# Patient Record
Sex: FEMALE | Race: WHITE | NOT HISPANIC OR LATINO | Employment: OTHER | URBAN - NONMETROPOLITAN AREA
[De-identification: names, ages, dates, MRNs, and addresses within clinical notes are randomized per-mention and may not be internally consistent; named-entity substitution may affect disease eponyms.]

---

## 2017-01-03 DIAGNOSIS — C50.412 MALIGNANT NEOPLASM OF UPPER-OUTER QUADRANT OF LEFT FEMALE BREAST (HCC): Primary | ICD-10-CM

## 2017-01-04 ENCOUNTER — INFUSION (OUTPATIENT)
Dept: ONCOLOGY | Facility: HOSPITAL | Age: 72
End: 2017-01-04
Attending: INTERNAL MEDICINE

## 2017-01-04 ENCOUNTER — OFFICE VISIT (OUTPATIENT)
Dept: ONCOLOGY | Facility: CLINIC | Age: 72
End: 2017-01-04

## 2017-01-04 ENCOUNTER — LAB (OUTPATIENT)
Dept: ONCOLOGY | Facility: CLINIC | Age: 72
End: 2017-01-04

## 2017-01-04 VITALS
HEART RATE: 60 BPM | DIASTOLIC BLOOD PRESSURE: 72 MMHG | TEMPERATURE: 98 F | OXYGEN SATURATION: 98 % | BODY MASS INDEX: 29.24 KG/M2 | WEIGHT: 175.5 LBS | SYSTOLIC BLOOD PRESSURE: 138 MMHG | HEIGHT: 65 IN | RESPIRATION RATE: 16 BRPM

## 2017-01-04 VITALS
RESPIRATION RATE: 20 BRPM | OXYGEN SATURATION: 99 % | SYSTOLIC BLOOD PRESSURE: 148 MMHG | TEMPERATURE: 97.6 F | DIASTOLIC BLOOD PRESSURE: 56 MMHG | HEART RATE: 61 BPM

## 2017-01-04 DIAGNOSIS — C50.412 MALIGNANT NEOPLASM OF UPPER-OUTER QUADRANT OF LEFT FEMALE BREAST (HCC): ICD-10-CM

## 2017-01-04 DIAGNOSIS — C50.412 MALIGNANT NEOPLASM OF UPPER-OUTER QUADRANT OF LEFT FEMALE BREAST (HCC): Primary | ICD-10-CM

## 2017-01-04 DIAGNOSIS — C79.51 BONE METASTASIS: Primary | ICD-10-CM

## 2017-01-04 LAB
ALBUMIN SERPL-MCNC: 4.1 G/DL (ref 3.5–5)
ALBUMIN/GLOB SERPL: 1 G/DL
ALP SERPL-CCNC: 83 U/L (ref 38–126)
ALT SERPL W P-5'-P-CCNC: 24 U/L (ref 9–52)
ANION GAP SERPL CALCULATED.3IONS-SCNC: 14 MMOL/L
AST SERPL-CCNC: 27 U/L (ref 5–40)
AUTO MIXED CELLS #: 0.3 10*3/UL (ref 0.1–1.5)
AUTO MIXED CELLS %: 4.7 % (ref 0.2–15.1)
BILIRUB SERPL-MCNC: 0.4 MG/DL (ref 0.2–1.3)
BUN BLD-MCNC: 17 MG/DL (ref 7–26)
BUN/CREAT SERPL: 13.1 (ref 7–25)
CALCIUM SPEC-SCNC: 9.3 MG/DL (ref 8.4–10.2)
CHLORIDE SERPL-SCNC: 103 MMOL/L (ref 98–107)
CO2 SERPL-SCNC: 27 MMOL/L (ref 22–30)
CREAT BLD-MCNC: 1.3 MG/DL (ref 0.7–1.4)
ERYTHROCYTE [DISTWIDTH] IN BLOOD BY AUTOMATED COUNT: 14.3 % (ref 11.5–14.5)
GFR SERPL CREATININE-BSD FRML MDRD: 40 ML/MIN/1.73
GLOBULIN UR ELPH-MCNC: 4.2 GM/DL
GLUCOSE BLD-MCNC: 120 MG/DL (ref 75–110)
HCT VFR BLD AUTO: 38.4 % (ref 37–47)
HGB BLD-MCNC: 11.2 G/DL (ref 12–16)
LYMPHOCYTES # BLD AUTO: 2.1 10*3/MM3 (ref 0.8–7)
LYMPHOCYTES NFR BLD AUTO: 34 % (ref 10–58.5)
MCH RBC QN AUTO: 34.5 PG (ref 27–31)
MCHC RBC AUTO-ENTMCNC: 29.2 G/DL (ref 33–37)
MCV RBC AUTO: 118.2 FL (ref 81–99)
NEUTROPHILS # BLD AUTO: 3.7 10*3/MM3 (ref 2–7.8)
NEUTROPHILS NFR BLD AUTO: 61.3 % (ref 37–92)
PLATELET # BLD AUTO: 437 10*3/MM3 (ref 130–400)
PMV BLD AUTO: 6.3 FL (ref 6–12)
POTASSIUM BLD-SCNC: 4.4 MMOL/L (ref 3.6–5)
PROT SERPL-MCNC: 8.3 G/DL (ref 6.3–8.2)
RBC # BLD AUTO: 3.25 10*6/MM3 (ref 4.2–5.4)
SODIUM BLD-SCNC: 144 MMOL/L (ref 137–145)
WBC NRBC COR # BLD: 6.1 10*3/MM3 (ref 4.8–10.8)

## 2017-01-04 PROCEDURE — 96402 CHEMO HORMON ANTINEOPL SQ/IM: CPT

## 2017-01-04 PROCEDURE — 99214 OFFICE O/P EST MOD 30 MIN: CPT | Performed by: INTERNAL MEDICINE

## 2017-01-04 PROCEDURE — 25010000002 FULVESTRANT PER 25 MG: Performed by: INTERNAL MEDICINE

## 2017-01-04 PROCEDURE — 80053 COMPREHEN METABOLIC PANEL: CPT | Performed by: INTERNAL MEDICINE

## 2017-01-04 PROCEDURE — 85025 COMPLETE CBC W/AUTO DIFF WBC: CPT | Performed by: INTERNAL MEDICINE

## 2017-01-04 PROCEDURE — 36415 COLL VENOUS BLD VENIPUNCTURE: CPT | Performed by: INTERNAL MEDICINE

## 2017-01-04 RX ADMIN — FULVESTRANT 500 MG: 50 INJECTION INTRAMUSCULAR at 14:26

## 2017-01-04 NOTE — PROGRESS NOTES
[facility]  HEMATOLOGY & ONCOLOGY        Subjective  patient is here for chemotherapy   Staging form: Breast, AJCC V7      Clinical stage from 10/11/2016: Stage IV (T2, N1, M1) - ER/VT positive. HER2/maynor negative  VISIT DIAGNOSIS:   Encounter Diagnosis   Name Primary?   • Malignant neoplasm of upper-outer quadrant of left female breast             HEMATOLOGY / ONCOLOGY HISTORY:   Oncology/Hematology History    Patient is a 67-year-old postmenopausal female who had onset of her menses at age 9 and menopause at age 48. She received oral contraceptives for approximately 14 years and did not receive hormonal manipulation. Patient has a maternal cousin had breast cancer. Patient has had no prior breast biopsies. Patient found a palpable left breast mass as well as a left axillary mass. Patient had a bloody nipple discharge which been present for approximately 6 months. On 6/20/2012, a mammogram was performed showing a subareolar mass consistent with malignancy. Bilateral breast ultrasound revealed an ill-defined subareolar mass measuring 2.8-2.5 cm. There is a left axillary mass measuring 1.7 cm with suspicion of extracapsular extension. There is no evidence of disease in the right breast. On 7/9/2012 patient underwent a left breast biopsy and placement of marker clip. Left breast biopsy revealed an infiltrating lobular carcinoma grade 2 with focal ductal carcinoma in situ, solid pattern. A fine-needle biopsy of the left lymph node was consistent with metastatic carcinoma. Breast tumor profile revealed ER: 100%; VT: 98%; HER-2/maynor 2+; FISH: Unamplified; Ki-67: 71%; P 53 1%; DNA aneuploidy. A MRI of the breast were performed body multiple abnormal enhancing masses within the left breast. There appears to be anterior retraction of the pectoralis muscle with no direct extension. There is a moderate to large, layering left pleural effusion appreciated. The right breast reveals 3 moderately enlarged lymph  nodes in the posterior lateral aspect the right breast. These have fatty hilum but followup is recommended. Patient is undergone systemic studies including, on 8/2/2012, a CT scan of the brain showing atrophy. a CT scan that shows showing a small to moderate left pleural effusion with 3 subcentimeter nodule densities in the left lung.   She completed 4 cycles of neoadjuvant treatment with dose dense Adriamycin and Cytoxan. She had a mammogram which showed a significant reduction in the size of her left breast lesion. There is no axillary adenopathy noted. She has had an ultrasound revealing the  lesion reducing from 2.4 cm to 1 cm. Patient underwent a left mastectomy on 03/19/2013 finding negative invasive cancer, negative nodes. A 5% DCIS was noted. The patient has declined hormonal therapy. She did not need radiation therapy.  November 2014, she began to have evidence of lytic bone lesions at T5T6, frontal disease, an 8 mm lucency in the central frontal area of  the skull but nothing intracranial. Bone scan revealed only vertex tracer activity but bilateral ribs and thoracic spine, and other lesions in  her pelvis. At that time, she was started on letrozole since November 2014. She is taking Xgeva. She had progression found in bone in Feb 2015 on scan. She was started on Faslodex, Ibrance and continued xgeva.         Malignant neoplasm of upper-outer quadrant of left female breast    7/9/2012 Initial Diagnosis    Malignant neoplasm of upper-outer quadrant of left female breast      7/10/2012 Biopsy    Left Breast Biopsy & Axillary Node FNA: A) Infiltrating lobular carcinoma, Grade 2. B) Foci of ductal carcinoma in situ, solid pattern. C) Greatest dimension of the invasive carcinoma is 15.8mm.      3/19/2013 Surgery    Left Mastectomy w/ Axillary Tail: Focal residual ducatal carcinoma in situ (DCIS) 12 lymph nodes negative for metastatic carcinoma.      10/20/2014 Biopsy    Peritoneum Biopsy: Final Diagnosis:  Malignant Cell Neoplasm.         Cancer Staging Information:  Malignant neoplasm of upper-outer quadrant of left female breast    Staging form: Breast, AJCC V7      Clinical stage from 10/11/2016: Stage IV (T2, N1, M1) - Signed by ALBERT Kulkarni on 10/11/2016      INTERVAL HISTORY  Patient ID: Heavenly Yanes is a 71 y.o. year old female history of metastatic breast carcinoma to lung and bone.  She is presently receiving treatment with Ibrance, Faslodex and Xgeva.  Tumor markers continued to decline with a CA 27-29 of 92.2 obtained on 9:30 2016 compared to 105.8 on 07/27/2016.  Mr. Yanes continues to tolerate this treatment regimen well and is here today for evaluation consideration to receive Faslodex and Xgeva.  Last bone mineral density study was obtained 11/21/2014 indicating osteopopenia, we will obtain a repeat bone mineral density study. Ms. Yanes indicates that she needs to have some significant dental work completed to include having 8 teeth pulled and implants placed to her lower jaw.  Discussed with Mrs. Yanes that this would have to be postponed for 3 months after her last dose of Xgeva which was given on 09/29/2016 and could not be restarted until 3 months after completion of dental work. She will have her dental work completed January 12th 2017  and if all goes well returned to Xgeva in April.  We will continue with the Ibrance and Faslodex as prescribed.  she has no new complaints today and indicates that otherwise she is doing well.           A 14 point review of systems is listed below and outlines the contribution of comorbidities to the activities of daily living. Acute complaints from review of systems have been evaluated and addressed with patient. Review of chronic complaints have also been reviewed with patient and are without change from previous exam as indicated below.      Past Medical History:   Past Medical History   Diagnosis Date   • Bipolar disorder    • Disease of thyroid  gland    • Hypertension    • Malignant neoplasm of upper-outer quadrant of left female breast 9/18/2016   • Secondary malignant neoplasm of bone and bone marrow 9/18/2016     Past Surgical History:   Past Surgical History   Procedure Laterality Date   • External ear surgery     • Breast surgery       left breast biopsy and axillary node   • Portacath placement     • Cholecystectomy     • Mastectomy Left      Social History:   Social History     Social History   • Marital status:      Spouse name: N/A   • Number of children: N/A   • Years of education: N/A     Occupational History   • Not on file.     Social History Main Topics   • Smoking status: Never Smoker   • Smokeless tobacco: Never Used   • Alcohol use No   • Drug use: No   • Sexual activity: Not on file     Other Topics Concern   • Not on file     Social History Narrative     Family History:   Family History   Problem Relation Age of Onset   • No Known Problems Daughter    • No Known Problems Son    • Other Mother      complications from a concussion from a fall   • Other Father      old age   • No Known Problems Brother    • No Known Problems Daughter        Review of Systems   Constitutional: Negative.  Negative for activity change, appetite change, chills, diaphoresis, fatigue and fever.   HENT: Negative.  Negative for congestion, ear discharge, ear pain, facial swelling, hearing loss, mouth sores, nosebleeds, postnasal drip, rhinorrhea, sinus pressure, sore throat, tinnitus, trouble swallowing and voice change.    Eyes: Negative.  Negative for photophobia, pain, discharge and visual disturbance.   Respiratory: Negative.  Negative for apnea, cough, chest tightness, shortness of breath, wheezing and stridor.    Cardiovascular: Negative.  Negative for chest pain, palpitations and leg swelling.   Gastrointestinal: Negative.  Negative for abdominal distention, abdominal pain, blood in stool, constipation, diarrhea, nausea, rectal pain and vomiting.    Endocrine: Negative.  Negative for cold intolerance, heat intolerance, polydipsia, polyphagia and polyuria.   Genitourinary: Negative.  Negative for difficulty urinating, dysuria, flank pain, frequency, hematuria and urgency.   Musculoskeletal: Negative.  Negative for arthralgias, back pain, gait problem, joint swelling and myalgias.   Skin: Negative.  Negative for color change, pallor, rash and wound.   Allergic/Immunologic: Negative.  Negative for environmental allergies, food allergies and immunocompromised state.   Neurological: Negative.  Negative for dizziness, tremors, seizures, syncope, speech difficulty, weakness, light-headedness, numbness and headaches.   Hematological: Negative.  Negative for adenopathy. Does not bruise/bleed easily.   Psychiatric/Behavioral: Negative.  Negative for agitation, confusion, hallucinations, sleep disturbance and suicidal ideas. The patient is not nervous/anxious.    All other systems reviewed and are negative.       Performance Status:  Asymptomatic    Medications:    Current Outpatient Prescriptions   Medication Sig Dispense Refill   • B Complex Vitamins (VITAMIN B COMPLEX PO) Take  by mouth. 1 po daily     • Calcium Carb-Cholecalciferol (CALCIUM 600 + D PO) Take  by mouth.     • celecoxib (CeleBREX) 200 MG capsule Take 200 mg by mouth 2 (two) times a day.     • DULoxetine (CYMBALTA) 30 MG capsule Take 30 mg by mouth daily.     • ferrous sulfate 325 (65 FE) MG tablet Take 325 mg by mouth 2 (two) times a day.     • Lysine HCl (L-LYSINE) 500 MG tablet tablet Take  by mouth daily.     • Magnesium 250 MG tablet Take  by mouth.     • melatonin 3 MG tablet Take  by mouth.     • metoprolol tartrate (LOPRESSOR) 25 MG tablet Take 25 mg by mouth 2 (two) times a day.     • nystatin (NYSTOP) 053059 UNIT/GM powder powder Apply  topically. prn     • OLANZapine (ZYPREXA) 2.5 MG tablet Take 2.5 mg by mouth every night.     • omeprazole (PriLOSEC) 40 MG capsule Take 40 mg by mouth Daily.      • Palbociclib 125 MG capsule capsule Take 125 mg by mouth daily. 3 weeks on, 1 week off     • Probiotic Product (PROBIOTIC DAILY PO) Take  by mouth. 300million cell daily     • thyroid 60 MG PO tablet Take 60 mg by mouth daily.     • VALERIAN ROOT PO Take  by mouth. 100mg daily     • Zinc 50 MG tablet Take  by mouth. 1 po daily       No current facility-administered medications for this visit.        ALLERGIES:    Allergies   Allergen Reactions   • Codeine    • Heparin    • Other      POPPY SEED   • Penicillins    • Petroleum Jelly [Skin Protectants, Misc.]    • Sulfa Antibiotics    • Valium [Diazepam]        Objective      There were no vitals filed for this visit.      No flowsheet data found.    General Appearance: Patient is awake, alert, oriented and in no acute distress. Patient is welldeveloped, wellnourished, and appears stated age.  HEENT: Normocephalic. Sclerae clear, conjunctiva pink, extraocular movements intact, pupils, round, reactive to light and accommodation. Mouth and throat are clear with moist oral mucosa.  NECK: Supple, no jugular venous distention, thyroid not enlarged.  LYMPH: No cervical, supraclavicular, axillary, or inguinal lymphadenopathy.  CHEST: Equal bilateral expansion   LUNGS: Good air movement, no rales, rhonchi, rubs or wheezes with auscultation  CARDIO: Regular sinus rhythm, no murmurs, gallops or rubs.  ABDOMEN: Nondistended, soft, No tenderness, no guarding, no rebound, No hepatosplenomegaly. No abdominal masses. Bowel sounds positive. No hernia  GENITALIA: Not examined.  BREASTS: Not examined.  MUSKEL: No joint swelling, decreased motion, or inflammation  EXTREMS: No edema, clubbing, cyanosis, No varicose veins.  NEURO: Grossly nonfocal, Gait is coordinated and smooth, Cognition is preserved.  SKIN: No rashes, no ecchymoses, no petechia.  PSYCH: Oriented to time, place and person. Memory is preserved. Mood and affect appear normal      RECENT LABS:  WBC   Date Value Ref  Range Status   01/04/2017 6.10 4.80 - 10.80 10*3/mm3 Final     RBC   Date Value Ref Range Status   01/04/2017 3.25 (L) 4.20 - 5.40 10*6/mm3 Final     HEMOGLOBIN   Date Value Ref Range Status   01/04/2017 11.2 (L) 12.0 - 16.0 g/dL Final     HEMATOCRIT   Date Value Ref Range Status   01/04/2017 38.4 37.0 - 47.0 % Final     MCV   Date Value Ref Range Status   01/04/2017 118.2 (H) 81.0 - 99.0 fL Final     MCH   Date Value Ref Range Status   01/04/2017 34.5 (H) 27.0 - 31.0 pg Final     MCHC   Date Value Ref Range Status   01/04/2017 29.2 (L) 33.0 - 37.0 g/dL Final     RDW   Date Value Ref Range Status   01/04/2017 14.3 11.5 - 14.5 % Final     MPV   Date Value Ref Range Status   01/04/2017 6.3 6.0 - 12.0 fL Final     PLATELETS   Date Value Ref Range Status   01/04/2017 437 (H) 130 - 400 10*3/mm3 Final     NEUTROPHIL %   Date Value Ref Range Status   01/04/2017 61.3 37.0 - 92.0 % Final     LYMPHOCYTE %   Date Value Ref Range Status   01/04/2017 34.0 10.0 - 58.5 % Final     NEUTROPHILS, ABSOLUTE   Date Value Ref Range Status   01/04/2017 3.70 2.00 - 7.80 10*3/mm3 Final     LYMPHOCYTES, ABSOLUTE   Date Value Ref Range Status   01/04/2017 2.10 0.80 - 7.00 10*3/mm3 Final     No visits with results within 7 Day(s) from this visit.  Latest known visit with results is:    Office Visit on 11/30/2016   Component Date Value Ref Range Status   • CA 27.29 11/30/2016 83.5* 0.0 - 38.6 U/mL Final    [facility]/ACS methodology   • CEA 11/30/2016 1.43  0.00 - 5.00 ng/mL Final       RADIOLOGY:  No results found.         Assessment/Plan     Patient Active Problem List   Diagnosis   • Malignant neoplasm of upper-outer quadrant of left female breast   • Secondary malignant neoplasm of bone and bone marrow   • Essential hypertension   • Acquired hypothyroidism   • Bipolar 1 disorder      Results for VÍCTOR BERNARD (MRN [id number]) as of 1/4/2017 12:45   Ref. Range 9/29/2016 13:02 11/30/2016 08:56   CA 27.29 Latest Ref Range: 0.0 - 38.6 U/mL  92.2 (H) 83.5 (H)   CEA Latest Ref Range: 0.00 - 5.00 ng/mL 1.55 1.43     Assessment:  1. Stage IV metastatic breast carcinoma, ER/AR positive. HER2/maynor negative undergoing Ibrance, Faslodex and Xgeva with overall stable with stable CA27.29 with slow decline. She is tolerating the treatment very well. Faslodex cycle 16 today and will continue to HOLD Xgeva for a total of 6 months for dental work, last dose of Xgeva given on 09/29/2016.    2. Chronic kidney disease overall stable.  3.  Needing 8 teeth pulled from lower jaw with plans from implants. Will hold Xgeva and patient will postpone procedures Juanuary 2017.      Recommendation/Plan:  1. CBC and CMP today and upon return.  2. Continue Faslodex and Ibrance.  3. HOLD Xgeva. Tentatively untill April 2017 for dental work.   3. Follow up in 28 days for Faslodex.  4. Advise patient to call with any problems between now and next appointment.  5. Tumor markers, ct scans and bone scan before next visit.         .    Delonte Childress MD    1/4/2017    12:43 PM

## 2017-01-04 NOTE — MR AVS SNAPSHOT
[facility] HEMATOLOGY & ONCOLOGY  [phone]                    Heavenly Yanes   1/4/2017 1:00 PM   Office Visit    Dept Phone:  [phone]   Encounter #:  [id number]    Provider:  Delonte Childress MD   Department:  [facility] HEMATOLOGY & ONCOLOGY                Your Full Care Plan              Your Updated Medication List          This list is accurate as of: 1/4/17  1:48 PM.  Always use your most recent med list.                CALCIUM 600 + D PO       celecoxib 200 MG capsule   Commonly known as:  CeleBREX       DULoxetine 30 MG capsule   Commonly known as:  CYMBALTA       ferrous sulfate 325 (65 FE) MG tablet       l-lysine 500 MG tablet tablet       Magnesium 250 MG tablet       melatonin 3 MG tablet       metoprolol tartrate 25 MG tablet   Commonly known as:  LOPRESSOR       nystatin 051290 UNIT/GM powder powder       OLANZapine 2.5 MG tablet   Commonly known as:  zyPREXA       omeprazole 40 MG capsule   Commonly known as:  priLOSEC       Palbociclib 125 MG capsule capsule       PROBIOTIC DAILY PO       Thyroid 60 MG tablet   Commonly known as:  ARMOUR       VALERIAN ROOT PO       VITAMIN B COMPLEX PO       Zinc 50 MG tablet               We Performed the Following     Clinic Appointment Request       You Were Diagnosed With        Codes Comments    Bone metastasis    -  Primary ICD-10-CM: C79.51  ICD-9-CM: 198.5     Malignant neoplasm of upper-outer quadrant of left female breast     ICD-10-CM: C50.412  ICD-9-CM: 174.4       Instructions     None    Patient Instructions History      Upcoming Appointments     Visit Type Date Time Department    FOLLOW UP 1 UNIT 1/4/2017  1:00 PM [facility] [place]    LAB 1/4/2017 12:45 PM [facility] [place]    ONCOLOGY TREAT - [place] 1/4/2017  1:30 PM  [facility]    FOLLOW UP 1 UNIT 2/1/2017  8:50 AM [facility] [place]    LAB 2/1/2017  8:30 AM [facility] [place]      MyChart Signup     Our records indicate that you have declined [facility]  "MyChart signup. If you would like to sign up for [employer], please email [email or web address] or call [phone] to obtain an activation code.             Other Info from Your Visit           Your Appointments     Feb 01, 2017  8:30 AM CST   LAB with [facility]   [facility] HEMATOLOGY & ONCOLOGY ([place])    [address] [address]   [phone]            Feb 01, 2017  8:50 AM CST   FOLLOW UP with Neptali Tolbert MD   [facility] HEMATOLOGY & ONCOLOGY ([place])    [address] [address]   [phone]              Allergies     Codeine      Heparin      Other      POPPY SEED    Penicillins      Petroleum Jelly [Skin Protectants, Misc.]      Sulfa Antibiotics      Valium [Diazepam]        Vital Signs     Blood Pressure Pulse Temperature Respirations Height Weight    138/72 60 98 °F (36.7 °C) (Tympanic) 16 65\" (165.1 cm) 175 lb 8 oz (79.6 kg)    Last Menstrual Period Oxygen Saturation Body Mass Index Smoking Status          (LMP Unknown) 98% 29.2 kg/m2 Never Smoker        Problems and Diagnoses Noted     Breast cancer    Bone metastasis    -  Primary        "

## 2017-01-11 ENCOUNTER — HOSPITAL ENCOUNTER (OUTPATIENT)
Dept: NUCLEAR MEDICINE | Age: 72
Discharge: HOME | End: 2017-01-11
Payer: MEDICARE

## 2017-01-11 ENCOUNTER — HOSPITAL ENCOUNTER (OUTPATIENT)
Dept: CT IMAGING | Age: 72
Discharge: HOME | End: 2017-01-11
Payer: MEDICARE

## 2017-01-11 DIAGNOSIS — C50.412 MALIGNANT NEOPLASM OF UPPER-OUTER QUADRANT OF LEFT FEMALE BREAST (HCC): ICD-10-CM

## 2017-01-11 DIAGNOSIS — C79.51 BONE METASTASIS (HCC): ICD-10-CM

## 2017-01-11 LAB
GFR NON-AFRICAN AMERICAN: 32
PERFORMED ON: ABNORMAL
POC CREATININE: 1.6 MG/DL (ref 0.3–1.3)
POC SAMPLE TYPE: ABNORMAL

## 2017-01-11 PROCEDURE — 74177 CT ABD & PELVIS W/CONTRAST: CPT

## 2017-01-11 PROCEDURE — 71260 CT THORAX DX C+: CPT

## 2017-01-11 PROCEDURE — A9561 TC99M OXIDRONATE: HCPCS | Performed by: INTERNAL MEDICINE

## 2017-01-11 PROCEDURE — 78306 BONE IMAGING WHOLE BODY: CPT

## 2017-01-11 PROCEDURE — 3430000000 HC RX DIAGNOSTIC RADIOPHARMACEUTICAL: Performed by: INTERNAL MEDICINE

## 2017-01-11 PROCEDURE — 82565 ASSAY OF CREATININE: CPT

## 2017-01-11 PROCEDURE — 6360000004 HC RX CONTRAST MEDICATION: Performed by: INTERNAL MEDICINE

## 2017-01-11 RX ADMIN — IOPAMIDOL 50 ML: 755 INJECTION, SOLUTION INTRAVENOUS at 08:44

## 2017-01-11 RX ADMIN — TECHNETIUM TC 99M OXIDRONATE 20 MILLICURIE: 3.15 INJECTION, POWDER, LYOPHILIZED, FOR SOLUTION INTRAVENOUS at 13:15

## 2017-02-01 ENCOUNTER — LAB (OUTPATIENT)
Dept: ONCOLOGY | Facility: CLINIC | Age: 72
End: 2017-02-01

## 2017-02-01 ENCOUNTER — OFFICE VISIT (OUTPATIENT)
Dept: ONCOLOGY | Facility: CLINIC | Age: 72
End: 2017-02-01

## 2017-02-01 ENCOUNTER — INFUSION (OUTPATIENT)
Dept: ONCOLOGY | Facility: HOSPITAL | Age: 72
End: 2017-02-01
Attending: INTERNAL MEDICINE

## 2017-02-01 ENCOUNTER — OFFICE VISIT (OUTPATIENT)
Dept: CARDIOLOGY | Age: 72
End: 2017-02-01
Payer: COMMERCIAL

## 2017-02-01 VITALS
WEIGHT: 180 LBS | BODY MASS INDEX: 29.99 KG/M2 | HEART RATE: 74 BPM | SYSTOLIC BLOOD PRESSURE: 130 MMHG | HEIGHT: 65 IN | DIASTOLIC BLOOD PRESSURE: 78 MMHG

## 2017-02-01 VITALS
HEART RATE: 58 BPM | RESPIRATION RATE: 18 BRPM | SYSTOLIC BLOOD PRESSURE: 172 MMHG | TEMPERATURE: 96.9 F | DIASTOLIC BLOOD PRESSURE: 80 MMHG | OXYGEN SATURATION: 99 %

## 2017-02-01 VITALS
RESPIRATION RATE: 16 BRPM | DIASTOLIC BLOOD PRESSURE: 70 MMHG | HEART RATE: 56 BPM | OXYGEN SATURATION: 98 % | TEMPERATURE: 97.6 F | HEIGHT: 65 IN | BODY MASS INDEX: 30.52 KG/M2 | SYSTOLIC BLOOD PRESSURE: 124 MMHG | WEIGHT: 183.2 LBS

## 2017-02-01 DIAGNOSIS — C50.412 MALIGNANT NEOPLASM OF UPPER-OUTER QUADRANT OF LEFT FEMALE BREAST (HCC): Primary | ICD-10-CM

## 2017-02-01 DIAGNOSIS — I10 ESSENTIAL HYPERTENSION: ICD-10-CM

## 2017-02-01 DIAGNOSIS — E53.8 B12 DEFICIENCY: Primary | ICD-10-CM

## 2017-02-01 DIAGNOSIS — C50.412 MALIGNANT NEOPLASM OF UPPER-OUTER QUADRANT OF LEFT FEMALE BREAST (HCC): ICD-10-CM

## 2017-02-01 DIAGNOSIS — I48.91 ATRIAL FIBRILLATION, UNSPECIFIED TYPE (HCC): ICD-10-CM

## 2017-02-01 LAB
ALBUMIN SERPL-MCNC: 3.9 G/DL (ref 3.5–5)
ALBUMIN/GLOB SERPL: 1 G/DL
ALP SERPL-CCNC: 75 U/L (ref 38–126)
ALT SERPL W P-5'-P-CCNC: 23 U/L (ref 9–52)
ANION GAP SERPL CALCULATED.3IONS-SCNC: 12 MMOL/L
AST SERPL-CCNC: 44 U/L (ref 5–40)
AUTO MIXED CELLS #: 0.4 10*3/UL (ref 0.1–1.5)
AUTO MIXED CELLS %: 8.4 % (ref 0.2–15.1)
BILIRUB SERPL-MCNC: 0.4 MG/DL (ref 0.2–1.3)
BUN BLD-MCNC: 16 MG/DL (ref 7–26)
BUN/CREAT SERPL: 14.5 (ref 7–25)
CALCIUM SPEC-SCNC: 9.2 MG/DL (ref 8.4–10.2)
CHLORIDE SERPL-SCNC: 102 MMOL/L (ref 98–107)
CO2 SERPL-SCNC: 29 MMOL/L (ref 22–30)
CREAT BLD-MCNC: 1.1 MG/DL (ref 0.7–1.4)
ERYTHROCYTE [DISTWIDTH] IN BLOOD BY AUTOMATED COUNT: 15.2 % (ref 11.5–14.5)
GFR SERPL CREATININE-BSD FRML MDRD: 49 ML/MIN/1.73
GLOBULIN UR ELPH-MCNC: 3.8 GM/DL
GLUCOSE BLD-MCNC: 102 MG/DL (ref 75–110)
HCT VFR BLD AUTO: 35.3 % (ref 37–47)
HGB BLD-MCNC: 10.6 G/DL (ref 12–16)
LYMPHOCYTES # BLD AUTO: 1.7 10*3/MM3 (ref 0.8–7)
LYMPHOCYTES NFR BLD AUTO: 36.2 % (ref 10–58.5)
MCH RBC QN AUTO: 34.5 PG (ref 27–31)
MCHC RBC AUTO-ENTMCNC: 30 G/DL (ref 33–37)
MCV RBC AUTO: 114.9 FL (ref 81–99)
NEUTROPHILS # BLD AUTO: 2.7 10*3/MM3 (ref 2–7.8)
NEUTROPHILS NFR BLD AUTO: 55.4 % (ref 37–92)
PLATELET # BLD AUTO: 285 10*3/MM3 (ref 130–400)
PMV BLD AUTO: 6.8 FL (ref 6–12)
POTASSIUM BLD-SCNC: 4.7 MMOL/L (ref 3.6–5)
PROT SERPL-MCNC: 7.7 G/DL (ref 6.3–8.2)
RBC # BLD AUTO: 3.07 10*6/MM3 (ref 4.2–5.4)
SODIUM BLD-SCNC: 143 MMOL/L (ref 137–145)
WBC NRBC COR # BLD: 4.8 10*3/MM3 (ref 4.8–10.8)

## 2017-02-01 PROCEDURE — 25010000002 FULVESTRANT PER 25 MG: Performed by: INTERNAL MEDICINE

## 2017-02-01 PROCEDURE — 99214 OFFICE O/P EST MOD 30 MIN: CPT | Performed by: INTERNAL MEDICINE

## 2017-02-01 PROCEDURE — 36415 COLL VENOUS BLD VENIPUNCTURE: CPT | Performed by: INTERNAL MEDICINE

## 2017-02-01 PROCEDURE — 99213 OFFICE O/P EST LOW 20 MIN: CPT | Performed by: INTERNAL MEDICINE

## 2017-02-01 PROCEDURE — 85025 COMPLETE CBC W/AUTO DIFF WBC: CPT | Performed by: INTERNAL MEDICINE

## 2017-02-01 PROCEDURE — 96402 CHEMO HORMON ANTINEOPL SQ/IM: CPT

## 2017-02-01 PROCEDURE — 80053 COMPREHEN METABOLIC PANEL: CPT | Performed by: INTERNAL MEDICINE

## 2017-02-01 RX ADMIN — FULVESTRANT 500 MG: 50 INJECTION INTRAMUSCULAR at 10:21

## 2017-02-01 NOTE — TELEPHONE ENCOUNTER
Heavenly stated that she was supposed to start back on Ibrance but has not received her prescription yet.  I called [facility] and was told that they have tried to get in touch with Heavenly twice last month because she has to answer questions for them and so far she has not returned their call.  Called Heavenly and gave her the number to call at [facility]([phone]).

## 2017-02-01 NOTE — PROGRESS NOTES
[facility]  HEMATOLOGY & ONCOLOGY        Subjective  patient is here for chemotherapy   Staging form: Breast, AJCC V7      Clinical stage from 10/11/2016: Stage IV (T2, N1, M1) - ER/IN positive. HER2/maynor negative  VISIT DIAGNOSIS:   No diagnosis found.         HEMATOLOGY / ONCOLOGY HISTORY:   Oncology/Hematology History    Patient is a 67-year-old postmenopausal female who had onset of her menses at age 9 and menopause at age 48. She received oral contraceptives for approximately 14 years and did not receive hormonal manipulation. Patient has a maternal cousin had breast cancer. Patient has had no prior breast biopsies. Patient found a palpable left breast mass as well as a left axillary mass. Patient had a bloody nipple discharge which been present for approximately 6 months. On 6/20/2012, a mammogram was performed showing a subareolar mass consistent with malignancy. Bilateral breast ultrasound revealed an ill-defined subareolar mass measuring 2.8-2.5 cm. There is a left axillary mass measuring 1.7 cm with suspicion of extracapsular extension. There is no evidence of disease in the right breast. On 7/9/2012 patient underwent a left breast biopsy and placement of marker clip. Left breast biopsy revealed an infiltrating lobular carcinoma grade 2 with focal ductal carcinoma in situ, solid pattern. A fine-needle biopsy of the left lymph node was consistent with metastatic carcinoma. Breast tumor profile revealed ER: 100%; IN: 98%; HER-2/maynor 2+; FISH: Unamplified; Ki-67: 71%; P 53 1%; DNA aneuploidy. A MRI of the breast were performed body multiple abnormal enhancing masses within the left breast. There appears to be anterior retraction of the pectoralis muscle with no direct extension. There is a moderate to large, layering left pleural effusion appreciated. The right breast reveals 3 moderately enlarged lymph nodes in the posterior lateral aspect the right breast. These have fatty hilum but  followup is recommended. Patient is undergone systemic studies including, on 8/2/2012, a CT scan of the brain showing atrophy. a CT scan that shows showing a small to moderate left pleural effusion with 3 subcentimeter nodule densities in the left lung.   She completed 4 cycles of neoadjuvant treatment with dose dense Adriamycin and Cytoxan. She had a mammogram which showed a significant reduction in the size of her left breast lesion. There is no axillary adenopathy noted. She has had an ultrasound revealing the  lesion reducing from 2.4 cm to 1 cm. Patient underwent a left mastectomy on 03/19/2013 finding negative invasive cancer, negative nodes. A 5% DCIS was noted. The patient has declined hormonal therapy. She did not need radiation therapy.  November 2014, she began to have evidence of lytic bone lesions at T5T6, frontal disease, an 8 mm lucency in the central frontal area of  the skull but nothing intracranial. Bone scan revealed only vertex tracer activity but bilateral ribs and thoracic spine, and other lesions in  her pelvis. At that time, she was started on letrozole since November 2014. She is taking Xgeva. She had progression found in bone in Feb 2015 on scan. She was started on Faslodex, Ibrance and continued xgeva.         Malignant neoplasm of upper-outer quadrant of left female breast    7/9/2012 Initial Diagnosis    Malignant neoplasm of upper-outer quadrant of left female breast      7/10/2012 Biopsy    Left Breast Biopsy & Axillary Node FNA: A) Infiltrating lobular carcinoma, Grade 2. B) Foci of ductal carcinoma in situ, solid pattern. C) Greatest dimension of the invasive carcinoma is 15.8mm.      3/19/2013 Surgery    Left Mastectomy w/ Axillary Tail: Focal residual ducatal carcinoma in situ (DCIS) 12 lymph nodes negative for metastatic carcinoma.      10/20/2014 Biopsy    Peritoneum Biopsy: Final Diagnosis: Malignant Cell Neoplasm.        Secondary malignant neoplasm of bone and bone marrow     10/15/2012 -  Other Event    Left mammogram:  Impression:  1. Decreasing spiculation and density in the retroareolar left breast, near a previous biopsy.   2. Definite left axillary lesion is not appreciated.       2/5/2013 -  Other Event    Diagnostic Mammo:  Comparison is made to 10/15/2012 and 6/20/2012  The spiculated mass at 12:00 behind the nipple is nearly resolved.   Impression:  1. The mass on the left side is less apparent after receiving chemotherapy. There has been a good response to chmotherapy on the left side.   2. No suspicious abnormality is seen in the right breast to account for the new palable abnormality at the 4-5:00 position.       3/19/2013 Surgery    Breast Biopsy:  Final Diagnosis:  A. Breast, left mastectomy with axillary tail  1. Focal residual ductal carcinoma in situ (DICS) (less than 5% of tumor bed).  2. Negative for residual invasive carcinoma.   3. 12 Lymph Nodes, negative for metastatic carcinoma (0/12) focally, some lymph nodes demonstrate fibrosis/treatment effect.  B. Skin and soft tissue, left advancement flap:  1. Benign skin and subcutis.   2. Negative for carcinoma.       6/20/2013 -  Other Event    Diagnostic Mammo [facility]:  1. A subareolar mass is consistent with malignancy. Additional involement extends inferiorly sonographically and superolaterally mammograhically as demonstrated by calcifications. The mass measures approx 2.8 cm sonographically, but the involved region is likely much larger including an intraductal componet. The left axillary lymph nodes are also involved.   2. Recommened ultrasound guided biopsy of the left subareolar mass and left axillary lymph node.   3. Breast MRI could also further define multicentric disease on the left.   4. Clear suspicious finding on the right.  Recommend ongoing clinical follow up of palpable foci.       6/13/2014 -  Other Event    Diagnostic Mammo:  IMPRESSION:  1. History of left breast cancer and mastectomy. Stable  benign right breast mammograms.       10/20/2014 Surgery    Final Diagnosis:  Biopsies, pertoneum:  Metastatic carcinoma, morphologically compatible with metastatic mammary carcinoma.       6/15/2015 -  Other Event    Diagnostic Mammo:  IMPRESSION:   1. Negative x-ray report, should not delay biopsy if a dominat or clinically suspicious mass is present.       7/5/2016 -  Other Event    Diagnositic mammogram:  Impression:   Stable right mamogram with no radiographic findings suspicious for malignancy. Recommend continued screening mammography per screening guidelines unless otherwise earlier clinically indicated.       9/18/2016 Initial Diagnosis    Secondary malignant neoplasm of bone and bone marrow       Cancer Staging Information:  Malignant neoplasm of upper-outer quadrant of left female breast    Staging form: Breast, AJCC V7      Clinical stage from 10/11/2016: Stage IV (T2, N1, M1) - Signed by ALBERT Kulkarni on 10/11/2016      INTERVAL HISTORY  Patient ID: Heavenly Yanes is a 71 y.o. year old female history of metastatic breast carcinoma to lung and bone.  She is presently receiving treatment with Ibrance, Faslodex and Xgeva.  Tumor markers continued to decline with a CA 27-29 of 92.2 obtained on 9:30 2016 compared to 105.8 on 07/27/2016.  Mr. Yanes continues to tolerate this treatment regimen well and is here today for evaluation consideration to receive Faslodex and Xgeva.  Last bone mineral density study was obtained 11/21/2014 indicating osteopopenia, we will obtain a repeat bone mineral density study. Ms. Yanes indicates that she needs to have some significant dental work completed to include having 8 teeth pulled and implants placed to her lower jaw.  Discussed with Mrs. Yanes that this would have to be postponed for 3 months after her last dose of Xgeva which was given on 09/29/2016 and could not be restarted until 3 months after completion of dental work. She will have her dental work  completed January 12th 2017  and if all goes well returned to [facility] in April.  We will continue with the Ibrance and Faslodex as prescribed.  she has no new complaints today and indicates that otherwise she is doing well.           Past Medical History:   Past Medical History   Diagnosis Date   • Bipolar disorder    • Disease of thyroid gland    • Hypertension    • Malignant neoplasm of upper-outer quadrant of left female breast 9/18/2016   • Secondary malignant neoplasm of bone and bone marrow 9/18/2016     Past Surgical History:   Past Surgical History   Procedure Laterality Date   • External ear surgery     • Breast surgery       left breast biopsy and axillary node   • Portacath placement     • Cholecystectomy     • Mastectomy Left      Social History:   Social History     Social History   • Marital status:      Spouse name: N/A   • Number of children: N/A   • Years of education: N/A     Occupational History   • Not on file.     Social History Main Topics   • Smoking status: Never Smoker   • Smokeless tobacco: Never Used   • Alcohol use No   • Drug use: No   • Sexual activity: Not on file     Other Topics Concern   • Not on file     Social History Narrative     Family History:   Family History   Problem Relation Age of Onset   • No Known Problems Daughter    • No Known Problems Son    • Other Mother      complications from a concussion from a fall   • Other Father      old age   • No Known Problems Brother    • No Known Problems Daughter        Review of Systems   Constitutional: Negative.  Negative for activity change, appetite change, chills, diaphoresis, fatigue and fever.   HENT: Negative.  Negative for congestion, ear discharge, ear pain, facial swelling, hearing loss, mouth sores, nosebleeds, postnasal drip, rhinorrhea, sinus pressure, sore throat, tinnitus, trouble swallowing and voice change.    Eyes: Negative.  Negative for photophobia, pain, discharge and visual disturbance.   Respiratory:  Negative.  Negative for apnea, cough, chest tightness, shortness of breath, wheezing and stridor.    Cardiovascular: Negative.  Negative for chest pain, palpitations and leg swelling.   Gastrointestinal: Negative.  Negative for abdominal distention, abdominal pain, blood in stool, constipation, diarrhea, nausea, rectal pain and vomiting.   Endocrine: Negative.  Negative for cold intolerance, heat intolerance, polydipsia, polyphagia and polyuria.   Genitourinary: Negative.  Negative for difficulty urinating, dysuria, flank pain, frequency, hematuria and urgency.   Musculoskeletal: Negative.  Negative for arthralgias, back pain, gait problem, joint swelling and myalgias.   Skin: Negative.  Negative for color change, pallor, rash and wound.   Allergic/Immunologic: Negative.  Negative for environmental allergies, food allergies and immunocompromised state.   Neurological: Negative.  Negative for dizziness, tremors, seizures, syncope, speech difficulty, weakness, light-headedness, numbness and headaches.   Hematological: Negative.  Negative for adenopathy. Does not bruise/bleed easily.   Psychiatric/Behavioral: Negative.  Negative for agitation, confusion, hallucinations, sleep disturbance and suicidal ideas. The patient is not nervous/anxious.    All other systems reviewed and are negative.       Performance Status:  Asymptomatic    Medications:    Current Outpatient Prescriptions   Medication Sig Dispense Refill   • B Complex Vitamins (VITAMIN B COMPLEX PO) Take  by mouth. 1 po daily     • Calcium Carb-Cholecalciferol (CALCIUM 600 + D PO) Take  by mouth.     • celecoxib (CeleBREX) 200 MG capsule Take 200 mg by mouth 2 (two) times a day.     • DULoxetine (CYMBALTA) 30 MG capsule Take 30 mg by mouth daily.     • ferrous sulfate 325 (65 FE) MG tablet Take 325 mg by mouth 2 (two) times a day.     • Lysine HCl (L-LYSINE) 500 MG tablet tablet Take  by mouth daily.     • Magnesium 250 MG tablet Take  by mouth.     • melatonin  "3 MG tablet Take  by mouth.     • metoprolol tartrate (LOPRESSOR) 25 MG tablet Take 25 mg by mouth 2 (two) times a day.     • nystatin (NYSTOP) 319418 UNIT/GM powder powder Apply  topically. prn     • OLANZapine (ZYPREXA) 2.5 MG tablet Take 2.5 mg by mouth every night.     • omeprazole (PriLOSEC) 40 MG capsule Take 40 mg by mouth Daily.     • Palbociclib 125 MG capsule capsule Take 1 capsule by mouth Daily. 3 weeks on, 1 week off 21 capsule 5   • Probiotic Product (PROBIOTIC DAILY PO) Take  by mouth. 300million cell daily     • thyroid 60 MG PO tablet Take 60 mg by mouth daily.     • VALERIAN ROOT PO Take  by mouth. 100mg daily     • Zinc 50 MG tablet Take  by mouth. 1 po daily       No current facility-administered medications for this visit.        ALLERGIES:    Allergies   Allergen Reactions   • Codeine    • Heparin    • Other      POPPY SEED   • Penicillins    • Petroleum Jelly [Skin Protectants, Misc.]    • Sulfa Antibiotics    • Valium [Diazepam]        Objective      Vitals:    02/01/17 0855   BP: 124/70   Pulse: 56   Resp: 16   Temp: 97.6 °F (36.4 °C)   TempSrc: Tympanic   SpO2: 98%   Weight: 183 lb 3.2 oz (83.1 kg)   Height: 65\" (165.1 cm)         Current Status 2/1/2017   ECOG score 0       General Appearance: Patient is awake, alert, oriented and in no acute distress. Patient is welldeveloped, wellnourished, and appears stated age.  HEENT: Normocephalic. Sclerae clear, conjunctiva pink, extraocular movements intact, pupils, round, reactive to light and accommodation. Mouth and throat are clear with moist oral mucosa.  NECK: Supple, no jugular venous distention, thyroid not enlarged.  LYMPH: No cervical, supraclavicular, axillary, or inguinal lymphadenopathy.  CHEST: Equal bilateral expansion   LUNGS: Good air movement, no rales, rhonchi, rubs or wheezes with auscultation  CARDIO: Regular sinus rhythm, no murmurs, gallops or rubs.  ABDOMEN: Nondistended, soft, No tenderness, no guarding, no rebound, No " hepatosplenomegaly. No abdominal masses. Bowel sounds positive. No hernia  GENITALIA: Not examined.  BREASTS: Not examined.  MUSKEL: No joint swelling, decreased motion, or inflammation  EXTREMS: No edema, clubbing, cyanosis, No varicose veins.  NEURO: Grossly nonfocal, Gait is coordinated and smooth, Cognition is preserved.  SKIN: No rashes, no ecchymoses, no petechia.  PSYCH: Oriented to time, place and person. Memory is preserved. Mood and affect appear normal      RECENT LABS:  WBC   Date Value Ref Range Status   02/01/2017 4.80 4.80 - 10.80 10*3/mm3 Final     RBC   Date Value Ref Range Status   02/01/2017 3.07 (L) 4.20 - 5.40 10*6/mm3 Final     HEMOGLOBIN   Date Value Ref Range Status   02/01/2017 10.6 (L) 12.0 - 16.0 g/dL Final     HEMATOCRIT   Date Value Ref Range Status   02/01/2017 35.3 (L) 37.0 - 47.0 % Final     MCV   Date Value Ref Range Status   02/01/2017 114.9 (H) 81.0 - 99.0 fL Final     MCH   Date Value Ref Range Status   02/01/2017 34.5 (H) 27.0 - 31.0 pg Final     MCHC   Date Value Ref Range Status   02/01/2017 30.0 (L) 33.0 - 37.0 g/dL Final     RDW   Date Value Ref Range Status   02/01/2017 15.2 (H) 11.5 - 14.5 % Final     MPV   Date Value Ref Range Status   02/01/2017 6.8 6.0 - 12.0 fL Final     PLATELETS   Date Value Ref Range Status   02/01/2017 285 130 - 400 10*3/mm3 Final     NEUTROPHIL %   Date Value Ref Range Status   02/01/2017 55.4 37.0 - 92.0 % Final     LYMPHOCYTE %   Date Value Ref Range Status   02/01/2017 36.2 10.0 - 58.5 % Final     NEUTROPHILS, ABSOLUTE   Date Value Ref Range Status   02/01/2017 2.70 2.00 - 7.80 10*3/mm3 Final     LYMPHOCYTES, ABSOLUTE   Date Value Ref Range Status   02/01/2017 1.70 0.80 - 7.00 10*3/mm3 Final     Orders Only on 02/01/2017   Component Date Value Ref Range Status   • WBC 02/01/2017 4.80  4.80 - 10.80 10*3/mm3 Final   • RBC 02/01/2017 3.07* 4.20 - 5.40 10*6/mm3 Final   • Hemoglobin 02/01/2017 10.6* 12.0 - 16.0 g/dL Final   • Hematocrit 02/01/2017  35.3* 37.0 - 47.0 % Final   • MCV 02/01/2017 114.9* 81.0 - 99.0 fL Final   • MCH 02/01/2017 34.5* 27.0 - 31.0 pg Final   • MCHC 02/01/2017 30.0* 33.0 - 37.0 g/dL Final   • RDW 02/01/2017 15.2* 11.5 - 14.5 % Final   • MPV 02/01/2017 6.8  6.0 - 12.0 fL Final   • Platelets 02/01/2017 285  130 - 400 10*3/mm3 Final   • Neutrophil % 02/01/2017 55.4  37.0 - 92.0 % Final   • Lymphocyte % 02/01/2017 36.2  10.0 - 58.5 % Final   • Auto Mixed Cells % 02/01/2017 8.4  0.2 - 15.1 % Final   • Neutrophils, Absolute 02/01/2017 2.70  2.00 - 7.80 10*3/mm3 Final   • Lymphocytes, Absolute 02/01/2017 1.70  0.80 - 7.00 10*3/mm3 Final   • Auto Mixed Cells # 02/01/2017 0.40  0.10 - 1.50 10*3/uL Final       RADIOLOGY:  No results found.         Assessment/Plan     Patient Active Problem List   Diagnosis   • Malignant neoplasm of upper-outer quadrant of left female breast   • Secondary malignant neoplasm of bone and bone marrow   • Essential hypertension   • Acquired hypothyroidism   • Bipolar 1 disorder      Results for VÍCTOR BERNARD (MRN [id number]) as of 1/4/2017 12:45   Ref. Range 9/29/2016 13:02 11/30/2016 08:56   CA 27.29 Latest Ref Range: 0.0 - 38.6 U/mL 92.2 (H) 83.5 (H)   CEA Latest Ref Range: 0.00 - 5.00 ng/mL 1.55 1.43     Assessment:  1. Stage IV metastatic breast carcinoma, ER/CT positive. HER2/maynor negative undergoing Ibrance, Faslodex and Xgeva with overall stable with stable CA27.29 with slow decline. She is tolerating the treatment very well. Faslodex cycle 17 today and will continue to HOLD Xgeva due to upcoming dental work, last dose of Xgeva given on 09/29/2016.  Restaging imaging CT chest abdomen and pelvis and bone scan showed stable diffuse sclerotic bony lesions, unchanged from 7/1/2016.  Next bone scan in August 2017.    2. Chronic kidney disease overall stable.    3.  Needing 8 teeth pulled from lower jaw with plans from implants. Will hold Xgeva and patient will postpone procedures Juanuary 2017.      4.  Microcytic  anemia.  We will check Vitamin B12 and folate level.    Recommendation/Plan:  1.  Proceed with the Faslodex cycle 17 at the hospital  2. Continue Ibrance 125 mg 3 weeks on and one-week off.  Patient's insurance was changed recently and she is awaiting get on Ibrance approval.  3. HOLD Xgeva. Tentatively untill April 2017 for dental work.   3. Follow up in 28 days for Faslodex.           .    Rocio Roman MD    2/1/2017    9:16 AM

## 2017-02-02 LAB
FOLATE SERPL-MCNC: >20 NG/ML
VIT B12 SERPL-MCNC: 376 PG/ML (ref 239–931)

## 2017-03-01 ENCOUNTER — OFFICE VISIT (OUTPATIENT)
Dept: ONCOLOGY | Facility: CLINIC | Age: 72
End: 2017-03-01

## 2017-03-01 ENCOUNTER — LAB (OUTPATIENT)
Dept: ONCOLOGY | Facility: CLINIC | Age: 72
End: 2017-03-01

## 2017-03-01 ENCOUNTER — INFUSION (OUTPATIENT)
Dept: ONCOLOGY | Facility: HOSPITAL | Age: 72
End: 2017-03-01
Attending: INTERNAL MEDICINE

## 2017-03-01 VITALS
HEART RATE: 51 BPM | HEIGHT: 65 IN | SYSTOLIC BLOOD PRESSURE: 152 MMHG | WEIGHT: 173 LBS | TEMPERATURE: 97.5 F | BODY MASS INDEX: 28.82 KG/M2 | RESPIRATION RATE: 18 BRPM | OXYGEN SATURATION: 98 % | DIASTOLIC BLOOD PRESSURE: 50 MMHG

## 2017-03-01 VITALS
RESPIRATION RATE: 18 BRPM | HEART RATE: 63 BPM | OXYGEN SATURATION: 98 % | HEIGHT: 65 IN | DIASTOLIC BLOOD PRESSURE: 78 MMHG | WEIGHT: 173 LBS | TEMPERATURE: 97.9 F | BODY MASS INDEX: 28.82 KG/M2 | SYSTOLIC BLOOD PRESSURE: 124 MMHG

## 2017-03-01 DIAGNOSIS — C50.412 MALIGNANT NEOPLASM OF UPPER-OUTER QUADRANT OF LEFT FEMALE BREAST (HCC): Primary | ICD-10-CM

## 2017-03-01 DIAGNOSIS — C79.51 CARCINOMA OF LEFT BREAST METASTATIC TO BONE (HCC): ICD-10-CM

## 2017-03-01 DIAGNOSIS — C50.412 MALIGNANT NEOPLASM OF UPPER-OUTER QUADRANT OF LEFT FEMALE BREAST (HCC): ICD-10-CM

## 2017-03-01 DIAGNOSIS — C50.912 CARCINOMA OF LEFT BREAST METASTATIC TO BONE (HCC): ICD-10-CM

## 2017-03-01 DIAGNOSIS — C79.51 CARCINOMA OF LEFT BREAST METASTATIC TO BONE (HCC): Primary | ICD-10-CM

## 2017-03-01 DIAGNOSIS — C50.912 CARCINOMA OF LEFT BREAST METASTATIC TO BONE (HCC): Primary | ICD-10-CM

## 2017-03-01 DIAGNOSIS — E53.8 B12 DEFICIENCY: ICD-10-CM

## 2017-03-01 PROBLEM — C50.911 CARCINOMA OF RIGHT BREAST METASTATIC TO BONE (HCC): Status: ACTIVE | Noted: 2017-03-01

## 2017-03-01 LAB
AUTO MIXED CELLS #: 0.3 10*3/UL (ref 0.1–1.5)
AUTO MIXED CELLS %: 7 % (ref 0.2–15.1)
ERYTHROCYTE [DISTWIDTH] IN BLOOD BY AUTOMATED COUNT: 14.8 % (ref 11.5–14.5)
HCT VFR BLD AUTO: 34.6 % (ref 37–47)
HGB BLD-MCNC: 11.1 G/DL (ref 12–16)
LYMPHOCYTES # BLD AUTO: 1.9 10*3/MM3 (ref 0.8–7)
LYMPHOCYTES NFR BLD AUTO: 44.3 % (ref 10–58.5)
MCH RBC QN AUTO: 36.4 PG (ref 27–31)
MCHC RBC AUTO-ENTMCNC: 32.1 G/DL (ref 33–37)
MCV RBC AUTO: 113.4 FL (ref 81–99)
NEUTROPHILS # BLD AUTO: 2.1 10*3/MM3 (ref 2–7.8)
NEUTROPHILS NFR BLD AUTO: 48.7 % (ref 37–92)
PLATELET # BLD AUTO: 208 10*3/MM3 (ref 130–400)
PMV BLD AUTO: 6.8 FL (ref 6–12)
RBC # BLD AUTO: 3.05 10*6/MM3 (ref 4.2–5.4)
WBC NRBC COR # BLD: 4.3 10*3/MM3 (ref 4.8–10.8)

## 2017-03-01 PROCEDURE — 96402 CHEMO HORMON ANTINEOPL SQ/IM: CPT

## 2017-03-01 PROCEDURE — 25010000002 FULVESTRANT PER 25 MG: Performed by: INTERNAL MEDICINE

## 2017-03-01 PROCEDURE — 99214 OFFICE O/P EST MOD 30 MIN: CPT | Performed by: INTERNAL MEDICINE

## 2017-03-01 PROCEDURE — 36415 COLL VENOUS BLD VENIPUNCTURE: CPT | Performed by: INTERNAL MEDICINE

## 2017-03-01 PROCEDURE — 85025 COMPLETE CBC W/AUTO DIFF WBC: CPT | Performed by: INTERNAL MEDICINE

## 2017-03-01 RX ADMIN — FULVESTRANT 500 MG: 50 INJECTION INTRAMUSCULAR at 10:18

## 2017-03-01 NOTE — PROGRESS NOTES
[facility]  HEMATOLOGY & ONCOLOGY        Subjective    Metastatic breast cancer on Faslodex and Ibrance     Staging form: Breast, AJCC V7      Clinical stage from 10/11/2016: Stage IV (T2, N1, M1) - ER/ND positive. HER2/maynor negative  VISIT DIAGNOSIS:   Metastatic breast cancer       HEMATOLOGY / ONCOLOGY HISTORY:   Oncology/Hematology History    Patient is a 67-year-old postmenopausal female who had onset of her menses at age 9 and menopause at age 48. She received oral contraceptives for approximately 14 years and did not receive hormonal manipulation. Patient has a maternal cousin had breast cancer. Patient has had no prior breast biopsies. Patient found a palpable left breast mass as well as a left axillary mass. Patient had a bloody nipple discharge which been present for approximately 6 months. On 6/20/2012, a mammogram was performed showing a subareolar mass consistent with malignancy. Bilateral breast ultrasound revealed an ill-defined subareolar mass measuring 2.8-2.5 cm. There is a left axillary mass measuring 1.7 cm with suspicion of extracapsular extension. There is no evidence of disease in the right breast. On 7/9/2012 patient underwent a left breast biopsy and placement of marker clip. Left breast biopsy revealed an infiltrating lobular carcinoma grade 2 with focal ductal carcinoma in situ, solid pattern. A fine-needle biopsy of the left lymph node was consistent with metastatic carcinoma. Breast tumor profile revealed ER: 100%; ND: 98%; HER-2/maynor 2+; FISH: Unamplified; Ki-67: 71%; P 53 1%; DNA aneuploidy. A MRI of the breast were performed body multiple abnormal enhancing masses within the left breast. There appears to be anterior retraction of the pectoralis muscle with no direct extension. There is a moderate to large, layering left pleural effusion appreciated. The right breast reveals 3 moderately enlarged lymph nodes in the posterior lateral aspect the right breast. These have  fatty hilum but followup is recommended. Patient is undergone systemic studies including, on 8/2/2012, a CT scan of the brain showing atrophy. a CT scan that shows showing a small to moderate left pleural effusion with 3 subcentimeter nodule densities in the left lung.   She completed 4 cycles of neoadjuvant treatment with dose dense Adriamycin and Cytoxan. She had a mammogram which showed a significant reduction in the size of her left breast lesion. There is no axillary adenopathy noted. She has had an ultrasound revealing the  lesion reducing from 2.4 cm to 1 cm. Patient underwent a left mastectomy on 03/19/2013 finding negative invasive cancer, negative nodes. A 5% DCIS was noted. The patient has declined hormonal therapy. She did not need radiation therapy.  November 2014, she began to have evidence of lytic bone lesions at T5T6, frontal disease, an 8 mm lucency in the central frontal area of  the skull but nothing intracranial. Bone scan revealed only vertex tracer activity but bilateral ribs and thoracic spine, and other lesions in  her pelvis. At that time, she was started on letrozole since November 2014. She is taking Xgeva. She had progression found in bone in Feb 2015 on scan. She was started on Faslodex, Ibrance and continued xgeva.         Malignant neoplasm of upper-outer quadrant of left female breast    7/9/2012 Initial Diagnosis    Malignant neoplasm of upper-outer quadrant of left female breast      7/10/2012 Biopsy    Left Breast Biopsy & Axillary Node FNA: A) Infiltrating lobular carcinoma, Grade 2. B) Foci of ductal carcinoma in situ, solid pattern. C) Greatest dimension of the invasive carcinoma is 15.8mm.      3/19/2013 Surgery    Left Mastectomy w/ Axillary Tail: Focal residual ducatal carcinoma in situ (DCIS) 12 lymph nodes negative for metastatic carcinoma.      10/20/2014 Biopsy    Peritoneum Biopsy: Final Diagnosis: Malignant Cell Neoplasm.        Secondary malignant neoplasm of bone and  bone marrow    10/15/2012 -  Other Event    Left mammogram:  Impression:  1. Decreasing spiculation and density in the retroareolar left breast, near a previous biopsy.   2. Definite left axillary lesion is not appreciated.       2/5/2013 -  Other Event    Diagnostic Mammo:  Comparison is made to 10/15/2012 and 6/20/2012  The spiculated mass at 12:00 behind the nipple is nearly resolved.   Impression:  1. The mass on the left side is less apparent after receiving chemotherapy. There has been a good response to chmotherapy on the left side.   2. No suspicious abnormality is seen in the right breast to account for the new palable abnormality at the 4-5:00 position.       3/19/2013 Surgery    Breast Biopsy:  Final Diagnosis:  A. Breast, left mastectomy with axillary tail  1. Focal residual ductal carcinoma in situ (DICS) (less than 5% of tumor bed).  2. Negative for residual invasive carcinoma.   3. 12 Lymph Nodes, negative for metastatic carcinoma (0/12) focally, some lymph nodes demonstrate fibrosis/treatment effect.  B. Skin and soft tissue, left advancement flap:  1. Benign skin and subcutis.   2. Negative for carcinoma.       6/20/2013 -  Other Event    Diagnostic Mammo [facility]:  1. A subareolar mass is consistent with malignancy. Additional involement extends inferiorly sonographically and superolaterally mammograhically as demonstrated by calcifications. The mass measures approx 2.8 cm sonographically, but the involved region is likely much larger including an intraductal componet. The left axillary lymph nodes are also involved.   2. Recommened ultrasound guided biopsy of the left subareolar mass and left axillary lymph node.   3. Breast MRI could also further define multicentric disease on the left.   4. Clear suspicious finding on the right.  Recommend ongoing clinical follow up of palpable foci.       6/13/2014 -  Other Event    Diagnostic Mammo:  IMPRESSION:  1. History of left breast cancer and  mastectomy. Stable benign right breast mammograms.       10/20/2014 Surgery    Final Diagnosis:  Biopsies, pertoneum:  Metastatic carcinoma, morphologically compatible with metastatic mammary carcinoma.       6/15/2015 -  Other Event    Diagnostic Mammo:  IMPRESSION:   1. Negative x-ray report, should not delay biopsy if a dominat or clinically suspicious mass is present.       7/5/2016 -  Other Event    Diagnositic mammogram:  Impression:   Stable right mamogram with no radiographic findings suspicious for malignancy. Recommend continued screening mammography per screening guidelines unless otherwise earlier clinically indicated.       9/18/2016 Initial Diagnosis    Secondary malignant neoplasm of bone and bone marrow       Cancer Staging Information:  Malignant neoplasm of upper-outer quadrant of left female breast    Staging form: Breast, AJCC V7      Clinical stage from 10/11/2016: Stage IV (T2, N1, M1) - Signed by ALBERT Kulkarni on 10/11/2016      INTERVAL HISTORY  Patient ID: Heavenly Yanes is a 71 y.o. year old female history of metastatic breast carcinoma to lung and bone.  She is presently receiving treatment with Ibrance, Faslodex and Xgeva.  Tumor markers continued to decline with a CA 27-29 of 92.2 obtained on 9:30 2016 compared to 105.8 on 07/27/2016.  Mr. Yanes continues to tolerate this treatment regimen well and is here today for evaluation consideration to receive Faslodex and Xgeva.  Last bone mineral density study was obtained 11/21/2014 indicating osteopopenia, we will obtain a repeat bone mineral density study. Ms. Yanes completed 8 teeth pulled 02/14/2017.   Xgeva on hold, resume 05/2017.     Past Medical History:   Past Medical History   Diagnosis Date   • Bipolar disorder    • Disease of thyroid gland    • Hypertension    • Malignant neoplasm of upper-outer quadrant of left female breast 9/18/2016   • Secondary malignant neoplasm of bone and bone marrow 9/18/2016     Past Surgical  History:   Past Surgical History   Procedure Laterality Date   • External ear surgery     • Breast surgery       left breast biopsy and axillary node   • Portacath placement     • Cholecystectomy     • Mastectomy Left      Social History:   Social History     Social History   • Marital status:      Spouse name: N/A   • Number of children: N/A   • Years of education: N/A     Occupational History   • Not on file.     Social History Main Topics   • Smoking status: Never Smoker   • Smokeless tobacco: Never Used   • Alcohol use No   • Drug use: No   • Sexual activity: Not on file     Other Topics Concern   • Not on file     Social History Narrative     Family History:   Family History   Problem Relation Age of Onset   • No Known Problems Daughter    • No Known Problems Son    • Other Mother      complications from a concussion from a fall   • Other Father      old age   • No Known Problems Brother    • No Known Problems Daughter        Review of Systems   Constitutional: Negative.  Negative for activity change, appetite change, chills, diaphoresis, fatigue and fever.   HENT: Negative.  Negative for congestion, ear discharge, ear pain, facial swelling, hearing loss, mouth sores, nosebleeds, postnasal drip, rhinorrhea, sinus pressure, sore throat, tinnitus, trouble swallowing and voice change.    Eyes: Negative.  Negative for photophobia, pain, discharge and visual disturbance.   Respiratory: Negative.  Negative for apnea, cough, chest tightness, shortness of breath, wheezing and stridor.    Cardiovascular: Negative.  Negative for chest pain, palpitations and leg swelling.   Gastrointestinal: Negative.  Negative for abdominal distention, abdominal pain, blood in stool, constipation, diarrhea, nausea, rectal pain and vomiting.   Endocrine: Negative.  Negative for cold intolerance, heat intolerance, polydipsia, polyphagia and polyuria.   Genitourinary: Negative.  Negative for difficulty urinating, dysuria, flank pain,  frequency, hematuria and urgency.   Musculoskeletal: Negative.  Negative for arthralgias, back pain, gait problem, joint swelling and myalgias.   Skin: Negative.  Negative for color change, pallor, rash and wound.   Allergic/Immunologic: Negative.  Negative for environmental allergies, food allergies and immunocompromised state.   Neurological: Negative.  Negative for dizziness, tremors, seizures, syncope, speech difficulty, weakness, light-headedness, numbness and headaches.   Hematological: Negative.  Negative for adenopathy. Does not bruise/bleed easily.   Psychiatric/Behavioral: Negative.  Negative for agitation, confusion, hallucinations, sleep disturbance and suicidal ideas. The patient is not nervous/anxious.    All other systems reviewed and are negative.       Performance Status:  Asymptomatic    Medications:    Current Outpatient Prescriptions   Medication Sig Dispense Refill   • B Complex Vitamins (VITAMIN B COMPLEX PO) Take  by mouth. 1 po daily     • Calcium Carb-Cholecalciferol (CALCIUM 600 + D PO) Take  by mouth.     • celecoxib (CeleBREX) 200 MG capsule Take 200 mg by mouth 2 (two) times a day.     • DULoxetine (CYMBALTA) 30 MG capsule Take 30 mg by mouth daily.     • ferrous sulfate 325 (65 FE) MG tablet Take 325 mg by mouth 2 (two) times a day.     • Lysine HCl (L-LYSINE) 500 MG tablet tablet Take  by mouth daily.     • Magnesium 250 MG tablet Take  by mouth.     • melatonin 3 MG tablet Take  by mouth.     • metoprolol tartrate (LOPRESSOR) 25 MG tablet Take 25 mg by mouth 2 (two) times a day.     • nystatin (NYSTOP) 487127 UNIT/GM powder powder Apply  topically. prn     • OLANZapine (ZYPREXA) 2.5 MG tablet Take 2.5 mg by mouth every night.     • omeprazole (PriLOSEC) 40 MG capsule Take 40 mg by mouth Daily.     • Palbociclib 125 MG capsule capsule Take 1 capsule by mouth Daily. 3 weeks on, 1 week off 21 capsule 5   • Probiotic Product (PROBIOTIC DAILY PO) Take  by mouth. 300million cell daily     •  "thyroid 60 MG PO tablet Take 60 mg by mouth daily.     • VALERIAN ROOT PO Take  by mouth. 100mg daily     • Zinc 50 MG tablet Take  by mouth. 1 po daily       No current facility-administered medications for this visit.        ALLERGIES:    Allergies   Allergen Reactions   • Codeine    • Heparin    • Other      POPPY SEED   • Penicillins    • Petroleum Jelly [Skin Protectants, Misc.]    • Sulfa Antibiotics    • Valium [Diazepam]        Objective      Vitals:    03/01/17 0901   BP: 124/78   Pulse: 63   Resp: 18   Temp: 97.9 °F (36.6 °C)   TempSrc: Tympanic   SpO2: 98%   Weight: 173 lb (78.5 kg)   Height: 65\" (165.1 cm)         Current Status 2/1/2017   ECOG score 0       General Appearance: Patient is awake, alert, oriented and in no acute distress. Patient is welldeveloped, wellnourished, and appears stated age.  HEENT: Normocephalic. Sclerae clear, conjunctiva pink, extraocular movements intact, pupils, round, reactive to light and accommodation. Mouth and throat are clear with moist oral mucosa.  NECK: Supple, no jugular venous distention, thyroid not enlarged.  LYMPH: No cervical, supraclavicular, axillary, or inguinal lymphadenopathy.  CHEST: Equal bilateral expansion   LUNGS: Good air movement, no rales, rhonchi, rubs or wheezes with auscultation  CARDIO: Regular sinus rhythm, no murmurs, gallops or rubs.  ABDOMEN: Nondistended, soft, No tenderness, no guarding, no rebound, No hepatosplenomegaly. No abdominal masses. Bowel sounds positive. No hernia  MUSKEL: No joint swelling, decreased motion, or inflammation  EXTREMS: No edema, clubbing, cyanosis, No varicose veins.  NEURO: Grossly nonfocal, Gait is coordinated and smooth, Cognition is preserved.  SKIN: No rashes, no ecchymoses, no petechia.  PSYCH: Oriented to time, place and person. Memory is preserved. Mood and affect appear normal      RECENT LABS:  WBC   Date Value Ref Range Status   03/01/2017 4.30 (L) 4.80 - 10.80 10*3/mm3 Final     RBC   Date Value " Ref Range Status   03/01/2017 3.05 (L) 4.20 - 5.40 10*6/mm3 Final     HEMOGLOBIN   Date Value Ref Range Status   03/01/2017 11.1 (L) 12.0 - 16.0 g/dL Final     HEMATOCRIT   Date Value Ref Range Status   03/01/2017 34.6 (L) 37.0 - 47.0 % Final     MCV   Date Value Ref Range Status   03/01/2017 113.4 (H) 81.0 - 99.0 fL Final     MCH   Date Value Ref Range Status   03/01/2017 36.4 (H) 27.0 - 31.0 pg Final     MCHC   Date Value Ref Range Status   03/01/2017 32.1 (L) 33.0 - 37.0 g/dL Final     RDW   Date Value Ref Range Status   03/01/2017 14.8 (H) 11.5 - 14.5 % Final     MPV   Date Value Ref Range Status   03/01/2017 6.8 6.0 - 12.0 fL Final     PLATELETS   Date Value Ref Range Status   03/01/2017 208 130 - 400 10*3/mm3 Final     NEUTROPHIL %   Date Value Ref Range Status   03/01/2017 48.7 37.0 - 92.0 % Final     LYMPHOCYTE %   Date Value Ref Range Status   03/01/2017 44.3 10.0 - 58.5 % Final     NEUTROPHILS, ABSOLUTE   Date Value Ref Range Status   03/01/2017 2.10 2.00 - 7.80 10*3/mm3 Final     LYMPHOCYTES, ABSOLUTE   Date Value Ref Range Status   03/01/2017 1.90 0.80 - 7.00 10*3/mm3 Final     Lab on 03/01/2017   Component Date Value Ref Range Status   • WBC 03/01/2017 4.30* 4.80 - 10.80 10*3/mm3 Final   • RBC 03/01/2017 3.05* 4.20 - 5.40 10*6/mm3 Final   • Hemoglobin 03/01/2017 11.1* 12.0 - 16.0 g/dL Final   • Hematocrit 03/01/2017 34.6* 37.0 - 47.0 % Final   • MCV 03/01/2017 113.4* 81.0 - 99.0 fL Final   • MCH 03/01/2017 36.4* 27.0 - 31.0 pg Final   • MCHC 03/01/2017 32.1* 33.0 - 37.0 g/dL Final   • RDW 03/01/2017 14.8* 11.5 - 14.5 % Final   • MPV 03/01/2017 6.8  6.0 - 12.0 fL Final   • Platelets 03/01/2017 208  130 - 400 10*3/mm3 Final   • Neutrophil % 03/01/2017 48.7  37.0 - 92.0 % Final   • Lymphocyte % 03/01/2017 44.3  10.0 - 58.5 % Final   • Auto Mixed Cells % 03/01/2017 7.0  0.2 - 15.1 % Final   • Neutrophils, Absolute 03/01/2017 2.10  2.00 - 7.80 10*3/mm3 Final   • Lymphocytes, Absolute 03/01/2017 1.90  0.80 -  7.00 10*3/mm3 Final   • Auto Mixed Cells # 03/01/2017 0.30  0.10 - 1.50 10*3/uL Final             Assessment/Plan     Patient Active Problem List   Diagnosis   • Malignant neoplasm of upper-outer quadrant of left female breast   • Secondary malignant neoplasm of bone and bone marrow   • Essential hypertension   • Acquired hypothyroidism   • Bipolar 1 disorder          Assessment:  1. Stage IV metastatic breast carcinoma, ER/MA positive. HER2/maynor negative undergoing Ibrance, Faslodex and Xgeva with overall stable with stable CA27.29 with slow decline. She is tolerating the treatment very well. Faslodex cycle 17 today and will continue to HOLD Xgeva due to upcoming dental work, last dose of Xgeva given on 09/29/2016.  01/2016 restaging CT chest abdomen and pelvis and bone scan showed stable diffuse sclerotic bony lesions, unchanged from 7/1/2016.Next scan July 2017    2.  Mild leukopenia without neutropenia.  Likely secondary to Ibrance.  Continue to monitor.     3.  Macrocytic anemia.  Hemoglobin 11.1.  02/2016, B12 and folate normal    4.  Status post 8 teeth extraction.     Recommendation/Plan:  1.  Proceed with Faslodex cycle 18.  2. Continue Ibrance 125 mg 3 weeks on, one-week off.  Reduce dose if leukopenia worsens.   3. HOLD Xgeva. Dental work and 02/14/2017, resume Xgeva mid-May 2017 .   4. Follow up in 28 days for Faslodex cycle 19.         Rocio Roman MD    3/1/2017    9:35 AM

## 2017-03-02 LAB
FOLATE SERPL-MCNC: >20 NG/ML
VIT B12 SERPL-MCNC: 406 PG/ML (ref 239–931)

## 2017-03-29 ENCOUNTER — APPOINTMENT (OUTPATIENT)
Dept: ONCOLOGY | Facility: HOSPITAL | Age: 72
End: 2017-03-29
Attending: INTERNAL MEDICINE

## 2017-03-30 DIAGNOSIS — C50.412 MALIGNANT NEOPLASM OF UPPER-OUTER QUADRANT OF LEFT FEMALE BREAST (HCC): Primary | ICD-10-CM

## 2017-03-31 ENCOUNTER — INFUSION (OUTPATIENT)
Dept: ONCOLOGY | Facility: HOSPITAL | Age: 72
End: 2017-03-31
Attending: INTERNAL MEDICINE

## 2017-03-31 ENCOUNTER — OFFICE VISIT (OUTPATIENT)
Dept: ONCOLOGY | Facility: CLINIC | Age: 72
End: 2017-03-31

## 2017-03-31 ENCOUNTER — LAB (OUTPATIENT)
Dept: ONCOLOGY | Facility: CLINIC | Age: 72
End: 2017-03-31

## 2017-03-31 VITALS
OXYGEN SATURATION: 99 % | SYSTOLIC BLOOD PRESSURE: 126 MMHG | BODY MASS INDEX: 29.42 KG/M2 | WEIGHT: 176.6 LBS | HEIGHT: 65 IN | DIASTOLIC BLOOD PRESSURE: 84 MMHG | RESPIRATION RATE: 16 BRPM | HEART RATE: 68 BPM | TEMPERATURE: 97.7 F

## 2017-03-31 VITALS
SYSTOLIC BLOOD PRESSURE: 155 MMHG | BODY MASS INDEX: 29.32 KG/M2 | WEIGHT: 176 LBS | HEIGHT: 65 IN | OXYGEN SATURATION: 100 % | RESPIRATION RATE: 18 BRPM | HEART RATE: 62 BPM | TEMPERATURE: 98 F | DIASTOLIC BLOOD PRESSURE: 74 MMHG

## 2017-03-31 DIAGNOSIS — C50.919 BREAST CANCER METASTASIZED TO BONE, UNSPECIFIED LATERALITY (HCC): ICD-10-CM

## 2017-03-31 DIAGNOSIS — C50.412 MALIGNANT NEOPLASM OF UPPER-OUTER QUADRANT OF LEFT FEMALE BREAST (HCC): Primary | ICD-10-CM

## 2017-03-31 DIAGNOSIS — C50.412 MALIGNANT NEOPLASM OF UPPER-OUTER QUADRANT OF LEFT FEMALE BREAST (HCC): ICD-10-CM

## 2017-03-31 DIAGNOSIS — C50.912 CARCINOMA OF LEFT BREAST METASTATIC TO BONE (HCC): Primary | ICD-10-CM

## 2017-03-31 DIAGNOSIS — C79.51 CARCINOMA OF LEFT BREAST METASTATIC TO BONE (HCC): Primary | ICD-10-CM

## 2017-03-31 DIAGNOSIS — C79.51 BREAST CANCER METASTASIZED TO BONE, UNSPECIFIED LATERALITY (HCC): ICD-10-CM

## 2017-03-31 LAB
ALBUMIN SERPL-MCNC: 4.5 G/DL (ref 3.5–5)
ALBUMIN/GLOB SERPL: 1.2 G/DL
ALP SERPL-CCNC: 66 U/L (ref 38–126)
ALT SERPL W P-5'-P-CCNC: 18 U/L (ref 9–52)
ANION GAP SERPL CALCULATED.3IONS-SCNC: 11 MMOL/L
AST SERPL-CCNC: 31 U/L (ref 5–40)
AUTO MIXED CELLS #: 0.3 10*3/UL (ref 0.1–1.5)
AUTO MIXED CELLS %: 9 % (ref 0.2–15.1)
BILIRUB SERPL-MCNC: 0.4 MG/DL (ref 0.2–1.3)
BUN BLD-MCNC: 16 MG/DL (ref 7–26)
BUN/CREAT SERPL: 14.5 (ref 7–25)
CALCIUM SPEC-SCNC: 9.1 MG/DL (ref 8.4–10.2)
CHLORIDE SERPL-SCNC: 105 MMOL/L (ref 98–107)
CO2 SERPL-SCNC: 27 MMOL/L (ref 22–30)
CREAT BLD-MCNC: 1.1 MG/DL (ref 0.7–1.4)
ERYTHROCYTE [DISTWIDTH] IN BLOOD BY AUTOMATED COUNT: 14.3 % (ref 11.5–14.5)
GFR SERPL CREATININE-BSD FRML MDRD: 49 ML/MIN/1.73
GLOBULIN UR ELPH-MCNC: 3.7 GM/DL
GLUCOSE BLD-MCNC: 101 MG/DL (ref 75–110)
HCT VFR BLD AUTO: 30.7 % (ref 37–47)
HGB BLD-MCNC: 10 G/DL (ref 12–16)
LYMPHOCYTES # BLD AUTO: 1.8 10*3/MM3 (ref 0.8–7)
LYMPHOCYTES NFR BLD AUTO: 55 % (ref 10–58.5)
MCH RBC QN AUTO: 36.2 PG (ref 27–31)
MCHC RBC AUTO-ENTMCNC: 32.6 G/DL (ref 33–37)
MCV RBC AUTO: 111.3 FL (ref 81–99)
NEUTROPHILS # BLD AUTO: 1.2 10*3/MM3 (ref 2–7.8)
NEUTROPHILS NFR BLD AUTO: 36 % (ref 37–92)
PLATELET # BLD AUTO: 196 10*3/MM3 (ref 130–400)
PMV BLD AUTO: 6.9 FL (ref 6–12)
POTASSIUM BLD-SCNC: 4.3 MMOL/L (ref 3.6–5)
PROT SERPL-MCNC: 8.2 G/DL (ref 6.3–8.2)
RBC # BLD AUTO: 2.76 10*6/MM3 (ref 4.2–5.4)
SODIUM BLD-SCNC: 143 MMOL/L (ref 137–145)
WBC NRBC COR # BLD: 3.2 10*3/MM3 (ref 4.8–10.8)

## 2017-03-31 PROCEDURE — 85025 COMPLETE CBC W/AUTO DIFF WBC: CPT | Performed by: INTERNAL MEDICINE

## 2017-03-31 PROCEDURE — 80053 COMPREHEN METABOLIC PANEL: CPT | Performed by: INTERNAL MEDICINE

## 2017-03-31 PROCEDURE — 36415 COLL VENOUS BLD VENIPUNCTURE: CPT | Performed by: INTERNAL MEDICINE

## 2017-03-31 PROCEDURE — 25010000002 FULVESTRANT PER 25 MG: Performed by: INTERNAL MEDICINE

## 2017-03-31 PROCEDURE — 96402 CHEMO HORMON ANTINEOPL SQ/IM: CPT

## 2017-03-31 PROCEDURE — 99214 OFFICE O/P EST MOD 30 MIN: CPT | Performed by: INTERNAL MEDICINE

## 2017-03-31 RX ADMIN — FULVESTRANT 500 MG: 50 INJECTION INTRAMUSCULAR at 10:17

## 2017-03-31 NOTE — PROGRESS NOTES
[facility]  HEMATOLOGY & ONCOLOGY        Subjective    Metastatic breast cancer on Faslodex and Ibrance     Staging form: Breast, AJCC V7      Clinical stage from 10/11/2016: Stage IV (T2, N1, M1) - ER/CO positive. HER2/maynor negative  VISIT DIAGNOSIS:   Metastatic breast cancer       HEMATOLOGY / ONCOLOGY HISTORY:   Oncology/Hematology History    Patient is a 67-year-old postmenopausal female who had onset of her menses at age 9 and menopause at age 48. She received oral contraceptives for approximately 14 years and did not receive hormonal manipulation. Patient has a maternal cousin had breast cancer. Patient has had no prior breast biopsies. Patient found a palpable left breast mass as well as a left axillary mass. Patient had a bloody nipple discharge which been present for approximately 6 months. On 6/20/2012, a mammogram was performed showing a subareolar mass consistent with malignancy. Bilateral breast ultrasound revealed an ill-defined subareolar mass measuring 2.8-2.5 cm. There is a left axillary mass measuring 1.7 cm with suspicion of extracapsular extension. There is no evidence of disease in the right breast. On 7/9/2012 patient underwent a left breast biopsy and placement of marker clip. Left breast biopsy revealed an infiltrating lobular carcinoma grade 2 with focal ductal carcinoma in situ, solid pattern. A fine-needle biopsy of the left lymph node was consistent with metastatic carcinoma. Breast tumor profile revealed ER: 100%; CO: 98%; HER-2/maynor 2+; FISH: Unamplified; Ki-67: 71%; P 53 1%; DNA aneuploidy. A MRI of the breast were performed body multiple abnormal enhancing masses within the left breast. There appears to be anterior retraction of the pectoralis muscle with no direct extension. There is a moderate to large, layering left pleural effusion appreciated. The right breast reveals 3 moderately enlarged lymph nodes in the posterior lateral aspect the right breast. These have  fatty hilum but followup is recommended. Patient is undergone systemic studies including, on 8/2/2012, a CT scan of the brain showing atrophy. a CT scan that shows showing a small to moderate left pleural effusion with 3 subcentimeter nodule densities in the left lung.   She completed 4 cycles of neoadjuvant treatment with dose dense Adriamycin and Cytoxan. She had a mammogram which showed a significant reduction in the size of her left breast lesion. There is no axillary adenopathy noted. She has had an ultrasound revealing the  lesion reducing from 2.4 cm to 1 cm. Patient underwent a left mastectomy on 03/19/2013 finding negative invasive cancer, negative nodes. A 5% DCIS was noted. The patient has declined hormonal therapy. She did not need radiation therapy.  November 2014, she began to have evidence of lytic bone lesions at T5T6, frontal disease, an 8 mm lucency in the central frontal area of  the skull but nothing intracranial. Bone scan revealed only vertex tracer activity but bilateral ribs and thoracic spine, and other lesions in  her pelvis. At that time, she was started on letrozole since November 2014. She is taking Xgeva. She had progression found in bone in Feb 2015 on scan. She was started on Faslodex, Ibrance and continued xgeva.         Malignant neoplasm of upper-outer quadrant of left female breast    7/9/2012 Initial Diagnosis    Malignant neoplasm of upper-outer quadrant of left female breast      7/10/2012 Biopsy    Left Breast Biopsy & Axillary Node FNA: A) Infiltrating lobular carcinoma, Grade 2. B) Foci of ductal carcinoma in situ, solid pattern. C) Greatest dimension of the invasive carcinoma is 15.8mm.      3/19/2013 Surgery    Left Mastectomy w/ Axillary Tail: Focal residual ducatal carcinoma in situ (DCIS) 12 lymph nodes negative for metastatic carcinoma.      10/20/2014 Biopsy    Peritoneum Biopsy: Final Diagnosis: Malignant Cell Neoplasm.        Secondary malignant neoplasm of bone and  bone marrow    10/15/2012 -  Other Event    Left mammogram:  Impression:  1. Decreasing spiculation and density in the retroareolar left breast, near a previous biopsy.   2. Definite left axillary lesion is not appreciated.       2/5/2013 -  Other Event    Diagnostic Mammo:  Comparison is made to 10/15/2012 and 6/20/2012  The spiculated mass at 12:00 behind the nipple is nearly resolved.   Impression:  1. The mass on the left side is less apparent after receiving chemotherapy. There has been a good response to chmotherapy on the left side.   2. No suspicious abnormality is seen in the right breast to account for the new palable abnormality at the 4-5:00 position.       3/19/2013 Surgery    Breast Biopsy:  Final Diagnosis:  A. Breast, left mastectomy with axillary tail  1. Focal residual ductal carcinoma in situ (DICS) (less than 5% of tumor bed).  2. Negative for residual invasive carcinoma.   3. 12 Lymph Nodes, negative for metastatic carcinoma (0/12) focally, some lymph nodes demonstrate fibrosis/treatment effect.  B. Skin and soft tissue, left advancement flap:  1. Benign skin and subcutis.   2. Negative for carcinoma.       6/20/2013 -  Other Event    Diagnostic Mammo [facility]:  1. A subareolar mass is consistent with malignancy. Additional involement extends inferiorly sonographically and superolaterally mammograhically as demonstrated by calcifications. The mass measures approx 2.8 cm sonographically, but the involved region is likely much larger including an intraductal componet. The left axillary lymph nodes are also involved.   2. Recommened ultrasound guided biopsy of the left subareolar mass and left axillary lymph node.   3. Breast MRI could also further define multicentric disease on the left.   4. Clear suspicious finding on the right.  Recommend ongoing clinical follow up of palpable foci.       6/13/2014 -  Other Event    Diagnostic Mammo:  IMPRESSION:  1. History of left breast cancer and  mastectomy. Stable benign right breast mammograms.       10/20/2014 Surgery    Final Diagnosis:  Biopsies, pertoneum:  Metastatic carcinoma, morphologically compatible with metastatic mammary carcinoma.       6/15/2015 -  Other Event    Diagnostic Mammo:  IMPRESSION:   1. Negative x-ray report, should not delay biopsy if a dominat or clinically suspicious mass is present.       7/5/2016 -  Other Event    Diagnositic mammogram:  Impression:   Stable right mamogram with no radiographic findings suspicious for malignancy. Recommend continued screening mammography per screening guidelines unless otherwise earlier clinically indicated.       9/18/2016 Initial Diagnosis    Secondary malignant neoplasm of bone and bone marrow       Cancer Staging Information:  Malignant neoplasm of upper-outer quadrant of left female breast    Staging form: Breast, AJCC V7      Clinical stage from 10/11/2016: Stage IV (T2, N1, M1) - Signed by ALBERT Kulkarni on 10/11/2016      INTERVAL HISTORY  Patient ID: Heavenly Yanes is a 72 y.o. year old female history of metastatic breast carcinoma to lung and bone.  She is presently receiving treatment with Ibrance, Faslodex and Xgeva.  Tumor markers continued to decline with a CA 27-29 of 92.2 obtained on 9:30 2016 compared to 105.8 on 07/27/2016.  Mr. Yanes continues to tolerate this treatment regimen well and is here today for evaluation consideration to receive Faslodex and Xgeva.  Last bone mineral density study was obtained 11/21/2014 indicating osteopopenia, we will obtain a repeat bone mineral density study. Ms. Yanes completed 8 teeth pulled 02/14/2017.   Xgeva on hold, resume 05/2017.     Past Medical History:   Past Medical History:   Diagnosis Date   • Bipolar disorder    • Disease of thyroid gland    • Hypertension    • Malignant neoplasm of upper-outer quadrant of left female breast 9/18/2016   • Secondary malignant neoplasm of bone and bone marrow 9/18/2016     Past Surgical  History:   Past Surgical History:   Procedure Laterality Date   • BREAST SURGERY      left breast biopsy and axillary node   • CHOLECYSTECTOMY     • EXTERNAL EAR SURGERY     • MASTECTOMY Left    • PORTACATH PLACEMENT       Social History:   Social History     Social History   • Marital status:      Spouse name: N/A   • Number of children: N/A   • Years of education: N/A     Occupational History   • Not on file.     Social History Main Topics   • Smoking status: Never Smoker   • Smokeless tobacco: Never Used   • Alcohol use No   • Drug use: No   • Sexual activity: Not on file     Other Topics Concern   • Not on file     Social History Narrative     Family History:   Family History   Problem Relation Age of Onset   • No Known Problems Daughter    • No Known Problems Son    • Other Mother      complications from a concussion from a fall   • Other Father      old age   • No Known Problems Brother    • No Known Problems Daughter        Review of Systems   Constitutional: Negative.  Negative for activity change, appetite change, chills, diaphoresis, fatigue and fever.   HENT: Negative.  Negative for congestion, ear discharge, ear pain, facial swelling, hearing loss, mouth sores, nosebleeds, postnasal drip, rhinorrhea, sinus pressure, sore throat, tinnitus, trouble swallowing and voice change.    Eyes: Negative.  Negative for photophobia, pain, discharge and visual disturbance.   Respiratory: Negative.  Negative for apnea, cough, chest tightness, shortness of breath, wheezing and stridor.    Cardiovascular: Negative.  Negative for chest pain, palpitations and leg swelling.   Gastrointestinal: Negative.  Negative for abdominal distention, abdominal pain, blood in stool, constipation, diarrhea, nausea, rectal pain and vomiting.   Endocrine: Negative.  Negative for cold intolerance, heat intolerance, polydipsia, polyphagia and polyuria.   Genitourinary: Negative.  Negative for difficulty urinating, dysuria, flank pain,  frequency, hematuria and urgency.   Musculoskeletal: Negative.  Negative for arthralgias, back pain, gait problem, joint swelling and myalgias.   Skin: Negative.  Negative for color change, pallor, rash and wound.   Allergic/Immunologic: Negative.  Negative for environmental allergies, food allergies and immunocompromised state.   Neurological: Negative.  Negative for dizziness, tremors, seizures, syncope, speech difficulty, weakness, light-headedness, numbness and headaches.   Hematological: Negative.  Negative for adenopathy. Does not bruise/bleed easily.   Psychiatric/Behavioral: Negative.  Negative for agitation, confusion, hallucinations, sleep disturbance and suicidal ideas. The patient is not nervous/anxious.    All other systems reviewed and are negative.       Performance Status:  Asymptomatic    Medications:    Current Outpatient Prescriptions   Medication Sig Dispense Refill   • B Complex Vitamins (VITAMIN B COMPLEX PO) Take  by mouth. 1 po daily     • Calcium Carb-Cholecalciferol (CALCIUM 600 + D PO) Take  by mouth.     • celecoxib (CeleBREX) 200 MG capsule Take 200 mg by mouth 2 (two) times a day.     • DULoxetine (CYMBALTA) 30 MG capsule Take 30 mg by mouth daily.     • ferrous sulfate 325 (65 FE) MG tablet Take 325 mg by mouth 2 (two) times a day.     • Lysine HCl (L-LYSINE) 500 MG tablet tablet Take  by mouth daily.     • Magnesium 250 MG tablet Take  by mouth.     • melatonin 3 MG tablet Take  by mouth.     • metoprolol tartrate (LOPRESSOR) 25 MG tablet Take 25 mg by mouth 2 (two) times a day.     • nystatin (NYSTOP) 855238 UNIT/GM powder powder Apply  topically. prn     • OLANZapine (ZYPREXA) 2.5 MG tablet Take 2.5 mg by mouth every night.     • omeprazole (PriLOSEC) 40 MG capsule Take 40 mg by mouth Daily.     • Palbociclib 125 MG capsule capsule Take 1 capsule by mouth Daily. 3 weeks on, 1 week off 21 capsule 5   • Probiotic Product (PROBIOTIC DAILY PO) Take  by mouth. 300million cell daily     •  thyroid 60 MG PO tablet Take 60 mg by mouth daily.     • VALERIAN ROOT PO Take  by mouth. 100mg daily     • Zinc 50 MG tablet Take  by mouth. 1 po daily       No current facility-administered medications for this visit.        ALLERGIES:    Allergies   Allergen Reactions   • Codeine    • Heparin    • Other      POPPY SEED   • Penicillins    • Petroleum Jelly [Skin Protectants, Misc.]    • Sulfa Antibiotics    • Valium [Diazepam]        Objective      There were no vitals filed for this visit.      Current Status 2/1/2017   ECOG score 0       General Appearance: Patient is awake, alert, oriented and in no acute distress. Patient is welldeveloped, wellnourished, and appears stated age.  HEENT: Normocephalic. Sclerae clear, conjunctiva pink, extraocular movements intact, pupils, round, reactive to light and accommodation. Mouth and throat are clear with moist oral mucosa.  NECK: Supple, no jugular venous distention, thyroid not enlarged.  LYMPH: No cervical, supraclavicular, axillary, or inguinal lymphadenopathy.  CHEST: Equal bilateral expansion   LUNGS: Good air movement, no rales, rhonchi, rubs or wheezes with auscultation  CARDIO: Regular sinus rhythm, no murmurs, gallops or rubs.  ABDOMEN: Nondistended, soft, No tenderness, no guarding, no rebound, No hepatosplenomegaly. No abdominal masses. Bowel sounds positive. No hernia  MUSKEL: No joint swelling, decreased motion, or inflammation  EXTREMS: No edema, clubbing, cyanosis, No varicose veins.  NEURO: Grossly nonfocal, Gait is coordinated and smooth, Cognition is preserved.  SKIN: No rashes, no ecchymoses, no petechia.  PSYCH: Oriented to time, place and person. Memory is preserved. Mood and affect appear normal      RECENT LABS:  WBC   Date Value Ref Range Status   03/01/2017 4.30 (L) 4.80 - 10.80 10*3/mm3 Final     RBC   Date Value Ref Range Status   03/01/2017 3.05 (L) 4.20 - 5.40 10*6/mm3 Final     Hemoglobin   Date Value Ref Range Status   03/01/2017 11.1 (L)  12.0 - 16.0 g/dL Final     Hematocrit   Date Value Ref Range Status   03/01/2017 34.6 (L) 37.0 - 47.0 % Final     MCV   Date Value Ref Range Status   03/01/2017 113.4 (H) 81.0 - 99.0 fL Final     MCH   Date Value Ref Range Status   03/01/2017 36.4 (H) 27.0 - 31.0 pg Final     MCHC   Date Value Ref Range Status   03/01/2017 32.1 (L) 33.0 - 37.0 g/dL Final     RDW   Date Value Ref Range Status   03/01/2017 14.8 (H) 11.5 - 14.5 % Final     MPV   Date Value Ref Range Status   03/01/2017 6.8 6.0 - 12.0 fL Final     Platelets   Date Value Ref Range Status   03/01/2017 208 130 - 400 10*3/mm3 Final     Neutrophil %   Date Value Ref Range Status   03/01/2017 48.7 37.0 - 92.0 % Final     Lymphocyte %   Date Value Ref Range Status   03/01/2017 44.3 10.0 - 58.5 % Final     Neutrophils, Absolute   Date Value Ref Range Status   03/01/2017 2.10 2.00 - 7.80 10*3/mm3 Final     Lymphocytes, Absolute   Date Value Ref Range Status   03/01/2017 1.90 0.80 - 7.00 10*3/mm3 Final     No visits with results within 7 Day(s) from this visit.  Latest known visit with results is:    Lab on 03/01/2017   Component Date Value Ref Range Status   • WBC 03/01/2017 4.30* 4.80 - 10.80 10*3/mm3 Final   • RBC 03/01/2017 3.05* 4.20 - 5.40 10*6/mm3 Final   • Hemoglobin 03/01/2017 11.1* 12.0 - 16.0 g/dL Final   • Hematocrit 03/01/2017 34.6* 37.0 - 47.0 % Final   • MCV 03/01/2017 113.4* 81.0 - 99.0 fL Final   • MCH 03/01/2017 36.4* 27.0 - 31.0 pg Final   • MCHC 03/01/2017 32.1* 33.0 - 37.0 g/dL Final   • RDW 03/01/2017 14.8* 11.5 - 14.5 % Final   • MPV 03/01/2017 6.8  6.0 - 12.0 fL Final   • Platelets 03/01/2017 208  130 - 400 10*3/mm3 Final   • Neutrophil % 03/01/2017 48.7  37.0 - 92.0 % Final   • Lymphocyte % 03/01/2017 44.3  10.0 - 58.5 % Final   • Auto Mixed Cells % 03/01/2017 7.0  0.2 - 15.1 % Final   • Neutrophils, Absolute 03/01/2017 2.10  2.00 - 7.80 10*3/mm3 Final   • Lymphocytes, Absolute 03/01/2017 1.90  0.80 - 7.00 10*3/mm3 Final   • Auto Mixed  Cells # 03/01/2017 0.30  0.10 - 1.50 10*3/uL Final   • Vitamin B-12 03/01/2017 406  239 - 931 pg/mL Final   • Folate 03/01/2017 >20.00  >2.75 ng/mL Final             Assessment/Plan     Patient Active Problem List   Diagnosis   • Malignant neoplasm of upper-outer quadrant of left female breast   • Secondary malignant neoplasm of bone and bone marrow   • Essential hypertension   • Acquired hypothyroidism   • Bipolar 1 disorder   • Carcinoma of left breast metastatic to bone          Assessment:  1. Stage IV metastatic breast carcinoma, ER/NC positive. HER2/maynor negative undergoing Ibrance, Faslodex and Xgeva with overall stable with stable CA27.29 with slow decline. She is tolerating the treatment very well. Faslodex cycle 17 today and will continue to HOLD Xgeva due to upcoming dental work, last dose of Xgeva given on 09/29/2016.  01/2017 restaging CT chest abdomen and pelvis and bone scan showed stable diffuse sclerotic bony lesions, unchanged from 7/1/2016.Next scan July 2017    2.  Mild leukopenia without neutropenia.  Likely secondary to Ibrance.  Continue to monitor.     3.  Macrocytic anemia.  Hemoglobin 11.1.  02/2016, B12 and folate normal    4.  Status post 8 teeth extraction.     Recommendation/Plan:  1.  Proceed with Faslodex cycle 19.  2. Continue Ibrance 125 mg 3 weeks on, one-week off.  Reduce dose if leukopenia worsens.   3. HOLD Xgeva. Dental work and 02/14/2017, resume Xgeva mid-May 2017 .   4. Follow up in 28 days for Faslodex cycle 19.   5. Recheck CA 27.29 today.      Joseph Nunez MD    3/31/2017    8:44 AM

## 2017-04-01 LAB — CANCER AG27-29 SERPL-ACNC: 81.4 U/ML (ref 0–38.6)

## 2017-04-06 ENCOUNTER — OFFICE VISIT (OUTPATIENT)
Dept: ONCOLOGY | Facility: CLINIC | Age: 72
End: 2017-04-06

## 2017-04-06 ENCOUNTER — LAB (OUTPATIENT)
Dept: ONCOLOGY | Facility: CLINIC | Age: 72
End: 2017-04-06

## 2017-04-06 VITALS
HEIGHT: 65 IN | SYSTOLIC BLOOD PRESSURE: 128 MMHG | WEIGHT: 175 LBS | TEMPERATURE: 97.4 F | DIASTOLIC BLOOD PRESSURE: 84 MMHG | OXYGEN SATURATION: 98 % | BODY MASS INDEX: 29.16 KG/M2 | RESPIRATION RATE: 16 BRPM | HEART RATE: 68 BPM

## 2017-04-06 DIAGNOSIS — D50.9 IRON DEFICIENCY ANEMIA, UNSPECIFIED: ICD-10-CM

## 2017-04-06 DIAGNOSIS — D50.9 IRON DEFICIENCY ANEMIA, UNSPECIFIED: Primary | ICD-10-CM

## 2017-04-06 DIAGNOSIS — N18.30 KIDNEY DISEASE, CHRONIC, STAGE III (MODERATE, EGFR 30-59 ML/MIN) (HCC): ICD-10-CM

## 2017-04-06 LAB
IRON SATN MFR SERPL: 35 % (ref 20–45)
IRON SERPL-MCNC: 91 MCG/DL (ref 42–180)
TIBC SERPL-MCNC: 259 MCG/DL (ref 225–420)
UIBC SERPL-MCNC: 168 MCG/DL

## 2017-04-06 PROCEDURE — 99214 OFFICE O/P EST MOD 30 MIN: CPT | Performed by: INTERNAL MEDICINE

## 2017-04-06 NOTE — PROGRESS NOTES
[facility]  HEMATOLOGY & ONCOLOGY        Subjective    Metastatic breast cancer on Faslodex and Ibrance     Staging form: Breast, AJCC V7      Clinical stage from 10/11/2016: Stage IV (T2, N1, M1) - ER/WI positive. HER2/maynor negative  VISIT DIAGNOSIS:   Metastatic breast cancer       HEMATOLOGY / ONCOLOGY HISTORY:   Oncology/Hematology History    Patient is a 67-year-old postmenopausal female who had onset of her menses at age 9 and menopause at age 48. She received oral contraceptives for approximately 14 years and did not receive hormonal manipulation. Patient has a maternal cousin had breast cancer. Patient has had no prior breast biopsies. Patient found a palpable left breast mass as well as a left axillary mass. Patient had a bloody nipple discharge which been present for approximately 6 months. On 6/20/2012, a mammogram was performed showing a subareolar mass consistent with malignancy. Bilateral breast ultrasound revealed an ill-defined subareolar mass measuring 2.8-2.5 cm. There is a left axillary mass measuring 1.7 cm with suspicion of extracapsular extension. There is no evidence of disease in the right breast. On 7/9/2012 patient underwent a left breast biopsy and placement of marker clip. Left breast biopsy revealed an infiltrating lobular carcinoma grade 2 with focal ductal carcinoma in situ, solid pattern. A fine-needle biopsy of the left lymph node was consistent with metastatic carcinoma. Breast tumor profile revealed ER: 100%; WI: 98%; HER-2/maynor 2+; FISH: Unamplified; Ki-67: 71%; P 53 1%; DNA aneuploidy. A MRI of the breast were performed body multiple abnormal enhancing masses within the left breast. There appears to be anterior retraction of the pectoralis muscle with no direct extension. There is a moderate to large, layering left pleural effusion appreciated. The right breast reveals 3 moderately enlarged lymph nodes in the posterior lateral aspect the right breast. These have  fatty hilum but followup is recommended. Patient is undergone systemic studies including, on 8/2/2012, a CT scan of the brain showing atrophy. a CT scan that shows showing a small to moderate left pleural effusion with 3 subcentimeter nodule densities in the left lung.   She completed 4 cycles of neoadjuvant treatment with dose dense Adriamycin and Cytoxan. She had a mammogram which showed a significant reduction in the size of her left breast lesion. There is no axillary adenopathy noted. She has had an ultrasound revealing the  lesion reducing from 2.4 cm to 1 cm. Patient underwent a left mastectomy on 03/19/2013 finding negative invasive cancer, negative nodes. A 5% DCIS was noted. The patient has declined hormonal therapy. She did not need radiation therapy.  November 2014, she began to have evidence of lytic bone lesions at T5T6, frontal disease, an 8 mm lucency in the central frontal area of  the skull but nothing intracranial. Bone scan revealed only vertex tracer activity but bilateral ribs and thoracic spine, and other lesions in  her pelvis. At that time, she was started on letrozole since November 2014. She is taking Xgeva. She had progression found in bone in Feb 2015 on scan. She was started on Faslodex, Ibrance and continued xgeva.         Malignant neoplasm of upper-outer quadrant of left female breast    7/9/2012 Initial Diagnosis    Malignant neoplasm of upper-outer quadrant of left female breast      7/10/2012 Biopsy    Left Breast Biopsy & Axillary Node FNA: A) Infiltrating lobular carcinoma, Grade 2. B) Foci of ductal carcinoma in situ, solid pattern. C) Greatest dimension of the invasive carcinoma is 15.8mm.      3/19/2013 Surgery    Left Mastectomy w/ Axillary Tail: Focal residual ducatal carcinoma in situ (DCIS) 12 lymph nodes negative for metastatic carcinoma.      10/20/2014 Biopsy    Peritoneum Biopsy: Final Diagnosis: Malignant Cell Neoplasm.        Breast cancer metastasized to bone,  unspecified laterality    10/15/2012 -  Other Event    Left mammogram:  Impression:  1. Decreasing spiculation and density in the retroareolar left breast, near a previous biopsy.   2. Definite left axillary lesion is not appreciated.       2/5/2013 -  Other Event    Diagnostic Mammo:  Comparison is made to 10/15/2012 and 6/20/2012  The spiculated mass at 12:00 behind the nipple is nearly resolved.   Impression:  1. The mass on the left side is less apparent after receiving chemotherapy. There has been a good response to chmotherapy on the left side.   2. No suspicious abnormality is seen in the right breast to account for the new palable abnormality at the 4-5:00 position.       3/19/2013 Surgery    Breast Biopsy:  Final Diagnosis:  A. Breast, left mastectomy with axillary tail  1. Focal residual ductal carcinoma in situ (DICS) (less than 5% of tumor bed).  2. Negative for residual invasive carcinoma.   3. 12 Lymph Nodes, negative for metastatic carcinoma (0/12) focally, some lymph nodes demonstrate fibrosis/treatment effect.  B. Skin and soft tissue, left advancement flap:  1. Benign skin and subcutis.   2. Negative for carcinoma.       6/20/2013 -  Other Event    Diagnostic Mammo [facility]:  1. A subareolar mass is consistent with malignancy. Additional involement extends inferiorly sonographically and superolaterally mammograhically as demonstrated by calcifications. The mass measures approx 2.8 cm sonographically, but the involved region is likely much larger including an intraductal componet. The left axillary lymph nodes are also involved.   2. Recommened ultrasound guided biopsy of the left subareolar mass and left axillary lymph node.   3. Breast MRI could also further define multicentric disease on the left.   4. Clear suspicious finding on the right.  Recommend ongoing clinical follow up of palpable foci.       6/13/2014 -  Other Event    Diagnostic Mammo:  IMPRESSION:  1. History of left breast cancer  and mastectomy. Stable benign right breast mammograms.       10/20/2014 Surgery    Final Diagnosis:  Biopsies, pertoneum:  Metastatic carcinoma, morphologically compatible with metastatic mammary carcinoma.       6/15/2015 -  Other Event    Diagnostic Mammo:  IMPRESSION:   1. Negative x-ray report, should not delay biopsy if a dominat or clinically suspicious mass is present.       7/5/2016 -  Other Event    Diagnositic mammogram:  Impression:   Stable right mamogram with no radiographic findings suspicious for malignancy. Recommend continued screening mammography per screening guidelines unless otherwise earlier clinically indicated.       9/18/2016 Initial Diagnosis    Secondary malignant neoplasm of bone and bone marrow       Cancer Staging Information:  Malignant neoplasm of upper-outer quadrant of left female breast    Staging form: Breast, AJCC V7      Clinical stage from 10/11/2016: Stage IV (T2, N1, M1) - Signed by ALBERT Kulkarni on 10/11/2016      INTERVAL HISTORY  Patient ID: Heavenly Yanes is a 72 y.o. year old female history of metastatic breast carcinoma to lung and bone.  She is presently receiving treatment with Ibrance, Faslodex and Xgeva.  Tumor markers continued to decline with a CA 27-29 of 92.2 obtained on 9:30 2016 compared to 105.8 on 07/27/2016.  Mr. Yanes continues to tolerate this treatment regimen well and is here today for evaluation consideration to receive Faslodex and Xgeva.  Last bone mineral density study was obtained 11/21/2014 indicating osteopopenia, we will obtain a repeat bone mineral density study. Ms. Yanes completed 8 teeth pulled 02/14/2017.   Xgeva on hold, resume 05/2017.     Past Medical History:   Past Medical History:   Diagnosis Date   • Bipolar disorder    • Disease of thyroid gland    • Hypertension    • Malignant neoplasm of upper-outer quadrant of left female breast 9/18/2016   • Secondary malignant neoplasm of bone and bone marrow 9/18/2016     Past  Surgical History:   Past Surgical History:   Procedure Laterality Date   • BREAST SURGERY      left breast biopsy and axillary node   • CHOLECYSTECTOMY     • EXTERNAL EAR SURGERY     • MASTECTOMY Left    • PORTACATH PLACEMENT       Social History:   Social History     Social History   • Marital status:      Spouse name: N/A   • Number of children: N/A   • Years of education: N/A     Occupational History   • Not on file.     Social History Main Topics   • Smoking status: Never Smoker   • Smokeless tobacco: Never Used   • Alcohol use No   • Drug use: No   • Sexual activity: Not on file     Other Topics Concern   • Not on file     Social History Narrative     Family History:   Family History   Problem Relation Age of Onset   • No Known Problems Daughter    • No Known Problems Son    • Other Mother      complications from a concussion from a fall   • Other Father      old age   • No Known Problems Brother    • No Known Problems Daughter        Review of Systems   Constitutional: Negative.  Negative for activity change, appetite change, chills, diaphoresis, fatigue and fever.   HENT: Negative.  Negative for congestion, ear discharge, ear pain, facial swelling, hearing loss, mouth sores, nosebleeds, postnasal drip, rhinorrhea, sinus pressure, sore throat, tinnitus, trouble swallowing and voice change.    Eyes: Negative.  Negative for photophobia, pain, discharge and visual disturbance.   Respiratory: Negative.  Negative for apnea, cough, chest tightness, shortness of breath, wheezing and stridor.    Cardiovascular: Negative.  Negative for chest pain, palpitations and leg swelling.   Gastrointestinal: Negative.  Negative for abdominal distention, abdominal pain, blood in stool, constipation, diarrhea, nausea, rectal pain and vomiting.   Endocrine: Negative.  Negative for cold intolerance, heat intolerance, polydipsia, polyphagia and polyuria.   Genitourinary: Negative.  Negative for difficulty urinating, dysuria,  flank pain, frequency, hematuria and urgency.   Musculoskeletal: Negative.  Negative for arthralgias, back pain, gait problem, joint swelling and myalgias.   Skin: Negative.  Negative for color change, pallor, rash and wound.   Allergic/Immunologic: Negative.  Negative for environmental allergies, food allergies and immunocompromised state.   Neurological: Negative.  Negative for dizziness, tremors, seizures, syncope, speech difficulty, weakness, light-headedness, numbness and headaches.   Hematological: Negative.  Negative for adenopathy. Does not bruise/bleed easily.   Psychiatric/Behavioral: Negative.  Negative for agitation, confusion, hallucinations, sleep disturbance and suicidal ideas. The patient is not nervous/anxious.    All other systems reviewed and are negative.       Performance Status:  Asymptomatic    Medications:    Current Outpatient Prescriptions   Medication Sig Dispense Refill   • B Complex Vitamins (VITAMIN B COMPLEX PO) Take  by mouth. 1 po daily     • Calcium Carb-Cholecalciferol (CALCIUM 600 + D PO) Take  by mouth.     • celecoxib (CeleBREX) 200 MG capsule Take 200 mg by mouth 2 (two) times a day.     • DULoxetine (CYMBALTA) 30 MG capsule Take 30 mg by mouth daily.     • ferrous sulfate 325 (65 FE) MG tablet Take 325 mg by mouth 2 (two) times a day.     • Lysine HCl (L-LYSINE) 500 MG tablet tablet Take  by mouth daily.     • Magnesium 250 MG tablet Take  by mouth.     • melatonin 3 MG tablet Take  by mouth.     • metoprolol tartrate (LOPRESSOR) 25 MG tablet Take 25 mg by mouth 2 (two) times a day.     • OLANZapine (ZYPREXA) 2.5 MG tablet Take 2.5 mg by mouth every night.     • omeprazole (PriLOSEC) 40 MG capsule Take 40 mg by mouth Daily.     • Palbociclib 125 MG capsule capsule Take 1 capsule by mouth Daily. 3 weeks on, 1 week off 21 capsule 5   • Probiotic Product (PROBIOTIC DAILY PO) Take  by mouth. 300million cell daily     • thyroid 60 MG PO tablet Take 60 mg by mouth daily.     •  "VALERIAN ROOT PO Take  by mouth. 100mg daily     • Zinc 50 MG tablet Take  by mouth. 1 po daily       No current facility-administered medications for this visit.        ALLERGIES:    Allergies   Allergen Reactions   • Codeine    • Diphenhydramine Other (See Comments)     \"Shaky leg syndrome\"   • Heparin    • Other      POPPY SEED   • Penicillins    • Petroleum Jelly [Skin Protectants, Misc.]    • Sulfa Antibiotics    • Sulfur    • Valium [Diazepam]        Objective      There were no vitals filed for this visit.      Current Status 3/31/2017   ECOG score 0       General Appearance: Patient is awake, alert, oriented and in no acute distress. Patient is welldeveloped, wellnourished, and appears stated age.  HEENT: Normocephalic. Sclerae clear, conjunctiva pink, extraocular movements intact, pupils, round, reactive to light and accommodation. Mouth and throat are clear with moist oral mucosa.  NECK: Supple, no jugular venous distention, thyroid not enlarged.  LYMPH: No cervical, supraclavicular, axillary, or inguinal lymphadenopathy.  CHEST: Equal bilateral expansion   LUNGS: Good air movement, no rales, rhonchi, rubs or wheezes with auscultation  CARDIO: Regular sinus rhythm, no murmurs, gallops or rubs.  ABDOMEN: Nondistended, soft, No tenderness, no guarding, no rebound, No hepatosplenomegaly. No abdominal masses. Bowel sounds positive. No hernia  MUSKEL: No joint swelling, decreased motion, or inflammation  EXTREMS: No edema, clubbing, cyanosis, No varicose veins.  NEURO: Grossly nonfocal, Gait is coordinated and smooth, Cognition is preserved.  SKIN: No rashes, no ecchymoses, no petechia.  PSYCH: Oriented to time, place and person. Memory is preserved. Mood and affect appear normal      RECENT LABS:  WBC   Date Value Ref Range Status   03/31/2017 3.20 (L) 4.80 - 10.80 10*3/mm3 Final     RBC   Date Value Ref Range Status   03/31/2017 2.76 (L) 4.20 - 5.40 10*6/mm3 Final     Hemoglobin   Date Value Ref Range Status "   03/31/2017 10.0 (L) 12.0 - 16.0 g/dL Final     Hematocrit   Date Value Ref Range Status   03/31/2017 30.7 (L) 37.0 - 47.0 % Final     MCV   Date Value Ref Range Status   03/31/2017 111.3 (H) 81.0 - 99.0 fL Final     MCH   Date Value Ref Range Status   03/31/2017 36.2 (H) 27.0 - 31.0 pg Final     MCHC   Date Value Ref Range Status   03/31/2017 32.6 (L) 33.0 - 37.0 g/dL Final     RDW   Date Value Ref Range Status   03/31/2017 14.3 11.5 - 14.5 % Final     MPV   Date Value Ref Range Status   03/31/2017 6.9 6.0 - 12.0 fL Final     Platelets   Date Value Ref Range Status   03/31/2017 196 130 - 400 10*3/mm3 Final     Neutrophil %   Date Value Ref Range Status   03/31/2017 36.0 (L) 37.0 - 92.0 % Final     Lymphocyte %   Date Value Ref Range Status   03/31/2017 55.0 10.0 - 58.5 % Final     Neutrophils, Absolute   Date Value Ref Range Status   03/31/2017 1.20 (L) 2.00 - 7.80 10*3/mm3 Final     Lymphocytes, Absolute   Date Value Ref Range Status   03/31/2017 1.80 0.80 - 7.00 10*3/mm3 Final     Orders Only on 03/31/2017   Component Date Value Ref Range Status   • CA 27.29 03/31/2017 81.4* 0.0 - 38.6 U/mL Final    [facility]/ACS methodology   Lab on 03/31/2017   Component Date Value Ref Range Status   • WBC 03/31/2017 3.20* 4.80 - 10.80 10*3/mm3 Final   • RBC 03/31/2017 2.76* 4.20 - 5.40 10*6/mm3 Final   • Hemoglobin 03/31/2017 10.0* 12.0 - 16.0 g/dL Final   • Hematocrit 03/31/2017 30.7* 37.0 - 47.0 % Final   • MCV 03/31/2017 111.3* 81.0 - 99.0 fL Final   • MCH 03/31/2017 36.2* 27.0 - 31.0 pg Final   • MCHC 03/31/2017 32.6* 33.0 - 37.0 g/dL Final   • RDW 03/31/2017 14.3  11.5 - 14.5 % Final   • MPV 03/31/2017 6.9  6.0 - 12.0 fL Final   • Platelets 03/31/2017 196  130 - 400 10*3/mm3 Final   • Neutrophil % 03/31/2017 36.0* 37.0 - 92.0 % Final   • Lymphocyte % 03/31/2017 55.0  10.0 - 58.5 % Final   • Auto Mixed Cells % 03/31/2017 9.0  0.2 - 15.1 % Final   • Neutrophils, Absolute 03/31/2017 1.20* 2.00 - 7.80 10*3/mm3 Final   •  Lymphocytes, Absolute 03/31/2017 1.80  0.80 - 7.00 10*3/mm3 Final   • Auto Mixed Cells # 03/31/2017 0.30  0.10 - 1.50 10*3/uL Final             Assessment/Plan     Patient Active Problem List   Diagnosis   • Malignant neoplasm of upper-outer quadrant of left female breast   • Breast cancer metastasized to bone, unspecified laterality   • Essential hypertension   • Acquired hypothyroidism   • Bipolar 1 disorder   • Carcinoma of left breast metastatic to bone          Assessment:  1. Stage IV metastatic breast carcinoma, ER/IN positive. HER2/maynor negative undergoing Ibrance, Faslodex and Xgeva with overall stable with stable CA27.29 with slow decline. She is tolerating the treatment very well. Faslodex cycle 17 today and will continue to HOLD Xgeva due to upcoming dental work, last dose of Xgeva given on 09/29/2016.  01/2017 restaging CT chest abdomen and pelvis and bone scan showed stable diffuse sclerotic bony lesions, unchanged from 7/1/2016.Next scan July 2017    2.  Mild leukopenia without neutropenia.  Likely secondary to Ibrance.  Continue to monitor.     3.  Macrocytic anemia.  Hemoglobin 11.1.  02/2016, B12 and folate normal    4.  Status post 8 teeth extraction.     Recommendation/Plan:  1.  Proceed with Faslodex cycle 19.  2. Continue Ibrance 125 mg 3 weeks on, one-week off.  Reduce dose if leukopenia worsens.   3. HOLD Xgeva. Dental work and 02/14/2017, resume Xgeva mid-May 2017 .   4. Follow up in 28 days for Faslodex cycle 19.   Repeat CA 27.29 on 03/30/2017 was 81 compared to the level of 92 back in November 2016.    Additionally she is anemic with a hemoglobin of 10 g percent with decreasing GFR.  I do suspect that she may have some form of chronic kidney disease, I am not sure why, she is not diabetic or hypertensive.  For this reason she is being referred over to a nephrologist.  We will draw iron studies on her today.      Joseph Nunez MD    4/6/2017    9:01 AM

## 2017-04-07 LAB — FERRITIN SERPL-MCNC: 100 NG/ML (ref 11.1–264)

## 2017-04-28 ENCOUNTER — INFUSION (OUTPATIENT)
Dept: ONCOLOGY | Facility: HOSPITAL | Age: 72
End: 2017-04-28
Attending: INTERNAL MEDICINE

## 2017-04-28 ENCOUNTER — OFFICE VISIT (OUTPATIENT)
Dept: ONCOLOGY | Facility: CLINIC | Age: 72
End: 2017-04-28

## 2017-04-28 VITALS
BODY MASS INDEX: 28.99 KG/M2 | HEART RATE: 55 BPM | SYSTOLIC BLOOD PRESSURE: 128 MMHG | OXYGEN SATURATION: 98 % | HEIGHT: 65 IN | WEIGHT: 174 LBS | DIASTOLIC BLOOD PRESSURE: 74 MMHG | TEMPERATURE: 98.2 F | RESPIRATION RATE: 18 BRPM

## 2017-04-28 VITALS
WEIGHT: 174 LBS | OXYGEN SATURATION: 99 % | HEIGHT: 65 IN | SYSTOLIC BLOOD PRESSURE: 154 MMHG | TEMPERATURE: 97.5 F | BODY MASS INDEX: 28.99 KG/M2 | DIASTOLIC BLOOD PRESSURE: 74 MMHG | HEART RATE: 50 BPM | RESPIRATION RATE: 18 BRPM

## 2017-04-28 DIAGNOSIS — C50.412 MALIGNANT NEOPLASM OF UPPER-OUTER QUADRANT OF LEFT FEMALE BREAST (HCC): Primary | ICD-10-CM

## 2017-04-28 DIAGNOSIS — C50.919 BREAST CANCER METASTASIZED TO BONE, UNSPECIFIED LATERALITY (HCC): ICD-10-CM

## 2017-04-28 DIAGNOSIS — C79.51 BREAST CANCER METASTASIZED TO BONE, UNSPECIFIED LATERALITY (HCC): ICD-10-CM

## 2017-04-28 LAB
ALBUMIN SERPL-MCNC: 4.1 G/DL (ref 3.5–5)
ALBUMIN/GLOB SERPL: 1 G/DL
ALP SERPL-CCNC: 68 U/L (ref 38–126)
ALT SERPL W P-5'-P-CCNC: 17 U/L (ref 9–52)
ANION GAP SERPL CALCULATED.3IONS-SCNC: 8 MMOL/L
AST SERPL-CCNC: 32 U/L (ref 5–40)
AUTO MIXED CELLS #: 0.4 10*3/UL (ref 0.1–1.5)
AUTO MIXED CELLS %: 10.3 % (ref 0.2–15.1)
BILIRUB SERPL-MCNC: 0.4 MG/DL (ref 0.2–1.3)
BUN BLD-MCNC: 17 MG/DL (ref 7–26)
BUN/CREAT SERPL: 14.2 (ref 7–25)
CALCIUM SPEC-SCNC: 9.3 MG/DL (ref 8.4–10.2)
CHLORIDE SERPL-SCNC: 109 MMOL/L (ref 98–107)
CO2 SERPL-SCNC: 26 MMOL/L (ref 22–30)
CREAT BLD-MCNC: 1.2 MG/DL (ref 0.7–1.4)
ERYTHROCYTE [DISTWIDTH] IN BLOOD BY AUTOMATED COUNT: 15.3 % (ref 11.5–14.5)
GFR SERPL CREATININE-BSD FRML MDRD: 44 ML/MIN/1.73
GLOBULIN UR ELPH-MCNC: 4.2 GM/DL
GLUCOSE BLD-MCNC: 88 MG/DL (ref 75–110)
HCT VFR BLD AUTO: 32.1 % (ref 37–47)
HGB BLD-MCNC: 10.3 G/DL (ref 12–16)
LYMPHOCYTES # BLD AUTO: 1.9 10*3/MM3 (ref 0.8–7)
LYMPHOCYTES NFR BLD AUTO: 45.4 % (ref 10–58.5)
MCH RBC QN AUTO: 36.1 PG (ref 27–31)
MCHC RBC AUTO-ENTMCNC: 32.1 G/DL (ref 33–37)
MCV RBC AUTO: 112.8 FL (ref 81–99)
NEUTROPHILS # BLD AUTO: 1.8 10*3/MM3 (ref 2–7.8)
NEUTROPHILS NFR BLD AUTO: 44.3 % (ref 37–92)
PLATELET # BLD AUTO: 245 10*3/MM3 (ref 130–400)
PMV BLD AUTO: 7.3 FL (ref 6–12)
POTASSIUM BLD-SCNC: 4.3 MMOL/L (ref 3.6–5)
PROT SERPL-MCNC: 8.3 G/DL (ref 6.3–8.2)
RBC # BLD AUTO: 2.85 10*6/MM3 (ref 4.2–5.4)
SODIUM BLD-SCNC: 143 MMOL/L (ref 137–145)
WBC NRBC COR # BLD: 4.1 10*3/MM3 (ref 4.8–10.8)

## 2017-04-28 PROCEDURE — 25010000002 FULVESTRANT PER 25 MG: Performed by: INTERNAL MEDICINE

## 2017-04-28 PROCEDURE — 96402 CHEMO HORMON ANTINEOPL SQ/IM: CPT

## 2017-04-28 PROCEDURE — 99214 OFFICE O/P EST MOD 30 MIN: CPT | Performed by: INTERNAL MEDICINE

## 2017-04-28 PROCEDURE — 80053 COMPREHEN METABOLIC PANEL: CPT | Performed by: INTERNAL MEDICINE

## 2017-04-28 PROCEDURE — 85025 COMPLETE CBC W/AUTO DIFF WBC: CPT | Performed by: INTERNAL MEDICINE

## 2017-04-28 PROCEDURE — 36415 COLL VENOUS BLD VENIPUNCTURE: CPT | Performed by: INTERNAL MEDICINE

## 2017-04-28 RX ORDER — SODIUM CHLORIDE 0.9 % (FLUSH) 0.9 %
10 SYRINGE (ML) INJECTION AS NEEDED
Status: CANCELLED | OUTPATIENT
Start: 2017-05-25

## 2017-04-28 RX ORDER — SODIUM CHLORIDE 0.9 % (FLUSH) 0.9 %
10 SYRINGE (ML) INJECTION AS NEEDED
Status: DISCONTINUED | OUTPATIENT
Start: 2017-04-28 | End: 2017-04-28 | Stop reason: HOSPADM

## 2017-04-28 RX ADMIN — Medication 10 ML: at 08:54

## 2017-04-28 RX ADMIN — FULVESTRANT 500 MG: 50 INJECTION INTRAMUSCULAR at 09:52

## 2017-04-28 NOTE — PROGRESS NOTES
[facility]  HEMATOLOGY & ONCOLOGY        Subjective    Metastatic breast cancer on Faslodex and Ibrance     Staging form: Breast, AJCC V7      Clinical stage from 10/11/2016: Stage IV (T2, N1, M1) - ER/NE positive. HER2/maynor negative  VISIT DIAGNOSIS:   Metastatic breast cancer       HEMATOLOGY / ONCOLOGY HISTORY:   Oncology/Hematology History    Patient is a 67-year-old postmenopausal female who had onset of her menses at age 9 and menopause at age 48. She received oral contraceptives for approximately 14 years and did not receive hormonal manipulation. Patient has a maternal cousin had breast cancer. Patient has had no prior breast biopsies. Patient found a palpable left breast mass as well as a left axillary mass. Patient had a bloody nipple discharge which been present for approximately 6 months. On 6/20/2012, a mammogram was performed showing a subareolar mass consistent with malignancy. Bilateral breast ultrasound revealed an ill-defined subareolar mass measuring 2.8-2.5 cm. There is a left axillary mass measuring 1.7 cm with suspicion of extracapsular extension. There is no evidence of disease in the right breast. On 7/9/2012 patient underwent a left breast biopsy and placement of marker clip. Left breast biopsy revealed an infiltrating lobular carcinoma grade 2 with focal ductal carcinoma in situ, solid pattern. A fine-needle biopsy of the left lymph node was consistent with metastatic carcinoma. Breast tumor profile revealed ER: 100%; NE: 98%; HER-2/maynor 2+; FISH: Unamplified; Ki-67: 71%; P 53 1%; DNA aneuploidy. A MRI of the breast were performed body multiple abnormal enhancing masses within the left breast. There appears to be anterior retraction of the pectoralis muscle with no direct extension. There is a moderate to large, layering left pleural effusion appreciated. The right breast reveals 3 moderately enlarged lymph nodes in the posterior lateral aspect the right breast. These have  fatty hilum but followup is recommended. Patient is undergone systemic studies including, on 8/2/2012, a CT scan of the brain showing atrophy. a CT scan that shows showing a small to moderate left pleural effusion with 3 subcentimeter nodule densities in the left lung.   She completed 4 cycles of neoadjuvant treatment with dose dense Adriamycin and Cytoxan. She had a mammogram which showed a significant reduction in the size of her left breast lesion. There is no axillary adenopathy noted. She has had an ultrasound revealing the  lesion reducing from 2.4 cm to 1 cm. Patient underwent a left mastectomy on 03/19/2013 finding negative invasive cancer, negative nodes. A 5% DCIS was noted. The patient has declined hormonal therapy. She did not need radiation therapy.  November 2014, she began to have evidence of lytic bone lesions at T5T6, frontal disease, an 8 mm lucency in the central frontal area of  the skull but nothing intracranial. Bone scan revealed only vertex tracer activity but bilateral ribs and thoracic spine, and other lesions in  her pelvis. At that time, she was started on letrozole since November 2014. She is taking Xgeva. She had progression found in bone in Feb 2015 on scan. She was started on Faslodex, Ibrance and continued xgeva.         Malignant neoplasm of upper-outer quadrant of left female breast    7/9/2012 Initial Diagnosis    Malignant neoplasm of upper-outer quadrant of left female breast      7/10/2012 Biopsy    Left Breast Biopsy & Axillary Node FNA: A) Infiltrating lobular carcinoma, Grade 2. B) Foci of ductal carcinoma in situ, solid pattern. C) Greatest dimension of the invasive carcinoma is 15.8mm.      3/19/2013 Surgery    Left Mastectomy w/ Axillary Tail: Focal residual ducatal carcinoma in situ (DCIS) 12 lymph nodes negative for metastatic carcinoma.      10/20/2014 Biopsy    Peritoneum Biopsy: Final Diagnosis: Malignant Cell Neoplasm.        Breast cancer metastasized to bone,  unspecified laterality    10/15/2012 -  Other Event    Left mammogram:  Impression:  1. Decreasing spiculation and density in the retroareolar left breast, near a previous biopsy.   2. Definite left axillary lesion is not appreciated.       2/5/2013 -  Other Event    Diagnostic Mammo:  Comparison is made to 10/15/2012 and 6/20/2012  The spiculated mass at 12:00 behind the nipple is nearly resolved.   Impression:  1. The mass on the left side is less apparent after receiving chemotherapy. There has been a good response to chmotherapy on the left side.   2. No suspicious abnormality is seen in the right breast to account for the new palable abnormality at the 4-5:00 position.       3/19/2013 Surgery    Breast Biopsy:  Final Diagnosis:  A. Breast, left mastectomy with axillary tail  1. Focal residual ductal carcinoma in situ (DICS) (less than 5% of tumor bed).  2. Negative for residual invasive carcinoma.   3. 12 Lymph Nodes, negative for metastatic carcinoma (0/12) focally, some lymph nodes demonstrate fibrosis/treatment effect.  B. Skin and soft tissue, left advancement flap:  1. Benign skin and subcutis.   2. Negative for carcinoma.       6/20/2013 -  Other Event    Diagnostic Mammo [facility]:  1. A subareolar mass is consistent with malignancy. Additional involement extends inferiorly sonographically and superolaterally mammograhically as demonstrated by calcifications. The mass measures approx 2.8 cm sonographically, but the involved region is likely much larger including an intraductal componet. The left axillary lymph nodes are also involved.   2. Recommened ultrasound guided biopsy of the left subareolar mass and left axillary lymph node.   3. Breast MRI could also further define multicentric disease on the left.   4. Clear suspicious finding on the right.  Recommend ongoing clinical follow up of palpable foci.       6/13/2014 -  Other Event    Diagnostic Mammo:  IMPRESSION:  1. History of left breast cancer  and mastectomy. Stable benign right breast mammograms.       10/20/2014 Surgery    Final Diagnosis:  Biopsies, pertoneum:  Metastatic carcinoma, morphologically compatible with metastatic mammary carcinoma.       6/15/2015 -  Other Event    Diagnostic Mammo:  IMPRESSION:   1. Negative x-ray report, should not delay biopsy if a dominat or clinically suspicious mass is present.       7/5/2016 -  Other Event    Diagnositic mammogram:  Impression:   Stable right mamogram with no radiographic findings suspicious for malignancy. Recommend continued screening mammography per screening guidelines unless otherwise earlier clinically indicated.       9/18/2016 Initial Diagnosis    Secondary malignant neoplasm of bone and bone marrow       Cancer Staging Information:  Malignant neoplasm of upper-outer quadrant of left female breast    Staging form: Breast, AJCC V7      Clinical stage from 10/11/2016: Stage IV (T2, N1, M1) - Signed by ALBERT Kulkarni on 10/11/2016      INTERVAL HISTORY  Patient ID: Heavenly Yanes is a 72 y.o. year old female history of metastatic breast carcinoma to lung and bone.  She is presently receiving treatment with Ibrance, Faslodex and Xgeva.  Tumor markers continued to decline with a CA 27-29 of 92.2 obtained on 9:30 2016 compared to 105.8 on 07/27/2016.  Mr. Yanes continues to tolerate this treatment regimen well and is here today for evaluation consideration to receive Faslodex and Xgeva.  Last bone mineral density study was obtained 11/21/2014 indicating osteopopenia, we will obtain a repeat bone mineral density study. Ms. Yanes completed 8 teeth pulled 02/14/2017.   Xgeva on hold, resume 05/2017.     Past Medical History:   Past Medical History:   Diagnosis Date   • Bipolar disorder    • Disease of thyroid gland    • Hypertension    • Malignant neoplasm of upper-outer quadrant of left female breast 9/18/2016   • Secondary malignant neoplasm of bone and bone marrow 9/18/2016     Past  Surgical History:   Past Surgical History:   Procedure Laterality Date   • BREAST SURGERY      left breast biopsy and axillary node   • CHOLECYSTECTOMY     • EXTERNAL EAR SURGERY     • MASTECTOMY Left    • PORTACATH PLACEMENT       Social History:   Social History     Social History   • Marital status:      Spouse name: N/A   • Number of children: N/A   • Years of education: N/A     Occupational History   • Not on file.     Social History Main Topics   • Smoking status: Never Smoker   • Smokeless tobacco: Never Used   • Alcohol use No   • Drug use: No   • Sexual activity: Not on file     Other Topics Concern   • Not on file     Social History Narrative     Family History:   Family History   Problem Relation Age of Onset   • No Known Problems Daughter    • No Known Problems Son    • Other Mother      complications from a concussion from a fall   • Other Father      old age   • No Known Problems Brother    • No Known Problems Daughter        Review of Systems   Constitutional: Negative.  Negative for activity change, appetite change, chills, diaphoresis, fatigue and fever.   HENT: Negative.  Negative for congestion, ear discharge, ear pain, facial swelling, hearing loss, mouth sores, nosebleeds, postnasal drip, rhinorrhea, sinus pressure, sore throat, tinnitus, trouble swallowing and voice change.    Eyes: Negative.  Negative for photophobia, pain, discharge and visual disturbance.   Respiratory: Negative.  Negative for apnea, cough, chest tightness, shortness of breath, wheezing and stridor.    Cardiovascular: Negative.  Negative for chest pain, palpitations and leg swelling.   Gastrointestinal: Negative.  Negative for abdominal distention, abdominal pain, blood in stool, constipation, diarrhea, nausea, rectal pain and vomiting.   Endocrine: Negative.  Negative for cold intolerance, heat intolerance, polydipsia, polyphagia and polyuria.   Genitourinary: Negative.  Negative for difficulty urinating, dysuria,  flank pain, frequency, hematuria and urgency.   Musculoskeletal: Negative.  Negative for arthralgias, back pain, gait problem, joint swelling and myalgias.   Skin: Negative.  Negative for color change, pallor, rash and wound.   Allergic/Immunologic: Negative.  Negative for environmental allergies, food allergies and immunocompromised state.   Neurological: Negative.  Negative for dizziness, tremors, seizures, syncope, speech difficulty, weakness, light-headedness, numbness and headaches.   Hematological: Negative.  Negative for adenopathy. Does not bruise/bleed easily.   Psychiatric/Behavioral: Negative.  Negative for agitation, confusion, hallucinations, sleep disturbance and suicidal ideas. The patient is not nervous/anxious.    All other systems reviewed and are negative.       Performance Status:  Asymptomatic    Medications:    Current Outpatient Prescriptions   Medication Sig Dispense Refill   • B Complex Vitamins (VITAMIN B COMPLEX PO) Take  by mouth. 1 po daily     • Calcium Carb-Cholecalciferol (CALCIUM 600 + D PO) Take  by mouth.     • celecoxib (CeleBREX) 200 MG capsule Take 200 mg by mouth 2 (two) times a day.     • DULoxetine (CYMBALTA) 30 MG capsule Take 30 mg by mouth daily.     • ferrous sulfate 325 (65 FE) MG tablet Take 325 mg by mouth 2 (two) times a day.     • Lysine HCl (L-LYSINE) 500 MG tablet tablet Take  by mouth daily.     • Magnesium 250 MG tablet Take  by mouth.     • melatonin 3 MG tablet Take  by mouth.     • metoprolol tartrate (LOPRESSOR) 25 MG tablet Take 25 mg by mouth 2 (two) times a day.     • OLANZapine (ZYPREXA) 2.5 MG tablet Take 2.5 mg by mouth every night.     • omeprazole (PriLOSEC) 40 MG capsule Take 40 mg by mouth Daily.     • Palbociclib 125 MG capsule capsule Take 1 capsule by mouth Daily. 3 weeks on, 1 week off 21 capsule 5   • Probiotic Product (PROBIOTIC DAILY PO) Take  by mouth. 300million cell daily     • thyroid 60 MG PO tablet Take 60 mg by mouth daily.     •  "VALERIAN ROOT PO Take  by mouth. 100mg daily     • Zinc 50 MG tablet Take  by mouth. 1 po daily       No current facility-administered medications for this visit.        ALLERGIES:    Allergies   Allergen Reactions   • Codeine    • Diphenhydramine Other (See Comments)     \"Shaky leg syndrome\"   • Heparin    • Other      POPPY SEED   • Penicillins    • Petroleum Jelly [Skin Protectants, Misc.]    • Sulfa Antibiotics    • Sulfur    • Valium [Diazepam]        Objective      There were no vitals filed for this visit.      Current Status 4/6/2017   ECOG score 0       General Appearance: Patient is awake, alert, oriented and in no acute distress. Patient is welldeveloped, wellnourished, and appears stated age.  HEENT: Normocephalic. Sclerae clear, conjunctiva pink, extraocular movements intact, pupils, round, reactive to light and accommodation. Mouth and throat are clear with moist oral mucosa.  NECK: Supple, no jugular venous distention, thyroid not enlarged.  LYMPH: No cervical, supraclavicular, axillary, or inguinal lymphadenopathy.  CHEST: Equal bilateral expansion   LUNGS: Good air movement, no rales, rhonchi, rubs or wheezes with auscultation  CARDIO: Regular sinus rhythm, no murmurs, gallops or rubs.  ABDOMEN: Nondistended, soft, No tenderness, no guarding, no rebound, No hepatosplenomegaly. No abdominal masses. Bowel sounds positive. No hernia  MUSKEL: No joint swelling, decreased motion, or inflammation  EXTREMS: No edema, clubbing, cyanosis, No varicose veins.  NEURO: Grossly nonfocal, Gait is coordinated and smooth, Cognition is preserved.  SKIN: No rashes, no ecchymoses, no petechia.  PSYCH: Oriented to time, place and person. Memory is preserved. Mood and affect appear normal      RECENT LABS:  WBC   Date Value Ref Range Status   03/31/2017 3.20 (L) 4.80 - 10.80 10*3/mm3 Final     RBC   Date Value Ref Range Status   03/31/2017 2.76 (L) 4.20 - 5.40 10*6/mm3 Final     Hemoglobin   Date Value Ref Range Status "   03/31/2017 10.0 (L) 12.0 - 16.0 g/dL Final     Hematocrit   Date Value Ref Range Status   03/31/2017 30.7 (L) 37.0 - 47.0 % Final     MCV   Date Value Ref Range Status   03/31/2017 111.3 (H) 81.0 - 99.0 fL Final     MCH   Date Value Ref Range Status   03/31/2017 36.2 (H) 27.0 - 31.0 pg Final     MCHC   Date Value Ref Range Status   03/31/2017 32.6 (L) 33.0 - 37.0 g/dL Final     RDW   Date Value Ref Range Status   03/31/2017 14.3 11.5 - 14.5 % Final     MPV   Date Value Ref Range Status   03/31/2017 6.9 6.0 - 12.0 fL Final     Platelets   Date Value Ref Range Status   03/31/2017 196 130 - 400 10*3/mm3 Final     Neutrophil %   Date Value Ref Range Status   03/31/2017 36.0 (L) 37.0 - 92.0 % Final     Lymphocyte %   Date Value Ref Range Status   03/31/2017 55.0 10.0 - 58.5 % Final     Neutrophils, Absolute   Date Value Ref Range Status   03/31/2017 1.20 (L) 2.00 - 7.80 10*3/mm3 Final     Lymphocytes, Absolute   Date Value Ref Range Status   03/31/2017 1.80 0.80 - 7.00 10*3/mm3 Final     No visits with results within 7 Day(s) from this visit.  Latest known visit with results is:    Lab on 04/06/2017   Component Date Value Ref Range Status   • TIBC 04/06/2017 259  225 - 420 mcg/dL Final   • UIBC 04/06/2017 168  mcg/dL Final   • Iron 04/06/2017 91  42 - 180 mcg/dL Final   • Iron Saturation 04/06/2017 35  20 - 45 % Final   • Ferritin 04/06/2017 100.00  11.10 - 264.00 ng/mL Final             Assessment/Plan     Patient Active Problem List   Diagnosis   • Malignant neoplasm of upper-outer quadrant of left female breast   • Breast cancer metastasized to bone, unspecified laterality   • Essential hypertension   • Acquired hypothyroidism   • Bipolar 1 disorder   • Carcinoma of left breast metastatic to bone          Assessment:  1. Stage IV metastatic breast carcinoma, ER/ME positive. HER2/maynor negative undergoing Ibrance, Faslodex and Xgeva with overall stable with stable CA27.29 with slow decline. She is tolerating the  treatment very well. Faslodex cycle 17 today and will continue to HOLD Xgeva due to upcoming dental work, last dose of Xgeva given on 09/29/2016.  01/2017 restaging CT chest abdomen and pelvis and bone scan showed stable diffuse sclerotic bony lesions, unchanged from 7/1/2016.Next scan July 2017    2.  Mild leukopenia without neutropenia.  Likely secondary to Ibrance.  Continue to monitor.     3.  Macrocytic anemia.  Hemoglobin 11.1.  02/2016, B12 and folate normal    4.  Status post 8 teeth extraction.     Recommendation/Plan:  1.  Proceed with Faslodex cycle 19.  2. Continue Ibrance 125 mg 3 weeks on, one-week off.  Reduce dose if leukopenia worsens.   3. HOLD Xgeva. Dental work and 02/14/2017, resume Xgeva    4. Follow up in 28 days for Faslodex cycle 19.   Repeat CA 27.29 on 03/30/2017 was 81 compared to the level of 92 back in November 2016.    Additionally she is anemic with a hemoglobin of 10 g percent with decreasing GFR.  I do suspect that she may have some form of chronic kidney disease, I am not sure why, she is not diabetic or hypertensive.  For this reason she is being referred over to a nephrologist.  We will draw iron studies on her today.      Joseph Nunez MD    4/28/2017    8:11 AM

## 2017-05-24 DIAGNOSIS — C50.412 MALIGNANT NEOPLASM OF UPPER-OUTER QUADRANT OF LEFT FEMALE BREAST (HCC): Primary | ICD-10-CM

## 2017-05-25 ENCOUNTER — TRANSCRIBE ORDERS (OUTPATIENT)
Dept: ADMINISTRATIVE | Facility: HOSPITAL | Age: 72
End: 2017-05-25

## 2017-05-25 ENCOUNTER — OFFICE VISIT (OUTPATIENT)
Dept: ONCOLOGY | Facility: CLINIC | Age: 72
End: 2017-05-25

## 2017-05-25 ENCOUNTER — INFUSION (OUTPATIENT)
Dept: ONCOLOGY | Facility: HOSPITAL | Age: 72
End: 2017-05-25
Attending: INTERNAL MEDICINE

## 2017-05-25 ENCOUNTER — LAB (OUTPATIENT)
Dept: ONCOLOGY | Facility: CLINIC | Age: 72
End: 2017-05-25

## 2017-05-25 ENCOUNTER — APPOINTMENT (OUTPATIENT)
Dept: LAB | Facility: HOSPITAL | Age: 72
End: 2017-05-25
Attending: INTERNAL MEDICINE

## 2017-05-25 VITALS
OXYGEN SATURATION: 100 % | TEMPERATURE: 97.9 F | BODY MASS INDEX: 27.74 KG/M2 | DIASTOLIC BLOOD PRESSURE: 64 MMHG | WEIGHT: 172.6 LBS | SYSTOLIC BLOOD PRESSURE: 180 MMHG | HEIGHT: 66 IN | HEART RATE: 55 BPM | RESPIRATION RATE: 20 BRPM

## 2017-05-25 VITALS
RESPIRATION RATE: 18 BRPM | DIASTOLIC BLOOD PRESSURE: 74 MMHG | WEIGHT: 172.2 LBS | BODY MASS INDEX: 28.69 KG/M2 | HEART RATE: 64 BPM | OXYGEN SATURATION: 99 % | HEIGHT: 65 IN | TEMPERATURE: 97.9 F | SYSTOLIC BLOOD PRESSURE: 130 MMHG

## 2017-05-25 DIAGNOSIS — C50.412 MALIGNANT NEOPLASM OF UPPER-OUTER QUADRANT OF LEFT FEMALE BREAST (HCC): Primary | ICD-10-CM

## 2017-05-25 DIAGNOSIS — C50.919 BREAST CANCER METASTASIZED TO BONE, UNSPECIFIED LATERALITY (HCC): ICD-10-CM

## 2017-05-25 DIAGNOSIS — N18.30 CHRONIC KIDNEY DISEASE, STAGE III (MODERATE) (HCC): Primary | ICD-10-CM

## 2017-05-25 DIAGNOSIS — C79.51 CARCINOMA OF LEFT BREAST METASTATIC TO BONE (HCC): ICD-10-CM

## 2017-05-25 DIAGNOSIS — C50.912 CARCINOMA OF LEFT BREAST METASTATIC TO BONE (HCC): ICD-10-CM

## 2017-05-25 DIAGNOSIS — C79.51 BREAST CANCER METASTASIZED TO BONE, UNSPECIFIED LATERALITY (HCC): ICD-10-CM

## 2017-05-25 DIAGNOSIS — C50.412 MALIGNANT NEOPLASM OF UPPER-OUTER QUADRANT OF LEFT FEMALE BREAST (HCC): ICD-10-CM

## 2017-05-25 LAB
ALBUMIN SERPL-MCNC: 4 G/DL (ref 3.5–5)
ALBUMIN SERPL-MCNC: 4.1 G/DL (ref 3.5–5)
ALBUMIN/GLOB SERPL: 1.1 G/DL
ALP SERPL-CCNC: 71 U/L (ref 38–126)
ALT SERPL W P-5'-P-CCNC: 17 U/L (ref 9–52)
ANION GAP SERPL CALCULATED.3IONS-SCNC: 10 MMOL/L (ref 4–13)
ANION GAP SERPL CALCULATED.3IONS-SCNC: 7 MMOL/L
AST SERPL-CCNC: 32 U/L (ref 5–40)
AUTO MIXED CELLS #: 0.3 10*3/UL (ref 0.1–1.5)
AUTO MIXED CELLS %: 9.6 % (ref 0.2–15.1)
BILIRUB SERPL-MCNC: 0.4 MG/DL (ref 0.2–1.3)
BILIRUB UR QL STRIP: NEGATIVE
BUN BLD-MCNC: 15 MG/DL (ref 5–21)
BUN BLD-MCNC: 15 MG/DL (ref 7–26)
BUN/CREAT SERPL: 13.2 (ref 7–25)
BUN/CREAT SERPL: 13.6 (ref 7–25)
CALCIUM SPEC-SCNC: 9.2 MG/DL (ref 8.4–10.2)
CALCIUM SPEC-SCNC: 9.5 MG/DL (ref 8.4–10.4)
CHLORIDE SERPL-SCNC: 102 MMOL/L (ref 98–110)
CHLORIDE SERPL-SCNC: 106 MMOL/L (ref 98–107)
CLARITY UR: CLEAR
CO2 SERPL-SCNC: 27 MMOL/L (ref 22–30)
CO2 SERPL-SCNC: 27 MMOL/L (ref 24–31)
COLOR UR: YELLOW
CREAT BLD-MCNC: 1.1 MG/DL (ref 0.7–1.4)
CREAT BLD-MCNC: 1.14 MG/DL (ref 0.5–1.4)
CREAT UR-MCNC: 33.4 MG/DL
DEPRECATED RDW RBC AUTO: 60.1 FL (ref 40–54)
ERYTHROCYTE [DISTWIDTH] IN BLOOD BY AUTOMATED COUNT: 15.2 % (ref 12–15)
ERYTHROCYTE [DISTWIDTH] IN BLOOD BY AUTOMATED COUNT: 16 % (ref 11.5–14.5)
GFR SERPL CREATININE-BSD FRML MDRD: 47 ML/MIN/1.73
GFR SERPL CREATININE-BSD FRML MDRD: 49 ML/MIN/1.73
GLOBULIN UR ELPH-MCNC: 3.8 GM/DL
GLUCOSE BLD-MCNC: 89 MG/DL (ref 75–110)
GLUCOSE BLD-MCNC: 92 MG/DL (ref 70–100)
GLUCOSE UR STRIP-MCNC: NEGATIVE MG/DL
HCT VFR BLD AUTO: 30 % (ref 37–47)
HCT VFR BLD AUTO: 30.8 % (ref 37–47)
HGB BLD-MCNC: 10.2 G/DL (ref 12–16)
HGB BLD-MCNC: 9.8 G/DL (ref 12–16)
HGB UR QL STRIP.AUTO: NEGATIVE
KETONES UR QL STRIP: NEGATIVE
LEUKOCYTE ESTERASE UR QL STRIP.AUTO: NEGATIVE
LYMPHOCYTES # BLD AUTO: 1.3 10*3/MM3 (ref 0.8–7)
LYMPHOCYTES NFR BLD AUTO: 41.1 % (ref 10–58.5)
MCH RBC QN AUTO: 36.3 PG (ref 27–31)
MCH RBC QN AUTO: 36.8 PG (ref 28–32)
MCHC RBC AUTO-ENTMCNC: 31.8 G/DL (ref 33–37)
MCHC RBC AUTO-ENTMCNC: 34 G/DL (ref 33–36)
MCV RBC AUTO: 108.3 FL (ref 82–98)
MCV RBC AUTO: 114.2 FL (ref 81–99)
NEUTROPHILS # BLD AUTO: 1.5 10*3/MM3 (ref 2–7.8)
NEUTROPHILS NFR BLD AUTO: 49.3 % (ref 37–92)
NITRITE UR QL STRIP: NEGATIVE
PH UR STRIP.AUTO: 6.5 [PH] (ref 5–8)
PHOSPHATE SERPL-MCNC: 3.9 MG/DL (ref 2.5–4.5)
PLATELET # BLD AUTO: 184 10*3/MM3 (ref 130–400)
PLATELET # BLD AUTO: 196 10*3/MM3 (ref 130–400)
PMV BLD AUTO: 6.4 FL (ref 6–12)
PMV BLD AUTO: 9.3 FL (ref 6–12)
POTASSIUM BLD-SCNC: 4.5 MMOL/L (ref 3.6–5)
POTASSIUM BLD-SCNC: 4.8 MMOL/L (ref 3.5–5.3)
PROT SERPL-MCNC: 7.8 G/DL (ref 6.3–8.2)
PROT UR QL STRIP: NEGATIVE
PROT UR-MCNC: 12 MG/DL (ref 0–13.5)
PTH-INTACT SERPL-MCNC: 53.2 PG/ML (ref 7.5–53.5)
RBC # BLD AUTO: 2.7 10*6/MM3 (ref 4.2–5.4)
RBC # BLD AUTO: 2.77 10*6/MM3 (ref 4.2–5.4)
SODIUM BLD-SCNC: 139 MMOL/L (ref 135–145)
SODIUM BLD-SCNC: 140 MMOL/L (ref 137–145)
SP GR UR STRIP: 1.01 (ref 1–1.03)
URATE SERPL-MCNC: 5.7 MG/DL (ref 2.7–7.5)
UROBILINOGEN UR QL STRIP: NORMAL
WBC NRBC COR # BLD: 3.1 10*3/MM3 (ref 4.8–10.8)
WBC NRBC COR # BLD: 4.38 10*3/MM3 (ref 4.8–10.8)

## 2017-05-25 PROCEDURE — 80053 COMPREHEN METABOLIC PANEL: CPT | Performed by: INTERNAL MEDICINE

## 2017-05-25 PROCEDURE — 84165 PROTEIN E-PHORESIS SERUM: CPT | Performed by: INTERNAL MEDICINE

## 2017-05-25 PROCEDURE — 36415 COLL VENOUS BLD VENIPUNCTURE: CPT | Performed by: INTERNAL MEDICINE

## 2017-05-25 PROCEDURE — 85025 COMPLETE CBC W/AUTO DIFF WBC: CPT | Performed by: INTERNAL MEDICINE

## 2017-05-25 PROCEDURE — 84550 ASSAY OF BLOOD/URIC ACID: CPT | Performed by: INTERNAL MEDICINE

## 2017-05-25 PROCEDURE — 84155 ASSAY OF PROTEIN SERUM: CPT | Performed by: INTERNAL MEDICINE

## 2017-05-25 PROCEDURE — 84156 ASSAY OF PROTEIN URINE: CPT | Performed by: INTERNAL MEDICINE

## 2017-05-25 PROCEDURE — 82784 ASSAY IGA/IGD/IGG/IGM EACH: CPT | Performed by: INTERNAL MEDICINE

## 2017-05-25 PROCEDURE — 81003 URINALYSIS AUTO W/O SCOPE: CPT | Performed by: INTERNAL MEDICINE

## 2017-05-25 PROCEDURE — 82570 ASSAY OF URINE CREATININE: CPT | Performed by: INTERNAL MEDICINE

## 2017-05-25 PROCEDURE — 85027 COMPLETE CBC AUTOMATED: CPT | Performed by: INTERNAL MEDICINE

## 2017-05-25 PROCEDURE — 86300 IMMUNOASSAY TUMOR CA 15-3: CPT | Performed by: INTERNAL MEDICINE

## 2017-05-25 PROCEDURE — 96402 CHEMO HORMON ANTINEOPL SQ/IM: CPT

## 2017-05-25 PROCEDURE — 83970 ASSAY OF PARATHORMONE: CPT | Performed by: INTERNAL MEDICINE

## 2017-05-25 PROCEDURE — 25010000002 FULVESTRANT PER 25 MG: Performed by: INTERNAL MEDICINE

## 2017-05-25 PROCEDURE — 80069 RENAL FUNCTION PANEL: CPT | Performed by: INTERNAL MEDICINE

## 2017-05-25 PROCEDURE — 96372 THER/PROPH/DIAG INJ SC/IM: CPT

## 2017-05-25 PROCEDURE — 86334 IMMUNOFIX E-PHORESIS SERUM: CPT | Performed by: INTERNAL MEDICINE

## 2017-05-25 PROCEDURE — 99214 OFFICE O/P EST MOD 30 MIN: CPT | Performed by: INTERNAL MEDICINE

## 2017-05-25 RX ADMIN — FULVESTRANT 500 MG: 50 INJECTION INTRAMUSCULAR at 10:38

## 2017-05-26 LAB — CANCER AG27-29 SERPL-ACNC: 84.1 U/ML (ref 0–38.6)

## 2017-06-01 LAB
ALBUMIN SERPL-MCNC: 3.5 G/DL (ref 2.9–4.4)
ALBUMIN/GLOB SERPL: 0.9 {RATIO} (ref 0.7–1.7)
ALPHA1 GLOB FLD ELPH-MCNC: 0.3 G/DL (ref 0–0.4)
ALPHA2 GLOB SERPL ELPH-MCNC: 0.8 G/DL (ref 0.4–1)
B-GLOBULIN SERPL ELPH-MCNC: 1.2 G/DL (ref 0.7–1.3)
GAMMA GLOB SERPL ELPH-MCNC: 1.5 G/DL (ref 0.4–1.8)
GLOBULIN SER CALC-MCNC: 3.8 G/DL (ref 2.2–3.9)
IGA SERPL-MCNC: 287 MG/DL (ref 64–422)
IGG SERPL-MCNC: 1469 MG/DL (ref 700–1600)
IGM SERPL-MCNC: 198 MG/DL (ref 26–217)
INTERPRETATION SERPL IEP-IMP: NORMAL
INTERPRETATION: NORMAL
Lab: NORMAL
M-SPIKE: NORMAL G/DL
PROT SERPL-MCNC: 7.3 G/DL (ref 6–8.5)

## 2017-06-27 DIAGNOSIS — C50.412 MALIGNANT NEOPLASM OF UPPER-OUTER QUADRANT OF LEFT FEMALE BREAST (HCC): Primary | ICD-10-CM

## 2017-06-28 ENCOUNTER — OFFICE VISIT (OUTPATIENT)
Dept: ONCOLOGY | Facility: CLINIC | Age: 72
End: 2017-06-28

## 2017-06-28 ENCOUNTER — INFUSION (OUTPATIENT)
Dept: ONCOLOGY | Facility: HOSPITAL | Age: 72
End: 2017-06-28
Attending: INTERNAL MEDICINE

## 2017-06-28 ENCOUNTER — LAB (OUTPATIENT)
Dept: ONCOLOGY | Facility: CLINIC | Age: 72
End: 2017-06-28

## 2017-06-28 ENCOUNTER — HOSPITAL ENCOUNTER (OUTPATIENT)
Dept: WOMENS IMAGING | Age: 72
Discharge: HOME | End: 2017-06-28
Payer: MEDICARE

## 2017-06-28 VITALS
RESPIRATION RATE: 16 BRPM | HEIGHT: 66 IN | SYSTOLIC BLOOD PRESSURE: 120 MMHG | BODY MASS INDEX: 27.06 KG/M2 | TEMPERATURE: 98.2 F | DIASTOLIC BLOOD PRESSURE: 80 MMHG | WEIGHT: 168.4 LBS | HEART RATE: 60 BPM | OXYGEN SATURATION: 98 %

## 2017-06-28 VITALS
DIASTOLIC BLOOD PRESSURE: 59 MMHG | RESPIRATION RATE: 20 BRPM | HEIGHT: 64 IN | BODY MASS INDEX: 28.68 KG/M2 | SYSTOLIC BLOOD PRESSURE: 151 MMHG | OXYGEN SATURATION: 100 % | WEIGHT: 168 LBS | TEMPERATURE: 97.9 F | HEART RATE: 58 BPM

## 2017-06-28 DIAGNOSIS — C50.919 BREAST CANCER METASTASIZED TO BONE, UNSPECIFIED LATERALITY (HCC): ICD-10-CM

## 2017-06-28 DIAGNOSIS — C50.412 MALIGNANT NEOPLASM OF UPPER-OUTER QUADRANT OF LEFT FEMALE BREAST (HCC): ICD-10-CM

## 2017-06-28 DIAGNOSIS — C79.51 BREAST CANCER METASTASIZED TO BONE, UNSPECIFIED LATERALITY (HCC): ICD-10-CM

## 2017-06-28 DIAGNOSIS — C79.51 CARCINOMA OF LEFT BREAST METASTATIC TO BONE (HCC): ICD-10-CM

## 2017-06-28 DIAGNOSIS — C50.912 CARCINOMA OF LEFT BREAST METASTATIC TO BONE (HCC): Primary | ICD-10-CM

## 2017-06-28 DIAGNOSIS — C79.51 CARCINOMA OF LEFT BREAST METASTATIC TO BONE (HCC): Primary | ICD-10-CM

## 2017-06-28 DIAGNOSIS — C50.912 CARCINOMA OF LEFT BREAST METASTATIC TO BONE (HCC): ICD-10-CM

## 2017-06-28 DIAGNOSIS — C50.412 MALIGNANT NEOPLASM OF UPPER-OUTER QUADRANT OF LEFT FEMALE BREAST (HCC): Primary | ICD-10-CM

## 2017-06-28 DIAGNOSIS — C50.912 BREAST CANCER, STAGE 4, LEFT (HCC): ICD-10-CM

## 2017-06-28 LAB
ALBUMIN SERPL-MCNC: 4.2 G/DL (ref 3.5–5)
ALBUMIN/GLOB SERPL: 1.1 G/DL
ALP SERPL-CCNC: 90 U/L (ref 38–126)
ALT SERPL W P-5'-P-CCNC: 31 U/L (ref 9–52)
ANION GAP SERPL CALCULATED.3IONS-SCNC: 9 MMOL/L
AST SERPL-CCNC: 46 U/L (ref 5–40)
AUTO MIXED CELLS #: 0.5 10*3/UL (ref 0.1–1.5)
AUTO MIXED CELLS %: 10.1 % (ref 0.2–15.1)
BILIRUB SERPL-MCNC: 0.4 MG/DL (ref 0.2–1.3)
BUN BLD-MCNC: 9 MG/DL (ref 7–26)
BUN/CREAT SERPL: 8.2 (ref 7–25)
CALCIUM SPEC-SCNC: 9.8 MG/DL (ref 8.4–10.2)
CHLORIDE SERPL-SCNC: 103 MMOL/L (ref 98–107)
CO2 SERPL-SCNC: 27 MMOL/L (ref 22–30)
CREAT BLD-MCNC: 1.1 MG/DL (ref 0.7–1.4)
ERYTHROCYTE [DISTWIDTH] IN BLOOD BY AUTOMATED COUNT: 14.4 % (ref 11.5–14.5)
GFR SERPL CREATININE-BSD FRML MDRD: 49 ML/MIN/1.73
GLOBULIN UR ELPH-MCNC: 3.8 GM/DL
GLUCOSE BLD-MCNC: 109 MG/DL (ref 75–110)
HCT VFR BLD AUTO: 32.8 % (ref 37–47)
HGB BLD-MCNC: 10.6 G/DL (ref 12–16)
LYMPHOCYTES # BLD AUTO: 2 10*3/MM3 (ref 0.8–7)
LYMPHOCYTES NFR BLD AUTO: 43.4 % (ref 10–58.5)
MCH RBC QN AUTO: 37.2 PG (ref 27–31)
MCHC RBC AUTO-ENTMCNC: 32.3 G/DL (ref 33–37)
MCV RBC AUTO: 115.1 FL (ref 81–99)
NEUTROPHILS # BLD AUTO: 2.1 10*3/MM3 (ref 2–7.8)
NEUTROPHILS NFR BLD AUTO: 46.5 % (ref 37–92)
PLATELET # BLD AUTO: 325 10*3/MM3 (ref 130–400)
PMV BLD AUTO: 6.9 FL (ref 6–12)
POTASSIUM BLD-SCNC: 3.9 MMOL/L (ref 3.6–5)
PROT SERPL-MCNC: 8 G/DL (ref 6.3–8.2)
RBC # BLD AUTO: 2.85 10*6/MM3 (ref 4.2–5.4)
SODIUM BLD-SCNC: 139 MMOL/L (ref 137–145)
WBC NRBC COR # BLD: 4.5 10*3/MM3 (ref 4.8–10.8)

## 2017-06-28 PROCEDURE — 96401 CHEMO ANTI-NEOPL SQ/IM: CPT

## 2017-06-28 PROCEDURE — 80053 COMPREHEN METABOLIC PANEL: CPT | Performed by: INTERNAL MEDICINE

## 2017-06-28 PROCEDURE — 96402 CHEMO HORMON ANTINEOPL SQ/IM: CPT

## 2017-06-28 PROCEDURE — 85025 COMPLETE CBC W/AUTO DIFF WBC: CPT | Performed by: INTERNAL MEDICINE

## 2017-06-28 PROCEDURE — 99214 OFFICE O/P EST MOD 30 MIN: CPT | Performed by: INTERNAL MEDICINE

## 2017-06-28 PROCEDURE — 36415 COLL VENOUS BLD VENIPUNCTURE: CPT | Performed by: INTERNAL MEDICINE

## 2017-06-28 PROCEDURE — 25010000002 FULVESTRANT PER 25 MG: Performed by: INTERNAL MEDICINE

## 2017-06-28 PROCEDURE — G0279 TOMOSYNTHESIS, MAMMO: HCPCS

## 2017-06-28 RX ADMIN — FULVESTRANT 500 MG: 50 INJECTION INTRAMUSCULAR at 11:20

## 2017-06-28 NOTE — PROGRESS NOTES
[facility]  HEMATOLOGY & ONCOLOGY        Subjective    Metastatic breast cancer on Faslodex and Ibrance     Staging form: Breast, AJCC V7      Clinical stage from 10/11/2016: Stage IV (T2, N1, M1) - ER/PA positive. HER2/maynor negative  VISIT DIAGNOSIS:   Metastatic breast cancer       HEMATOLOGY / ONCOLOGY HISTORY:   Oncology/Hematology History    Patient is a 67-year-old postmenopausal female who had onset of her menses at age 9 and menopause at age 48. She received oral contraceptives for approximately 14 years and did not receive hormonal manipulation. Patient has a maternal cousin had breast cancer. Patient has had no prior breast biopsies. Patient found a palpable left breast mass as well as a left axillary mass. Patient had a bloody nipple discharge which been present for approximately 6 months. On 6/20/2012, a mammogram was performed showing a subareolar mass consistent with malignancy. Bilateral breast ultrasound revealed an ill-defined subareolar mass measuring 2.8-2.5 cm. There is a left axillary mass measuring 1.7 cm with suspicion of extracapsular extension. There is no evidence of disease in the right breast. On 7/9/2012 patient underwent a left breast biopsy and placement of marker clip. Left breast biopsy revealed an infiltrating lobular carcinoma grade 2 with focal ductal carcinoma in situ, solid pattern. A fine-needle biopsy of the left lymph node was consistent with metastatic carcinoma. Breast tumor profile revealed ER: 100%; PA: 98%; HER-2/maynor 2+; FISH: Unamplified; Ki-67: 71%; P 53 1%; DNA aneuploidy. A MRI of the breast were performed body multiple abnormal enhancing masses within the left breast. There appears to be anterior retraction of the pectoralis muscle with no direct extension. There is a moderate to large, layering left pleural effusion appreciated. The right breast reveals 3 moderately enlarged lymph nodes in the posterior lateral aspect the right breast. These have  fatty hilum but followup is recommended. Patient is undergone systemic studies including, on 8/2/2012, a CT scan of the brain showing atrophy. a CT scan that shows showing a small to moderate left pleural effusion with 3 subcentimeter nodule densities in the left lung.   She completed 4 cycles of neoadjuvant treatment with dose dense Adriamycin and Cytoxan. She had a mammogram which showed a significant reduction in the size of her left breast lesion. There is no axillary adenopathy noted. She has had an ultrasound revealing the  lesion reducing from 2.4 cm to 1 cm. Patient underwent a left mastectomy on 03/19/2013 finding negative invasive cancer, negative nodes. A 5% DCIS was noted. The patient has declined hormonal therapy. She did not need radiation therapy.  November 2014, she began to have evidence of lytic bone lesions at T5T6, frontal disease, an 8 mm lucency in the central frontal area of  the skull but nothing intracranial. Bone scan revealed only vertex tracer activity but bilateral ribs and thoracic spine, and other lesions in  her pelvis. At that time, she was started on letrozole since November 2014. She is taking Xgeva. She had progression found in bone in Feb 2015 on scan. She was started on Faslodex, Ibrance and continued xgeva.         Malignant neoplasm of upper-outer quadrant of left female breast    7/9/2012 Initial Diagnosis    Malignant neoplasm of upper-outer quadrant of left female breast      7/10/2012 Biopsy    Left Breast Biopsy & Axillary Node FNA: A) Infiltrating lobular carcinoma, Grade 2. B) Foci of ductal carcinoma in situ, solid pattern. C) Greatest dimension of the invasive carcinoma is 15.8mm.      3/19/2013 Surgery    Left Mastectomy w/ Axillary Tail: Focal residual ducatal carcinoma in situ (DCIS) 12 lymph nodes negative for metastatic carcinoma.      10/20/2014 Biopsy    Peritoneum Biopsy: Final Diagnosis: Malignant Cell Neoplasm.        Breast cancer metastasized to bone,  unspecified laterality    10/15/2012 -  Other Event    Left mammogram:  Impression:  1. Decreasing spiculation and density in the retroareolar left breast, near a previous biopsy.   2. Definite left axillary lesion is not appreciated.       2/5/2013 -  Other Event    Diagnostic Mammo:  Comparison is made to 10/15/2012 and 6/20/2012  The spiculated mass at 12:00 behind the nipple is nearly resolved.   Impression:  1. The mass on the left side is less apparent after receiving chemotherapy. There has been a good response to chmotherapy on the left side.   2. No suspicious abnormality is seen in the right breast to account for the new palable abnormality at the 4-5:00 position.       3/19/2013 Surgery    Breast Biopsy:  Final Diagnosis:  A. Breast, left mastectomy with axillary tail  1. Focal residual ductal carcinoma in situ (DICS) (less than 5% of tumor bed).  2. Negative for residual invasive carcinoma.   3. 12 Lymph Nodes, negative for metastatic carcinoma (0/12) focally, some lymph nodes demonstrate fibrosis/treatment effect.  B. Skin and soft tissue, left advancement flap:  1. Benign skin and subcutis.   2. Negative for carcinoma.       6/20/2013 -  Other Event    Diagnostic Mammo [facility]:  1. A subareolar mass is consistent with malignancy. Additional involement extends inferiorly sonographically and superolaterally mammograhically as demonstrated by calcifications. The mass measures approx 2.8 cm sonographically, but the involved region is likely much larger including an intraductal componet. The left axillary lymph nodes are also involved.   2. Recommened ultrasound guided biopsy of the left subareolar mass and left axillary lymph node.   3. Breast MRI could also further define multicentric disease on the left.   4. Clear suspicious finding on the right.  Recommend ongoing clinical follow up of palpable foci.       6/13/2014 -  Other Event    Diagnostic Mammo:  IMPRESSION:  1. History of left breast cancer  and mastectomy. Stable benign right breast mammograms.       10/20/2014 Surgery    Final Diagnosis:  Biopsies, pertoneum:  Metastatic carcinoma, morphologically compatible with metastatic mammary carcinoma.       6/15/2015 -  Other Event    Diagnostic Mammo:  IMPRESSION:   1. Negative x-ray report, should not delay biopsy if a dominat or clinically suspicious mass is present.       7/5/2016 -  Other Event    Diagnositic mammogram:  Impression:   Stable right mamogram with no radiographic findings suspicious for malignancy. Recommend continued screening mammography per screening guidelines unless otherwise earlier clinically indicated.       9/18/2016 Initial Diagnosis    Secondary malignant neoplasm of bone and bone marrow       Cancer Staging Information:  Malignant neoplasm of upper-outer quadrant of left female breast    Staging form: Breast, AJCC V7      Clinical stage from 10/11/2016: Stage IV (T2, N1, M1) - Signed by ALBERT Kulkarni on 10/11/2016      INTERVAL HISTORY  Patient ID: Heavenly Yanes is a 72 y.o. year old female history of metastatic breast carcinoma to lung and bone.  She is presently receiving treatment with Ibrance, Faslodex and Xgeva.  Tumor markers continued to decline with a CA 27-29 of 92.2 obtained on 9:30 2016 compared to 105.8 on 07/27/2016.  Mr. Yanes continues to tolerate this treatment regimen well and is here today for evaluation consideration to receive Faslodex and Xgeva.  Last bone mineral density study was obtained 11/21/2014 indicating osteopopenia, we will obtain a repeat bone mineral density study. Ms. Yanes completed 8 teeth pulled 02/14/2017.   Xgeva on hold, resume 05/2017.     Past Medical History:   Past Medical History:   Diagnosis Date   • Bipolar disorder    • Disease of thyroid gland    • Hypertension    • Malignant neoplasm of upper-outer quadrant of left female breast 9/18/2016   • Secondary malignant neoplasm of bone and bone marrow 9/18/2016     Past  Surgical History:   Past Surgical History:   Procedure Laterality Date   • BREAST SURGERY      left breast biopsy and axillary node   • CHOLECYSTECTOMY     • EXTERNAL EAR SURGERY     • MASTECTOMY Left    • PORTACATH PLACEMENT       Social History:   Social History     Social History   • Marital status:      Spouse name: N/A   • Number of children: N/A   • Years of education: N/A     Occupational History   • Not on file.     Social History Main Topics   • Smoking status: Never Smoker   • Smokeless tobacco: Never Used   • Alcohol use No   • Drug use: No   • Sexual activity: Not on file     Other Topics Concern   • Not on file     Social History Narrative     Family History:   Family History   Problem Relation Age of Onset   • No Known Problems Daughter    • No Known Problems Son    • Other Mother      complications from a concussion from a fall   • Other Father      old age   • No Known Problems Brother    • No Known Problems Daughter        Review of Systems   Constitutional: Negative.  Negative for activity change, appetite change, chills, diaphoresis, fatigue and fever.   HENT: Negative.  Negative for congestion, ear discharge, ear pain, facial swelling, hearing loss, mouth sores, nosebleeds, postnasal drip, rhinorrhea, sinus pressure, sore throat, tinnitus, trouble swallowing and voice change.    Eyes: Negative.  Negative for photophobia, pain, discharge and visual disturbance.   Respiratory: Negative.  Negative for apnea, cough, chest tightness, shortness of breath, wheezing and stridor.    Cardiovascular: Negative.  Negative for chest pain, palpitations and leg swelling.   Gastrointestinal: Negative.  Negative for abdominal distention, abdominal pain, blood in stool, constipation, diarrhea, nausea, rectal pain and vomiting.   Endocrine: Negative.  Negative for cold intolerance, heat intolerance, polydipsia, polyphagia and polyuria.   Genitourinary: Negative.  Negative for difficulty urinating, dysuria,  flank pain, frequency, hematuria and urgency.   Musculoskeletal: Negative.  Negative for arthralgias, back pain, gait problem, joint swelling and myalgias.   Skin: Negative.  Negative for color change, pallor, rash and wound.   Allergic/Immunologic: Negative.  Negative for environmental allergies, food allergies and immunocompromised state.   Neurological: Negative.  Negative for dizziness, tremors, seizures, syncope, speech difficulty, weakness, light-headedness, numbness and headaches.   Hematological: Negative.  Negative for adenopathy. Does not bruise/bleed easily.   Psychiatric/Behavioral: Negative.  Negative for agitation, confusion, hallucinations, sleep disturbance and suicidal ideas. The patient is not nervous/anxious.    All other systems reviewed and are negative.       Performance Status:  Asymptomatic    Medications:    Current Outpatient Prescriptions   Medication Sig Dispense Refill   • B Complex Vitamins (VITAMIN B COMPLEX PO) Take  by mouth. 1 po daily     • Calcium Carb-Cholecalciferol (CALCIUM 600 + D PO) Take  by mouth.     • celecoxib (CeleBREX) 200 MG capsule Take 200 mg by mouth 2 (two) times a day.     • DULoxetine (CYMBALTA) 30 MG capsule Take 30 mg by mouth daily.     • ferrous sulfate 325 (65 FE) MG tablet Take 325 mg by mouth 2 (two) times a day.     • Lysine HCl (L-LYSINE) 500 MG tablet tablet Take  by mouth daily.     • Magnesium 250 MG tablet Take  by mouth.     • melatonin 3 MG tablet Take  by mouth.     • metoprolol tartrate (LOPRESSOR) 25 MG tablet Take 25 mg by mouth 2 (two) times a day.     • OLANZapine (ZYPREXA) 2.5 MG tablet Take 2.5 mg by mouth every night.     • omeprazole (PriLOSEC) 40 MG capsule Take 40 mg by mouth Daily.     • Palbociclib 125 MG capsule capsule Take 1 capsule by mouth Daily. 3 weeks on, 1 week off 21 capsule 5   • Probiotic Product (PROBIOTIC DAILY PO) Take  by mouth. 300million cell daily     • thyroid 60 MG PO tablet Take 60 mg by mouth daily.     •  "VALERIAN ROOT PO Take  by mouth. 100mg daily     • Zinc 50 MG tablet Take  by mouth. 1 po daily       No current facility-administered medications for this visit.        ALLERGIES:    Allergies   Allergen Reactions   • Codeine    • Diphenhydramine Other (See Comments)     \"Shaky leg syndrome\"   • Heparin    • Other      POPPY SEED   • Penicillins    • Petroleum Jelly [Skin Protectants, Misc.]    • Sulfa Antibiotics    • Sulfur    • Valium [Diazepam]        Objective      There were no vitals filed for this visit.      Current Status 5/25/2017   ECOG score 0       General Appearance: Patient is awake, alert, oriented and in no acute distress. Patient is welldeveloped, wellnourished, and appears stated age.  HEENT: Normocephalic. Sclerae clear, conjunctiva pink, extraocular movements intact, pupils, round, reactive to light and accommodation. Mouth and throat are clear with moist oral mucosa.  NECK: Supple, no jugular venous distention, thyroid not enlarged.  LYMPH: No cervical, supraclavicular, axillary, or inguinal lymphadenopathy.  CHEST: Equal bilateral expansion   LUNGS: Good air movement, no rales, rhonchi, rubs or wheezes with auscultation  CARDIO: Regular sinus rhythm, no murmurs, gallops or rubs.  ABDOMEN: Nondistended, soft, No tenderness, no guarding, no rebound, No hepatosplenomegaly. No abdominal masses. Bowel sounds positive. No hernia  MUSKEL: No joint swelling, decreased motion, or inflammation  EXTREMS: No edema, clubbing, cyanosis, No varicose veins.  NEURO: Grossly nonfocal, Gait is coordinated and smooth, Cognition is preserved.  SKIN: No rashes, no ecchymoses, no petechia.  PSYCH: Oriented to time, place and person. Memory is preserved. Mood and affect appear normal      RECENT LABS:  WBC   Date Value Ref Range Status   05/25/2017 4.38 (L) 4.80 - 10.80 10*3/mm3 Final     RBC   Date Value Ref Range Status   05/25/2017 2.77 (L) 4.20 - 5.40 10*6/mm3 Final     Hemoglobin   Date Value Ref Range Status "   05/25/2017 10.2 (L) 12.0 - 16.0 g/dL Final     Hematocrit   Date Value Ref Range Status   05/25/2017 30.0 (L) 37.0 - 47.0 % Final     MCV   Date Value Ref Range Status   05/25/2017 108.3 (H) 82.0 - 98.0 fL Final     MCH   Date Value Ref Range Status   05/25/2017 36.8 (H) 28.0 - 32.0 pg Final     MCHC   Date Value Ref Range Status   05/25/2017 34.0 33.0 - 36.0 g/dL Final     RDW   Date Value Ref Range Status   05/25/2017 15.2 (H) 12.0 - 15.0 % Final     RDW-SD   Date Value Ref Range Status   05/25/2017 60.1 (H) 40.0 - 54.0 fl Final     MPV   Date Value Ref Range Status   05/25/2017 9.3 6.0 - 12.0 fL Final     Platelets   Date Value Ref Range Status   05/25/2017 184 130 - 400 10*3/mm3 Final     Neutrophil %   Date Value Ref Range Status   05/25/2017 49.3 37.0 - 92.0 % Final     Lymphocyte %   Date Value Ref Range Status   05/25/2017 41.1 10.0 - 58.5 % Final     Neutrophils, Absolute   Date Value Ref Range Status   05/25/2017 1.50 (L) 2.00 - 7.80 10*3/mm3 Final     Lymphocytes, Absolute   Date Value Ref Range Status   05/25/2017 1.30 0.80 - 7.00 10*3/mm3 Final     No visits with results within 7 Day(s) from this visit.  Latest known visit with results is:    Orders Only on 05/25/2017   Component Date Value Ref Range Status   • CA 27.29 05/25/2017 84.1* 0.0 - 38.6 U/mL Final    [employer]/ACS methodology             Assessment/Plan     Patient Active Problem List   Diagnosis   • Malignant neoplasm of upper-outer quadrant of left female breast   • Breast cancer metastasized to bone, unspecified laterality   • Essential hypertension   • Acquired hypothyroidism   • Bipolar 1 disorder   • Carcinoma of left breast metastatic to bone          Assessment:  1. Stage IV metastatic breast carcinoma, ER/NM positive. HER2/maynor negative undergoing Ibrance, Faslodex and Xgeva with overall stable with stable CA27.29 with slow decline. She is tolerating the treatment very well. Faslodex cycle 17 today and will continue to HOLD  Xgeva due to upcoming dental work, last dose of Xgeva given on 09/29/2016.  01/2017 restaging CT chest abdomen and pelvis and bone scan showed stable diffuse sclerotic bony lesions, unchanged from 7/1/2016.Next scan July 2017    2.  Mild leukopenia without neutropenia.  Likely secondary to Ibrance.  Continue to monitor.     3.  Macrocytic anemia.  Hemoglobin 11.1.  02/2016, B12 and folate normal    4.  Status post 8 teeth extraction.     Recommendation/Plan:  1.  Proceed with Faslodex cycle 20.  2. Continue Ibrance 125 mg 3 weeks on, one-week off.  Reduce dose if leukopenia worsens.   3. HOLD Xgeva. Dental work and 02/14/2017, resume Xgeva    4. Follow up in 28 days for Faslodex cycle 19.   Repeat CA 27.29 on 03/30/2017 was 81 compared to the level of 92 back in November 2016.    Additionally she is anemic with a hemoglobin of 10 g percent with decreasing GFR.  I do suspect that she may have some form of chronic kidney disease, I am not sure why, she is not diabetic or hypertensive.  For this reason she is being referred over to a nephrologist.  We will draw iron studies on her today.      Joseph Nunez MD    6/28/2017    8:55 AM

## 2017-06-28 NOTE — PROGRESS NOTES
6/28/17 1029 Called dr. nunn's office and spoke with Sandra RN to discuss labs. Instructions to not give xgeva, ok for faslodex and authorization is ok for faslodex, patient is medicare patient. Nilsa Cagle RN

## 2017-07-26 ENCOUNTER — LAB (OUTPATIENT)
Dept: ONCOLOGY | Facility: CLINIC | Age: 72
End: 2017-07-26

## 2017-07-26 ENCOUNTER — OFFICE VISIT (OUTPATIENT)
Dept: ONCOLOGY | Facility: CLINIC | Age: 72
End: 2017-07-26

## 2017-07-26 ENCOUNTER — INFUSION (OUTPATIENT)
Dept: ONCOLOGY | Facility: HOSPITAL | Age: 72
End: 2017-07-26
Attending: INTERNAL MEDICINE

## 2017-07-26 VITALS
TEMPERATURE: 98 F | WEIGHT: 170.7 LBS | HEIGHT: 64 IN | RESPIRATION RATE: 16 BRPM | DIASTOLIC BLOOD PRESSURE: 78 MMHG | OXYGEN SATURATION: 98 % | BODY MASS INDEX: 29.14 KG/M2 | SYSTOLIC BLOOD PRESSURE: 144 MMHG | HEART RATE: 60 BPM

## 2017-07-26 VITALS — DIASTOLIC BLOOD PRESSURE: 61 MMHG | SYSTOLIC BLOOD PRESSURE: 180 MMHG | HEART RATE: 51 BPM | TEMPERATURE: 98.1 F

## 2017-07-26 DIAGNOSIS — C50.912 CARCINOMA OF LEFT BREAST METASTATIC TO BONE (HCC): ICD-10-CM

## 2017-07-26 DIAGNOSIS — C50.412 MALIGNANT NEOPLASM OF UPPER-OUTER QUADRANT OF LEFT FEMALE BREAST (HCC): ICD-10-CM

## 2017-07-26 DIAGNOSIS — C50.412 MALIGNANT NEOPLASM OF UPPER-OUTER QUADRANT OF LEFT FEMALE BREAST (HCC): Primary | ICD-10-CM

## 2017-07-26 DIAGNOSIS — C79.51 BREAST CANCER METASTASIZED TO BONE, UNSPECIFIED LATERALITY (HCC): ICD-10-CM

## 2017-07-26 DIAGNOSIS — C79.51 CARCINOMA OF LEFT BREAST METASTATIC TO BONE (HCC): ICD-10-CM

## 2017-07-26 DIAGNOSIS — C50.919 BREAST CANCER METASTASIZED TO BONE, UNSPECIFIED LATERALITY (HCC): ICD-10-CM

## 2017-07-26 DIAGNOSIS — C50.912 CARCINOMA OF LEFT BREAST METASTATIC TO BONE (HCC): Primary | ICD-10-CM

## 2017-07-26 DIAGNOSIS — C79.51 CARCINOMA OF LEFT BREAST METASTATIC TO BONE (HCC): Primary | ICD-10-CM

## 2017-07-26 LAB
ALBUMIN SERPL-MCNC: 4.1 G/DL (ref 3.5–5)
ALBUMIN/GLOB SERPL: 1.1 G/DL
ALP SERPL-CCNC: 79 U/L (ref 38–126)
ALT SERPL W P-5'-P-CCNC: 20 U/L (ref 9–52)
ANION GAP SERPL CALCULATED.3IONS-SCNC: 9 MMOL/L
AST SERPL-CCNC: 25 U/L (ref 5–40)
AUTO MIXED CELLS #: 0.4 10*3/MM3 (ref 0.1–1.5)
AUTO MIXED CELLS %: 9.4 % (ref 0.2–15.1)
BILIRUB SERPL-MCNC: 0.3 MG/DL (ref 0.2–1.3)
BUN BLD-MCNC: 7 MG/DL (ref 7–26)
BUN/CREAT SERPL: 7 (ref 7–25)
CALCIUM SPEC-SCNC: 9.7 MG/DL (ref 8.4–10.2)
CHLORIDE SERPL-SCNC: 103 MMOL/L (ref 98–107)
CO2 SERPL-SCNC: 29 MMOL/L (ref 22–30)
CREAT BLD-MCNC: 1 MG/DL (ref 0.7–1.4)
ERYTHROCYTE [DISTWIDTH] IN BLOOD BY AUTOMATED COUNT: 15 % (ref 11.5–14.5)
GFR SERPL CREATININE-BSD FRML MDRD: 54 ML/MIN/1.73
GLOBULIN UR ELPH-MCNC: 3.9 GM/DL
GLUCOSE BLD-MCNC: 102 MG/DL (ref 75–110)
HCT VFR BLD AUTO: 32.8 % (ref 37–47)
HGB BLD-MCNC: 10.2 G/DL (ref 12–16)
LYMPHOCYTES # BLD AUTO: 1.8 10*3/MM3 (ref 0.8–7)
LYMPHOCYTES NFR BLD AUTO: 44 % (ref 10–58.5)
MCH RBC QN AUTO: 36.6 PG (ref 27–31)
MCHC RBC AUTO-ENTMCNC: 31.1 G/DL (ref 33–37)
MCV RBC AUTO: 117.7 FL (ref 81–99)
NEUTROPHILS # BLD AUTO: 2 10*3/MM3 (ref 2–7.8)
NEUTROPHILS NFR BLD AUTO: 46.6 % (ref 37–92)
PLATELET # BLD AUTO: 322 10*3/MM3 (ref 130–400)
PMV BLD AUTO: 6.4 FL (ref 6–12)
POTASSIUM BLD-SCNC: 4.1 MMOL/L (ref 3.6–5)
PROT SERPL-MCNC: 8 G/DL (ref 6.3–8.2)
RBC # BLD AUTO: 2.79 10*6/MM3 (ref 4.2–5.4)
SODIUM BLD-SCNC: 141 MMOL/L (ref 137–145)
WBC NRBC COR # BLD: 4.2 10*3/MM3 (ref 4.8–10.8)

## 2017-07-26 PROCEDURE — 36415 COLL VENOUS BLD VENIPUNCTURE: CPT | Performed by: INTERNAL MEDICINE

## 2017-07-26 PROCEDURE — 80053 COMPREHEN METABOLIC PANEL: CPT | Performed by: INTERNAL MEDICINE

## 2017-07-26 PROCEDURE — 85025 COMPLETE CBC W/AUTO DIFF WBC: CPT | Performed by: INTERNAL MEDICINE

## 2017-07-26 PROCEDURE — 99214 OFFICE O/P EST MOD 30 MIN: CPT | Performed by: INTERNAL MEDICINE

## 2017-07-26 PROCEDURE — 25010000002 FULVESTRANT PER 25 MG: Performed by: INTERNAL MEDICINE

## 2017-07-26 PROCEDURE — 96402 CHEMO HORMON ANTINEOPL SQ/IM: CPT

## 2017-07-26 RX ADMIN — FULVESTRANT 500 MG: 50 INJECTION INTRAMUSCULAR at 09:48

## 2017-07-26 NOTE — PROGRESS NOTES
[facility]  HEMATOLOGY & ONCOLOGY        Subjective    Metastatic breast cancer on Faslodex and Ibrance     Staging form: Breast, AJCC V7      Clinical stage from 10/11/2016: Stage IV (T2, N1, M1) - ER/OR positive. HER2/maynor negative  VISIT DIAGNOSIS:   Metastatic breast cancer       HEMATOLOGY / ONCOLOGY HISTORY:   Oncology/Hematology History    Patient is a 67-year-old postmenopausal female who had onset of her menses at age 9 and menopause at age 48. She received oral contraceptives for approximately 14 years and did not receive hormonal manipulation. Patient has a maternal cousin had breast cancer. Patient has had no prior breast biopsies. Patient found a palpable left breast mass as well as a left axillary mass. Patient had a bloody nipple discharge which been present for approximately 6 months. On 6/20/2012, a mammogram was performed showing a subareolar mass consistent with malignancy. Bilateral breast ultrasound revealed an ill-defined subareolar mass measuring 2.8-2.5 cm. There is a left axillary mass measuring 1.7 cm with suspicion of extracapsular extension. There is no evidence of disease in the right breast. On 7/9/2012 patient underwent a left breast biopsy and placement of marker clip. Left breast biopsy revealed an infiltrating lobular carcinoma grade 2 with focal ductal carcinoma in situ, solid pattern. A fine-needle biopsy of the left lymph node was consistent with metastatic carcinoma. Breast tumor profile revealed ER: 100%; OR: 98%; HER-2/maynor 2+; FISH: Unamplified; Ki-67: 71%; P 53 1%; DNA aneuploidy. A MRI of the breast were performed body multiple abnormal enhancing masses within the left breast. There appears to be anterior retraction of the pectoralis muscle with no direct extension. There is a moderate to large, layering left pleural effusion appreciated. The right breast reveals 3 moderately enlarged lymph nodes in the posterior lateral aspect the right breast. These have  fatty hilum but followup is recommended. Patient is undergone systemic studies including, on 8/2/2012, a CT scan of the brain showing atrophy. a CT scan that shows showing a small to moderate left pleural effusion with 3 subcentimeter nodule densities in the left lung.   She completed 4 cycles of neoadjuvant treatment with dose dense Adriamycin and Cytoxan. She had a mammogram which showed a significant reduction in the size of her left breast lesion. There is no axillary adenopathy noted. She has had an ultrasound revealing the  lesion reducing from 2.4 cm to 1 cm. Patient underwent a left mastectomy on 03/19/2013 finding negative invasive cancer, negative nodes. A 5% DCIS was noted. The patient has declined hormonal therapy. She did not need radiation therapy.  November 2014, she began to have evidence of lytic bone lesions at T5T6, frontal disease, an 8 mm lucency in the central frontal area of  the skull but nothing intracranial. Bone scan revealed only vertex tracer activity but bilateral ribs and thoracic spine, and other lesions in  her pelvis. At that time, she was started on letrozole since November 2014. She is taking Xgeva. She had progression found in bone in Feb 2015 on scan. She was started on Faslodex, Ibrance and continued xgeva.         Malignant neoplasm of upper-outer quadrant of left female breast    7/9/2012 Initial Diagnosis     Malignant neoplasm of upper-outer quadrant of left female breast         7/10/2012 Biopsy     Left Breast Biopsy & Axillary Node FNA: A) Infiltrating lobular carcinoma, Grade 2. B) Foci of ductal carcinoma in situ, solid pattern. C) Greatest dimension of the invasive carcinoma is 15.8mm.         3/19/2013 Surgery     Left Mastectomy w/ Axillary Tail: Focal residual ducatal carcinoma in situ (DCIS) 12 lymph nodes negative for metastatic carcinoma.         10/20/2014 Biopsy     Peritoneum Biopsy: Final Diagnosis: Malignant Cell Neoplasm.           Breast cancer  metastasized to bone, unspecified laterality    10/15/2012 -  Other Event     Left mammogram:  Impression:  1. Decreasing spiculation and density in the retroareolar left breast, near a previous biopsy.   2. Definite left axillary lesion is not appreciated.          2/5/2013 -  Other Event     Diagnostic Mammo:  Comparison is made to 10/15/2012 and 6/20/2012  The spiculated mass at 12:00 behind the nipple is nearly resolved.   Impression:  1. The mass on the left side is less apparent after receiving chemotherapy. There has been a good response to chmotherapy on the left side.   2. No suspicious abnormality is seen in the right breast to account for the new palable abnormality at the 4-5:00 position.          3/19/2013 Surgery     Breast Biopsy:  Final Diagnosis:  A. Breast, left mastectomy with axillary tail  1. Focal residual ductal carcinoma in situ (DICS) (less than 5% of tumor bed).  2. Negative for residual invasive carcinoma.   3. 12 Lymph Nodes, negative for metastatic carcinoma (0/12) focally, some lymph nodes demonstrate fibrosis/treatment effect.  B. Skin and soft tissue, left advancement flap:  1. Benign skin and subcutis.   2. Negative for carcinoma.          6/20/2013 -  Other Event     Diagnostic Mammo [facility]:  1. A subareolar mass is consistent with malignancy. Additional involement extends inferiorly sonographically and superolaterally mammograhically as demonstrated by calcifications. The mass measures approx 2.8 cm sonographically, but the involved region is likely much larger including an intraductal componet. The left axillary lymph nodes are also involved.   2. Recommened ultrasound guided biopsy of the left subareolar mass and left axillary lymph node.   3. Breast MRI could also further define multicentric disease on the left.   4. Clear suspicious finding on the right.  Recommend ongoing clinical follow up of palpable foci.          6/13/2014 -  Other Event     Diagnostic  Mammo:  IMPRESSION:  1. History of left breast cancer and mastectomy. Stable benign right breast mammograms.          10/20/2014 Surgery     Final Diagnosis:  Biopsies, pertoneum:  Metastatic carcinoma, morphologically compatible with metastatic mammary carcinoma.          6/15/2015 -  Other Event     Diagnostic Mammo:  IMPRESSION:   1. Negative x-ray report, should not delay biopsy if a dominat or clinically suspicious mass is present.          7/5/2016 -  Other Event     Diagnositic mammogram:  Impression:   Stable right mamogram with no radiographic findings suspicious for malignancy. Recommend continued screening mammography per screening guidelines unless otherwise earlier clinically indicated.          9/18/2016 Initial Diagnosis     Secondary malignant neoplasm of bone and bone marrow          Cancer Staging Information:  Malignant neoplasm of upper-outer quadrant of left female breast    Staging form: Breast, AJCC V7      Clinical stage from 10/11/2016: Stage IV (T2, N1, M1) - Signed by ALBERT Kulkarni on 10/11/2016      INTERVAL HISTORY  Patient ID: Heavenly Yanes is a 72 y.o. year old female history of metastatic breast carcinoma to lung and bone.  She is presently receiving treatment with Ibrance, Faslodex and Xgeva.  Tumor markers continued to decline with a CA 27-29 of 92.2 obtained on 9:30 2016 compared to 105.8 on 07/27/2016.  Mr. Yanes continues to tolerate this treatment regimen well and is here today for evaluation consideration to receive Faslodex and Xgeva.  Last bone mineral density study was obtained 11/21/2014 indicating osteopopenia, we will obtain a repeat bone mineral density study. Ms. Yanes completed 8 teeth pulled 02/14/2017.   Xgeva on hold, resume 05/2017.     Past Medical History:   Past Medical History:   Diagnosis Date   • Bipolar disorder    • Disease of thyroid gland    • Hypertension    • Malignant neoplasm of upper-outer quadrant of left female breast 9/18/2016   •  Secondary malignant neoplasm of bone and bone marrow 9/18/2016     Past Surgical History:   Past Surgical History:   Procedure Laterality Date   • BREAST SURGERY      left breast biopsy and axillary node   • CHOLECYSTECTOMY     • EXTERNAL EAR SURGERY     • MASTECTOMY Left    • PORTACATH PLACEMENT       Social History:   Social History     Social History   • Marital status:      Spouse name: N/A   • Number of children: N/A   • Years of education: N/A     Occupational History   • Not on file.     Social History Main Topics   • Smoking status: Never Smoker   • Smokeless tobacco: Never Used   • Alcohol use No   • Drug use: No   • Sexual activity: Not on file     Other Topics Concern   • Not on file     Social History Narrative     Family History:   Family History   Problem Relation Age of Onset   • No Known Problems Daughter    • No Known Problems Son    • Other Mother      complications from a concussion from a fall   • Other Father      old age   • No Known Problems Brother    • No Known Problems Daughter        Review of Systems   Constitutional: Negative.  Negative for activity change, appetite change, chills, diaphoresis, fatigue and fever.   HENT: Negative.  Negative for congestion, ear discharge, ear pain, facial swelling, hearing loss, mouth sores, nosebleeds, postnasal drip, rhinorrhea, sinus pressure, sore throat, tinnitus, trouble swallowing and voice change.    Eyes: Negative.  Negative for photophobia, pain, discharge and visual disturbance.   Respiratory: Negative.  Negative for apnea, cough, chest tightness, shortness of breath, wheezing and stridor.    Cardiovascular: Negative.  Negative for chest pain, palpitations and leg swelling.   Gastrointestinal: Negative.  Negative for abdominal distention, abdominal pain, blood in stool, constipation, diarrhea, nausea, rectal pain and vomiting.   Endocrine: Negative.  Negative for cold intolerance, heat intolerance, polydipsia, polyphagia and polyuria.    Genitourinary: Negative.  Negative for difficulty urinating, dysuria, flank pain, frequency, hematuria and urgency.   Musculoskeletal: Negative.  Negative for arthralgias, back pain, gait problem, joint swelling and myalgias.   Skin: Negative.  Negative for color change, pallor, rash and wound.   Allergic/Immunologic: Negative.  Negative for environmental allergies, food allergies and immunocompromised state.   Neurological: Negative.  Negative for dizziness, tremors, seizures, syncope, speech difficulty, weakness, light-headedness, numbness and headaches.   Hematological: Negative.  Negative for adenopathy. Does not bruise/bleed easily.   Psychiatric/Behavioral: Negative.  Negative for agitation, confusion, hallucinations, sleep disturbance and suicidal ideas. The patient is not nervous/anxious.    All other systems reviewed and are negative.       Performance Status:  Asymptomatic    Medications:    Current Outpatient Prescriptions   Medication Sig Dispense Refill   • B Complex Vitamins (VITAMIN B COMPLEX PO) Take  by mouth. 1 po daily     • Calcium Carb-Cholecalciferol (CALCIUM 600 + D PO) Take  by mouth.     • celecoxib (CeleBREX) 200 MG capsule Take 200 mg by mouth 2 (two) times a day.     • DULoxetine (CYMBALTA) 30 MG capsule Take 30 mg by mouth daily.     • ferrous sulfate 325 (65 FE) MG tablet Take 325 mg by mouth 2 (two) times a day.     • Lysine HCl (L-LYSINE) 500 MG tablet tablet Take  by mouth daily.     • Magnesium 250 MG tablet Take  by mouth.     • melatonin 3 MG tablet Take  by mouth.     • metoprolol tartrate (LOPRESSOR) 25 MG tablet Take 25 mg by mouth 2 (two) times a day.     • OLANZapine (ZYPREXA) 2.5 MG tablet Take 2.5 mg by mouth every night.     • omeprazole (PriLOSEC) 40 MG capsule Take 40 mg by mouth Daily.     • Palbociclib 125 MG capsule capsule Take 1 capsule by mouth Daily. 3 weeks on, 1 week off 21 capsule 5   • Probiotic Product (PROBIOTIC DAILY PO) Take  by mouth. 300million cell  "daily     • thyroid 60 MG PO tablet Take 60 mg by mouth daily.     • VALERIAN ROOT PO Take  by mouth. 100mg daily     • Zinc 50 MG tablet Take  by mouth. 1 po daily       No current facility-administered medications for this visit.        ALLERGIES:    Allergies   Allergen Reactions   • Codeine    • Diphenhydramine Other (See Comments)     \"Shaky leg syndrome\"   • Heparin    • Other      POPPY SEED   • Penicillins    • Petroleum Jelly [Skin Protectants, Misc.]    • Sulfa Antibiotics    • Sulfur    • Valium [Diazepam]        Objective      Vitals:    07/26/17 0812   BP: 144/78   Pulse: 60   Resp: 16   Temp: 98 °F (36.7 °C)   TempSrc: Tympanic   SpO2: 98%   Weight: 170 lb 11.2 oz (77.4 kg)   Height: 64\" (162.6 cm)         Current Status 7/26/2017   ECOG score 2       General Appearance: Patient is awake, alert, oriented and in no acute distress. Patient is welldeveloped, wellnourished, and appears stated age.  HEENT: Normocephalic. Sclerae clear, conjunctiva pink, extraocular movements intact, pupils, round, reactive to light and accommodation. Mouth and throat are clear with moist oral mucosa.  NECK: Supple, no jugular venous distention, thyroid not enlarged.  LYMPH: No cervical, supraclavicular, axillary, or inguinal lymphadenopathy.  CHEST: Equal bilateral expansion   LUNGS: Good air movement, no rales, rhonchi, rubs or wheezes with auscultation  CARDIO: Regular sinus rhythm, no murmurs, gallops or rubs.  ABDOMEN: Nondistended, soft, No tenderness, no guarding, no rebound, No hepatosplenomegaly. No abdominal masses. Bowel sounds positive. No hernia  MUSKEL: No joint swelling, decreased motion, or inflammation  EXTREMS: No edema, clubbing, cyanosis, No varicose veins.  NEURO: Grossly nonfocal, Gait is coordinated and smooth, Cognition is preserved.  SKIN: No rashes, no ecchymoses, no petechia.  PSYCH: Oriented to time, place and person. Memory is preserved. Mood and affect appear normal      RECENT LABS:  WBC   Date " Value Ref Range Status   07/26/2017 4.20 (L) 4.80 - 10.80 10*3/mm3 Final     RBC   Date Value Ref Range Status   07/26/2017 2.79 (L) 4.20 - 5.40 10*6/mm3 Final     Hemoglobin   Date Value Ref Range Status   07/26/2017 10.2 (L) 12.0 - 16.0 g/dL Final     Hematocrit   Date Value Ref Range Status   07/26/2017 32.8 (L) 37.0 - 47.0 % Final     MCV   Date Value Ref Range Status   07/26/2017 117.7 (H) 81.0 - 99.0 fL Final     MCH   Date Value Ref Range Status   07/26/2017 36.6 (H) 27.0 - 31.0 pg Final     MCHC   Date Value Ref Range Status   07/26/2017 31.1 (L) 33.0 - 37.0 g/dL Final     RDW   Date Value Ref Range Status   07/26/2017 15.0 (H) 11.5 - 14.5 % Final     RDW-SD   Date Value Ref Range Status   05/25/2017 60.1 (H) 40.0 - 54.0 fl Final     MPV   Date Value Ref Range Status   07/26/2017 6.4 6.0 - 12.0 fL Final     Platelets   Date Value Ref Range Status   07/26/2017 322 130 - 400 10*3/mm3 Final     Neutrophil %   Date Value Ref Range Status   07/26/2017 46.6 37.0 - 92.0 % Final     Lymphocyte %   Date Value Ref Range Status   07/26/2017 44.0 10.0 - 58.5 % Final     Neutrophils, Absolute   Date Value Ref Range Status   07/26/2017 2.00 2.00 - 7.80 10*3/mm3 Final     Lymphocytes, Absolute   Date Value Ref Range Status   07/26/2017 1.80 0.80 - 7.00 10*3/mm3 Final     Lab on 07/26/2017   Component Date Value Ref Range Status   • WBC 07/26/2017 4.20* 4.80 - 10.80 10*3/mm3 Final   • RBC 07/26/2017 2.79* 4.20 - 5.40 10*6/mm3 Final   • Hemoglobin 07/26/2017 10.2* 12.0 - 16.0 g/dL Final   • Hematocrit 07/26/2017 32.8* 37.0 - 47.0 % Final   • MCV 07/26/2017 117.7* 81.0 - 99.0 fL Final   • MCH 07/26/2017 36.6* 27.0 - 31.0 pg Final   • MCHC 07/26/2017 31.1* 33.0 - 37.0 g/dL Final   • RDW 07/26/2017 15.0* 11.5 - 14.5 % Final   • MPV 07/26/2017 6.4  6.0 - 12.0 fL Final   • Platelets 07/26/2017 322  130 - 400 10*3/mm3 Final   • Neutrophil % 07/26/2017 46.6  37.0 - 92.0 % Final   • Lymphocyte % 07/26/2017 44.0  10.0 - 58.5 % Final    • Auto Mixed Cells % 07/26/2017 9.4  0.2 - 15.1 % Final   • Neutrophils, Absolute 07/26/2017 2.00  2.00 - 7.80 10*3/mm3 Final   • Lymphocytes, Absolute 07/26/2017 1.80  0.80 - 7.00 10*3/mm3 Final   • Auto Mixed Cells # 07/26/2017 0.40  0.10 - 1.50 10*3/mm3 Final             Assessment/Plan     Patient Active Problem List   Diagnosis   • Malignant neoplasm of upper-outer quadrant of left female breast   • Breast cancer metastasized to bone, unspecified laterality   • Essential hypertension   • Acquired hypothyroidism   • Bipolar 1 disorder   • Carcinoma of left breast metastatic to bone          Assessment:  1. Stage IV metastatic breast carcinoma, ER/DE positive. HER2/maynor negative undergoing Ibrance, Faslodex and Xgeva with overall stable with stable CA27.29 with slow decline. She is tolerating the treatment very well. Faslodex cycle 17 today and will continue to HOLD Xgeva due to upcoming dental work, last dose of Xgeva given on 09/29/2016.  01/2017 restaging CT chest abdomen and pelvis and bone scan showed stable diffuse sclerotic bony lesions, unchanged from 7/1/2016.Next scan July 2017    2.  Mild leukopenia without neutropenia.  Likely secondary to Ibrance.  Continue to monitor.     3.  Macrocytic anemia.  Hemoglobin 11.1.  02/2016, B12 and folate normal    4.  Status post 8 teeth extraction.     Recommendation/Plan:  1.  Proceed with Faslodex cycle 20.  2. Continue Ibrance 125 mg 3 weeks on, one-week off.  Reduce dose if leukopenia worsens.   3. HOLD Xgeva. Dental work and 02/14/2017, resume Xgeva    4. Follow up in 28 days for Faslodex cycle 19.   Repeat CA 27.29 .    Additionally she is anemic with a hemoglobin of 10 g percent with decreasing GFR.  I do suspect that she may have some form of chronic kidney disease, I am not sure why, she is not diabetic or hypertensive.  For this reason she is being referred over to a nephrologist.  We will draw iron studies on her today.      Joseph Nunez,  MD    7/26/2017    8:33 AM

## 2017-07-27 LAB — CANCER AG27-29 SERPL-ACNC: 88.9 U/ML (ref 0–38.6)

## 2017-08-02 ENCOUNTER — OFFICE VISIT (OUTPATIENT)
Dept: CARDIOLOGY | Age: 72
End: 2017-08-02
Payer: COMMERCIAL

## 2017-08-02 VITALS
WEIGHT: 171 LBS | BODY MASS INDEX: 28.49 KG/M2 | SYSTOLIC BLOOD PRESSURE: 110 MMHG | HEART RATE: 60 BPM | DIASTOLIC BLOOD PRESSURE: 64 MMHG | HEIGHT: 65 IN

## 2017-08-02 DIAGNOSIS — I48.0 PAROXYSMAL ATRIAL FIBRILLATION (HCC): Primary | ICD-10-CM

## 2017-08-02 DIAGNOSIS — I38 VALVULAR HEART DISEASE: ICD-10-CM

## 2017-08-02 PROCEDURE — 93000 ELECTROCARDIOGRAM COMPLETE: CPT | Performed by: CLINICAL NURSE SPECIALIST

## 2017-08-02 PROCEDURE — 99213 OFFICE O/P EST LOW 20 MIN: CPT | Performed by: CLINICAL NURSE SPECIALIST

## 2017-08-22 DIAGNOSIS — C50.412 MALIGNANT NEOPLASM OF UPPER-OUTER QUADRANT OF LEFT FEMALE BREAST (HCC): Primary | ICD-10-CM

## 2017-08-24 ENCOUNTER — OFFICE VISIT (OUTPATIENT)
Dept: ONCOLOGY | Facility: CLINIC | Age: 72
End: 2017-08-24

## 2017-08-24 ENCOUNTER — INFUSION (OUTPATIENT)
Dept: ONCOLOGY | Facility: HOSPITAL | Age: 72
End: 2017-08-24
Attending: INTERNAL MEDICINE

## 2017-08-24 ENCOUNTER — LAB (OUTPATIENT)
Dept: ONCOLOGY | Facility: CLINIC | Age: 72
End: 2017-08-24
Attending: INTERNAL MEDICINE

## 2017-08-24 VITALS
SYSTOLIC BLOOD PRESSURE: 148 MMHG | HEART RATE: 57 BPM | HEIGHT: 65 IN | BODY MASS INDEX: 27.82 KG/M2 | RESPIRATION RATE: 18 BRPM | TEMPERATURE: 97.5 F | OXYGEN SATURATION: 100 % | WEIGHT: 167 LBS | DIASTOLIC BLOOD PRESSURE: 49 MMHG

## 2017-08-24 VITALS
SYSTOLIC BLOOD PRESSURE: 126 MMHG | TEMPERATURE: 97.8 F | DIASTOLIC BLOOD PRESSURE: 72 MMHG | BODY MASS INDEX: 28.61 KG/M2 | HEART RATE: 58 BPM | HEIGHT: 64 IN | RESPIRATION RATE: 16 BRPM | WEIGHT: 167.6 LBS | OXYGEN SATURATION: 99 %

## 2017-08-24 DIAGNOSIS — C50.912 CARCINOMA OF LEFT BREAST METASTATIC TO BONE (HCC): Primary | ICD-10-CM

## 2017-08-24 DIAGNOSIS — C79.51 BREAST CANCER METASTASIZED TO BONE, UNSPECIFIED LATERALITY (HCC): ICD-10-CM

## 2017-08-24 DIAGNOSIS — C50.919 BREAST CANCER METASTASIZED TO BONE, UNSPECIFIED LATERALITY (HCC): ICD-10-CM

## 2017-08-24 DIAGNOSIS — C50.412 MALIGNANT NEOPLASM OF UPPER-OUTER QUADRANT OF LEFT FEMALE BREAST (HCC): ICD-10-CM

## 2017-08-24 DIAGNOSIS — C50.912 CARCINOMA OF LEFT BREAST METASTATIC TO BONE (HCC): ICD-10-CM

## 2017-08-24 DIAGNOSIS — C50.412 MALIGNANT NEOPLASM OF UPPER-OUTER QUADRANT OF LEFT FEMALE BREAST (HCC): Primary | ICD-10-CM

## 2017-08-24 DIAGNOSIS — C79.51 CARCINOMA OF LEFT BREAST METASTATIC TO BONE (HCC): ICD-10-CM

## 2017-08-24 DIAGNOSIS — C79.51 CARCINOMA OF LEFT BREAST METASTATIC TO BONE (HCC): Primary | ICD-10-CM

## 2017-08-24 LAB
ALBUMIN SERPL-MCNC: 4.3 G/DL (ref 3.5–5)
ALBUMIN/GLOB SERPL: 1.2 G/DL (ref 1.1–2.5)
ALP SERPL-CCNC: 81 U/L (ref 24–120)
ALT SERPL W P-5'-P-CCNC: 30 U/L (ref 0–54)
ANION GAP SERPL CALCULATED.3IONS-SCNC: 10 MMOL/L (ref 4–13)
AST SERPL-CCNC: 31 U/L (ref 7–45)
AUTO MIXED CELLS #: 0.3 10*3/MM3 (ref 0.1–1.5)
AUTO MIXED CELLS %: 7.6 % (ref 0.2–15.1)
BILIRUB SERPL-MCNC: 0.4 MG/DL (ref 0.1–1)
BUN BLD-MCNC: 13 MG/DL (ref 5–21)
BUN/CREAT SERPL: 11 (ref 7–25)
CALCIUM SPEC-SCNC: 10.2 MG/DL (ref 8.4–10.4)
CHLORIDE SERPL-SCNC: 104 MMOL/L (ref 98–110)
CO2 SERPL-SCNC: 27 MMOL/L (ref 24–31)
CREAT BLD-MCNC: 1.18 MG/DL (ref 0.5–1.4)
ERYTHROCYTE [DISTWIDTH] IN BLOOD BY AUTOMATED COUNT: 14.6 % (ref 11.5–14.5)
GFR SERPL CREATININE-BSD FRML MDRD: 45 ML/MIN/1.73
GLOBULIN UR ELPH-MCNC: 3.5 GM/DL
GLUCOSE BLD-MCNC: 91 MG/DL (ref 70–100)
HCT VFR BLD AUTO: 31.8 % (ref 37–47)
HGB BLD-MCNC: 10.4 G/DL (ref 12–16)
LYMPHOCYTES # BLD AUTO: 1.9 10*3/MM3 (ref 0.8–7)
LYMPHOCYTES NFR BLD AUTO: 45.1 % (ref 10–58.5)
MCH RBC QN AUTO: 38 PG (ref 27–31)
MCHC RBC AUTO-ENTMCNC: 32.7 G/DL (ref 33–37)
MCV RBC AUTO: 116.1 FL (ref 81–99)
NEUTROPHILS # BLD AUTO: 2 10*3/MM3 (ref 2–7.8)
NEUTROPHILS NFR BLD AUTO: 47.3 % (ref 37–92)
PLATELET # BLD AUTO: 219 10*3/MM3 (ref 130–400)
PMV BLD AUTO: 6.7 FL (ref 6–12)
POTASSIUM BLD-SCNC: 4.1 MMOL/L (ref 3.5–5.3)
PROT SERPL-MCNC: 7.8 G/DL (ref 6.3–8.7)
RBC # BLD AUTO: 2.74 10*6/MM3 (ref 4.2–5.4)
SODIUM BLD-SCNC: 141 MMOL/L (ref 135–145)
WBC NRBC COR # BLD: 4.2 10*3/MM3 (ref 4.8–10.8)

## 2017-08-24 PROCEDURE — 85025 COMPLETE CBC W/AUTO DIFF WBC: CPT | Performed by: INTERNAL MEDICINE

## 2017-08-24 PROCEDURE — 36415 COLL VENOUS BLD VENIPUNCTURE: CPT

## 2017-08-24 PROCEDURE — 36415 COLL VENOUS BLD VENIPUNCTURE: CPT | Performed by: INTERNAL MEDICINE

## 2017-08-24 PROCEDURE — 96402 CHEMO HORMON ANTINEOPL SQ/IM: CPT

## 2017-08-24 PROCEDURE — 80053 COMPREHEN METABOLIC PANEL: CPT | Performed by: INTERNAL MEDICINE

## 2017-08-24 PROCEDURE — 25010000002 FULVESTRANT PER 25 MG: Performed by: INTERNAL MEDICINE

## 2017-08-24 PROCEDURE — 99214 OFFICE O/P EST MOD 30 MIN: CPT | Performed by: INTERNAL MEDICINE

## 2017-08-24 RX ADMIN — FULVESTRANT 500 MG: 50 INJECTION INTRAMUSCULAR at 11:00

## 2017-08-24 NOTE — PROGRESS NOTES
[facility]  HEMATOLOGY & ONCOLOGY        Subjective    Metastatic breast cancer on Faslodex and Ibrance     Staging form: Breast, AJCC V7      Clinical stage from 10/11/2016: Stage IV (T2, N1, M1) - ER/IL positive. HER2/maynor negative  VISIT DIAGNOSIS:   Metastatic breast cancer       HEMATOLOGY / ONCOLOGY HISTORY:   Oncology/Hematology History    Patient is a 67-year-old postmenopausal female who had onset of her menses at age 9 and menopause at age 48. She received oral contraceptives for approximately 14 years and did not receive hormonal manipulation. Patient has a maternal cousin had breast cancer. Patient has had no prior breast biopsies. Patient found a palpable left breast mass as well as a left axillary mass. Patient had a bloody nipple discharge which been present for approximately 6 months. On 6/20/2012, a mammogram was performed showing a subareolar mass consistent with malignancy. Bilateral breast ultrasound revealed an ill-defined subareolar mass measuring 2.8-2.5 cm. There is a left axillary mass measuring 1.7 cm with suspicion of extracapsular extension. There is no evidence of disease in the right breast. On 7/9/2012 patient underwent a left breast biopsy and placement of marker clip. Left breast biopsy revealed an infiltrating lobular carcinoma grade 2 with focal ductal carcinoma in situ, solid pattern. A fine-needle biopsy of the left lymph node was consistent with metastatic carcinoma. Breast tumor profile revealed ER: 100%; IL: 98%; HER-2/maynor 2+; FISH: Unamplified; Ki-67: 71%; P 53 1%; DNA aneuploidy. A MRI of the breast were performed body multiple abnormal enhancing masses within the left breast. There appears to be anterior retraction of the pectoralis muscle with no direct extension. There is a moderate to large, layering left pleural effusion appreciated. The right breast reveals 3 moderately enlarged lymph nodes in the posterior lateral aspect the right breast. These have  fatty hilum but followup is recommended. Patient is undergone systemic studies including, on 8/2/2012, a CT scan of the brain showing atrophy. a CT scan that shows showing a small to moderate left pleural effusion with 3 subcentimeter nodule densities in the left lung.   She completed 4 cycles of neoadjuvant treatment with dose dense Adriamycin and Cytoxan. She had a mammogram which showed a significant reduction in the size of her left breast lesion. There is no axillary adenopathy noted. She has had an ultrasound revealing the  lesion reducing from 2.4 cm to 1 cm. Patient underwent a left mastectomy on 03/19/2013 finding negative invasive cancer, negative nodes. A 5% DCIS was noted. The patient has declined hormonal therapy. She did not need radiation therapy.  November 2014, she began to have evidence of lytic bone lesions at T5T6, frontal disease, an 8 mm lucency in the central frontal area of  the skull but nothing intracranial. Bone scan revealed only vertex tracer activity but bilateral ribs and thoracic spine, and other lesions in  her pelvis. At that time, she was started on letrozole since November 2014. She is taking Xgeva. She had progression found in bone in Feb 2015 on scan. She was started on Faslodex, Ibrance and continued xgeva.         Malignant neoplasm of upper-outer quadrant of left female breast    7/9/2012 Initial Diagnosis     Malignant neoplasm of upper-outer quadrant of left female breast         7/10/2012 Biopsy     Left Breast Biopsy & Axillary Node FNA: A) Infiltrating lobular carcinoma, Grade 2. B) Foci of ductal carcinoma in situ, solid pattern. C) Greatest dimension of the invasive carcinoma is 15.8mm.         3/19/2013 Surgery     Left Mastectomy w/ Axillary Tail: Focal residual ducatal carcinoma in situ (DCIS) 12 lymph nodes negative for metastatic carcinoma.         10/20/2014 Biopsy     Peritoneum Biopsy: Final Diagnosis: Malignant Cell Neoplasm.           Breast cancer  metastasized to bone, unspecified laterality    10/15/2012 -  Other Event     Left mammogram:  Impression:  1. Decreasing spiculation and density in the retroareolar left breast, near a previous biopsy.   2. Definite left axillary lesion is not appreciated.          2/5/2013 -  Other Event     Diagnostic Mammo:  Comparison is made to 10/15/2012 and 6/20/2012  The spiculated mass at 12:00 behind the nipple is nearly resolved.   Impression:  1. The mass on the left side is less apparent after receiving chemotherapy. There has been a good response to chmotherapy on the left side.   2. No suspicious abnormality is seen in the right breast to account for the new palable abnormality at the 4-5:00 position.          3/19/2013 Surgery     Breast Biopsy:  Final Diagnosis:  A. Breast, left mastectomy with axillary tail  1. Focal residual ductal carcinoma in situ (DICS) (less than 5% of tumor bed).  2. Negative for residual invasive carcinoma.   3. 12 Lymph Nodes, negative for metastatic carcinoma (0/12) focally, some lymph nodes demonstrate fibrosis/treatment effect.  B. Skin and soft tissue, left advancement flap:  1. Benign skin and subcutis.   2. Negative for carcinoma.          6/20/2013 -  Other Event     Diagnostic Mammo [facility]:  1. A subareolar mass is consistent with malignancy. Additional involement extends inferiorly sonographically and superolaterally mammograhically as demonstrated by calcifications. The mass measures approx 2.8 cm sonographically, but the involved region is likely much larger including an intraductal componet. The left axillary lymph nodes are also involved.   2. Recommened ultrasound guided biopsy of the left subareolar mass and left axillary lymph node.   3. Breast MRI could also further define multicentric disease on the left.   4. Clear suspicious finding on the right.  Recommend ongoing clinical follow up of palpable foci.          6/13/2014 -  Other Event     Diagnostic  Mammo:  IMPRESSION:  1. History of left breast cancer and mastectomy. Stable benign right breast mammograms.          10/20/2014 Surgery     Final Diagnosis:  Biopsies, pertoneum:  Metastatic carcinoma, morphologically compatible with metastatic mammary carcinoma.          6/15/2015 -  Other Event     Diagnostic Mammo:  IMPRESSION:   1. Negative x-ray report, should not delay biopsy if a dominat or clinically suspicious mass is present.          7/5/2016 -  Other Event     Diagnositic mammogram:  Impression:   Stable right mamogram with no radiographic findings suspicious for malignancy. Recommend continued screening mammography per screening guidelines unless otherwise earlier clinically indicated.          9/18/2016 Initial Diagnosis     Secondary malignant neoplasm of bone and bone marrow          Cancer Staging Information:  Malignant neoplasm of upper-outer quadrant of left female breast    Staging form: Breast, AJCC V7      Clinical stage from 10/11/2016: Stage IV (T2, N1, M1) - Signed by ALBERT Kulkarni on 10/11/2016      INTERVAL HISTORY  Patient ID: Heavenly Yanes is a 72 y.o. year old female history of metastatic breast carcinoma to lung and bone.  She is presently receiving treatment with Ibrance, Faslodex and Xgeva.  Tumor markers continued to decline with a CA 27-29 of 92.2 obtained on 9:30 2016 compared to 105.8 on 07/27/2016.  Mr. Yanes continues to tolerate this treatment regimen well and is here today for evaluation consideration to receive Faslodex and Xgeva.  Last bone mineral density study was obtained 11/21/2014 indicating osteopopenia, we will obtain a repeat bone mineral density study. Ms. Yanes completed 8 teeth pulled 02/14/2017.   Xgeva on hold, resume 05/2017.     Past Medical History:   Past Medical History:   Diagnosis Date   • Bipolar disorder    • Disease of thyroid gland    • Hypertension    • Malignant neoplasm of upper-outer quadrant of left female breast 9/18/2016   •  Secondary malignant neoplasm of bone and bone marrow 9/18/2016     Past Surgical History:   Past Surgical History:   Procedure Laterality Date   • BREAST SURGERY      left breast biopsy and axillary node   • CHOLECYSTECTOMY     • EXTERNAL EAR SURGERY     • MASTECTOMY Left    • PORTACATH PLACEMENT       Social History:   Social History     Social History   • Marital status:      Spouse name: N/A   • Number of children: N/A   • Years of education: N/A     Occupational History   • Not on file.     Social History Main Topics   • Smoking status: Never Smoker   • Smokeless tobacco: Never Used   • Alcohol use No   • Drug use: No   • Sexual activity: Not on file     Other Topics Concern   • Not on file     Social History Narrative     Family History:   Family History   Problem Relation Age of Onset   • No Known Problems Daughter    • No Known Problems Son    • Other Mother      complications from a concussion from a fall   • Other Father      old age   • No Known Problems Brother    • No Known Problems Daughter        Review of Systems   Constitutional: Negative.  Negative for activity change, appetite change, chills, diaphoresis, fatigue and fever.   HENT: Negative.  Negative for congestion, ear discharge, ear pain, facial swelling, hearing loss, mouth sores, nosebleeds, postnasal drip, rhinorrhea, sinus pressure, sore throat, tinnitus, trouble swallowing and voice change.    Eyes: Negative.  Negative for photophobia, pain, discharge and visual disturbance.   Respiratory: Negative.  Negative for apnea, cough, chest tightness, shortness of breath, wheezing and stridor.    Cardiovascular: Negative.  Negative for chest pain, palpitations and leg swelling.   Gastrointestinal: Negative.  Negative for abdominal distention, abdominal pain, blood in stool, constipation, diarrhea, nausea, rectal pain and vomiting.   Endocrine: Negative.  Negative for cold intolerance, heat intolerance, polydipsia, polyphagia and polyuria.    Genitourinary: Negative.  Negative for difficulty urinating, dysuria, flank pain, frequency, hematuria and urgency.   Musculoskeletal: Negative.  Negative for arthralgias, back pain, gait problem, joint swelling and myalgias.   Skin: Negative.  Negative for color change, pallor, rash and wound.   Allergic/Immunologic: Negative.  Negative for environmental allergies, food allergies and immunocompromised state.   Neurological: Negative.  Negative for dizziness, tremors, seizures, syncope, speech difficulty, weakness, light-headedness, numbness and headaches.   Hematological: Negative.  Negative for adenopathy. Does not bruise/bleed easily.   Psychiatric/Behavioral: Negative.  Negative for agitation, confusion, hallucinations, sleep disturbance and suicidal ideas. The patient is not nervous/anxious.    All other systems reviewed and are negative.       Performance Status:  Asymptomatic    Medications:    Current Outpatient Prescriptions   Medication Sig Dispense Refill   • B Complex Vitamins (VITAMIN B COMPLEX PO) Take  by mouth. 1 po daily     • Calcium Carb-Cholecalciferol (CALCIUM 600 + D PO) Take  by mouth.     • celecoxib (CeleBREX) 200 MG capsule Take 200 mg by mouth 2 (two) times a day.     • DULoxetine (CYMBALTA) 30 MG capsule Take 30 mg by mouth daily.     • ferrous sulfate 325 (65 FE) MG tablet Take 325 mg by mouth 2 (two) times a day.     • Lysine HCl (L-LYSINE) 500 MG tablet tablet Take  by mouth daily.     • Magnesium 250 MG tablet Take  by mouth.     • melatonin 3 MG tablet Take  by mouth.     • metoprolol tartrate (LOPRESSOR) 25 MG tablet Take 25 mg by mouth 2 (two) times a day.     • OLANZapine (ZYPREXA) 2.5 MG tablet Take 2.5 mg by mouth every night.     • omeprazole (PriLOSEC) 40 MG capsule Take 40 mg by mouth Daily.     • Palbociclib 125 MG capsule capsule Take 1 capsule by mouth Daily. 3 weeks on, 1 week off 21 capsule 5   • Probiotic Product (PROBIOTIC DAILY PO) Take  by mouth. 300million cell  "daily     • thyroid 60 MG PO tablet Take 60 mg by mouth daily.     • VALERIAN ROOT PO Take  by mouth. 100mg daily     • Zinc 50 MG tablet Take  by mouth. 1 po daily       No current facility-administered medications for this visit.        ALLERGIES:    Allergies   Allergen Reactions   • Codeine    • Diphenhydramine Other (See Comments)     \"Shaky leg syndrome\"   • Heparin    • Other      POPPY SEED   • Penicillins    • Petroleum Jelly [Skin Protectants, Misc.]    • Sulfa Antibiotics    • Sulfur    • Valium [Diazepam]        Objective      There were no vitals filed for this visit.      Current Status 7/26/2017   ECOG score 2       General Appearance: Patient is awake, alert, oriented and in no acute distress. Patient is welldeveloped, wellnourished, and appears stated age.  HEENT: Normocephalic. Sclerae clear, conjunctiva pink, extraocular movements intact, pupils, round, reactive to light and accommodation. Mouth and throat are clear with moist oral mucosa.  NECK: Supple, no jugular venous distention, thyroid not enlarged.  LYMPH: No cervical, supraclavicular, axillary, or inguinal lymphadenopathy.  CHEST: Equal bilateral expansion   LUNGS: Good air movement, no rales, rhonchi, rubs or wheezes with auscultation  CARDIO: Regular sinus rhythm, no murmurs, gallops or rubs.  ABDOMEN: Nondistended, soft, No tenderness, no guarding, no rebound, No hepatosplenomegaly. No abdominal masses. Bowel sounds positive. No hernia  MUSKEL: No joint swelling, decreased motion, or inflammation  EXTREMS: No edema, clubbing, cyanosis, No varicose veins.  NEURO: Grossly nonfocal, Gait is coordinated and smooth, Cognition is preserved.  SKIN: No rashes, no ecchymoses, no petechia.  PSYCH: Oriented to time, place and person. Memory is preserved. Mood and affect appear normal      RECENT LABS:  WBC   Date Value Ref Range Status   07/26/2017 4.20 (L) 4.80 - 10.80 10*3/mm3 Final     RBC   Date Value Ref Range Status   07/26/2017 2.79 (L) " 4.20 - 5.40 10*6/mm3 Final     Hemoglobin   Date Value Ref Range Status   07/26/2017 10.2 (L) 12.0 - 16.0 g/dL Final     Hematocrit   Date Value Ref Range Status   07/26/2017 32.8 (L) 37.0 - 47.0 % Final     MCV   Date Value Ref Range Status   07/26/2017 117.7 (H) 81.0 - 99.0 fL Final     MCH   Date Value Ref Range Status   07/26/2017 36.6 (H) 27.0 - 31.0 pg Final     MCHC   Date Value Ref Range Status   07/26/2017 31.1 (L) 33.0 - 37.0 g/dL Final     RDW   Date Value Ref Range Status   07/26/2017 15.0 (H) 11.5 - 14.5 % Final     RDW-SD   Date Value Ref Range Status   05/25/2017 60.1 (H) 40.0 - 54.0 fl Final     MPV   Date Value Ref Range Status   07/26/2017 6.4 6.0 - 12.0 fL Final     Platelets   Date Value Ref Range Status   07/26/2017 322 130 - 400 10*3/mm3 Final     Neutrophil %   Date Value Ref Range Status   07/26/2017 46.6 37.0 - 92.0 % Final     Lymphocyte %   Date Value Ref Range Status   07/26/2017 44.0 10.0 - 58.5 % Final     Neutrophils, Absolute   Date Value Ref Range Status   07/26/2017 2.00 2.00 - 7.80 10*3/mm3 Final     Lymphocytes, Absolute   Date Value Ref Range Status   07/26/2017 1.80 0.80 - 7.00 10*3/mm3 Final     No visits with results within 7 Day(s) from this visit.  Latest known visit with results is:    Orders Only on 07/26/2017   Component Date Value Ref Range Status   • CA 27.29 07/26/2017 88.9* 0.0 - 38.6 U/mL Final    [facility]/ACS methodology             Assessment/Plan     Patient Active Problem List   Diagnosis   • Malignant neoplasm of upper-outer quadrant of left female breast   • Breast cancer metastasized to bone, unspecified laterality   • Essential hypertension   • Acquired hypothyroidism   • Bipolar 1 disorder   • Carcinoma of left breast metastatic to bone          Assessment:  1. Stage IV metastatic breast carcinoma, ER/ND positive. HER2/maynor negative undergoing Ibrance, Faslodex and Xgeva with overall stable with stable CA27.29 with slow decline. She is tolerating the  treatment very well. Faslodex cycle 17 today and will continue to HOLD Xgeva due to upcoming dental work, last dose of Xgeva given on 09/29/2016.  01/2017 restaging CT chest abdomen and pelvis and bone scan showed stable diffuse sclerotic bony lesions, unchanged from 7/1/2016.Next scan July 2017    2.  Mild leukopenia without neutropenia.  Likely secondary to Ibrance.  Continue to monitor.     3.  Macrocytic anemia.  Hemoglobin 11.1.  02/2016, B12 and folate normal    4.  Status post 8 teeth extraction.     Recommendation/Plan:  1.  Proceed with Faslodex cycle 20.  2. Continue Ibrance 125 mg 3 weeks on, one-week off.  Reduce dose if leukopenia worsens.   3. HOLD Xgeva. Dental work and 02/14/2017, resume Xgeva    4. Follow up in 28 days for Faslodex cycle 19.   Repeat CA 27.29.  Previous CA 27.29 have remained stable, the last one done on 07/26/2017 was 88.    Additionally she is anemic with a hemoglobin of 10 g percent with decreasing GFR.  I do suspect that she may have some form of chronic kidney disease, I am not sure why, she is not diabetic or hypertensive.  For this reason she is being referred over to a nephrologist.  We will draw iron studies on her today.      Joseph Nunez MD    8/24/2017    8:59 AM

## 2017-08-25 LAB — CANCER AG27-29 SERPL-ACNC: 89.9 U/ML (ref 0–38.6)

## 2017-09-28 ENCOUNTER — INFUSION (OUTPATIENT)
Dept: ONCOLOGY | Facility: HOSPITAL | Age: 72
End: 2017-09-28
Attending: INTERNAL MEDICINE

## 2017-09-28 ENCOUNTER — OFFICE VISIT (OUTPATIENT)
Dept: ONCOLOGY | Facility: CLINIC | Age: 72
End: 2017-09-28

## 2017-09-28 ENCOUNTER — LAB (OUTPATIENT)
Dept: LAB | Facility: HOSPITAL | Age: 72
End: 2017-09-28
Attending: INTERNAL MEDICINE

## 2017-09-28 ENCOUNTER — APPOINTMENT (OUTPATIENT)
Dept: LAB | Facility: HOSPITAL | Age: 72
End: 2017-09-28

## 2017-09-28 ENCOUNTER — TRANSCRIBE ORDERS (OUTPATIENT)
Dept: ADMINISTRATIVE | Facility: HOSPITAL | Age: 72
End: 2017-09-28

## 2017-09-28 VITALS
SYSTOLIC BLOOD PRESSURE: 165 MMHG | WEIGHT: 168 LBS | HEART RATE: 48 BPM | RESPIRATION RATE: 20 BRPM | DIASTOLIC BLOOD PRESSURE: 65 MMHG | BODY MASS INDEX: 27.99 KG/M2 | TEMPERATURE: 98 F | OXYGEN SATURATION: 100 % | HEIGHT: 65 IN

## 2017-09-28 VITALS
HEART RATE: 74 BPM | BODY MASS INDEX: 28.07 KG/M2 | TEMPERATURE: 97.2 F | WEIGHT: 168.5 LBS | HEIGHT: 65 IN | OXYGEN SATURATION: 99 % | SYSTOLIC BLOOD PRESSURE: 128 MMHG | DIASTOLIC BLOOD PRESSURE: 74 MMHG | RESPIRATION RATE: 18 BRPM

## 2017-09-28 DIAGNOSIS — C79.51 CARCINOMA OF LEFT BREAST METASTATIC TO BONE (HCC): Primary | ICD-10-CM

## 2017-09-28 DIAGNOSIS — C50.919 BREAST CANCER METASTASIZED TO BONE, UNSPECIFIED LATERALITY (HCC): ICD-10-CM

## 2017-09-28 DIAGNOSIS — C79.51 BREAST CANCER METASTASIZED TO BONE, UNSPECIFIED LATERALITY (HCC): ICD-10-CM

## 2017-09-28 DIAGNOSIS — C50.412 MALIGNANT NEOPLASM OF UPPER-OUTER QUADRANT OF LEFT FEMALE BREAST (HCC): ICD-10-CM

## 2017-09-28 DIAGNOSIS — C50.412 MALIGNANT NEOPLASM OF UPPER-OUTER QUADRANT OF LEFT FEMALE BREAST (HCC): Primary | ICD-10-CM

## 2017-09-28 DIAGNOSIS — C50.912 CARCINOMA OF LEFT BREAST METASTATIC TO BONE (HCC): Primary | ICD-10-CM

## 2017-09-28 DIAGNOSIS — C79.51 CARCINOMA OF LEFT BREAST METASTATIC TO BONE (HCC): ICD-10-CM

## 2017-09-28 DIAGNOSIS — N18.30 CHRONIC KIDNEY DISEASE, STAGE III (MODERATE) (HCC): Primary | ICD-10-CM

## 2017-09-28 DIAGNOSIS — C50.912 CARCINOMA OF LEFT BREAST METASTATIC TO BONE (HCC): ICD-10-CM

## 2017-09-28 LAB
ALBUMIN SERPL-MCNC: 4.3 G/DL (ref 3.5–5)
ALBUMIN SERPL-MCNC: 4.3 G/DL (ref 3.5–5)
ALBUMIN/GLOB SERPL: 1.2 G/DL (ref 1.1–2.5)
ALP SERPL-CCNC: 76 U/L (ref 24–120)
ALT SERPL W P-5'-P-CCNC: 24 U/L (ref 0–54)
ANION GAP SERPL CALCULATED.3IONS-SCNC: 12 MMOL/L (ref 4–13)
ANION GAP SERPL CALCULATED.3IONS-SCNC: 13 MMOL/L (ref 4–13)
AST SERPL-CCNC: 26 U/L (ref 7–45)
BACTERIA UR QL AUTO: ABNORMAL /HPF
BASOPHILS # BLD AUTO: 0.09 10*3/MM3 (ref 0–0.2)
BASOPHILS NFR BLD AUTO: 2.4 % (ref 0–2)
BILIRUB SERPL-MCNC: 0.3 MG/DL (ref 0.1–1)
BILIRUB UR QL STRIP: NEGATIVE
BUN BLD-MCNC: 16 MG/DL (ref 5–21)
BUN BLD-MCNC: 16 MG/DL (ref 5–21)
BUN/CREAT SERPL: 12.9 (ref 7–25)
BUN/CREAT SERPL: 13.2 (ref 7–25)
CALCIUM SPEC-SCNC: 10 MG/DL (ref 8.4–10.4)
CALCIUM SPEC-SCNC: 10.1 MG/DL (ref 8.4–10.4)
CHLORIDE SERPL-SCNC: 101 MMOL/L (ref 98–110)
CHLORIDE SERPL-SCNC: 102 MMOL/L (ref 98–110)
CLARITY UR: ABNORMAL
CO2 SERPL-SCNC: 26 MMOL/L (ref 24–31)
CO2 SERPL-SCNC: 28 MMOL/L (ref 24–31)
COLOR UR: YELLOW
CREAT BLD-MCNC: 1.21 MG/DL (ref 0.5–1.4)
CREAT BLD-MCNC: 1.24 MG/DL (ref 0.5–1.4)
CREAT UR-MCNC: 60.9 MG/DL
DEPRECATED RDW RBC AUTO: 62.2 FL (ref 40–54)
DEPRECATED RDW RBC AUTO: 63 FL (ref 40–54)
EOSINOPHIL # BLD AUTO: 0.1 10*3/MM3 (ref 0–0.7)
EOSINOPHIL NFR BLD AUTO: 2.7 % (ref 0–4)
ERYTHROCYTE [DISTWIDTH] IN BLOOD BY AUTOMATED COUNT: 15.2 % (ref 12–15)
ERYTHROCYTE [DISTWIDTH] IN BLOOD BY AUTOMATED COUNT: 15.2 % (ref 12–15)
GFR SERPL CREATININE-BSD FRML MDRD: 42 ML/MIN/1.73
GFR SERPL CREATININE-BSD FRML MDRD: 43 ML/MIN/1.73
GLOBULIN UR ELPH-MCNC: 3.6 GM/DL
GLUCOSE BLD-MCNC: 88 MG/DL (ref 70–100)
GLUCOSE BLD-MCNC: 91 MG/DL (ref 70–100)
GLUCOSE UR STRIP-MCNC: NEGATIVE MG/DL
HCT VFR BLD AUTO: 30.7 % (ref 37–47)
HCT VFR BLD AUTO: 31.3 % (ref 37–47)
HGB BLD-MCNC: 10.4 G/DL (ref 12–16)
HGB BLD-MCNC: 10.6 G/DL (ref 12–16)
HGB UR QL STRIP.AUTO: ABNORMAL
HOLD SPECIMEN: NORMAL
IMM GRANULOCYTES # BLD: 0.01 10*3/MM3 (ref 0–0.03)
IMM GRANULOCYTES NFR BLD: 0.3 % (ref 0–5)
KETONES UR QL STRIP: NEGATIVE
LEUKOCYTE ESTERASE UR QL STRIP.AUTO: ABNORMAL
LYMPHOCYTES # BLD AUTO: 1.8 10*3/MM3 (ref 0.72–4.86)
LYMPHOCYTES NFR BLD AUTO: 48.4 % (ref 15–45)
MCH RBC QN AUTO: 38.2 PG (ref 28–32)
MCH RBC QN AUTO: 38.3 PG (ref 28–32)
MCHC RBC AUTO-ENTMCNC: 33.9 G/DL (ref 33–36)
MCHC RBC AUTO-ENTMCNC: 33.9 G/DL (ref 33–36)
MCV RBC AUTO: 112.9 FL (ref 82–98)
MCV RBC AUTO: 113 FL (ref 82–98)
MONOCYTES # BLD AUTO: 0.19 10*3/MM3 (ref 0.19–1.3)
MONOCYTES NFR BLD AUTO: 5.1 % (ref 4–12)
NEUTROPHILS # BLD AUTO: 1.53 10*3/MM3 (ref 1.87–8.4)
NEUTROPHILS NFR BLD AUTO: 41.1 % (ref 39–78)
NITRITE UR QL STRIP: NEGATIVE
PH UR STRIP.AUTO: <=5 [PH] (ref 5–8)
PHOSPHATE SERPL-MCNC: 4.2 MG/DL (ref 2.5–4.5)
PLATELET # BLD AUTO: 294 10*3/MM3 (ref 130–400)
PLATELET # BLD AUTO: 308 10*3/MM3 (ref 130–400)
PMV BLD AUTO: 9.2 FL (ref 6–12)
PMV BLD AUTO: 9.3 FL (ref 6–12)
POTASSIUM BLD-SCNC: 3.9 MMOL/L (ref 3.5–5.3)
POTASSIUM BLD-SCNC: 4.2 MMOL/L (ref 3.5–5.3)
PROT SERPL-MCNC: 7.9 G/DL (ref 6.3–8.7)
PROT UR QL STRIP: NEGATIVE
PROT UR-MCNC: 11 MG/DL (ref 0–13.5)
PTH-INTACT SERPL-MCNC: 22.2 PG/ML (ref 7.5–53.5)
RBC # BLD AUTO: 2.72 10*6/MM3 (ref 4.2–5.4)
RBC # BLD AUTO: 2.77 10*6/MM3 (ref 4.2–5.4)
RBC # UR: ABNORMAL /HPF
REF LAB TEST METHOD: ABNORMAL
SODIUM BLD-SCNC: 140 MMOL/L (ref 135–145)
SODIUM BLD-SCNC: 142 MMOL/L (ref 135–145)
SP GR UR STRIP: 1.01 (ref 1–1.03)
SQUAMOUS #/AREA URNS HPF: ABNORMAL /HPF
URATE SERPL-MCNC: 7.1 MG/DL (ref 2.7–7.5)
UROBILINOGEN UR QL STRIP: ABNORMAL
WBC NRBC COR # BLD: 3.72 10*3/MM3 (ref 4.8–10.8)
WBC NRBC COR # BLD: 5.83 10*3/MM3 (ref 4.8–10.8)
WBC UR QL AUTO: ABNORMAL /HPF

## 2017-09-28 PROCEDURE — 87086 URINE CULTURE/COLONY COUNT: CPT | Performed by: INTERNAL MEDICINE

## 2017-09-28 PROCEDURE — 83970 ASSAY OF PARATHORMONE: CPT | Performed by: INTERNAL MEDICINE

## 2017-09-28 PROCEDURE — 85025 COMPLETE CBC W/AUTO DIFF WBC: CPT

## 2017-09-28 PROCEDURE — 25010000002 FULVESTRANT PER 25 MG: Performed by: INTERNAL MEDICINE

## 2017-09-28 PROCEDURE — 87088 URINE BACTERIA CULTURE: CPT | Performed by: INTERNAL MEDICINE

## 2017-09-28 PROCEDURE — 85027 COMPLETE CBC AUTOMATED: CPT | Performed by: INTERNAL MEDICINE

## 2017-09-28 PROCEDURE — 84550 ASSAY OF BLOOD/URIC ACID: CPT | Performed by: INTERNAL MEDICINE

## 2017-09-28 PROCEDURE — 82570 ASSAY OF URINE CREATININE: CPT | Performed by: INTERNAL MEDICINE

## 2017-09-28 PROCEDURE — 36415 COLL VENOUS BLD VENIPUNCTURE: CPT

## 2017-09-28 PROCEDURE — 81001 URINALYSIS AUTO W/SCOPE: CPT | Performed by: INTERNAL MEDICINE

## 2017-09-28 PROCEDURE — 80053 COMPREHEN METABOLIC PANEL: CPT

## 2017-09-28 PROCEDURE — 84156 ASSAY OF PROTEIN URINE: CPT | Performed by: INTERNAL MEDICINE

## 2017-09-28 PROCEDURE — 99214 OFFICE O/P EST MOD 30 MIN: CPT | Performed by: INTERNAL MEDICINE

## 2017-09-28 PROCEDURE — 87186 SC STD MICRODIL/AGAR DIL: CPT | Performed by: INTERNAL MEDICINE

## 2017-09-28 PROCEDURE — 80069 RENAL FUNCTION PANEL: CPT | Performed by: INTERNAL MEDICINE

## 2017-09-28 PROCEDURE — 96402 CHEMO HORMON ANTINEOPL SQ/IM: CPT

## 2017-09-28 RX ADMIN — FULVESTRANT 500 MG: 50 INJECTION INTRAMUSCULAR at 10:11

## 2017-09-28 NOTE — PROGRESS NOTES
[facility]  HEMATOLOGY & ONCOLOGY        Subjective    Metastatic breast cancer on Faslodex and Ibrance     Staging form: Breast, AJCC V7      Clinical stage from 10/11/2016: Stage IV (T2, N1, M1) - ER/IA positive. HER2/maynor negative  VISIT DIAGNOSIS:   Metastatic breast cancer       HEMATOLOGY / ONCOLOGY HISTORY:   Oncology/Hematology History    Patient is a 67-year-old postmenopausal female who had onset of her menses at age 9 and menopause at age 48. She received oral contraceptives for approximately 14 years and did not receive hormonal manipulation. Patient has a maternal cousin had breast cancer. Patient has had no prior breast biopsies. Patient found a palpable left breast mass as well as a left axillary mass. Patient had a bloody nipple discharge which been present for approximately 6 months. On 6/20/2012, a mammogram was performed showing a subareolar mass consistent with malignancy. Bilateral breast ultrasound revealed an ill-defined subareolar mass measuring 2.8-2.5 cm. There is a left axillary mass measuring 1.7 cm with suspicion of extracapsular extension. There is no evidence of disease in the right breast. On 7/9/2012 patient underwent a left breast biopsy and placement of marker clip. Left breast biopsy revealed an infiltrating lobular carcinoma grade 2 with focal ductal carcinoma in situ, solid pattern. A fine-needle biopsy of the left lymph node was consistent with metastatic carcinoma. Breast tumor profile revealed ER: 100%; IA: 98%; HER-2/maynor 2+; FISH: Unamplified; Ki-67: 71%; P 53 1%; DNA aneuploidy. A MRI of the breast were performed body multiple abnormal enhancing masses within the left breast. There appears to be anterior retraction of the pectoralis muscle with no direct extension. There is a moderate to large, layering left pleural effusion appreciated. The right breast reveals 3 moderately enlarged lymph nodes in the posterior lateral aspect the right breast. These have  fatty hilum but followup is recommended. Patient is undergone systemic studies including, on 8/2/2012, a CT scan of the brain showing atrophy. a CT scan that shows showing a small to moderate left pleural effusion with 3 subcentimeter nodule densities in the left lung.   She completed 4 cycles of neoadjuvant treatment with dose dense Adriamycin and Cytoxan. She had a mammogram which showed a significant reduction in the size of her left breast lesion. There is no axillary adenopathy noted. She has had an ultrasound revealing the  lesion reducing from 2.4 cm to 1 cm. Patient underwent a left mastectomy on 03/19/2013 finding negative invasive cancer, negative nodes. A 5% DCIS was noted. The patient has declined hormonal therapy. She did not need radiation therapy.  November 2014, she began to have evidence of lytic bone lesions at T5T6, frontal disease, an 8 mm lucency in the central frontal area of  the skull but nothing intracranial. Bone scan revealed only vertex tracer activity but bilateral ribs and thoracic spine, and other lesions in  her pelvis. At that time, she was started on letrozole since November 2014. She is taking Xgeva. She had progression found in bone in Feb 2015 on scan. She was started on Faslodex, Ibrance and continued xgeva.         Malignant neoplasm of upper-outer quadrant of left female breast    7/9/2012 Initial Diagnosis     Malignant neoplasm of upper-outer quadrant of left female breast         7/10/2012 Biopsy     Left Breast Biopsy & Axillary Node FNA: A) Infiltrating lobular carcinoma, Grade 2. B) Foci of ductal carcinoma in situ, solid pattern. C) Greatest dimension of the invasive carcinoma is 15.8mm.         3/19/2013 Surgery     Left Mastectomy w/ Axillary Tail: Focal residual ducatal carcinoma in situ (DCIS) 12 lymph nodes negative for metastatic carcinoma.         10/20/2014 Biopsy     Peritoneum Biopsy: Final Diagnosis: Malignant Cell Neoplasm.           Breast cancer  metastasized to bone, unspecified laterality    10/15/2012 -  Other Event     Left mammogram:  Impression:  1. Decreasing spiculation and density in the retroareolar left breast, near a previous biopsy.   2. Definite left axillary lesion is not appreciated.          2/5/2013 -  Other Event     Diagnostic Mammo:  Comparison is made to 10/15/2012 and 6/20/2012  The spiculated mass at 12:00 behind the nipple is nearly resolved.   Impression:  1. The mass on the left side is less apparent after receiving chemotherapy. There has been a good response to chmotherapy on the left side.   2. No suspicious abnormality is seen in the right breast to account for the new palable abnormality at the 4-5:00 position.          3/19/2013 Surgery     Breast Biopsy:  Final Diagnosis:  A. Breast, left mastectomy with axillary tail  1. Focal residual ductal carcinoma in situ (DICS) (less than 5% of tumor bed).  2. Negative for residual invasive carcinoma.   3. 12 Lymph Nodes, negative for metastatic carcinoma (0/12) focally, some lymph nodes demonstrate fibrosis/treatment effect.  B. Skin and soft tissue, left advancement flap:  1. Benign skin and subcutis.   2. Negative for carcinoma.          6/20/2013 -  Other Event     Diagnostic Mammo [facility]:  1. A subareolar mass is consistent with malignancy. Additional involement extends inferiorly sonographically and superolaterally mammograhically as demonstrated by calcifications. The mass measures approx 2.8 cm sonographically, but the involved region is likely much larger including an intraductal componet. The left axillary lymph nodes are also involved.   2. Recommened ultrasound guided biopsy of the left subareolar mass and left axillary lymph node.   3. Breast MRI could also further define multicentric disease on the left.   4. Clear suspicious finding on the right.  Recommend ongoing clinical follow up of palpable foci.          6/13/2014 -  Other Event     Diagnostic  Mammo:  IMPRESSION:  1. History of left breast cancer and mastectomy. Stable benign right breast mammograms.          10/20/2014 Surgery     Final Diagnosis:  Biopsies, pertoneum:  Metastatic carcinoma, morphologically compatible with metastatic mammary carcinoma.          6/15/2015 -  Other Event     Diagnostic Mammo:  IMPRESSION:   1. Negative x-ray report, should not delay biopsy if a dominat or clinically suspicious mass is present.          7/5/2016 -  Other Event     Diagnositic mammogram:  Impression:   Stable right mamogram with no radiographic findings suspicious for malignancy. Recommend continued screening mammography per screening guidelines unless otherwise earlier clinically indicated.          9/18/2016 Initial Diagnosis     Secondary malignant neoplasm of bone and bone marrow          Cancer Staging Information:  Malignant neoplasm of upper-outer quadrant of left female breast    Staging form: Breast, AJCC V7      Clinical stage from 10/11/2016: Stage IV (T2, N1, M1) - Signed by ALBERT Kulkarni on 10/11/2016      INTERVAL HISTORY  Patient ID: Heavenly Yanes is a 72 y.o. year old female history of metastatic breast carcinoma to lung and bone.  She is presently receiving treatment with Ibrance, Faslodex and Xgeva.  Tumor markers continued to decline with a CA 27-29 of 92.2 obtained on 9:30 2016 compared to 105.8 on 07/27/2016.  Mr. Yanes continues to tolerate this treatment regimen well and is here today for evaluation consideration to receive Faslodex and Xgeva.  Last bone mineral density study was obtained 11/21/2014 indicating osteopopenia, we will obtain a repeat bone mineral density study. Ms. Yanes completed 8 teeth pulled 02/14/2017.   Xgeva on hold, resume 05/2017.     Past Medical History:   Past Medical History:   Diagnosis Date   • Bipolar disorder    • Disease of thyroid gland    • Hypertension    • Malignant neoplasm of upper-outer quadrant of left female breast 9/18/2016   •  Secondary malignant neoplasm of bone and bone marrow 9/18/2016     Past Surgical History:   Past Surgical History:   Procedure Laterality Date   • BREAST SURGERY      left breast biopsy and axillary node   • CHOLECYSTECTOMY     • EXTERNAL EAR SURGERY     • MASTECTOMY Left    • PORTACATH PLACEMENT       Social History:   Social History     Social History   • Marital status:      Spouse name: N/A   • Number of children: N/A   • Years of education: N/A     Occupational History   • Not on file.     Social History Main Topics   • Smoking status: Never Smoker   • Smokeless tobacco: Never Used   • Alcohol use No   • Drug use: No   • Sexual activity: Not on file     Other Topics Concern   • Not on file     Social History Narrative     Family History:   Family History   Problem Relation Age of Onset   • No Known Problems Daughter    • No Known Problems Son    • Other Mother      complications from a concussion from a fall   • Other Father      old age   • No Known Problems Brother    • No Known Problems Daughter        Review of Systems   Constitutional: Negative.  Negative for activity change, appetite change, chills, diaphoresis, fatigue and fever.   HENT: Negative.  Negative for congestion, ear discharge, ear pain, facial swelling, hearing loss, mouth sores, nosebleeds, postnasal drip, rhinorrhea, sinus pressure, sore throat, tinnitus, trouble swallowing and voice change.    Eyes: Negative.  Negative for photophobia, pain, discharge and visual disturbance.   Respiratory: Negative.  Negative for apnea, cough, chest tightness, shortness of breath, wheezing and stridor.    Cardiovascular: Negative.  Negative for chest pain, palpitations and leg swelling.   Gastrointestinal: Negative.  Negative for abdominal distention, abdominal pain, blood in stool, constipation, diarrhea, nausea, rectal pain and vomiting.   Endocrine: Negative.  Negative for cold intolerance, heat intolerance, polydipsia, polyphagia and polyuria.    Genitourinary: Negative.  Negative for difficulty urinating, dysuria, flank pain, frequency, hematuria and urgency.   Musculoskeletal: Negative.  Negative for arthralgias, back pain, gait problem, joint swelling and myalgias.   Skin: Negative.  Negative for color change, pallor, rash and wound.   Allergic/Immunologic: Negative.  Negative for environmental allergies, food allergies and immunocompromised state.   Neurological: Negative.  Negative for dizziness, tremors, seizures, syncope, speech difficulty, weakness, light-headedness, numbness and headaches.   Hematological: Negative.  Negative for adenopathy. Does not bruise/bleed easily.   Psychiatric/Behavioral: Negative.  Negative for agitation, confusion, hallucinations, sleep disturbance and suicidal ideas. The patient is not nervous/anxious.    All other systems reviewed and are negative.       Performance Status:  Asymptomatic    Medications:    Current Outpatient Prescriptions   Medication Sig Dispense Refill   • B Complex Vitamins (VITAMIN B COMPLEX PO) Take  by mouth. 1 po daily     • Calcium Carb-Cholecalciferol (CALCIUM 600 + D PO) Take  by mouth.     • DULoxetine (CYMBALTA) 30 MG capsule Take 30 mg by mouth daily.     • ferrous sulfate 325 (65 FE) MG tablet Take 325 mg by mouth 2 (two) times a day.     • Lysine HCl (L-LYSINE) 500 MG tablet tablet Take  by mouth daily.     • Magnesium 250 MG tablet Take  by mouth.     • melatonin 3 MG tablet Take  by mouth.     • metoprolol tartrate (LOPRESSOR) 25 MG tablet Take 25 mg by mouth 2 (two) times a day.     • OLANZapine (ZYPREXA) 2.5 MG tablet Take 2.5 mg by mouth every night.     • Palbociclib 125 MG capsule capsule Take 1 capsule by mouth Daily. 3 weeks on, 1 week off 21 capsule 5   • Probiotic Product (PROBIOTIC DAILY PO) Take  by mouth. 300million cell daily     • thyroid 60 MG PO tablet Take 60 mg by mouth daily.     • VALERIAN ROOT PO Take  by mouth. 100mg daily     • Zinc 50 MG tablet Take  by mouth. 1  "po daily       No current facility-administered medications for this visit.        ALLERGIES:    Allergies   Allergen Reactions   • Codeine    • Diphenhydramine Other (See Comments)     \"Shaky leg syndrome\"   • Heparin    • Other      POPPY SEED   • Penicillins    • Petroleum Jelly [Skin Protectants, Misc.]    • Sulfa Antibiotics    • Sulfur    • Valium [Diazepam]        Objective      Vitals:    09/28/17 0808   BP: 128/74   Pulse: 74   Resp: 18   Temp: 97.2 °F (36.2 °C)   TempSrc: Tympanic   SpO2: 99%   Weight: 168 lb 8 oz (76.4 kg)   Height: 65\" (165.1 cm)         Current Status 9/28/2017   ECOG score 1       General Appearance: Patient is awake, alert, oriented and in no acute distress. Patient is welldeveloped, wellnourished, and appears stated age.  HEENT: Normocephalic. Sclerae clear, conjunctiva pink, extraocular movements intact, pupils, round, reactive to light and accommodation. Mouth and throat are clear with moist oral mucosa.  NECK: Supple, no jugular venous distention, thyroid not enlarged.  LYMPH: No cervical, supraclavicular, axillary, or inguinal lymphadenopathy.  CHEST: Equal bilateral expansion   LUNGS: Good air movement, no rales, rhonchi, rubs or wheezes with auscultation  CARDIO: Regular sinus rhythm, no murmurs, gallops or rubs.  ABDOMEN: Nondistended, soft, No tenderness, no guarding, no rebound, No hepatosplenomegaly. No abdominal masses. Bowel sounds positive. No hernia  MUSKEL: No joint swelling, decreased motion, or inflammation  EXTREMS: No edema, clubbing, cyanosis, No varicose veins.  NEURO: Grossly nonfocal, Gait is coordinated and smooth, Cognition is preserved.  SKIN: No rashes, no ecchymoses, no petechia.  PSYCH: Oriented to time, place and person. Memory is preserved. Mood and affect appear normal      RECENT LABS:  WBC   Date Value Ref Range Status   09/28/2017 3.72 (L) 4.80 - 10.80 10*3/mm3 Final     RBC   Date Value Ref Range Status   09/28/2017 2.72 (L) 4.20 - 5.40 10*6/mm3 " Final     Hemoglobin   Date Value Ref Range Status   09/28/2017 10.4 (L) 12.0 - 16.0 g/dL Final     Hematocrit   Date Value Ref Range Status   09/28/2017 30.7 (L) 37.0 - 47.0 % Final     MCV   Date Value Ref Range Status   09/28/2017 112.9 (H) 82.0 - 98.0 fL Final     MCH   Date Value Ref Range Status   09/28/2017 38.2 (H) 28.0 - 32.0 pg Final     MCHC   Date Value Ref Range Status   09/28/2017 33.9 33.0 - 36.0 g/dL Final     RDW   Date Value Ref Range Status   09/28/2017 15.2 (H) 12.0 - 15.0 % Final     RDW-SD   Date Value Ref Range Status   09/28/2017 62.2 (H) 40.0 - 54.0 fl Final     MPV   Date Value Ref Range Status   09/28/2017 9.3 6.0 - 12.0 fL Final     Platelets   Date Value Ref Range Status   09/28/2017 308 130 - 400 10*3/mm3 Final     Neutrophil %   Date Value Ref Range Status   09/28/2017 41.1 39.0 - 78.0 % Final     Lymphocyte %   Date Value Ref Range Status   09/28/2017 48.4 (H) 15.0 - 45.0 % Final     Monocyte %   Date Value Ref Range Status   09/28/2017 5.1 4.0 - 12.0 % Final     Eosinophil %   Date Value Ref Range Status   09/28/2017 2.7 0.0 - 4.0 % Final     Basophil %   Date Value Ref Range Status   09/28/2017 2.4 (H) 0.0 - 2.0 % Final     Immature Grans %   Date Value Ref Range Status   09/28/2017 0.3 0.0 - 5.0 % Final     Neutrophils, Absolute   Date Value Ref Range Status   09/28/2017 1.53 (L) 1.87 - 8.40 10*3/mm3 Final     Lymphocytes, Absolute   Date Value Ref Range Status   09/28/2017 1.80 0.72 - 4.86 10*3/mm3 Final     Monocytes, Absolute   Date Value Ref Range Status   09/28/2017 0.19 0.19 - 1.30 10*3/mm3 Final     Eosinophils, Absolute   Date Value Ref Range Status   09/28/2017 0.10 0.00 - 0.70 10*3/mm3 Final     Basophils, Absolute   Date Value Ref Range Status   09/28/2017 0.09 0.00 - 0.20 10*3/mm3 Final     Immature Grans, Absolute   Date Value Ref Range Status   09/28/2017 0.01 0.00 - 0.03 10*3/mm3 Final     Lab on 09/28/2017   Component Date Value Ref Range Status   • WBC 09/28/2017  3.72* 4.80 - 10.80 10*3/mm3 Final   • RBC 09/28/2017 2.72* 4.20 - 5.40 10*6/mm3 Final   • Hemoglobin 09/28/2017 10.4* 12.0 - 16.0 g/dL Final   • Hematocrit 09/28/2017 30.7* 37.0 - 47.0 % Final   • MCV 09/28/2017 112.9* 82.0 - 98.0 fL Final   • MCH 09/28/2017 38.2* 28.0 - 32.0 pg Final   • MCHC 09/28/2017 33.9  33.0 - 36.0 g/dL Final   • RDW 09/28/2017 15.2* 12.0 - 15.0 % Final   • RDW-SD 09/28/2017 62.2* 40.0 - 54.0 fl Final   • MPV 09/28/2017 9.3  6.0 - 12.0 fL Final   • Platelets 09/28/2017 308  130 - 400 10*3/mm3 Final   • Neutrophil % 09/28/2017 41.1  39.0 - 78.0 % Final   • Lymphocyte % 09/28/2017 48.4* 15.0 - 45.0 % Final   • Monocyte % 09/28/2017 5.1  4.0 - 12.0 % Final   • Eosinophil % 09/28/2017 2.7  0.0 - 4.0 % Final   • Basophil % 09/28/2017 2.4* 0.0 - 2.0 % Final   • Immature Grans % 09/28/2017 0.3  0.0 - 5.0 % Final   • Neutrophils, Absolute 09/28/2017 1.53* 1.87 - 8.40 10*3/mm3 Final   • Lymphocytes, Absolute 09/28/2017 1.80  0.72 - 4.86 10*3/mm3 Final   • Monocytes, Absolute 09/28/2017 0.19  0.19 - 1.30 10*3/mm3 Final   • Eosinophils, Absolute 09/28/2017 0.10  0.00 - 0.70 10*3/mm3 Final   • Basophils, Absolute 09/28/2017 0.09  0.00 - 0.20 10*3/mm3 Final   • Immature Grans, Absolute 09/28/2017 0.01  0.00 - 0.03 10*3/mm3 Final             Assessment/Plan     Patient Active Problem List   Diagnosis   • Malignant neoplasm of upper-outer quadrant of left female breast   • Breast cancer metastasized to bone, unspecified laterality   • Essential hypertension   • Acquired hypothyroidism   • Bipolar 1 disorder   • Carcinoma of left breast metastatic to bone          Assessment:  1. Stage IV metastatic breast carcinoma, ER/DC positive. HER2/maynor negative undergoing Ibrance, Faslodex and Xgeva with overall stable with stable CA27.29 with slow decline. She is tolerating the treatment very well. Faslodex cycle 17 today and will continue to HOLD Xgeva due to upcoming dental work, last dose of Xgeva given on  09/29/2016.  01/2017 restaging CT chest abdomen and pelvis and bone scan showed stable diffuse sclerotic bony lesions, unchanged from 7/1/2016.Next scan July 2017    2.  Mild leukopenia without neutropenia.  Likely secondary to Ibrance.  Continue to monitor.     3.  Macrocytic anemia.  Hemoglobin 11.1.  02/2016, B12 and folate normal    4.  Status post 8 teeth extraction.     Recommendation/Plan:  1.  Proceed with Faslodex cycle 20.  2. Continue Ibrance 125 mg 3 weeks on, one-week off.  Reduce dose if leukopenia worsens.   3. Follow up in 28 days for Faslodex cycle 19.   Repeat CA 27.29.  Previous CA 27.29 have remained stable, the last one done on 07/26/2017 was 88. Continue Faslodex q monthly.    Additionally she is anemic with a hemoglobin of 10 g percent with decreasing GFR.  I do suspect that she may have some form of chronic kidney disease, I am not sure why, she is not diabetic or hypertensive.  For this reason she is being referred over to a nephrologist.  We will draw iron studies on her today.      Joseph Nunez MD    9/28/2017    8:26 AM

## 2017-09-28 NOTE — CODE DOCUMENTATION
Patient injections administered per protocol and patient tolerated well.  Patient discharged in stable condition. ERIN Raygoza

## 2017-10-01 LAB
BACTERIA SPEC AEROBE CULT: ABNORMAL

## 2017-10-26 ENCOUNTER — LAB (OUTPATIENT)
Dept: LAB | Facility: HOSPITAL | Age: 72
End: 2017-10-26
Attending: INTERNAL MEDICINE

## 2017-10-26 ENCOUNTER — OFFICE VISIT (OUTPATIENT)
Dept: ONCOLOGY | Facility: CLINIC | Age: 72
End: 2017-10-26

## 2017-10-26 ENCOUNTER — INFUSION (OUTPATIENT)
Dept: ONCOLOGY | Facility: HOSPITAL | Age: 72
End: 2017-10-26
Attending: INTERNAL MEDICINE

## 2017-10-26 VITALS
OXYGEN SATURATION: 98 % | HEIGHT: 65 IN | RESPIRATION RATE: 18 BRPM | WEIGHT: 167.9 LBS | BODY MASS INDEX: 27.97 KG/M2 | HEART RATE: 57 BPM | DIASTOLIC BLOOD PRESSURE: 74 MMHG | SYSTOLIC BLOOD PRESSURE: 120 MMHG | TEMPERATURE: 97.6 F

## 2017-10-26 VITALS
SYSTOLIC BLOOD PRESSURE: 159 MMHG | DIASTOLIC BLOOD PRESSURE: 91 MMHG | HEIGHT: 65 IN | RESPIRATION RATE: 20 BRPM | TEMPERATURE: 97.8 F | OXYGEN SATURATION: 100 % | HEART RATE: 55 BPM | BODY MASS INDEX: 27.82 KG/M2 | WEIGHT: 167 LBS

## 2017-10-26 DIAGNOSIS — C79.51 BREAST CANCER METASTASIZED TO BONE, UNSPECIFIED LATERALITY (HCC): ICD-10-CM

## 2017-10-26 DIAGNOSIS — C79.51 CARCINOMA OF LEFT BREAST METASTATIC TO BONE (HCC): ICD-10-CM

## 2017-10-26 DIAGNOSIS — C50.412 MALIGNANT NEOPLASM OF UPPER-OUTER QUADRANT OF LEFT FEMALE BREAST (HCC): Primary | ICD-10-CM

## 2017-10-26 DIAGNOSIS — C50.412 MALIGNANT NEOPLASM OF UPPER-OUTER QUADRANT OF LEFT FEMALE BREAST, UNSPECIFIED ESTROGEN RECEPTOR STATUS (HCC): ICD-10-CM

## 2017-10-26 DIAGNOSIS — C50.412 MALIGNANT NEOPLASM OF UPPER-OUTER QUADRANT OF LEFT FEMALE BREAST, UNSPECIFIED ESTROGEN RECEPTOR STATUS (HCC): Primary | ICD-10-CM

## 2017-10-26 DIAGNOSIS — C50.919 BREAST CANCER METASTASIZED TO BONE, UNSPECIFIED LATERALITY (HCC): ICD-10-CM

## 2017-10-26 DIAGNOSIS — C50.912 CARCINOMA OF LEFT BREAST METASTATIC TO BONE (HCC): ICD-10-CM

## 2017-10-26 DIAGNOSIS — C50.412 MALIGNANT NEOPLASM OF UPPER-OUTER QUADRANT OF LEFT FEMALE BREAST (HCC): ICD-10-CM

## 2017-10-26 LAB
ALBUMIN SERPL-MCNC: 4 G/DL (ref 3.5–5)
ALBUMIN/GLOB SERPL: 1.1 G/DL (ref 1.1–2.5)
ALP SERPL-CCNC: 83 U/L (ref 24–120)
ALT SERPL W P-5'-P-CCNC: 32 U/L (ref 0–54)
ANION GAP SERPL CALCULATED.3IONS-SCNC: 9 MMOL/L (ref 4–13)
AST SERPL-CCNC: 32 U/L (ref 7–45)
AUTO MIXED CELLS #: 0.4 10*3/MM3 (ref 0.1–2.6)
AUTO MIXED CELLS %: 10.2 % (ref 0.1–24)
BILIRUB SERPL-MCNC: 0.2 MG/DL (ref 0.1–1)
BUN BLD-MCNC: 14 MG/DL (ref 5–21)
BUN/CREAT SERPL: 11.9
CALCIUM SPEC-SCNC: 10.1 MG/DL (ref 8.4–10.4)
CHLORIDE SERPL-SCNC: 102 MMOL/L (ref 98–110)
CO2 SERPL-SCNC: 28 MMOL/L (ref 24–31)
CREAT BLD-MCNC: 1.18 MG/DL (ref 0.5–1.4)
ERYTHROCYTE [DISTWIDTH] IN BLOOD BY AUTOMATED COUNT: 15.6 % (ref 12–15)
GFR SERPL CREATININE-BSD FRML MDRD: 45 ML/MIN/1.73
GLOBULIN UR ELPH-MCNC: 3.7 GM/DL
GLUCOSE BLD-MCNC: 90 MG/DL (ref 70–100)
HCT VFR BLD AUTO: 28.7 % (ref 37–47)
HGB BLD-MCNC: 10 G/DL (ref 12–16)
HOLD SPECIMEN: NORMAL
LYMPHOCYTES # BLD AUTO: 1.7 10*3/MM3 (ref 0.8–7)
LYMPHOCYTES NFR BLD AUTO: 46 % (ref 15–45)
MCH RBC QN AUTO: 38.8 PG (ref 28–32)
MCHC RBC AUTO-ENTMCNC: 34.8 G/DL (ref 33–36)
MCV RBC AUTO: 111.2 FL (ref 82–98)
NEUTROPHILS # BLD AUTO: 1.7 10*3/MM3 (ref 1.5–8.3)
NEUTROPHILS NFR BLD AUTO: 43.8 % (ref 39–78)
PLATELET # BLD AUTO: 293 10*3/MM3 (ref 130–400)
PMV BLD AUTO: 7.6 FL (ref 6–12)
POTASSIUM BLD-SCNC: 4.1 MMOL/L (ref 3.5–5.3)
PROT SERPL-MCNC: 7.7 G/DL (ref 6.3–8.7)
RBC # BLD AUTO: 2.58 10*6/MM3 (ref 4.2–5.4)
SODIUM BLD-SCNC: 139 MMOL/L (ref 135–145)
WBC NRBC COR # BLD: 3.8 10*3/MM3 (ref 4.8–10.8)

## 2017-10-26 PROCEDURE — 25010000002 FULVESTRANT PER 25 MG: Performed by: INTERNAL MEDICINE

## 2017-10-26 PROCEDURE — 36415 COLL VENOUS BLD VENIPUNCTURE: CPT

## 2017-10-26 PROCEDURE — 85025 COMPLETE CBC W/AUTO DIFF WBC: CPT

## 2017-10-26 PROCEDURE — 96402 CHEMO HORMON ANTINEOPL SQ/IM: CPT

## 2017-10-26 PROCEDURE — 80053 COMPREHEN METABOLIC PANEL: CPT

## 2017-10-26 RX ORDER — SODIUM CHLORIDE 0.9 % (FLUSH) 0.9 %
10 SYRINGE (ML) INJECTION AS NEEDED
Status: CANCELLED | OUTPATIENT
Start: 2017-10-26

## 2017-10-26 RX ORDER — SODIUM CHLORIDE 0.9 % (FLUSH) 0.9 %
10 SYRINGE (ML) INJECTION AS NEEDED
Status: DISPENSED | OUTPATIENT
Start: 2017-10-26

## 2017-10-26 RX ADMIN — Medication 10 ML: at 11:55

## 2017-10-26 RX ADMIN — FULVESTRANT 500 MG: 50 INJECTION INTRAMUSCULAR at 11:55

## 2017-10-26 NOTE — PROGRESS NOTES
[facility]  HEMATOLOGY & ONCOLOGY        Subjective    Metastatic breast cancer on Faslodex and Ibrance     Staging form: Breast, AJCC V7      Clinical stage from 10/11/2016: Stage IV (T2, N1, M1) - ER/CA positive. HER2/maynor negative  VISIT DIAGNOSIS:   Metastatic breast cancer       HEMATOLOGY / ONCOLOGY HISTORY:   Oncology/Hematology History    Patient is a 67-year-old postmenopausal female who had onset of her menses at age 9 and menopause at age 48. She received oral contraceptives for approximately 14 years and did not receive hormonal manipulation. Patient has a maternal cousin had breast cancer. Patient has had no prior breast biopsies. Patient found a palpable left breast mass as well as a left axillary mass. Patient had a bloody nipple discharge which been present for approximately 6 months. On 6/20/2012, a mammogram was performed showing a subareolar mass consistent with malignancy. Bilateral breast ultrasound revealed an ill-defined subareolar mass measuring 2.8-2.5 cm. There is a left axillary mass measuring 1.7 cm with suspicion of extracapsular extension. There is no evidence of disease in the right breast. On 7/9/2012 patient underwent a left breast biopsy and placement of marker clip. Left breast biopsy revealed an infiltrating lobular carcinoma grade 2 with focal ductal carcinoma in situ, solid pattern. A fine-needle biopsy of the left lymph node was consistent with metastatic carcinoma. Breast tumor profile revealed ER: 100%; CA: 98%; HER-2/maynor 2+; FISH: Unamplified; Ki-67: 71%; P 53 1%; DNA aneuploidy. A MRI of the breast were performed body multiple abnormal enhancing masses within the left breast. There appears to be anterior retraction of the pectoralis muscle with no direct extension. There is a moderate to large, layering left pleural effusion appreciated. The right breast reveals 3 moderately enlarged lymph nodes in the posterior lateral aspect the right breast. These have  fatty hilum but followup is recommended. Patient is undergone systemic studies including, on 8/2/2012, a CT scan of the brain showing atrophy. a CT scan that shows showing a small to moderate left pleural effusion with 3 subcentimeter nodule densities in the left lung.   She completed 4 cycles of neoadjuvant treatment with dose dense Adriamycin and Cytoxan. She had a mammogram which showed a significant reduction in the size of her left breast lesion. There is no axillary adenopathy noted. She has had an ultrasound revealing the  lesion reducing from 2.4 cm to 1 cm. Patient underwent a left mastectomy on 03/19/2013 finding negative invasive cancer, negative nodes. A 5% DCIS was noted. The patient has declined hormonal therapy. She did not need radiation therapy.  November 2014, she began to have evidence of lytic bone lesions at T5T6, frontal disease, an 8 mm lucency in the central frontal area of  the skull but nothing intracranial. Bone scan revealed only vertex tracer activity but bilateral ribs and thoracic spine, and other lesions in  her pelvis. At that time, she was started on letrozole since November 2014. She is taking Xgeva. She had progression found in bone in Feb 2015 on scan. She was started on Faslodex, Ibrance and continued xgeva.         Malignant neoplasm of upper-outer quadrant of left female breast    7/9/2012 Initial Diagnosis     Malignant neoplasm of upper-outer quadrant of left female breast         7/10/2012 Biopsy     Left Breast Biopsy & Axillary Node FNA: A) Infiltrating lobular carcinoma, Grade 2. B) Foci of ductal carcinoma in situ, solid pattern. C) Greatest dimension of the invasive carcinoma is 15.8mm.         3/19/2013 Surgery     Left Mastectomy w/ Axillary Tail: Focal residual ducatal carcinoma in situ (DCIS) 12 lymph nodes negative for metastatic carcinoma.         10/20/2014 Biopsy     Peritoneum Biopsy: Final Diagnosis: Malignant Cell Neoplasm.           Breast cancer  metastasized to bone, unspecified laterality    10/15/2012 -  Other Event     Left mammogram:  Impression:  1. Decreasing spiculation and density in the retroareolar left breast, near a previous biopsy.   2. Definite left axillary lesion is not appreciated.          2/5/2013 -  Other Event     Diagnostic Mammo:  Comparison is made to 10/15/2012 and 6/20/2012  The spiculated mass at 12:00 behind the nipple is nearly resolved.   Impression:  1. The mass on the left side is less apparent after receiving chemotherapy. There has been a good response to chmotherapy on the left side.   2. No suspicious abnormality is seen in the right breast to account for the new palable abnormality at the 4-5:00 position.          3/19/2013 Surgery     Breast Biopsy:  Final Diagnosis:  A. Breast, left mastectomy with axillary tail  1. Focal residual ductal carcinoma in situ (DICS) (less than 5% of tumor bed).  2. Negative for residual invasive carcinoma.   3. 12 Lymph Nodes, negative for metastatic carcinoma (0/12) focally, some lymph nodes demonstrate fibrosis/treatment effect.  B. Skin and soft tissue, left advancement flap:  1. Benign skin and subcutis.   2. Negative for carcinoma.          6/20/2013 -  Other Event     Diagnostic Mammo [facility]:  1. A subareolar mass is consistent with malignancy. Additional involement extends inferiorly sonographically and superolaterally mammograhically as demonstrated by calcifications. The mass measures approx 2.8 cm sonographically, but the involved region is likely much larger including an intraductal componet. The left axillary lymph nodes are also involved.   2. Recommened ultrasound guided biopsy of the left subareolar mass and left axillary lymph node.   3. Breast MRI could also further define multicentric disease on the left.   4. Clear suspicious finding on the right.  Recommend ongoing clinical follow up of palpable foci.          6/13/2014 -  Other Event     Diagnostic  Mammo:  IMPRESSION:  1. History of left breast cancer and mastectomy. Stable benign right breast mammograms.          10/20/2014 Surgery     Final Diagnosis:  Biopsies, pertoneum:  Metastatic carcinoma, morphologically compatible with metastatic mammary carcinoma.          6/15/2015 -  Other Event     Diagnostic Mammo:  IMPRESSION:   1. Negative x-ray report, should not delay biopsy if a dominat or clinically suspicious mass is present.          7/5/2016 -  Other Event     Diagnositic mammogram:  Impression:   Stable right mamogram with no radiographic findings suspicious for malignancy. Recommend continued screening mammography per screening guidelines unless otherwise earlier clinically indicated.          9/18/2016 Initial Diagnosis     Secondary malignant neoplasm of bone and bone marrow          Cancer Staging Information:  Malignant neoplasm of upper-outer quadrant of left female breast    Staging form: Breast, AJCC V7      Clinical stage from 10/11/2016: Stage IV (T2, N1, M1) - Signed by ALBERT Kulkarni on 10/11/2016      INTERVAL HISTORY  Patient ID: Heavenly Yanes is a 72 y.o. year old female history of metastatic breast carcinoma to lung and bone.  She is presently receiving treatment with Ibrance, Faslodex and Xgeva.  Tumor markers continued to decline with a CA 27-29 of 92.2 obtained on 9:30 2016 compared to 105.8 on 07/27/2016.  Mr. Yanes continues to tolerate this treatment regimen well and is here today for evaluation consideration to receive Faslodex and Xgeva.  Last bone mineral density study was obtained 11/21/2014 indicating osteopopenia, we will obtain a repeat bone mineral density study. Ms. Yanes completed 8 teeth pulled 02/14/2017.   Xgeva on hold, resume 05/2017.     Past Medical History:   Past Medical History:   Diagnosis Date   • Bipolar disorder    • Disease of thyroid gland    • Hypertension    • Malignant neoplasm of upper-outer quadrant of left female breast 9/18/2016   •  Secondary malignant neoplasm of bone and bone marrow 9/18/2016     Past Surgical History:   Past Surgical History:   Procedure Laterality Date   • BREAST SURGERY      left breast biopsy and axillary node   • CHOLECYSTECTOMY     • EXTERNAL EAR SURGERY     • MASTECTOMY Left    • PORTACATH PLACEMENT       Social History:   Social History     Social History   • Marital status:      Spouse name: N/A   • Number of children: N/A   • Years of education: N/A     Occupational History   • Not on file.     Social History Main Topics   • Smoking status: Never Smoker   • Smokeless tobacco: Never Used   • Alcohol use No   • Drug use: No   • Sexual activity: Not on file     Other Topics Concern   • Not on file     Social History Narrative     Family History:   Family History   Problem Relation Age of Onset   • No Known Problems Daughter    • No Known Problems Son    • Other Mother      complications from a concussion from a fall   • Other Father      old age   • No Known Problems Brother    • No Known Problems Daughter        Review of Systems   Constitutional: Negative.  Negative for activity change, appetite change, chills, diaphoresis, fatigue and fever.   HENT: Negative.  Negative for congestion, ear discharge, ear pain, facial swelling, hearing loss, mouth sores, nosebleeds, postnasal drip, rhinorrhea, sinus pressure, sore throat, tinnitus, trouble swallowing and voice change.    Eyes: Negative.  Negative for photophobia, pain, discharge and visual disturbance.   Respiratory: Negative.  Negative for apnea, cough, chest tightness, shortness of breath, wheezing and stridor.    Cardiovascular: Negative.  Negative for chest pain, palpitations and leg swelling.   Gastrointestinal: Negative.  Negative for abdominal distention, abdominal pain, blood in stool, constipation, diarrhea, nausea, rectal pain and vomiting.   Endocrine: Negative.  Negative for cold intolerance, heat intolerance, polydipsia, polyphagia and polyuria.    Genitourinary: Negative.  Negative for difficulty urinating, dysuria, flank pain, frequency, hematuria and urgency.   Musculoskeletal: Negative.  Negative for arthralgias, back pain, gait problem, joint swelling and myalgias.   Skin: Negative.  Negative for color change, pallor, rash and wound.   Allergic/Immunologic: Negative.  Negative for environmental allergies, food allergies and immunocompromised state.   Neurological: Negative.  Negative for dizziness, tremors, seizures, syncope, speech difficulty, weakness, light-headedness, numbness and headaches.   Hematological: Negative.  Negative for adenopathy. Does not bruise/bleed easily.   Psychiatric/Behavioral: Negative.  Negative for agitation, confusion, hallucinations, sleep disturbance and suicidal ideas. The patient is not nervous/anxious.    All other systems reviewed and are negative.       Performance Status:  Asymptomatic    Medications:    Current Outpatient Prescriptions   Medication Sig Dispense Refill   • B Complex Vitamins (VITAMIN B COMPLEX PO) Take  by mouth. 1 po daily     • Calcium Carb-Cholecalciferol (CALCIUM 600 + D PO) Take  by mouth.     • DULoxetine (CYMBALTA) 30 MG capsule Take 30 mg by mouth daily.     • ferrous sulfate 325 (65 FE) MG tablet Take 325 mg by mouth 2 (two) times a day.     • Lysine HCl (L-LYSINE) 500 MG tablet tablet Take  by mouth daily.     • Magnesium 250 MG tablet Take  by mouth.     • metoprolol tartrate (LOPRESSOR) 25 MG tablet Take 25 mg by mouth 2 (two) times a day.     • OLANZapine (ZYPREXA) 2.5 MG tablet Take 2.5 mg by mouth every night.     • Palbociclib 125 MG capsule capsule Take 1 capsule by mouth Daily. 3 weeks on, 1 week off 21 capsule 5   • Probiotic Product (PROBIOTIC DAILY PO) Take  by mouth. 300million cell daily     • thyroid 60 MG PO tablet Take 60 mg by mouth daily.     • VALERIAN ROOT PO Take  by mouth. 100mg daily     • Zinc 50 MG tablet Take  by mouth. 1 po daily     • melatonin 3 MG tablet Take   "by mouth.       No current facility-administered medications for this visit.        ALLERGIES:    Allergies   Allergen Reactions   • Codeine    • Diphenhydramine Other (See Comments)     \"Shaky leg syndrome\"   • Heparin    • Other      POPPY SEED   • Penicillins    • Petroleum Jelly [Skin Protectants, Misc.]    • Sulfa Antibiotics    • Sulfur    • Valium [Diazepam]        Objective      Vitals:    10/26/17 1011   BP: 120/74   Pulse: 57   Resp: 18   Temp: 97.6 °F (36.4 °C)   TempSrc: Tympanic   SpO2: 98%   Weight: 167 lb 14.4 oz (76.2 kg)   Height: 65\" (165.1 cm)         Current Status 10/26/2017   ECOG score 1       General Appearance: Patient is awake, alert, oriented and in no acute distress. Patient is welldeveloped, wellnourished, and appears stated age.  HEENT: Normocephalic. Sclerae clear, conjunctiva pink, extraocular movements intact, pupils, round, reactive to light and accommodation. Mouth and throat are clear with moist oral mucosa.  NECK: Supple, no jugular venous distention, thyroid not enlarged.  LYMPH: No cervical, supraclavicular, axillary, or inguinal lymphadenopathy.  CHEST: Equal bilateral expansion   LUNGS: Good air movement, no rales, rhonchi, rubs or wheezes with auscultation  CARDIO: Regular sinus rhythm, no murmurs, gallops or rubs.  ABDOMEN: Nondistended, soft, No tenderness, no guarding, no rebound, No hepatosplenomegaly. No abdominal masses. Bowel sounds positive. No hernia  MUSKEL: No joint swelling, decreased motion, or inflammation  EXTREMS: No edema, clubbing, cyanosis, No varicose veins.  NEURO: Grossly nonfocal, Gait is coordinated and smooth, Cognition is preserved.  SKIN: No rashes, no ecchymoses, no petechia.  PSYCH: Oriented to time, place and person. Memory is preserved. Mood and affect appear normal      RECENT LABS:  WBC   Date Value Ref Range Status   10/26/2017 3.80 (L) 4.80 - 10.80 10*3/mm3 Final     RBC   Date Value Ref Range Status   10/26/2017 2.58 (L) 4.20 - 5.40 " 10*6/mm3 Final     Hemoglobin   Date Value Ref Range Status   10/26/2017 10.0 (L) 12.0 - 16.0 g/dL Final     Hematocrit   Date Value Ref Range Status   10/26/2017 28.7 (L) 37.0 - 47.0 % Final     MCV   Date Value Ref Range Status   10/26/2017 111.2 (H) 82.0 - 98.0 fL Final     MCH   Date Value Ref Range Status   10/26/2017 38.8 (H) 28.0 - 32.0 pg Final     MCHC   Date Value Ref Range Status   10/26/2017 34.8 33.0 - 36.0 g/dL Final     RDW   Date Value Ref Range Status   10/26/2017 15.6 (H) 12.0 - 15.0 % Final     RDW-SD   Date Value Ref Range Status   09/28/2017 63.0 (H) 40.0 - 54.0 fl Final     MPV   Date Value Ref Range Status   10/26/2017 7.6 6.0 - 12.0 fL Final     Platelets   Date Value Ref Range Status   10/26/2017 293 130 - 400 10*3/mm3 Final     Neutrophil %   Date Value Ref Range Status   10/26/2017 43.8 39.0 - 78.0 % Final     Lymphocyte %   Date Value Ref Range Status   10/26/2017 46.0 (H) 15.0 - 45.0 % Final     Monocyte %   Date Value Ref Range Status   09/28/2017 5.1 4.0 - 12.0 % Final     Eosinophil %   Date Value Ref Range Status   09/28/2017 2.7 0.0 - 4.0 % Final     Basophil %   Date Value Ref Range Status   09/28/2017 2.4 (H) 0.0 - 2.0 % Final     Immature Grans %   Date Value Ref Range Status   09/28/2017 0.3 0.0 - 5.0 % Final     Neutrophils, Absolute   Date Value Ref Range Status   10/26/2017 1.70 1.50 - 8.30 10*3/mm3 Final     Lymphocytes, Absolute   Date Value Ref Range Status   10/26/2017 1.70 0.80 - 7.00 10*3/mm3 Final     Monocytes, Absolute   Date Value Ref Range Status   09/28/2017 0.19 0.19 - 1.30 10*3/mm3 Final     Eosinophils, Absolute   Date Value Ref Range Status   09/28/2017 0.10 0.00 - 0.70 10*3/mm3 Final     Basophils, Absolute   Date Value Ref Range Status   09/28/2017 0.09 0.00 - 0.20 10*3/mm3 Final     Immature Grans, Absolute   Date Value Ref Range Status   09/28/2017 0.01 0.00 - 0.03 10*3/mm3 Final     Lab on 10/26/2017   Component Date Value Ref Range Status   • Glucose  10/26/2017 90  70 - 100 mg/dL Final   • BUN 10/26/2017 14  5 - 21 mg/dL Final   • Creatinine 10/26/2017 1.18  0.50 - 1.40 mg/dL Final   • Sodium 10/26/2017 139  135 - 145 mmol/L Final   • Potassium 10/26/2017 4.1  3.5 - 5.3 mmol/L Final   • Chloride 10/26/2017 102  98 - 110 mmol/L Final   • CO2 10/26/2017 28.0  24.0 - 31.0 mmol/L Final   • Calcium 10/26/2017 10.1  8.4 - 10.4 mg/dL Final   • Total Protein 10/26/2017 7.7  6.3 - 8.7 g/dL Final   • Albumin 10/26/2017 4.00  3.50 - 5.00 g/dL Final   • ALT (SGPT) 10/26/2017 32  0 - 54 U/L Final   • AST (SGOT) 10/26/2017 32  7 - 45 U/L Final   • Alkaline Phosphatase 10/26/2017 83  24 - 120 U/L Final   • Total Bilirubin 10/26/2017 0.2  0.1 - 1.0 mg/dL Final   • eGFR Non African Amer 10/26/2017 45* >60 mL/min/1.73 Final   • Globulin 10/26/2017 3.7  gm/dL Final   • A/G Ratio 10/26/2017 1.1  1.1 - 2.5 g/dL Final   • BUN/Creatinine Ratio 10/26/2017 11.9    Final   • Anion Gap 10/26/2017 9.0  4.0 - 13.0 mmol/L Final   • WBC 10/26/2017 3.80* 4.80 - 10.80 10*3/mm3 Final   • RBC 10/26/2017 2.58* 4.20 - 5.40 10*6/mm3 Final   • Hemoglobin 10/26/2017 10.0* 12.0 - 16.0 g/dL Final   • Hematocrit 10/26/2017 28.7* 37.0 - 47.0 % Final   • MCV 10/26/2017 111.2* 82.0 - 98.0 fL Final   • MCH 10/26/2017 38.8* 28.0 - 32.0 pg Final   • MCHC 10/26/2017 34.8  33.0 - 36.0 g/dL Final   • RDW 10/26/2017 15.6* 12.0 - 15.0 % Final   • MPV 10/26/2017 7.6  6.0 - 12.0 fL Final   • Platelets 10/26/2017 293  130 - 400 10*3/mm3 Final   • Neutrophil % 10/26/2017 43.8  39.0 - 78.0 % Final   • Lymphocyte % 10/26/2017 46.0* 15.0 - 45.0 % Final   • Auto Mixed Cells % 10/26/2017 10.2  0.1 - 24.0 % Final   • Neutrophils, Absolute 10/26/2017 1.70  1.50 - 8.30 10*3/mm3 Final   • Lymphocytes, Absolute 10/26/2017 1.70  0.80 - 7.00 10*3/mm3 Final   • Auto Mixed Cells # 10/26/2017 0.40  0.10 - 2.60 10*3/mm3 Final   • Extra Tube 10/26/2017 Hold for add-ons.   Final    Auto resulted.             Assessment/Plan     Patient  Active Problem List   Diagnosis   • Malignant neoplasm of upper-outer quadrant of left female breast   • Breast cancer metastasized to bone, unspecified laterality   • Essential hypertension   • Acquired hypothyroidism   • Bipolar 1 disorder   • Carcinoma of left breast metastatic to bone          Assessment:  1. Stage IV metastatic breast carcinoma, ER/TN positive. HER2/maynor negative undergoing Ibrance, Faslodex and Xgeva with overall stable with stable CA27.29 with slow decline. She is tolerating the treatment very well. Faslodex cycle 17 today and will continue to HOLD Xgeva due to upcoming dental work, last dose of Xgeva given on 09/29/2016.  01/2017 restaging CT chest abdomen and pelvis and bone scan showed stable diffuse sclerotic bony lesions, unchanged from 7/1/2016.Next scan July 2017    2.  Mild leukopenia without neutropenia.  Likely secondary to Ibrance.  Continue to monitor.     3.  Macrocytic anemia.  Hemoglobin 11.1.  02/2016, B12 and folate normal    4.  Status post 8 teeth extraction.     Recommendation/Plan:  1.  Proceed with Faslodex cycle 20.  2. Continue Ibrance 125 mg 3 weeks on, one-week off.  Reduce dose if leukopenia worsens.   3. Follow up in 28 days for Faslodex cycle 19.   Repeat CA 27.29.  Previous CA 27.29 have remained stable, the last one done on 07/26/2017 was 88. Continue Faslodex q monthly.    Additionally she is anemic with a hemoglobin of 10 g percent with decreasing GFR.  I do suspect that she may have some form of chronic kidney disease, I am not sure why, she is not diabetic or hypertensive.  For this reason she is being referred over to a nephrologist.  We will draw iron studies on her today.      Joseph Nunez MD    10/26/2017    11:05 AM

## 2017-11-22 ENCOUNTER — INFUSION (OUTPATIENT)
Dept: ONCOLOGY | Facility: HOSPITAL | Age: 72
End: 2017-11-22
Attending: INTERNAL MEDICINE

## 2017-11-22 ENCOUNTER — LAB (OUTPATIENT)
Dept: LAB | Facility: HOSPITAL | Age: 72
End: 2017-11-22
Attending: INTERNAL MEDICINE

## 2017-11-22 ENCOUNTER — OFFICE VISIT (OUTPATIENT)
Dept: ONCOLOGY | Facility: CLINIC | Age: 72
End: 2017-11-22

## 2017-11-22 VITALS
TEMPERATURE: 97.7 F | RESPIRATION RATE: 18 BRPM | BODY MASS INDEX: 27.82 KG/M2 | SYSTOLIC BLOOD PRESSURE: 161 MMHG | DIASTOLIC BLOOD PRESSURE: 63 MMHG | HEIGHT: 65 IN | HEART RATE: 52 BPM | OXYGEN SATURATION: 100 % | WEIGHT: 167 LBS

## 2017-11-22 VITALS
DIASTOLIC BLOOD PRESSURE: 78 MMHG | HEART RATE: 58 BPM | BODY MASS INDEX: 27.86 KG/M2 | OXYGEN SATURATION: 98 % | RESPIRATION RATE: 16 BRPM | WEIGHT: 167.2 LBS | TEMPERATURE: 97.6 F | SYSTOLIC BLOOD PRESSURE: 128 MMHG | HEIGHT: 65 IN

## 2017-11-22 DIAGNOSIS — C50.412 MALIGNANT NEOPLASM OF UPPER-OUTER QUADRANT OF LEFT FEMALE BREAST, UNSPECIFIED ESTROGEN RECEPTOR STATUS (HCC): ICD-10-CM

## 2017-11-22 DIAGNOSIS — C50.412 MALIGNANT NEOPLASM OF UPPER-OUTER QUADRANT OF LEFT FEMALE BREAST, UNSPECIFIED ESTROGEN RECEPTOR STATUS (HCC): Primary | ICD-10-CM

## 2017-11-22 LAB
ALBUMIN SERPL-MCNC: 3.9 G/DL (ref 3.5–5)
ALBUMIN/GLOB SERPL: 1.1 G/DL (ref 1.1–2.5)
ALP SERPL-CCNC: 72 U/L (ref 24–120)
ALT SERPL W P-5'-P-CCNC: 26 U/L (ref 0–54)
ANION GAP SERPL CALCULATED.3IONS-SCNC: 7 MMOL/L (ref 4–13)
AST SERPL-CCNC: 28 U/L (ref 7–45)
AUTO MIXED CELLS #: 0.5 10*3/MM3 (ref 0.1–2.6)
AUTO MIXED CELLS %: 14.4 % (ref 0.1–24)
BILIRUB SERPL-MCNC: 0.3 MG/DL (ref 0.1–1)
BUN BLD-MCNC: 10 MG/DL (ref 5–21)
BUN/CREAT SERPL: 8.5
CALCIUM SPEC-SCNC: 9.9 MG/DL (ref 8.4–10.4)
CHLORIDE SERPL-SCNC: 102 MMOL/L (ref 98–110)
CO2 SERPL-SCNC: 26 MMOL/L (ref 24–31)
CREAT BLD-MCNC: 1.18 MG/DL (ref 0.5–1.4)
ERYTHROCYTE [DISTWIDTH] IN BLOOD BY AUTOMATED COUNT: 14.9 % (ref 12–15)
FERRITIN SERPL-MCNC: 219 NG/ML (ref 11.1–264)
GFR SERPL CREATININE-BSD FRML MDRD: 45 ML/MIN/1.73
GLOBULIN UR ELPH-MCNC: 3.7 GM/DL
GLUCOSE BLD-MCNC: 89 MG/DL (ref 70–100)
HCT VFR BLD AUTO: 28.4 % (ref 37–47)
HGB BLD-MCNC: 9.7 G/DL (ref 12–16)
HOLD SPECIMEN: NORMAL
IRON 24H UR-MRATE: 105 MCG/DL (ref 42–180)
IRON SATN MFR SERPL: 42 % (ref 20–45)
LYMPHOCYTES # BLD AUTO: 1.9 10*3/MM3 (ref 0.8–7)
LYMPHOCYTES NFR BLD AUTO: 51.7 % (ref 15–45)
MCH RBC QN AUTO: 39 PG (ref 28–32)
MCHC RBC AUTO-ENTMCNC: 34.2 G/DL (ref 33–36)
MCV RBC AUTO: 114.1 FL (ref 82–98)
NEUTROPHILS # BLD AUTO: 1.3 10*3/MM3 (ref 1.5–8.3)
NEUTROPHILS NFR BLD AUTO: 33.9 % (ref 39–78)
PLATELET # BLD AUTO: 242 10*3/MM3 (ref 130–400)
PMV BLD AUTO: 8 FL (ref 6–12)
POTASSIUM BLD-SCNC: 4.2 MMOL/L (ref 3.5–5.3)
PROT SERPL-MCNC: 7.6 G/DL (ref 6.3–8.7)
RBC # BLD AUTO: 2.49 10*6/MM3 (ref 4.2–5.4)
SODIUM BLD-SCNC: 135 MMOL/L (ref 135–145)
TIBC SERPL-MCNC: 250 MCG/DL (ref 225–420)
WBC NRBC COR # BLD: 3.7 10*3/MM3 (ref 4.8–10.8)

## 2017-11-22 PROCEDURE — 85025 COMPLETE CBC W/AUTO DIFF WBC: CPT

## 2017-11-22 PROCEDURE — 83540 ASSAY OF IRON: CPT | Performed by: INTERNAL MEDICINE

## 2017-11-22 PROCEDURE — 83550 IRON BINDING TEST: CPT | Performed by: INTERNAL MEDICINE

## 2017-11-22 PROCEDURE — 36415 COLL VENOUS BLD VENIPUNCTURE: CPT

## 2017-11-22 PROCEDURE — 80053 COMPREHEN METABOLIC PANEL: CPT

## 2017-11-22 PROCEDURE — 96402 CHEMO HORMON ANTINEOPL SQ/IM: CPT

## 2017-11-22 PROCEDURE — 99214 OFFICE O/P EST MOD 30 MIN: CPT | Performed by: INTERNAL MEDICINE

## 2017-11-22 PROCEDURE — 82728 ASSAY OF FERRITIN: CPT | Performed by: INTERNAL MEDICINE

## 2017-11-22 PROCEDURE — 86300 IMMUNOASSAY TUMOR CA 15-3: CPT | Performed by: INTERNAL MEDICINE

## 2017-11-22 PROCEDURE — 96372 THER/PROPH/DIAG INJ SC/IM: CPT

## 2017-11-22 PROCEDURE — 25010000002 FULVESTRANT PER 25 MG: Performed by: INTERNAL MEDICINE

## 2017-11-22 RX ADMIN — FULVESTRANT 500 MG: 50 INJECTION INTRAMUSCULAR at 11:10

## 2017-11-22 NOTE — PROGRESS NOTES
[facility]  HEMATOLOGY & ONCOLOGY        Subjective    Metastatic breast cancer on Faslodex and Ibrance     Staging form: Breast, AJCC V7      Clinical stage from 10/11/2016: Stage IV (T2, N1, M1) - ER/OR positive. HER2/maynor negative  VISIT DIAGNOSIS:   Metastatic breast cancer       HEMATOLOGY / ONCOLOGY HISTORY:   Oncology/Hematology History    Patient is a 67-year-old postmenopausal female who had onset of her menses at age 9 and menopause at age 48. She received oral contraceptives for approximately 14 years and did not receive hormonal manipulation. Patient has a maternal cousin had breast cancer. Patient has had no prior breast biopsies. Patient found a palpable left breast mass as well as a left axillary mass. Patient had a bloody nipple discharge which been present for approximately 6 months. On 6/20/2012, a mammogram was performed showing a subareolar mass consistent with malignancy. Bilateral breast ultrasound revealed an ill-defined subareolar mass measuring 2.8-2.5 cm. There is a left axillary mass measuring 1.7 cm with suspicion of extracapsular extension. There is no evidence of disease in the right breast. On 7/9/2012 patient underwent a left breast biopsy and placement of marker clip. Left breast biopsy revealed an infiltrating lobular carcinoma grade 2 with focal ductal carcinoma in situ, solid pattern. A fine-needle biopsy of the left lymph node was consistent with metastatic carcinoma. Breast tumor profile revealed ER: 100%; OR: 98%; HER-2/maynor 2+; FISH: Unamplified; Ki-67: 71%; P 53 1%; DNA aneuploidy. A MRI of the breast were performed body multiple abnormal enhancing masses within the left breast. There appears to be anterior retraction of the pectoralis muscle with no direct extension. There is a moderate to large, layering left pleural effusion appreciated. The right breast reveals 3 moderately enlarged lymph nodes in the posterior lateral aspect the right breast. These have  fatty hilum but followup is recommended. Patient is undergone systemic studies including, on 8/2/2012, a CT scan of the brain showing atrophy. a CT scan that shows showing a small to moderate left pleural effusion with 3 subcentimeter nodule densities in the left lung.   She completed 4 cycles of neoadjuvant treatment with dose dense Adriamycin and Cytoxan. She had a mammogram which showed a significant reduction in the size of her left breast lesion. There is no axillary adenopathy noted. She has had an ultrasound revealing the  lesion reducing from 2.4 cm to 1 cm. Patient underwent a left mastectomy on 03/19/2013 finding negative invasive cancer, negative nodes. A 5% DCIS was noted. The patient has declined hormonal therapy. She did not need radiation therapy.  November 2014, she began to have evidence of lytic bone lesions at T5T6, frontal disease, an 8 mm lucency in the central frontal area of  the skull but nothing intracranial. Bone scan revealed only vertex tracer activity but bilateral ribs and thoracic spine, and other lesions in  her pelvis. At that time, she was started on letrozole since November 2014. She is taking Xgeva. She had progression found in bone in Feb 2015 on scan. She was started on Faslodex, Ibrance and continued xgeva.         Malignant neoplasm of upper-outer quadrant of left female breast    7/9/2012 Initial Diagnosis     Malignant neoplasm of upper-outer quadrant of left female breast         7/10/2012 Biopsy     Left Breast Biopsy & Axillary Node FNA: A) Infiltrating lobular carcinoma, Grade 2. B) Foci of ductal carcinoma in situ, solid pattern. C) Greatest dimension of the invasive carcinoma is 15.8mm.         3/19/2013 Surgery     Left Mastectomy w/ Axillary Tail: Focal residual ducatal carcinoma in situ (DCIS) 12 lymph nodes negative for metastatic carcinoma.         10/20/2014 Biopsy     Peritoneum Biopsy: Final Diagnosis: Malignant Cell Neoplasm.           Breast cancer  metastasized to bone, unspecified laterality    10/15/2012 -  Other Event     Left mammogram:  Impression:  1. Decreasing spiculation and density in the retroareolar left breast, near a previous biopsy.   2. Definite left axillary lesion is not appreciated.          2/5/2013 -  Other Event     Diagnostic Mammo:  Comparison is made to 10/15/2012 and 6/20/2012  The spiculated mass at 12:00 behind the nipple is nearly resolved.   Impression:  1. The mass on the left side is less apparent after receiving chemotherapy. There has been a good response to chmotherapy on the left side.   2. No suspicious abnormality is seen in the right breast to account for the new palable abnormality at the 4-5:00 position.          3/19/2013 Surgery     Breast Biopsy:  Final Diagnosis:  A. Breast, left mastectomy with axillary tail  1. Focal residual ductal carcinoma in situ (DICS) (less than 5% of tumor bed).  2. Negative for residual invasive carcinoma.   3. 12 Lymph Nodes, negative for metastatic carcinoma (0/12) focally, some lymph nodes demonstrate fibrosis/treatment effect.  B. Skin and soft tissue, left advancement flap:  1. Benign skin and subcutis.   2. Negative for carcinoma.          6/20/2013 -  Other Event     Diagnostic Mammo [facility]:  1. A subareolar mass is consistent with malignancy. Additional involement extends inferiorly sonographically and superolaterally mammograhically as demonstrated by calcifications. The mass measures approx 2.8 cm sonographically, but the involved region is likely much larger including an intraductal componet. The left axillary lymph nodes are also involved.   2. Recommened ultrasound guided biopsy of the left subareolar mass and left axillary lymph node.   3. Breast MRI could also further define multicentric disease on the left.   4. Clear suspicious finding on the right.  Recommend ongoing clinical follow up of palpable foci.          6/13/2014 -  Other Event     Diagnostic  Mammo:  IMPRESSION:  1. History of left breast cancer and mastectomy. Stable benign right breast mammograms.          10/20/2014 Surgery     Final Diagnosis:  Biopsies, pertoneum:  Metastatic carcinoma, morphologically compatible with metastatic mammary carcinoma.          6/15/2015 -  Other Event     Diagnostic Mammo:  IMPRESSION:   1. Negative x-ray report, should not delay biopsy if a dominat or clinically suspicious mass is present.          7/5/2016 -  Other Event     Diagnositic mammogram:  Impression:   Stable right mamogram with no radiographic findings suspicious for malignancy. Recommend continued screening mammography per screening guidelines unless otherwise earlier clinically indicated.          9/18/2016 Initial Diagnosis     Secondary malignant neoplasm of bone and bone marrow          Cancer Staging Information:  Malignant neoplasm of upper-outer quadrant of left female breast    Staging form: Breast, AJCC V7      Clinical stage from 10/11/2016: Stage IV (T2, N1, M1) - Signed by ALBERT Kulkarni on 10/11/2016      INTERVAL HISTORY  Patient ID: Heavenly Yanes is a 72 y.o. year old female history of metastatic breast carcinoma to lung and bone.  She is presently receiving treatment with Ibrance, Faslodex and Xgeva.  Tumor markers continued to decline with a CA 27-29 of 92.2 obtained on 9:30 2016 compared to 105.8 on 07/27/2016.  Mr. Yanes continues to tolerate this treatment regimen well and is here today for evaluation consideration to receive Faslodex and Xgeva.  Last bone mineral density study was obtained 11/21/2014 indicating osteopopenia, we will obtain a repeat bone mineral density study. Ms. Yanes completed 8 teeth pulled 02/14/2017.   Xgeva on hold, resume 05/2017.     Past Medical History:   Past Medical History:   Diagnosis Date   • Bipolar disorder    • Disease of thyroid gland    • Hypertension    • Malignant neoplasm of upper-outer quadrant of left female breast 9/18/2016   •  Secondary malignant neoplasm of bone and bone marrow 9/18/2016     Past Surgical History:   Past Surgical History:   Procedure Laterality Date   • BREAST SURGERY      left breast biopsy and axillary node   • CHOLECYSTECTOMY     • EXTERNAL EAR SURGERY     • MASTECTOMY Left    • PORTACATH PLACEMENT       Social History:   Social History     Social History   • Marital status:      Spouse name: N/A   • Number of children: N/A   • Years of education: N/A     Occupational History   • Not on file.     Social History Main Topics   • Smoking status: Never Smoker   • Smokeless tobacco: Never Used   • Alcohol use No   • Drug use: No   • Sexual activity: Not on file     Other Topics Concern   • Not on file     Social History Narrative     Family History:   Family History   Problem Relation Age of Onset   • No Known Problems Daughter    • No Known Problems Son    • Other Mother      complications from a concussion from a fall   • Other Father      old age   • No Known Problems Brother    • No Known Problems Daughter        Review of Systems   Constitutional: Negative.  Negative for activity change, appetite change, chills, diaphoresis, fatigue and fever.   HENT: Negative.  Negative for congestion, ear discharge, ear pain, facial swelling, hearing loss, mouth sores, nosebleeds, postnasal drip, rhinorrhea, sinus pressure, sore throat, tinnitus, trouble swallowing and voice change.    Eyes: Negative.  Negative for photophobia, pain, discharge and visual disturbance.   Respiratory: Negative.  Negative for apnea, cough, chest tightness, shortness of breath, wheezing and stridor.    Cardiovascular: Negative.  Negative for chest pain, palpitations and leg swelling.   Gastrointestinal: Negative.  Negative for abdominal distention, abdominal pain, blood in stool, constipation, diarrhea, nausea, rectal pain and vomiting.   Endocrine: Negative.  Negative for cold intolerance, heat intolerance, polydipsia, polyphagia and polyuria.    Genitourinary: Negative.  Negative for difficulty urinating, dysuria, flank pain, frequency, hematuria and urgency.   Musculoskeletal: Negative.  Negative for arthralgias, back pain, gait problem, joint swelling and myalgias.   Skin: Negative.  Negative for color change, pallor, rash and wound.   Allergic/Immunologic: Negative.  Negative for environmental allergies, food allergies and immunocompromised state.   Neurological: Negative.  Negative for dizziness, tremors, seizures, syncope, speech difficulty, weakness, light-headedness, numbness and headaches.   Hematological: Negative.  Negative for adenopathy. Does not bruise/bleed easily.   Psychiatric/Behavioral: Negative.  Negative for agitation, confusion, hallucinations, sleep disturbance and suicidal ideas. The patient is not nervous/anxious.    All other systems reviewed and are negative.       Performance Status:  Asymptomatic    Medications:    Current Outpatient Prescriptions   Medication Sig Dispense Refill   • B Complex Vitamins (VITAMIN B COMPLEX PO) Take  by mouth. 1 po daily     • Calcium Carb-Cholecalciferol (CALCIUM 600 + D PO) Take  by mouth.     • DULoxetine (CYMBALTA) 30 MG capsule Take 30 mg by mouth daily.     • ferrous sulfate 325 (65 FE) MG tablet Take 325 mg by mouth 2 (two) times a day.     • Lysine HCl (L-LYSINE) 500 MG tablet tablet Take  by mouth daily.     • Magnesium 250 MG tablet Take  by mouth.     • melatonin 3 MG tablet Take  by mouth.     • metoprolol tartrate (LOPRESSOR) 25 MG tablet Take 25 mg by mouth 2 (two) times a day.     • OLANZapine (ZYPREXA) 2.5 MG tablet Take 2.5 mg by mouth every night.     • Palbociclib 125 MG capsule capsule Take 1 capsule by mouth Daily. 3 weeks on, 1 week off 21 capsule 5   • Probiotic Product (PROBIOTIC DAILY PO) Take  by mouth. 300million cell daily     • thyroid 60 MG PO tablet Take 60 mg by mouth daily.     • VALERIAN ROOT PO Take  by mouth. 100mg daily     • Zinc 50 MG tablet Take  by mouth. 1  "po daily       No current facility-administered medications for this visit.      Facility-Administered Medications Ordered in Other Visits   Medication Dose Route Frequency Provider Last Rate Last Dose   • heparin flush (porcine) 100 UNIT/ML injection 500 Units  500 Units Intravenous PRN Joseph Nunez MD   500 Units at 10/26/17 1155   • sodium chloride 0.9 % flush 10 mL  10 mL Intravenous PRN Joseph Nunez MD   10 mL at 10/26/17 1155       ALLERGIES:    Allergies   Allergen Reactions   • Codeine    • Diphenhydramine Other (See Comments)     \"Shaky leg syndrome\"   • Heparin    • Other      POPPY SEED   • Penicillins    • Petroleum Jelly [Skin Protectants, Misc.]    • Sulfa Antibiotics    • Sulfur    • Valium [Diazepam]        Objective      Vitals:    11/22/17 1018   BP: 128/78   Pulse: 58   Resp: 16   Temp: 97.6 °F (36.4 °C)   TempSrc: Tympanic   SpO2: 98%   Weight: 167 lb 3.2 oz (75.8 kg)   Height: 65\" (165.1 cm)         Current Status 11/22/2017   ECOG score 1       General Appearance: Patient is awake, alert, oriented and in no acute distress. Patient is welldeveloped, wellnourished, and appears stated age.  HEENT: Normocephalic. Sclerae clear, conjunctiva pink, extraocular movements intact, pupils, round, reactive to light and accommodation. Mouth and throat are clear with moist oral mucosa.  NECK: Supple, no jugular venous distention, thyroid not enlarged.  LYMPH: No cervical, supraclavicular, axillary, or inguinal lymphadenopathy.  CHEST: Equal bilateral expansion   LUNGS: Good air movement, no rales, rhonchi, rubs or wheezes with auscultation  CARDIO: Regular sinus rhythm, no murmurs, gallops or rubs.  ABDOMEN: Nondistended, soft, No tenderness, no guarding, no rebound, No hepatosplenomegaly. No abdominal masses. Bowel sounds positive. No hernia  MUSKEL: No joint swelling, decreased motion, or inflammation  EXTREMS: No edema, clubbing, cyanosis, No varicose veins.  NEURO: Grossly nonfocal, Gait is coordinated " and smooth, Cognition is preserved.  SKIN: No rashes, no ecchymoses, no petechia.  PSYCH: Oriented to time, place and person. Memory is preserved. Mood and affect appear normal      RECENT LABS:  WBC   Date Value Ref Range Status   11/22/2017 3.70 (L) 4.80 - 10.80 10*3/mm3 Final     RBC   Date Value Ref Range Status   11/22/2017 2.49 (L) 4.20 - 5.40 10*6/mm3 Final     Hemoglobin   Date Value Ref Range Status   11/22/2017 9.7 (L) 12.0 - 16.0 g/dL Final     Hematocrit   Date Value Ref Range Status   11/22/2017 28.4 (L) 37.0 - 47.0 % Final     MCV   Date Value Ref Range Status   11/22/2017 114.1 (H) 82.0 - 98.0 fL Final     MCH   Date Value Ref Range Status   11/22/2017 39.0 (H) 28.0 - 32.0 pg Final     MCHC   Date Value Ref Range Status   11/22/2017 34.2 33.0 - 36.0 g/dL Final     RDW   Date Value Ref Range Status   11/22/2017 14.9 12.0 - 15.0 % Final     RDW-SD   Date Value Ref Range Status   09/28/2017 63.0 (H) 40.0 - 54.0 fl Final     MPV   Date Value Ref Range Status   11/22/2017 8.0 6.0 - 12.0 fL Final     Platelets   Date Value Ref Range Status   11/22/2017 242 130 - 400 10*3/mm3 Final     Neutrophil %   Date Value Ref Range Status   11/22/2017 33.9 (L) 39.0 - 78.0 % Final     Lymphocyte %   Date Value Ref Range Status   11/22/2017 51.7 (H) 15.0 - 45.0 % Final     Monocyte %   Date Value Ref Range Status   09/28/2017 5.1 4.0 - 12.0 % Final     Eosinophil %   Date Value Ref Range Status   09/28/2017 2.7 0.0 - 4.0 % Final     Basophil %   Date Value Ref Range Status   09/28/2017 2.4 (H) 0.0 - 2.0 % Final     Immature Grans %   Date Value Ref Range Status   09/28/2017 0.3 0.0 - 5.0 % Final     Neutrophils, Absolute   Date Value Ref Range Status   11/22/2017 1.30 (L) 1.50 - 8.30 10*3/mm3 Final     Lymphocytes, Absolute   Date Value Ref Range Status   11/22/2017 1.90 0.80 - 7.00 10*3/mm3 Final     Monocytes, Absolute   Date Value Ref Range Status   09/28/2017 0.19 0.19 - 1.30 10*3/mm3 Final     Eosinophils, Absolute    Date Value Ref Range Status   09/28/2017 0.10 0.00 - 0.70 10*3/mm3 Final     Basophils, Absolute   Date Value Ref Range Status   09/28/2017 0.09 0.00 - 0.20 10*3/mm3 Final     Immature Grans, Absolute   Date Value Ref Range Status   09/28/2017 0.01 0.00 - 0.03 10*3/mm3 Final     Lab on 11/22/2017   Component Date Value Ref Range Status   • WBC 11/22/2017 3.70* 4.80 - 10.80 10*3/mm3 Final   • RBC 11/22/2017 2.49* 4.20 - 5.40 10*6/mm3 Final   • Hemoglobin 11/22/2017 9.7* 12.0 - 16.0 g/dL Final   • Hematocrit 11/22/2017 28.4* 37.0 - 47.0 % Final   • MCV 11/22/2017 114.1* 82.0 - 98.0 fL Final   • MCH 11/22/2017 39.0* 28.0 - 32.0 pg Final   • MCHC 11/22/2017 34.2  33.0 - 36.0 g/dL Final   • RDW 11/22/2017 14.9  12.0 - 15.0 % Final   • MPV 11/22/2017 8.0  6.0 - 12.0 fL Final   • Platelets 11/22/2017 242  130 - 400 10*3/mm3 Final   • Neutrophil % 11/22/2017 33.9* 39.0 - 78.0 % Final   • Lymphocyte % 11/22/2017 51.7* 15.0 - 45.0 % Final   • Auto Mixed Cells % 11/22/2017 14.4  0.1 - 24.0 % Final   • Neutrophils, Absolute 11/22/2017 1.30* 1.50 - 8.30 10*3/mm3 Final   • Lymphocytes, Absolute 11/22/2017 1.90  0.80 - 7.00 10*3/mm3 Final   • Auto Mixed Cells # 11/22/2017 0.50  0.10 - 2.60 10*3/mm3 Final             Assessment/Plan     Patient Active Problem List   Diagnosis   • Malignant neoplasm of upper-outer quadrant of left female breast   • Breast cancer metastasized to bone, unspecified laterality   • Essential hypertension   • Acquired hypothyroidism   • Bipolar 1 disorder   • Carcinoma of left breast metastatic to bone          Assessment:  1. Stage IV metastatic breast carcinoma, ER/OK positive. HER2/maynor negative undergoing Ibrance, Faslodex and Xgeva with overall stable with stable CA27.29 with slow decline. She is tolerating the treatment very well. Faslodex cycle 17 today and will continue to HOLD Xgeva due to upcoming dental work, last dose of Xgeva given on 09/29/2016.  01/2017 restaging CT chest abdomen and  pelvis and bone scan showed stable diffuse sclerotic bony lesions, unchanged from 7/1/2016.Next scan July 2017    2.  Mild leukopenia without neutropenia.  Likely secondary to Ibrance.  Continue to monitor.     3.  Macrocytic anemia.  Hemoglobin 11.1.  02/2016, B12 and folate normal    4.  Status post 8 teeth extraction.     Recommendation/Plan:  1.  Proceed with Faslodex cycle 20.  2. Continue Ibrance 125 mg 3 weeks on, one-week off.  Reduce dose if leukopenia worsens.   3. Follow up in 28 days for Faslodex cycle 19.   Repeat CA 27.29.  Previous CA 27.29 have remained stable, the last one done on 08/24/17 was 89. Continue Faslodex q monthly.    Additionally she is anemic with a hemoglobin of 10 g percent with decreasing GFR.  I do suspect that she may have some form of chronic kidney disease, I am not sure why, she is not diabetic or hypertensive.  For this reason she is being referred over to a nephrologist.  We will draw iron studies on her today and plan Procrit once Ins approved.      Joseph Nunez MD    11/22/2017    10:26 AM

## 2017-11-23 LAB — CANCER AG27-29 SERPL-ACNC: 86.1 U/ML (ref 0–38.6)

## 2017-12-04 PROBLEM — D64.81 ANTINEOPLASTIC CHEMOTHERAPY INDUCED ANEMIA: Status: ACTIVE | Noted: 2017-12-04

## 2017-12-04 PROBLEM — T45.1X5A ANTINEOPLASTIC CHEMOTHERAPY INDUCED ANEMIA: Status: ACTIVE | Noted: 2017-12-04

## 2017-12-05 DIAGNOSIS — T45.1X5A ANTINEOPLASTIC CHEMOTHERAPY INDUCED ANEMIA: ICD-10-CM

## 2017-12-05 DIAGNOSIS — D64.81 ANTINEOPLASTIC CHEMOTHERAPY INDUCED ANEMIA: ICD-10-CM

## 2017-12-07 ENCOUNTER — INFUSION (OUTPATIENT)
Dept: ONCOLOGY | Facility: HOSPITAL | Age: 72
End: 2017-12-07
Attending: INTERNAL MEDICINE

## 2017-12-07 ENCOUNTER — LAB (OUTPATIENT)
Dept: LAB | Facility: HOSPITAL | Age: 72
End: 2017-12-07
Attending: INTERNAL MEDICINE

## 2017-12-07 VITALS
OXYGEN SATURATION: 100 % | RESPIRATION RATE: 20 BRPM | TEMPERATURE: 97.6 F | HEIGHT: 65 IN | BODY MASS INDEX: 27.82 KG/M2 | HEART RATE: 49 BPM | DIASTOLIC BLOOD PRESSURE: 59 MMHG | SYSTOLIC BLOOD PRESSURE: 156 MMHG | WEIGHT: 167 LBS

## 2017-12-07 DIAGNOSIS — D64.81 ANTINEOPLASTIC CHEMOTHERAPY INDUCED ANEMIA: ICD-10-CM

## 2017-12-07 DIAGNOSIS — T45.1X5A ANTINEOPLASTIC CHEMOTHERAPY INDUCED ANEMIA: ICD-10-CM

## 2017-12-07 LAB
AUTO MIXED CELLS #: 0.5 10*3/MM3 (ref 0.1–2.6)
AUTO MIXED CELLS %: 11.1 % (ref 0.1–24)
ERYTHROCYTE [DISTWIDTH] IN BLOOD BY AUTOMATED COUNT: 14.2 % (ref 12–15)
HCT VFR BLD AUTO: 28.7 % (ref 37–47)
HGB BLD-MCNC: 10.3 G/DL (ref 12–16)
LYMPHOCYTES # BLD AUTO: 2 10*3/MM3 (ref 0.8–7)
LYMPHOCYTES NFR BLD AUTO: 48.8 % (ref 15–45)
MCH RBC QN AUTO: 40.2 PG (ref 28–32)
MCHC RBC AUTO-ENTMCNC: 35.9 G/DL (ref 33–36)
MCV RBC AUTO: 112.1 FL (ref 82–98)
NEUTROPHILS # BLD AUTO: 1.7 10*3/MM3 (ref 1.5–8.3)
NEUTROPHILS NFR BLD AUTO: 40.1 % (ref 39–78)
PLATELET # BLD AUTO: 227 10*3/MM3 (ref 130–400)
PMV BLD AUTO: 8.5 FL (ref 6–12)
RBC # BLD AUTO: 2.56 10*6/MM3 (ref 4.2–5.4)
WBC NRBC COR # BLD: 4.2 10*3/MM3 (ref 4.8–10.8)

## 2017-12-07 PROCEDURE — 36415 COLL VENOUS BLD VENIPUNCTURE: CPT

## 2017-12-07 PROCEDURE — 85025 COMPLETE CBC W/AUTO DIFF WBC: CPT

## 2017-12-07 NOTE — PROGRESS NOTES
12/7/17 1004 Called Sandra about hgb = 10.3 and orders are not signed does doctor want any treatment? Sandra to call back with answer. Nilsa Cagle RN    12/7/17 1045 Sandra from Dr. Nunez's office called and after reviewing labs patient does not need procrit. Give patient option to come next week or wait till sees doctor. Patient leaving with no c/o pain or discomfort. Nilsa Cagle RN

## 2017-12-21 ENCOUNTER — APPOINTMENT (OUTPATIENT)
Dept: ONCOLOGY | Facility: HOSPITAL | Age: 72
End: 2017-12-21
Attending: INTERNAL MEDICINE

## 2017-12-27 ENCOUNTER — LAB (OUTPATIENT)
Dept: LAB | Facility: HOSPITAL | Age: 72
End: 2017-12-27
Attending: INTERNAL MEDICINE

## 2017-12-27 ENCOUNTER — INFUSION (OUTPATIENT)
Dept: ONCOLOGY | Facility: HOSPITAL | Age: 72
End: 2017-12-27
Attending: INTERNAL MEDICINE

## 2017-12-27 ENCOUNTER — OFFICE VISIT (OUTPATIENT)
Dept: ONCOLOGY | Facility: CLINIC | Age: 72
End: 2017-12-27

## 2017-12-27 VITALS
RESPIRATION RATE: 18 BRPM | SYSTOLIC BLOOD PRESSURE: 165 MMHG | DIASTOLIC BLOOD PRESSURE: 62 MMHG | TEMPERATURE: 97.9 F | WEIGHT: 169 LBS | HEART RATE: 54 BPM | HEIGHT: 65 IN | BODY MASS INDEX: 28.16 KG/M2 | OXYGEN SATURATION: 100 %

## 2017-12-27 VITALS
SYSTOLIC BLOOD PRESSURE: 142 MMHG | BODY MASS INDEX: 28.22 KG/M2 | RESPIRATION RATE: 16 BRPM | HEIGHT: 65 IN | HEART RATE: 64 BPM | TEMPERATURE: 97.2 F | DIASTOLIC BLOOD PRESSURE: 84 MMHG | WEIGHT: 169.4 LBS | OXYGEN SATURATION: 97 %

## 2017-12-27 DIAGNOSIS — C50.412 MALIGNANT NEOPLASM OF UPPER-OUTER QUADRANT OF LEFT FEMALE BREAST, UNSPECIFIED ESTROGEN RECEPTOR STATUS (HCC): Primary | ICD-10-CM

## 2017-12-27 DIAGNOSIS — C50.412 MALIGNANT NEOPLASM OF UPPER-OUTER QUADRANT OF LEFT FEMALE BREAST, UNSPECIFIED ESTROGEN RECEPTOR STATUS (HCC): ICD-10-CM

## 2017-12-27 LAB
ALBUMIN SERPL-MCNC: 4 G/DL (ref 3.5–5)
ALBUMIN/GLOB SERPL: 1.1 G/DL (ref 1.1–2.5)
ALP SERPL-CCNC: 75 U/L (ref 24–120)
ALT SERPL W P-5'-P-CCNC: 32 U/L (ref 0–54)
ANION GAP SERPL CALCULATED.3IONS-SCNC: 7 MMOL/L (ref 4–13)
AST SERPL-CCNC: 24 U/L (ref 7–45)
AUTO MIXED CELLS #: 0.3 10*3/MM3 (ref 0.1–2.6)
AUTO MIXED CELLS %: 5.7 % (ref 0.1–24)
BILIRUB SERPL-MCNC: 0.4 MG/DL (ref 0.1–1)
BUN BLD-MCNC: 15 MG/DL (ref 5–21)
BUN/CREAT SERPL: 11.1
CALCIUM SPEC-SCNC: 10.1 MG/DL (ref 8.4–10.4)
CHLORIDE SERPL-SCNC: 100 MMOL/L (ref 98–110)
CO2 SERPL-SCNC: 30 MMOL/L (ref 24–31)
CREAT BLD-MCNC: 1.35 MG/DL (ref 0.5–1.4)
ERYTHROCYTE [DISTWIDTH] IN BLOOD BY AUTOMATED COUNT: 14.2 % (ref 12–15)
GFR SERPL CREATININE-BSD FRML MDRD: 38 ML/MIN/1.73
GLOBULIN UR ELPH-MCNC: 3.6 GM/DL
GLUCOSE BLD-MCNC: 83 MG/DL (ref 70–100)
HCT VFR BLD AUTO: 29.6 % (ref 37–47)
HGB BLD-MCNC: 10.5 G/DL (ref 12–16)
HOLD SPECIMEN: NORMAL
LYMPHOCYTES # BLD AUTO: 1.4 10*3/MM3 (ref 0.8–7)
LYMPHOCYTES NFR BLD AUTO: 27.7 % (ref 15–45)
MCH RBC QN AUTO: 40.1 PG (ref 28–32)
MCHC RBC AUTO-ENTMCNC: 35.5 G/DL (ref 33–36)
MCV RBC AUTO: 113 FL (ref 82–98)
NEUTROPHILS # BLD AUTO: 3.3 10*3/MM3 (ref 1.5–8.3)
NEUTROPHILS NFR BLD AUTO: 66.6 % (ref 39–78)
PLATELET # BLD AUTO: 362 10*3/MM3 (ref 130–400)
PMV BLD AUTO: 7.8 FL (ref 6–12)
POTASSIUM BLD-SCNC: 4.1 MMOL/L (ref 3.5–5.3)
PROT SERPL-MCNC: 7.6 G/DL (ref 6.3–8.7)
RBC # BLD AUTO: 2.62 10*6/MM3 (ref 4.2–5.4)
SODIUM BLD-SCNC: 137 MMOL/L (ref 135–145)
WBC NRBC COR # BLD: 5 10*3/MM3 (ref 4.8–10.8)

## 2017-12-27 PROCEDURE — 96402 CHEMO HORMON ANTINEOPL SQ/IM: CPT

## 2017-12-27 PROCEDURE — 99214 OFFICE O/P EST MOD 30 MIN: CPT | Performed by: INTERNAL MEDICINE

## 2017-12-27 PROCEDURE — 25010000002 FULVESTRANT PER 25 MG: Performed by: INTERNAL MEDICINE

## 2017-12-27 PROCEDURE — 85025 COMPLETE CBC W/AUTO DIFF WBC: CPT

## 2017-12-27 PROCEDURE — 36415 COLL VENOUS BLD VENIPUNCTURE: CPT

## 2017-12-27 PROCEDURE — 80053 COMPREHEN METABOLIC PANEL: CPT

## 2017-12-27 RX ORDER — TERBINAFINE HYDROCHLORIDE 250 MG/1
TABLET ORAL
COMMUNITY
Start: 2017-12-01 | End: 2018-11-12

## 2017-12-27 RX ADMIN — FULVESTRANT 500 MG: 50 INJECTION INTRAMUSCULAR at 12:01

## 2017-12-27 NOTE — PROGRESS NOTES
[facility]  HEMATOLOGY & ONCOLOGY        Subjective    Metastatic breast cancer on Faslodex and Ibrance     Staging form: Breast, AJCC V7      Clinical stage from 10/11/2016: Stage IV (T2, N1, M1) - ER/WY positive. HER2/maynor negative  VISIT DIAGNOSIS:   Metastatic breast cancer       HEMATOLOGY / ONCOLOGY HISTORY:   Oncology/Hematology History    Patient is a 67-year-old postmenopausal female who had onset of her menses at age 9 and menopause at age 48. She received oral contraceptives for approximately 14 years and did not receive hormonal manipulation. Patient has a maternal cousin had breast cancer. Patient has had no prior breast biopsies. Patient found a palpable left breast mass as well as a left axillary mass. Patient had a bloody nipple discharge which been present for approximately 6 months. On 6/20/2012, a mammogram was performed showing a subareolar mass consistent with malignancy. Bilateral breast ultrasound revealed an ill-defined subareolar mass measuring 2.8-2.5 cm. There is a left axillary mass measuring 1.7 cm with suspicion of extracapsular extension. There is no evidence of disease in the right breast. On 7/9/2012 patient underwent a left breast biopsy and placement of marker clip. Left breast biopsy revealed an infiltrating lobular carcinoma grade 2 with focal ductal carcinoma in situ, solid pattern. A fine-needle biopsy of the left lymph node was consistent with metastatic carcinoma. Breast tumor profile revealed ER: 100%; WY: 98%; HER-2/maynor 2+; FISH: Unamplified; Ki-67: 71%; P 53 1%; DNA aneuploidy. A MRI of the breast were performed body multiple abnormal enhancing masses within the left breast. There appears to be anterior retraction of the pectoralis muscle with no direct extension. There is a moderate to large, layering left pleural effusion appreciated. The right breast reveals 3 moderately enlarged lymph nodes in the posterior lateral aspect the right breast. These have  fatty hilum but followup is recommended. Patient is undergone systemic studies including, on 8/2/2012, a CT scan of the brain showing atrophy. a CT scan that shows showing a small to moderate left pleural effusion with 3 subcentimeter nodule densities in the left lung.   She completed 4 cycles of neoadjuvant treatment with dose dense Adriamycin and Cytoxan. She had a mammogram which showed a significant reduction in the size of her left breast lesion. There is no axillary adenopathy noted. She has had an ultrasound revealing the  lesion reducing from 2.4 cm to 1 cm. Patient underwent a left mastectomy on 03/19/2013 finding negative invasive cancer, negative nodes. A 5% DCIS was noted. The patient has declined hormonal therapy. She did not need radiation therapy.  November 2014, she began to have evidence of lytic bone lesions at T5T6, frontal disease, an 8 mm lucency in the central frontal area of  the skull but nothing intracranial. Bone scan revealed only vertex tracer activity but bilateral ribs and thoracic spine, and other lesions in  her pelvis. At that time, she was started on letrozole since November 2014. She is taking Xgeva. She had progression found in bone in Feb 2015 on scan. She was started on Faslodex, Ibrance and continued xgeva.         Malignant neoplasm of upper-outer quadrant of left female breast    7/9/2012 Initial Diagnosis     Malignant neoplasm of upper-outer quadrant of left female breast         7/10/2012 Biopsy     Left Breast Biopsy & Axillary Node FNA: A) Infiltrating lobular carcinoma, Grade 2. B) Foci of ductal carcinoma in situ, solid pattern. C) Greatest dimension of the invasive carcinoma is 15.8mm.         3/19/2013 Surgery     Left Mastectomy w/ Axillary Tail: Focal residual ducatal carcinoma in situ (DCIS) 12 lymph nodes negative for metastatic carcinoma.         10/20/2014 Biopsy     Peritoneum Biopsy: Final Diagnosis: Malignant Cell Neoplasm.           Breast cancer  metastasized to bone, unspecified laterality    10/15/2012 -  Other Event     Left mammogram:  Impression:  1. Decreasing spiculation and density in the retroareolar left breast, near a previous biopsy.   2. Definite left axillary lesion is not appreciated.          2/5/2013 -  Other Event     Diagnostic Mammo:  Comparison is made to 10/15/2012 and 6/20/2012  The spiculated mass at 12:00 behind the nipple is nearly resolved.   Impression:  1. The mass on the left side is less apparent after receiving chemotherapy. There has been a good response to chmotherapy on the left side.   2. No suspicious abnormality is seen in the right breast to account for the new palable abnormality at the 4-5:00 position.          3/19/2013 Surgery     Breast Biopsy:  Final Diagnosis:  A. Breast, left mastectomy with axillary tail  1. Focal residual ductal carcinoma in situ (DICS) (less than 5% of tumor bed).  2. Negative for residual invasive carcinoma.   3. 12 Lymph Nodes, negative for metastatic carcinoma (0/12) focally, some lymph nodes demonstrate fibrosis/treatment effect.  B. Skin and soft tissue, left advancement flap:  1. Benign skin and subcutis.   2. Negative for carcinoma.          6/20/2013 -  Other Event     Diagnostic Mammo [facility]:  1. A subareolar mass is consistent with malignancy. Additional involement extends inferiorly sonographically and superolaterally mammograhically as demonstrated by calcifications. The mass measures approx 2.8 cm sonographically, but the involved region is likely much larger including an intraductal componet. The left axillary lymph nodes are also involved.   2. Recommened ultrasound guided biopsy of the left subareolar mass and left axillary lymph node.   3. Breast MRI could also further define multicentric disease on the left.   4. Clear suspicious finding on the right.  Recommend ongoing clinical follow up of palpable foci.          6/13/2014 -  Other Event     Diagnostic  Mammo:  IMPRESSION:  1. History of left breast cancer and mastectomy. Stable benign right breast mammograms.          10/20/2014 Surgery     Final Diagnosis:  Biopsies, pertoneum:  Metastatic carcinoma, morphologically compatible with metastatic mammary carcinoma.          6/15/2015 -  Other Event     Diagnostic Mammo:  IMPRESSION:   1. Negative x-ray report, should not delay biopsy if a dominat or clinically suspicious mass is present.          7/5/2016 -  Other Event     Diagnositic mammogram:  Impression:   Stable right mamogram with no radiographic findings suspicious for malignancy. Recommend continued screening mammography per screening guidelines unless otherwise earlier clinically indicated.          9/18/2016 Initial Diagnosis     Secondary malignant neoplasm of bone and bone marrow          Cancer Staging Information:  Malignant neoplasm of upper-outer quadrant of left female breast    Staging form: Breast, AJCC V7      Clinical stage from 10/11/2016: Stage IV (T2, N1, M1) - Signed by ALBERT Kulkarni on 10/11/2016      INTERVAL HISTORY  Patient ID: Heavenly Yanes is a 72 y.o. year old female history of metastatic breast carcinoma to lung and bone.  She is presently receiving treatment with Ibrance, Faslodex and Xgeva.  Tumor markers continued to decline with a CA 27-29 of 92.2 obtained on 9:30 2016 compared to 105.8 on 07/27/2016.  Mr. Yanes continues to tolerate this treatment regimen well and is here today for evaluation consideration to receive Faslodex and Xgeva.  Last bone mineral density study was obtained 11/21/2014 indicating osteopopenia, we will obtain a repeat bone mineral density study. Ms. Yanes completed 8 teeth pulled 02/14/2017.   Xgeva on hold, resume 05/2017.     Past Medical History:   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   • Bipolar disorder    • Disease of thyroid gland    • Hypertension    • Malignant neoplasm of  upper-outer quadrant of left female breast 9/18/2016   • Secondary malignant neoplasm of bone and bone marrow 9/18/2016     Past Surgical History:   Past Surgical History:   Procedure Laterality Date   • BREAST SURGERY      left breast biopsy and axillary node   • CHOLECYSTECTOMY     • EXTERNAL EAR SURGERY     • MASTECTOMY Left    • PORTACATH PLACEMENT       Social History:   Social History     Social History   • Marital status:      Spouse name: N/A   • Number of children: N/A   • Years of education: N/A     Occupational History   • Not on file.     Social History Main Topics   • Smoking status: Never Smoker   • Smokeless tobacco: Never Used   • Alcohol use No   • Drug use: No   • Sexual activity: Not on file     Other Topics Concern   • Not on file     Social History Narrative     Family History:   Family History   Problem Relation Age of Onset   • No Known Problems Daughter    • No Known Problems Son    • Other Mother      complications from a concussion from a fall   • Other Father      old age   • No Known Problems Brother    • No Known Problems Daughter        Review of Systems   Constitutional: Negative.  Negative for activity change, appetite change, chills, diaphoresis, fatigue and fever.   HENT: Negative.  Negative for congestion, ear discharge, ear pain, facial swelling, hearing loss, mouth sores, nosebleeds, postnasal drip, rhinorrhea, sinus pressure, sore throat, tinnitus, trouble swallowing and voice change.    Eyes: Negative.  Negative for photophobia, pain, discharge and visual disturbance.   Respiratory: Negative.  Negative for apnea, cough, chest tightness, shortness of breath, wheezing and stridor.    Cardiovascular: Negative.  Negative for chest pain, palpitations and leg swelling.   Gastrointestinal: Negative.  Negative for abdominal distention, abdominal pain, blood in stool, constipation, diarrhea, nausea, rectal pain and vomiting.   Endocrine: Negative.  Negative for cold intolerance,  heat intolerance, polydipsia, polyphagia and polyuria.   Genitourinary: Negative.  Negative for difficulty urinating, dysuria, flank pain, frequency, hematuria and urgency.   Musculoskeletal: Negative.  Negative for arthralgias, back pain, gait problem, joint swelling and myalgias.   Skin: Negative.  Negative for color change, pallor, rash and wound.   Allergic/Immunologic: Negative.  Negative for environmental allergies, food allergies and immunocompromised state.   Neurological: Negative.  Negative for dizziness, tremors, seizures, syncope, speech difficulty, weakness, light-headedness, numbness and headaches.   Hematological: Negative.  Negative for adenopathy. Does not bruise/bleed easily.   Psychiatric/Behavioral: Negative.  Negative for agitation, confusion, hallucinations, sleep disturbance and suicidal ideas. The patient is not nervous/anxious.    All other systems reviewed and are negative.       Performance Status:  Asymptomatic    Medications:    Current Outpatient Prescriptions   Medication Sig Dispense Refill   • B Complex Vitamins (VITAMIN B COMPLEX PO) Take  by mouth. 1 po daily     • Calcium Carb-Cholecalciferol (CALCIUM 600 + D PO) Take  by mouth.     • DULoxetine (CYMBALTA) 30 MG capsule Take 30 mg by mouth daily.     • ferrous sulfate 325 (65 FE) MG tablet Take 325 mg by mouth 2 (two) times a day.     • Lysine HCl (L-LYSINE) 500 MG tablet tablet Take  by mouth daily.     • Magnesium 250 MG tablet Take  by mouth.     • melatonin 3 MG tablet Take  by mouth.     • metoprolol tartrate (LOPRESSOR) 25 MG tablet Take 25 mg by mouth 2 (two) times a day.     • OLANZapine (ZYPREXA) 2.5 MG tablet Take 2.5 mg by mouth every night.     • Palbociclib 125 MG capsule capsule Take 1 capsule by mouth Daily. 3 weeks on, 1 week off 21 capsule 5   • Probiotic Product (PROBIOTIC DAILY PO) Take  by mouth. 300million cell daily     • terbinafine (lamiSIL) 250 MG tablet      • thyroid 60 MG PO tablet Take 60 mg by mouth  "daily.     • VALERIAN ROOT PO Take  by mouth. 100mg daily     • Zinc 50 MG tablet Take  by mouth. 1 po daily       No current facility-administered medications for this visit.      Facility-Administered Medications Ordered in Other Visits   Medication Dose Route Frequency Provider Last Rate Last Dose   • heparin flush (porcine) 100 UNIT/ML injection 500 Units  500 Units Intravenous PRN Joseph Nunez MD   500 Units at 10/26/17 1155   • sodium chloride 0.9 % flush 10 mL  10 mL Intravenous PRN Joseph Nunez MD   10 mL at 10/26/17 1155       ALLERGIES:    Allergies   Allergen Reactions   • Codeine    • Diphenhydramine Other (See Comments)     \"Shaky leg syndrome\"   • Heparin    • Other      POPPY SEED   • Penicillins    • Petroleum Jelly [Skin Protectants, Misc.]    • Sulfa Antibiotics    • Sulfur    • Valium [Diazepam]        Objective      Vitals:    12/27/17 1053   BP: 142/84   Pulse: 64   Resp: 16   Temp: 97.2 °F (36.2 °C)   TempSrc: Tympanic   SpO2: 97%   Weight: 76.8 kg (169 lb 6.4 oz)   Height: 165.1 cm (65\")         Current Status 12/27/2017   ECOG score 0       General Appearance: Patient is awake, alert, oriented and in no acute distress. Patient is welldeveloped, wellnourished, and appears stated age.  HEENT: Normocephalic. Sclerae clear, conjunctiva pink, extraocular movements intact, pupils, round, reactive to light and accommodation. Mouth and throat are clear with moist oral mucosa.  NECK: Supple, no jugular venous distention, thyroid not enlarged.  LYMPH: No cervical, supraclavicular, axillary, or inguinal lymphadenopathy.  CHEST: Equal bilateral expansion   LUNGS: Good air movement, no rales, rhonchi, rubs or wheezes with auscultation  CARDIO: Regular sinus rhythm, no murmurs, gallops or rubs.  ABDOMEN: Nondistended, soft, No tenderness, no guarding, no rebound, No hepatosplenomegaly. No abdominal masses. Bowel sounds positive. No hernia  MUSKEL: No joint swelling, decreased motion, or " inflammation  EXTREMS: No edema, clubbing, cyanosis, No varicose veins.  NEURO: Grossly nonfocal, Gait is coordinated and smooth, Cognition is preserved.  SKIN: No rashes, no ecchymoses, no petechia.  PSYCH: Oriented to time, place and person. Memory is preserved. Mood and affect appear normal      RECENT LABS:  WBC   Date Value Ref Range Status   12/27/2017 5.00 4.80 - 10.80 10*3/mm3 Final     RBC   Date Value Ref Range Status   12/27/2017 2.62 (L) 4.20 - 5.40 10*6/mm3 Final     Hemoglobin   Date Value Ref Range Status   12/27/2017 10.5 (L) 12.0 - 16.0 g/dL Final     Hematocrit   Date Value Ref Range Status   12/27/2017 29.6 (L) 37.0 - 47.0 % Final     MCV   Date Value Ref Range Status   12/27/2017 113.0 (H) 82.0 - 98.0 fL Final     MCH   Date Value Ref Range Status   12/27/2017 40.1 (H) 28.0 - 32.0 pg Final     MCHC   Date Value Ref Range Status   12/27/2017 35.5 33.0 - 36.0 g/dL Final     RDW   Date Value Ref Range Status   12/27/2017 14.2 12.0 - 15.0 % Final     RDW-SD   Date Value Ref Range Status   09/28/2017 63.0 (H) 40.0 - 54.0 fl Final     MPV   Date Value Ref Range Status   12/27/2017 7.8 6.0 - 12.0 fL Final     Platelets   Date Value Ref Range Status   12/27/2017 362 130 - 400 10*3/mm3 Final     Neutrophil %   Date Value Ref Range Status   12/27/2017 66.6 39.0 - 78.0 % Final     Lymphocyte %   Date Value Ref Range Status   12/27/2017 27.7 15.0 - 45.0 % Final     Monocyte %   Date Value Ref Range Status   09/28/2017 5.1 4.0 - 12.0 % Final     Eosinophil %   Date Value Ref Range Status   09/28/2017 2.7 0.0 - 4.0 % Final     Basophil %   Date Value Ref Range Status   09/28/2017 2.4 (H) 0.0 - 2.0 % Final     Immature Grans %   Date Value Ref Range Status   09/28/2017 0.3 0.0 - 5.0 % Final     Neutrophils, Absolute   Date Value Ref Range Status   12/27/2017 3.30 1.50 - 8.30 10*3/mm3 Final     Lymphocytes, Absolute   Date Value Ref Range Status   12/27/2017 1.40 0.80 - 7.00 10*3/mm3 Final     Monocytes,  Absolute   Date Value Ref Range Status   09/28/2017 0.19 0.19 - 1.30 10*3/mm3 Final     Eosinophils, Absolute   Date Value Ref Range Status   09/28/2017 0.10 0.00 - 0.70 10*3/mm3 Final     Basophils, Absolute   Date Value Ref Range Status   09/28/2017 0.09 0.00 - 0.20 10*3/mm3 Final     Immature Grans, Absolute   Date Value Ref Range Status   09/28/2017 0.01 0.00 - 0.03 10*3/mm3 Final     Lab on 12/27/2017   Component Date Value Ref Range Status   • WBC 12/27/2017 5.00  4.80 - 10.80 10*3/mm3 Final   • RBC 12/27/2017 2.62* 4.20 - 5.40 10*6/mm3 Final   • Hemoglobin 12/27/2017 10.5* 12.0 - 16.0 g/dL Final   • Hematocrit 12/27/2017 29.6* 37.0 - 47.0 % Final   • MCV 12/27/2017 113.0* 82.0 - 98.0 fL Final   • MCH 12/27/2017 40.1* 28.0 - 32.0 pg Final   • MCHC 12/27/2017 35.5  33.0 - 36.0 g/dL Final   • RDW 12/27/2017 14.2  12.0 - 15.0 % Final   • MPV 12/27/2017 7.8  6.0 - 12.0 fL Final   • Platelets 12/27/2017 362  130 - 400 10*3/mm3 Final   • Neutrophil % 12/27/2017 66.6  39.0 - 78.0 % Final   • Lymphocyte % 12/27/2017 27.7  15.0 - 45.0 % Final   • Auto Mixed Cells % 12/27/2017 5.7  0.1 - 24.0 % Final   • Neutrophils, Absolute 12/27/2017 3.30  1.50 - 8.30 10*3/mm3 Final   • Lymphocytes, Absolute 12/27/2017 1.40  0.80 - 7.00 10*3/mm3 Final   • Auto Mixed Cells # 12/27/2017 0.30  0.10 - 2.60 10*3/mm3 Final             Assessment/Plan     Patient Active Problem List   Diagnosis   • Malignant neoplasm of upper-outer quadrant of left female breast   • Breast cancer metastasized to bone, unspecified laterality   • Essential hypertension   • Acquired hypothyroidism   • Bipolar 1 disorder   • Carcinoma of left breast metastatic to bone   • Antineoplastic chemotherapy induced anemia          Assessment:  1. Stage IV metastatic breast carcinoma, ER/DE positive. HER2/maynor negative undergoing Ibrance, Faslodex and Xgeva with overall stable with stable CA27.29 with slow decline. She is tolerating the treatment very well. Faslodex  cycle 17 today and will continue to HOLD Xgeva due to upcoming dental work, last dose of Xgeva given on 09/29/2016.  01/2017 restaging CT chest abdomen and pelvis and bone scan showed stable diffuse sclerotic bony lesions, unchanged from 7/1/2016.Next scan July 2017    2.  Mild leukopenia without neutropenia.  Likely secondary to Ibrance.  Continue to monitor.     3.  Macrocytic anemia.  Hemoglobin 11.1.  02/2016, B12 and folate normal    4.  Status post 8 teeth extraction.     Recommendation/Plan:  1.  Proceed with Faslodex cycle 20.  2. Continue Ibrance 125 mg 3 weeks on, one-week off.  Reduce dose if leukopenia worsens.   3. Follow up in 28 days for Faslodex cycle 19.   Repeat CA 27.29.  Previous CA 27.29 have remained stable, the last one done on 11/2017 was 89 Stable. Continue Faslodex q monthly.    Additionally she is anemic with a hemoglobin of 10 g percent with decreasing GFR.  I do suspect that she may have some form of chronic kidney disease, I am not sure why, she is not diabetic or hypertensive.  For this reason she is being referred over to a nephrologist.  We will draw iron studies on her today and plan Procrit once Ins approved.      Joseph Nunez MD    12/27/2017    11:03 AM

## 2017-12-28 DIAGNOSIS — I10 ESSENTIAL HYPERTENSION: ICD-10-CM

## 2017-12-28 DIAGNOSIS — I48.91 ATRIAL FIBRILLATION, UNSPECIFIED TYPE (HCC): ICD-10-CM

## 2018-01-15 DIAGNOSIS — C50.412 MALIGNANT NEOPLASM OF UPPER-OUTER QUADRANT OF LEFT FEMALE BREAST, UNSPECIFIED ESTROGEN RECEPTOR STATUS (HCC): ICD-10-CM

## 2018-01-24 ENCOUNTER — OFFICE VISIT (OUTPATIENT)
Dept: ONCOLOGY | Facility: CLINIC | Age: 73
End: 2018-01-24

## 2018-01-24 ENCOUNTER — INFUSION (OUTPATIENT)
Dept: ONCOLOGY | Facility: HOSPITAL | Age: 73
End: 2018-01-24
Attending: INTERNAL MEDICINE

## 2018-01-24 ENCOUNTER — LAB (OUTPATIENT)
Dept: LAB | Facility: HOSPITAL | Age: 73
End: 2018-01-24
Attending: INTERNAL MEDICINE

## 2018-01-24 VITALS
BODY MASS INDEX: 28.16 KG/M2 | TEMPERATURE: 97.4 F | WEIGHT: 169 LBS | SYSTOLIC BLOOD PRESSURE: 176 MMHG | DIASTOLIC BLOOD PRESSURE: 72 MMHG | HEART RATE: 49 BPM | OXYGEN SATURATION: 100 % | HEIGHT: 65 IN | RESPIRATION RATE: 20 BRPM

## 2018-01-24 VITALS
SYSTOLIC BLOOD PRESSURE: 138 MMHG | HEART RATE: 56 BPM | WEIGHT: 169.9 LBS | DIASTOLIC BLOOD PRESSURE: 82 MMHG | TEMPERATURE: 97.4 F | RESPIRATION RATE: 16 BRPM | HEIGHT: 65 IN | BODY MASS INDEX: 28.31 KG/M2 | OXYGEN SATURATION: 98 %

## 2018-01-24 DIAGNOSIS — D64.9 ANEMIA, UNSPECIFIED TYPE: Primary | ICD-10-CM

## 2018-01-24 DIAGNOSIS — C50.412 MALIGNANT NEOPLASM OF UPPER-OUTER QUADRANT OF LEFT FEMALE BREAST, UNSPECIFIED ESTROGEN RECEPTOR STATUS (HCC): Primary | ICD-10-CM

## 2018-01-24 DIAGNOSIS — C50.412 MALIGNANT NEOPLASM OF UPPER-OUTER QUADRANT OF LEFT FEMALE BREAST, UNSPECIFIED ESTROGEN RECEPTOR STATUS (HCC): ICD-10-CM

## 2018-01-24 LAB
ALBUMIN SERPL-MCNC: 4 G/DL (ref 3.5–5)
ALBUMIN/GLOB SERPL: 1.1 G/DL (ref 1.1–2.5)
ALP SERPL-CCNC: 69 U/L (ref 24–120)
ALT SERPL W P-5'-P-CCNC: 21 U/L (ref 0–54)
ANION GAP SERPL CALCULATED.3IONS-SCNC: 6 MMOL/L (ref 4–13)
AST SERPL-CCNC: 31 U/L (ref 7–45)
AUTO MIXED CELLS #: 0.1 10*3/MM3 (ref 0.1–2.6)
AUTO MIXED CELLS %: 3.5 % (ref 0.1–24)
BILIRUB SERPL-MCNC: 0.3 MG/DL (ref 0.1–1)
BUN BLD-MCNC: 16 MG/DL (ref 5–21)
BUN/CREAT SERPL: 11.3
CALCIUM SPEC-SCNC: 10 MG/DL (ref 8.4–10.4)
CHLORIDE SERPL-SCNC: 100 MMOL/L (ref 98–110)
CO2 SERPL-SCNC: 30 MMOL/L (ref 24–31)
CREAT BLD-MCNC: 1.42 MG/DL (ref 0.5–1.4)
ERYTHROCYTE [DISTWIDTH] IN BLOOD BY AUTOMATED COUNT: 13.8 % (ref 12–15)
FERRITIN SERPL-MCNC: 188 NG/ML (ref 11.1–264)
FOLATE SERPL-MCNC: 17.9 NG/ML
GFR SERPL CREATININE-BSD FRML MDRD: 36 ML/MIN/1.73
GLOBULIN UR ELPH-MCNC: 3.7 GM/DL
GLUCOSE BLD-MCNC: 79 MG/DL (ref 70–100)
HCT VFR BLD AUTO: 30.1 % (ref 37–47)
HGB BLD-MCNC: 10.8 G/DL (ref 12–16)
HOLD SPECIMEN: NORMAL
IRON 24H UR-MRATE: 117 MCG/DL (ref 42–180)
IRON SATN MFR SERPL: 48 % (ref 20–45)
LYMPHOCYTES # BLD AUTO: 1.4 10*3/MM3 (ref 0.8–7)
LYMPHOCYTES NFR BLD AUTO: 35.3 % (ref 15–45)
MCH RBC QN AUTO: 40.4 PG (ref 28–32)
MCHC RBC AUTO-ENTMCNC: 35.9 G/DL (ref 33–36)
MCV RBC AUTO: 112.7 FL (ref 82–98)
NEUTROPHILS # BLD AUTO: 2.6 10*3/MM3 (ref 1.5–8.3)
NEUTROPHILS NFR BLD AUTO: 61.2 % (ref 39–78)
PLATELET # BLD AUTO: 332 10*3/MM3 (ref 130–400)
PMV BLD AUTO: 7.6 FL (ref 6–12)
POTASSIUM BLD-SCNC: 4.2 MMOL/L (ref 3.5–5.3)
PROT SERPL-MCNC: 7.7 G/DL (ref 6.3–8.7)
RBC # BLD AUTO: 2.67 10*6/MM3 (ref 4.2–5.4)
SODIUM BLD-SCNC: 136 MMOL/L (ref 135–145)
TIBC SERPL-MCNC: 246 MCG/DL (ref 225–420)
VIT B12 BLD-MCNC: 304 PG/ML (ref 239–931)
WBC NRBC COR # BLD: 4.1 10*3/MM3 (ref 4.8–10.8)

## 2018-01-24 PROCEDURE — 83550 IRON BINDING TEST: CPT | Performed by: INTERNAL MEDICINE

## 2018-01-24 PROCEDURE — 82728 ASSAY OF FERRITIN: CPT | Performed by: INTERNAL MEDICINE

## 2018-01-24 PROCEDURE — 36415 COLL VENOUS BLD VENIPUNCTURE: CPT

## 2018-01-24 PROCEDURE — 25010000002 FULVESTRANT PER 25 MG: Performed by: INTERNAL MEDICINE

## 2018-01-24 PROCEDURE — 85025 COMPLETE CBC W/AUTO DIFF WBC: CPT

## 2018-01-24 PROCEDURE — 96401 CHEMO ANTI-NEOPL SQ/IM: CPT

## 2018-01-24 PROCEDURE — 80053 COMPREHEN METABOLIC PANEL: CPT

## 2018-01-24 PROCEDURE — 83540 ASSAY OF IRON: CPT | Performed by: INTERNAL MEDICINE

## 2018-01-24 PROCEDURE — 96402 CHEMO HORMON ANTINEOPL SQ/IM: CPT

## 2018-01-24 PROCEDURE — 82607 VITAMIN B-12: CPT | Performed by: INTERNAL MEDICINE

## 2018-01-24 PROCEDURE — 82746 ASSAY OF FOLIC ACID SERUM: CPT | Performed by: INTERNAL MEDICINE

## 2018-01-24 PROCEDURE — 99214 OFFICE O/P EST MOD 30 MIN: CPT | Performed by: INTERNAL MEDICINE

## 2018-01-24 RX ORDER — RANITIDINE HCL 75 MG
75 TABLET ORAL 2 TIMES DAILY
COMMUNITY
End: 2018-11-12

## 2018-01-24 RX ADMIN — FULVESTRANT 500 MG: 50 INJECTION INTRAMUSCULAR at 11:16

## 2018-01-26 ENCOUNTER — TELEPHONE (OUTPATIENT)
Dept: ONCOLOGY | Facility: CLINIC | Age: 73
End: 2018-01-26

## 2018-01-26 NOTE — TELEPHONE ENCOUNTER
----- Message from Moise Sotelo MD sent at 1/24/2018  3:53 PM CST -----  Please call the patient regarding her abnormal result.low B12. Start weekly then monthly. Thanks :)

## 2018-01-26 NOTE — TELEPHONE ENCOUNTER
Called Heavenly to go over lab work with her and that her B12 level is low and Dr. Sotelo wants her to start B12 injections.  Patient stated that she wants to try taking oral b12 first and she has a follow up appointment in one month.  Instructed to call if has problems before return visit.

## 2018-01-26 NOTE — TELEPHONE ENCOUNTER
Returned call to patient, she states she had questions and concerns. Unable to contact patient, left my name and number for her to return my call.

## 2018-02-07 ENCOUNTER — OFFICE VISIT (OUTPATIENT)
Dept: CARDIOLOGY | Age: 73
End: 2018-02-07
Payer: COMMERCIAL

## 2018-02-07 VITALS
HEIGHT: 65 IN | RESPIRATION RATE: 16 BRPM | BODY MASS INDEX: 28.66 KG/M2 | SYSTOLIC BLOOD PRESSURE: 130 MMHG | HEART RATE: 65 BPM | DIASTOLIC BLOOD PRESSURE: 72 MMHG | WEIGHT: 172 LBS

## 2018-02-07 DIAGNOSIS — I48.0 PAROXYSMAL ATRIAL FIBRILLATION (HCC): Primary | ICD-10-CM

## 2018-02-07 DIAGNOSIS — I10 ESSENTIAL HYPERTENSION: ICD-10-CM

## 2018-02-07 PROCEDURE — 99213 OFFICE O/P EST LOW 20 MIN: CPT | Performed by: INTERNAL MEDICINE

## 2018-02-21 DIAGNOSIS — C50.412 MALIGNANT NEOPLASM OF UPPER-OUTER QUADRANT OF LEFT FEMALE BREAST, UNSPECIFIED ESTROGEN RECEPTOR STATUS (HCC): ICD-10-CM

## 2018-02-26 DIAGNOSIS — C50.412 MALIGNANT NEOPLASM OF UPPER-OUTER QUADRANT OF LEFT FEMALE BREAST, UNSPECIFIED ESTROGEN RECEPTOR STATUS (HCC): Primary | ICD-10-CM

## 2018-02-26 NOTE — PROGRESS NOTES
[facility]  HEMATOLOGY & ONCOLOGY    Cancer Staging Information:  Malignant neoplasm of upper-outer quadrant of left female breast    Staging form: Breast, AJCC V7    - Clinical stage from 10/11/2016: Stage IV (T2, N1, M1) - Signed by ALBERT Kulkarni on 10/11/2016      Subjective     VISIT DIAGNOSIS:   Encounter Diagnoses   Name Primary?   • Malignant neoplasm of upper-outer quadrant of left female breast, unspecified estrogen receptor status    • Anemia, unspecified type Yes       REASON FOR VISIT:     Chief Complaint   Patient presents with   • Metastatic breast cancer     Toxicity assessment and fulvestrant administration.        HEMATOLOGY / ONCOLOGY HISTORY:   Oncology/Hematology History    Patient is a 67-year-old postmenopausal female who had onset of her menses at age 9 and menopause at age 48. She received oral contraceptives for approximately 14 years and did not receive hormonal manipulation. Patient has a maternal cousin had breast cancer. Patient has had no prior breast biopsies. Patient found a palpable left breast mass as well as a left axillary mass. Patient had a bloody nipple discharge which been present for approximately 6 months. On 6/20/2012, a mammogram was performed showing a subareolar mass consistent with malignancy. Bilateral breast ultrasound revealed an ill-defined subareolar mass measuring 2.8-2.5 cm. There is a left axillary mass measuring 1.7 cm with suspicion of extracapsular extension. There is no evidence of disease in the right breast. On 7/9/2012 patient underwent a left breast biopsy and placement of marker clip. Left breast biopsy revealed an infiltrating lobular carcinoma grade 2 with focal ductal carcinoma in situ, solid pattern. A fine-needle biopsy of the left lymph node was consistent with metastatic carcinoma. Breast tumor profile revealed ER: 100%; DE: 98%; HER-2/maynor 2+; FISH: Unamplified; Ki-67: 71%; P 53 1%; DNA aneuploidy. A MRI of the  breast were performed body multiple abnormal enhancing masses within the left breast. There appears to be anterior retraction of the pectoralis muscle with no direct extension. There is a moderate to large, layering left pleural effusion appreciated. The right breast reveals 3 moderately enlarged lymph nodes in the posterior lateral aspect the right breast. These have fatty hilum but followup is recommended. Patient is undergone systemic studies including, on 8/2/2012, a CT scan of the brain showing atrophy. a CT scan that shows showing a small to moderate left pleural effusion with 3 subcentimeter nodule densities in the left lung.   She completed 4 cycles of neoadjuvant treatment with dose dense Adriamycin and Cytoxan. She had a mammogram which showed a significant reduction in the size of her left breast lesion. There is no axillary adenopathy noted. She has had an ultrasound revealing the  lesion reducing from 2.4 cm to 1 cm. Patient underwent a left mastectomy on 03/19/2013 finding negative invasive cancer, negative nodes. A 5% DCIS was noted. The patient has declined hormonal therapy. She did not need radiation therapy.  November 2014, she began to have evidence of lytic bone lesions at T5T6, frontal disease, an 8 mm lucency in the central frontal area of  the skull but nothing intracranial. Bone scan revealed only vertex tracer activity but bilateral ribs and thoracic spine, and other lesions in  her pelvis. At that time, she was started on letrozole since November 2014. She is taking Xgeva. She had progression found in bone in Feb 2015 on scan. She was started on Faslodex, Ibrance and continued xgeva.         Malignant neoplasm of upper-outer quadrant of left female breast    7/9/2012 Initial Diagnosis     Malignant neoplasm of upper-outer quadrant of left female breast         7/10/2012 Biopsy     Left Breast Biopsy & Axillary Node FNA: A) Infiltrating lobular carcinoma, Grade 2. B) Foci of ductal carcinoma  in situ, solid pattern. C) Greatest dimension of the invasive carcinoma is 15.8mm.         3/19/2013 Surgery     Left Mastectomy w/ Axillary Tail: Focal residual ducatal carcinoma in situ (DCIS) 12 lymph nodes negative for metastatic carcinoma.         10/20/2014 Biopsy     Peritoneum Biopsy: Final Diagnosis: Malignant Cell Neoplasm.           Breast cancer metastasized to bone, unspecified laterality    10/15/2012 -  Other Event     Left mammogram:  Impression:  1. Decreasing spiculation and density in the retroareolar left breast, near a previous biopsy.   2. Definite left axillary lesion is not appreciated.          2/5/2013 -  Other Event     Diagnostic Mammo:  Comparison is made to 10/15/2012 and 6/20/2012  The spiculated mass at 12:00 behind the nipple is nearly resolved.   Impression:  1. The mass on the left side is less apparent after receiving chemotherapy. There has been a good response to chmotherapy on the left side.   2. No suspicious abnormality is seen in the right breast to account for the new palable abnormality at the 4-5:00 position.          3/19/2013 Surgery     Breast Biopsy:  Final Diagnosis:  A. Breast, left mastectomy with axillary tail  1. Focal residual ductal carcinoma in situ (DICS) (less than 5% of tumor bed).  2. Negative for residual invasive carcinoma.   3. 12 Lymph Nodes, negative for metastatic carcinoma (0/12) focally, some lymph nodes demonstrate fibrosis/treatment effect.  B. Skin and soft tissue, left advancement flap:  1. Benign skin and subcutis.   2. Negative for carcinoma.          6/20/2013 -  Other Event     Diagnostic Mammo [facility]:  1. A subareolar mass is consistent with malignancy. Additional involement extends inferiorly sonographically and superolaterally mammograhically as demonstrated by calcifications. The mass measures approx 2.8 cm sonographically, but the involved region is likely much larger including an intraductal componet. The left axillary lymph  nodes are also involved.   2. Recommened ultrasound guided biopsy of the left subareolar mass and left axillary lymph node.   3. Breast MRI could also further define multicentric disease on the left.   4. Clear suspicious finding on the right.  Recommend ongoing clinical follow up of palpable foci.          6/13/2014 -  Other Event     Diagnostic Mammo:  IMPRESSION:  1. History of left breast cancer and mastectomy. Stable benign right breast mammograms.          10/20/2014 Surgery     Final Diagnosis:  Biopsies, pertoneum:  Metastatic carcinoma, morphologically compatible with metastatic mammary carcinoma.          6/15/2015 -  Other Event     Diagnostic Mammo:  IMPRESSION:   1. Negative x-ray report, should not delay biopsy if a dominat or clinically suspicious mass is present.          7/5/2016 -  Other Event     Diagnositic mammogram:  Impression:   Stable right mamogram with no radiographic findings suspicious for malignancy. Recommend continued screening mammography per screening guidelines unless otherwise earlier clinically indicated.          9/18/2016 Initial Diagnosis     Secondary malignant neoplasm of bone and bone marrow              INTERVAL HISTORY  Patient ID: Heavenly Yanes is a 72 y.o. year old female Stage IV (T2, N1, M1)on palbociclib and fulvestrant. takes palbo daily no se to report.    Past Medical History:   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   • Bipolar disorder    • Disease of thyroid gland    • Hypertension    • Malignant neoplasm of upper-outer quadrant of left female breast 9/18/2016   • Secondary malignant neoplasm of bone and bone marrow 9/18/2016     Past Surgical History:   Past Surgical History:   Procedure Laterality Date   • BREAST SURGERY      left breast biopsy and axillary node   • CHOLECYSTECTOMY     • EXTERNAL EAR SURGERY     • MASTECTOMY Left    • PORTACATH PLACEMENT       Social History:   Social History     Social  History   • Marital status:      Spouse name: N/A   • Number of children: N/A   • Years of education: N/A     Occupational History   • Not on file.     Social History Main Topics   • Smoking status: Never Smoker   • Smokeless tobacco: Never Used   • Alcohol use No   • Drug use: No   • Sexual activity: Not on file     Other Topics Concern   • Not on file     Social History Narrative     Family History:   Family History   Problem Relation Age of Onset   • No Known Problems Daughter    • No Known Problems Son    • Other Mother      complications from a concussion from a fall   • Other Father      old age   • No Known Problems Brother    • No Known Problems Daughter        Review of Systems   Constitutional: Negative.    HENT: Negative.    Eyes: Negative.    Respiratory: Negative.    Cardiovascular: Negative.    Gastrointestinal: Negative.    Genitourinary: Negative.    Musculoskeletal: Negative.    Skin: Negative.    Neurological: Negative.    Hematological: Negative.    Psychiatric/Behavioral: Negative.         Performance Status:  Asymptomatic    Medications:    Current Outpatient Prescriptions   Medication Sig Dispense Refill   • B Complex Vitamins (VITAMIN B COMPLEX PO) Take  by mouth. 1 po daily     • Calcium Carb-Cholecalciferol (CALCIUM 600 + D PO) Take  by mouth.     • DULoxetine (CYMBALTA) 30 MG capsule Take 30 mg by mouth daily.     • ferrous sulfate 325 (65 FE) MG tablet Take 325 mg by mouth 2 (two) times a day.     • Lysine HCl (L-LYSINE) 500 MG tablet tablet Take  by mouth daily.     • Magnesium 250 MG tablet Take  by mouth.     • melatonin 3 MG tablet Take  by mouth.     • metoprolol tartrate (LOPRESSOR) 25 MG tablet Take 25 mg by mouth 2 (two) times a day.     • OLANZapine (ZYPREXA) 2.5 MG tablet Take 2.5 mg by mouth every night.     • Palbociclib 125 MG capsule capsule Take 1 capsule by mouth Daily. 3 weeks on, 1 week off 21 capsule 5   • Probiotic Product (PROBIOTIC DAILY PO) Take  by mouth.  "300million cell daily     • raNITIdine (ZANTAC) 75 MG tablet Take 75 mg by mouth 2 (Two) Times a Day.     • terbinafine (lamiSIL) 250 MG tablet      • thyroid 60 MG PO tablet Take 60 mg by mouth daily.     • VALERIAN ROOT PO Take  by mouth. 100mg daily     • Zinc 50 MG tablet Take  by mouth. 1 po daily       No current facility-administered medications for this visit.      Facility-Administered Medications Ordered in Other Visits   Medication Dose Route Frequency Provider Last Rate Last Dose   • heparin flush (porcine) 100 UNIT/ML injection 500 Units  500 Units Intravenous PRN Joseph Nunez MD   500 Units at 10/26/17 1155   • sodium chloride 0.9 % flush 10 mL  10 mL Intravenous PRN Joseph Nunez MD   10 mL at 10/26/17 1155       ALLERGIES:    Allergies   Allergen Reactions   • Codeine    • Diphenhydramine Other (See Comments)     \"Shaky leg syndrome\"   • Heparin    • Other      POPPY SEED   • Penicillins    • Petroleum Jelly [Skin Protectants, Misc.]    • Sulfa Antibiotics    • Sulfur    • Valium [Diazepam]        Objective      Vitals:    01/24/18 0959   BP: 138/82   Pulse: 56   Resp: 16   Temp: 97.4 °F (36.3 °C)   TempSrc: Tympanic   SpO2: 98%   Weight: 77.1 kg (169 lb 14.4 oz)   Height: 165.1 cm (65\")         Current Status 1/24/2018   ECOG score 1         Physical Exam    RECENT LABS:  Office Visit on 01/24/2018   Component Date Value Ref Range Status   • Iron 01/24/2018 117  42 - 180 mcg/dL Final   • TIBC 01/24/2018 246  225 - 420 mcg/dL Final   • Iron Saturation 01/24/2018 48* 20 - 45 % Final   • Ferritin 01/24/2018 188.00  11.10 - 264.00 ng/mL Final   • Folate 01/24/2018 17.90  >2.75 ng/mL Final   • Vitamin B-12 01/24/2018 304  239 - 931 pg/mL Final   Lab on 01/24/2018   Component Date Value Ref Range Status   • Glucose 01/24/2018 79  70 - 100 mg/dL Final   • BUN 01/24/2018 16  5 - 21 mg/dL Final   • Creatinine 01/24/2018 1.42* 0.50 - 1.40 mg/dL Final   • Sodium 01/24/2018 136  135 - 145 mmol/L Final   • " Potassium 01/24/2018 4.2  3.5 - 5.3 mmol/L Final   • Chloride 01/24/2018 100  98 - 110 mmol/L Final   • CO2 01/24/2018 30.0  24.0 - 31.0 mmol/L Final   • Calcium 01/24/2018 10.0  8.4 - 10.4 mg/dL Final   • Total Protein 01/24/2018 7.7  6.3 - 8.7 g/dL Final   • Albumin 01/24/2018 4.00  3.50 - 5.00 g/dL Final   • ALT (SGPT) 01/24/2018 21  0 - 54 U/L Final   • AST (SGOT) 01/24/2018 31  7 - 45 U/L Final   • Alkaline Phosphatase 01/24/2018 69  24 - 120 U/L Final   • Total Bilirubin 01/24/2018 0.3  0.1 - 1.0 mg/dL Final   • eGFR Non  Amer 01/24/2018 36* >60 mL/min/1.73 Final   • Globulin 01/24/2018 3.7  gm/dL Final   • A/G Ratio 01/24/2018 1.1  1.1 - 2.5 g/dL Final   • BUN/Creatinine Ratio 01/24/2018 11.3    Final   • Anion Gap 01/24/2018 6.0  4.0 - 13.0 mmol/L Final   • WBC 01/24/2018 4.10* 4.80 - 10.80 10*3/mm3 Final   • RBC 01/24/2018 2.67* 4.20 - 5.40 10*6/mm3 Final   • Hemoglobin 01/24/2018 10.8* 12.0 - 16.0 g/dL Final   • Hematocrit 01/24/2018 30.1* 37.0 - 47.0 % Final   • MCV 01/24/2018 112.7* 82.0 - 98.0 fL Final   • MCH 01/24/2018 40.4* 28.0 - 32.0 pg Final   • MCHC 01/24/2018 35.9  33.0 - 36.0 g/dL Final   • RDW 01/24/2018 13.8  12.0 - 15.0 % Final   • MPV 01/24/2018 7.6  6.0 - 12.0 fL Final   • Platelets 01/24/2018 332  130 - 400 10*3/mm3 Final   • Neutrophil % 01/24/2018 61.2  39.0 - 78.0 % Final   • Lymphocyte % 01/24/2018 35.3  15.0 - 45.0 % Final   • Auto Mixed Cells % 01/24/2018 3.5  0.1 - 24.0 % Final   • Neutrophils, Absolute 01/24/2018 2.60  1.50 - 8.30 10*3/mm3 Final   • Lymphocytes, Absolute 01/24/2018 1.40  0.80 - 7.00 10*3/mm3 Final   • Auto Mixed Cells # 01/24/2018 0.10  0.10 - 2.60 10*3/mm3 Final   • Extra Tube 01/24/2018 Hold for add-ons.   Final    Auto resulted.       RADIOLOGY:  No results found.         Assessment/Plan  Heavenly Yanes is a 72 y.o. year old female stage IV HR+ breast ca here for cmt.    Patient Active Problem List   Diagnosis   • Malignant neoplasm of upper-outer  quadrant of left female breast   • Breast cancer metastasized to bone, unspecified laterality   • Essential hypertension   • Acquired hypothyroidism   • Bipolar 1 disorder   • Carcinoma of left breast metastatic to bone   • Antineoplastic chemotherapy induced anemia          1.Breast ca: . Stage IV metastatic breast carcinoma, ER/NH positive. HER2/maynor negative undergoing Ibrance, Faslodex and Xgeva with overall stable with stable CA27.29 with slow decline. She is tolerating the treatment very well. Faslodex cycle 17 today and will continue to HOLD Xgeva due to upcoming dental work, last dose of Xgeva given on 09/29/2016.  01/2017 restaging CT chest abdomen and pelvis and bone scan showed stable diffuse sclerotic bony lesions, unchanged from 7/1/2016.Next scan July 2017  --continue on palbociclib +fuvestrant    2. Anemia: On ferrous sulfate    3. Gerd: on ranitidine    4. Hypertension: On metoprolol  5. Depression: On cymbalta  6. Hyppothyroidism: On thyroid 60       Brooksiageli Elena Sotelo MD    1/24/2018    1:16 PM

## 2018-02-28 ENCOUNTER — INFUSION (OUTPATIENT)
Dept: ONCOLOGY | Facility: HOSPITAL | Age: 73
End: 2018-02-28
Attending: INTERNAL MEDICINE

## 2018-02-28 ENCOUNTER — OFFICE VISIT (OUTPATIENT)
Dept: ONCOLOGY | Facility: CLINIC | Age: 73
End: 2018-02-28

## 2018-02-28 ENCOUNTER — LAB (OUTPATIENT)
Dept: LAB | Facility: HOSPITAL | Age: 73
End: 2018-02-28
Attending: INTERNAL MEDICINE

## 2018-02-28 VITALS
RESPIRATION RATE: 16 BRPM | DIASTOLIC BLOOD PRESSURE: 84 MMHG | SYSTOLIC BLOOD PRESSURE: 138 MMHG | BODY MASS INDEX: 27.96 KG/M2 | TEMPERATURE: 97.1 F | HEIGHT: 65 IN | WEIGHT: 167.8 LBS | OXYGEN SATURATION: 95 % | HEART RATE: 64 BPM

## 2018-02-28 VITALS
RESPIRATION RATE: 20 BRPM | SYSTOLIC BLOOD PRESSURE: 141 MMHG | DIASTOLIC BLOOD PRESSURE: 59 MMHG | HEIGHT: 65 IN | WEIGHT: 168 LBS | HEART RATE: 63 BPM | BODY MASS INDEX: 27.99 KG/M2 | TEMPERATURE: 96.9 F | OXYGEN SATURATION: 100 %

## 2018-02-28 DIAGNOSIS — C50.412 MALIGNANT NEOPLASM OF UPPER-OUTER QUADRANT OF LEFT FEMALE BREAST, UNSPECIFIED ESTROGEN RECEPTOR STATUS (HCC): ICD-10-CM

## 2018-02-28 DIAGNOSIS — C50.412 MALIGNANT NEOPLASM OF UPPER-OUTER QUADRANT OF LEFT FEMALE BREAST, UNSPECIFIED ESTROGEN RECEPTOR STATUS (HCC): Primary | ICD-10-CM

## 2018-02-28 DIAGNOSIS — C50.912 CARCINOMA OF LEFT BREAST METASTATIC TO BONE (HCC): Primary | ICD-10-CM

## 2018-02-28 DIAGNOSIS — C79.51 CARCINOMA OF LEFT BREAST METASTATIC TO BONE (HCC): Primary | ICD-10-CM

## 2018-02-28 LAB
ALBUMIN SERPL-MCNC: 3.9 G/DL (ref 3.5–5)
ALBUMIN/GLOB SERPL: 1.1 G/DL (ref 1.1–2.5)
ALP SERPL-CCNC: 79 U/L (ref 24–120)
ALT SERPL W P-5'-P-CCNC: 21 U/L (ref 0–54)
ANION GAP SERPL CALCULATED.3IONS-SCNC: 11 MMOL/L (ref 4–13)
ANISOCYTOSIS BLD QL: NORMAL
AST SERPL-CCNC: 25 U/L (ref 7–45)
BASOPHILS # BLD AUTO: 0.05 10*3/MM3 (ref 0–0.2)
BASOPHILS NFR BLD AUTO: 1.1 % (ref 0–2)
BILIRUB SERPL-MCNC: 0.3 MG/DL (ref 0.1–1)
BUN BLD-MCNC: 16 MG/DL (ref 5–21)
BUN/CREAT SERPL: 12.2 (ref 7–25)
CALCIUM SPEC-SCNC: 10 MG/DL (ref 8.4–10.4)
CHLORIDE SERPL-SCNC: 97 MMOL/L (ref 98–110)
CO2 SERPL-SCNC: 30 MMOL/L (ref 24–31)
CREAT BLD-MCNC: 1.31 MG/DL (ref 0.5–1.4)
DEPRECATED RDW RBC AUTO: 56.2 FL (ref 40–54)
EOSINOPHIL # BLD AUTO: 0.12 10*3/MM3 (ref 0–0.7)
EOSINOPHIL NFR BLD AUTO: 2.7 % (ref 0–4)
ERYTHROCYTE [DISTWIDTH] IN BLOOD BY AUTOMATED COUNT: 13.5 % (ref 12–15)
GFR SERPL CREATININE-BSD FRML MDRD: 40 ML/MIN/1.73
GLOBULIN UR ELPH-MCNC: 3.6 GM/DL
GLUCOSE BLD-MCNC: 99 MG/DL (ref 70–100)
HCT VFR BLD AUTO: 31.3 % (ref 37–47)
HGB BLD-MCNC: 10.6 G/DL (ref 12–16)
HOLD SPECIMEN: NORMAL
HOLD SPECIMEN: NORMAL
LYMPHOCYTES # BLD AUTO: 1.41 10*3/MM3 (ref 0.72–4.86)
LYMPHOCYTES NFR BLD AUTO: 32.1 % (ref 15–45)
MACROCYTES BLD QL SMEAR: NORMAL
MCH RBC QN AUTO: 38 PG (ref 28–32)
MCHC RBC AUTO-ENTMCNC: 33.9 G/DL (ref 33–36)
MCV RBC AUTO: 112.2 FL (ref 82–98)
MONOCYTES # BLD AUTO: 0.13 10*3/MM3 (ref 0.19–1.3)
MONOCYTES NFR BLD AUTO: 3 % (ref 4–12)
NEUTROPHILS # BLD AUTO: 2.63 10*3/MM3 (ref 1.87–8.4)
NEUTROPHILS NFR BLD AUTO: 60 % (ref 39–78)
PLATELET # BLD AUTO: 417 10*3/MM3 (ref 130–400)
PMV BLD AUTO: 9.1 FL (ref 6–12)
POTASSIUM BLD-SCNC: 4 MMOL/L (ref 3.5–5.3)
PROT SERPL-MCNC: 7.5 G/DL (ref 6.3–8.7)
RBC # BLD AUTO: 2.79 10*6/MM3 (ref 4.2–5.4)
SMALL PLATELETS BLD QL SMEAR: ADEQUATE
SODIUM BLD-SCNC: 138 MMOL/L (ref 135–145)
WBC NRBC COR # BLD: 4.39 10*3/MM3 (ref 4.8–10.8)

## 2018-02-28 PROCEDURE — 99214 OFFICE O/P EST MOD 30 MIN: CPT | Performed by: INTERNAL MEDICINE

## 2018-02-28 PROCEDURE — 36415 COLL VENOUS BLD VENIPUNCTURE: CPT

## 2018-02-28 PROCEDURE — 80053 COMPREHEN METABOLIC PANEL: CPT

## 2018-02-28 PROCEDURE — 25010000002 FULVESTRANT PER 25 MG: Performed by: INTERNAL MEDICINE

## 2018-02-28 PROCEDURE — 85025 COMPLETE CBC W/AUTO DIFF WBC: CPT

## 2018-02-28 PROCEDURE — 85007 BL SMEAR W/DIFF WBC COUNT: CPT

## 2018-02-28 PROCEDURE — 96402 CHEMO HORMON ANTINEOPL SQ/IM: CPT

## 2018-02-28 PROCEDURE — 86300 IMMUNOASSAY TUMOR CA 15-3: CPT | Performed by: INTERNAL MEDICINE

## 2018-02-28 RX ADMIN — FULVESTRANT 500 MG: 50 INJECTION INTRAMUSCULAR at 11:45

## 2018-02-28 NOTE — PROGRESS NOTES
[facility]  HEMATOLOGY & ONCOLOGY        Subjective    Metastatic breast cancer on Faslodex and Ibrance     Staging form: Breast, AJCC V7      Clinical stage from 10/11/2016: Stage IV (T2, N1, M1) - ER/MA positive. HER2/maynor negative  VISIT DIAGNOSIS:   Metastatic breast cancer       HEMATOLOGY / ONCOLOGY HISTORY:   Oncology/Hematology History    Patient is a 67-year-old postmenopausal female who had onset of her menses at age 9 and menopause at age 48. She received oral contraceptives for approximately 14 years and did not receive hormonal manipulation. Patient has a maternal cousin had breast cancer. Patient has had no prior breast biopsies. Patient found a palpable left breast mass as well as a left axillary mass. Patient had a bloody nipple discharge which been present for approximately 6 months. On 6/20/2012, a mammogram was performed showing a subareolar mass consistent with malignancy. Bilateral breast ultrasound revealed an ill-defined subareolar mass measuring 2.8-2.5 cm. There is a left axillary mass measuring 1.7 cm with suspicion of extracapsular extension. There is no evidence of disease in the right breast. On 7/9/2012 patient underwent a left breast biopsy and placement of marker clip. Left breast biopsy revealed an infiltrating lobular carcinoma grade 2 with focal ductal carcinoma in situ, solid pattern. A fine-needle biopsy of the left lymph node was consistent with metastatic carcinoma. Breast tumor profile revealed ER: 100%; MA: 98%; HER-2/maynor 2+; FISH: Unamplified; Ki-67: 71%; P 53 1%; DNA aneuploidy. A MRI of the breast were performed body multiple abnormal enhancing masses within the left breast. There appears to be anterior retraction of the pectoralis muscle with no direct extension. There is a moderate to large, layering left pleural effusion appreciated. The right breast reveals 3 moderately enlarged lymph nodes in the posterior lateral aspect the right breast. These have  fatty hilum but followup is recommended. Patient is undergone systemic studies including, on 8/2/2012, a CT scan of the brain showing atrophy. a CT scan that shows showing a small to moderate left pleural effusion with 3 subcentimeter nodule densities in the left lung.   She completed 4 cycles of neoadjuvant treatment with dose dense Adriamycin and Cytoxan. She had a mammogram which showed a significant reduction in the size of her left breast lesion. There is no axillary adenopathy noted. She has had an ultrasound revealing the  lesion reducing from 2.4 cm to 1 cm. Patient underwent a left mastectomy on 03/19/2013 finding negative invasive cancer, negative nodes. A 5% DCIS was noted. The patient has declined hormonal therapy. She did not need radiation therapy.  November 2014, she began to have evidence of lytic bone lesions at T5T6, frontal disease, an 8 mm lucency in the central frontal area of  the skull but nothing intracranial. Bone scan revealed only vertex tracer activity but bilateral ribs and thoracic spine, and other lesions in  her pelvis. At that time, she was started on letrozole since November 2014. She is taking Xgeva. She had progression found in bone in Feb 2015 on scan. She was started on Faslodex, Ibrance and continued xgeva.         Malignant neoplasm of upper-outer quadrant of left female breast    7/9/2012 Initial Diagnosis     Malignant neoplasm of upper-outer quadrant of left female breast         7/10/2012 Biopsy     Left Breast Biopsy & Axillary Node FNA: A) Infiltrating lobular carcinoma, Grade 2. B) Foci of ductal carcinoma in situ, solid pattern. C) Greatest dimension of the invasive carcinoma is 15.8mm.         3/19/2013 Surgery     Left Mastectomy w/ Axillary Tail: Focal residual ducatal carcinoma in situ (DCIS) 12 lymph nodes negative for metastatic carcinoma.         10/20/2014 Biopsy     Peritoneum Biopsy: Final Diagnosis: Malignant Cell Neoplasm.           Breast cancer  metastasized to bone, unspecified laterality    10/15/2012 -  Other Event     Left mammogram:  Impression:  1. Decreasing spiculation and density in the retroareolar left breast, near a previous biopsy.   2. Definite left axillary lesion is not appreciated.          2/5/2013 -  Other Event     Diagnostic Mammo:  Comparison is made to 10/15/2012 and 6/20/2012  The spiculated mass at 12:00 behind the nipple is nearly resolved.   Impression:  1. The mass on the left side is less apparent after receiving chemotherapy. There has been a good response to chmotherapy on the left side.   2. No suspicious abnormality is seen in the right breast to account for the new palable abnormality at the 4-5:00 position.          3/19/2013 Surgery     Breast Biopsy:  Final Diagnosis:  A. Breast, left mastectomy with axillary tail  1. Focal residual ductal carcinoma in situ (DICS) (less than 5% of tumor bed).  2. Negative for residual invasive carcinoma.   3. 12 Lymph Nodes, negative for metastatic carcinoma (0/12) focally, some lymph nodes demonstrate fibrosis/treatment effect.  B. Skin and soft tissue, left advancement flap:  1. Benign skin and subcutis.   2. Negative for carcinoma.          6/20/2013 -  Other Event     Diagnostic Mammo [facility]:  1. A subareolar mass is consistent with malignancy. Additional involement extends inferiorly sonographically and superolaterally mammograhically as demonstrated by calcifications. The mass measures approx 2.8 cm sonographically, but the involved region is likely much larger including an intraductal componet. The left axillary lymph nodes are also involved.   2. Recommened ultrasound guided biopsy of the left subareolar mass and left axillary lymph node.   3. Breast MRI could also further define multicentric disease on the left.   4. Clear suspicious finding on the right.  Recommend ongoing clinical follow up of palpable foci.          6/13/2014 -  Other Event     Diagnostic  Mammo:  IMPRESSION:  1. History of left breast cancer and mastectomy. Stable benign right breast mammograms.          10/20/2014 Surgery     Final Diagnosis:  Biopsies, pertoneum:  Metastatic carcinoma, morphologically compatible with metastatic mammary carcinoma.          6/15/2015 -  Other Event     Diagnostic Mammo:  IMPRESSION:   1. Negative x-ray report, should not delay biopsy if a dominat or clinically suspicious mass is present.          7/5/2016 -  Other Event     Diagnositic mammogram:  Impression:   Stable right mamogram with no radiographic findings suspicious for malignancy. Recommend continued screening mammography per screening guidelines unless otherwise earlier clinically indicated.          9/18/2016 Initial Diagnosis     Secondary malignant neoplasm of bone and bone marrow          Cancer Staging Information:  Malignant neoplasm of upper-outer quadrant of left female breast    Staging form: Breast, AJCC V7      Clinical stage from 10/11/2016: Stage IV (T2, N1, M1) - Signed by ALBERT Kulkarni on 10/11/2016      INTERVAL HISTORY  Patient ID: Heavenly Yanes is a 72 y.o. year old female history of metastatic breast carcinoma to lung and bone.  She is presently receiving treatment with Ibrance, Faslodex and Xgeva.  Tumor markers continued to decline with a CA 27-29 of 92.2 obtained on 9:30 2016 compared to 105.8 on 07/27/2016.  Mr. Yanes continues to tolerate this treatment regimen well and is here today for evaluation consideration to receive Faslodex and Xgeva.  Last bone mineral density study was obtained 11/21/2014 indicating osteopopenia, we will obtain a repeat bone mineral density study. Ms. Yanes completed 8 teeth pulled 02/14/2017.   Xgeva on hold, resume 05/2017.     Past Medical History:   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   • Bipolar disorder    • Disease of thyroid gland    • Hypertension    • Malignant neoplasm of  upper-outer quadrant of left female breast 9/18/2016   • Secondary malignant neoplasm of bone and bone marrow 9/18/2016     Past Surgical History:   Past Surgical History:   Procedure Laterality Date   • BREAST SURGERY      left breast biopsy and axillary node   • CHOLECYSTECTOMY     • EXTERNAL EAR SURGERY     • MASTECTOMY Left    • PORTACATH PLACEMENT       Social History:   Social History     Social History   • Marital status:      Spouse name: N/A   • Number of children: N/A   • Years of education: N/A     Occupational History   • Not on file.     Social History Main Topics   • Smoking status: Never Smoker   • Smokeless tobacco: Never Used   • Alcohol use No   • Drug use: No   • Sexual activity: Not on file     Other Topics Concern   • Not on file     Social History Narrative     Family History:   Family History   Problem Relation Age of Onset   • No Known Problems Daughter    • No Known Problems Son    • Other Mother      complications from a concussion from a fall   • Other Father      old age   • No Known Problems Brother    • No Known Problems Daughter        Review of Systems   Constitutional: Negative.  Negative for activity change, appetite change, chills, diaphoresis, fatigue and fever.   HENT: Negative.  Negative for congestion, ear discharge, ear pain, facial swelling, hearing loss, mouth sores, nosebleeds, postnasal drip, rhinorrhea, sinus pressure, sore throat, tinnitus, trouble swallowing and voice change.    Eyes: Negative.  Negative for photophobia, pain, discharge and visual disturbance.   Respiratory: Negative.  Negative for apnea, cough, chest tightness, shortness of breath, wheezing and stridor.    Cardiovascular: Negative.  Negative for chest pain, palpitations and leg swelling.   Gastrointestinal: Negative.  Negative for abdominal distention, abdominal pain, blood in stool, constipation, diarrhea, nausea, rectal pain and vomiting.   Endocrine: Negative.  Negative for cold intolerance,  heat intolerance, polydipsia, polyphagia and polyuria.   Genitourinary: Negative.  Negative for difficulty urinating, dysuria, flank pain, frequency, hematuria and urgency.   Musculoskeletal: Negative.  Negative for arthralgias, back pain, gait problem, joint swelling and myalgias.   Skin: Negative.  Negative for color change, pallor, rash and wound.   Allergic/Immunologic: Negative.  Negative for environmental allergies, food allergies and immunocompromised state.   Neurological: Negative.  Negative for dizziness, tremors, seizures, syncope, speech difficulty, weakness, light-headedness, numbness and headaches.   Hematological: Negative.  Negative for adenopathy. Does not bruise/bleed easily.   Psychiatric/Behavioral: Negative.  Negative for agitation, confusion, hallucinations, sleep disturbance and suicidal ideas. The patient is not nervous/anxious.    All other systems reviewed and are negative.       Performance Status:  Asymptomatic    Medications:    Current Outpatient Prescriptions   Medication Sig Dispense Refill   • B Complex Vitamins (VITAMIN B COMPLEX PO) Take  by mouth. 1 po daily     • Calcium Carb-Cholecalciferol (CALCIUM 600 + D PO) Take  by mouth.     • DULoxetine (CYMBALTA) 30 MG capsule Take 30 mg by mouth daily.     • ferrous sulfate 325 (65 FE) MG tablet Take 325 mg by mouth 2 (two) times a day.     • Lysine HCl (L-LYSINE) 500 MG tablet tablet Take  by mouth daily.     • Magnesium 250 MG tablet Take  by mouth.     • melatonin 3 MG tablet Take  by mouth.     • metoprolol tartrate (LOPRESSOR) 25 MG tablet Take 25 mg by mouth 2 (two) times a day.     • OLANZapine (ZYPREXA) 2.5 MG tablet Take 2.5 mg by mouth every night.     • Palbociclib 125 MG capsule capsule Take 1 capsule by mouth Daily. 3 weeks on, 1 week off 21 capsule 5   • Probiotic Product (PROBIOTIC DAILY PO) Take  by mouth. 300million cell daily     • raNITIdine (ZANTAC) 75 MG tablet Take 75 mg by mouth 2 (Two) Times a Day.     •  "terbinafine (lamiSIL) 250 MG tablet      • thyroid 60 MG PO tablet Take 60 mg by mouth daily.     • VALERIAN ROOT PO Take  by mouth. 100mg daily     • Zinc 50 MG tablet Take  by mouth. 1 po daily       No current facility-administered medications for this visit.      Facility-Administered Medications Ordered in Other Visits   Medication Dose Route Frequency Provider Last Rate Last Dose   • heparin flush (porcine) 100 UNIT/ML injection 500 Units  500 Units Intravenous PRN Joseph Nunez MD   500 Units at 10/26/17 1155   • sodium chloride 0.9 % flush 10 mL  10 mL Intravenous PRN Joseph Nunez MD   10 mL at 10/26/17 1155       ALLERGIES:    Allergies   Allergen Reactions   • Codeine    • Diphenhydramine Other (See Comments)     \"Shaky leg syndrome\"   • Heparin    • Other      POPPY SEED   • Penicillins    • Petroleum Jelly [Skin Protectants, Misc.]    • Sulfa Antibiotics    • Sulfur    • Valium [Diazepam]        Objective      There were no vitals filed for this visit.      Current Status 1/24/2018   ECOG score 1       General Appearance: Patient is awake, alert, oriented and in no acute distress. Patient is welldeveloped, wellnourished, and appears stated age.  HEENT: Normocephalic. Sclerae clear, conjunctiva pink, extraocular movements intact, pupils, round, reactive to light and accommodation. Mouth and throat are clear with moist oral mucosa.  NECK: Supple, no jugular venous distention, thyroid not enlarged.  LYMPH: No cervical, supraclavicular, axillary, or inguinal lymphadenopathy.  CHEST: Equal bilateral expansion   LUNGS: Good air movement, no rales, rhonchi, rubs or wheezes with auscultation  CARDIO: Regular sinus rhythm, no murmurs, gallops or rubs.  ABDOMEN: Nondistended, soft, No tenderness, no guarding, no rebound, No hepatosplenomegaly. No abdominal masses. Bowel sounds positive. No hernia  MUSKEL: No joint swelling, decreased motion, or inflammation  EXTREMS: No edema, clubbing, cyanosis, No varicose " veins.  NEURO: Grossly nonfocal, Gait is coordinated and smooth, Cognition is preserved.  SKIN: No rashes, no ecchymoses, no petechia.  PSYCH: Oriented to time, place and person. Memory is preserved. Mood and affect appear normal      RECENT LABS:  WBC   Date Value Ref Range Status   01/24/2018 4.10 (L) 4.80 - 10.80 10*3/mm3 Final     RBC   Date Value Ref Range Status   01/24/2018 2.67 (L) 4.20 - 5.40 10*6/mm3 Final     Hemoglobin   Date Value Ref Range Status   01/24/2018 10.8 (L) 12.0 - 16.0 g/dL Final     Hematocrit   Date Value Ref Range Status   01/24/2018 30.1 (L) 37.0 - 47.0 % Final     MCV   Date Value Ref Range Status   01/24/2018 112.7 (H) 82.0 - 98.0 fL Final     MCH   Date Value Ref Range Status   01/24/2018 40.4 (H) 28.0 - 32.0 pg Final     MCHC   Date Value Ref Range Status   01/24/2018 35.9 33.0 - 36.0 g/dL Final     RDW   Date Value Ref Range Status   01/24/2018 13.8 12.0 - 15.0 % Final     RDW-SD   Date Value Ref Range Status   09/28/2017 63.0 (H) 40.0 - 54.0 fl Final     MPV   Date Value Ref Range Status   01/24/2018 7.6 6.0 - 12.0 fL Final     Platelets   Date Value Ref Range Status   01/24/2018 332 130 - 400 10*3/mm3 Final     Neutrophil %   Date Value Ref Range Status   01/24/2018 61.2 39.0 - 78.0 % Final     Lymphocyte %   Date Value Ref Range Status   01/24/2018 35.3 15.0 - 45.0 % Final     Monocyte %   Date Value Ref Range Status   09/28/2017 5.1 4.0 - 12.0 % Final     Eosinophil %   Date Value Ref Range Status   09/28/2017 2.7 0.0 - 4.0 % Final     Basophil %   Date Value Ref Range Status   09/28/2017 2.4 (H) 0.0 - 2.0 % Final     Immature Grans %   Date Value Ref Range Status   09/28/2017 0.3 0.0 - 5.0 % Final     Neutrophils, Absolute   Date Value Ref Range Status   01/24/2018 2.60 1.50 - 8.30 10*3/mm3 Final     Lymphocytes, Absolute   Date Value Ref Range Status   01/24/2018 1.40 0.80 - 7.00 10*3/mm3 Final     Monocytes, Absolute   Date Value Ref Range Status   09/28/2017 0.19 0.19 - 1.30  10*3/mm3 Final     Eosinophils, Absolute   Date Value Ref Range Status   09/28/2017 0.10 0.00 - 0.70 10*3/mm3 Final     Basophils, Absolute   Date Value Ref Range Status   09/28/2017 0.09 0.00 - 0.20 10*3/mm3 Final     Immature Grans, Absolute   Date Value Ref Range Status   09/28/2017 0.01 0.00 - 0.03 10*3/mm3 Final     No visits with results within 7 Day(s) from this visit.  Latest known visit with results is:    Office Visit on 01/24/2018   Component Date Value Ref Range Status   • Iron 01/24/2018 117  42 - 180 mcg/dL Final   • TIBC 01/24/2018 246  225 - 420 mcg/dL Final   • Iron Saturation 01/24/2018 48* 20 - 45 % Final   • Ferritin 01/24/2018 188.00  11.10 - 264.00 ng/mL Final   • Folate 01/24/2018 17.90  >2.75 ng/mL Final   • Vitamin B-12 01/24/2018 304  239 - 931 pg/mL Final             Assessment/Plan     Patient Active Problem List   Diagnosis   • Malignant neoplasm of upper-outer quadrant of left female breast   • Breast cancer metastasized to bone, unspecified laterality   • Essential hypertension   • Acquired hypothyroidism   • Bipolar 1 disorder   • Carcinoma of left breast metastatic to bone   • Antineoplastic chemotherapy induced anemia          Assessment:  1. Stage IV metastatic breast carcinoma, ER/SC positive. HER2/maynor negative undergoing Ibrance, Faslodex and Xgeva with overall stable with stable CA27.29 with slow decline. She is tolerating the treatment very well. Faslodex cycle 17 today and will continue to HOLD Xgeva due to upcoming dental work, last dose of Xgeva given on 09/29/2016.  01/2017 restaging CT chest abdomen and pelvis and bone scan showed stable diffuse sclerotic bony lesions, unchanged from 7/1/2016.Next scan July 2017    2.  Mild leukopenia without neutropenia.  Likely secondary to Ibrance.  Continue to monitor.     3.  Macrocytic anemia.  Hemoglobin 11.1.  02/2016, B12 and folate normal    4.  Status post 8 teeth extraction.     Recommendation/Plan:  1.  Proceed with Faslodex  cycle 20.  2. Continue Ibrance 125 mg 3 weeks on, one-week off.  Reduce dose if leukopenia worsens.   3. Follow up in 28 days for Faslodex cycle 19.   Repeat CA 27.29.  Previous CA 27.29 have remained stable, the last one done on 11/2017 was 89 Stable. Continue Faslodex q monthly.    Additionally she is anemic with a hemoglobin of 10 g percent with decreasing GFR.  I do suspect that she may have some form of chronic kidney disease, as evaluated by Nephrology, CKD from Health system.  No need for EPO as she is asyptomatic.  Redraw CA 27.29 today. Cont faslodex + Ibrance.  Joseph Nunez MD    2/28/2018    10:04 AM

## 2018-03-01 LAB — CANCER AG27-29 SERPL-ACNC: 96.4 U/ML (ref 0–38.6)

## 2018-03-28 ENCOUNTER — INFUSION (OUTPATIENT)
Dept: ONCOLOGY | Facility: HOSPITAL | Age: 73
End: 2018-03-28
Attending: INTERNAL MEDICINE

## 2018-03-28 ENCOUNTER — LAB (OUTPATIENT)
Dept: LAB | Facility: HOSPITAL | Age: 73
End: 2018-03-28
Attending: INTERNAL MEDICINE

## 2018-03-28 ENCOUNTER — OFFICE VISIT (OUTPATIENT)
Dept: ONCOLOGY | Facility: CLINIC | Age: 73
End: 2018-03-28

## 2018-03-28 VITALS
TEMPERATURE: 96.5 F | HEIGHT: 65 IN | OXYGEN SATURATION: 100 % | BODY MASS INDEX: 28.16 KG/M2 | RESPIRATION RATE: 16 BRPM | DIASTOLIC BLOOD PRESSURE: 70 MMHG | HEART RATE: 49 BPM | SYSTOLIC BLOOD PRESSURE: 167 MMHG | WEIGHT: 169 LBS

## 2018-03-28 VITALS
RESPIRATION RATE: 16 BRPM | HEART RATE: 56 BPM | SYSTOLIC BLOOD PRESSURE: 138 MMHG | WEIGHT: 169.3 LBS | TEMPERATURE: 97.7 F | DIASTOLIC BLOOD PRESSURE: 76 MMHG | OXYGEN SATURATION: 99 % | HEIGHT: 65 IN | BODY MASS INDEX: 28.21 KG/M2

## 2018-03-28 DIAGNOSIS — C50.412 MALIGNANT NEOPLASM OF UPPER-OUTER QUADRANT OF LEFT FEMALE BREAST, UNSPECIFIED ESTROGEN RECEPTOR STATUS (HCC): ICD-10-CM

## 2018-03-28 DIAGNOSIS — C50.412 MALIGNANT NEOPLASM OF UPPER-OUTER QUADRANT OF LEFT FEMALE BREAST, UNSPECIFIED ESTROGEN RECEPTOR STATUS (HCC): Primary | ICD-10-CM

## 2018-03-28 LAB
ALBUMIN SERPL-MCNC: 3.8 G/DL (ref 3.5–5)
ALBUMIN/GLOB SERPL: 1.1 G/DL (ref 1.1–2.5)
ALP SERPL-CCNC: 73 U/L (ref 24–120)
ALT SERPL W P-5'-P-CCNC: <15 U/L (ref 0–54)
ANION GAP SERPL CALCULATED.3IONS-SCNC: 9 MMOL/L (ref 4–13)
ANISOCYTOSIS BLD QL: ABNORMAL
AST SERPL-CCNC: 22 U/L (ref 7–45)
BASOPHILS # BLD MANUAL: 0.04 10*3/MM3 (ref 0–0.2)
BASOPHILS NFR BLD AUTO: 1 % (ref 0–2)
BILIRUB SERPL-MCNC: 0.3 MG/DL (ref 0.1–1)
BUN BLD-MCNC: 17 MG/DL (ref 5–21)
BUN/CREAT SERPL: 11.9 (ref 7–25)
CALCIUM SPEC-SCNC: 9.7 MG/DL (ref 8.4–10.4)
CHLORIDE SERPL-SCNC: 99 MMOL/L (ref 98–110)
CO2 SERPL-SCNC: 31 MMOL/L (ref 24–31)
CREAT BLD-MCNC: 1.43 MG/DL (ref 0.5–1.4)
DEPRECATED RDW RBC AUTO: 59.2 FL (ref 40–54)
EOSINOPHIL # BLD MANUAL: 0.08 10*3/MM3 (ref 0–0.7)
EOSINOPHIL NFR BLD MANUAL: 2 % (ref 0–7)
ERYTHROCYTE [DISTWIDTH] IN BLOOD BY AUTOMATED COUNT: 14.2 % (ref 12–15)
GFR SERPL CREATININE-BSD FRML MDRD: 36 ML/MIN/1.73
GLOBULIN UR ELPH-MCNC: 3.5 GM/DL
GLUCOSE BLD-MCNC: 89 MG/DL (ref 70–100)
HCT VFR BLD AUTO: 28.8 % (ref 37–47)
HGB BLD-MCNC: 10 G/DL (ref 12–16)
HOLD SPECIMEN: NORMAL
HOLD SPECIMEN: NORMAL
LYMPHOCYTES # BLD MANUAL: 2.25 10*3/MM3 (ref 0.8–7)
LYMPHOCYTES NFR BLD MANUAL: 1 % (ref 0–12)
LYMPHOCYTES NFR BLD MANUAL: 54.5 % (ref 24–44)
MACROCYTES BLD QL SMEAR: ABNORMAL
MCH RBC QN AUTO: 39.4 PG (ref 28–32)
MCHC RBC AUTO-ENTMCNC: 34.7 G/DL (ref 33–36)
MCV RBC AUTO: 113.4 FL (ref 82–98)
MONOCYTES # BLD AUTO: 0.04 10*3/MM3 (ref 0–1)
NEUTROPHILS # BLD AUTO: 1.71 10*3/MM3 (ref 1–11)
NEUTROPHILS NFR BLD MANUAL: 41.4 % (ref 37–80)
PLAT MORPH BLD: NORMAL
PLATELET # BLD AUTO: 340 10*3/MM3 (ref 130–400)
PMV BLD AUTO: 9.1 FL (ref 6–12)
POLYCHROMASIA BLD QL SMEAR: ABNORMAL
POTASSIUM BLD-SCNC: 4 MMOL/L (ref 3.5–5.3)
PROT SERPL-MCNC: 7.3 G/DL (ref 6.3–8.7)
RBC # BLD AUTO: 2.54 10*6/MM3 (ref 4.2–5.4)
SMUDGE CELLS BLD QL SMEAR: ABNORMAL
SODIUM BLD-SCNC: 139 MMOL/L (ref 135–145)
WBC NRBC COR # BLD: 4.12 10*3/MM3 (ref 4.8–10.8)

## 2018-03-28 PROCEDURE — 85007 BL SMEAR W/DIFF WBC COUNT: CPT

## 2018-03-28 PROCEDURE — 36415 COLL VENOUS BLD VENIPUNCTURE: CPT

## 2018-03-28 PROCEDURE — 86300 IMMUNOASSAY TUMOR CA 15-3: CPT | Performed by: INTERNAL MEDICINE

## 2018-03-28 PROCEDURE — 85025 COMPLETE CBC W/AUTO DIFF WBC: CPT

## 2018-03-28 PROCEDURE — 96402 CHEMO HORMON ANTINEOPL SQ/IM: CPT

## 2018-03-28 PROCEDURE — 25010000002 FULVESTRANT PER 25 MG: Performed by: INTERNAL MEDICINE

## 2018-03-28 PROCEDURE — 80053 COMPREHEN METABOLIC PANEL: CPT

## 2018-03-28 PROCEDURE — 99214 OFFICE O/P EST MOD 30 MIN: CPT | Performed by: INTERNAL MEDICINE

## 2018-03-28 RX ADMIN — FULVESTRANT 500 MG: 50 INJECTION INTRAMUSCULAR at 11:21

## 2018-03-28 NOTE — PROGRESS NOTES
[facility]  HEMATOLOGY & ONCOLOGY        Subjective    Metastatic breast cancer on Faslodex and Ibrance     Staging form: Breast, AJCC V7      Clinical stage from 10/11/2016: Stage IV (T2, N1, M1) - ER/NC positive. HER2/maynor negative  VISIT DIAGNOSIS:   Metastatic breast cancer       HEMATOLOGY / ONCOLOGY HISTORY:   Oncology/Hematology History    Patient is a 67-year-old postmenopausal female who had onset of her menses at age 9 and menopause at age 48. She received oral contraceptives for approximately 14 years and did not receive hormonal manipulation. Patient has a maternal cousin had breast cancer. Patient has had no prior breast biopsies. Patient found a palpable left breast mass as well as a left axillary mass. Patient had a bloody nipple discharge which been present for approximately 6 months. On 6/20/2012, a mammogram was performed showing a subareolar mass consistent with malignancy. Bilateral breast ultrasound revealed an ill-defined subareolar mass measuring 2.8-2.5 cm. There is a left axillary mass measuring 1.7 cm with suspicion of extracapsular extension. There is no evidence of disease in the right breast. On 7/9/2012 patient underwent a left breast biopsy and placement of marker clip. Left breast biopsy revealed an infiltrating lobular carcinoma grade 2 with focal ductal carcinoma in situ, solid pattern. A fine-needle biopsy of the left lymph node was consistent with metastatic carcinoma. Breast tumor profile revealed ER: 100%; NC: 98%; HER-2/maynor 2+; FISH: Unamplified; Ki-67: 71%; P 53 1%; DNA aneuploidy. A MRI of the breast were performed body multiple abnormal enhancing masses within the left breast. There appears to be anterior retraction of the pectoralis muscle with no direct extension. There is a moderate to large, layering left pleural effusion appreciated. The right breast reveals 3 moderately enlarged lymph nodes in the posterior lateral aspect the right breast. These have  fatty hilum but followup is recommended. Patient is undergone systemic studies including, on 8/2/2012, a CT scan of the brain showing atrophy. a CT scan that shows showing a small to moderate left pleural effusion with 3 subcentimeter nodule densities in the left lung.   She completed 4 cycles of neoadjuvant treatment with dose dense Adriamycin and Cytoxan. She had a mammogram which showed a significant reduction in the size of her left breast lesion. There is no axillary adenopathy noted. She has had an ultrasound revealing the  lesion reducing from 2.4 cm to 1 cm. Patient underwent a left mastectomy on 03/19/2013 finding negative invasive cancer, negative nodes. A 5% DCIS was noted. The patient has declined hormonal therapy. She did not need radiation therapy.  November 2014, she began to have evidence of lytic bone lesions at T5T6, frontal disease, an 8 mm lucency in the central frontal area of  the skull but nothing intracranial. Bone scan revealed only vertex tracer activity but bilateral ribs and thoracic spine, and other lesions in  her pelvis. At that time, she was started on letrozole since November 2014. She is taking Xgeva. She had progression found in bone in Feb 2015 on scan. She was started on Faslodex, Ibrance and continued xgeva.         Malignant neoplasm of upper-outer quadrant of left female breast    7/9/2012 Initial Diagnosis     Malignant neoplasm of upper-outer quadrant of left female breast         7/10/2012 Biopsy     Left Breast Biopsy & Axillary Node FNA: A) Infiltrating lobular carcinoma, Grade 2. B) Foci of ductal carcinoma in situ, solid pattern. C) Greatest dimension of the invasive carcinoma is 15.8mm.         3/19/2013 Surgery     Left Mastectomy w/ Axillary Tail: Focal residual ducatal carcinoma in situ (DCIS) 12 lymph nodes negative for metastatic carcinoma.         10/20/2014 Biopsy     Peritoneum Biopsy: Final Diagnosis: Malignant Cell Neoplasm.           Breast cancer  metastasized to bone, unspecified laterality    10/15/2012 -  Other Event     Left mammogram:  Impression:  1. Decreasing spiculation and density in the retroareolar left breast, near a previous biopsy.   2. Definite left axillary lesion is not appreciated.          2/5/2013 -  Other Event     Diagnostic Mammo:  Comparison is made to 10/15/2012 and 6/20/2012  The spiculated mass at 12:00 behind the nipple is nearly resolved.   Impression:  1. The mass on the left side is less apparent after receiving chemotherapy. There has been a good response to chmotherapy on the left side.   2. No suspicious abnormality is seen in the right breast to account for the new palable abnormality at the 4-5:00 position.          3/19/2013 Surgery     Breast Biopsy:  Final Diagnosis:  A. Breast, left mastectomy with axillary tail  1. Focal residual ductal carcinoma in situ (DICS) (less than 5% of tumor bed).  2. Negative for residual invasive carcinoma.   3. 12 Lymph Nodes, negative for metastatic carcinoma (0/12) focally, some lymph nodes demonstrate fibrosis/treatment effect.  B. Skin and soft tissue, left advancement flap:  1. Benign skin and subcutis.   2. Negative for carcinoma.          6/20/2013 -  Other Event     Diagnostic Mammo [facility]:  1. A subareolar mass is consistent with malignancy. Additional involement extends inferiorly sonographically and superolaterally mammograhically as demonstrated by calcifications. The mass measures approx 2.8 cm sonographically, but the involved region is likely much larger including an intraductal componet. The left axillary lymph nodes are also involved.   2. Recommened ultrasound guided biopsy of the left subareolar mass and left axillary lymph node.   3. Breast MRI could also further define multicentric disease on the left.   4. Clear suspicious finding on the right.  Recommend ongoing clinical follow up of palpable foci.          6/13/2014 -  Other Event     Diagnostic  Mammo:  IMPRESSION:  1. History of left breast cancer and mastectomy. Stable benign right breast mammograms.          10/20/2014 Surgery     Final Diagnosis:  Biopsies, pertoneum:  Metastatic carcinoma, morphologically compatible with metastatic mammary carcinoma.          6/15/2015 -  Other Event     Diagnostic Mammo:  IMPRESSION:   1. Negative x-ray report, should not delay biopsy if a dominat or clinically suspicious mass is present.          7/5/2016 -  Other Event     Diagnositic mammogram:  Impression:   Stable right mamogram with no radiographic findings suspicious for malignancy. Recommend continued screening mammography per screening guidelines unless otherwise earlier clinically indicated.          9/18/2016 Initial Diagnosis     Secondary malignant neoplasm of bone and bone marrow          Cancer Staging Information:  Malignant neoplasm of upper-outer quadrant of left female breast    Staging form: Breast, AJCC V7      Clinical stage from 10/11/2016: Stage IV (T2, N1, M1) - Signed by ALBERT Kulkarni on 10/11/2016      INTERVAL HISTORY  Patient ID: Heavenly Yanes is a 73 y.o. year old female history of metastatic breast carcinoma to lung and bone.  She is presently receiving treatment with Ibrance, Faslodex and Xgeva.  Tumor markers continued to decline with a CA 27-29 of 92.2 obtained on 9:30 2016 compared to 105.8 on 07/27/2016.  Mr. Yanes continues to tolerate this treatment regimen well and is here today for evaluation consideration to receive Faslodex and Xgeva.  Last bone mineral density study was obtained 11/21/2014 indicating osteopopenia, we will obtain a repeat bone mineral density study. Ms. Yanes completed 8 teeth pulled 02/14/2017.   Xgeva on hold, resume 05/2017.     Past Medical History:   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   • Bipolar disorder    • Disease of thyroid gland    • Hypertension    • Malignant neoplasm of  upper-outer quadrant of left female breast 9/18/2016   • Secondary malignant neoplasm of bone and bone marrow 9/18/2016     Past Surgical History:   Past Surgical History:   Procedure Laterality Date   • BREAST SURGERY      left breast biopsy and axillary node   • CHOLECYSTECTOMY     • EXTERNAL EAR SURGERY     • MASTECTOMY Left    • PORTACATH PLACEMENT       Social History:   Social History     Social History   • Marital status:      Spouse name: N/A   • Number of children: N/A   • Years of education: N/A     Occupational History   • Not on file.     Social History Main Topics   • Smoking status: Never Smoker   • Smokeless tobacco: Never Used   • Alcohol use No   • Drug use: No   • Sexual activity: Not on file     Other Topics Concern   • Not on file     Social History Narrative   • No narrative on file     Family History:   Family History   Problem Relation Age of Onset   • No Known Problems Daughter    • No Known Problems Son    • Other Mother      complications from a concussion from a fall   • Other Father      old age   • No Known Problems Brother    • No Known Problems Daughter        Review of Systems   Constitutional: Negative.  Negative for activity change, appetite change, chills, diaphoresis, fatigue and fever.   HENT: Negative.  Negative for congestion, ear discharge, ear pain, facial swelling, hearing loss, mouth sores, nosebleeds, postnasal drip, rhinorrhea, sinus pressure, sore throat, tinnitus, trouble swallowing and voice change.    Eyes: Negative.  Negative for photophobia, pain, discharge and visual disturbance.   Respiratory: Negative.  Negative for apnea, cough, chest tightness, shortness of breath, wheezing and stridor.    Cardiovascular: Negative.  Negative for chest pain, palpitations and leg swelling.   Gastrointestinal: Negative.  Negative for abdominal distention, abdominal pain, blood in stool, constipation, diarrhea, nausea, rectal pain and vomiting.   Endocrine: Negative.   Negative for cold intolerance, heat intolerance, polydipsia, polyphagia and polyuria.   Genitourinary: Negative.  Negative for difficulty urinating, dysuria, flank pain, frequency, hematuria and urgency.   Musculoskeletal: Negative.  Negative for arthralgias, back pain, gait problem, joint swelling and myalgias.   Skin: Negative.  Negative for color change, pallor, rash and wound.   Allergic/Immunologic: Negative.  Negative for environmental allergies, food allergies and immunocompromised state.   Neurological: Negative.  Negative for dizziness, tremors, seizures, syncope, speech difficulty, weakness, light-headedness, numbness and headaches.   Hematological: Negative.  Negative for adenopathy. Does not bruise/bleed easily.   Psychiatric/Behavioral: Negative.  Negative for agitation, confusion, hallucinations, sleep disturbance and suicidal ideas. The patient is not nervous/anxious.    All other systems reviewed and are negative.       Performance Status:  Asymptomatic    Medications:    Current Outpatient Prescriptions   Medication Sig Dispense Refill   • B Complex Vitamins (VITAMIN B COMPLEX PO) Take  by mouth. 1 po daily     • Calcium Carb-Cholecalciferol (CALCIUM 600 + D PO) Take  by mouth.     • DULoxetine (CYMBALTA) 30 MG capsule Take 30 mg by mouth daily.     • ferrous sulfate 325 (65 FE) MG tablet Take 325 mg by mouth 2 (two) times a day.     • Lysine HCl (L-LYSINE) 500 MG tablet tablet Take  by mouth daily.     • Magnesium 250 MG tablet Take  by mouth.     • melatonin 3 MG tablet Take  by mouth.     • metoprolol tartrate (LOPRESSOR) 25 MG tablet Take 25 mg by mouth 2 (two) times a day.     • OLANZapine (ZYPREXA) 2.5 MG tablet Take 2.5 mg by mouth every night.     • Palbociclib 125 MG capsule capsule Take 1 capsule by mouth Daily. 3 weeks on, 1 week off 21 capsule 5   • Probiotic Product (PROBIOTIC DAILY PO) Take  by mouth. 300million cell daily     • raNITIdine (ZANTAC) 75 MG tablet Take 75 mg by mouth 2  "(Two) Times a Day.     • terbinafine (lamiSIL) 250 MG tablet      • thyroid 60 MG PO tablet Take 60 mg by mouth daily.     • VALERIAN ROOT PO Take  by mouth. 100mg daily     • Zinc 50 MG tablet Take  by mouth. 1 po daily       No current facility-administered medications for this visit.      Facility-Administered Medications Ordered in Other Visits   Medication Dose Route Frequency Provider Last Rate Last Dose   • heparin flush (porcine) 100 UNIT/ML injection 500 Units  500 Units Intravenous PRN Joseph Nunez MD   500 Units at 10/26/17 1155   • sodium chloride 0.9 % flush 10 mL  10 mL Intravenous PRN Joseph Nunez MD   10 mL at 10/26/17 1155       ALLERGIES:    Allergies   Allergen Reactions   • Codeine    • Diphenhydramine Other (See Comments)     \"Shaky leg syndrome\"  \"Shaky leg syndrome\"  \"Shaky leg syndrome\"   • Heparin    • Other      POPPY SEED   • Penicillins    • Petroleum Jelly [Skin Protectants, Misc.]    • Sulfa Antibiotics    • Sulfur    • Valium [Diazepam]        Objective      There were no vitals filed for this visit.      Current Status 2/28/2018   ECOG score 1       General Appearance: Patient is awake, alert, oriented and in no acute distress. Patient is welldeveloped, wellnourished, and appears stated age.  HEENT: Normocephalic. Sclerae clear, conjunctiva pink, extraocular movements intact, pupils, round, reactive to light and accommodation. Mouth and throat are clear with moist oral mucosa.  NECK: Supple, no jugular venous distention, thyroid not enlarged.  LYMPH: No cervical, supraclavicular, axillary, or inguinal lymphadenopathy.  CHEST: Equal bilateral expansion   LUNGS: Good air movement, no rales, rhonchi, rubs or wheezes with auscultation  CARDIO: Regular sinus rhythm, no murmurs, gallops or rubs.  ABDOMEN: Nondistended, soft, No tenderness, no guarding, no rebound, No hepatosplenomegaly. No abdominal masses. Bowel sounds positive. No hernia  MUSKEL: No joint swelling, decreased motion, or " inflammation  EXTREMS: No edema, clubbing, cyanosis, No varicose veins.  NEURO: Grossly nonfocal, Gait is coordinated and smooth, Cognition is preserved.  SKIN: No rashes, no ecchymoses, no petechia.  PSYCH: Oriented to time, place and person. Memory is preserved. Mood and affect appear normal      RECENT LABS:  WBC   Date Value Ref Range Status   02/28/2018 4.39 (L) 4.80 - 10.80 10*3/mm3 Final     RBC   Date Value Ref Range Status   02/28/2018 2.79 (L) 4.20 - 5.40 10*6/mm3 Final     Hemoglobin   Date Value Ref Range Status   02/28/2018 10.6 (L) 12.0 - 16.0 g/dL Final     Hematocrit   Date Value Ref Range Status   02/28/2018 31.3 (L) 37.0 - 47.0 % Final     MCV   Date Value Ref Range Status   02/28/2018 112.2 (H) 82.0 - 98.0 fL Final     MCH   Date Value Ref Range Status   02/28/2018 38.0 (H) 28.0 - 32.0 pg Final     MCHC   Date Value Ref Range Status   02/28/2018 33.9 33.0 - 36.0 g/dL Final     RDW   Date Value Ref Range Status   02/28/2018 13.5 12.0 - 15.0 % Final     RDW-SD   Date Value Ref Range Status   02/28/2018 56.2 (H) 40.0 - 54.0 fl Final     MPV   Date Value Ref Range Status   02/28/2018 9.1 6.0 - 12.0 fL Final     Platelets   Date Value Ref Range Status   02/28/2018 417 (H) 130 - 400 10*3/mm3 Final     Neutrophil %   Date Value Ref Range Status   02/28/2018 60.0 39.0 - 78.0 % Final     Lymphocyte %   Date Value Ref Range Status   02/28/2018 32.1 15.0 - 45.0 % Final     Monocyte %   Date Value Ref Range Status   02/28/2018 3.0 (L) 4.0 - 12.0 % Final     Eosinophil %   Date Value Ref Range Status   02/28/2018 2.7 0.0 - 4.0 % Final     Basophil %   Date Value Ref Range Status   02/28/2018 1.1 0.0 - 2.0 % Final     Immature Grans %   Date Value Ref Range Status   09/28/2017 0.3 0.0 - 5.0 % Final     Neutrophils, Absolute   Date Value Ref Range Status   02/28/2018 2.63 1.87 - 8.40 10*3/mm3 Final     Lymphocytes, Absolute   Date Value Ref Range Status   02/28/2018 1.41 0.72 - 4.86 10*3/mm3 Final     Monocytes,  Absolute   Date Value Ref Range Status   02/28/2018 0.13 (L) 0.19 - 1.30 10*3/mm3 Final     Eosinophils, Absolute   Date Value Ref Range Status   02/28/2018 0.12 0.00 - 0.70 10*3/mm3 Final     Basophils, Absolute   Date Value Ref Range Status   02/28/2018 0.05 0.00 - 0.20 10*3/mm3 Final     Immature Grans, Absolute   Date Value Ref Range Status   09/28/2017 0.01 0.00 - 0.03 10*3/mm3 Final     No visits with results within 7 Day(s) from this visit.   Latest known visit with results is:   Orders Only on 02/28/2018   Component Date Value Ref Range Status   • CA 27.29 03/01/2018 96.4* 0.0 - 38.6 U/mL Final             Assessment/Plan     Patient Active Problem List   Diagnosis   • Malignant neoplasm of upper-outer quadrant of left female breast   • Breast cancer metastasized to bone, unspecified laterality   • Essential hypertension   • Acquired hypothyroidism   • Bipolar 1 disorder   • Carcinoma of left breast metastatic to bone   • Antineoplastic chemotherapy induced anemia          Assessment:  1. Stage IV metastatic breast carcinoma, ER/RI positive. HER2/maynor negative undergoing Ibrance, Faslodex and Xgeva with overall stable with stable CA27.29 with slow decline. She is tolerating the treatment very well. Faslodex cycle 17 today and will continue to HOLD Xgeva due to upcoming dental work, last dose of Xgeva given on 09/29/2016.  01/2017 restaging CT chest abdomen and pelvis and bone scan showed stable diffuse sclerotic bony lesions, unchanged from 7/1/2016.Next scan July 2017    2.  Mild leukopenia without neutropenia.  Likely secondary to Ibrance.  Continue to monitor.     3.  Macrocytic anemia.  Hemoglobin 11.1.  02/2016, B12 and folate normal    4.  Status post 8 teeth extraction.     Recommendation/Plan:  1.  Proceed with Faslodex cycle 20.  2. Continue Ibrance 125 mg 3 weeks on, one-week off.  Reduce dose if leukopenia worsens.   3. Follow up in 28 days for Faslodex cycle 19.   Repeat CA 27.29.  Previous CA  27.29 have remained stable, the last one done on 11/2017 was 89 Stable. Continue Faslodex q monthly.    Additionally she is anemic with a hemoglobin of 10 g percent with decreasing GFR.  I do suspect that she may have some form of chronic kidney disease, as evaluated by Nephrology, CKD from tn.  No need for EPO as she is asyptomatic.  Redraw CA 27.29 today. Cont faslodex + Ibrance for now until repeat CA 27.29 returns. Last CA 27.29 in Feb 2018 slightly increased to 96. I have discussed different options with her:  #1. Stop Ibrance and switch to a new CD4K inhabitor Kisqali or Verenzio.  #. Stop Ibrance and switch to [employer] + faslodex.  #. Cont Ibrance and Faslodex with just a subtle rise, since clinically pt is doing better.  Joseph Nunez MD    3/28/2018    10:27 AM

## 2018-03-29 LAB — CANCER AG27-29 SERPL-ACNC: 97.4 U/ML (ref 0–38.6)

## 2018-04-25 ENCOUNTER — LAB (OUTPATIENT)
Dept: LAB | Facility: HOSPITAL | Age: 73
End: 2018-04-25
Attending: INTERNAL MEDICINE

## 2018-04-25 ENCOUNTER — INFUSION (OUTPATIENT)
Dept: ONCOLOGY | Facility: HOSPITAL | Age: 73
End: 2018-04-25
Attending: INTERNAL MEDICINE

## 2018-04-25 ENCOUNTER — OFFICE VISIT (OUTPATIENT)
Dept: ONCOLOGY | Facility: CLINIC | Age: 73
End: 2018-04-25

## 2018-04-25 ENCOUNTER — TRANSCRIBE ORDERS (OUTPATIENT)
Dept: ONCOLOGY | Facility: HOSPITAL | Age: 73
End: 2018-04-25

## 2018-04-25 VITALS
OXYGEN SATURATION: 100 % | WEIGHT: 164 LBS | HEART RATE: 47 BPM | TEMPERATURE: 97.7 F | BODY MASS INDEX: 27.32 KG/M2 | DIASTOLIC BLOOD PRESSURE: 58 MMHG | HEIGHT: 65 IN | RESPIRATION RATE: 20 BRPM | SYSTOLIC BLOOD PRESSURE: 156 MMHG

## 2018-04-25 VITALS
BODY MASS INDEX: 27.29 KG/M2 | RESPIRATION RATE: 16 BRPM | TEMPERATURE: 97 F | HEART RATE: 56 BPM | SYSTOLIC BLOOD PRESSURE: 138 MMHG | HEIGHT: 65 IN | DIASTOLIC BLOOD PRESSURE: 82 MMHG | OXYGEN SATURATION: 90 % | WEIGHT: 163.8 LBS

## 2018-04-25 DIAGNOSIS — C50.412 MALIGNANT NEOPLASM OF UPPER-OUTER QUADRANT OF LEFT FEMALE BREAST, UNSPECIFIED ESTROGEN RECEPTOR STATUS (HCC): Primary | ICD-10-CM

## 2018-04-25 DIAGNOSIS — C50.412 MALIGNANT NEOPLASM OF UPPER-OUTER QUADRANT OF LEFT FEMALE BREAST, UNSPECIFIED ESTROGEN RECEPTOR STATUS (HCC): ICD-10-CM

## 2018-04-25 DIAGNOSIS — N18.30 CHRONIC KIDNEY DISEASE, STAGE III (MODERATE) (HCC): Primary | ICD-10-CM

## 2018-04-25 DIAGNOSIS — Z17.0 MALIGNANT NEOPLASM OF OVERLAPPING SITES OF BREAST IN FEMALE, ESTROGEN RECEPTOR POSITIVE, UNSPECIFIED LATERALITY (HCC): Primary | ICD-10-CM

## 2018-04-25 DIAGNOSIS — C50.819 MALIGNANT NEOPLASM OF OVERLAPPING SITES OF BREAST IN FEMALE, ESTROGEN RECEPTOR POSITIVE, UNSPECIFIED LATERALITY (HCC): Primary | ICD-10-CM

## 2018-04-25 LAB
ALBUMIN SERPL-MCNC: 4.4 G/DL (ref 3.5–5)
ALBUMIN/GLOB SERPL: 1.1 G/DL (ref 1.1–2.5)
ALP SERPL-CCNC: 90 U/L (ref 24–120)
ALT SERPL W P-5'-P-CCNC: 26 U/L (ref 0–54)
ANION GAP SERPL CALCULATED.3IONS-SCNC: 10 MMOL/L (ref 4–13)
ANISOCYTOSIS BLD QL: ABNORMAL
AST SERPL-CCNC: 40 U/L (ref 7–45)
BASOPHILS # BLD MANUAL: 0.1 10*3/MM3 (ref 0–0.2)
BASOPHILS NFR BLD AUTO: 3 % (ref 0–2)
BILIRUB SERPL-MCNC: 0.6 MG/DL (ref 0.1–1)
BILIRUB UR QL STRIP: NEGATIVE
BUN BLD-MCNC: 14 MG/DL (ref 5–21)
BUN/CREAT SERPL: 10.1 (ref 7–25)
CALCIUM SPEC-SCNC: 10.1 MG/DL (ref 8.4–10.4)
CHLORIDE SERPL-SCNC: 96 MMOL/L (ref 98–110)
CLARITY UR: CLEAR
CO2 SERPL-SCNC: 32 MMOL/L (ref 24–31)
COLOR UR: YELLOW
CREAT BLD-MCNC: 1.39 MG/DL (ref 0.5–1.4)
DEPRECATED RDW RBC AUTO: 61.1 FL (ref 40–54)
EOSINOPHIL # BLD MANUAL: 0.1 10*3/MM3 (ref 0–0.7)
EOSINOPHIL NFR BLD MANUAL: 3 % (ref 0–4)
ERYTHROCYTE [DISTWIDTH] IN BLOOD BY AUTOMATED COUNT: 14.6 % (ref 12–15)
GFR SERPL CREATININE-BSD FRML MDRD: 37 ML/MIN/1.73
GIANT PLATELETS: ABNORMAL
GLOBULIN UR ELPH-MCNC: 3.9 GM/DL
GLUCOSE BLD-MCNC: 102 MG/DL (ref 70–100)
GLUCOSE UR STRIP-MCNC: NEGATIVE MG/DL
HCT VFR BLD AUTO: 29.3 % (ref 37–47)
HGB BLD-MCNC: 10 G/DL (ref 12–16)
HGB UR QL STRIP.AUTO: NEGATIVE
HOLD SPECIMEN: NORMAL
HOLD SPECIMEN: NORMAL
KETONES UR QL STRIP: NEGATIVE
LEUKOCYTE ESTERASE UR QL STRIP.AUTO: NEGATIVE
LYMPHOCYTES # BLD MANUAL: 1.08 10*3/MM3 (ref 0.72–4.86)
LYMPHOCYTES NFR BLD MANUAL: 31.3 % (ref 15–45)
LYMPHOCYTES NFR BLD MANUAL: 6.1 % (ref 4–12)
MACROCYTES BLD QL SMEAR: ABNORMAL
MCH RBC QN AUTO: 38.6 PG (ref 28–32)
MCHC RBC AUTO-ENTMCNC: 34.1 G/DL (ref 33–36)
MCV RBC AUTO: 113.1 FL (ref 82–98)
MONOCYTES # BLD AUTO: 0.21 10*3/MM3 (ref 0.19–1.3)
NEUTROPHILS # BLD AUTO: 1.6 10*3/MM3 (ref 1.87–8.4)
NEUTROPHILS NFR BLD MANUAL: 44.4 % (ref 39–78)
NEUTS BAND NFR BLD MANUAL: 2 % (ref 0–10)
NITRITE UR QL STRIP: NEGATIVE
PH UR STRIP.AUTO: 7 [PH] (ref 5–8)
PHOSPHATE SERPL-MCNC: 3.6 MG/DL (ref 2.5–4.5)
PLATELET # BLD AUTO: 437 10*3/MM3 (ref 130–400)
PMV BLD AUTO: 9.6 FL (ref 6–12)
POTASSIUM BLD-SCNC: 4.4 MMOL/L (ref 3.5–5.3)
PROT SERPL-MCNC: 8.3 G/DL (ref 6.3–8.7)
PROT UR QL STRIP: NEGATIVE
PTH-INTACT SERPL-MCNC: 37.7 PG/ML (ref 7.5–53.5)
RBC # BLD AUTO: 2.59 10*6/MM3 (ref 4.2–5.4)
SMALL PLATELETS BLD QL SMEAR: ABNORMAL
SODIUM BLD-SCNC: 138 MMOL/L (ref 135–145)
SP GR UR STRIP: <=1.005 (ref 1–1.03)
URATE SERPL-MCNC: 7.6 MG/DL (ref 2.7–7.5)
UROBILINOGEN UR QL STRIP: NORMAL
VARIANT LYMPHS NFR BLD MANUAL: 10.1 % (ref 0–5)
WBC MORPH BLD: NORMAL
WBC NRBC COR # BLD: 3.44 10*3/MM3 (ref 4.8–10.8)

## 2018-04-25 PROCEDURE — 81003 URINALYSIS AUTO W/O SCOPE: CPT | Performed by: INTERNAL MEDICINE

## 2018-04-25 PROCEDURE — 85025 COMPLETE CBC W/AUTO DIFF WBC: CPT

## 2018-04-25 PROCEDURE — 36415 COLL VENOUS BLD VENIPUNCTURE: CPT

## 2018-04-25 PROCEDURE — 86300 IMMUNOASSAY TUMOR CA 15-3: CPT | Performed by: INTERNAL MEDICINE

## 2018-04-25 PROCEDURE — 85007 BL SMEAR W/DIFF WBC COUNT: CPT

## 2018-04-25 PROCEDURE — 84100 ASSAY OF PHOSPHORUS: CPT

## 2018-04-25 PROCEDURE — 99214 OFFICE O/P EST MOD 30 MIN: CPT | Performed by: INTERNAL MEDICINE

## 2018-04-25 PROCEDURE — 25010000002 FULVESTRANT PER 25 MG: Performed by: INTERNAL MEDICINE

## 2018-04-25 PROCEDURE — 84550 ASSAY OF BLOOD/URIC ACID: CPT | Performed by: INTERNAL MEDICINE

## 2018-04-25 PROCEDURE — 80053 COMPREHEN METABOLIC PANEL: CPT

## 2018-04-25 PROCEDURE — 83970 ASSAY OF PARATHORMONE: CPT | Performed by: INTERNAL MEDICINE

## 2018-04-25 PROCEDURE — 96402 CHEMO HORMON ANTINEOPL SQ/IM: CPT

## 2018-04-25 RX ADMIN — FULVESTRANT 500 MG: 50 INJECTION INTRAMUSCULAR at 13:42

## 2018-04-25 NOTE — PROGRESS NOTES
[facility]  HEMATOLOGY & ONCOLOGY        Subjective    Metastatic breast cancer on Faslodex and Ibrance     Staging form: Breast, AJCC V7      Clinical stage from 10/11/2016: Stage IV (T2, N1, M1) - ER/SC positive. HER2/maynor negative  VISIT DIAGNOSIS:   Metastatic breast cancer       HEMATOLOGY / ONCOLOGY HISTORY:   Oncology/Hematology History    Patient is a 67-year-old postmenopausal female who had onset of her menses at age 9 and menopause at age 48. She received oral contraceptives for approximately 14 years and did not receive hormonal manipulation. Patient has a maternal cousin had breast cancer. Patient has had no prior breast biopsies. Patient found a palpable left breast mass as well as a left axillary mass. Patient had a bloody nipple discharge which been present for approximately 6 months. On 6/20/2012, a mammogram was performed showing a subareolar mass consistent with malignancy. Bilateral breast ultrasound revealed an ill-defined subareolar mass measuring 2.8-2.5 cm. There is a left axillary mass measuring 1.7 cm with suspicion of extracapsular extension. There is no evidence of disease in the right breast. On 7/9/2012 patient underwent a left breast biopsy and placement of marker clip. Left breast biopsy revealed an infiltrating lobular carcinoma grade 2 with focal ductal carcinoma in situ, solid pattern. A fine-needle biopsy of the left lymph node was consistent with metastatic carcinoma. Breast tumor profile revealed ER: 100%; SC: 98%; HER-2/maynor 2+; FISH: Unamplified; Ki-67: 71%; P 53 1%; DNA aneuploidy. A MRI of the breast were performed body multiple abnormal enhancing masses within the left breast. There appears to be anterior retraction of the pectoralis muscle with no direct extension. There is a moderate to large, layering left pleural effusion appreciated. The right breast reveals 3 moderately enlarged lymph nodes in the posterior lateral aspect the right breast. These have  fatty hilum but followup is recommended. Patient is undergone systemic studies including, on 8/2/2012, a CT scan of the brain showing atrophy. a CT scan that shows showing a small to moderate left pleural effusion with 3 subcentimeter nodule densities in the left lung.   She completed 4 cycles of neoadjuvant treatment with dose dense Adriamycin and Cytoxan. She had a mammogram which showed a significant reduction in the size of her left breast lesion. There is no axillary adenopathy noted. She has had an ultrasound revealing the  lesion reducing from 2.4 cm to 1 cm. Patient underwent a left mastectomy on 03/19/2013 finding negative invasive cancer, negative nodes. A 5% DCIS was noted. The patient has declined hormonal therapy. She did not need radiation therapy.  November 2014, she began to have evidence of lytic bone lesions at T5T6, frontal disease, an 8 mm lucency in the central frontal area of  the skull but nothing intracranial. Bone scan revealed only vertex tracer activity but bilateral ribs and thoracic spine, and other lesions in  her pelvis. At that time, she was started on letrozole since November 2014. She is taking Xgeva. She had progression found in bone in Feb 2015 on scan. She was started on Faslodex, Ibrance and continued xgeva.         Malignant neoplasm of upper-outer quadrant of left female breast    7/9/2012 Initial Diagnosis     Malignant neoplasm of upper-outer quadrant of left female breast         7/10/2012 Biopsy     Left Breast Biopsy & Axillary Node FNA: A) Infiltrating lobular carcinoma, Grade 2. B) Foci of ductal carcinoma in situ, solid pattern. C) Greatest dimension of the invasive carcinoma is 15.8mm.         3/19/2013 Surgery     Left Mastectomy w/ Axillary Tail: Focal residual ducatal carcinoma in situ (DCIS) 12 lymph nodes negative for metastatic carcinoma.         10/20/2014 Biopsy     Peritoneum Biopsy: Final Diagnosis: Malignant Cell Neoplasm.           Breast cancer  metastasized to bone, unspecified laterality    10/15/2012 -  Other Event     Left mammogram:  Impression:  1. Decreasing spiculation and density in the retroareolar left breast, near a previous biopsy.   2. Definite left axillary lesion is not appreciated.          2/5/2013 -  Other Event     Diagnostic Mammo:  Comparison is made to 10/15/2012 and 6/20/2012  The spiculated mass at 12:00 behind the nipple is nearly resolved.   Impression:  1. The mass on the left side is less apparent after receiving chemotherapy. There has been a good response to chmotherapy on the left side.   2. No suspicious abnormality is seen in the right breast to account for the new palable abnormality at the 4-5:00 position.          3/19/2013 Surgery     Breast Biopsy:  Final Diagnosis:  A. Breast, left mastectomy with axillary tail  1. Focal residual ductal carcinoma in situ (DICS) (less than 5% of tumor bed).  2. Negative for residual invasive carcinoma.   3. 12 Lymph Nodes, negative for metastatic carcinoma (0/12) focally, some lymph nodes demonstrate fibrosis/treatment effect.  B. Skin and soft tissue, left advancement flap:  1. Benign skin and subcutis.   2. Negative for carcinoma.          6/20/2013 -  Other Event     Diagnostic Mammo [facility]:  1. A subareolar mass is consistent with malignancy. Additional involement extends inferiorly sonographically and superolaterally mammograhically as demonstrated by calcifications. The mass measures approx 2.8 cm sonographically, but the involved region is likely much larger including an intraductal componet. The left axillary lymph nodes are also involved.   2. Recommened ultrasound guided biopsy of the left subareolar mass and left axillary lymph node.   3. Breast MRI could also further define multicentric disease on the left.   4. Clear suspicious finding on the right.  Recommend ongoing clinical follow up of palpable foci.          6/13/2014 -  Other Event     Diagnostic  Mammo:  IMPRESSION:  1. History of left breast cancer and mastectomy. Stable benign right breast mammograms.          10/20/2014 Surgery     Final Diagnosis:  Biopsies, pertoneum:  Metastatic carcinoma, morphologically compatible with metastatic mammary carcinoma.          6/15/2015 -  Other Event     Diagnostic Mammo:  IMPRESSION:   1. Negative x-ray report, should not delay biopsy if a dominat or clinically suspicious mass is present.          7/5/2016 -  Other Event     Diagnositic mammogram:  Impression:   Stable right mamogram with no radiographic findings suspicious for malignancy. Recommend continued screening mammography per screening guidelines unless otherwise earlier clinically indicated.          9/18/2016 Initial Diagnosis     Secondary malignant neoplasm of bone and bone marrow          Cancer Staging Information:  Malignant neoplasm of upper-outer quadrant of left female breast    Staging form: Breast, AJCC V7      Clinical stage from 10/11/2016: Stage IV (T2, N1, M1) - Signed by ALBERT Kulkarni on 10/11/2016      INTERVAL HISTORY  Patient ID: Heavenly Yanes is a 73 y.o. year old female history of metastatic breast carcinoma to lung and bone.  She is presently receiving treatment with Ibrance, Faslodex and Xgeva.  Tumor markers continued to decline with a CA 27-29 of 92.2 obtained on 9:30 2016 compared to 105.8 on 07/27/2016.  Mr. Yanes continues to tolerate this treatment regimen well and is here today for evaluation consideration to receive Faslodex and Xgeva.  Last bone mineral density study was obtained 11/21/2014 indicating osteopopenia, we will obtain a repeat bone mineral density study. Ms. Yanes completed 8 teeth pulled 02/14/2017.   Xgeva on hold, resume 05/2017.     Past Medical History:   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   • Bipolar disorder    • Disease of thyroid gland    • Hypertension    • Malignant neoplasm of  upper-outer quadrant of left female breast 9/18/2016   • Secondary malignant neoplasm of bone and bone marrow 9/18/2016     Past Surgical History:   Past Surgical History:   Procedure Laterality Date   • BREAST SURGERY      left breast biopsy and axillary node   • CHOLECYSTECTOMY     • EXTERNAL EAR SURGERY     • MASTECTOMY Left    • PORTACATH PLACEMENT       Social History:   Social History     Social History   • Marital status:      Spouse name: N/A   • Number of children: N/A   • Years of education: N/A     Occupational History   • Not on file.     Social History Main Topics   • Smoking status: Never Smoker   • Smokeless tobacco: Never Used   • Alcohol use No   • Drug use: No   • Sexual activity: Not on file     Other Topics Concern   • Not on file     Social History Narrative   • No narrative on file     Family History:   Family History   Problem Relation Age of Onset   • No Known Problems Daughter    • No Known Problems Son    • Other Mother      complications from a concussion from a fall   • Other Father      old age   • No Known Problems Brother    • No Known Problems Daughter        Review of Systems   Constitutional: Negative.  Negative for activity change, appetite change, chills, diaphoresis, fatigue and fever.   HENT: Negative.  Negative for congestion, ear discharge, ear pain, facial swelling, hearing loss, mouth sores, nosebleeds, postnasal drip, rhinorrhea, sinus pressure, sore throat, tinnitus, trouble swallowing and voice change.    Eyes: Negative.  Negative for photophobia, pain, discharge and visual disturbance.   Respiratory: Negative.  Negative for apnea, cough, chest tightness, shortness of breath, wheezing and stridor.    Cardiovascular: Negative.  Negative for chest pain, palpitations and leg swelling.   Gastrointestinal: Negative.  Negative for abdominal distention, abdominal pain, blood in stool, constipation, diarrhea, nausea, rectal pain and vomiting.   Endocrine: Negative.   Negative for cold intolerance, heat intolerance, polydipsia, polyphagia and polyuria.   Genitourinary: Negative.  Negative for difficulty urinating, dysuria, flank pain, frequency, hematuria and urgency.   Musculoskeletal: Negative.  Negative for arthralgias, back pain, gait problem, joint swelling and myalgias.   Skin: Negative.  Negative for color change, pallor, rash and wound.   Allergic/Immunologic: Negative.  Negative for environmental allergies, food allergies and immunocompromised state.   Neurological: Negative.  Negative for dizziness, tremors, seizures, syncope, speech difficulty, weakness, light-headedness, numbness and headaches.   Hematological: Negative.  Negative for adenopathy. Does not bruise/bleed easily.   Psychiatric/Behavioral: Negative.  Negative for agitation, confusion, hallucinations, sleep disturbance and suicidal ideas. The patient is not nervous/anxious.    All other systems reviewed and are negative.       Performance Status:  Asymptomatic    Medications:    Current Outpatient Prescriptions   Medication Sig Dispense Refill   • B Complex Vitamins (VITAMIN B COMPLEX PO) Take  by mouth. 1 po daily     • Calcium Carb-Cholecalciferol (CALCIUM 600 + D PO) Take  by mouth.     • DULoxetine (CYMBALTA) 30 MG capsule Take 30 mg by mouth daily.     • ferrous sulfate 325 (65 FE) MG tablet Take 325 mg by mouth 2 (two) times a day.     • Lysine HCl (L-LYSINE) 500 MG tablet tablet Take  by mouth daily.     • Magnesium 250 MG tablet Take  by mouth.     • melatonin 3 MG tablet Take  by mouth.     • metoprolol tartrate (LOPRESSOR) 25 MG tablet Take 25 mg by mouth 2 (two) times a day.     • OLANZapine (ZYPREXA) 2.5 MG tablet Take 2.5 mg by mouth every night.     • Palbociclib 125 MG capsule capsule Take 1 capsule by mouth Daily. 3 weeks on, 1 week off 21 capsule 5   • Probiotic Product (PROBIOTIC DAILY PO) Take  by mouth. 300million cell daily     • raNITIdine (ZANTAC) 75 MG tablet Take 75 mg by mouth 2  "(Two) Times a Day.     • terbinafine (lamiSIL) 250 MG tablet      • thyroid 60 MG PO tablet Take 60 mg by mouth daily.     • VALERIAN ROOT PO Take  by mouth. 100mg daily     • Zinc 50 MG tablet Take  by mouth. 1 po daily       No current facility-administered medications for this visit.      Facility-Administered Medications Ordered in Other Visits   Medication Dose Route Frequency Provider Last Rate Last Dose   • heparin flush (porcine) 100 UNIT/ML injection 500 Units  500 Units Intravenous PRN Joseph Nunez MD   500 Units at 10/26/17 1155   • sodium chloride 0.9 % flush 10 mL  10 mL Intravenous PRN Joseph Nunez MD   10 mL at 10/26/17 1155       ALLERGIES:    Allergies   Allergen Reactions   • Codeine    • Diphenhydramine Other (See Comments)     \"Shaky leg syndrome\"  \"Shaky leg syndrome\"  \"Shaky leg syndrome\"   • Heparin    • Other      POPPY SEED   • Penicillins    • Petroleum Jelly [Skin Protectants, Misc.]    • Sulfa Antibiotics    • Sulfur    • Valium [Diazepam]        Objective      Vitals:    04/25/18 1023   BP: 138/82   Pulse: 56   Resp: 16   Temp: 97 °F (36.1 °C)   TempSrc: Tympanic   SpO2: 90%   Weight: 74.3 kg (163 lb 12.8 oz)   Height: 165 cm (64.96\")         Current Status 4/25/2018   ECOG score 0       General Appearance: Patient is awake, alert, oriented and in no acute distress. Patient is welldeveloped, wellnourished, and appears stated age.  HEENT: Normocephalic. Sclerae clear, conjunctiva pink, extraocular movements intact, pupils, round, reactive to light and accommodation. Mouth and throat are clear with moist oral mucosa.  NECK: Supple, no jugular venous distention, thyroid not enlarged.  LYMPH: No cervical, supraclavicular, axillary, or inguinal lymphadenopathy.  CHEST: Equal bilateral expansion   LUNGS: Good air movement, no rales, rhonchi, rubs or wheezes with auscultation  CARDIO: Regular sinus rhythm, no murmurs, gallops or rubs.  ABDOMEN: Nondistended, soft, No tenderness, no guarding, " no rebound, No hepatosplenomegaly. No abdominal masses. Bowel sounds positive. No hernia  MUSKEL: No joint swelling, decreased motion, or inflammation  EXTREMS: No edema, clubbing, cyanosis, No varicose veins.  NEURO: Grossly nonfocal, Gait is coordinated and smooth, Cognition is preserved.  SKIN: No rashes, no ecchymoses, no petechia.  PSYCH: Oriented to time, place and person. Memory is preserved. Mood and affect appear normal      RECENT LABS:  WBC   Date Value Ref Range Status   04/25/2018 3.44 (L) 4.80 - 10.80 10*3/mm3 Preliminary     RBC   Date Value Ref Range Status   04/25/2018 2.59 (L) 4.20 - 5.40 10*6/mm3 Preliminary     Hemoglobin   Date Value Ref Range Status   04/25/2018 10.0 (L) 12.0 - 16.0 g/dL Preliminary     Hematocrit   Date Value Ref Range Status   04/25/2018 29.3 (L) 37.0 - 47.0 % Preliminary     MCV   Date Value Ref Range Status   04/25/2018 113.1 (H) 82.0 - 98.0 fL Preliminary     MCH   Date Value Ref Range Status   04/25/2018 38.6 (H) 28.0 - 32.0 pg Preliminary     MCHC   Date Value Ref Range Status   04/25/2018 34.1 33.0 - 36.0 g/dL Preliminary     RDW   Date Value Ref Range Status   04/25/2018 14.6 12.0 - 15.0 % Preliminary     RDW-SD   Date Value Ref Range Status   04/25/2018 61.1 (H) 40.0 - 54.0 fl Preliminary     MPV   Date Value Ref Range Status   04/25/2018 9.6 6.0 - 12.0 fL Preliminary     Platelets   Date Value Ref Range Status   04/25/2018 437 (H) 130 - 400 10*3/mm3 Preliminary     Neutrophil %   Date Value Ref Range Status   02/28/2018 60.0 39.0 - 78.0 % Final     Lymphocyte %   Date Value Ref Range Status   02/28/2018 32.1 15.0 - 45.0 % Final     Monocyte %   Date Value Ref Range Status   02/28/2018 3.0 (L) 4.0 - 12.0 % Final     Eosinophil %   Date Value Ref Range Status   02/28/2018 2.7 0.0 - 4.0 % Final     Basophil %   Date Value Ref Range Status   02/28/2018 1.1 0.0 - 2.0 % Final     Immature Grans %   Date Value Ref Range Status   09/28/2017 0.3 0.0 - 5.0 % Final      Neutrophils, Absolute   Date Value Ref Range Status   02/28/2018 2.63 1.87 - 8.40 10*3/mm3 Final     Neutrophils Absolute   Date Value Ref Range Status   03/28/2018 1.71 1.00 - 11.00 10*3/mm3 Final     Lymphocytes, Absolute   Date Value Ref Range Status   02/28/2018 1.41 0.72 - 4.86 10*3/mm3 Final     Monocytes, Absolute   Date Value Ref Range Status   02/28/2018 0.13 (L) 0.19 - 1.30 10*3/mm3 Final     Eosinophils, Absolute   Date Value Ref Range Status   02/28/2018 0.12 0.00 - 0.70 10*3/mm3 Final     Eosinophils Absolute   Date Value Ref Range Status   03/28/2018 0.08 0.00 - 0.70 10*3/mm3 Final     Basophils, Absolute   Date Value Ref Range Status   02/28/2018 0.05 0.00 - 0.20 10*3/mm3 Final     Basophils Absolute   Date Value Ref Range Status   03/28/2018 0.04 0.00 - 0.20 10*3/mm3 Final     Immature Grans, Absolute   Date Value Ref Range Status   09/28/2017 0.01 0.00 - 0.03 10*3/mm3 Final     nRBC   Date Value Ref Range Status   04/25/2018 0.0 0.0 - 0.0 /100 WBC Preliminary     Transcribe Orders on 04/25/2018   Component Date Value Ref Range Status   • Color, UA 04/25/2018 Yellow  Yellow, Straw Final   • Appearance, UA 04/25/2018 Clear  Clear Final   • pH, UA 04/25/2018 7.0  5.0 - 8.0 Final   • Specific Gravity, UA 04/25/2018 <=1.005  1.005 - 1.030 Final   • Glucose, UA 04/25/2018 Negative  Negative Final   • Ketones, UA 04/25/2018 Negative  Negative Final   • Bilirubin, UA 04/25/2018 Negative  Negative Final   • Blood, UA 04/25/2018 Negative  Negative Final   • Protein, UA 04/25/2018 Negative  Negative Final   • Leuk Esterase, UA 04/25/2018 Negative  Negative Final   • Nitrite, UA 04/25/2018 Negative  Negative Final   • Urobilinogen, UA 04/25/2018 1.0 E.U./dL  0.2 - 1.0 E.U./dL Final   • Uric Acid 04/25/2018 7.6* 2.7 - 7.5 mg/dL Final   Lab on 04/25/2018   Component Date Value Ref Range Status   • WBC 04/25/2018 3.44* 4.80 - 10.80 10*3/mm3 Preliminary   • RBC 04/25/2018 2.59* 4.20 - 5.40 10*6/mm3 Preliminary    • Hemoglobin 04/25/2018 10.0* 12.0 - 16.0 g/dL Preliminary   • Hematocrit 04/25/2018 29.3* 37.0 - 47.0 % Preliminary   • MCV 04/25/2018 113.1* 82.0 - 98.0 fL Preliminary   • MCH 04/25/2018 38.6* 28.0 - 32.0 pg Preliminary   • MCHC 04/25/2018 34.1  33.0 - 36.0 g/dL Preliminary   • RDW 04/25/2018 14.6  12.0 - 15.0 % Preliminary   • RDW-SD 04/25/2018 61.1* 40.0 - 54.0 fl Preliminary   • MPV 04/25/2018 9.6  6.0 - 12.0 fL Preliminary   • Platelets 04/25/2018 437* 130 - 400 10*3/mm3 Preliminary   • nRBC 04/25/2018 0.0  0.0 - 0.0 /100 WBC Preliminary             Assessment/Plan     Patient Active Problem List   Diagnosis   • Malignant neoplasm of upper-outer quadrant of left female breast   • Breast cancer metastasized to bone, unspecified laterality   • Essential hypertension   • Acquired hypothyroidism   • Bipolar 1 disorder   • Carcinoma of left breast metastatic to bone   • Antineoplastic chemotherapy induced anemia          Assessment:  1. Stage IV metastatic breast carcinoma, ER/RI positive. HER2/maynor negative undergoing Ibrance, Faslodex and Xgeva with overall stable with stable CA27.29 with slow decline. She is tolerating the treatment very well. Faslodex cycle 17 today and will continue to HOLD Xgeva due to upcoming dental work, last dose of Xgeva given on 09/29/2016.  01/2017 restaging CT chest abdomen and pelvis and bone scan showed stable diffuse sclerotic bony lesions, unchanged from 7/1/2016.Next scan July 2017    2.  Mild leukopenia without neutropenia.  Likely secondary to Ibrance.  Continue to monitor.     3.  Macrocytic anemia.  Hemoglobin 11.1.  02/2016, B12 and folate normal    4.  Status post 8 teeth extraction.     Recommendation/Plan:  1.  Proceed with Faslodex cycle 20.  2. Continue Ibrance 125 mg 3 weeks on, one-week off.  Reduce dose if leukopenia worsens.   3. Follow up in 28 days for Faslodex cycle 19.   Repeat CA 27.29.  Previous CA 27.29 have remained stable, the last one done on 11/2017 was  89 Stable. Continue Faslodex q monthly.    Additionally she is anemic with a hemoglobin of 10 g percent with decreasing GFR.  I do suspect that she may have some form of chronic kidney disease, as evaluated by Nephrology, CKD from [facility].  No need for EPO as she is asyptomatic.  Redraw CA 27.29 today. Cont faslodex + Ibrance for now until repeat CA 27.29 returns. Last CA 27.29 in Feb 2018 slightly increased to 96. I have discussed different options with her:  #1. Stop Ibrance and switch to a new CD4K inhabitor Kisqali or Verenzio.  #. Stop Ibrance and switch to Affinitor + faslodex.  #. Cont Ibrance and Faslodex with just a subtle rise, since clinically pt is doing better.    At this point will restage with CT scans CAP at [facility] and compare to one done Jan 2017. If progression on CT scans and CA 27.29 then go for the [facility] trial of using MTOR inhabitor Everlimos + Exemestine.  Joseph Nunez MD    4/25/2018    10:59 AM

## 2018-04-26 LAB — CANCER AG27-29 SERPL-ACNC: 99.1 U/ML (ref 0–38.6)

## 2018-05-04 ENCOUNTER — HOSPITAL ENCOUNTER (OUTPATIENT)
Dept: CT IMAGING | Age: 73
Discharge: HOME | End: 2018-05-04
Payer: MEDICARE

## 2018-05-04 DIAGNOSIS — C50.412 MALIGNANT NEOPLASM OF UPPER-OUTER QUADRANT OF LEFT FEMALE BREAST, UNSPECIFIED ESTROGEN RECEPTOR STATUS (HCC): ICD-10-CM

## 2018-05-04 DIAGNOSIS — C50.819 MALIGNANT NEOPLASM OF MIDLINE OF BREAST, UNSPECIFIED LATERALITY (HCC): ICD-10-CM

## 2018-05-04 LAB
GFR NON-AFRICAN AMERICAN: 34
PERFORMED ON: ABNORMAL
POC CREATININE: 1.5 MG/DL (ref 0.3–1.3)
POC SAMPLE TYPE: ABNORMAL

## 2018-05-04 PROCEDURE — 82565 ASSAY OF CREATININE: CPT

## 2018-05-04 PROCEDURE — 71250 CT THORAX DX C-: CPT

## 2018-05-04 PROCEDURE — 74176 CT ABD & PELVIS W/O CONTRAST: CPT

## 2018-05-08 ENCOUNTER — TELEPHONE (OUTPATIENT)
Dept: ONCOLOGY | Facility: CLINIC | Age: 73
End: 2018-05-08

## 2018-05-08 DIAGNOSIS — C79.52 SECONDARY MALIGNANT NEOPLASM OF BONE AND BONE MARROW (HCC): ICD-10-CM

## 2018-05-08 DIAGNOSIS — C50.412 MALIGNANT NEOPLASM OF UPPER-OUTER QUADRANT OF LEFT FEMALE BREAST, UNSPECIFIED ESTROGEN RECEPTOR STATUS (HCC): Primary | ICD-10-CM

## 2018-05-08 DIAGNOSIS — C79.51 SECONDARY MALIGNANT NEOPLASM OF BONE AND BONE MARROW (HCC): ICD-10-CM

## 2018-05-08 NOTE — TELEPHONE ENCOUNTER
Returned call to patient she was requesting recent Tumor Marker results,   : 99.1  Discussed with Dr Nunez he reviewed previous lab markers. At this time he considers this stable and it is not above 100 so no txt change at this time. Patient v/u of conversation

## 2018-05-15 ENCOUNTER — TELEPHONE (OUTPATIENT)
Dept: ONCOLOGY | Facility: CLINIC | Age: 73
End: 2018-05-15

## 2018-05-15 NOTE — TELEPHONE ENCOUNTER
Returned call to patient, she had called checking on the status of her new medications that Dr Nunez was wanting to try her on. Reviewed patient chart, patient is jenni for Bone Scan on Monday 5/21/18 after Dr Nunez reviews the results of this then he will make a decision of changes that need to be made in her treatment plan. Patient v/u of our conversation and will discuss results with Dr Nunez next week, Wed 5/23/18 apt.

## 2018-05-21 ENCOUNTER — HOSPITAL ENCOUNTER (OUTPATIENT)
Dept: NUCLEAR MEDICINE | Age: 73
Discharge: HOME | End: 2018-05-23
Payer: MEDICARE

## 2018-05-21 DIAGNOSIS — C50.412 MALIGNANT NEOPLASM OF UPPER-OUTER QUADRANT OF LEFT FEMALE BREAST, UNSPECIFIED ESTROGEN RECEPTOR STATUS (HCC): ICD-10-CM

## 2018-05-21 DIAGNOSIS — C79.51 SECONDARY MALIGNANT NEOPLASM OF BONE AND BONE MARROW (HCC): ICD-10-CM

## 2018-05-21 DIAGNOSIS — C79.9 METASTATIC MALIGNANT NEOPLASM, UNSPECIFIED SITE (HCC): ICD-10-CM

## 2018-05-21 DIAGNOSIS — C79.52 SECONDARY MALIGNANT NEOPLASM OF BONE AND BONE MARROW (HCC): ICD-10-CM

## 2018-05-21 PROCEDURE — 3430000000 HC RX DIAGNOSTIC RADIOPHARMACEUTICAL: Performed by: INTERNAL MEDICINE

## 2018-05-21 PROCEDURE — A9561 TC99M OXIDRONATE: HCPCS | Performed by: INTERNAL MEDICINE

## 2018-05-21 PROCEDURE — 78306 BONE IMAGING WHOLE BODY: CPT

## 2018-05-21 RX ADMIN — TECHNETIUM TC 99M OXIDRONATE 20 MILLICURIE: 3.15 INJECTION, POWDER, LYOPHILIZED, FOR SOLUTION INTRAVENOUS at 14:21

## 2018-05-23 ENCOUNTER — INFUSION (OUTPATIENT)
Dept: ONCOLOGY | Facility: HOSPITAL | Age: 73
End: 2018-05-23
Attending: INTERNAL MEDICINE

## 2018-05-23 ENCOUNTER — OFFICE VISIT (OUTPATIENT)
Dept: ONCOLOGY | Facility: CLINIC | Age: 73
End: 2018-05-23

## 2018-05-23 ENCOUNTER — LAB (OUTPATIENT)
Dept: LAB | Facility: HOSPITAL | Age: 73
End: 2018-05-23
Attending: INTERNAL MEDICINE

## 2018-05-23 VITALS
SYSTOLIC BLOOD PRESSURE: 146 MMHG | HEART RATE: 136 BPM | OXYGEN SATURATION: 90 % | DIASTOLIC BLOOD PRESSURE: 72 MMHG | RESPIRATION RATE: 16 BRPM | BODY MASS INDEX: 27.07 KG/M2 | HEIGHT: 65 IN | TEMPERATURE: 97.8 F | WEIGHT: 162.5 LBS

## 2018-05-23 DIAGNOSIS — C50.412 MALIGNANT NEOPLASM OF UPPER-OUTER QUADRANT OF LEFT FEMALE BREAST, UNSPECIFIED ESTROGEN RECEPTOR STATUS (HCC): ICD-10-CM

## 2018-05-23 LAB
ALBUMIN SERPL-MCNC: 4.6 G/DL (ref 3.5–5)
ALBUMIN/GLOB SERPL: 1.2 G/DL (ref 1.1–2.5)
ALP SERPL-CCNC: 85 U/L (ref 24–120)
ALT SERPL W P-5'-P-CCNC: 27 U/L (ref 0–54)
ANION GAP SERPL CALCULATED.3IONS-SCNC: 10 MMOL/L (ref 4–13)
AST SERPL-CCNC: 48 U/L (ref 7–45)
BASOPHILS # BLD AUTO: 0.09 10*3/MM3 (ref 0–0.2)
BASOPHILS NFR BLD AUTO: 1.4 % (ref 0–2)
BILIRUB SERPL-MCNC: 0.3 MG/DL (ref 0.1–1)
BUN BLD-MCNC: 13 MG/DL (ref 5–21)
BUN/CREAT SERPL: 11.7 (ref 7–25)
CALCIUM SPEC-SCNC: 10.6 MG/DL (ref 8.4–10.4)
CHLORIDE SERPL-SCNC: 98 MMOL/L (ref 98–110)
CO2 SERPL-SCNC: 31 MMOL/L (ref 24–31)
CREAT BLD-MCNC: 1.11 MG/DL (ref 0.5–1.4)
DEPRECATED RDW RBC AUTO: 57.7 FL (ref 40–54)
EOSINOPHIL # BLD AUTO: 0.13 10*3/MM3 (ref 0–0.7)
EOSINOPHIL NFR BLD AUTO: 2.1 % (ref 0–4)
ERYTHROCYTE [DISTWIDTH] IN BLOOD BY AUTOMATED COUNT: 13.9 % (ref 12–15)
GFR SERPL CREATININE-BSD FRML MDRD: 48 ML/MIN/1.73
GLOBULIN UR ELPH-MCNC: 3.9 GM/DL
GLUCOSE BLD-MCNC: 91 MG/DL (ref 70–100)
HCT VFR BLD AUTO: 32.2 % (ref 37–47)
HGB BLD-MCNC: 10.8 G/DL (ref 12–16)
HOLD SPECIMEN: NORMAL
HOLD SPECIMEN: NORMAL
IMM GRANULOCYTES # BLD: 0.1 10*3/MM3 (ref 0–0.03)
IMM GRANULOCYTES NFR BLD: 1.6 % (ref 0–5)
LYMPHOCYTES # BLD AUTO: 3.12 10*3/MM3 (ref 0.72–4.86)
LYMPHOCYTES NFR BLD AUTO: 49.7 % (ref 15–45)
MCH RBC QN AUTO: 37.9 PG (ref 28–32)
MCHC RBC AUTO-ENTMCNC: 33.5 G/DL (ref 33–36)
MCV RBC AUTO: 113 FL (ref 82–98)
MONOCYTES # BLD AUTO: 0.6 10*3/MM3 (ref 0.19–1.3)
MONOCYTES NFR BLD AUTO: 9.6 % (ref 4–12)
NEUTROPHILS # BLD AUTO: 2.24 10*3/MM3 (ref 1.87–8.4)
NEUTROPHILS NFR BLD AUTO: 35.6 % (ref 39–78)
NRBC BLD MANUAL-RTO: 0 /100 WBC (ref 0–0)
PLAT MORPH BLD: NORMAL
PLATELET # BLD AUTO: 385 10*3/MM3 (ref 130–400)
PMV BLD AUTO: 9.1 FL (ref 6–12)
POIKILOCYTOSIS BLD QL SMEAR: NORMAL
POTASSIUM BLD-SCNC: 4.1 MMOL/L (ref 3.5–5.3)
PROT SERPL-MCNC: 8.5 G/DL (ref 6.3–8.7)
RBC # BLD AUTO: 2.85 10*6/MM3 (ref 4.2–5.4)
SODIUM BLD-SCNC: 139 MMOL/L (ref 135–145)
WBC MORPH BLD: NORMAL
WBC NRBC COR # BLD: 6.28 10*3/MM3 (ref 4.8–10.8)

## 2018-05-23 PROCEDURE — 85007 BL SMEAR W/DIFF WBC COUNT: CPT

## 2018-05-23 PROCEDURE — 85025 COMPLETE CBC W/AUTO DIFF WBC: CPT

## 2018-05-23 PROCEDURE — 99214 OFFICE O/P EST MOD 30 MIN: CPT | Performed by: INTERNAL MEDICINE

## 2018-05-23 PROCEDURE — 80053 COMPREHEN METABOLIC PANEL: CPT

## 2018-05-23 PROCEDURE — 36415 COLL VENOUS BLD VENIPUNCTURE: CPT

## 2018-05-23 RX ORDER — DEXAMETHASONE 0.5 MG/5ML
1 SOLUTION ORAL 4 TIMES DAILY
Qty: 500 ML | Refills: 5 | Status: SHIPPED | OUTPATIENT
Start: 2018-05-23 | End: 2018-11-12

## 2018-05-23 RX ORDER — EVEROLIMUS 10 MG/1
10 TABLET ORAL DAILY
Qty: 30 TABLET | Refills: 11 | Status: SHIPPED | OUTPATIENT
Start: 2018-05-23 | End: 2018-08-22 | Stop reason: ALTCHOICE

## 2018-05-23 RX ORDER — EXEMESTANE 25 MG/1
25 TABLET ORAL DAILY
Qty: 30 TABLET | Refills: 11 | Status: SHIPPED | OUTPATIENT
Start: 2018-05-23 | End: 2018-11-12

## 2018-05-23 NOTE — PROGRESS NOTES
[facility]  HEMATOLOGY & ONCOLOGY        Subjective    Metastatic breast cancer on Faslodex and Ibrance     Staging form: Breast, AJCC V7      Clinical stage from 10/11/2016: Stage IV (T2, N1, M1) - ER/VA positive. HER2/maynor negative  VISIT DIAGNOSIS:   Metastatic breast cancer       HEMATOLOGY / ONCOLOGY HISTORY:   Oncology/Hematology History    Patient is a 67-year-old postmenopausal female who had onset of her menses at age 9 and menopause at age 48. She received oral contraceptives for approximately 14 years and did not receive hormonal manipulation. Patient has a maternal cousin had breast cancer. Patient has had no prior breast biopsies. Patient found a palpable left breast mass as well as a left axillary mass. Patient had a bloody nipple discharge which been present for approximately 6 months. On 6/20/2012, a mammogram was performed showing a subareolar mass consistent with malignancy. Bilateral breast ultrasound revealed an ill-defined subareolar mass measuring 2.8-2.5 cm. There is a left axillary mass measuring 1.7 cm with suspicion of extracapsular extension. There is no evidence of disease in the right breast. On 7/9/2012 patient underwent a left breast biopsy and placement of marker clip. Left breast biopsy revealed an infiltrating lobular carcinoma grade 2 with focal ductal carcinoma in situ, solid pattern. A fine-needle biopsy of the left lymph node was consistent with metastatic carcinoma. Breast tumor profile revealed ER: 100%; VA: 98%; HER-2/maynor 2+; FISH: Unamplified; Ki-67: 71%; P 53 1%; DNA aneuploidy. A MRI of the breast were performed body multiple abnormal enhancing masses within the left breast. There appears to be anterior retraction of the pectoralis muscle with no direct extension. There is a moderate to large, layering left pleural effusion appreciated. The right breast reveals 3 moderately enlarged lymph nodes in the posterior lateral aspect the right breast. These have  fatty hilum but followup is recommended. Patient is undergone systemic studies including, on 8/2/2012, a CT scan of the brain showing atrophy. a CT scan that shows showing a small to moderate left pleural effusion with 3 subcentimeter nodule densities in the left lung.   She completed 4 cycles of neoadjuvant treatment with dose dense Adriamycin and Cytoxan. She had a mammogram which showed a significant reduction in the size of her left breast lesion. There is no axillary adenopathy noted. She has had an ultrasound revealing the  lesion reducing from 2.4 cm to 1 cm. Patient underwent a left mastectomy on 03/19/2013 finding negative invasive cancer, negative nodes. A 5% DCIS was noted. The patient has declined hormonal therapy. She did not need radiation therapy.  November 2014, she began to have evidence of lytic bone lesions at T5T6, frontal disease, an 8 mm lucency in the central frontal area of  the skull but nothing intracranial. Bone scan revealed only vertex tracer activity but bilateral ribs and thoracic spine, and other lesions in  her pelvis. At that time, she was started on letrozole since November 2014. She is taking Xgeva. She had progression found in bone in Feb 2015 on scan. She was started on Faslodex, Ibrance and continued xgeva.         Malignant neoplasm of upper-outer quadrant of left female breast    7/9/2012 Initial Diagnosis     Malignant neoplasm of upper-outer quadrant of left female breast         7/10/2012 Biopsy     Left Breast Biopsy & Axillary Node FNA: A) Infiltrating lobular carcinoma, Grade 2. B) Foci of ductal carcinoma in situ, solid pattern. C) Greatest dimension of the invasive carcinoma is 15.8mm.         3/19/2013 Surgery     Left Mastectomy w/ Axillary Tail: Focal residual ducatal carcinoma in situ (DCIS) 12 lymph nodes negative for metastatic carcinoma.         10/20/2014 Biopsy     Peritoneum Biopsy: Final Diagnosis: Malignant Cell Neoplasm.           Breast cancer  metastasized to bone, unspecified laterality    10/15/2012 -  Other Event     Left mammogram:  Impression:  1. Decreasing spiculation and density in the retroareolar left breast, near a previous biopsy.   2. Definite left axillary lesion is not appreciated.          2/5/2013 -  Other Event     Diagnostic Mammo:  Comparison is made to 10/15/2012 and 6/20/2012  The spiculated mass at 12:00 behind the nipple is nearly resolved.   Impression:  1. The mass on the left side is less apparent after receiving chemotherapy. There has been a good response to chmotherapy on the left side.   2. No suspicious abnormality is seen in the right breast to account for the new palable abnormality at the 4-5:00 position.          3/19/2013 Surgery     Breast Biopsy:  Final Diagnosis:  A. Breast, left mastectomy with axillary tail  1. Focal residual ductal carcinoma in situ (DICS) (less than 5% of tumor bed).  2. Negative for residual invasive carcinoma.   3. 12 Lymph Nodes, negative for metastatic carcinoma (0/12) focally, some lymph nodes demonstrate fibrosis/treatment effect.  B. Skin and soft tissue, left advancement flap:  1. Benign skin and subcutis.   2. Negative for carcinoma.          6/20/2013 -  Other Event     Diagnostic Mammo [facility]:  1. A subareolar mass is consistent with malignancy. Additional involement extends inferiorly sonographically and superolaterally mammograhically as demonstrated by calcifications. The mass measures approx 2.8 cm sonographically, but the involved region is likely much larger including an intraductal componet. The left axillary lymph nodes are also involved.   2. Recommened ultrasound guided biopsy of the left subareolar mass and left axillary lymph node.   3. Breast MRI could also further define multicentric disease on the left.   4. Clear suspicious finding on the right.  Recommend ongoing clinical follow up of palpable foci.          6/13/2014 -  Other Event     Diagnostic  Mammo:  IMPRESSION:  1. History of left breast cancer and mastectomy. Stable benign right breast mammograms.          10/20/2014 Surgery     Final Diagnosis:  Biopsies, pertoneum:  Metastatic carcinoma, morphologically compatible with metastatic mammary carcinoma.          6/15/2015 -  Other Event     Diagnostic Mammo:  IMPRESSION:   1. Negative x-ray report, should not delay biopsy if a dominat or clinically suspicious mass is present.          7/5/2016 -  Other Event     Diagnositic mammogram:  Impression:   Stable right mamogram with no radiographic findings suspicious for malignancy. Recommend continued screening mammography per screening guidelines unless otherwise earlier clinically indicated.          9/18/2016 Initial Diagnosis     Secondary malignant neoplasm of bone and bone marrow          Cancer Staging Information:  Malignant neoplasm of upper-outer quadrant of left female breast    Staging form: Breast, AJCC V7      Clinical stage from 10/11/2016: Stage IV (T2, N1, M1) - Signed by ALBERT Kulkarni on 10/11/2016      INTERVAL HISTORY  Patient ID: Heavenly Yanes is a 73 y.o. year old female history of metastatic breast carcinoma to lung and bone.  She is presently receiving treatment with Ibrance, Faslodex and Xgeva.  Tumor markers continued to decline with a CA 27-29 of 92.2 obtained on 9:30 2016 compared to 105.8 on 07/27/2016.  Mr. Yanes continues to tolerate this treatment regimen well and is here today for evaluation consideration to receive Faslodex and Xgeva.  Last bone mineral density study was obtained 11/21/2014 indicating osteopopenia, we will obtain a repeat bone mineral density study. Ms. Yanes completed 8 teeth pulled 02/14/2017.   Xgeva on hold, resume 05/2017.     Past Medical History:   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   • Bipolar disorder    • Disease of thyroid gland    • Hypertension    • Malignant neoplasm of  upper-outer quadrant of left female breast 9/18/2016   • Secondary malignant neoplasm of bone and bone marrow 9/18/2016     Past Surgical History:   Past Surgical History:   Procedure Laterality Date   • BREAST SURGERY      left breast biopsy and axillary node   • CHOLECYSTECTOMY     • EXTERNAL EAR SURGERY     • MASTECTOMY Left    • PORTACATH PLACEMENT       Social History:   Social History     Social History   • Marital status:      Spouse name: N/A   • Number of children: N/A   • Years of education: N/A     Occupational History   • Not on file.     Social History Main Topics   • Smoking status: Never Smoker   • Smokeless tobacco: Never Used   • Alcohol use No   • Drug use: No   • Sexual activity: Not on file     Other Topics Concern   • Not on file     Social History Narrative   • No narrative on file     Family History:   Family History   Problem Relation Age of Onset   • No Known Problems Daughter    • No Known Problems Son    • Other Mother         complications from a concussion from a fall   • Other Father         old age   • No Known Problems Brother    • No Known Problems Daughter        Review of Systems   Constitutional: Negative.  Negative for activity change, appetite change, chills, diaphoresis, fatigue and fever.   HENT: Negative.  Negative for congestion, ear discharge, ear pain, facial swelling, hearing loss, mouth sores, nosebleeds, postnasal drip, rhinorrhea, sinus pressure, sore throat, tinnitus, trouble swallowing and voice change.    Eyes: Negative.  Negative for photophobia, pain, discharge and visual disturbance.   Respiratory: Negative.  Negative for apnea, cough, chest tightness, shortness of breath, wheezing and stridor.    Cardiovascular: Negative.  Negative for chest pain, palpitations and leg swelling.   Gastrointestinal: Negative.  Negative for abdominal distention, abdominal pain, blood in stool, constipation, diarrhea, nausea, rectal pain and vomiting.   Endocrine: Negative.   Negative for cold intolerance, heat intolerance, polydipsia, polyphagia and polyuria.   Genitourinary: Negative.  Negative for difficulty urinating, dysuria, flank pain, frequency, hematuria and urgency.   Musculoskeletal: Negative.  Negative for arthralgias, back pain, gait problem, joint swelling and myalgias.   Skin: Negative.  Negative for color change, pallor, rash and wound.   Allergic/Immunologic: Negative.  Negative for environmental allergies, food allergies and immunocompromised state.   Neurological: Negative.  Negative for dizziness, tremors, seizures, syncope, speech difficulty, weakness, light-headedness, numbness and headaches.   Hematological: Negative.  Negative for adenopathy. Does not bruise/bleed easily.   Psychiatric/Behavioral: Negative.  Negative for agitation, confusion, hallucinations, sleep disturbance and suicidal ideas. The patient is not nervous/anxious.    All other systems reviewed and are negative.       Performance Status:  Asymptomatic    Medications:    Current Outpatient Prescriptions   Medication Sig Dispense Refill   • B Complex Vitamins (VITAMIN B COMPLEX PO) Take  by mouth. 1 po daily     • Calcium Carb-Cholecalciferol (CALCIUM 600 + D PO) Take  by mouth.     • DULoxetine (CYMBALTA) 30 MG capsule Take 30 mg by mouth daily.     • ferrous sulfate 325 (65 FE) MG tablet Take 325 mg by mouth 2 (two) times a day.     • Lysine HCl (L-LYSINE) 500 MG tablet tablet Take  by mouth daily.     • Magnesium 250 MG tablet Take  by mouth.     • melatonin 3 MG tablet Take  by mouth.     • metoprolol tartrate (LOPRESSOR) 25 MG tablet Take 25 mg by mouth 2 (two) times a day.     • OLANZapine (ZYPREXA) 2.5 MG tablet Take 2.5 mg by mouth every night.     • Palbociclib 125 MG capsule capsule Take 1 capsule by mouth Daily. 3 weeks on, 1 week off 21 capsule 5   • Probiotic Product (PROBIOTIC DAILY PO) Take  by mouth. 300million cell daily     • raNITIdine (ZANTAC) 75 MG tablet Take 75 mg by mouth 2  "(Two) Times a Day.     • terbinafine (lamiSIL) 250 MG tablet      • thyroid 60 MG PO tablet Take 60 mg by mouth daily.     • VALERIAN ROOT PO Take  by mouth. 100mg daily     • Zinc 50 MG tablet Take  by mouth. 1 po daily       No current facility-administered medications for this visit.      Facility-Administered Medications Ordered in Other Visits   Medication Dose Route Frequency Provider Last Rate Last Dose   • heparin flush (porcine) 100 UNIT/ML injection 500 Units  500 Units Intravenous PRN Joseph Nunez MD   500 Units at 10/26/17 1155   • sodium chloride 0.9 % flush 10 mL  10 mL Intravenous PRN Joseph Nunez MD   10 mL at 10/26/17 1155       ALLERGIES:    Allergies   Allergen Reactions   • Codeine    • Diphenhydramine Other (See Comments)     \"Shaky leg syndrome\"  \"Shaky leg syndrome\"  \"Shaky leg syndrome\"   • Heparin    • Other      POPPY SEED   • Penicillins    • Petroleum Jelly [Skin Protectants, Misc.]    • Sulfa Antibiotics    • Sulfur    • Valium [Diazepam]        Objective      Vitals:    05/23/18 1131   BP: 146/72   Pulse: (!) 136   Resp: 16   Temp: 97.8 °F (36.6 °C)   TempSrc: Tympanic   SpO2: 90%   Weight: 73.7 kg (162 lb 8 oz)   Height: 165.1 cm (65\")         Current Status 5/23/2018   ECOG score 0       General Appearance: Patient is awake, alert, oriented and in no acute distress. Patient is welldeveloped, wellnourished, and appears stated age.  HEENT: Normocephalic. Sclerae clear, conjunctiva pink, extraocular movements intact, pupils, round, reactive to light and accommodation. Mouth and throat are clear with moist oral mucosa.  NECK: Supple, no jugular venous distention, thyroid not enlarged.  LYMPH: No cervical, supraclavicular, axillary, or inguinal lymphadenopathy.  CHEST: Equal bilateral expansion   LUNGS: Good air movement, no rales, rhonchi, rubs or wheezes with auscultation  CARDIO: Regular sinus rhythm, no murmurs, gallops or rubs.  ABDOMEN: Nondistended, soft, No tenderness, no " guarding, no rebound, No hepatosplenomegaly. No abdominal masses. Bowel sounds positive. No hernia  MUSKEL: No joint swelling, decreased motion, or inflammation  EXTREMS: No edema, clubbing, cyanosis, No varicose veins.  NEURO: Grossly nonfocal, Gait is coordinated and smooth, Cognition is preserved.  SKIN: No rashes, no ecchymoses, no petechia.  PSYCH: Oriented to time, place and person. Memory is preserved. Mood and affect appear normal      RECENT LABS:  WBC   Date Value Ref Range Status   05/23/2018 6.28 4.80 - 10.80 10*3/mm3 Preliminary     RBC   Date Value Ref Range Status   05/23/2018 2.85 (L) 4.20 - 5.40 10*6/mm3 Preliminary     Hemoglobin   Date Value Ref Range Status   05/23/2018 10.8 (L) 12.0 - 16.0 g/dL Preliminary     Hematocrit   Date Value Ref Range Status   05/23/2018 32.2 (L) 37.0 - 47.0 % Preliminary     MCV   Date Value Ref Range Status   05/23/2018 113.0 (H) 82.0 - 98.0 fL Preliminary     MCH   Date Value Ref Range Status   05/23/2018 37.9 (H) 28.0 - 32.0 pg Preliminary     MCHC   Date Value Ref Range Status   05/23/2018 33.5 33.0 - 36.0 g/dL Preliminary     RDW   Date Value Ref Range Status   05/23/2018 13.9 12.0 - 15.0 % Preliminary     RDW-SD   Date Value Ref Range Status   05/23/2018 57.7 (H) 40.0 - 54.0 fl Preliminary     MPV   Date Value Ref Range Status   05/23/2018 9.1 6.0 - 12.0 fL Preliminary     Platelets   Date Value Ref Range Status   05/23/2018 385 130 - 400 10*3/mm3 Preliminary     Neutrophil %   Date Value Ref Range Status   02/28/2018 60.0 39.0 - 78.0 % Final     Lymphocyte %   Date Value Ref Range Status   02/28/2018 32.1 15.0 - 45.0 % Final     Monocyte %   Date Value Ref Range Status   02/28/2018 3.0 (L) 4.0 - 12.0 % Final     Eosinophil %   Date Value Ref Range Status   02/28/2018 2.7 0.0 - 4.0 % Final     Basophil %   Date Value Ref Range Status   02/28/2018 1.1 0.0 - 2.0 % Final     Immature Grans %   Date Value Ref Range Status   09/28/2017 0.3 0.0 - 5.0 % Final      Neutrophils, Absolute   Date Value Ref Range Status   02/28/2018 2.63 1.87 - 8.40 10*3/mm3 Final     Neutrophils Absolute   Date Value Ref Range Status   04/25/2018 1.60 (L) 1.87 - 8.40 10*3/mm3 Final     Lymphocytes, Absolute   Date Value Ref Range Status   02/28/2018 1.41 0.72 - 4.86 10*3/mm3 Final     Monocytes, Absolute   Date Value Ref Range Status   02/28/2018 0.13 (L) 0.19 - 1.30 10*3/mm3 Final     Eosinophils, Absolute   Date Value Ref Range Status   02/28/2018 0.12 0.00 - 0.70 10*3/mm3 Final     Eosinophils Absolute   Date Value Ref Range Status   04/25/2018 0.10 0.00 - 0.70 10*3/mm3 Final     Basophils, Absolute   Date Value Ref Range Status   02/28/2018 0.05 0.00 - 0.20 10*3/mm3 Final     Basophils Absolute   Date Value Ref Range Status   04/25/2018 0.10 0.00 - 0.20 10*3/mm3 Final     Immature Grans, Absolute   Date Value Ref Range Status   09/28/2017 0.01 0.00 - 0.03 10*3/mm3 Final     nRBC   Date Value Ref Range Status   05/23/2018 0.0 0.0 - 0.0 /100 WBC Preliminary     Lab on 05/23/2018   Component Date Value Ref Range Status   • WBC 05/23/2018 6.28  4.80 - 10.80 10*3/mm3 Preliminary   • RBC 05/23/2018 2.85* 4.20 - 5.40 10*6/mm3 Preliminary   • Hemoglobin 05/23/2018 10.8* 12.0 - 16.0 g/dL Preliminary   • Hematocrit 05/23/2018 32.2* 37.0 - 47.0 % Preliminary   • MCV 05/23/2018 113.0* 82.0 - 98.0 fL Preliminary   • MCH 05/23/2018 37.9* 28.0 - 32.0 pg Preliminary   • MCHC 05/23/2018 33.5  33.0 - 36.0 g/dL Preliminary   • RDW 05/23/2018 13.9  12.0 - 15.0 % Preliminary   • RDW-SD 05/23/2018 57.7* 40.0 - 54.0 fl Preliminary   • MPV 05/23/2018 9.1  6.0 - 12.0 fL Preliminary   • Platelets 05/23/2018 385  130 - 400 10*3/mm3 Preliminary   • nRBC 05/23/2018 0.0  0.0 - 0.0 /100 WBC Preliminary             Assessment/Plan     Patient Active Problem List   Diagnosis   • Malignant neoplasm of upper-outer quadrant of left female breast   • Breast cancer metastasized to bone, unspecified laterality   • Essential  hypertension   • Acquired hypothyroidism   • Bipolar 1 disorder   • Carcinoma of left breast metastatic to bone   • Antineoplastic chemotherapy induced anemia          Assessment:  1. Stage IV metastatic breast carcinoma, ER/AL positive. HER2/maynor negative undergoing Ibrance, Faslodex and Xgeva with overall stable with stable CA27.29 with slow decline. She is tolerating the treatment very well. Faslodex cycle 17 today and will continue to HOLD Xgeva due to upcoming dental work, last dose of Xgeva given on 09/29/2016.  01/2017 restaging CT chest abdomen and pelvis and bone scan showed stable diffuse sclerotic bony lesions, unchanged from 7/1/2016.Next scan July 2017    2.  Mild leukopenia without neutropenia.  Likely secondary to Ibrance.  Continue to monitor.     3.  Macrocytic anemia.  Hemoglobin 11.1.  02/2016, B12 and folate normal    4.  Status post 8 teeth extraction.     Recommendation/Plan:  1.  Proceed with Faslodex cycle 20.  2. Continue Ibrance 125 mg 3 weeks on, one-week off.  Reduce dose if leukopenia worsens.   3. Follow up in 28 days for Faslodex cycle 19.   Repeat CA 27.29.  Previous CA 27.29 have remained stable, the last one done on 11/2017 was 89 Stable. Continue Faslodex q monthly.    Additionally she is anemic with a hemoglobin of 10 g percent with decreasing GFR.  I do suspect that she may have some form of chronic kidney disease, as evaluated by Nephrology, CKD from [facility].  No need for EPO as she is asyptomatic.  Redraw CA 27.29 today. Cont faslodex + Ibrance for now until repeat CA 27.29 returns. Last CA 27.29 in Feb 2018 slightly increased to 96. I have discussed different options with her:  #1. Stop Ibrance and switch to a new CD4K inhabitor Kisqali or Johnenzkemal.  #. Stop Ibrance and switch to Affinitor + faslodex.  #. Cont Ibrance and Faslodex with just a subtle rise, since clinically pt is doing better.    At this point will restage with CT scans CAP at [facility] and compare to one done [place]  2017. If progression on CT scans and CA 27.29 then go for the [place] trial of using MTOR inhabitor Everlimos + Exemestine. Bone scan shows progressive dz. Therefore will stop Faslodex. Start Exemestine 25mg daily in combination with Affinitor 10mg daily. Redraw her tumor markers every 6 weeks.  Joseph Nunez MD    5/23/2018    11:36 AM

## 2018-05-23 NOTE — TELEPHONE ENCOUNTER
Verbal order from DR. Nunez to start Heavenly on the [place] Trial of Everlimos +Exemestine.  Exemestane 25 mg daily with afinitor 10 mg daily.  Will also need steroid mouthwash.  Scripts sent to [facility].

## 2018-05-30 ENCOUNTER — TELEPHONE (OUTPATIENT)
Dept: ONCOLOGY | Facility: CLINIC | Age: 73
End: 2018-05-30

## 2018-05-30 NOTE — TELEPHONE ENCOUNTER
Follow up call placed to patient to notify her that [facility] has her prescription ready for in home delivery but has been unable to contact patient to set up delivery. Discussed with patient the need to answer the phone and phone number was given to patient. [phone]. Heavenly v/u of our conversation and will call pharmacy to jenni delivery.

## 2018-06-27 DIAGNOSIS — C50.412 MALIGNANT NEOPLASM OF UPPER-OUTER QUADRANT OF LEFT FEMALE BREAST, UNSPECIFIED ESTROGEN RECEPTOR STATUS (HCC): Primary | ICD-10-CM

## 2018-06-28 ENCOUNTER — APPOINTMENT (OUTPATIENT)
Dept: ONCOLOGY | Facility: HOSPITAL | Age: 73
End: 2018-06-28
Attending: INTERNAL MEDICINE

## 2018-06-28 ENCOUNTER — OFFICE VISIT (OUTPATIENT)
Dept: ONCOLOGY | Facility: CLINIC | Age: 73
End: 2018-06-28

## 2018-06-28 ENCOUNTER — LAB (OUTPATIENT)
Dept: LAB | Facility: HOSPITAL | Age: 73
End: 2018-06-28
Attending: INTERNAL MEDICINE

## 2018-06-28 VITALS
SYSTOLIC BLOOD PRESSURE: 126 MMHG | OXYGEN SATURATION: 98 % | HEIGHT: 65 IN | TEMPERATURE: 97.1 F | HEART RATE: 68 BPM | WEIGHT: 160.2 LBS | BODY MASS INDEX: 26.69 KG/M2 | RESPIRATION RATE: 16 BRPM | DIASTOLIC BLOOD PRESSURE: 82 MMHG

## 2018-06-28 DIAGNOSIS — C50.412 MALIGNANT NEOPLASM OF UPPER-OUTER QUADRANT OF LEFT FEMALE BREAST, UNSPECIFIED ESTROGEN RECEPTOR STATUS (HCC): Primary | ICD-10-CM

## 2018-06-28 LAB
ALBUMIN SERPL-MCNC: 4.1 G/DL (ref 3.5–5)
ALBUMIN/GLOB SERPL: 1.1 G/DL (ref 1.1–2.5)
ALP SERPL-CCNC: 106 U/L (ref 24–120)
ALT SERPL W P-5'-P-CCNC: 38 U/L (ref 0–54)
ANION GAP SERPL CALCULATED.3IONS-SCNC: 13 MMOL/L (ref 4–13)
AST SERPL-CCNC: 62 U/L (ref 7–45)
BASOPHILS # BLD AUTO: 0.02 10*3/MM3 (ref 0–0.2)
BASOPHILS NFR BLD AUTO: 0.3 % (ref 0–2)
BILIRUB SERPL-MCNC: 0.5 MG/DL (ref 0.1–1)
BUN BLD-MCNC: 8 MG/DL (ref 5–21)
BUN/CREAT SERPL: 7.3 (ref 7–25)
CALCIUM SPEC-SCNC: 9.3 MG/DL (ref 8.4–10.4)
CHLORIDE SERPL-SCNC: 98 MMOL/L (ref 98–110)
CO2 SERPL-SCNC: 26 MMOL/L (ref 24–31)
CREAT BLD-MCNC: 1.09 MG/DL (ref 0.5–1.4)
DEPRECATED RDW RBC AUTO: 56.7 FL (ref 40–54)
EOSINOPHIL # BLD AUTO: 0.22 10*3/MM3 (ref 0–0.7)
EOSINOPHIL NFR BLD AUTO: 3.3 % (ref 0–4)
ERYTHROCYTE [DISTWIDTH] IN BLOOD BY AUTOMATED COUNT: 15.1 % (ref 12–15)
GFR SERPL CREATININE-BSD FRML MDRD: 49 ML/MIN/1.73
GLOBULIN UR ELPH-MCNC: 3.9 GM/DL
GLUCOSE BLD-MCNC: 96 MG/DL (ref 70–100)
HCT VFR BLD AUTO: 31.5 % (ref 37–47)
HGB BLD-MCNC: 10.6 G/DL (ref 12–16)
HOLD SPECIMEN: NORMAL
IMM GRANULOCYTES # BLD: 0.05 10*3/MM3 (ref 0–0.03)
IMM GRANULOCYTES NFR BLD: 0.8 % (ref 0–5)
LYMPHOCYTES # BLD AUTO: 2.33 10*3/MM3 (ref 0.72–4.86)
LYMPHOCYTES NFR BLD AUTO: 35.1 % (ref 15–45)
MCH RBC QN AUTO: 34.1 PG (ref 28–32)
MCHC RBC AUTO-ENTMCNC: 33.7 G/DL (ref 33–36)
MCV RBC AUTO: 101.3 FL (ref 82–98)
MONOCYTES # BLD AUTO: 0.45 10*3/MM3 (ref 0.19–1.3)
MONOCYTES NFR BLD AUTO: 6.8 % (ref 4–12)
NEUTROPHILS # BLD AUTO: 3.57 10*3/MM3 (ref 1.87–8.4)
NEUTROPHILS NFR BLD AUTO: 53.7 % (ref 39–78)
NRBC BLD MANUAL-RTO: 0 /100 WBC (ref 0–0)
PLATELET # BLD AUTO: 292 10*3/MM3 (ref 130–400)
PMV BLD AUTO: 10.1 FL (ref 6–12)
POTASSIUM BLD-SCNC: 3.9 MMOL/L (ref 3.5–5.3)
PROT SERPL-MCNC: 8 G/DL (ref 6.3–8.7)
RBC # BLD AUTO: 3.11 10*6/MM3 (ref 4.2–5.4)
SODIUM BLD-SCNC: 137 MMOL/L (ref 135–145)
WBC NRBC COR # BLD: 6.64 10*3/MM3 (ref 4.8–10.8)

## 2018-06-28 PROCEDURE — 85025 COMPLETE CBC W/AUTO DIFF WBC: CPT

## 2018-06-28 PROCEDURE — 80053 COMPREHEN METABOLIC PANEL: CPT

## 2018-06-28 PROCEDURE — 86300 IMMUNOASSAY TUMOR CA 15-3: CPT | Performed by: INTERNAL MEDICINE

## 2018-06-28 PROCEDURE — 99214 OFFICE O/P EST MOD 30 MIN: CPT | Performed by: INTERNAL MEDICINE

## 2018-06-28 PROCEDURE — 36415 COLL VENOUS BLD VENIPUNCTURE: CPT

## 2018-06-28 NOTE — PROGRESS NOTES
[facility]  HEMATOLOGY & ONCOLOGY        Subjective    Metastatic breast cancer on Faslodex and Ibrance     Staging form: Breast, AJCC V7      Clinical stage from 10/11/2016: Stage IV (T2, N1, M1) - ER/AK positive. HER2/maynor negative  VISIT DIAGNOSIS:   Metastatic breast cancer       HEMATOLOGY / ONCOLOGY HISTORY:   Oncology/Hematology History    Patient is a 67-year-old postmenopausal female who had onset of her menses at age 9 and menopause at age 48. She received oral contraceptives for approximately 14 years and did not receive hormonal manipulation. Patient has a maternal cousin had breast cancer. Patient has had no prior breast biopsies. Patient found a palpable left breast mass as well as a left axillary mass. Patient had a bloody nipple discharge which been present for approximately 6 months. On 6/20/2012, a mammogram was performed showing a subareolar mass consistent with malignancy. Bilateral breast ultrasound revealed an ill-defined subareolar mass measuring 2.8-2.5 cm. There is a left axillary mass measuring 1.7 cm with suspicion of extracapsular extension. There is no evidence of disease in the right breast. On 7/9/2012 patient underwent a left breast biopsy and placement of marker clip. Left breast biopsy revealed an infiltrating lobular carcinoma grade 2 with focal ductal carcinoma in situ, solid pattern. A fine-needle biopsy of the left lymph node was consistent with metastatic carcinoma. Breast tumor profile revealed ER: 100%; AK: 98%; HER-2/maynor 2+; FISH: Unamplified; Ki-67: 71%; P 53 1%; DNA aneuploidy. A MRI of the breast were performed body multiple abnormal enhancing masses within the left breast. There appears to be anterior retraction of the pectoralis muscle with no direct extension. There is a moderate to large, layering left pleural effusion appreciated. The right breast reveals 3 moderately enlarged lymph nodes in the posterior lateral aspect the right breast. These have  fatty hilum but followup is recommended. Patient is undergone systemic studies including, on 8/2/2012, a CT scan of the brain showing atrophy. a CT scan that shows showing a small to moderate left pleural effusion with 3 subcentimeter nodule densities in the left lung.   She completed 4 cycles of neoadjuvant treatment with dose dense Adriamycin and Cytoxan. She had a mammogram which showed a significant reduction in the size of her left breast lesion. There is no axillary adenopathy noted. She has had an ultrasound revealing the  lesion reducing from 2.4 cm to 1 cm. Patient underwent a left mastectomy on 03/19/2013 finding negative invasive cancer, negative nodes. A 5% DCIS was noted. The patient has declined hormonal therapy. She did not need radiation therapy.  November 2014, she began to have evidence of lytic bone lesions at T5T6, frontal disease, an 8 mm lucency in the central frontal area of  the skull but nothing intracranial. Bone scan revealed only vertex tracer activity but bilateral ribs and thoracic spine, and other lesions in  her pelvis. At that time, she was started on letrozole since November 2014. She is taking Xgeva. She had progression found in bone in Feb 2015 on scan. She was started on Faslodex, Ibrance and continued xgeva.         Malignant neoplasm of upper-outer quadrant of left female breast    7/9/2012 Initial Diagnosis     Malignant neoplasm of upper-outer quadrant of left female breast         7/10/2012 Biopsy     Left Breast Biopsy & Axillary Node FNA: A) Infiltrating lobular carcinoma, Grade 2. B) Foci of ductal carcinoma in situ, solid pattern. C) Greatest dimension of the invasive carcinoma is 15.8mm.         3/19/2013 Surgery     Left Mastectomy w/ Axillary Tail: Focal residual ducatal carcinoma in situ (DCIS) 12 lymph nodes negative for metastatic carcinoma.         10/20/2014 Biopsy     Peritoneum Biopsy: Final Diagnosis: Malignant Cell Neoplasm.           Breast cancer  metastasized to bone, unspecified laterality    10/15/2012 -  Other Event     Left mammogram:  Impression:  1. Decreasing spiculation and density in the retroareolar left breast, near a previous biopsy.   2. Definite left axillary lesion is not appreciated.          2/5/2013 -  Other Event     Diagnostic Mammo:  Comparison is made to 10/15/2012 and 6/20/2012  The spiculated mass at 12:00 behind the nipple is nearly resolved.   Impression:  1. The mass on the left side is less apparent after receiving chemotherapy. There has been a good response to chmotherapy on the left side.   2. No suspicious abnormality is seen in the right breast to account for the new palable abnormality at the 4-5:00 position.          3/19/2013 Surgery     Breast Biopsy:  Final Diagnosis:  A. Breast, left mastectomy with axillary tail  1. Focal residual ductal carcinoma in situ (DICS) (less than 5% of tumor bed).  2. Negative for residual invasive carcinoma.   3. 12 Lymph Nodes, negative for metastatic carcinoma (0/12) focally, some lymph nodes demonstrate fibrosis/treatment effect.  B. Skin and soft tissue, left advancement flap:  1. Benign skin and subcutis.   2. Negative for carcinoma.          6/20/2013 -  Other Event     Diagnostic Mammo [facility]:  1. A subareolar mass is consistent with malignancy. Additional involement extends inferiorly sonographically and superolaterally mammograhically as demonstrated by calcifications. The mass measures approx 2.8 cm sonographically, but the involved region is likely much larger including an intraductal componet. The left axillary lymph nodes are also involved.   2. Recommened ultrasound guided biopsy of the left subareolar mass and left axillary lymph node.   3. Breast MRI could also further define multicentric disease on the left.   4. Clear suspicious finding on the right.  Recommend ongoing clinical follow up of palpable foci.          6/13/2014 -  Other Event     Diagnostic  Mammo:  IMPRESSION:  1. History of left breast cancer and mastectomy. Stable benign right breast mammograms.          10/20/2014 Surgery     Final Diagnosis:  Biopsies, pertoneum:  Metastatic carcinoma, morphologically compatible with metastatic mammary carcinoma.          6/15/2015 -  Other Event     Diagnostic Mammo:  IMPRESSION:   1. Negative x-ray report, should not delay biopsy if a dominat or clinically suspicious mass is present.          7/5/2016 -  Other Event     Diagnositic mammogram:  Impression:   Stable right mamogram with no radiographic findings suspicious for malignancy. Recommend continued screening mammography per screening guidelines unless otherwise earlier clinically indicated.          9/18/2016 Initial Diagnosis     Secondary malignant neoplasm of bone and bone marrow          Cancer Staging Information:  Malignant neoplasm of upper-outer quadrant of left female breast    Staging form: Breast, AJCC V7      Clinical stage from 10/11/2016: Stage IV (T2, N1, M1) - Signed by ALBERT Kulkarni on 10/11/2016      INTERVAL HISTORY  Patient ID: Heavenly Yanes is a 73 y.o. year old female history of metastatic breast carcinoma to lung and bone.  She is presently receiving treatment with Ibrance, Faslodex and Xgeva.  Tumor markers continued to decline with a CA 27-29 of 92.2 obtained on 9:30 2016 compared to 105.8 on 07/27/2016.  Mr. Yanes continues to tolerate this treatment regimen well and is here today for evaluation consideration to receive Faslodex and Xgeva.  Last bone mineral density study was obtained 11/21/2014 indicating osteopopenia, we will obtain a repeat bone mineral density study. Ms. Yanes completed 8 teeth pulled 02/14/2017.   Xgeva on hold, resume 05/2017.     Past Medical History:   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   • Bipolar disorder    • Disease of thyroid gland    • Hypertension    • Malignant neoplasm of  upper-outer quadrant of left female breast 9/18/2016   • Secondary malignant neoplasm of bone and bone marrow 9/18/2016     Past Surgical History:   Past Surgical History:   Procedure Laterality Date   • BREAST SURGERY      left breast biopsy and axillary node   • CHOLECYSTECTOMY     • EXTERNAL EAR SURGERY     • MASTECTOMY Left    • PORTACATH PLACEMENT       Social History:   Social History     Social History   • Marital status:      Spouse name: N/A   • Number of children: N/A   • Years of education: N/A     Occupational History   • Not on file.     Social History Main Topics   • Smoking status: Never Smoker   • Smokeless tobacco: Never Used   • Alcohol use No   • Drug use: No   • Sexual activity: Not on file     Other Topics Concern   • Not on file     Social History Narrative   • No narrative on file     Family History:   Family History   Problem Relation Age of Onset   • No Known Problems Daughter    • No Known Problems Son    • Other Mother         complications from a concussion from a fall   • Other Father         old age   • No Known Problems Brother    • No Known Problems Daughter        Review of Systems   Constitutional: Negative.  Negative for activity change, appetite change, chills, diaphoresis, fatigue and fever.   HENT: Negative.  Negative for congestion, ear discharge, ear pain, facial swelling, hearing loss, mouth sores, nosebleeds, postnasal drip, rhinorrhea, sinus pressure, sore throat, tinnitus, trouble swallowing and voice change.    Eyes: Negative.  Negative for photophobia, pain, discharge and visual disturbance.   Respiratory: Negative.  Negative for apnea, cough, chest tightness, shortness of breath, wheezing and stridor.    Cardiovascular: Negative.  Negative for chest pain, palpitations and leg swelling.   Gastrointestinal: Negative.  Negative for abdominal distention, abdominal pain, blood in stool, constipation, diarrhea, nausea, rectal pain and vomiting.   Endocrine: Negative.   Negative for cold intolerance, heat intolerance, polydipsia, polyphagia and polyuria.   Genitourinary: Negative.  Negative for difficulty urinating, dysuria, flank pain, frequency, hematuria and urgency.   Musculoskeletal: Negative.  Negative for arthralgias, back pain, gait problem, joint swelling and myalgias.   Skin: Negative.  Negative for color change, pallor, rash and wound.   Allergic/Immunologic: Negative.  Negative for environmental allergies, food allergies and immunocompromised state.   Neurological: Negative.  Negative for dizziness, tremors, seizures, syncope, speech difficulty, weakness, light-headedness, numbness and headaches.   Hematological: Negative.  Negative for adenopathy. Does not bruise/bleed easily.   Psychiatric/Behavioral: Negative.  Negative for agitation, confusion, hallucinations, sleep disturbance and suicidal ideas. The patient is not nervous/anxious.    All other systems reviewed and are negative.       Performance Status:  Asymptomatic    Medications:    Current Outpatient Prescriptions   Medication Sig Dispense Refill   • B Complex Vitamins (VITAMIN B COMPLEX PO) Take  by mouth. 1 po daily     • Calcium Carb-Cholecalciferol (CALCIUM 600 + D PO) Take  by mouth.     • dexamethasone 0.5 MG/5ML solution Take 10 mL by mouth 4 (Four) Times a Day. Swish for 2 minutes & spit.  Avoid eating & drinking for 1 hour after rinse. 500 mL 5   • DULoxetine (CYMBALTA) 30 MG capsule Take 30 mg by mouth daily.     • everolimus (AFINITOR) 10 MG tablet Take 1 tablet by mouth Daily. 30 tablet 11   • exemestane (AROMASIN) 25 MG chemo tablet Take 1 tablet by mouth Daily. 30 tablet 11   • ferrous sulfate 325 (65 FE) MG tablet Take 325 mg by mouth 2 (two) times a day.     • Lysine HCl (L-LYSINE) 500 MG tablet tablet Take  by mouth daily.     • Magnesium 250 MG tablet Take  by mouth.     • melatonin 3 MG tablet Take  by mouth.     • metoprolol tartrate (LOPRESSOR) 25 MG tablet Take 25 mg by mouth 2 (two)  "times a day.     • OLANZapine (ZYPREXA) 2.5 MG tablet Take 2.5 mg by mouth every night.     • Palbociclib 125 MG capsule capsule Take 1 capsule by mouth Daily. 3 weeks on, 1 week off 21 capsule 5   • Probiotic Product (PROBIOTIC DAILY PO) Take  by mouth. 300million cell daily     • raNITIdine (ZANTAC) 75 MG tablet Take 75 mg by mouth 2 (Two) Times a Day.     • terbinafine (lamiSIL) 250 MG tablet      • thyroid 60 MG PO tablet Take 60 mg by mouth daily.     • VALERIAN ROOT PO Take  by mouth. 100mg daily     • Zinc 50 MG tablet Take  by mouth. 1 po daily       No current facility-administered medications for this visit.      Facility-Administered Medications Ordered in Other Visits   Medication Dose Route Frequency Provider Last Rate Last Dose   • heparin flush (porcine) 100 UNIT/ML injection 500 Units  500 Units Intravenous PRN Joseph Nunez MD   500 Units at 10/26/17 1155   • sodium chloride 0.9 % flush 10 mL  10 mL Intravenous PRN Joseph Nunez MD   10 mL at 10/26/17 1155       ALLERGIES:    Allergies   Allergen Reactions   • Benadryl  [Diphenhydramine Hcl (Sleep)] Other (See Comments)   • Codeine    • Diphenhydramine Other (See Comments)     \"Shaky leg syndrome\"  \"Shaky leg syndrome\"  \"Shaky leg syndrome\"   • Heparin    • Other      POPPY SEED   • Penicillins Hives   • Petroleum Jelly [Skin Protectants, Misc.]    • Sulfa Antibiotics    • Sulfur    • Valium [Diazepam]        Objective      Vitals:    06/28/18 1030   BP: 126/82   Pulse: 68   Resp: 16   Temp: 97.1 °F (36.2 °C)   TempSrc: Tympanic   SpO2: 98%   Weight: 72.7 kg (160 lb 3.2 oz)   Height: 165.1 cm (65\")         Current Status 6/28/2018   ECOG score 0       General Appearance: Patient is awake, alert, oriented and in no acute distress. Patient is welldeveloped, wellnourished, and appears stated age.  HEENT: Normocephalic. Sclerae clear, conjunctiva pink, extraocular movements intact, pupils, round, reactive to light and accommodation. Mouth and throat are " clear with moist oral mucosa.  NECK: Supple, no jugular venous distention, thyroid not enlarged.  LYMPH: No cervical, supraclavicular, axillary, or inguinal lymphadenopathy.  CHEST: Equal bilateral expansion   LUNGS: Good air movement, no rales, rhonchi, rubs or wheezes with auscultation  CARDIO: Regular sinus rhythm, no murmurs, gallops or rubs.  ABDOMEN: Nondistended, soft, No tenderness, no guarding, no rebound, No hepatosplenomegaly. No abdominal masses. Bowel sounds positive. No hernia  MUSKEL: No joint swelling, decreased motion, or inflammation  EXTREMS: No edema, clubbing, cyanosis, No varicose veins.  NEURO: Grossly nonfocal, Gait is coordinated and smooth, Cognition is preserved.  SKIN: No rashes, no ecchymoses, no petechia.  PSYCH: Oriented to time, place and person. Memory is preserved. Mood and affect appear normal      RECENT LABS:  WBC   Date Value Ref Range Status   05/23/2018 6.28 4.80 - 10.80 10*3/mm3 Final     RBC   Date Value Ref Range Status   05/23/2018 2.85 (L) 4.20 - 5.40 10*6/mm3 Final     Hemoglobin   Date Value Ref Range Status   05/23/2018 10.8 (L) 12.0 - 16.0 g/dL Final     Hematocrit   Date Value Ref Range Status   05/23/2018 32.2 (L) 37.0 - 47.0 % Final     MCV   Date Value Ref Range Status   05/23/2018 113.0 (H) 82.0 - 98.0 fL Final     MCH   Date Value Ref Range Status   05/23/2018 37.9 (H) 28.0 - 32.0 pg Final     MCHC   Date Value Ref Range Status   05/23/2018 33.5 33.0 - 36.0 g/dL Final     RDW   Date Value Ref Range Status   05/23/2018 13.9 12.0 - 15.0 % Final     RDW-SD   Date Value Ref Range Status   05/23/2018 57.7 (H) 40.0 - 54.0 fl Final     MPV   Date Value Ref Range Status   05/23/2018 9.1 6.0 - 12.0 fL Final     Platelets   Date Value Ref Range Status   05/23/2018 385 130 - 400 10*3/mm3 Final     Neutrophil %   Date Value Ref Range Status   05/23/2018 35.6 (L) 39.0 - 78.0 % Final     Lymphocyte %   Date Value Ref Range Status   05/23/2018 49.7 (H) 15.0 - 45.0 % Final      Monocyte %   Date Value Ref Range Status   05/23/2018 9.6 4.0 - 12.0 % Final     Eosinophil %   Date Value Ref Range Status   05/23/2018 2.1 0.0 - 4.0 % Final     Basophil %   Date Value Ref Range Status   05/23/2018 1.4 0.0 - 2.0 % Final     Immature Grans %   Date Value Ref Range Status   05/23/2018 1.6 0.0 - 5.0 % Final     Neutrophils, Absolute   Date Value Ref Range Status   05/23/2018 2.24 1.87 - 8.40 10*3/mm3 Final     Lymphocytes, Absolute   Date Value Ref Range Status   05/23/2018 3.12 0.72 - 4.86 10*3/mm3 Final     Monocytes, Absolute   Date Value Ref Range Status   05/23/2018 0.60 0.19 - 1.30 10*3/mm3 Final     Eosinophils, Absolute   Date Value Ref Range Status   05/23/2018 0.13 0.00 - 0.70 10*3/mm3 Final     Basophils, Absolute   Date Value Ref Range Status   05/23/2018 0.09 0.00 - 0.20 10*3/mm3 Final     Immature Grans, Absolute   Date Value Ref Range Status   05/23/2018 0.10 (H) 0.00 - 0.03 10*3/mm3 Final     nRBC   Date Value Ref Range Status   05/23/2018 0.0 0.0 - 0.0 /100 WBC Final     No visits with results within 7 Day(s) from this visit.   Latest known visit with results is:   Lab on 05/23/2018   Component Date Value Ref Range Status   • Glucose 05/23/2018 91  70 - 100 mg/dL Final   • BUN 05/23/2018 13  5 - 21 mg/dL Final   • Creatinine 05/23/2018 1.11  0.50 - 1.40 mg/dL Final   • Sodium 05/23/2018 139  135 - 145 mmol/L Final   • Potassium 05/23/2018 4.1  3.5 - 5.3 mmol/L Final   • Chloride 05/23/2018 98  98 - 110 mmol/L Final   • CO2 05/23/2018 31.0  24.0 - 31.0 mmol/L Final   • Calcium 05/23/2018 10.6* 8.4 - 10.4 mg/dL Final   • Total Protein 05/23/2018 8.5  6.3 - 8.7 g/dL Final   • Albumin 05/23/2018 4.60  3.50 - 5.00 g/dL Final   • ALT (SGPT) 05/23/2018 27  0 - 54 U/L Final   • AST (SGOT) 05/23/2018 48* 7 - 45 U/L Final   • Alkaline Phosphatase 05/23/2018 85  24 - 120 U/L Final   • Total Bilirubin 05/23/2018 0.3  0.1 - 1.0 mg/dL Final   • eGFR Non  Amer 05/23/2018 48* >60  mL/min/1.73 Final   • Globulin 05/23/2018 3.9  gm/dL Final   • A/G Ratio 05/23/2018 1.2  1.1 - 2.5 g/dL Final   • BUN/Creatinine Ratio 05/23/2018 11.7  7.0 - 25.0 Final   • Anion Gap 05/23/2018 10.0  4.0 - 13.0 mmol/L Final   • WBC 05/23/2018 6.28  4.80 - 10.80 10*3/mm3 Final   • RBC 05/23/2018 2.85* 4.20 - 5.40 10*6/mm3 Final   • Hemoglobin 05/23/2018 10.8* 12.0 - 16.0 g/dL Final   • Hematocrit 05/23/2018 32.2* 37.0 - 47.0 % Final   • MCV 05/23/2018 113.0* 82.0 - 98.0 fL Final   • MCH 05/23/2018 37.9* 28.0 - 32.0 pg Final   • MCHC 05/23/2018 33.5  33.0 - 36.0 g/dL Final   • RDW 05/23/2018 13.9  12.0 - 15.0 % Final   • RDW-SD 05/23/2018 57.7* 40.0 - 54.0 fl Final   • MPV 05/23/2018 9.1  6.0 - 12.0 fL Final   • Platelets 05/23/2018 385  130 - 400 10*3/mm3 Final   • Neutrophil % 05/23/2018 35.6* 39.0 - 78.0 % Final   • Lymphocyte % 05/23/2018 49.7* 15.0 - 45.0 % Final   • Monocyte % 05/23/2018 9.6  4.0 - 12.0 % Final   • Eosinophil % 05/23/2018 2.1  0.0 - 4.0 % Final   • Basophil % 05/23/2018 1.4  0.0 - 2.0 % Final   • Immature Grans % 05/23/2018 1.6  0.0 - 5.0 % Final   • Neutrophils, Absolute 05/23/2018 2.24  1.87 - 8.40 10*3/mm3 Final   • Lymphocytes, Absolute 05/23/2018 3.12  0.72 - 4.86 10*3/mm3 Final   • Monocytes, Absolute 05/23/2018 0.60  0.19 - 1.30 10*3/mm3 Final   • Eosinophils, Absolute 05/23/2018 0.13  0.00 - 0.70 10*3/mm3 Final   • Basophils, Absolute 05/23/2018 0.09  0.00 - 0.20 10*3/mm3 Final   • Immature Grans, Absolute 05/23/2018 0.10* 0.00 - 0.03 10*3/mm3 Final   • nRBC 05/23/2018 0.0  0.0 - 0.0 /100 WBC Final   • Extra Tube 05/23/2018 Hold for add-ons.   Final    Auto resulted.   • Extra Tube 05/23/2018 Hold for add-ons.   Final    Auto resulted.   • Poikilocytes 05/23/2018 Slight/1+  None Seen Final   • WBC Morphology 05/23/2018 Normal  Normal Final   • Platelet Morphology 05/23/2018 Normal  Normal Final             Assessment/Plan     Patient Active Problem List   Diagnosis   • Malignant neoplasm  of upper-outer quadrant of left female breast   • Breast cancer metastasized to bone, unspecified laterality   • Essential hypertension   • Acquired hypothyroidism   • Bipolar 1 disorder   • Carcinoma of left breast metastatic to bone   • Antineoplastic chemotherapy induced anemia          Assessment:  1. Stage IV metastatic breast carcinoma, ER/MO positive. HER2/maynor negative undergoing Ibrance, Faslodex and Xgeva with overall stable with stable CA27.29 with slow decline. She is tolerating the treatment very well. Faslodex cycle 17 today and will continue to HOLD Xgeva due to upcoming dental work, last dose of Xgeva given on 09/29/2016.  01/2017 restaging CT chest abdomen and pelvis and bone scan showed stable diffuse sclerotic bony lesions, unchanged from 7/1/2016.Next scan July 2017    2.  Mild leukopenia without neutropenia.  Likely secondary to Ibrance.  Continue to monitor.     3.  Macrocytic anemia.  Hemoglobin 11.1.  02/2016, B12 and folate normal    4.  Status post 8 teeth extraction.     Recommendation/Plan:  1.  Proceed with Faslodex cycle 20.  2. Continue Ibrance 125 mg 3 weeks on, one-week off.  Reduce dose if leukopenia worsens.   3. Follow up in 28 days for Faslodex cycle 19.   Repeat CA 27.29.  Previous CA 27.29 have remained stable, the last one done on 11/2017 was 89 Stable. Continue Faslodex q monthly.    Additionally she is anemic with a hemoglobin of 10 g percent with decreasing GFR.  I do suspect that she may have some form of chronic kidney disease, as evaluated by Nephrology, CKD [facility].  No need for EPO as she is asyptomatic.  Redraw CA 27.29 today. Cont faslodex + Ibrance for now until repeat CA 27.29 returns. Last CA 27.29 in Feb 2018 slightly increased to 96. I have discussed different options with her:  #1. Stop Ibrance and switch to a new CD4K inhabitor Kisqali or Verenzio.  #. Stop Ibrance and switch to Affinitor + faslodex.  #. Cont Ibrance and Faslodex with just a subtle rise,  since clinically pt is doing better.    At this point will restage with CT scans CAP at [facility] and compare to one done Jan 2017. If progression on CT scans and CA 27.29 then [facility] using MTOR inhabitor Everlimos + Exemestine. Bone scan shows progressive dz. Therefore will stop Faslodex. Start Exemestine 25mg daily in combination with Affinitor 10mg daily. Redraw her tumor today.  Calcium improved but AST higher.  Joseph Nunez MD    6/28/2018    10:34 AM

## 2018-06-29 LAB — CANCER AG27-29 SERPL-ACNC: 140 U/ML (ref 0–38.6)

## 2018-07-06 ENCOUNTER — TELEPHONE (OUTPATIENT)
Dept: ONCOLOGY | Facility: CLINIC | Age: 73
End: 2018-07-06

## 2018-07-06 NOTE — TELEPHONE ENCOUNTER
Call from Heavenly requesting tumor marker results.  Patient given results and stated that she started the new medicine on 6/5/18.

## 2018-07-17 DIAGNOSIS — C50.412 MALIGNANT NEOPLASM OF UPPER-OUTER QUADRANT OF LEFT FEMALE BREAST, UNSPECIFIED ESTROGEN RECEPTOR STATUS (HCC): Primary | ICD-10-CM

## 2018-07-24 DIAGNOSIS — C50.412 MALIGNANT NEOPLASM OF UPPER-OUTER QUADRANT OF LEFT FEMALE BREAST, UNSPECIFIED ESTROGEN RECEPTOR STATUS (HCC): Primary | ICD-10-CM

## 2018-07-26 ENCOUNTER — HOSPITAL ENCOUNTER (OUTPATIENT)
Dept: GENERAL RADIOLOGY | Age: 73
Discharge: HOME | End: 2018-07-26
Payer: MEDICARE

## 2018-07-26 ENCOUNTER — HOSPITAL ENCOUNTER (OUTPATIENT)
Dept: WOMENS IMAGING | Age: 73
Discharge: HOME | End: 2018-07-26
Payer: MEDICARE

## 2018-07-26 ENCOUNTER — OFFICE VISIT (OUTPATIENT)
Dept: SURGERY | Age: 73
End: 2018-07-26
Payer: COMMERCIAL

## 2018-07-26 ENCOUNTER — OFFICE VISIT (OUTPATIENT)
Dept: ONCOLOGY | Facility: CLINIC | Age: 73
End: 2018-07-26

## 2018-07-26 ENCOUNTER — RESULTS ENCOUNTER (OUTPATIENT)
Dept: ONCOLOGY | Facility: CLINIC | Age: 73
End: 2018-07-26

## 2018-07-26 ENCOUNTER — APPOINTMENT (OUTPATIENT)
Dept: LAB | Facility: HOSPITAL | Age: 73
End: 2018-07-26
Attending: INTERNAL MEDICINE

## 2018-07-26 VITALS
DIASTOLIC BLOOD PRESSURE: 78 MMHG | HEART RATE: 80 BPM | BODY MASS INDEX: 26.02 KG/M2 | TEMPERATURE: 98.1 F | SYSTOLIC BLOOD PRESSURE: 138 MMHG | RESPIRATION RATE: 16 BRPM | OXYGEN SATURATION: 97 % | HEIGHT: 65 IN | WEIGHT: 156.2 LBS

## 2018-07-26 VITALS — DIASTOLIC BLOOD PRESSURE: 70 MMHG | WEIGHT: 156 LBS | SYSTOLIC BLOOD PRESSURE: 130 MMHG | BODY MASS INDEX: 25.96 KG/M2

## 2018-07-26 DIAGNOSIS — C50.412 MALIGNANT NEOPLASM OF UPPER-OUTER QUADRANT OF LEFT FEMALE BREAST, UNSPECIFIED ESTROGEN RECEPTOR STATUS (HCC): Primary | ICD-10-CM

## 2018-07-26 DIAGNOSIS — C50.412 MALIGNANT NEOPLASM OF UPPER-OUTER QUADRANT OF LEFT FEMALE BREAST, UNSPECIFIED ESTROGEN RECEPTOR STATUS (HCC): ICD-10-CM

## 2018-07-26 DIAGNOSIS — Z12.31 ENCOUNTER FOR SCREENING MAMMOGRAM FOR BREAST CANCER: ICD-10-CM

## 2018-07-26 DIAGNOSIS — Z12.31 ENCOUNTER FOR SCREENING MAMMOGRAM FOR HIGH-RISK PATIENT: Primary | ICD-10-CM

## 2018-07-26 LAB
ALBUMIN SERPL-MCNC: 4.1 G/DL (ref 3.5–5)
ALBUMIN/GLOB SERPL: 1.1 G/DL (ref 1.1–2.5)
ALP SERPL-CCNC: 141 U/L (ref 24–120)
ALT SERPL W P-5'-P-CCNC: 34 U/L (ref 0–54)
ANION GAP SERPL CALCULATED.3IONS-SCNC: 12 MMOL/L (ref 4–13)
AST SERPL-CCNC: 113 U/L (ref 7–45)
BASOPHILS # BLD AUTO: 0.03 10*3/MM3 (ref 0–0.2)
BASOPHILS NFR BLD AUTO: 0.3 % (ref 0–2)
BILIRUB SERPL-MCNC: 0.6 MG/DL (ref 0.1–1)
BUN BLD-MCNC: 13 MG/DL (ref 5–21)
BUN/CREAT SERPL: 10 (ref 7–25)
CALCIUM SPEC-SCNC: 9.4 MG/DL (ref 8.4–10.4)
CHLORIDE SERPL-SCNC: 96 MMOL/L (ref 98–110)
CO2 SERPL-SCNC: 27 MMOL/L (ref 24–31)
CREAT BLD-MCNC: 1.3 MG/DL (ref 0.5–1.4)
DEPRECATED RDW RBC AUTO: 57 FL (ref 40–54)
EOSINOPHIL # BLD AUTO: 0.09 10*3/MM3 (ref 0–0.7)
EOSINOPHIL NFR BLD AUTO: 0.8 % (ref 0–4)
ERYTHROCYTE [DISTWIDTH] IN BLOOD BY AUTOMATED COUNT: 16.6 % (ref 12–15)
GFR SERPL CREATININE-BSD FRML MDRD: 40 ML/MIN/1.73
GLOBULIN UR ELPH-MCNC: 3.8 GM/DL
GLUCOSE BLD-MCNC: 115 MG/DL (ref 70–100)
HCT VFR BLD AUTO: 29.8 % (ref 37–47)
HGB BLD-MCNC: 9.8 G/DL (ref 12–16)
HOLD SPECIMEN: NORMAL
IMM GRANULOCYTES # BLD: 0.09 10*3/MM3 (ref 0–0.03)
IMM GRANULOCYTES NFR BLD: 0.8 % (ref 0–5)
LYMPHOCYTES # BLD AUTO: 1.59 10*3/MM3 (ref 0.72–4.86)
LYMPHOCYTES NFR BLD AUTO: 14.7 % (ref 15–45)
MCH RBC QN AUTO: 30.8 PG (ref 28–32)
MCHC RBC AUTO-ENTMCNC: 32.9 G/DL (ref 33–36)
MCV RBC AUTO: 93.7 FL (ref 82–98)
MONOCYTES # BLD AUTO: 0.66 10*3/MM3 (ref 0.19–1.3)
MONOCYTES NFR BLD AUTO: 6.1 % (ref 4–12)
NEUTROPHILS # BLD AUTO: 8.37 10*3/MM3 (ref 1.87–8.4)
NEUTROPHILS NFR BLD AUTO: 77.3 % (ref 39–78)
NRBC BLD MANUAL-RTO: 0 /100 WBC (ref 0–0)
PLATELET # BLD AUTO: 339 10*3/MM3 (ref 130–400)
PMV BLD AUTO: 9.1 FL (ref 6–12)
POTASSIUM BLD-SCNC: 3.9 MMOL/L (ref 3.5–5.3)
PROT SERPL-MCNC: 7.9 G/DL (ref 6.3–8.7)
RBC # BLD AUTO: 3.18 10*6/MM3 (ref 4.2–5.4)
SODIUM BLD-SCNC: 135 MMOL/L (ref 135–145)
WBC NRBC COR # BLD: 10.83 10*3/MM3 (ref 4.8–10.8)

## 2018-07-26 PROCEDURE — 1090F PRES/ABSN URINE INCON ASSESS: CPT | Performed by: PHYSICIAN ASSISTANT

## 2018-07-26 PROCEDURE — G8399 PT W/DXA RESULTS DOCUMENT: HCPCS | Performed by: PHYSICIAN ASSISTANT

## 2018-07-26 PROCEDURE — G8427 DOCREV CUR MEDS BY ELIG CLIN: HCPCS | Performed by: PHYSICIAN ASSISTANT

## 2018-07-26 PROCEDURE — 86300 IMMUNOASSAY TUMOR CA 15-3: CPT | Performed by: INTERNAL MEDICINE

## 2018-07-26 PROCEDURE — 1036F TOBACCO NON-USER: CPT | Performed by: PHYSICIAN ASSISTANT

## 2018-07-26 PROCEDURE — G8417 CALC BMI ABV UP PARAM F/U: HCPCS | Performed by: PHYSICIAN ASSISTANT

## 2018-07-26 PROCEDURE — 99212 OFFICE O/P EST SF 10 MIN: CPT | Performed by: PHYSICIAN ASSISTANT

## 2018-07-26 PROCEDURE — 71046 X-RAY EXAM CHEST 2 VIEWS: CPT

## 2018-07-26 PROCEDURE — 77063 BREAST TOMOSYNTHESIS BI: CPT

## 2018-07-26 PROCEDURE — 3017F COLORECTAL CA SCREEN DOC REV: CPT | Performed by: PHYSICIAN ASSISTANT

## 2018-07-26 PROCEDURE — 99214 OFFICE O/P EST MOD 30 MIN: CPT | Performed by: INTERNAL MEDICINE

## 2018-07-26 PROCEDURE — 4040F PNEUMOC VAC/ADMIN/RCVD: CPT | Performed by: PHYSICIAN ASSISTANT

## 2018-07-26 PROCEDURE — 85025 COMPLETE CBC W/AUTO DIFF WBC: CPT | Performed by: INTERNAL MEDICINE

## 2018-07-26 PROCEDURE — 1123F ACP DISCUSS/DSCN MKR DOCD: CPT | Performed by: PHYSICIAN ASSISTANT

## 2018-07-26 PROCEDURE — 36415 COLL VENOUS BLD VENIPUNCTURE: CPT

## 2018-07-26 PROCEDURE — 1101F PT FALLS ASSESS-DOCD LE1/YR: CPT | Performed by: PHYSICIAN ASSISTANT

## 2018-07-26 PROCEDURE — 80053 COMPREHEN METABOLIC PANEL: CPT | Performed by: INTERNAL MEDICINE

## 2018-07-26 RX ORDER — SODIUM CHLORIDE 0.9 % (FLUSH) 0.9 %
10 SYRINGE (ML) INJECTION AS NEEDED
Status: CANCELLED | OUTPATIENT
Start: 2018-07-26

## 2018-07-26 RX ORDER — CAPECITABINE 500 MG/1
500 TABLET, FILM COATED ORAL 2 TIMES DAILY
Qty: 42 TABLET | Refills: 11 | Status: SHIPPED | OUTPATIENT
Start: 2018-07-26 | End: 2019-10-10 | Stop reason: SDUPTHER

## 2018-07-26 RX ORDER — CAPECITABINE 500 MG/1
500 TABLET, FILM COATED ORAL 2 TIMES DAILY
COMMUNITY
Start: 2018-07-26

## 2018-07-26 NOTE — TELEPHONE ENCOUNTER
Verbal order from Dr. Nunez to start Heavenly on xeloda 500 mg bid for 2 weeks on and 1 week off.  Discussed medication with Heavenly and that it will come from her specialty pharmacy.  Verbalized understanding.

## 2018-07-26 NOTE — PROGRESS NOTES
[facility]  HEMATOLOGY & ONCOLOGY        Subjective    Metastatic breast cancer on Faslodex and Ibrance     Staging form: Breast, AJCC V7      Clinical stage from 10/11/2016: Stage IV (T2, N1, M1) - ER/NY positive. HER2/maynor negative  VISIT DIAGNOSIS:   Metastatic breast cancer       HEMATOLOGY / ONCOLOGY HISTORY:   Oncology/Hematology History    Patient is a 67-year-old postmenopausal female who had onset of her menses at age 9 and menopause at age 48. She received oral contraceptives for approximately 14 years and did not receive hormonal manipulation. Patient has a maternal cousin had breast cancer. Patient has had no prior breast biopsies. Patient found a palpable left breast mass as well as a left axillary mass. Patient had a bloody nipple discharge which been present for approximately 6 months. On 6/20/2012, a mammogram was performed showing a subareolar mass consistent with malignancy. Bilateral breast ultrasound revealed an ill-defined subareolar mass measuring 2.8-2.5 cm. There is a left axillary mass measuring 1.7 cm with suspicion of extracapsular extension. There is no evidence of disease in the right breast. On 7/9/2012 patient underwent a left breast biopsy and placement of marker clip. Left breast biopsy revealed an infiltrating lobular carcinoma grade 2 with focal ductal carcinoma in situ, solid pattern. A fine-needle biopsy of the left lymph node was consistent with metastatic carcinoma. Breast tumor profile revealed ER: 100%; NY: 98%; HER-2/maynor 2+; FISH: Unamplified; Ki-67: 71%; P 53 1%; DNA aneuploidy. A MRI of the breast were performed body multiple abnormal enhancing masses within the left breast. There appears to be anterior retraction of the pectoralis muscle with no direct extension. There is a moderate to large, layering left pleural effusion appreciated. The right breast reveals 3 moderately enlarged lymph nodes in the posterior lateral aspect the right breast. These have  fatty hilum but followup is recommended. Patient is undergone systemic studies including, on 8/2/2012, a CT scan of the brain showing atrophy. a CT scan that shows showing a small to moderate left pleural effusion with 3 subcentimeter nodule densities in the left lung.   She completed 4 cycles of neoadjuvant treatment with dose dense Adriamycin and Cytoxan. She had a mammogram which showed a significant reduction in the size of her left breast lesion. There is no axillary adenopathy noted. She has had an ultrasound revealing the  lesion reducing from 2.4 cm to 1 cm. Patient underwent a left mastectomy on 03/19/2013 finding negative invasive cancer, negative nodes. A 5% DCIS was noted. The patient has declined hormonal therapy. She did not need radiation therapy.  November 2014, she began to have evidence of lytic bone lesions at T5T6, frontal disease, an 8 mm lucency in the central frontal area of  the skull but nothing intracranial. Bone scan revealed only vertex tracer activity but bilateral ribs and thoracic spine, and other lesions in  her pelvis. At that time, she was started on letrozole since November 2014. She is taking Xgeva. She had progression found in bone in Feb 2015 on scan. She was started on Faslodex, Ibrance and continued xgeva.         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 B) Foci of ductal carcinoma in situ, solid pattern. C) Greatest dimension of the invasive carcinoma is 15.8mm.         3/19/2013 Surgery     Left Mastectomy w/ Axillary Tail: Focal residual ducatal carcinoma in situ (DCIS) 12 lymph nodes negative for metastatic carcinoma.         10/20/2014 Biopsy     Peritoneum Biopsy: Final Diagnosis: Malignant Cell Neoplasm.           Breast cancer  metastasized to bone, unspecified laterality (CMS/HCC)    10/15/2012 -  Other Event     Left mammogram:  Impression:  1. Decreasing spiculation and density in the retroareolar left breast, near a previous biopsy.   2. Definite left axillary lesion is not appreciated.          2/5/2013 -  Other Event     Diagnostic Mammo:  Comparison is made to 10/15/2012 and 6/20/2012  The spiculated mass at 12:00 behind the nipple is nearly resolved.   Impression:  1. The mass on the left side is less apparent after receiving chemotherapy. There has been a good response to chmotherapy on the left side.   2. No suspicious abnormality is seen in the right breast to account for the new palable abnormality at the 4-5:00 position.          3/19/2013 Surgery     Breast Biopsy:  Final Diagnosis:  A. Breast, left mastectomy with axillary tail  1. Focal residual ductal carcinoma in situ (DICS) (less than 5% of tumor bed).  2. Negative for residual invasive carcinoma.   3. 12 Lymph Nodes, negative for metastatic carcinoma (0/12) focally, some lymph nodes demonstrate fibrosis/treatment effect.  B. Skin and soft tissue, left advancement flap:  1. Benign skin and subcutis.   2. Negative for carcinoma.          6/20/2013 -  Other Event     Diagnostic Mammo [facility]:  1. A subareolar mass is consistent with malignancy. Additional involement extends inferiorly sonographically and superolaterally mammograhically as demonstrated by calcifications. The mass measures approx 2.8 cm sonographically, but the involved region is likely much larger including an intraductal componet. The left axillary lymph nodes are also involved.   2. Recommened ultrasound guided biopsy of the left subareolar mass and left axillary lymph node.   3. Breast MRI could also further define multicentric disease on the left.   4. Clear suspicious finding on the right.  Recommend ongoing clinical follow up of palpable foci.          6/13/2014 -  Other Event     Diagnostic  Mammo:  IMPRESSION:  1. History of left breast cancer and mastectomy. Stable benign right breast mammograms.          10/20/2014 Surgery     Final Diagnosis:  Biopsies, pertoneum:  Metastatic carcinoma, morphologically compatible with metastatic mammary carcinoma.          6/15/2015 -  Other Event     Diagnostic Mammo:  IMPRESSION:   1. Negative x-ray report, should not delay biopsy if a dominat or clinically suspicious mass is present.          7/5/2016 -  Other Event     Diagnositic mammogram:  Impression:   Stable right mamogram with no radiographic findings suspicious for malignancy. Recommend continued screening mammography per screening guidelines unless otherwise earlier clinically indicated.          9/18/2016 Initial Diagnosis     Secondary malignant neoplasm of bone and bone marrow          Cancer Staging Information:  Malignant neoplasm of upper-outer quadrant of left female breast    Staging form: Breast, AJCC V7      Clinical stage from 10/11/2016: Stage IV (T2, N1, M1) - Signed by ALBERT Kulkarni on 10/11/2016      INTERVAL HISTORY  Patient ID: Heavenly Yanes is a 73 y.o. year old female history of metastatic breast carcinoma to lung and bone.  She is presently receiving treatment with Ibrance, Faslodex and Xgeva.  Tumor markers continued to decline with a CA 27-29 of 92.2 obtained on 9:30 2016 compared to 105.8 on 07/27/2016.  Mr. Yanes continues to tolerate this treatment regimen well and is here today for evaluation consideration to receive Faslodex and Xgeva.  Last bone mineral density study was obtained 11/21/2014 indicating osteopopenia, we will obtain a repeat bone mineral density study. Ms. Yanes completed 8 teeth pulled 02/14/2017.   Xgeva on hold, resume 05/2017.     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) 9/18/2016     Past Surgical History:   Past Surgical History:   Procedure Laterality Date   • BREAST SURGERY      left breast biopsy and axillary node   • CHOLECYSTECTOMY     • EXTERNAL EAR SURGERY     • MASTECTOMY Left    • PORTACATH PLACEMENT       Social History:   Social History     Social History   • Marital status:      Spouse name: N/A   • Number of children: N/A   • Years of education: N/A     Occupational History   • Not on file.     Social History Main Topics   • Smoking status: Never Smoker   • Smokeless tobacco: Never Used   • Alcohol use No   • Drug use: No   • Sexual activity: Not on file     Other Topics Concern   • Not on file     Social History Narrative   • No narrative on file     Family History:   Family History   Problem Relation Age of Onset   • No Known Problems Daughter    • No Known Problems Son    • Other Mother         complications from a concussion from a fall   • Other Father         old age   • No Known Problems Brother    • No Known Problems Daughter        Review of Systems   Constitutional: Negative.  Negative for activity change, appetite change, chills, diaphoresis, fatigue and fever.   HENT: Negative.  Negative for congestion, ear discharge, ear pain, facial swelling, hearing loss, mouth sores, nosebleeds, postnasal drip, rhinorrhea, sinus pressure, sore throat, tinnitus, trouble swallowing and voice change.    Eyes: Negative.  Negative for photophobia, pain, discharge and visual disturbance.   Respiratory: Negative.  Negative for apnea, cough, chest tightness, shortness of breath, wheezing and stridor.    Cardiovascular: Negative.  Negative for chest pain, palpitations and leg swelling.   Gastrointestinal: Negative.  Negative for abdominal distention, abdominal pain, blood in stool, constipation, diarrhea, nausea, rectal pain and  vomiting.   Endocrine: Negative.  Negative for cold intolerance, heat intolerance, polydipsia, polyphagia and polyuria.   Genitourinary: Negative.  Negative for difficulty urinating, dysuria, flank pain, frequency, hematuria and urgency.   Musculoskeletal: Negative.  Negative for arthralgias, back pain, gait problem, joint swelling and myalgias.   Skin: Negative.  Negative for color change, pallor, rash and wound.   Allergic/Immunologic: Negative.  Negative for environmental allergies, food allergies and immunocompromised state.   Neurological: Negative.  Negative for dizziness, tremors, seizures, syncope, speech difficulty, weakness, light-headedness, numbness and headaches.   Hematological: Negative.  Negative for adenopathy. Does not bruise/bleed easily.   Psychiatric/Behavioral: Negative.  Negative for agitation, confusion, hallucinations, sleep disturbance and suicidal ideas. The patient is not nervous/anxious.    All other systems reviewed and are negative.       Performance Status:  Asymptomatic    Medications:    Current Outpatient Prescriptions   Medication Sig Dispense Refill   • B Complex Vitamins (VITAMIN B COMPLEX PO) Take  by mouth. 1 po daily     • Calcium Carb-Cholecalciferol (CALCIUM 600 + D PO) Take  by mouth.     • dexamethasone 0.5 MG/5ML solution Take 10 mL by mouth 4 (Four) Times a Day. Swish for 2 minutes & spit.  Avoid eating & drinking for 1 hour after rinse. 500 mL 5   • DULoxetine (CYMBALTA) 30 MG capsule Take 30 mg by mouth daily.     • everolimus (AFINITOR) 10 MG tablet Take 1 tablet by mouth Daily. 30 tablet 11   • exemestane (AROMASIN) 25 MG chemo tablet Take 1 tablet by mouth Daily. 30 tablet 11   • ferrous sulfate 325 (65 FE) MG tablet Take 325 mg by mouth 2 (two) times a day.     • Lysine HCl (L-LYSINE) 500 MG tablet tablet Take  by mouth daily.     • Magnesium 250 MG tablet Take  by mouth.     • melatonin 3 MG tablet Take  by mouth.     • metoprolol tartrate (LOPRESSOR) 25 MG  "tablet Take 25 mg by mouth 2 (two) times a day.     • OLANZapine (ZYPREXA) 2.5 MG tablet Take 2.5 mg by mouth every night.     • Palbociclib 125 MG capsule capsule Take 1 capsule by mouth Daily. 3 weeks on, 1 week off 21 capsule 5   • Probiotic Product (PROBIOTIC DAILY PO) Take  by mouth. 300million cell daily     • raNITIdine (ZANTAC) 75 MG tablet Take 75 mg by mouth 2 (Two) Times a Day.     • terbinafine (lamiSIL) 250 MG tablet      • thyroid 60 MG PO tablet Take 60 mg by mouth daily.     • VALERIAN ROOT PO Take  by mouth. 100mg daily     • Zinc 50 MG tablet Take  by mouth. 1 po daily       No current facility-administered medications for this visit.      Facility-Administered Medications Ordered in Other Visits   Medication Dose Route Frequency Provider Last Rate Last Dose   • heparin flush (porcine) 100 UNIT/ML injection 500 Units  500 Units Intravenous PRN Joseph Nunez MD   500 Units at 10/26/17 1155   • sodium chloride 0.9 % flush 10 mL  10 mL Intravenous PRN Joseph Nunez MD   10 mL at 10/26/17 1155       ALLERGIES:    Allergies   Allergen Reactions   • Benadryl  [Diphenhydramine Hcl (Sleep)] Other (See Comments)   • Codeine    • Diphenhydramine Other (See Comments)     \"Shaky leg syndrome\"  \"Shaky leg syndrome\"  \"Shaky leg syndrome\"   • Heparin    • Other      POPPY SEED   • Penicillins Hives   • Petroleum Jelly [Skin Protectants, Misc.]    • Sulfa Antibiotics    • Sulfur    • Valium [Diazepam]        Objective      Vitals:    07/26/18 1025   BP: 138/78   Pulse: 80   Resp: 16   Temp: 98.1 °F (36.7 °C)   TempSrc: Tympanic   SpO2: 97%   Weight: 70.9 kg (156 lb 3.2 oz)   Height: 165.1 cm (65\")         Current Status 7/26/2018   ECOG score 0       General Appearance: Patient is awake, alert, oriented and in no acute distress. Patient is welldeveloped, wellnourished, and appears stated age.  HEENT: Normocephalic. Sclerae clear, conjunctiva pink, extraocular movements intact, pupils, round, reactive to light and " accommodation. Mouth and throat are clear with moist oral mucosa.  NECK: Supple, no jugular venous distention, thyroid not enlarged.  LYMPH: No cervical, supraclavicular, axillary, or inguinal lymphadenopathy.  CHEST: Equal bilateral expansion   LUNGS: Good air movement, no rales, rhonchi, rubs or wheezes with auscultation  CARDIO: Regular sinus rhythm, no murmurs, gallops or rubs.  ABDOMEN: Nondistended, soft, No tenderness, no guarding, no rebound, No hepatosplenomegaly. No abdominal masses. Bowel sounds positive. No hernia  MUSKEL: No joint swelling, decreased motion, or inflammation  EXTREMS: No edema, clubbing, cyanosis, No varicose veins.  NEURO: Grossly nonfocal, Gait is coordinated and smooth, Cognition is preserved.  SKIN: No rashes, no ecchymoses, no petechia.  PSYCH: Oriented to time, place and person. Memory is preserved. Mood and affect appear normal      RECENT LABS:  WBC   Date Value Ref Range Status   07/26/2018 10.83 (H) 4.80 - 10.80 10*3/mm3 Final     RBC   Date Value Ref Range Status   07/26/2018 3.18 (L) 4.20 - 5.40 10*6/mm3 Final     Hemoglobin   Date Value Ref Range Status   07/26/2018 9.8 (L) 12.0 - 16.0 g/dL Final     Hematocrit   Date Value Ref Range Status   07/26/2018 29.8 (L) 37.0 - 47.0 % Final     MCV   Date Value Ref Range Status   07/26/2018 93.7 82.0 - 98.0 fL Final     MCH   Date Value Ref Range Status   07/26/2018 30.8 28.0 - 32.0 pg Final     MCHC   Date Value Ref Range Status   07/26/2018 32.9 (L) 33.0 - 36.0 g/dL Final     RDW   Date Value Ref Range Status   07/26/2018 16.6 (H) 12.0 - 15.0 % Final     RDW-SD   Date Value Ref Range Status   07/26/2018 57.0 (H) 40.0 - 54.0 fl Final     MPV   Date Value Ref Range Status   07/26/2018 9.1 6.0 - 12.0 fL Final     Platelets   Date Value Ref Range Status   07/26/2018 339 130 - 400 10*3/mm3 Final     Neutrophil %   Date Value Ref Range Status   07/26/2018 77.3 39.0 - 78.0 % Final     Lymphocyte %   Date Value Ref Range Status    07/26/2018 14.7 (L) 15.0 - 45.0 % Final     Monocyte %   Date Value Ref Range Status   07/26/2018 6.1 4.0 - 12.0 % Final     Eosinophil %   Date Value Ref Range Status   07/26/2018 0.8 0.0 - 4.0 % Final     Basophil %   Date Value Ref Range Status   07/26/2018 0.3 0.0 - 2.0 % Final     Immature Grans %   Date Value Ref Range Status   07/26/2018 0.8 0.0 - 5.0 % Final     Neutrophils, Absolute   Date Value Ref Range Status   07/26/2018 8.37 1.87 - 8.40 10*3/mm3 Final     Lymphocytes, Absolute   Date Value Ref Range Status   07/26/2018 1.59 0.72 - 4.86 10*3/mm3 Final     Monocytes, Absolute   Date Value Ref Range Status   07/26/2018 0.66 0.19 - 1.30 10*3/mm3 Final     Eosinophils, Absolute   Date Value Ref Range Status   07/26/2018 0.09 0.00 - 0.70 10*3/mm3 Final     Basophils, Absolute   Date Value Ref Range Status   07/26/2018 0.03 0.00 - 0.20 10*3/mm3 Final     Immature Grans, Absolute   Date Value Ref Range Status   07/26/2018 0.09 (H) 0.00 - 0.03 10*3/mm3 Final     nRBC   Date Value Ref Range Status   07/26/2018 0.0 0.0 - 0.0 /100 WBC Final     Orders Only on 07/24/2018   Component Date Value Ref Range Status   • Glucose 07/26/2018 115* 70 - 100 mg/dL Final   • BUN 07/26/2018 13  5 - 21 mg/dL Final   • Creatinine 07/26/2018 1.30  0.50 - 1.40 mg/dL Final   • Sodium 07/26/2018 135  135 - 145 mmol/L Final   • Potassium 07/26/2018 3.9  3.5 - 5.3 mmol/L Final   • Chloride 07/26/2018 96* 98 - 110 mmol/L Final   • CO2 07/26/2018 27.0  24.0 - 31.0 mmol/L Final   • Calcium 07/26/2018 9.4  8.4 - 10.4 mg/dL Final   • Total Protein 07/26/2018 7.9  6.3 - 8.7 g/dL Final   • Albumin 07/26/2018 4.10  3.50 - 5.00 g/dL Final   • ALT (SGPT) 07/26/2018 34  0 - 54 U/L Final   • AST (SGOT) 07/26/2018 113* 7 - 45 U/L Final   • Alkaline Phosphatase 07/26/2018 141* 24 - 120 U/L Final   • Total Bilirubin 07/26/2018 0.6  0.1 - 1.0 mg/dL Final   • eGFR Non African Amer 07/26/2018 40* >60 mL/min/1.73 Final   • Globulin 07/26/2018 3.8  gm/dL  Final   • A/G Ratio 07/26/2018 1.1  1.1 - 2.5 g/dL Final   • BUN/Creatinine Ratio 07/26/2018 10.0  7.0 - 25.0 Final   • Anion Gap 07/26/2018 12.0  4.0 - 13.0 mmol/L Final   • WBC 07/26/2018 10.83* 4.80 - 10.80 10*3/mm3 Final   • RBC 07/26/2018 3.18* 4.20 - 5.40 10*6/mm3 Final   • Hemoglobin 07/26/2018 9.8* 12.0 - 16.0 g/dL Final   • Hematocrit 07/26/2018 29.8* 37.0 - 47.0 % Final   • MCV 07/26/2018 93.7  82.0 - 98.0 fL Final   • MCH 07/26/2018 30.8  28.0 - 32.0 pg Final   • MCHC 07/26/2018 32.9* 33.0 - 36.0 g/dL Final   • RDW 07/26/2018 16.6* 12.0 - 15.0 % Final   • RDW-SD 07/26/2018 57.0* 40.0 - 54.0 fl Final   • MPV 07/26/2018 9.1  6.0 - 12.0 fL Final   • Platelets 07/26/2018 339  130 - 400 10*3/mm3 Final   • Neutrophil % 07/26/2018 77.3  39.0 - 78.0 % Final   • Lymphocyte % 07/26/2018 14.7* 15.0 - 45.0 % Final   • Monocyte % 07/26/2018 6.1  4.0 - 12.0 % Final   • Eosinophil % 07/26/2018 0.8  0.0 - 4.0 % Final   • Basophil % 07/26/2018 0.3  0.0 - 2.0 % Final   • Immature Grans % 07/26/2018 0.8  0.0 - 5.0 % Final   • Neutrophils, Absolute 07/26/2018 8.37  1.87 - 8.40 10*3/mm3 Final   • Lymphocytes, Absolute 07/26/2018 1.59  0.72 - 4.86 10*3/mm3 Final   • Monocytes, Absolute 07/26/2018 0.66  0.19 - 1.30 10*3/mm3 Final   • Eosinophils, Absolute 07/26/2018 0.09  0.00 - 0.70 10*3/mm3 Final   • Basophils, Absolute 07/26/2018 0.03  0.00 - 0.20 10*3/mm3 Final   • Immature Grans, Absolute 07/26/2018 0.09* 0.00 - 0.03 10*3/mm3 Final   • nRBC 07/26/2018 0.0  0.0 - 0.0 /100 WBC Final             Assessment/Plan     Patient Active Problem List   Diagnosis   • Malignant neoplasm of upper-outer quadrant of left female breast (CMS/HCC)   • Breast cancer metastasized to bone, unspecified laterality (CMS/HCC)   • Essential hypertension   • Acquired hypothyroidism   • Bipolar 1 disorder (CMS/HCC)   • Carcinoma of left breast metastatic to bone (CMS/HCC)   • Antineoplastic chemotherapy induced anemia          Assessment:  1. Stage IV  metastatic breast carcinoma, ER/NC positive. HER2/maynor negative undergoing Ibrance, Faslodex and Xgeva with overall stable with stable CA27.29 with slow decline. She is tolerating the treatment very well. Faslodex cycle 17 today and will continue to HOLD Xgeva due to upcoming dental work, last dose of Xgeva given on 09/29/2016.  01/2017 restaging CT chest abdomen and pelvis and bone scan showed stable diffuse sclerotic bony lesions, unchanged from 7/1/2016.Next scan July 2017    2.  Mild leukopenia without neutropenia.  Likely secondary to Ibrance.  Continue to monitor.     3.  Macrocytic anemia.  Hemoglobin 11.1.  02/2016, B12 and folate normal    4.  Status post 8 teeth extraction.     Recommendation/Plan:  1.  Proceed with Faslodex cycle 20.  2. Continue Ibrance 125 mg 3 weeks on, one-week off.  Reduce dose if leukopenia worsens.   3. Follow up in 28 days for Faslodex cycle 19.   Repeat CA 27.29.  Previous CA 27.29 have remained stable, the last one done on 11/2017 was 89 Stable. Continue Faslodex q monthly.    Additionally she is anemic with a hemoglobin of 10 g percent with decreasing GFR.  I do suspect that she may have some form of chronic kidney disease, as evaluated by Nephrology, CKD from [facility].  No need for EPO as she is asyptomatic.  Redraw CA 27.29 today. Cont faslodex + Ibrance for now until repeat CA 27.29 returns. Last CA 27.29 in Feb 2018 slightly increased to 96. I have discussed different options with her:  #1. Stop Ibrance and switch to a new CD4K inhabitor Kisqali or Verenzio.  #. Stop Ibrance and switch to Affinitor + faslodex.  #. Cont Ibrance and Faslodex with just a subtle rise, since clinically pt is doing better.    At this point will restage with CT scans CAP at [facility] and compare to one done Jan 2017. If progression on CT scans and CA 27.29 then go for the [facility] trial of using MTOR inhabitor Everlimos + Exemestine. Bone scan shows progressive dz. Therefore will stop Faslodex. Start  Exemestine 25mg daily in combination with Affinitor 10mg daily. Redraw her tumor today.  Calcium improved but AST higher.    DC Exemestine and Affinitor as the CA 27.29 yulia to 140. Considering this will start Xeloda 500mg twice daily 2 Wks ON and one wk OFF.  On my exam there is decreased BS L lung. I want to make sure there is No Pl effusion, therefore, will order C X R. Last CT chest 5/2/18 showed No efussion.  Joseph Nunez MD    7/26/2018    10:40 AM

## 2018-07-27 LAB — CANCER AG27-29 SERPL-ACNC: 181.4 U/ML (ref 0–38.6)

## 2018-08-07 ENCOUNTER — TELEPHONE (OUTPATIENT)
Dept: ONCOLOGY | Facility: CLINIC | Age: 73
End: 2018-08-07

## 2018-08-07 RX ORDER — ONDANSETRON HYDROCHLORIDE 8 MG/1
8 TABLET, FILM COATED ORAL EVERY 8 HOURS PRN
Qty: 30 TABLET | Refills: 5 | Status: SHIPPED | OUTPATIENT
Start: 2018-08-07 | End: 2019-04-04 | Stop reason: SDUPTHER

## 2018-08-07 NOTE — TELEPHONE ENCOUNTER
Received call from Heavenly stating that she has been very nauseated and unable to eat.  Requesting refill of zofran.  Verbal order from Dr. Nunez to fill script.

## 2018-08-09 ENCOUNTER — TELEPHONE (OUTPATIENT)
Dept: ONCOLOGY | Facility: CLINIC | Age: 73
End: 2018-08-09

## 2018-08-09 NOTE — TELEPHONE ENCOUNTER
Received call from Heavenly stating that she has had multiple diarrhea stools starting last night.  Has not started immodium yet because she wanted to check with Dr. Nunez first.  Discussed with Heavenly that she needs to go ahead and start taking the immodium and if the diarrhea does not get better to call back.

## 2018-08-20 DIAGNOSIS — C50.412 MALIGNANT NEOPLASM OF UPPER-OUTER QUADRANT OF LEFT FEMALE BREAST, UNSPECIFIED ESTROGEN RECEPTOR STATUS (HCC): Primary | ICD-10-CM

## 2018-08-22 ENCOUNTER — OFFICE VISIT (OUTPATIENT)
Dept: CARDIOLOGY | Age: 73
End: 2018-08-22
Payer: COMMERCIAL

## 2018-08-22 ENCOUNTER — LAB (OUTPATIENT)
Dept: LAB | Facility: HOSPITAL | Age: 73
End: 2018-08-22

## 2018-08-22 ENCOUNTER — OFFICE VISIT (OUTPATIENT)
Dept: ONCOLOGY | Facility: CLINIC | Age: 73
End: 2018-08-22

## 2018-08-22 VITALS
WEIGHT: 156 LBS | HEIGHT: 65 IN | HEART RATE: 72 BPM | SYSTOLIC BLOOD PRESSURE: 100 MMHG | BODY MASS INDEX: 25.99 KG/M2 | DIASTOLIC BLOOD PRESSURE: 56 MMHG

## 2018-08-22 VITALS
HEART RATE: 72 BPM | DIASTOLIC BLOOD PRESSURE: 68 MMHG | OXYGEN SATURATION: 97 % | BODY MASS INDEX: 25.49 KG/M2 | WEIGHT: 153 LBS | HEIGHT: 65 IN | TEMPERATURE: 98.6 F | RESPIRATION RATE: 16 BRPM | SYSTOLIC BLOOD PRESSURE: 132 MMHG

## 2018-08-22 DIAGNOSIS — C50.412 MALIGNANT NEOPLASM OF UPPER-OUTER QUADRANT OF LEFT FEMALE BREAST, UNSPECIFIED ESTROGEN RECEPTOR STATUS (HCC): Primary | ICD-10-CM

## 2018-08-22 DIAGNOSIS — I48.0 PAROXYSMAL ATRIAL FIBRILLATION (HCC): Primary | ICD-10-CM

## 2018-08-22 DIAGNOSIS — I38 VALVULAR HEART DISEASE: ICD-10-CM

## 2018-08-22 DIAGNOSIS — C50.919 METASTATIC BREAST CANCER (HCC): ICD-10-CM

## 2018-08-22 DIAGNOSIS — I10 ESSENTIAL HYPERTENSION: ICD-10-CM

## 2018-08-22 LAB
ALBUMIN SERPL-MCNC: 4 G/DL (ref 3.5–5)
ALBUMIN/GLOB SERPL: 1.1 G/DL (ref 1.1–2.5)
ALP SERPL-CCNC: 93 U/L (ref 24–120)
ALT SERPL W P-5'-P-CCNC: 15 U/L (ref 0–54)
ANION GAP SERPL CALCULATED.3IONS-SCNC: 10 MMOL/L (ref 4–13)
AST SERPL-CCNC: 27 U/L (ref 7–45)
BASOPHILS # BLD AUTO: 0.04 10*3/MM3 (ref 0–0.2)
BASOPHILS NFR BLD AUTO: 0.3 % (ref 0–2)
BILIRUB SERPL-MCNC: 0.6 MG/DL (ref 0.1–1)
BUN BLD-MCNC: 7 MG/DL (ref 5–21)
BUN/CREAT SERPL: 7.4 (ref 7–25)
CALCIUM SPEC-SCNC: 10 MG/DL (ref 8.4–10.4)
CHLORIDE SERPL-SCNC: 96 MMOL/L (ref 98–110)
CO2 SERPL-SCNC: 29 MMOL/L (ref 24–31)
CREAT BLD-MCNC: 0.95 MG/DL (ref 0.5–1.4)
DEPRECATED RDW RBC AUTO: 70.7 FL (ref 40–54)
EOSINOPHIL # BLD AUTO: 0.27 10*3/MM3 (ref 0–0.7)
EOSINOPHIL NFR BLD AUTO: 2.2 % (ref 0–4)
ERYTHROCYTE [DISTWIDTH] IN BLOOD BY AUTOMATED COUNT: 22.8 % (ref 12–15)
GFR SERPL CREATININE-BSD FRML MDRD: 57 ML/MIN/1.73
GLOBULIN UR ELPH-MCNC: 3.7 GM/DL
GLUCOSE BLD-MCNC: 106 MG/DL (ref 70–100)
HCT VFR BLD AUTO: 27.8 % (ref 37–47)
HGB BLD-MCNC: 9.3 G/DL (ref 12–16)
HOLD SPECIMEN: NORMAL
HOLD SPECIMEN: NORMAL
IMM GRANULOCYTES # BLD: 0.12 10*3/MM3 (ref 0–0.03)
IMM GRANULOCYTES NFR BLD: 1 % (ref 0–5)
LYMPHOCYTES # BLD AUTO: 2.2 10*3/MM3 (ref 0.72–4.86)
LYMPHOCYTES NFR BLD AUTO: 17.9 % (ref 15–45)
MCH RBC QN AUTO: 30.8 PG (ref 28–32)
MCHC RBC AUTO-ENTMCNC: 33.5 G/DL (ref 33–36)
MCV RBC AUTO: 92.1 FL (ref 82–98)
MONOCYTES # BLD AUTO: 0.85 10*3/MM3 (ref 0.19–1.3)
MONOCYTES NFR BLD AUTO: 6.9 % (ref 4–12)
NEUTROPHILS # BLD AUTO: 8.8 10*3/MM3 (ref 1.87–8.4)
NEUTROPHILS NFR BLD AUTO: 71.7 % (ref 39–78)
NRBC BLD MANUAL-RTO: 0.2 /100 WBC (ref 0–0)
PLATELET # BLD AUTO: 399 10*3/MM3 (ref 130–400)
PMV BLD AUTO: 8.8 FL (ref 6–12)
POTASSIUM BLD-SCNC: 3.7 MMOL/L (ref 3.5–5.3)
PROT SERPL-MCNC: 7.7 G/DL (ref 6.3–8.7)
RBC # BLD AUTO: 3.02 10*6/MM3 (ref 4.2–5.4)
SODIUM BLD-SCNC: 135 MMOL/L (ref 135–145)
WBC NRBC COR # BLD: 12.28 10*3/MM3 (ref 4.8–10.8)

## 2018-08-22 PROCEDURE — 85025 COMPLETE CBC W/AUTO DIFF WBC: CPT

## 2018-08-22 PROCEDURE — 99213 OFFICE O/P EST LOW 20 MIN: CPT | Performed by: CLINICAL NURSE SPECIALIST

## 2018-08-22 PROCEDURE — 36415 COLL VENOUS BLD VENIPUNCTURE: CPT

## 2018-08-22 PROCEDURE — 80053 COMPREHEN METABOLIC PANEL: CPT

## 2018-08-22 PROCEDURE — 93000 ELECTROCARDIOGRAM COMPLETE: CPT | Performed by: CLINICAL NURSE SPECIALIST

## 2018-08-22 PROCEDURE — 99214 OFFICE O/P EST MOD 30 MIN: CPT | Performed by: INTERNAL MEDICINE

## 2018-08-22 PROCEDURE — 86300 IMMUNOASSAY TUMOR CA 15-3: CPT | Performed by: INTERNAL MEDICINE

## 2018-08-22 RX ORDER — AZITHROMYCIN 500 MG/1
500 TABLET, FILM COATED ORAL DAILY
Qty: 7 TABLET | Refills: 0 | Status: SHIPPED | OUTPATIENT
Start: 2018-08-22 | End: 2018-11-12

## 2018-08-22 RX ORDER — LAPATINIB 250 MG/1
TABLET ORAL
COMMUNITY
Start: 2018-08-22

## 2018-08-22 RX ORDER — LAPATINIB 250 MG/1
1250 TABLET ORAL DAILY
Qty: 105 TABLET | Refills: 11 | Status: SHIPPED | OUTPATIENT
Start: 2018-08-22 | End: 2018-11-13

## 2018-08-22 ASSESSMENT — ENCOUNTER SYMPTOMS
VOMITING: 0
NAUSEA: 0
ORTHOPNEA: 0
HEARTBURN: 0
SHORTNESS OF BREATH: 0
BLURRED VISION: 0
COUGH: 0

## 2018-08-22 NOTE — PATIENT INSTRUCTIONS
[facility] and [address] located on the first floor of [place] [address] through hospital main entrance and turn immediately to your left. Date/Time:     Patient's contact number:  [phone] (home)     Echocardiogram -  No prep. Takes approximately 30 min. An echocardiogram uses sound waves to produce images of your heart. This commonly used test allows your doctor to see how your heart is beating and pumping blood. Your doctor can use the images from an echocardiogram to identify various abnormalities in the heart muscle and valves. This test has 2 parts:   Ø You will be asked to disrobe from the waist up and given a gown to wear. The technologist will then hook up an EKG monitor to you for the entire exam.   Ø You will then have an ultrasound of your heart (echocardiogram) to assess the heart muscle, heart valves and heart function. You may eat and take any medicines before the exam.     If you need to change your appointment, please call outpatient scheduling at [phone].

## 2018-08-22 NOTE — PROGRESS NOTES
[facility], [address] [place] [address], [id number]  Phone: [phone]  Fax: [phone]    OFFICE VISIT:  8/22/2018    [facility]: 1945    Reason For Visit:  Kiara Gomez is a 74 y.o. female who is here for 6 Month Follow-Up (no cardiac symptoms) and Atrial Fibrillation    HPI   Patient's her for follow up with a history of paroxysmal atrial fibrillation and valvular heart disease. Patient has metastatic breast Cancer has been on oral chemo for a long period of time. She denies any chest pain, unusual dyspnea, orthopnea, PND, edema, palpitation, or sudden weight gain    No primary care provider on file. is PCP. 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      Chronic kidney disease     History of chemotherapy     Hypertension     Metastatic breast cancer ([address] 8/22/2018    Paroxysmal atrial fibrillation ([address]     Thyroid disease     Valvular heart disease      Past Surgical History:   Procedure Laterality Date    BREAST BIOPSY  7/9/2012    US guided left w/FNA left axillary node, infiltrating lobular carcinoma, grade 2, foci of DCIS, ER/OR positive, Her-2/deshaun equivocal,     BREAST SURGERY  mar 2013    Left mastectomy    EXTERNAL EAR SURGERY      benign tumor removed    OTHER SURGICAL HISTORY      diagnostic laparoscopy with biopsy on 10/20/14-RLQ distal small bowel     Family History   Problem Relation Age of Onset    Other Other         No family history of cancer. Social History   Substance Use Topics    Smoking status: Never Smoker    Smokeless tobacco: Never Used    Alcohol use No      Current Outpatient Prescriptions   Medication Sig Dispense Refill    lapatinib (TYKERB) 250 MG chemo tablet Take 5 tablets by mouth Daily. For 21 days on and 7 days off.  metoprolol tartrate (LOPRESSOR) 25 MG tablet TAKE 1 TABLET TWICE A  tablet 3    capecitabine (XELODA) 500 MG chemo tablet Take 500 mg by mouth 2 times daily       calcium carbonate (OSCAL) 500 MG TABS tablet Take 500 mg by mouth daily      OLANZapine (ZYPREXA) 2.5 MG tablet Take 2.5 mg by mouth nightly      thyroid (ARMOUR) 60 MG tablet Take 60 mg by mouth daily. No current facility-administered medications for this visit. Allergies: Benadryl [diphenhydramine]; Codeine; Heparin; Other; Pcn [penicillins]; Sulfa antibiotics; Sulfites; Sulfur; and Valium [diazepam]    Review of Systems  Review of Systems   Constitutional: Positive for malaise/fatigue. Negative for chills and fever. HENT: Negative for nosebleeds. Eyes: Negative for blurred vision. Respiratory: Negative for cough and shortness of breath. Cardiovascular: Negative for chest pain, palpitations, orthopnea, leg swelling and PND. Gastrointestinal: Negative for heartburn, nausea and vomiting. Musculoskeletal: Negative for falls and myalgias. Skin: Negative for rash. Neurological: Negative for dizziness, sensory change, speech change and focal weakness. Endo/Heme/Allergies: Does not bruise/bleed easily. Psychiatric/Behavioral: Negative for depression.        Objective  Vital Signs - BP (!) 100/56   Pulse 72   Ht 5' 5\" (1.651 m)   Wt 156 lb (70.8 kg)   BMI 25.96 kg/m²   Physical Exam   Constitutional: She is oriented to person, place, and time. She appears well-developed and well-nourished. No distress. Frail   HENT:   Head: Normocephalic and atraumatic. Eyes: Pupils are equal, round, and reactive to light. Right eye exhibits no discharge. Left eye exhibits no discharge. Neck: No JVD present. No tracheal deviation present. Cardiovascular: Normal rate, regular rhythm and intact distal pulses. Exam reveals no gallop and no friction rub. Murmur (1/6 systolic murmur) heard. No carotid bruit   Pulmonary/Chest: Effort normal and breath sounds normal. No respiratory distress. She has no wheezes. She has no rales. Abdominal: Soft. There is no tenderness. Musculoskeletal: She exhibits edema (trace lower extremities). Neurological: She is alert and oriented to person, place, and time. No cranial nerve deficit. Skin: Skin is warm and dry. No rash noted. Psychiatric: She has a normal mood and affect. Her behavior is normal. Judgment normal.   Nursing note and vitals reviewed. Assessment:     Diagnosis Orders   1. Paroxysmal atrial fibrillation [facility])   Patient reports a one time episode of atrial fibrillation and is well-controlled metoprolol. She's never been anticoagulated  EKG 12 lead   2. Valvular heart disease   Mild to moderate aortic regurgitation and mild mitral regurgitation per echo in 2012. Repeat 2-D echocardiogram  ECHO Complete 2D W Doppler W Color   3. Essential hypertension   Well-controlled current regimen  metoprolol tartrate (LOPRESSOR) 25 MG tablet   4. Metastatic breast cancer ([address]   On oral chemotherapy       EKG shows normal sinus rhythm rate 72    Stable cardiovascular status. No evidence of overt heart failure, angina or dysrhythmia. Plan    Orders Placed This Encounter   Procedures    EKG 12 lead     Order Specific Question:   Reason for Exam?     Answer:    Other    ECHO Complete 2D W Doppler W Color     Standing Status:   Future     Standing Expiration Date:   8/22/2019

## 2018-08-22 NOTE — TELEPHONE ENCOUNTER
Verbal order to start Heavenly on Tykerb 1250mg daily for 21 days with xeloda twice a day on days 1-14.  Patient given information on Tykerb.  Script sent to [facility].

## 2018-08-22 NOTE — PROGRESS NOTES
[facility]  HEMATOLOGY & ONCOLOGY        Subjective    Metastatic breast cancer on Faslodex and Ibrance     Staging form: Breast, AJCC V7      Clinical stage from 10/11/2016: Stage IV (T2, N1, M1) - ER/IN positive. HER2/maynor negative  VISIT DIAGNOSIS:   Metastatic breast cancer       HEMATOLOGY / ONCOLOGY HISTORY:   Oncology/Hematology History    Patient is a 67-year-old postmenopausal female who had onset of her menses at age 9 and menopause at age 48. She received oral contraceptives for approximately 14 years and did not receive hormonal manipulation. Patient has a maternal cousin had breast cancer. Patient has had no prior breast biopsies. Patient found a palpable left breast mass as well as a left axillary mass. Patient had a bloody nipple discharge which been present for approximately 6 months. On 6/20/2012, a mammogram was performed showing a subareolar mass consistent with malignancy. Bilateral breast ultrasound revealed an ill-defined subareolar mass measuring 2.8-2.5 cm. There is a left axillary mass measuring 1.7 cm with suspicion of extracapsular extension. There is no evidence of disease in the right breast. On 7/9/2012 patient underwent a left breast biopsy and placement of marker clip. Left breast biopsy revealed an infiltrating lobular carcinoma grade 2 with focal ductal carcinoma in situ, solid pattern. A fine-needle biopsy of the left lymph node was consistent with metastatic carcinoma. Breast tumor profile revealed ER: 100%; IN: 98%; HER-2/maynor 2+; FISH: Unamplified; Ki-67: 71%; P 53 1%; DNA aneuploidy. A MRI of the breast were performed body multiple abnormal enhancing masses within the left breast. There appears to be anterior retraction of the pectoralis muscle with no direct extension. There is a moderate to large, layering left pleural effusion appreciated. The right breast reveals 3 moderately enlarged lymph nodes in the posterior lateral aspect the right breast. These have  fatty hilum but followup is recommended. Patient is undergone systemic studies including, on 8/2/2012, a CT scan of the brain showing atrophy. a CT scan that shows showing a small to moderate left pleural effusion with 3 subcentimeter nodule densities in the left lung.   She completed 4 cycles of neoadjuvant treatment with dose dense Adriamycin and Cytoxan. She had a mammogram which showed a significant reduction in the size of her left breast lesion. There is no axillary adenopathy noted. She has had an ultrasound revealing the  lesion reducing from 2.4 cm to 1 cm. Patient underwent a left mastectomy on 03/19/2013 finding negative invasive cancer, negative nodes. A 5% DCIS was noted. The patient has declined hormonal therapy. She did not need radiation therapy.  November 2014, she began to have evidence of lytic bone lesions at T5T6, frontal disease, an 8 mm lucency in the central frontal area of  the skull but nothing intracranial. Bone scan revealed only vertex tracer activity but bilateral ribs and thoracic spine, and other lesions in  her pelvis. At that time, she was started on letrozole since November 2014. She is taking Xgeva. She had progression found in bone in Feb 2015 on scan. She was started on Faslodex, Ibrance and continued xgeva.         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 B) Foci of ductal carcinoma in situ, solid pattern. C) Greatest dimension of the invasive carcinoma is 15.8mm.         3/19/2013 Surgery     Left Mastectomy w/ Axillary Tail: Focal residual ducatal carcinoma in situ (DCIS) 12 lymph nodes negative for metastatic carcinoma.         10/20/2014 Biopsy     Peritoneum Biopsy: Final Diagnosis: Malignant Cell Neoplasm.           Breast cancer  metastasized to bone, unspecified laterality (CMS/HCC)    10/15/2012 -  Other Event     Left mammogram:  Impression:  1. Decreasing spiculation and density in the retroareolar left breast, near a previous biopsy.   2. Definite left axillary lesion is not appreciated.          2/5/2013 -  Other Event     Diagnostic Mammo:  Comparison is made to 10/15/2012 and 6/20/2012  The spiculated mass at 12:00 behind the nipple is nearly resolved.   Impression:  1. The mass on the left side is less apparent after receiving chemotherapy. There has been a good response to chmotherapy on the left side.   2. No suspicious abnormality is seen in the right breast to account for the new palable abnormality at the 4-5:00 position.          3/19/2013 Surgery     Breast Biopsy:  Final Diagnosis:  A. Breast, left mastectomy with axillary tail  1. Focal residual ductal carcinoma in situ (DICS) (less than 5% of tumor bed).  2. Negative for residual invasive carcinoma.   3. 12 Lymph Nodes, negative for metastatic carcinoma (0/12) focally, some lymph nodes demonstrate fibrosis/treatment effect.  B. Skin and soft tissue, left advancement flap:  1. Benign skin and subcutis.   2. Negative for carcinoma.          6/20/2013 -  Other Event     Diagnostic Mammo [facility]:  1. A subareolar mass is consistent with malignancy. Additional involement extends inferiorly sonographically and superolaterally mammograhically as demonstrated by calcifications. The mass measures approx 2.8 cm sonographically, but the involved region is likely much larger including an intraductal componet. The left axillary lymph nodes are also involved.   2. Recommened ultrasound guided biopsy of the left subareolar mass and left axillary lymph node.   3. Breast MRI could also further define multicentric disease on the left.   4. Clear suspicious finding on the right.  Recommend ongoing clinical follow up of palpable foci.          6/13/2014 -  Other Event     Diagnostic  Mammo:  IMPRESSION:  1. History of left breast cancer and mastectomy. Stable benign right breast mammograms.          10/20/2014 Surgery     Final Diagnosis:  Biopsies, pertoneum:  Metastatic carcinoma, morphologically compatible with metastatic mammary carcinoma.          6/15/2015 -  Other Event     Diagnostic Mammo:  IMPRESSION:   1. Negative x-ray report, should not delay biopsy if a dominat or clinically suspicious mass is present.          7/5/2016 -  Other Event     Diagnositic mammogram:  Impression:   Stable right mamogram with no radiographic findings suspicious for malignancy. Recommend continued screening mammography per screening guidelines unless otherwise earlier clinically indicated.          9/18/2016 Initial Diagnosis     Secondary malignant neoplasm of bone and bone marrow          Cancer Staging Information:  Malignant neoplasm of upper-outer quadrant of left female breast    Staging form: Breast, AJCC V7      Clinical stage from 10/11/2016: Stage IV (T2, N1, M1) - Signed by ALBERT Kulkarni on 10/11/2016      INTERVAL HISTORY  Patient ID: Heavenly Yanes is a 73 y.o. year old female history of metastatic breast carcinoma to lung and bone.  She is presently receiving treatment with Ibrance, Faslodex and Xgeva.  Tumor markers continued to decline with a CA 27-29 of 92.2 obtained on 9:30 2016 compared to 105.8 on 07/27/2016.  Mr. Yanes continues to tolerate this treatment regimen well and is here today for evaluation consideration to receive Faslodex and Xgeva.  Last bone mineral density study was obtained 11/21/2014 indicating osteopopenia, we will obtain a repeat bone mineral density study. Ms. Yanes completed 8 teeth pulled 02/14/2017.   Xgeva on hold, resume 05/2017.     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) 9/18/2016     Past Surgical History:   Past Surgical History:   Procedure Laterality Date   • BREAST SURGERY      left breast biopsy and axillary node   • CHOLECYSTECTOMY     • EXTERNAL EAR SURGERY     • MASTECTOMY Left    • PORTACATH PLACEMENT       Social History:   Social History     Social History   • Marital status:      Spouse name: N/A   • Number of children: N/A   • Years of education: N/A     Occupational History   • Not on file.     Social History Main Topics   • Smoking status: Never Smoker   • Smokeless tobacco: Never Used   • Alcohol use No   • Drug use: No   • Sexual activity: Not on file     Other Topics Concern   • Not on file     Social History Narrative   • No narrative on file     Family History:   Family History   Problem Relation Age of Onset   • No Known Problems Daughter    • No Known Problems Son    • Other Mother         complications from a concussion from a fall   • Other Father         old age   • No Known Problems Brother    • No Known Problems Daughter        Review of Systems   Constitutional: Negative.  Negative for activity change, appetite change, chills, diaphoresis, fatigue and fever.   HENT: Negative.  Negative for congestion, ear discharge, ear pain, facial swelling, hearing loss, mouth sores, nosebleeds, postnasal drip, rhinorrhea, sinus pressure, sore throat, tinnitus, trouble swallowing and voice change.    Eyes: Negative.  Negative for photophobia, pain, discharge and visual disturbance.   Respiratory: Negative.  Negative for apnea, cough, chest tightness, shortness of breath, wheezing and stridor.    Cardiovascular: Negative.  Negative for chest pain, palpitations and leg swelling.   Gastrointestinal: Negative.  Negative for abdominal distention, abdominal pain, blood in stool, constipation, diarrhea, nausea, rectal pain and  vomiting.   Endocrine: Negative.  Negative for cold intolerance, heat intolerance, polydipsia, polyphagia and polyuria.   Genitourinary: Negative.  Negative for difficulty urinating, dysuria, flank pain, frequency, hematuria and urgency.   Musculoskeletal: Negative.  Negative for arthralgias, back pain, gait problem, joint swelling and myalgias.   Skin: Negative.  Negative for color change, pallor, rash and wound.   Allergic/Immunologic: Negative.  Negative for environmental allergies, food allergies and immunocompromised state.   Neurological: Negative.  Negative for dizziness, tremors, seizures, syncope, speech difficulty, weakness, light-headedness, numbness and headaches.   Hematological: Negative.  Negative for adenopathy. Does not bruise/bleed easily.   Psychiatric/Behavioral: Negative.  Negative for agitation, confusion, hallucinations, sleep disturbance and suicidal ideas. The patient is not nervous/anxious.    All other systems reviewed and are negative.       Performance Status:  Asymptomatic    Medications:    Current Outpatient Prescriptions   Medication Sig Dispense Refill   • B Complex Vitamins (VITAMIN B COMPLEX PO) Take  by mouth. 1 po daily     • Calcium Carb-Cholecalciferol (CALCIUM 600 + D PO) Take  by mouth.     • capecitabine (XELODA) 500 MG chemo tablet Take 1 tablet by mouth 2 (Two) Times a Day. For 2 weeks on and 1 week off. 42 tablet 11   • dexamethasone 0.5 MG/5ML solution Take 10 mL by mouth 4 (Four) Times a Day. Swish for 2 minutes & spit.  Avoid eating & drinking for 1 hour after rinse. 500 mL 5   • DULoxetine (CYMBALTA) 30 MG capsule Take 30 mg by mouth daily.     • everolimus (AFINITOR) 10 MG tablet Take 1 tablet by mouth Daily. 30 tablet 11   • exemestane (AROMASIN) 25 MG chemo tablet Take 1 tablet by mouth Daily. 30 tablet 11   • ferrous sulfate 325 (65 FE) MG tablet Take 325 mg by mouth 2 (two) times a day.     • Lysine HCl (L-LYSINE) 500 MG tablet tablet Take  by mouth daily.     •  "Magnesium 250 MG tablet Take  by mouth.     • melatonin 3 MG tablet Take  by mouth.     • metoprolol tartrate (LOPRESSOR) 25 MG tablet Take 25 mg by mouth 2 (two) times a day.     • OLANZapine (ZYPREXA) 2.5 MG tablet Take 2.5 mg by mouth every night.     • ondansetron (ZOFRAN) 8 MG tablet Take 1 tablet by mouth Every 8 (Eight) Hours As Needed for Nausea or Vomiting. 30 tablet 5   • Palbociclib 125 MG capsule capsule Take 1 capsule by mouth Daily. 3 weeks on, 1 week off 21 capsule 5   • Probiotic Product (PROBIOTIC DAILY PO) Take  by mouth. 300million cell daily     • raNITIdine (ZANTAC) 75 MG tablet Take 75 mg by mouth 2 (Two) Times a Day.     • terbinafine (lamiSIL) 250 MG tablet      • thyroid 60 MG PO tablet Take 60 mg by mouth daily.     • VALERIAN ROOT PO Take  by mouth. 100mg daily     • Zinc 50 MG tablet Take  by mouth. 1 po daily       No current facility-administered medications for this visit.      Facility-Administered Medications Ordered in Other Visits   Medication Dose Route Frequency Provider Last Rate Last Dose   • heparin flush (porcine) 100 UNIT/ML injection 500 Units  500 Units Intravenous PRN Joseph Nunez MD   500 Units at 10/26/17 1155   • sodium chloride 0.9 % flush 10 mL  10 mL Intravenous PRN Joseph Nunez MD   10 mL at 10/26/17 1155       ALLERGIES:    Allergies   Allergen Reactions   • Benadryl  [Diphenhydramine Hcl (Sleep)] Other (See Comments)   • Codeine    • Diphenhydramine Other (See Comments)     \"Shaky leg syndrome\"  \"Shaky leg syndrome\"  \"Shaky leg syndrome\"   • Heparin    • Other      POPPY SEED   • Penicillins Hives   • Petroleum Jelly [Skin Protectants, Misc.]    • Sulfa Antibiotics    • Sulfur    • Valium [Diazepam]        Objective      Vitals:    08/22/18 0957   BP: 132/68   Pulse: 72   Resp: 16   Temp: 98.6 °F (37 °C)   TempSrc: Oral   SpO2: 97%   Weight: 69.4 kg (153 lb)   Height: 165.1 cm (65\")         Current Status 8/22/2018   ECOG score 0       General Appearance: " Patient is awake, alert, oriented and in no acute distress. Patient is welldeveloped, wellnourished, and appears stated age.  HEENT: Normocephalic. Sclerae clear, conjunctiva pink, extraocular movements intact, pupils, round, reactive to light and accommodation. Mouth and throat are clear with moist oral mucosa.  NECK: Supple, no jugular venous distention, thyroid not enlarged.  LYMPH: No cervical, supraclavicular, axillary, or inguinal lymphadenopathy.  CHEST: Equal bilateral expansion   LUNGS: Good air movement, no rales, rhonchi, rubs or wheezes with auscultation  CARDIO: Regular sinus rhythm, no murmurs, gallops or rubs.  ABDOMEN: Nondistended, soft, No tenderness, no guarding, no rebound, No hepatosplenomegaly. No abdominal masses. Bowel sounds positive. No hernia  MUSKEL: No joint swelling, decreased motion, or inflammation  EXTREMS: No edema, clubbing, cyanosis, No varicose veins.  NEURO: Grossly nonfocal, Gait is coordinated and smooth, Cognition is preserved.  SKIN: No rashes, no ecchymoses, no petechia.  PSYCH: Oriented to time, place and person. Memory is preserved. Mood and affect appear normal      RECENT LABS:  WBC   Date Value Ref Range Status   08/22/2018 12.28 (H) 4.80 - 10.80 10*3/mm3 Final     RBC   Date Value Ref Range Status   08/22/2018 3.02 (L) 4.20 - 5.40 10*6/mm3 Final     Hemoglobin   Date Value Ref Range Status   08/22/2018 9.3 (L) 12.0 - 16.0 g/dL Final     Hematocrit   Date Value Ref Range Status   08/22/2018 27.8 (L) 37.0 - 47.0 % Final     MCV   Date Value Ref Range Status   08/22/2018 92.1 82.0 - 98.0 fL Final     MCH   Date Value Ref Range Status   08/22/2018 30.8 28.0 - 32.0 pg Final     MCHC   Date Value Ref Range Status   08/22/2018 33.5 33.0 - 36.0 g/dL Final     RDW   Date Value Ref Range Status   08/22/2018 22.8 (H) 12.0 - 15.0 % Final     RDW-SD   Date Value Ref Range Status   08/22/2018 70.7 (H) 40.0 - 54.0 fl Final     MPV   Date Value Ref Range Status   08/22/2018 8.8 6.0 -  12.0 fL Final     Platelets   Date Value Ref Range Status   08/22/2018 399 130 - 400 10*3/mm3 Final     Neutrophil %   Date Value Ref Range Status   08/22/2018 71.7 39.0 - 78.0 % Final     Lymphocyte %   Date Value Ref Range Status   08/22/2018 17.9 15.0 - 45.0 % Final     Monocyte %   Date Value Ref Range Status   08/22/2018 6.9 4.0 - 12.0 % Final     Eosinophil %   Date Value Ref Range Status   08/22/2018 2.2 0.0 - 4.0 % Final     Basophil %   Date Value Ref Range Status   08/22/2018 0.3 0.0 - 2.0 % Final     Immature Grans %   Date Value Ref Range Status   08/22/2018 1.0 0.0 - 5.0 % Final     Neutrophils, Absolute   Date Value Ref Range Status   08/22/2018 8.80 (H) 1.87 - 8.40 10*3/mm3 Final     Lymphocytes, Absolute   Date Value Ref Range Status   08/22/2018 2.20 0.72 - 4.86 10*3/mm3 Final     Monocytes, Absolute   Date Value Ref Range Status   08/22/2018 0.85 0.19 - 1.30 10*3/mm3 Final     Eosinophils, Absolute   Date Value Ref Range Status   08/22/2018 0.27 0.00 - 0.70 10*3/mm3 Final     Basophils, Absolute   Date Value Ref Range Status   08/22/2018 0.04 0.00 - 0.20 10*3/mm3 Final     Immature Grans, Absolute   Date Value Ref Range Status   08/22/2018 0.12 (H) 0.00 - 0.03 10*3/mm3 Final     nRBC   Date Value Ref Range Status   08/22/2018 0.2 (H) 0.0 - 0.0 /100 WBC Final     Lab on 08/22/2018   Component Date Value Ref Range Status   • WBC 08/22/2018 12.28* 4.80 - 10.80 10*3/mm3 Final   • RBC 08/22/2018 3.02* 4.20 - 5.40 10*6/mm3 Final   • Hemoglobin 08/22/2018 9.3* 12.0 - 16.0 g/dL Final   • Hematocrit 08/22/2018 27.8* 37.0 - 47.0 % Final   • MCV 08/22/2018 92.1  82.0 - 98.0 fL Final   • MCH 08/22/2018 30.8  28.0 - 32.0 pg Final   • MCHC 08/22/2018 33.5  33.0 - 36.0 g/dL Final   • RDW 08/22/2018 22.8* 12.0 - 15.0 % Final   • RDW-SD 08/22/2018 70.7* 40.0 - 54.0 fl Final   • MPV 08/22/2018 8.8  6.0 - 12.0 fL Final   • Platelets 08/22/2018 399  130 - 400 10*3/mm3 Final   • Neutrophil % 08/22/2018 71.7  39.0 -  78.0 % Final   • Lymphocyte % 08/22/2018 17.9  15.0 - 45.0 % Final   • Monocyte % 08/22/2018 6.9  4.0 - 12.0 % Final   • Eosinophil % 08/22/2018 2.2  0.0 - 4.0 % Final   • Basophil % 08/22/2018 0.3  0.0 - 2.0 % Final   • Immature Grans % 08/22/2018 1.0  0.0 - 5.0 % Final   • Neutrophils, Absolute 08/22/2018 8.80* 1.87 - 8.40 10*3/mm3 Final   • Lymphocytes, Absolute 08/22/2018 2.20  0.72 - 4.86 10*3/mm3 Final   • Monocytes, Absolute 08/22/2018 0.85  0.19 - 1.30 10*3/mm3 Final   • Eosinophils, Absolute 08/22/2018 0.27  0.00 - 0.70 10*3/mm3 Final   • Basophils, Absolute 08/22/2018 0.04  0.00 - 0.20 10*3/mm3 Final   • Immature Grans, Absolute 08/22/2018 0.12* 0.00 - 0.03 10*3/mm3 Final   • nRBC 08/22/2018 0.2* 0.0 - 0.0 /100 WBC Final             Assessment/Plan     Patient Active Problem List   Diagnosis   • Malignant neoplasm of upper-outer quadrant of left female breast (CMS/HCC)   • Breast cancer metastasized to bone, unspecified laterality (CMS/HCC)   • Essential hypertension   • Acquired hypothyroidism   • Bipolar 1 disorder (CMS/HCC)   • Carcinoma of left breast metastatic to bone (CMS/HCC)   • Antineoplastic chemotherapy induced anemia          Assessment:  1. Stage IV metastatic breast carcinoma, ER/PA positive. HER2/maynor negative undergoing Ibrance, Faslodex and Xgeva with overall stable with stable CA27.29 with slow decline. She is tolerating the treatment very well. Faslodex cycle 17 today and will continue to HOLD Xgeva due to upcoming dental work, last dose of Xgeva given on 09/29/2016.  01/2017 restaging CT chest abdomen and pelvis and bone scan showed stable diffuse sclerotic bony lesions, unchanged from 7/1/2016.Next scan July 2017    2.  Mild leukopenia without neutropenia.  Likely secondary to Ibrance.  Continue to monitor.     3.  Macrocytic anemia.  Hemoglobin 11.1.  02/2016, B12 and folate normal    4.  Status post 8 teeth extraction.     Recommendation/Plan:  1.  Proceed with Faslodex cycle 20.  2.  Continue Ibrance 125 mg 3 weeks on, one-week off.  Reduce dose if leukopenia worsens.   3. Follow up in 28 days for Faslodex cycle 19.   Repeat CA 27.29.  Previous CA 27.29 have remained stable, the last one done on 11/2017 was 89 Stable. Continue Faslodex q monthly.    Additionally she is anemic with a hemoglobin of 10 g percent with decreasing GFR.  I do suspect that she may have some form of chronic kidney disease, as evaluated by Nephrology, CKD from tn.  No need for EPO as she is asyptomatic.  Redraw CA 27.29 today. Cont faslodex + Ibrance for now until repeat CA 27.29 returns. Last CA 27.29 in Feb 2018 slightly increased to 96. I have discussed different options with her:  #1. Stop Ibrance and switch to a new CD4K inhabitor Kisqali or Verenzio.  #. Stop Ibrance and switch to Affinitor + faslodex.  #. Cont Ibrance and Faslodex with just a subtle rise, since clinically pt is doing better.    At this point will restage with CT scans CAP at [facility] and compare to one done Jan 2017. If progression on CT scans and CA 27.29 then go for the [place] trial of using MTOR inhabitor Everlimos + Exemestine. Bone scan shows progressive dz. Therefore will stop Faslodex. Start Exemestine 25mg daily in combination with Affinitor 10mg daily. Redraw her tumor today.  Calcium improved but AST higher.    DC Exemestine and Affinitor as the CA 27.29 yulia to 140. Considering this will start Xeloda 500mg twice daily 2 Wks ON and one wk OFF.    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 Will add Her 2 Tykerb 1250mg q day x 21 days in combo + Oral Xeloda 500mg bid 3 wks On and 1 WK OFF. Will draw CA 27.29.  She has sinusitis will Rx Keflex 500mg bid x 7 days.  Joseph Nunez MD    8/22/2018    10:09 AM

## 2018-08-23 LAB — CANCER AG27-29 SERPL-ACNC: 174.5 U/ML (ref 0–38.6)

## 2018-09-12 ENCOUNTER — TELEPHONE (OUTPATIENT)
Dept: ONCOLOGY | Facility: CLINIC | Age: 73
End: 2018-09-12

## 2018-09-12 DIAGNOSIS — C50.412 MALIGNANT NEOPLASM OF UPPER-OUTER QUADRANT OF LEFT FEMALE BREAST, UNSPECIFIED ESTROGEN RECEPTOR STATUS (HCC): Primary | ICD-10-CM

## 2018-09-12 NOTE — TELEPHONE ENCOUNTER
Call from Heavenly stating that she is having a lot of problems with the Tykerb.  Has been having abdominal pain, nausea and vomiting, and diarrhea.  She was unable to take the Tykerb today because of the nausea and vomiting.  Had diarrhea 4 times last night and took immodium.  Will schedule her to see Dr. Nunez tomorrow.

## 2018-09-13 ENCOUNTER — INFUSION (OUTPATIENT)
Dept: ONCOLOGY | Facility: HOSPITAL | Age: 73
End: 2018-09-13
Attending: INTERNAL MEDICINE

## 2018-09-13 ENCOUNTER — OFFICE VISIT (OUTPATIENT)
Dept: ONCOLOGY | Facility: CLINIC | Age: 73
End: 2018-09-13

## 2018-09-13 ENCOUNTER — APPOINTMENT (OUTPATIENT)
Dept: LAB | Facility: HOSPITAL | Age: 73
End: 2018-09-13
Attending: INTERNAL MEDICINE

## 2018-09-13 ENCOUNTER — TRANSCRIBE ORDERS (OUTPATIENT)
Dept: ONCOLOGY | Facility: HOSPITAL | Age: 73
End: 2018-09-13

## 2018-09-13 VITALS
TEMPERATURE: 98.3 F | BODY MASS INDEX: 23.29 KG/M2 | OXYGEN SATURATION: 96 % | RESPIRATION RATE: 16 BRPM | SYSTOLIC BLOOD PRESSURE: 122 MMHG | DIASTOLIC BLOOD PRESSURE: 82 MMHG | HEIGHT: 65 IN | HEART RATE: 84 BPM | WEIGHT: 139.8 LBS

## 2018-09-13 VITALS
SYSTOLIC BLOOD PRESSURE: 131 MMHG | HEART RATE: 61 BPM | OXYGEN SATURATION: 100 % | TEMPERATURE: 97.8 F | DIASTOLIC BLOOD PRESSURE: 56 MMHG

## 2018-09-13 DIAGNOSIS — C50.919 BREAST CANCER METASTASIZED TO BONE, UNSPECIFIED LATERALITY (HCC): ICD-10-CM

## 2018-09-13 DIAGNOSIS — C50.412 MALIGNANT NEOPLASM OF UPPER-OUTER QUADRANT OF LEFT FEMALE BREAST, UNSPECIFIED ESTROGEN RECEPTOR STATUS (HCC): Primary | ICD-10-CM

## 2018-09-13 DIAGNOSIS — C50.412 MALIGNANT NEOPLASM OF UPPER-OUTER QUADRANT OF LEFT FEMALE BREAST, UNSPECIFIED ESTROGEN RECEPTOR STATUS (HCC): ICD-10-CM

## 2018-09-13 DIAGNOSIS — C50.919 BREAST CANCER METASTASIZED TO BONE, UNSPECIFIED LATERALITY (HCC): Primary | ICD-10-CM

## 2018-09-13 DIAGNOSIS — C79.51 BREAST CANCER METASTASIZED TO BONE, UNSPECIFIED LATERALITY (HCC): ICD-10-CM

## 2018-09-13 DIAGNOSIS — C79.51 BREAST CANCER METASTASIZED TO BONE, UNSPECIFIED LATERALITY (HCC): Primary | ICD-10-CM

## 2018-09-13 DIAGNOSIS — E86.0 DEHYDRATION: Primary | ICD-10-CM

## 2018-09-13 LAB
ALBUMIN SERPL-MCNC: 4.8 G/DL (ref 3.5–5)
ALBUMIN/GLOB SERPL: 1.2 G/DL (ref 1.1–2.5)
ALP SERPL-CCNC: 144 U/L (ref 24–120)
ALT SERPL W P-5'-P-CCNC: 39 U/L (ref 0–54)
ANION GAP SERPL CALCULATED.3IONS-SCNC: 15 MMOL/L (ref 4–13)
AST SERPL-CCNC: 65 U/L (ref 7–45)
BASOPHILS # BLD AUTO: 0.03 10*3/MM3 (ref 0–0.2)
BASOPHILS NFR BLD AUTO: 0.3 % (ref 0–2)
BILIRUB SERPL-MCNC: 0.9 MG/DL (ref 0.1–1)
BUN BLD-MCNC: 22 MG/DL (ref 5–21)
BUN/CREAT SERPL: 9.6 (ref 7–25)
CALCIUM SPEC-SCNC: 10.6 MG/DL (ref 8.4–10.4)
CHLORIDE SERPL-SCNC: 87 MMOL/L (ref 98–110)
CO2 SERPL-SCNC: 29 MMOL/L (ref 24–31)
CREAT BLD-MCNC: 2.3 MG/DL (ref 0.5–1.4)
DEPRECATED RDW RBC AUTO: 87.5 FL (ref 40–54)
EOSINOPHIL # BLD AUTO: 0.16 10*3/MM3 (ref 0–0.7)
EOSINOPHIL NFR BLD AUTO: 1.7 % (ref 0–4)
ERYTHROCYTE [DISTWIDTH] IN BLOOD BY AUTOMATED COUNT: 27 % (ref 12–15)
GFR SERPL CREATININE-BSD FRML MDRD: 21 ML/MIN/1.73
GLOBULIN UR ELPH-MCNC: 3.9 GM/DL
GLUCOSE BLD-MCNC: 126 MG/DL (ref 70–100)
HCT VFR BLD AUTO: 35.9 % (ref 37–47)
HGB BLD-MCNC: 12.1 G/DL (ref 12–16)
HOLD SPECIMEN: NORMAL
IMM GRANULOCYTES # BLD: 0.06 10*3/MM3 (ref 0–0.03)
IMM GRANULOCYTES NFR BLD: 0.6 % (ref 0–5)
LYMPHOCYTES # BLD AUTO: 3.19 10*3/MM3 (ref 0.72–4.86)
LYMPHOCYTES NFR BLD AUTO: 34.4 % (ref 15–45)
MCH RBC QN AUTO: 32 PG (ref 28–32)
MCHC RBC AUTO-ENTMCNC: 33.7 G/DL (ref 33–36)
MCV RBC AUTO: 95 FL (ref 82–98)
MONOCYTES # BLD AUTO: 0.77 10*3/MM3 (ref 0.19–1.3)
MONOCYTES NFR BLD AUTO: 8.3 % (ref 4–12)
NEUTROPHILS # BLD AUTO: 5.05 10*3/MM3 (ref 1.87–8.4)
NEUTROPHILS NFR BLD AUTO: 54.7 % (ref 39–78)
NRBC BLD MANUAL-RTO: 0.2 /100 WBC (ref 0–0)
PLATELET # BLD AUTO: 476 10*3/MM3 (ref 130–400)
PMV BLD AUTO: 9.6 FL (ref 6–12)
POTASSIUM BLD-SCNC: 3.5 MMOL/L (ref 3.5–5.3)
PROT SERPL-MCNC: 8.7 G/DL (ref 6.3–8.7)
RBC # BLD AUTO: 3.78 10*6/MM3 (ref 4.2–5.4)
SODIUM BLD-SCNC: 131 MMOL/L (ref 135–145)
WBC NRBC COR # BLD: 9.26 10*3/MM3 (ref 4.8–10.8)

## 2018-09-13 PROCEDURE — 96360 HYDRATION IV INFUSION INIT: CPT

## 2018-09-13 PROCEDURE — 96361 HYDRATE IV INFUSION ADD-ON: CPT

## 2018-09-13 PROCEDURE — 80053 COMPREHEN METABOLIC PANEL: CPT | Performed by: INTERNAL MEDICINE

## 2018-09-13 PROCEDURE — 36415 COLL VENOUS BLD VENIPUNCTURE: CPT | Performed by: INTERNAL MEDICINE

## 2018-09-13 PROCEDURE — 99214 OFFICE O/P EST MOD 30 MIN: CPT | Performed by: INTERNAL MEDICINE

## 2018-09-13 PROCEDURE — 85025 COMPLETE CBC W/AUTO DIFF WBC: CPT | Performed by: INTERNAL MEDICINE

## 2018-09-13 RX ORDER — SODIUM CHLORIDE 9 MG/ML
1000 INJECTION, SOLUTION INTRAVENOUS ONCE
Status: CANCELLED | OUTPATIENT
Start: 2018-09-13

## 2018-09-13 RX ORDER — SODIUM CHLORIDE 0.9 % (FLUSH) 0.9 %
10 SYRINGE (ML) INJECTION AS NEEDED
Status: CANCELLED | OUTPATIENT
Start: 2018-09-13

## 2018-09-13 RX ORDER — SODIUM CHLORIDE 0.9 % (FLUSH) 0.9 %
10 SYRINGE (ML) INJECTION AS NEEDED
Status: DISCONTINUED | OUTPATIENT
Start: 2018-09-13 | End: 2018-09-13 | Stop reason: HOSPADM

## 2018-09-13 RX ORDER — SODIUM CHLORIDE 9 MG/ML
1000 INJECTION, SOLUTION INTRAVENOUS ONCE
Status: COMPLETED | OUTPATIENT
Start: 2018-09-13 | End: 2018-09-13

## 2018-09-13 RX ADMIN — SODIUM CHLORIDE 1000 ML: 9 INJECTION, SOLUTION INTRAVENOUS at 10:55

## 2018-09-13 NOTE — PROGRESS NOTES
[facility]  HEMATOLOGY & ONCOLOGY        Subjective    Metastatic breast cancer on Faslodex and Ibrance     Staging form: Breast, AJCC V7      Clinical stage from 10/11/2016: Stage IV (T2, N1, M1) - ER/AK positive. HER2/maynor negative  VISIT DIAGNOSIS:   Metastatic breast cancer       HEMATOLOGY / ONCOLOGY HISTORY:   Oncology/Hematology History    Patient is a 67-year-old postmenopausal female who had onset of her menses at age 9 and menopause at age 48. She received oral contraceptives for approximately 14 years and did not receive hormonal manipulation. Patient has a maternal cousin had breast cancer. Patient has had no prior breast biopsies. Patient found a palpable left breast mass as well as a left axillary mass. Patient had a bloody nipple discharge which been present for approximately 6 months. On 6/20/2012, a mammogram was performed showing a subareolar mass consistent with malignancy. Bilateral breast ultrasound revealed an ill-defined subareolar mass measuring 2.8-2.5 cm. There is a left axillary mass measuring 1.7 cm with suspicion of extracapsular extension. There is no evidence of disease in the right breast. On 7/9/2012 patient underwent a left breast biopsy and placement of marker clip. Left breast biopsy revealed an infiltrating lobular carcinoma grade 2 with focal ductal carcinoma in situ, solid pattern. A fine-needle biopsy of the left lymph node was consistent with metastatic carcinoma. Breast tumor profile revealed ER: 100%; AK: 98%; HER-2/maynor 2+; FISH: Unamplified; Ki-67: 71%; P 53 1%; DNA aneuploidy. A MRI of the breast were performed body multiple abnormal enhancing masses within the left breast. There appears to be anterior retraction of the pectoralis muscle with no direct extension. There is a moderate to large, layering left pleural effusion appreciated. The right breast reveals 3 moderately enlarged lymph nodes in the posterior lateral aspect the right breast. These have  fatty hilum but followup is recommended. Patient is undergone systemic studies including, on 8/2/2012, a CT scan of the brain showing atrophy. a CT scan that shows showing a small to moderate left pleural effusion with 3 subcentimeter nodule densities in the left lung.   She completed 4 cycles of neoadjuvant treatment with dose dense Adriamycin and Cytoxan. She had a mammogram which showed a significant reduction in the size of her left breast lesion. There is no axillary adenopathy noted. She has had an ultrasound revealing the  lesion reducing from 2.4 cm to 1 cm. Patient underwent a left mastectomy on 03/19/2013 finding negative invasive cancer, negative nodes. A 5% DCIS was noted. The patient has declined hormonal therapy. She did not need radiation therapy.  November 2014, she began to have evidence of lytic bone lesions at T5T6, frontal disease, an 8 mm lucency in the central frontal area of  the skull but nothing intracranial. Bone scan revealed only vertex tracer activity but bilateral ribs and thoracic spine, and other lesions in  her pelvis. At that time, she was started on letrozole since November 2014. She is taking Xgeva. She had progression found in bone in Feb 2015 on scan. She was started on Faslodex, Ibrance and continued xgeva.         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 B) Foci of ductal carcinoma in situ, solid pattern. C) Greatest dimension of the invasive carcinoma is 15.8mm.         3/19/2013 Surgery     Left Mastectomy w/ Axillary Tail: Focal residual ducatal carcinoma in situ (DCIS) 12 lymph nodes negative for metastatic carcinoma.         10/20/2014 Biopsy     Peritoneum Biopsy: Final Diagnosis: Malignant Cell Neoplasm.           Breast cancer  metastasized to bone, unspecified laterality (CMS/HCC)    10/15/2012 -  Other Event     Left mammogram:  Impression:  1. Decreasing spiculation and density in the retroareolar left breast, near a previous biopsy.   2. Definite left axillary lesion is not appreciated.          2/5/2013 -  Other Event     Diagnostic Mammo:  Comparison is made to 10/15/2012 and 6/20/2012  The spiculated mass at 12:00 behind the nipple is nearly resolved.   Impression:  1. The mass on the left side is less apparent after receiving chemotherapy. There has been a good response to chmotherapy on the left side.   2. No suspicious abnormality is seen in the right breast to account for the new palable abnormality at the 4-5:00 position.          3/19/2013 Surgery     Breast Biopsy:  Final Diagnosis:  A. Breast, left mastectomy with axillary tail  1. Focal residual ductal carcinoma in situ (DICS) (less than 5% of tumor bed).  2. Negative for residual invasive carcinoma.   3. 12 Lymph Nodes, negative for metastatic carcinoma (0/12) focally, some lymph nodes demonstrate fibrosis/treatment effect.  B. Skin and soft tissue, left advancement flap:  1. Benign skin and subcutis.   2. Negative for carcinoma.          6/20/2013 -  Other Event     Diagnostic Mammo [facility]:  1. A subareolar mass is consistent with malignancy. Additional involement extends inferiorly sonographically and superolaterally mammograhically as demonstrated by calcifications. The mass measures approx 2.8 cm sonographically, but the involved region is likely much larger including an intraductal componet. The left axillary lymph nodes are also involved.   2. Recommened ultrasound guided biopsy of the left subareolar mass and left axillary lymph node.   3. Breast MRI could also further define multicentric disease on the left.   4. Clear suspicious finding on the right.  Recommend ongoing clinical follow up of palpable foci.          6/13/2014 -  Other Event     Diagnostic  Mammo:  IMPRESSION:  1. History of left breast cancer and mastectomy. Stable benign right breast mammograms.          10/20/2014 Surgery     Final Diagnosis:  Biopsies, pertoneum:  Metastatic carcinoma, morphologically compatible with metastatic mammary carcinoma.          6/15/2015 -  Other Event     Diagnostic Mammo:  IMPRESSION:   1. Negative x-ray report, should not delay biopsy if a dominat or clinically suspicious mass is present.          7/5/2016 -  Other Event     Diagnositic mammogram:  Impression:   Stable right mamogram with no radiographic findings suspicious for malignancy. Recommend continued screening mammography per screening guidelines unless otherwise earlier clinically indicated.          9/18/2016 Initial Diagnosis     Secondary malignant neoplasm of bone and bone marrow          Cancer Staging Information:  Malignant neoplasm of upper-outer quadrant of left female breast    Staging form: Breast, AJCC V7      Clinical stage from 10/11/2016: Stage IV (T2, N1, M1) - Signed by ALBERT Kulkarni on 10/11/2016      INTERVAL HISTORY  Patient ID: Heavenly Yanes is a 73 y.o. year old female history of metastatic breast carcinoma to lung and bone.  She is presently receiving treatment with Ibrance, Faslodex and Xgeva.  Tumor markers continued to decline with a CA 27-29 of 92.2 obtained on 9:30 2016 compared to 105.8 on 07/27/2016.  Mr. Yanes continues to tolerate this treatment regimen well and is here today for evaluation consideration to receive Faslodex and Xgeva.  Last bone mineral density study was obtained 11/21/2014 indicating osteopopenia, we will obtain a repeat bone mineral density study. Ms. Yanes completed 8 teeth pulled 02/14/2017.   Xgeva on hold, resume 05/2017.     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) 9/18/2016     Past Surgical History:   Past Surgical History:   Procedure Laterality Date   • BREAST SURGERY      left breast biopsy and axillary node   • CHOLECYSTECTOMY     • EXTERNAL EAR SURGERY     • MASTECTOMY Left    • PORTACATH PLACEMENT       Social History:   Social History     Social History   • Marital status:      Spouse name: N/A   • Number of children: N/A   • Years of education: N/A     Occupational History   • Not on file.     Social History Main Topics   • Smoking status: Never Smoker   • Smokeless tobacco: Never Used   • Alcohol use No   • Drug use: No   • Sexual activity: Not on file     Other Topics Concern   • Not on file     Social History Narrative   • No narrative on file     Family History:   Family History   Problem Relation Age of Onset   • No Known Problems Daughter    • No Known Problems Son    • Other Mother         complications from a concussion from a fall   • Other Father         old age   • No Known Problems Brother    • No Known Problems Daughter        Review of Systems   Constitutional: Negative.  Negative for activity change, appetite change, chills, diaphoresis, fatigue and fever.   HENT: Negative.  Negative for congestion, ear discharge, ear pain, facial swelling, hearing loss, mouth sores, nosebleeds, postnasal drip, rhinorrhea, sinus pressure, sore throat, tinnitus, trouble swallowing and voice change.    Eyes: Negative.  Negative for photophobia, pain, discharge and visual disturbance.   Respiratory: Negative.  Negative for apnea, cough, chest tightness, shortness of breath, wheezing and stridor.    Cardiovascular: Negative.  Negative for chest pain, palpitations and leg swelling.   Gastrointestinal: Negative.  Negative for abdominal distention, abdominal pain, blood in stool, constipation, diarrhea, nausea, rectal pain and  vomiting.   Endocrine: Negative.  Negative for cold intolerance, heat intolerance, polydipsia, polyphagia and polyuria.   Genitourinary: Negative.  Negative for difficulty urinating, dysuria, flank pain, frequency, hematuria and urgency.   Musculoskeletal: Negative.  Negative for arthralgias, back pain, gait problem, joint swelling and myalgias.   Skin: Negative.  Negative for color change, pallor, rash and wound.   Allergic/Immunologic: Negative.  Negative for environmental allergies, food allergies and immunocompromised state.   Neurological: Negative.  Negative for dizziness, tremors, seizures, syncope, speech difficulty, weakness, light-headedness, numbness and headaches.   Hematological: Negative.  Negative for adenopathy. Does not bruise/bleed easily.   Psychiatric/Behavioral: Negative.  Negative for agitation, confusion, hallucinations, sleep disturbance and suicidal ideas. The patient is not nervous/anxious.    All other systems reviewed and are negative.       Performance Status:  Asymptomatic    Medications:    Current Outpatient Prescriptions   Medication Sig Dispense Refill   • azithromycin (ZITHROMAX) 500 MG tablet Take 1 tablet by mouth Daily. 7 tablet 0   • B Complex Vitamins (VITAMIN B COMPLEX PO) Take  by mouth. 1 po daily     • Calcium Carb-Cholecalciferol (CALCIUM 600 + D PO) Take  by mouth.     • capecitabine (XELODA) 500 MG chemo tablet Take 1 tablet by mouth 2 (Two) Times a Day. For 2 weeks on and 1 week off. 42 tablet 11   • dexamethasone 0.5 MG/5ML solution Take 10 mL by mouth 4 (Four) Times a Day. Swish for 2 minutes & spit.  Avoid eating & drinking for 1 hour after rinse. 500 mL 5   • DULoxetine (CYMBALTA) 30 MG capsule Take 30 mg by mouth daily.     • exemestane (AROMASIN) 25 MG chemo tablet Take 1 tablet by mouth Daily. 30 tablet 11   • ferrous sulfate 325 (65 FE) MG tablet Take 325 mg by mouth 2 (two) times a day.     • lapatinib (TYKERB) 250 MG chemo tablet Take 5 tablets by mouth  "Daily. For 21 days on and 7 days off. 105 tablet 11   • Lysine HCl (L-LYSINE) 500 MG tablet tablet Take  by mouth daily.     • Magnesium 250 MG tablet Take  by mouth.     • melatonin 3 MG tablet Take  by mouth.     • metoprolol tartrate (LOPRESSOR) 25 MG tablet Take 25 mg by mouth 2 (two) times a day.     • OLANZapine (ZYPREXA) 2.5 MG tablet Take 2.5 mg by mouth every night.     • ondansetron (ZOFRAN) 8 MG tablet Take 1 tablet by mouth Every 8 (Eight) Hours As Needed for Nausea or Vomiting. 30 tablet 5   • Palbociclib 125 MG capsule capsule Take 1 capsule by mouth Daily. 3 weeks on, 1 week off 21 capsule 5   • Probiotic Product (PROBIOTIC DAILY PO) Take  by mouth. 300million cell daily     • raNITIdine (ZANTAC) 75 MG tablet Take 75 mg by mouth 2 (Two) Times a Day.     • terbinafine (lamiSIL) 250 MG tablet      • thyroid 60 MG PO tablet Take 60 mg by mouth daily.     • VALERIAN ROOT PO Take  by mouth. 100mg daily     • Zinc 50 MG tablet Take  by mouth. 1 po daily       No current facility-administered medications for this visit.      Facility-Administered Medications Ordered in Other Visits   Medication Dose Route Frequency Provider Last Rate Last Dose   • heparin flush (porcine) 100 UNIT/ML injection 500 Units  500 Units Intravenous PRN Joseph Nunez MD   500 Units at 10/26/17 1155   • sodium chloride 0.9 % flush 10 mL  10 mL Intravenous PRN Joseph Nunez MD   10 mL at 10/26/17 1155       ALLERGIES:    Allergies   Allergen Reactions   • Benadryl  [Diphenhydramine Hcl (Sleep)] Other (See Comments)   • Codeine    • Diphenhydramine Other (See Comments)     \"Shaky leg syndrome\"  \"Shaky leg syndrome\"  \"Shaky leg syndrome\"   • Heparin    • Other      POPPY SEED   • Penicillins Hives   • Petroleum Jelly [Skin Protectants, Misc.]    • Sulfa Antibiotics    • Sulfur    • Valium [Diazepam]        Objective      There were no vitals filed for this visit.      Current Status 8/22/2018   ECOG score 0       General Appearance: " Patient is awake, alert, oriented and in no acute distress. Patient is welldeveloped, wellnourished, and appears stated age.  HEENT: Normocephalic. Sclerae clear, conjunctiva pink, extraocular movements intact, pupils, round, reactive to light and accommodation. Mouth and throat are clear with moist oral mucosa.  NECK: Supple, no jugular venous distention, thyroid not enlarged.  LYMPH: No cervical, supraclavicular, axillary, or inguinal lymphadenopathy.  CHEST: Equal bilateral expansion   LUNGS: Good air movement, no rales, rhonchi, rubs or wheezes with auscultation  CARDIO: Regular sinus rhythm, no murmurs, gallops or rubs.  ABDOMEN: Nondistended, soft, No tenderness, no guarding, no rebound, No hepatosplenomegaly. No abdominal masses. Bowel sounds positive. No hernia  MUSKEL: No joint swelling, decreased motion, or inflammation  EXTREMS: No edema, clubbing, cyanosis, No varicose veins.  NEURO: Grossly nonfocal, Gait is coordinated and smooth, Cognition is preserved.  SKIN: No rashes, no ecchymoses, no petechia.  PSYCH: Oriented to time, place and person. Memory is preserved. Mood and affect appear normal      RECENT LABS:  WBC   Date Value Ref Range Status   08/22/2018 12.28 (H) 4.80 - 10.80 10*3/mm3 Final     RBC   Date Value Ref Range Status   08/22/2018 3.02 (L) 4.20 - 5.40 10*6/mm3 Final     Hemoglobin   Date Value Ref Range Status   08/22/2018 9.3 (L) 12.0 - 16.0 g/dL Final     Hematocrit   Date Value Ref Range Status   08/22/2018 27.8 (L) 37.0 - 47.0 % Final     MCV   Date Value Ref Range Status   08/22/2018 92.1 82.0 - 98.0 fL Final     MCH   Date Value Ref Range Status   08/22/2018 30.8 28.0 - 32.0 pg Final     MCHC   Date Value Ref Range Status   08/22/2018 33.5 33.0 - 36.0 g/dL Final     RDW   Date Value Ref Range Status   08/22/2018 22.8 (H) 12.0 - 15.0 % Final     RDW-SD   Date Value Ref Range Status   08/22/2018 70.7 (H) 40.0 - 54.0 fl Final     MPV   Date Value Ref Range Status   08/22/2018 8.8 6.0 -  12.0 fL Final     Platelets   Date Value Ref Range Status   08/22/2018 399 130 - 400 10*3/mm3 Final     Neutrophil %   Date Value Ref Range Status   08/22/2018 71.7 39.0 - 78.0 % Final     Lymphocyte %   Date Value Ref Range Status   08/22/2018 17.9 15.0 - 45.0 % Final     Monocyte %   Date Value Ref Range Status   08/22/2018 6.9 4.0 - 12.0 % Final     Eosinophil %   Date Value Ref Range Status   08/22/2018 2.2 0.0 - 4.0 % Final     Basophil %   Date Value Ref Range Status   08/22/2018 0.3 0.0 - 2.0 % Final     Immature Grans %   Date Value Ref Range Status   08/22/2018 1.0 0.0 - 5.0 % Final     Neutrophils, Absolute   Date Value Ref Range Status   08/22/2018 8.80 (H) 1.87 - 8.40 10*3/mm3 Final     Lymphocytes, Absolute   Date Value Ref Range Status   08/22/2018 2.20 0.72 - 4.86 10*3/mm3 Final     Monocytes, Absolute   Date Value Ref Range Status   08/22/2018 0.85 0.19 - 1.30 10*3/mm3 Final     Eosinophils, Absolute   Date Value Ref Range Status   08/22/2018 0.27 0.00 - 0.70 10*3/mm3 Final     Basophils, Absolute   Date Value Ref Range Status   08/22/2018 0.04 0.00 - 0.20 10*3/mm3 Final     Immature Grans, Absolute   Date Value Ref Range Status   08/22/2018 0.12 (H) 0.00 - 0.03 10*3/mm3 Final     nRBC   Date Value Ref Range Status   08/22/2018 0.2 (H) 0.0 - 0.0 /100 WBC Final     No visits with results within 7 Day(s) from this visit.   Latest known visit with results is:   Orders Only on 08/22/2018   Component Date Value Ref Range Status   • CA 27.29 08/22/2018 174.5* 0.0 - 38.6 U/mL Final    [facility]/ACS methodology             Assessment/Plan     Patient Active Problem List   Diagnosis   • Malignant neoplasm of upper-outer quadrant of left female breast (CMS/HCC)   • Breast cancer metastasized to bone, unspecified laterality (CMS/HCC)   • Essential hypertension   • Acquired hypothyroidism   • Bipolar 1 disorder (CMS/HCC)   • Carcinoma of left breast metastatic to bone (CMS/HCC)   • Antineoplastic  chemotherapy induced anemia          Assessment:  1. Stage IV metastatic breast carcinoma, ER/KS positive. HER2/maynor negative undergoing Ibrance, Faslodex and Xgeva with overall stable with stable CA27.29 with slow decline. She is tolerating the treatment very well. Faslodex cycle 17 today and will continue to HOLD Xgeva due to upcoming dental work, last dose of Xgeva given on 09/29/2016.  01/2017 restaging CT chest abdomen and pelvis and bone scan showed stable diffuse sclerotic bony lesions, unchanged from 7/1/2016.Next scan July 2017    2.  Mild leukopenia without neutropenia.  Likely secondary to Ibrance.  Continue to monitor.     3.  Macrocytic anemia.  Hemoglobin 11.1.  02/2016, B12 and folate normal    4.  Status post 8 teeth extraction.     Recommendation/Plan:  1.  Proceed with Faslodex cycle 20.  2. Continue Ibrance 125 mg 3 weeks on, one-week off.  Reduce dose if leukopenia worsens.   3. Follow up in 28 days for Faslodex cycle 19.   Repeat CA 27.29.  Previous CA 27.29 have remained stable, the last one done on 11/2017 was 89 Stable. Continue Faslodex q monthly.    Additionally she is anemic with a hemoglobin of 10 g percent with decreasing GFR.  I do suspect that she may have some form of chronic kidney disease, as evaluated by Nephrology, CKD from [facility].  No need for EPO as she is asyptomatic.  Redraw CA 27.29 today. Cont faslodex + Ibrance for now until repeat CA 27.29 returns. Last CA 27.29 in Feb 2018 slightly increased to 96. I have discussed different options with her:  #1. Stop Ibrance and switch to a new CD4K inhabitor Kisqali or Verenzio.  #. Stop Ibrance and switch to Affinitor + faslodex.  #. Cont Ibrance and Faslodex with just a subtle rise, since clinically pt is doing better.    At this point will restage with CT scans CAP at [facility] and compare to one done Jan 2017. If progression on CT scans and CA 27.29 then go for the [facility] trial of using MTOR inhabitor Everlimos + Exemestine. Bone scan  shows progressive dz. Therefore will stop Faslodex. Start Exemestine 25mg daily in combination with Affinitor 10mg daily. Redraw her tumor today.  Calcium improved but AST higher.    DC Exemestine and Affinitor as the CA 27.29 yulia to 140. Considering this will start Xeloda 500mg twice daily 2 Wks ON and one wk OFF.    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 Will add Her 2 Tykerb 1250mg q day x 21 days in combo + Oral Xeloda 500mg bid 3 wks On and 1 WK OFF. Will draw CA 27.29.    Since she started Lapatinib 1250 mg q day + Xeloda combo from 8/22/18 she has had significant dirrohea and lately has been throwing up and today her Creat is up to 2.3 from 0.9 and GFR down to 21 as well as she has lost 7 ILBS WT and appears very dehydrated. Plan STOP ALL meds and hydrate with 1 L NS every day next 2 days and bring her back in clinic this coming Monday.      Joseph Nunez MD    9/13/2018    9:55 AM

## 2018-09-14 ENCOUNTER — INFUSION (OUTPATIENT)
Dept: ONCOLOGY | Facility: HOSPITAL | Age: 73
End: 2018-09-14
Attending: INTERNAL MEDICINE

## 2018-09-14 VITALS
HEART RATE: 64 BPM | DIASTOLIC BLOOD PRESSURE: 57 MMHG | HEIGHT: 65 IN | WEIGHT: 140 LBS | BODY MASS INDEX: 23.32 KG/M2 | SYSTOLIC BLOOD PRESSURE: 98 MMHG | OXYGEN SATURATION: 99 % | RESPIRATION RATE: 20 BRPM | TEMPERATURE: 96.8 F

## 2018-09-14 DIAGNOSIS — E86.0 DEHYDRATION: Primary | ICD-10-CM

## 2018-09-14 DIAGNOSIS — C79.51 BREAST CANCER METASTASIZED TO BONE, UNSPECIFIED LATERALITY (HCC): ICD-10-CM

## 2018-09-14 DIAGNOSIS — C50.919 BREAST CANCER METASTASIZED TO BONE, UNSPECIFIED LATERALITY (HCC): ICD-10-CM

## 2018-09-14 DIAGNOSIS — C50.412 MALIGNANT NEOPLASM OF UPPER-OUTER QUADRANT OF LEFT FEMALE BREAST, UNSPECIFIED ESTROGEN RECEPTOR STATUS (HCC): ICD-10-CM

## 2018-09-14 PROCEDURE — 96361 HYDRATE IV INFUSION ADD-ON: CPT

## 2018-09-14 PROCEDURE — 96360 HYDRATION IV INFUSION INIT: CPT

## 2018-09-14 RX ORDER — SODIUM CHLORIDE 9 MG/ML
1000 INJECTION, SOLUTION INTRAVENOUS ONCE
Status: COMPLETED | OUTPATIENT
Start: 2018-09-14 | End: 2018-09-14

## 2018-09-14 RX ORDER — SODIUM CHLORIDE 9 MG/ML
1000 INJECTION, SOLUTION INTRAVENOUS ONCE
Status: CANCELLED | OUTPATIENT
Start: 2018-09-14

## 2018-09-14 RX ORDER — SODIUM CHLORIDE 0.9 % (FLUSH) 0.9 %
10 SYRINGE (ML) INJECTION AS NEEDED
Status: DISCONTINUED | OUTPATIENT
Start: 2018-09-14 | End: 2018-09-14 | Stop reason: HOSPADM

## 2018-09-14 RX ORDER — SODIUM CHLORIDE 0.9 % (FLUSH) 0.9 %
10 SYRINGE (ML) INJECTION AS NEEDED
Status: CANCELLED | OUTPATIENT
Start: 2018-09-14

## 2018-09-14 RX ADMIN — Medication 10 ML: at 11:41

## 2018-09-14 RX ADMIN — SODIUM CHLORIDE 1000 ML: 9 INJECTION, SOLUTION INTRAVENOUS at 09:40

## 2018-09-14 NOTE — PATIENT INSTRUCTIONS
Dehydration, Adult  Dehydration is when there is not enough fluid or water in your body. This happens when you lose more fluids than you take in. Dehydration can range from mild to very bad. It should be treated right away to keep it from getting very bad.  Symptoms of mild dehydration may include:  · Thirst.  · Dry lips.  · Slightly dry mouth.  · Dry, warm skin.  · Dizziness.  Symptoms of moderate dehydration may include:  · Very dry mouth.  · Muscle cramps.  · Dark pee (urine). Pee may be the color of tea.  · Your body making less pee.  · Your eyes making fewer tears.  · Heartbeat that is uneven or faster than normal (palpitations).  · Headache.  · Light-headedness, especially when you stand up from sitting.  · Fainting (syncope).  Symptoms of very bad dehydration may include:  · Changes in skin, such as:  ? Cold and clammy skin.  ? Blotchy (mottled) or pale skin.  ? Skin that does not quickly return to normal after being lightly pinched and let go (poor skin turgor).  · Changes in body fluids, such as:  ? Feeling very thirsty.  ? Your eyes making fewer tears.  ? Not sweating when body temperature is high, such as in hot weather.  ? Your body making very little pee.  · Changes in vital signs, such as:  ? Weak pulse.  ? Pulse that is more than 100 beats a minute when you are sitting still.  ? Fast breathing.  ? Low blood pressure.  · Other changes, such as:  ? Sunken eyes.  ? Cold hands and feet.  ? Confusion.  ? Lack of energy (lethargy).  ? Trouble waking up from sleep.  ? Short-term weight loss.  ? Unconsciousness.  Follow these instructions at home:  · If told by your doctor, drink an ORS:  ? Make an ORS by using instructions on the package.  ? Start by drinking small amounts, about ½ cup (120 mL) every 5-10 minutes.  ? Slowly drink more until you have had the amount that your doctor said to have.  · Drink enough clear fluid to keep your pee clear or pale yellow. If you were told to drink an ORS, finish the ORS  first, then start slowly drinking clear fluids. Drink fluids such as:  ? Water. Do not drink only water by itself. Doing that can make the salt (sodium) level in your body get too low (hyponatremia).  ? Ice chips.  ? Fruit juice that you have added water to (diluted).  ? Low-calorie sports drinks.  · Avoid:  ? Alcohol.  ? Drinks that have a lot of sugar. These include high-calorie sports drinks, fruit juice that does not have water added, and soda.  ? Caffeine.  ? Foods that are greasy or have a lot of fat or sugar.  · Take over-the-counter and prescription medicines only as told by your doctor.  · Do not take salt tablets. Doing that can make the salt level in your body get too high (hypernatremia).  · Eat foods that have minerals (electrolytes). Examples include bananas, oranges, potatoes, tomatoes, and spinach.  · Keep all follow-up visits as told by your doctor. This is important.  Contact a doctor if:  · You have belly (abdominal) pain that:  ? Gets worse.  ? Stays in one area (localizes).  · You have a rash.  · You have a stiff neck.  · You get angry or annoyed more easily than normal (irritability).  · You are more sleepy than normal.  · You have a harder time waking up than normal.  · You feel:  ? Weak.  ? Dizzy.  ? Very thirsty.  · You have peed (urinated) only a small amount of very dark pee during 6-8 hours.  Get help right away if:  · You have symptoms of very bad dehydration.  · You cannot drink fluids without throwing up (vomiting).  · Your symptoms get worse with treatment.  · You have a fever.  · You have a very bad headache.  · You are throwing up or having watery poop (diarrhea) and it:  ? Gets worse.  ? Does not go away.  · You have blood or something green (bile) in your throw-up.  · You have blood in your poop (stool). This may cause poop to look black and tarry.  · You have not peed in 6-8 hours.  · You pass out (faint).  · Your heart rate when you are sitting still is more than 100 beats a  minute.  · You have trouble breathing.  This information is not intended to replace advice given to you by your health care provider. Make sure you discuss any questions you have with your health care provider.  Document Released: 10/14/2010 Document Revised: 07/07/2017 Document Reviewed: 02/10/2017  [employer] Interactive Patient Education © 2018 [employer]    Rehydration, Adult  Rehydration is the replacement of body fluids and salts and minerals (electrolytes) that are lost during dehydration. Dehydration is when there is not enough fluid or water in the body. This happens when you lose more fluids than you take in. Common causes of dehydration include:  · Vomiting.  · Diarrhea.  · Excessive sweating, such as from heat exposure or exercise.  · Taking medicines that cause the body to lose excess fluid (diuretics).  · Impaired kidney function.  · Not drinking enough fluid.  · Certain illnesses or infections.  · Certain poorly controlled long-term (chronic) illnesses, such as diabetes, heart disease, and kidney disease.    Symptoms of mild dehydration may include thirst, dry lips and mouth, dry skin, and dizziness. Symptoms of severe dehydration may include increased heart rate, confusion, fainting, and not urinating.  You can rehydrate by drinking certain fluids or getting fluids through an IV tube, as told by your health care provider.  What are the risks?  Generally, rehydration is safe. However, one problem that can happen is taking in too much fluid (overhydration). This is rare. If overhydration happens, it can cause an electrolyte imbalance, kidney failure, or a decrease in salt (sodium) levels in the body.  How to rehydrate  Follow instructions from your health care provider for rehydration. The kind of fluid you should drink and the amount you should drink depend on your condition.  · If directed by your health care provider, drink an oral rehydration solution (ORS). This is a drink designed to treat  dehydration that is found in pharmacies and retail stores.  ? Make an ORS by following instructions on the package.  ? Start by drinking small amounts, about ½ cup (120 mL) every 5-10 minutes.  ? Slowly increase how much you drink until you have taken the amount recommended by your health care provider.  · Drink enough clear fluids to keep your urine clear or pale yellow. If you were instructed to drink an ORS, finish the ORS first, then start slowly drinking other clear fluids. Drink fluids such as:  ? Water. Do not drink only water. Doing that can lead to having too little sodium in your body (hyponatremia).  ? Ice chips.  ? Fruit juice that you have added water to (diluted juice).  ? Low-calorie sports drinks.  · If you are severely dehydrated, your health care provider may recommend that you receive fluids through an IV tube in the hospital.  · Do not take sodium tablets. Doing that can lead to the condition of having too much sodium in your body (hypernatremia).    Eating while you rehydrate  Follow instructions from your health care provider about what to eat while you rehydrate. Your health care provider may recommend that you slowly begin eating regular foods in small amounts.  · Eat foods that contain a healthy balance of electrolytes, such as bananas, oranges, potatoes, tomatoes, and spinach.  · Avoid foods that are greasy or contain a lot of fat or sugar.    In some cases, you may get nutrition through a feeding tube that is passed through your nose and into your stomach (nasogastric tube, or NG tube). This may be done if you have uncontrolled vomiting or diarrhea.  Beverages to avoid  Certain beverages may make dehydration worse. While you rehydrate, avoid:  · Alcohol.  · Caffeine.  · Drinks that contain a lot of sugar. These include:  ? High-calorie sports drinks.  ? Fruit juice that is not diluted.  ? Soda.    Check nutrition labels to see how much sugar or caffeine a beverage contains.  Signs of  dehydration recovery  You may be recovering from dehydration if:  · You are urinating more often than before you started rehydrating.  · Your urine is clear or pale yellow.  · Your energy level improves.  · You vomit less frequently.  · You have diarrhea less frequently.  · Your appetite improves or returns to normal.  · You feel less dizzy or less light-headed.  · Your skin tone and color start to look more normal.    Contact a health care provider if:  · You continue to have symptoms of mild dehydration, such as:  ? Thirst.  ? Dry lips.  ? Slightly dry mouth.  ? Dry, warm skin.  ? Dizziness.  · You continue to vomit or have diarrhea.  Get help right away if:  · You have symptoms of dehydration that get worse.  · You feel:  ? Confused.  ? Weak.  ? Like you are going to faint.  · You have not urinated in 6-8 hours.  · You have very dark urine.  · You have trouble breathing.  · Your heart rate while sitting still is over 100 beats a minute.  · You cannot drink fluids without vomiting.  · You have vomiting or diarrhea that:  ? Gets worse.  ? Does not go away.  · You have a fever.  This information is not intended to replace advice given to you by your health care provider. Make sure you discuss any questions you have with your health care provider.  Document Released: 03/11/2013 Document Revised: 07/07/2017 Document Reviewed: 02/10/2017  [employer] Interactive Patient Education © 2018 [employer]

## 2018-09-17 ENCOUNTER — LAB (OUTPATIENT)
Dept: LAB | Facility: HOSPITAL | Age: 73
End: 2018-09-17
Attending: INTERNAL MEDICINE

## 2018-09-17 ENCOUNTER — OFFICE VISIT (OUTPATIENT)
Dept: ONCOLOGY | Facility: CLINIC | Age: 73
End: 2018-09-17

## 2018-09-17 VITALS
RESPIRATION RATE: 18 BRPM | DIASTOLIC BLOOD PRESSURE: 64 MMHG | HEART RATE: 92 BPM | WEIGHT: 143.8 LBS | TEMPERATURE: 98 F | OXYGEN SATURATION: 93 % | BODY MASS INDEX: 23.96 KG/M2 | SYSTOLIC BLOOD PRESSURE: 118 MMHG | HEIGHT: 65 IN

## 2018-09-17 DIAGNOSIS — C50.919 BREAST CANCER METASTASIZED TO BONE, UNSPECIFIED LATERALITY (HCC): Primary | ICD-10-CM

## 2018-09-17 DIAGNOSIS — C50.412 MALIGNANT NEOPLASM OF UPPER-OUTER QUADRANT OF LEFT FEMALE BREAST, UNSPECIFIED ESTROGEN RECEPTOR STATUS (HCC): Primary | ICD-10-CM

## 2018-09-17 DIAGNOSIS — E86.0 DEHYDRATION: Primary | ICD-10-CM

## 2018-09-17 DIAGNOSIS — C79.51 BREAST CANCER METASTASIZED TO BONE, UNSPECIFIED LATERALITY (HCC): Primary | ICD-10-CM

## 2018-09-17 DIAGNOSIS — E86.0 DEHYDRATION: ICD-10-CM

## 2018-09-17 LAB
ALBUMIN SERPL-MCNC: 3.9 G/DL (ref 3.5–5)
ALBUMIN/GLOB SERPL: 1.1 G/DL (ref 1.1–2.5)
ALP SERPL-CCNC: 107 U/L (ref 24–120)
ALT SERPL W P-5'-P-CCNC: 20 U/L (ref 0–54)
ANION GAP SERPL CALCULATED.3IONS-SCNC: 11 MMOL/L (ref 4–13)
AST SERPL-CCNC: 29 U/L (ref 7–45)
BASOPHILS # BLD AUTO: 0.05 10*3/MM3 (ref 0–0.2)
BASOPHILS NFR BLD AUTO: 0.6 % (ref 0–2)
BILIRUB SERPL-MCNC: 0.4 MG/DL (ref 0.1–1)
BUN BLD-MCNC: 12 MG/DL (ref 5–21)
BUN/CREAT SERPL: 9.6 (ref 7–25)
CALCIUM SPEC-SCNC: 10.1 MG/DL (ref 8.4–10.4)
CHLORIDE SERPL-SCNC: 97 MMOL/L (ref 98–110)
CO2 SERPL-SCNC: 29 MMOL/L (ref 24–31)
CREAT BLD-MCNC: 1.25 MG/DL (ref 0.5–1.4)
DEPRECATED RDW RBC AUTO: 91.1 FL (ref 40–54)
EOSINOPHIL # BLD AUTO: 0.34 10*3/MM3 (ref 0–0.7)
EOSINOPHIL NFR BLD AUTO: 4.3 % (ref 0–4)
ERYTHROCYTE [DISTWIDTH] IN BLOOD BY AUTOMATED COUNT: 26.6 % (ref 12–15)
GFR SERPL CREATININE-BSD FRML MDRD: 42 ML/MIN/1.73
GLOBULIN UR ELPH-MCNC: 3.4 GM/DL
GLUCOSE BLD-MCNC: 111 MG/DL (ref 70–100)
HCT VFR BLD AUTO: 32.7 % (ref 37–47)
HGB BLD-MCNC: 10.8 G/DL (ref 12–16)
HOLD SPECIMEN: NORMAL
HOLD SPECIMEN: NORMAL
IMM GRANULOCYTES # BLD: 0.1 10*3/MM3 (ref 0–0.03)
IMM GRANULOCYTES NFR BLD: 1.3 % (ref 0–5)
LYMPHOCYTES # BLD AUTO: 2.14 10*3/MM3 (ref 0.72–4.86)
LYMPHOCYTES NFR BLD AUTO: 27 % (ref 15–45)
MCH RBC QN AUTO: 32.9 PG (ref 28–32)
MCHC RBC AUTO-ENTMCNC: 33 G/DL (ref 33–36)
MCV RBC AUTO: 99.7 FL (ref 82–98)
MONOCYTES # BLD AUTO: 0.66 10*3/MM3 (ref 0.19–1.3)
MONOCYTES NFR BLD AUTO: 8.3 % (ref 4–12)
NEUTROPHILS # BLD AUTO: 4.64 10*3/MM3 (ref 1.87–8.4)
NEUTROPHILS NFR BLD AUTO: 58.5 % (ref 39–78)
NRBC BLD MANUAL-RTO: 0 /100 WBC (ref 0–0)
PLATELET # BLD AUTO: 446 10*3/MM3 (ref 130–400)
PMV BLD AUTO: 9.4 FL (ref 6–12)
POTASSIUM BLD-SCNC: 3.4 MMOL/L (ref 3.5–5.3)
PROT SERPL-MCNC: 7.3 G/DL (ref 6.3–8.7)
RBC # BLD AUTO: 3.28 10*6/MM3 (ref 4.2–5.4)
SODIUM BLD-SCNC: 137 MMOL/L (ref 135–145)
WBC NRBC COR # BLD: 7.93 10*3/MM3 (ref 4.8–10.8)

## 2018-09-17 PROCEDURE — 99214 OFFICE O/P EST MOD 30 MIN: CPT | Performed by: INTERNAL MEDICINE

## 2018-09-17 PROCEDURE — 85025 COMPLETE CBC W/AUTO DIFF WBC: CPT

## 2018-09-17 PROCEDURE — 86300 IMMUNOASSAY TUMOR CA 15-3: CPT | Performed by: INTERNAL MEDICINE

## 2018-09-17 PROCEDURE — 80053 COMPREHEN METABOLIC PANEL: CPT

## 2018-09-17 PROCEDURE — 36415 COLL VENOUS BLD VENIPUNCTURE: CPT

## 2018-09-17 NOTE — PROGRESS NOTES
Port flushed w/NS only due to patient allergy.  LORETO Yanes RN  [facility]  HEMATOLOGY & ONCOLOGY        Subjective    Metastatic breast cancer on Faslodex and Ibrance     Staging form: Breast, AJCC V7      Clinical stage from 10/11/2016: Stage IV (T2, N1, M1) - ER/SD positive. HER2/maynor negative  VISIT DIAGNOSIS:   Metastatic breast cancer       HEMATOLOGY / ONCOLOGY HISTORY:   Oncology/Hematology History    Patient is a 67-year-old postmenopausal female who had onset of her menses at age 9 and menopause at age 48. She received oral contraceptives for approximately 14 years and did not receive hormonal manipulation. Patient has a maternal cousin had breast cancer. Patient has had no prior breast biopsies. Patient found a palpable left breast mass as well as a left axillary mass. Patient had a bloody nipple discharge which been present for approximately 6 months. On 6/20/2012, a mammogram was performed showing a subareolar mass consistent with malignancy. Bilateral breast ultrasound revealed an ill-defined subareolar mass measuring 2.8-2.5 cm. There is a left axillary mass measuring 1.7 cm with suspicion of extracapsular extension. There is no evidence of disease in the right breast. On 7/9/2012 patient underwent a left breast biopsy and placement of marker clip. Left breast biopsy revealed an infiltrating lobular carcinoma grade 2 with focal ductal carcinoma in situ, solid pattern. A fine-needle biopsy of the left lymph node was consistent with metastatic carcinoma. Breast tumor profile revealed ER: 100%; SD: 98%; HER-2/maynor 2+; FISH: Unamplified; Ki-67: 71%; P 53 1%; DNA aneuploidy. A MRI of the breast were performed body multiple abnormal enhancing masses within the left breast. There appears to be anterior retraction of the pectoralis muscle with no direct extension. There is a moderate to large, layering left pleural effusion appreciated. The right breast reveals 3 moderately enlarged lymph nodes  in the posterior lateral aspect the right breast. These have fatty hilum but followup is recommended. Patient is undergone systemic studies including, on 8/2/2012, a CT scan of the brain showing atrophy. a CT scan that shows showing a small to moderate left pleural effusion with 3 subcentimeter nodule densities in the left lung.   She completed 4 cycles of neoadjuvant treatment with dose dense Adriamycin and Cytoxan. She had a mammogram which showed a significant reduction in the size of her left breast lesion. There is no axillary adenopathy noted. She has had an ultrasound revealing the  lesion reducing from 2.4 cm to 1 cm. Patient underwent a left mastectomy on 03/19/2013 finding negative invasive cancer, negative nodes. A 5% DCIS was noted. The patient has declined hormonal therapy. She did not need radiation therapy.  November 2014, she began to have evidence of lytic bone lesions at T5T6, frontal disease, an 8 mm lucency in the central frontal area of  the skull but nothing intracranial. Bone scan revealed only vertex tracer activity but bilateral ribs and thoracic spine, and other lesions in  her pelvis. At that time, she was started on letrozole since November 2014. She is taking Xgeva. She had progression found in bone in Feb 2015 on scan. She was started on Faslodex, Ibrance and continued xgeva.         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 B) Foci of ductal carcinoma in situ, solid pattern. C) Greatest dimension of the invasive carcinoma is 15.8mm.         3/19/2013 Surgery     Left Mastectomy w/ Axillary Tail: Focal residual ducatal carcinoma in situ (DCIS) 12 lymph nodes negative for metastatic carcinoma.         10/20/2014 Biopsy     Peritoneum Biopsy: Final  Diagnosis: Malignant Cell Neoplasm.           Breast cancer metastasized to bone, unspecified laterality (CMS/HCC)    10/15/2012 -  Other Event     Left mammogram:  Impression:  1. Decreasing spiculation and density in the retroareolar left breast, near a previous biopsy.   2. Definite left axillary lesion is not appreciated.          2/5/2013 -  Other Event     Diagnostic Mammo:  Comparison is made to 10/15/2012 and 6/20/2012  The spiculated mass at 12:00 behind the nipple is nearly resolved.   Impression:  1. The mass on the left side is less apparent after receiving chemotherapy. There has been a good response to chmotherapy on the left side.   2. No suspicious abnormality is seen in the right breast to account for the new palable abnormality at the 4-5:00 position.          3/19/2013 Surgery     Breast Biopsy:  Final Diagnosis:  A. Breast, left mastectomy with axillary tail  1. Focal residual ductal carcinoma in situ (DICS) (less than 5% of tumor bed).  2. Negative for residual invasive carcinoma.   3. 12 Lymph Nodes, negative for metastatic carcinoma (0/12) focally, some lymph nodes demonstrate fibrosis/treatment effect.  B. Skin and soft tissue, left advancement flap:  1. Benign skin and subcutis.   2. Negative for carcinoma.          6/20/2013 -  Other Event     Diagnostic Mammo [facility]:  1. A subareolar mass is consistent with malignancy. Additional involement extends inferiorly sonographically and superolaterally mammograhically as demonstrated by calcifications. The mass measures approx 2.8 cm sonographically, but the involved region is likely much larger including an intraductal componet. The left axillary lymph nodes are also involved.   2. Recommened ultrasound guided biopsy of the left subareolar mass and left axillary lymph node.   3. Breast MRI could also further define multicentric disease on the left.   4. Clear suspicious finding on the right.  Recommend ongoing clinical follow up of palpable  foci.          6/13/2014 -  Other Event     Diagnostic Mammo:  IMPRESSION:  1. History of left breast cancer and mastectomy. Stable benign right breast mammograms.          10/20/2014 Surgery     Final Diagnosis:  Biopsies, pertoneum:  Metastatic carcinoma, morphologically compatible with metastatic mammary carcinoma.          6/15/2015 -  Other Event     Diagnostic Mammo:  IMPRESSION:   1. Negative x-ray report, should not delay biopsy if a dominat or clinically suspicious mass is present.          7/5/2016 -  Other Event     Diagnositic mammogram:  Impression:   Stable right mamogram with no radiographic findings suspicious for malignancy. Recommend continued screening mammography per screening guidelines unless otherwise earlier clinically indicated.          9/18/2016 Initial Diagnosis     Secondary malignant neoplasm of bone and bone marrow          Cancer Staging Information:  Malignant neoplasm of upper-outer quadrant of left female breast    Staging form: Breast, AJCC V7      Clinical stage from 10/11/2016: Stage IV (T2, N1, M1) - Signed by ALBERT Kulkarni on 10/11/2016      INTERVAL HISTORY  Patient ID: Heavenly Yanes is a 73 y.o. year old female history of metastatic breast carcinoma to lung and bone.  She is presently receiving treatment with Ibrance, Faslodex and Xgeva.  Tumor markers continued to decline with a CA 27-29 of 92.2 obtained on 9:30 2016 compared to 105.8 on 07/27/2016.  Mr. Yanes continues to tolerate this treatment regimen well and is here today for evaluation consideration to receive Faslodex and Xgeva.  Last bone mineral density study was obtained 11/21/2014 indicating osteopopenia, we will obtain a repeat bone mineral density study. Ms. Yanes completed 8 teeth pulled 02/14/2017.   Xgeva on hold, resume 05/2017.     Past Medical History:   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   • Bipolar disorder (CMS/HCC)    • Disease  of thyroid gland    • Hypertension    • Malignant neoplasm of upper-outer quadrant of left female breast (CMS/HCC) 9/18/2016   • Secondary malignant neoplasm of bone and bone marrow (CMS/HCC) 9/18/2016     Past Surgical History:   Past Surgical History:   Procedure Laterality Date   • BREAST SURGERY      left breast biopsy and axillary node   • CHOLECYSTECTOMY     • EXTERNAL EAR SURGERY     • MASTECTOMY Left    • PORTACATH PLACEMENT       Social History:   Social History     Social History   • Marital status:      Spouse name: N/A   • Number of children: N/A   • Years of education: N/A     Occupational History   • Not on file.     Social History Main Topics   • Smoking status: Never Smoker   • Smokeless tobacco: Never Used   • Alcohol use No   • Drug use: No   • Sexual activity: Not on file     Other Topics Concern   • Not on file     Social History Narrative   • No narrative on file     Family History:   Family History   Problem Relation Age of Onset   • No Known Problems Daughter    • No Known Problems Son    • Other Mother         complications from a concussion from a fall   • Other Father         old age   • No Known Problems Brother    • No Known Problems Daughter        Review of Systems   Constitutional: Negative.  Negative for activity change, appetite change, chills, diaphoresis, fatigue and fever.   HENT: Negative.  Negative for congestion, ear discharge, ear pain, facial swelling, hearing loss, mouth sores, nosebleeds, postnasal drip, rhinorrhea, sinus pressure, sore throat, tinnitus, trouble swallowing and voice change.    Eyes: Negative.  Negative for photophobia, pain, discharge and visual disturbance.   Respiratory: Negative.  Negative for apnea, cough, chest tightness, shortness of breath, wheezing and stridor.    Cardiovascular: Negative.  Negative for chest pain, palpitations and leg swelling.   Gastrointestinal: Negative.  Negative for abdominal distention, abdominal pain, blood in stool,  constipation, diarrhea, nausea, rectal pain and vomiting.   Endocrine: Negative.  Negative for cold intolerance, heat intolerance, polydipsia, polyphagia and polyuria.   Genitourinary: Negative.  Negative for difficulty urinating, dysuria, flank pain, frequency, hematuria and urgency.   Musculoskeletal: Negative.  Negative for arthralgias, back pain, gait problem, joint swelling and myalgias.   Skin: Negative.  Negative for color change, pallor, rash and wound.   Allergic/Immunologic: Negative.  Negative for environmental allergies, food allergies and immunocompromised state.   Neurological: Negative.  Negative for dizziness, tremors, seizures, syncope, speech difficulty, weakness, light-headedness, numbness and headaches.   Hematological: Negative.  Negative for adenopathy. Does not bruise/bleed easily.   Psychiatric/Behavioral: Negative.  Negative for agitation, confusion, hallucinations, sleep disturbance and suicidal ideas. The patient is not nervous/anxious.    All other systems reviewed and are negative.       Performance Status:  Asymptomatic    Medications:    Current Outpatient Prescriptions   Medication Sig Dispense Refill   • azithromycin (ZITHROMAX) 500 MG tablet Take 1 tablet by mouth Daily. 7 tablet 0   • B Complex Vitamins (VITAMIN B COMPLEX PO) Take  by mouth. 1 po daily     • Calcium Carb-Cholecalciferol (CALCIUM 600 + D PO) Take  by mouth.     • capecitabine (XELODA) 500 MG chemo tablet Take 1 tablet by mouth 2 (Two) Times a Day. For 2 weeks on and 1 week off. 42 tablet 11   • dexamethasone 0.5 MG/5ML solution Take 10 mL by mouth 4 (Four) Times a Day. Swish for 2 minutes & spit.  Avoid eating & drinking for 1 hour after rinse. 500 mL 5   • DULoxetine (CYMBALTA) 30 MG capsule Take 30 mg by mouth daily.     • exemestane (AROMASIN) 25 MG chemo tablet Take 1 tablet by mouth Daily. 30 tablet 11   • ferrous sulfate 325 (65 FE) MG tablet Take 325 mg by mouth 2 (two) times a day.     • lapatinib (TYKERB)  "250 MG chemo tablet Take 5 tablets by mouth Daily. For 21 days on and 7 days off. 105 tablet 11   • Lysine HCl (L-LYSINE) 500 MG tablet tablet Take  by mouth daily.     • Magnesium 250 MG tablet Take  by mouth.     • melatonin 3 MG tablet Take  by mouth.     • metoprolol tartrate (LOPRESSOR) 25 MG tablet Take 25 mg by mouth 2 (two) times a day.     • OLANZapine (ZYPREXA) 2.5 MG tablet Take 2.5 mg by mouth every night.     • ondansetron (ZOFRAN) 8 MG tablet Take 1 tablet by mouth Every 8 (Eight) Hours As Needed for Nausea or Vomiting. 30 tablet 5   • Palbociclib 125 MG capsule capsule Take 1 capsule by mouth Daily. 3 weeks on, 1 week off 21 capsule 5   • Probiotic Product (PROBIOTIC DAILY PO) Take  by mouth. 300million cell daily     • raNITIdine (ZANTAC) 75 MG tablet Take 75 mg by mouth 2 (Two) Times a Day.     • terbinafine (lamiSIL) 250 MG tablet      • thyroid 60 MG PO tablet Take 60 mg by mouth daily.     • VALERIAN ROOT PO Take  by mouth. 100mg daily     • Zinc 50 MG tablet Take  by mouth. 1 po daily       No current facility-administered medications for this visit.      Facility-Administered Medications Ordered in Other Visits   Medication Dose Route Frequency Provider Last Rate Last Dose   • heparin flush (porcine) 100 UNIT/ML injection 500 Units  500 Units Intravenous PRN Joseph Nunez MD   500 Units at 10/26/17 1155   • sodium chloride 0.9 % flush 10 mL  10 mL Intravenous PRN Joseph Nunez MD   10 mL at 10/26/17 1155       ALLERGIES:    Allergies   Allergen Reactions   • Benadryl  [Diphenhydramine Hcl (Sleep)] Other (See Comments)   • Codeine    • Diphenhydramine Other (See Comments)     \"Shaky leg syndrome\"  \"Shaky leg syndrome\"  \"Shaky leg syndrome\"   • Heparin    • Other      POPPY SEED   • Penicillins Hives   • Petroleum Jelly [Skin Protectants, Misc.]    • Sulfa Antibiotics    • Sulfur    • Valium [Diazepam]        Objective      There were no vitals filed for this visit.      Current Status " 9/13/2018   ECOG score 1       General Appearance: Patient is awake, alert, oriented and in no acute distress. Patient is welldeveloped, wellnourished, and appears stated age.  HEENT: Normocephalic. Sclerae clear, conjunctiva pink, extraocular movements intact, pupils, round, reactive to light and accommodation. Mouth and throat are clear with moist oral mucosa.  NECK: Supple, no jugular venous distention, thyroid not enlarged.  LYMPH: No cervical, supraclavicular, axillary, or inguinal lymphadenopathy.  CHEST: Equal bilateral expansion   LUNGS: Good air movement, no rales, rhonchi, rubs or wheezes with auscultation  CARDIO: Regular sinus rhythm, no murmurs, gallops or rubs.  ABDOMEN: Nondistended, soft, No tenderness, no guarding, no rebound, No hepatosplenomegaly. No abdominal masses. Bowel sounds positive. No hernia  MUSKEL: No joint swelling, decreased motion, or inflammation  EXTREMS: No edema, clubbing, cyanosis, No varicose veins.  NEURO: Grossly nonfocal, Gait is coordinated and smooth, Cognition is preserved.  SKIN: No rashes, no ecchymoses, no petechia.  PSYCH: Oriented to time, place and person. Memory is preserved. Mood and affect appear normal      RECENT LABS:  WBC   Date Value Ref Range Status   09/13/2018 9.26 4.80 - 10.80 10*3/mm3 Final     RBC   Date Value Ref Range Status   09/13/2018 3.78 (L) 4.20 - 5.40 10*6/mm3 Final     Hemoglobin   Date Value Ref Range Status   09/13/2018 12.1 12.0 - 16.0 g/dL Final     Hematocrit   Date Value Ref Range Status   09/13/2018 35.9 (L) 37.0 - 47.0 % Final     MCV   Date Value Ref Range Status   09/13/2018 95.0 82.0 - 98.0 fL Final     MCH   Date Value Ref Range Status   09/13/2018 32.0 28.0 - 32.0 pg Final     MCHC   Date Value Ref Range Status   09/13/2018 33.7 33.0 - 36.0 g/dL Final     RDW   Date Value Ref Range Status   09/13/2018 27.0 (H) 12.0 - 15.0 % Final     RDW-SD   Date Value Ref Range Status   09/13/2018 87.5 (H) 40.0 - 54.0 fl Final     MPV   Date  Value Ref Range Status   09/13/2018 9.6 6.0 - 12.0 fL Final     Platelets   Date Value Ref Range Status   09/13/2018 476 (H) 130 - 400 10*3/mm3 Final     Neutrophil %   Date Value Ref Range Status   09/13/2018 54.7 39.0 - 78.0 % Final     Lymphocyte %   Date Value Ref Range Status   09/13/2018 34.4 15.0 - 45.0 % Final     Monocyte %   Date Value Ref Range Status   09/13/2018 8.3 4.0 - 12.0 % Final     Eosinophil %   Date Value Ref Range Status   09/13/2018 1.7 0.0 - 4.0 % Final     Basophil %   Date Value Ref Range Status   09/13/2018 0.3 0.0 - 2.0 % Final     Immature Grans %   Date Value Ref Range Status   09/13/2018 0.6 0.0 - 5.0 % Final     Neutrophils, Absolute   Date Value Ref Range Status   09/13/2018 5.05 1.87 - 8.40 10*3/mm3 Final     Lymphocytes, Absolute   Date Value Ref Range Status   09/13/2018 3.19 0.72 - 4.86 10*3/mm3 Final     Monocytes, Absolute   Date Value Ref Range Status   09/13/2018 0.77 0.19 - 1.30 10*3/mm3 Final     Eosinophils, Absolute   Date Value Ref Range Status   09/13/2018 0.16 0.00 - 0.70 10*3/mm3 Final     Basophils, Absolute   Date Value Ref Range Status   09/13/2018 0.03 0.00 - 0.20 10*3/mm3 Final     Immature Grans, Absolute   Date Value Ref Range Status   09/13/2018 0.06 (H) 0.00 - 0.03 10*3/mm3 Final     nRBC   Date Value Ref Range Status   09/13/2018 0.2 (H) 0.0 - 0.0 /100 WBC Final     Appointment on 09/13/2018   Component Date Value Ref Range Status   • Extra Tube 09/13/2018 Hold for add-ons.   Final    Auto resulted.   • Extra Tube 09/13/2018 Hold for add-ons.   Final    Auto resulted.   Orders Only on 09/13/2018   Component Date Value Ref Range Status   • Glucose 09/13/2018 126* 70 - 100 mg/dL Final   • BUN 09/13/2018 22* 5 - 21 mg/dL Final   • Creatinine 09/13/2018 2.30* 0.50 - 1.40 mg/dL Final   • Sodium 09/13/2018 131* 135 - 145 mmol/L Final   • Potassium 09/13/2018 3.5  3.5 - 5.3 mmol/L Final   • Chloride 09/13/2018 87* 98 - 110 mmol/L Final   • CO2 09/13/2018 29.0   24.0 - 31.0 mmol/L Final   • Calcium 09/13/2018 10.6* 8.4 - 10.4 mg/dL Final   • Total Protein 09/13/2018 8.7  6.3 - 8.7 g/dL Final   • Albumin 09/13/2018 4.80  3.50 - 5.00 g/dL Final   • ALT (SGPT) 09/13/2018 39  0 - 54 U/L Final   • AST (SGOT) 09/13/2018 65* 7 - 45 U/L Final   • Alkaline Phosphatase 09/13/2018 144* 24 - 120 U/L Final   • Total Bilirubin 09/13/2018 0.9  0.1 - 1.0 mg/dL Final   • eGFR Non African Amer 09/13/2018 21* >60 mL/min/1.73 Final   • Globulin 09/13/2018 3.9  gm/dL Final   • A/G Ratio 09/13/2018 1.2  1.1 - 2.5 g/dL Final   • BUN/Creatinine Ratio 09/13/2018 9.6  7.0 - 25.0 Final   • Anion Gap 09/13/2018 15.0* 4.0 - 13.0 mmol/L Final   • WBC 09/13/2018 9.26  4.80 - 10.80 10*3/mm3 Final   • RBC 09/13/2018 3.78* 4.20 - 5.40 10*6/mm3 Final   • Hemoglobin 09/13/2018 12.1  12.0 - 16.0 g/dL Final   • Hematocrit 09/13/2018 35.9* 37.0 - 47.0 % Final   • MCV 09/13/2018 95.0  82.0 - 98.0 fL Final   • MCH 09/13/2018 32.0  28.0 - 32.0 pg Final   • MCHC 09/13/2018 33.7  33.0 - 36.0 g/dL Final   • RDW 09/13/2018 27.0* 12.0 - 15.0 % Final   • RDW-SD 09/13/2018 87.5* 40.0 - 54.0 fl Final   • MPV 09/13/2018 9.6  6.0 - 12.0 fL Final   • Platelets 09/13/2018 476* 130 - 400 10*3/mm3 Final   • Neutrophil % 09/13/2018 54.7  39.0 - 78.0 % Final   • Lymphocyte % 09/13/2018 34.4  15.0 - 45.0 % Final   • Monocyte % 09/13/2018 8.3  4.0 - 12.0 % Final   • Eosinophil % 09/13/2018 1.7  0.0 - 4.0 % Final   • Basophil % 09/13/2018 0.3  0.0 - 2.0 % Final   • Immature Grans % 09/13/2018 0.6  0.0 - 5.0 % Final   • Neutrophils, Absolute 09/13/2018 5.05  1.87 - 8.40 10*3/mm3 Final   • Lymphocytes, Absolute 09/13/2018 3.19  0.72 - 4.86 10*3/mm3 Final   • Monocytes, Absolute 09/13/2018 0.77  0.19 - 1.30 10*3/mm3 Final   • Eosinophils, Absolute 09/13/2018 0.16  0.00 - 0.70 10*3/mm3 Final   • Basophils, Absolute 09/13/2018 0.03  0.00 - 0.20 10*3/mm3 Final   • Immature Grans, Absolute 09/13/2018 0.06* 0.00 - 0.03 10*3/mm3 Final   •  nRBC 09/13/2018 0.2* 0.0 - 0.0 /100 WBC Final             Assessment/Plan     Patient Active Problem List   Diagnosis   • Malignant neoplasm of upper-outer quadrant of left female breast (CMS/HCC)   • Breast cancer metastasized to bone, unspecified laterality (CMS/HCC)   • Essential hypertension   • Acquired hypothyroidism   • Bipolar 1 disorder (CMS/HCC)   • Carcinoma of left breast metastatic to bone (CMS/HCC)   • Antineoplastic chemotherapy induced anemia   • Dehydration          Assessment:  1. Stage IV metastatic breast carcinoma, ER/OK positive. HER2/maynor negative undergoing Ibrance, Faslodex and Xgeva with overall stable with stable CA27.29 with slow decline. She is tolerating the treatment very well. Faslodex cycle 17 today and will continue to HOLD Xgeva due to upcoming dental work, last dose of Xgeva given on 09/29/2016.  01/2017 restaging CT chest abdomen and pelvis and bone scan showed stable diffuse sclerotic bony lesions, unchanged from 7/1/2016.Next scan July 2017    2.  Mild leukopenia without neutropenia.  Likely secondary to Ibrance.  Continue to monitor.     3.  Macrocytic anemia.  Hemoglobin 11.1.  02/2016, B12 and folate normal    4.  Status post 8 teeth extraction.     Recommendation/Plan:  1.  Proceed with Faslodex cycle 20.  2. Continue Ibrance 125 mg 3 weeks on, one-week off.  Reduce dose if leukopenia worsens.   3. Follow up in 28 days for Faslodex cycle 19.   Repeat CA 27.29.  Previous CA 27.29 have remained stable, the last one done on 11/2017 was 89 Stable. Continue Faslodex q monthly.    Additionally she is anemic with a hemoglobin of 10 g percent with decreasing GFR.  I do suspect that she may have some form of chronic kidney disease, as evaluated by Nephrology, CKD from tn.  No need for EPO as she is asyptomatic.  Redraw CA 27.29 today. Cont faslodex + Ibrance for now until repeat CA 27.29 returns. Last CA 27.29 in Feb 2018 slightly increased to 96. I have discussed different options  with her:  #1. Stop Ibrance and switch to a new CD4K inhabitor Kisqali or Verenzio.  #. Stop Ibrance and switch to Affinitor + faslodex.  #. Cont Ibrance and Faslodex with just a subtle rise, since clinically pt is doing better.    At this point will restage with CT scans [facility] and compare to one done Jan 2017. If progression on CT scans and CA 27.29 then go for the [facility] trial of using MTOR inhabitor Everlimos + Exemestine. Bone scan shows progressive dz. Therefore will stop Faslodex. Start Exemestine 25mg daily in combination with Affinitor 10mg daily. Redraw her tumor today.  Calcium improved but AST higher.    DC Exemestine and Affinitor as the CA 27.29 yulia to 140. Considering this will start Xeloda 500mg twice daily 2 Wks ON and one wk OFF.    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 Will add Her 2 Tykerb 1250mg q day x 21 days in combo + Oral Xeloda 500mg bid 3 wks On and 1 WK OFF. Will draw CA 27.29.    Since she started Lapatinib 1250 mg q day + Xeloda combo from 8/22/18 she has had significant dirrohea and lately has been throwing up and today her Creat is up to 2.3 from 0.9 and GFR down to 21 as well as she has lost 7 ILBS WT and appears very dehydrated. All meds were stopped last week and she received IVF and today creat is back down to 1.25 and GFR upto 41. Redraw CAproved the best would be either to reduce the dose and keep both Lapitinib and Xeloda on board, she will call me tomorrow.  Joseph Nunez MD    9/17/2018    9:22 AM

## 2018-09-18 LAB — CANCER AG27-29 SERPL-ACNC: 122.8 U/ML (ref 0–38.6)

## 2018-09-19 ENCOUNTER — TELEPHONE (OUTPATIENT)
Dept: ONCOLOGY | Facility: CLINIC | Age: 73
End: 2018-09-19

## 2018-09-19 NOTE — TELEPHONE ENCOUNTER
Per Dr Nunez instructions patient was called and informed that her Tumor Marker is down, he has lowered dose of her chemotherapy medications.  Tykerb  250 mg tabs 2 tabs po qd 21 days on 7 days off  Xeloda 500 mg po qd weeks on 1 week off  Patient v/u of new medication instructions and was verbalize the new change. She was encouraged to call if she has concerns or questions.

## 2018-09-26 DIAGNOSIS — C50.412 MALIGNANT NEOPLASM OF UPPER-OUTER QUADRANT OF LEFT FEMALE BREAST, UNSPECIFIED ESTROGEN RECEPTOR STATUS (HCC): Primary | ICD-10-CM

## 2018-09-27 ENCOUNTER — APPOINTMENT (OUTPATIENT)
Dept: LAB | Facility: HOSPITAL | Age: 73
End: 2018-09-27
Attending: INTERNAL MEDICINE

## 2018-09-27 ENCOUNTER — OFFICE VISIT (OUTPATIENT)
Dept: ONCOLOGY | Facility: CLINIC | Age: 73
End: 2018-09-27

## 2018-09-27 ENCOUNTER — APPOINTMENT (OUTPATIENT)
Dept: ONCOLOGY | Facility: HOSPITAL | Age: 73
End: 2018-09-27
Attending: INTERNAL MEDICINE

## 2018-09-27 VITALS
SYSTOLIC BLOOD PRESSURE: 118 MMHG | BODY MASS INDEX: 23.31 KG/M2 | DIASTOLIC BLOOD PRESSURE: 72 MMHG | WEIGHT: 139.9 LBS | HEART RATE: 52 BPM | TEMPERATURE: 97.8 F | HEIGHT: 65 IN | OXYGEN SATURATION: 98 % | RESPIRATION RATE: 16 BRPM

## 2018-09-27 DIAGNOSIS — C50.412 MALIGNANT NEOPLASM OF UPPER-OUTER QUADRANT OF LEFT FEMALE BREAST, UNSPECIFIED ESTROGEN RECEPTOR STATUS (HCC): Primary | ICD-10-CM

## 2018-09-27 LAB
ALBUMIN SERPL-MCNC: 4.2 G/DL (ref 3.5–5)
ALBUMIN/GLOB SERPL: 1.2 G/DL (ref 1.1–2.5)
ALP SERPL-CCNC: 90 U/L (ref 24–120)
ALT SERPL W P-5'-P-CCNC: <15 U/L (ref 0–54)
ANION GAP SERPL CALCULATED.3IONS-SCNC: 10 MMOL/L (ref 4–13)
ANISOCYTOSIS BLD QL: ABNORMAL
AST SERPL-CCNC: 31 U/L (ref 7–45)
BILIRUB SERPL-MCNC: 0.4 MG/DL (ref 0.1–1)
BUN BLD-MCNC: 11 MG/DL (ref 5–21)
BUN/CREAT SERPL: 8.4 (ref 7–25)
BURR CELLS BLD QL SMEAR: ABNORMAL
C3 FRG RBC-MCNC: ABNORMAL
CALCIUM SPEC-SCNC: 10.3 MG/DL (ref 8.4–10.4)
CHLORIDE SERPL-SCNC: 98 MMOL/L (ref 98–110)
CO2 SERPL-SCNC: 30 MMOL/L (ref 24–31)
CREAT BLD-MCNC: 1.31 MG/DL (ref 0.5–1.4)
DEPRECATED RDW RBC AUTO: ABNORMAL FL (ref 40–54)
ELLIPTOCYTES BLD QL SMEAR: ABNORMAL
EOSINOPHIL # BLD MANUAL: 0.56 10*3/MM3 (ref 0–0.7)
EOSINOPHIL NFR BLD MANUAL: 6 % (ref 0–4)
ERYTHROCYTE [DISTWIDTH] IN BLOOD BY AUTOMATED COUNT: ABNORMAL % (ref 12–15)
GFR SERPL CREATININE-BSD FRML MDRD: 40 ML/MIN/1.73
GIANT PLATELETS: ABNORMAL
GLOBULIN UR ELPH-MCNC: 3.5 GM/DL
GLUCOSE BLD-MCNC: 108 MG/DL (ref 70–100)
HCT VFR BLD AUTO: 35.2 % (ref 37–47)
HGB BLD-MCNC: 11.6 G/DL (ref 12–16)
HOLD SPECIMEN: NORMAL
HOLD SPECIMEN: NORMAL
LYMPHOCYTES # BLD MANUAL: 1.76 10*3/MM3 (ref 0.72–4.86)
LYMPHOCYTES NFR BLD MANUAL: 19 % (ref 15–45)
LYMPHOCYTES NFR BLD MANUAL: 3 % (ref 4–12)
MACROCYTES BLD QL SMEAR: ABNORMAL
MCH RBC QN AUTO: 32.7 PG (ref 28–32)
MCHC RBC AUTO-ENTMCNC: 33 G/DL (ref 33–36)
MCV RBC AUTO: 99.2 FL (ref 82–98)
MONOCYTES # BLD AUTO: 0.28 10*3/MM3 (ref 0.19–1.3)
NEUTROPHILS # BLD AUTO: 5.83 10*3/MM3 (ref 1.87–8.4)
NEUTROPHILS NFR BLD MANUAL: 61 % (ref 39–78)
NEUTS BAND NFR BLD MANUAL: 2 % (ref 0–10)
PLATELET # BLD AUTO: 432 10*3/MM3 (ref 130–400)
PMV BLD AUTO: 9.3 FL (ref 6–12)
POIKILOCYTOSIS BLD QL SMEAR: ABNORMAL
POTASSIUM BLD-SCNC: 3.7 MMOL/L (ref 3.5–5.3)
PROT SERPL-MCNC: 7.7 G/DL (ref 6.3–8.7)
RBC # BLD AUTO: 3.55 10*6/MM3 (ref 4.2–5.4)
SMALL PLATELETS BLD QL SMEAR: ABNORMAL
SODIUM BLD-SCNC: 138 MMOL/L (ref 135–145)
VARIANT LYMPHS NFR BLD MANUAL: 9 % (ref 0–5)
WBC MORPH BLD: NORMAL
WBC NRBC COR # BLD: 9.25 10*3/MM3 (ref 4.8–10.8)

## 2018-09-27 PROCEDURE — 36415 COLL VENOUS BLD VENIPUNCTURE: CPT | Performed by: INTERNAL MEDICINE

## 2018-09-27 PROCEDURE — 99214 OFFICE O/P EST MOD 30 MIN: CPT | Performed by: INTERNAL MEDICINE

## 2018-09-27 PROCEDURE — 85007 BL SMEAR W/DIFF WBC COUNT: CPT | Performed by: INTERNAL MEDICINE

## 2018-09-27 PROCEDURE — 85025 COMPLETE CBC W/AUTO DIFF WBC: CPT | Performed by: INTERNAL MEDICINE

## 2018-09-27 PROCEDURE — 80053 COMPREHEN METABOLIC PANEL: CPT | Performed by: INTERNAL MEDICINE

## 2018-09-27 NOTE — PROGRESS NOTES
Port flushed w/NS only due to patient allergy.  LORETO Yanes RN  [facility]  HEMATOLOGY & ONCOLOGY        Subjective    Metastatic breast cancer on Faslodex and Ibrance     Staging form: Breast, AJCC V7      Clinical stage from 10/11/2016: Stage IV (T2, N1, M1) - ER/NV positive. HER2/maynor negative  VISIT DIAGNOSIS:   Metastatic breast cancer       HEMATOLOGY / ONCOLOGY HISTORY:   Oncology/Hematology History    Patient is a 67-year-old postmenopausal female who had onset of her menses at age 9 and menopause at age 48. She received oral contraceptives for approximately 14 years and did not receive hormonal manipulation. Patient has a maternal cousin had breast cancer. Patient has had no prior breast biopsies. Patient found a palpable left breast mass as well as a left axillary mass. Patient had a bloody nipple discharge which been present for approximately 6 months. On 6/20/2012, a mammogram was performed showing a subareolar mass consistent with malignancy. Bilateral breast ultrasound revealed an ill-defined subareolar mass measuring 2.8-2.5 cm. There is a left axillary mass measuring 1.7 cm with suspicion of extracapsular extension. There is no evidence of disease in the right breast. On 7/9/2012 patient underwent a left breast biopsy and placement of marker clip. Left breast biopsy revealed an infiltrating lobular carcinoma grade 2 with focal ductal carcinoma in situ, solid pattern. A fine-needle biopsy of the left lymph node was consistent with metastatic carcinoma. Breast tumor profile revealed ER: 100%; NV: 98%; HER-2/maynor 2+; FISH: Unamplified; Ki-67: 71%; P 53 1%; DNA aneuploidy. A MRI of the breast were performed body multiple abnormal enhancing masses within the left breast. There appears to be anterior retraction of the pectoralis muscle with no direct extension. There is a moderate to large, layering left pleural effusion appreciated. The right breast reveals 3 moderately enlarged lymph nodes  in the posterior lateral aspect the right breast. These have fatty hilum but followup is recommended. Patient is undergone systemic studies including, on 8/2/2012, a CT scan of the brain showing atrophy. a CT scan that shows showing a small to moderate left pleural effusion with 3 subcentimeter nodule densities in the left lung.   She completed 4 cycles of neoadjuvant treatment with dose dense Adriamycin and Cytoxan. She had a mammogram which showed a significant reduction in the size of her left breast lesion. There is no axillary adenopathy noted. She has had an ultrasound revealing the  lesion reducing from 2.4 cm to 1 cm. Patient underwent a left mastectomy on 03/19/2013 finding negative invasive cancer, negative nodes. A 5% DCIS was noted. The patient has declined hormonal therapy. She did not need radiation therapy.  November 2014, she began to have evidence of lytic bone lesions at T5T6, frontal disease, an 8 mm lucency in the central frontal area of  the skull but nothing intracranial. Bone scan revealed only vertex tracer activity but bilateral ribs and thoracic spine, and other lesions in  her pelvis. At that time, she was started on letrozole since November 2014. She is taking Xgeva. She had progression found in bone in Feb 2015 on scan. She was started on Faslodex, Ibrance and continued xgeva.         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 B) Foci of ductal carcinoma in situ, solid pattern. C) Greatest dimension of the invasive carcinoma is 15.8mm.         3/19/2013 Surgery     Left Mastectomy w/ Axillary Tail: Focal residual ducatal carcinoma in situ (DCIS) 12 lymph nodes negative for metastatic carcinoma.         10/20/2014 Biopsy     Peritoneum Biopsy: Final  Diagnosis: Malignant Cell Neoplasm.           Breast cancer metastasized to bone, unspecified laterality (CMS/HCC)    10/15/2012 -  Other Event     Left mammogram:  Impression:  1. Decreasing spiculation and density in the retroareolar left breast, near a previous biopsy.   2. Definite left axillary lesion is not appreciated.          2/5/2013 -  Other Event     Diagnostic Mammo:  Comparison is made to 10/15/2012 and 6/20/2012  The spiculated mass at 12:00 behind the nipple is nearly resolved.   Impression:  1. The mass on the left side is less apparent after receiving chemotherapy. There has been a good response to chmotherapy on the left side.   2. No suspicious abnormality is seen in the right breast to account for the new palable abnormality at the 4-5:00 position.          3/19/2013 Surgery     Breast Biopsy:  Final Diagnosis:  A. Breast, left mastectomy with axillary tail  1. Focal residual ductal carcinoma in situ (DICS) (less than 5% of tumor bed).  2. Negative for residual invasive carcinoma.   3. 12 Lymph Nodes, negative for metastatic carcinoma (0/12) focally, some lymph nodes demonstrate fibrosis/treatment effect.  B. Skin and soft tissue, left advancement flap:  1. Benign skin and subcutis.   2. Negative for carcinoma.          6/20/2013 -  Other Event     Diagnostic Mammo [facility]:  1. A subareolar mass is consistent with malignancy. Additional involement extends inferiorly sonographically and superolaterally mammograhically as demonstrated by calcifications. The mass measures approx 2.8 cm sonographically, but the involved region is likely much larger including an intraductal componet. The left axillary lymph nodes are also involved.   2. Recommened ultrasound guided biopsy of the left subareolar mass and left axillary lymph node.   3. Breast MRI could also further define multicentric disease on the left.   4. Clear suspicious finding on the right.  Recommend ongoing clinical follow up of palpable  foci.          6/13/2014 -  Other Event     Diagnostic Mammo:  IMPRESSION:  1. History of left breast cancer and mastectomy. Stable benign right breast mammograms.          10/20/2014 Surgery     Final Diagnosis:  Biopsies, pertoneum:  Metastatic carcinoma, morphologically compatible with metastatic mammary carcinoma.          6/15/2015 -  Other Event     Diagnostic Mammo:  IMPRESSION:   1. Negative x-ray report, should not delay biopsy if a dominat or clinically suspicious mass is present.          7/5/2016 -  Other Event     Diagnositic mammogram:  Impression:   Stable right mamogram with no radiographic findings suspicious for malignancy. Recommend continued screening mammography per screening guidelines unless otherwise earlier clinically indicated.          9/18/2016 Initial Diagnosis     Secondary malignant neoplasm of bone and bone marrow          Cancer Staging Information:  Malignant neoplasm of upper-outer quadrant of left female breast    Staging form: Breast, AJCC V7      Clinical stage from 10/11/2016: Stage IV (T2, N1, M1) - Signed by ALBERT Kulkarni on 10/11/2016      INTERVAL HISTORY  Patient ID: Heavenly Yanes is a 73 y.o. year old female history of metastatic breast carcinoma to lung and bone.  She is presently receiving treatment with Ibrance, Faslodex and Xgeva.  Tumor markers continued to decline with a CA 27-29 of 92.2 obtained on 9:30 2016 compared to 105.8 on 07/27/2016.  Mr. Yanes continues to tolerate this treatment regimen well and is here today for evaluation consideration to receive Faslodex and Xgeva.  Last bone mineral density study was obtained 11/21/2014 indicating osteopopenia, we will obtain a repeat bone mineral density study. Ms. Yanes completed 8 teeth pulled 02/14/2017.   Xgeva on hold, resume 05/2017.     Past Medical History:   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   • Bipolar disorder (CMS/HCC)    • Disease  of thyroid gland    • Hypertension    • Malignant neoplasm of upper-outer quadrant of left female breast (CMS/HCC) 9/18/2016   • Secondary malignant neoplasm of bone and bone marrow (CMS/HCC) 9/18/2016     Past Surgical History:   Past Surgical History:   Procedure Laterality Date   • BREAST SURGERY      left breast biopsy and axillary node   • CHOLECYSTECTOMY     • EXTERNAL EAR SURGERY     • MASTECTOMY Left    • PORTACATH PLACEMENT       Social History:   Social History     Social History   • Marital status:      Spouse name: N/A   • Number of children: N/A   • Years of education: N/A     Occupational History   • Not on file.     Social History Main Topics   • Smoking status: Never Smoker   • Smokeless tobacco: Never Used   • Alcohol use No   • Drug use: No   • Sexual activity: Not on file     Other Topics Concern   • Not on file     Social History Narrative   • No narrative on file     Family History:   Family History   Problem Relation Age of Onset   • No Known Problems Daughter    • No Known Problems Son    • Other Mother         complications from a concussion from a fall   • Other Father         old age   • No Known Problems Brother    • No Known Problems Daughter        Review of Systems   Constitutional: Negative.  Negative for activity change, appetite change, chills, diaphoresis, fatigue and fever.   HENT: Negative.  Negative for congestion, ear discharge, ear pain, facial swelling, hearing loss, mouth sores, nosebleeds, postnasal drip, rhinorrhea, sinus pressure, sore throat, tinnitus, trouble swallowing and voice change.    Eyes: Negative.  Negative for photophobia, pain, discharge and visual disturbance.   Respiratory: Negative.  Negative for apnea, cough, chest tightness, shortness of breath, wheezing and stridor.    Cardiovascular: Negative.  Negative for chest pain, palpitations and leg swelling.   Gastrointestinal: Negative.  Negative for abdominal distention, abdominal pain, blood in stool,  constipation, diarrhea, nausea, rectal pain and vomiting.   Endocrine: Negative.  Negative for cold intolerance, heat intolerance, polydipsia, polyphagia and polyuria.   Genitourinary: Negative.  Negative for difficulty urinating, dysuria, flank pain, frequency, hematuria and urgency.   Musculoskeletal: Negative.  Negative for arthralgias, back pain, gait problem, joint swelling and myalgias.   Skin: Negative.  Negative for color change, pallor, rash and wound.   Allergic/Immunologic: Negative.  Negative for environmental allergies, food allergies and immunocompromised state.   Neurological: Negative.  Negative for dizziness, tremors, seizures, syncope, speech difficulty, weakness, light-headedness, numbness and headaches.   Hematological: Negative.  Negative for adenopathy. Does not bruise/bleed easily.   Psychiatric/Behavioral: Negative.  Negative for agitation, confusion, hallucinations, sleep disturbance and suicidal ideas. The patient is not nervous/anxious.    All other systems reviewed and are negative.       Performance Status:  Asymptomatic    Medications:    Current Outpatient Prescriptions   Medication Sig Dispense Refill   • azithromycin (ZITHROMAX) 500 MG tablet Take 1 tablet by mouth Daily. 7 tablet 0   • B Complex Vitamins (VITAMIN B COMPLEX PO) Take  by mouth. 1 po daily     • Calcium Carb-Cholecalciferol (CALCIUM 600 + D PO) Take  by mouth.     • capecitabine (XELODA) 500 MG chemo tablet Take 1 tablet by mouth 2 (Two) Times a Day. For 2 weeks on and 1 week off. 42 tablet 11   • dexamethasone 0.5 MG/5ML solution Take 10 mL by mouth 4 (Four) Times a Day. Swish for 2 minutes & spit.  Avoid eating & drinking for 1 hour after rinse. 500 mL 5   • DULoxetine (CYMBALTA) 30 MG capsule Take 30 mg by mouth daily.     • exemestane (AROMASIN) 25 MG chemo tablet Take 1 tablet by mouth Daily. 30 tablet 11   • ferrous sulfate 325 (65 FE) MG tablet Take 325 mg by mouth 2 (two) times a day.     • lapatinib (TYKERB)  "250 MG chemo tablet Take 5 tablets by mouth Daily. For 21 days on and 7 days off. 105 tablet 11   • Lysine HCl (L-LYSINE) 500 MG tablet tablet Take  by mouth daily.     • Magnesium 250 MG tablet Take  by mouth.     • melatonin 3 MG tablet Take  by mouth.     • metoprolol tartrate (LOPRESSOR) 25 MG tablet Take 25 mg by mouth 2 (two) times a day.     • OLANZapine (ZYPREXA) 2.5 MG tablet Take 2.5 mg by mouth every night.     • ondansetron (ZOFRAN) 8 MG tablet Take 1 tablet by mouth Every 8 (Eight) Hours As Needed for Nausea or Vomiting. 30 tablet 5   • Palbociclib 125 MG capsule capsule Take 1 capsule by mouth Daily. 3 weeks on, 1 week off 21 capsule 5   • Probiotic Product (PROBIOTIC DAILY PO) Take  by mouth. 300million cell daily     • raNITIdine (ZANTAC) 75 MG tablet Take 75 mg by mouth 2 (Two) Times a Day.     • terbinafine (lamiSIL) 250 MG tablet      • thyroid 60 MG PO tablet Take 60 mg by mouth daily.     • VALERIAN ROOT PO Take  by mouth. 100mg daily     • Zinc 50 MG tablet Take  by mouth. 1 po daily       No current facility-administered medications for this visit.      Facility-Administered Medications Ordered in Other Visits   Medication Dose Route Frequency Provider Last Rate Last Dose   • heparin flush (porcine) 100 UNIT/ML injection 500 Units  500 Units Intravenous PRN Joseph Nunez MD   500 Units at 10/26/17 1155   • sodium chloride 0.9 % flush 10 mL  10 mL Intravenous PRN Joseph Nunez MD   10 mL at 10/26/17 1155       ALLERGIES:    Allergies   Allergen Reactions   • Benadryl  [Diphenhydramine Hcl (Sleep)] Other (See Comments)   • Codeine    • Diphenhydramine Other (See Comments)     \"Shaky leg syndrome\"  \"Shaky leg syndrome\"  \"Shaky leg syndrome\"   • Heparin    • Other      POPPY SEED   • Penicillins Hives   • Petroleum Jelly [Skin Protectants, Misc.]    • Sulfa Antibiotics    • Sulfur    • Valium [Diazepam]        Objective      Vitals:    09/27/18 0947   BP: 118/72   Pulse: 52   Resp: 16   Temp: " "97.8 °F (36.6 °C)   TempSrc: Oral   SpO2: 98%   Weight: 63.5 kg (139 lb 14.4 oz)   Height: 165.1 cm (65\")         Current Status 9/27/2018   ECOG score 1       General Appearance: Patient is awake, alert, oriented and in no acute distress. Patient is welldeveloped, wellnourished, and appears stated age.  HEENT: Normocephalic. Sclerae clear, conjunctiva pink, extraocular movements intact, pupils, round, reactive to light and accommodation. Mouth and throat are clear with moist oral mucosa.  NECK: Supple, no jugular venous distention, thyroid not enlarged.  LYMPH: No cervical, supraclavicular, axillary, or inguinal lymphadenopathy.  CHEST: Equal bilateral expansion   LUNGS: Good air movement, no rales, rhonchi, rubs or wheezes with auscultation  CARDIO: Regular sinus rhythm, no murmurs, gallops or rubs.  ABDOMEN: Nondistended, soft, No tenderness, no guarding, no rebound, No hepatosplenomegaly. No abdominal masses. Bowel sounds positive. No hernia  MUSKEL: No joint swelling, decreased motion, or inflammation  EXTREMS: No edema, clubbing, cyanosis, No varicose veins.  NEURO: Grossly nonfocal, Gait is coordinated and smooth, Cognition is preserved.  SKIN: No rashes, no ecchymoses, no petechia.  PSYCH: Oriented to time, place and person. Memory is preserved. Mood and affect appear normal      RECENT LABS:  WBC   Date Value Ref Range Status   09/27/2018 9.25 4.80 - 10.80 10*3/mm3 Final     RBC   Date Value Ref Range Status   09/27/2018 3.55 (L) 4.20 - 5.40 10*6/mm3 Final     Hemoglobin   Date Value Ref Range Status   09/27/2018 11.6 (L) 12.0 - 16.0 g/dL Final     Hematocrit   Date Value Ref Range Status   09/27/2018 35.2 (L) 37.0 - 47.0 % Final     MCV   Date Value Ref Range Status   09/27/2018 99.2 (H) 82.0 - 98.0 fL Final     MCH   Date Value Ref Range Status   09/27/2018 32.7 (H) 28.0 - 32.0 pg Final     MCHC   Date Value Ref Range Status   09/27/2018 33.0 33.0 - 36.0 g/dL Final     RDW   Date Value Ref Range Status "   09/27/2018  12.0 - 15.0 % Final     Comment:     Unable to calculate     RDW-SD   Date Value Ref Range Status   09/27/2018  40.0 - 54.0 fl Final     Comment:     Unable to calculate     MPV   Date Value Ref Range Status   09/27/2018 9.3 6.0 - 12.0 fL Final     Platelets   Date Value Ref Range Status   09/27/2018 432 (H) 130 - 400 10*3/mm3 Final     Neutrophil %   Date Value Ref Range Status   09/17/2018 58.5 39.0 - 78.0 % Final     Lymphocyte %   Date Value Ref Range Status   09/17/2018 27.0 15.0 - 45.0 % Final     Monocyte %   Date Value Ref Range Status   09/17/2018 8.3 4.0 - 12.0 % Final     Eosinophil %   Date Value Ref Range Status   09/17/2018 4.3 (H) 0.0 - 4.0 % Final     Basophil %   Date Value Ref Range Status   09/17/2018 0.6 0.0 - 2.0 % Final     Immature Grans %   Date Value Ref Range Status   09/17/2018 1.3 0.0 - 5.0 % Final     Neutrophils, Absolute   Date Value Ref Range Status   09/17/2018 4.64 1.87 - 8.40 10*3/mm3 Final     Neutrophils Absolute   Date Value Ref Range Status   09/27/2018 5.83 1.87 - 8.40 10*3/mm3 Final     Lymphocytes, Absolute   Date Value Ref Range Status   09/17/2018 2.14 0.72 - 4.86 10*3/mm3 Final     Monocytes, Absolute   Date Value Ref Range Status   09/17/2018 0.66 0.19 - 1.30 10*3/mm3 Final     Eosinophils, Absolute   Date Value Ref Range Status   09/17/2018 0.34 0.00 - 0.70 10*3/mm3 Final     Eosinophils Absolute   Date Value Ref Range Status   09/27/2018 0.56 0.00 - 0.70 10*3/mm3 Final     Basophils, Absolute   Date Value Ref Range Status   09/17/2018 0.05 0.00 - 0.20 10*3/mm3 Final     Immature Grans, Absolute   Date Value Ref Range Status   09/17/2018 0.10 (H) 0.00 - 0.03 10*3/mm3 Final     nRBC   Date Value Ref Range Status   09/17/2018 0.0 0.0 - 0.0 /100 WBC Final     Orders Only on 09/26/2018   Component Date Value Ref Range Status   • Glucose 09/27/2018 108* 70 - 100 mg/dL Final   • BUN 09/27/2018 11  5 - 21 mg/dL Final   • Creatinine 09/27/2018 1.31  0.50 - 1.40  mg/dL Final   • Sodium 09/27/2018 138  135 - 145 mmol/L Final   • Potassium 09/27/2018 3.7  3.5 - 5.3 mmol/L Final   • Chloride 09/27/2018 98  98 - 110 mmol/L Final   • CO2 09/27/2018 30.0  24.0 - 31.0 mmol/L Final   • Calcium 09/27/2018 10.3  8.4 - 10.4 mg/dL Final   • Total Protein 09/27/2018 7.7  6.3 - 8.7 g/dL Final   • Albumin 09/27/2018 4.20  3.50 - 5.00 g/dL Final   • ALT (SGPT) 09/27/2018 <15  0 - 54 U/L Final   • AST (SGOT) 09/27/2018 31  7 - 45 U/L Final   • Alkaline Phosphatase 09/27/2018 90  24 - 120 U/L Final   • Total Bilirubin 09/27/2018 0.4  0.1 - 1.0 mg/dL Final   • eGFR Non African Amer 09/27/2018 40* >60 mL/min/1.73 Final   • Globulin 09/27/2018 3.5  gm/dL Final   • A/G Ratio 09/27/2018 1.2  1.1 - 2.5 g/dL Final   • BUN/Creatinine Ratio 09/27/2018 8.4  7.0 - 25.0 Final   • Anion Gap 09/27/2018 10.0  4.0 - 13.0 mmol/L Final   • WBC 09/27/2018 9.25  4.80 - 10.80 10*3/mm3 Final   • RBC 09/27/2018 3.55* 4.20 - 5.40 10*6/mm3 Final   • Hemoglobin 09/27/2018 11.6* 12.0 - 16.0 g/dL Final   • Hematocrit 09/27/2018 35.2* 37.0 - 47.0 % Final   • MCV 09/27/2018 99.2* 82.0 - 98.0 fL Final   • MCH 09/27/2018 32.7* 28.0 - 32.0 pg Final   • MCHC 09/27/2018 33.0  33.0 - 36.0 g/dL Final   • RDW 09/27/2018   12.0 - 15.0 % Final    Unable to calculate   • RDW-SD 09/27/2018   40.0 - 54.0 fl Final    Unable to calculate   • MPV 09/27/2018 9.3  6.0 - 12.0 fL Final   • Platelets 09/27/2018 432* 130 - 400 10*3/mm3 Final   • Neutrophil % 09/27/2018 61.0  39.0 - 78.0 % Final   • Lymphocyte % 09/27/2018 19.0  15.0 - 45.0 % Final   • Monocyte % 09/27/2018 3.0* 4.0 - 12.0 % Final   • Eosinophil % 09/27/2018 6.0* 0.0 - 4.0 % Final   • Bands %  09/27/2018 2.0  0.0 - 10.0 % Final   • Atypical Lymphocyte % 09/27/2018 9.0* 0.0 - 5.0 % Final   • Neutrophils Absolute 09/27/2018 5.83  1.87 - 8.40 10*3/mm3 Final   • Lymphocytes Absolute 09/27/2018 1.76  0.72 - 4.86 10*3/mm3 Final   • Monocytes Absolute 09/27/2018 0.28  0.19 - 1.30  10*3/mm3 Final   • Eosinophils Absolute 09/27/2018 0.56  0.00 - 0.70 10*3/mm3 Final   • Anisocytosis 09/27/2018 Mod/2+  None Seen Final   • Eva Cells 09/27/2018 Slight/1+  None Seen Final   • Elliptocytes 09/27/2018 Slight/1+  None Seen Final   • Macrocytes 09/27/2018 Mod/2+  None Seen Final   • Poikilocytes 09/27/2018 Slight/1+  None Seen Final   • RBC Fragments 09/27/2018 Slight/1+  None Seen Final   • WBC Morphology 09/27/2018 Normal  Normal Final   • Platelet Estimate 09/27/2018 Increased  Normal Final   • Giant Platelets 09/27/2018 Slight/1+  None Seen Final             Assessment/Plan     Patient Active Problem List   Diagnosis   • Malignant neoplasm of upper-outer quadrant of left female breast (CMS/HCC)   • Breast cancer metastasized to bone, unspecified laterality (CMS/HCC)   • Essential hypertension   • Acquired hypothyroidism   • Bipolar 1 disorder (CMS/HCC)   • Carcinoma of left breast metastatic to bone (CMS/HCC)   • Antineoplastic chemotherapy induced anemia   • Dehydration          Assessment:  1. Stage IV metastatic breast carcinoma, ER/ME positive. HER2/maynor negative undergoing Ibrance, Faslodex and Xgeva with overall stable with stable CA27.29 with slow decline. She is tolerating the treatment very well. Faslodex cycle 17 today and will continue to HOLD Xgeva due to upcoming dental work, last dose of Xgeva given on 09/29/2016.  01/2017 restaging CT chest abdomen and pelvis and bone scan showed stable diffuse sclerotic bony lesions, unchanged from 7/1/2016.Next scan July 2017    2.  Mild leukopenia without neutropenia.  Likely secondary to Ibrance.  Continue to monitor.     3.  Macrocytic anemia.  Hemoglobin 11.1.  02/2016, B12 and folate normal    4.  Status post 8 teeth extraction.     Recommendation/Plan:  1.  Proceed with Faslodex cycle 20.  2. Continue Ibrance 125 mg 3 weeks on, one-week off.  Reduce dose if leukopenia worsens.   3. Follow up in 28 days for Faslodex cycle 19.   Repeat CA 27.29.   Previous CA 27.29 have remained stable, the last one done on 11/2017 was 89 Stable. Continue Faslodex q monthly.    Additionally she is anemic with a hemoglobin of 10 g percent with decreasing GFR.  I do suspect that she may have some form of chronic kidney disease, as evaluated by Nephrology, CKD from [facility].  No need for EPO as she is asyptomatic.  Redraw CA 27.29 today. Cont faslodex + Ibrance for now until repeat CA 27.29 returns. Last CA 27.29 in Feb 2018 slightly increased to 96. I have discussed different options with her:  #1. Stop Ibrance and switch to a new CD4K inhabitor Kisqali or Verenzio.  #. Stop Ibrance and switch to Affinitor + faslodex.  #. Cont Ibrance and Faslodex with just a subtle rise, since clinically pt is doing better.    At this point will restage with CT scans [facility] and compare to one done Jan 2017. If progression on CT scans and CA 27.29 then go for the [facility] trial of using MTOR inhabitor Everlimos + Exemestine. Bone scan shows progressive dz. Therefore will stop Faslodex. Start Exemestine 25mg daily in combination with Affinitor 10mg daily. Redraw her tumor today.  Calcium improved but AST higher.    DC Exemestine and Affinitor as the CA 27.29 yulia to 140. Considering this will start Xeloda 500mg twice daily 2 Wks ON and one wk OFF.    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 Will add Her 2 Tykerb 1250mg q day x 21 days in combo + Oral Xeloda 500mg bid 3 wks On and 1 WK OFF. Will draw CA 27.29.    Since she started Lapatinib 1250 mg q day + Xeloda combo from 8/22/18 she has had significant dirrohea and lately has been throwing up and today her Creat is up to 2.3 from 0.9 and GFR down to 21 as well as she has lost 7 ILBS WT and appears very dehydrated. All meds were stopped last week and she received IVF and today creat is back down to 1.25 and GFR upto 41. Redraw CAproved the best would be either to reduce the dose and keep both Lapitinib and Xeloda on board, she will  call me tomorrow. Therefor, last week the Tykerb dose was reduced to 2 tabs q day ( 500mg ) daily 3 wks On and 1 wk OFF Plus Xeloda 500mg 1 tab q day 2 Wks ON and 1K OFF. See  How this schedulle works. CA 27.29 is down to 122.  Joseph Nunez MD    9/27/2018    10:25 AM

## 2018-09-28 ENCOUNTER — HOSPITAL ENCOUNTER (OUTPATIENT)
Dept: NON INVASIVE DIAGNOSTICS | Age: 73
Discharge: HOME | End: 2018-09-28
Payer: MEDICARE

## 2018-09-28 ENCOUNTER — APPOINTMENT (OUTPATIENT)
Dept: LAB | Facility: HOSPITAL | Age: 73
End: 2018-09-28
Attending: INTERNAL MEDICINE

## 2018-09-28 DIAGNOSIS — I38 VALVULAR HEART DISEASE: ICD-10-CM

## 2018-09-28 LAB
LV EF: 58 %
LVEF MODALITY: NORMAL

## 2018-09-28 PROCEDURE — 93306 TTE W/DOPPLER COMPLETE: CPT

## 2018-10-25 ENCOUNTER — LAB (OUTPATIENT)
Dept: LAB | Facility: HOSPITAL | Age: 73
End: 2018-10-25

## 2018-10-25 ENCOUNTER — OFFICE VISIT (OUTPATIENT)
Dept: ONCOLOGY | Facility: CLINIC | Age: 73
End: 2018-10-25

## 2018-10-25 VITALS
SYSTOLIC BLOOD PRESSURE: 124 MMHG | HEIGHT: 65 IN | HEART RATE: 56 BPM | TEMPERATURE: 97.2 F | BODY MASS INDEX: 24.56 KG/M2 | RESPIRATION RATE: 18 BRPM | DIASTOLIC BLOOD PRESSURE: 68 MMHG | OXYGEN SATURATION: 96 % | WEIGHT: 147.4 LBS

## 2018-10-25 DIAGNOSIS — C50.412 MALIGNANT NEOPLASM OF UPPER-OUTER QUADRANT OF LEFT FEMALE BREAST, UNSPECIFIED ESTROGEN RECEPTOR STATUS (HCC): Primary | ICD-10-CM

## 2018-10-25 DIAGNOSIS — E86.0 DEHYDRATION: ICD-10-CM

## 2018-10-25 LAB
ALBUMIN SERPL-MCNC: 4.2 G/DL (ref 3.5–5)
ALBUMIN/GLOB SERPL: 1.2 G/DL (ref 1.1–2.5)
ALP SERPL-CCNC: 89 U/L (ref 24–120)
ALT SERPL W P-5'-P-CCNC: <15 U/L (ref 0–54)
ANION GAP SERPL CALCULATED.3IONS-SCNC: 10 MMOL/L (ref 4–13)
AST SERPL-CCNC: 40 U/L (ref 7–45)
BASOPHILS # BLD AUTO: 0.05 10*3/MM3 (ref 0–0.2)
BASOPHILS NFR BLD AUTO: 0.6 % (ref 0–2)
BILIRUB SERPL-MCNC: 0.4 MG/DL (ref 0.1–1)
BUN BLD-MCNC: 14 MG/DL (ref 5–21)
BUN/CREAT SERPL: 12.4 (ref 7–25)
CALCIUM SPEC-SCNC: 10.1 MG/DL (ref 8.4–10.4)
CHLORIDE SERPL-SCNC: 98 MMOL/L (ref 98–110)
CO2 SERPL-SCNC: 28 MMOL/L (ref 24–31)
CREAT BLD-MCNC: 1.13 MG/DL (ref 0.5–1.4)
DEPRECATED RDW RBC AUTO: 79.4 FL (ref 40–54)
EOSINOPHIL # BLD AUTO: 0.22 10*3/MM3 (ref 0–0.7)
EOSINOPHIL NFR BLD AUTO: 2.6 % (ref 0–4)
ERYTHROCYTE [DISTWIDTH] IN BLOOD BY AUTOMATED COUNT: 21.3 % (ref 12–15)
GFR SERPL CREATININE-BSD FRML MDRD: 47 ML/MIN/1.73
GLOBULIN UR ELPH-MCNC: 3.6 GM/DL
GLUCOSE BLD-MCNC: 86 MG/DL (ref 70–100)
HCT VFR BLD AUTO: 34.7 % (ref 37–47)
HGB BLD-MCNC: 11.6 G/DL (ref 12–16)
HOLD SPECIMEN: NORMAL
HOLD SPECIMEN: NORMAL
IMM GRANULOCYTES # BLD: 0.05 10*3/MM3 (ref 0–0.03)
IMM GRANULOCYTES NFR BLD: 0.6 % (ref 0–5)
LYMPHOCYTES # BLD AUTO: 3.13 10*3/MM3 (ref 0.72–4.86)
LYMPHOCYTES NFR BLD AUTO: 36.7 % (ref 15–45)
MCH RBC QN AUTO: 33.6 PG (ref 28–32)
MCHC RBC AUTO-ENTMCNC: 33.4 G/DL (ref 33–36)
MCV RBC AUTO: 100.6 FL (ref 82–98)
MONOCYTES # BLD AUTO: 0.52 10*3/MM3 (ref 0.19–1.3)
MONOCYTES NFR BLD AUTO: 6.1 % (ref 4–12)
NEUTROPHILS # BLD AUTO: 4.57 10*3/MM3 (ref 1.87–8.4)
NEUTROPHILS NFR BLD AUTO: 53.4 % (ref 39–78)
NRBC BLD MANUAL-RTO: 0 /100 WBC (ref 0–0)
PLATELET # BLD AUTO: 377 10*3/MM3 (ref 130–400)
PMV BLD AUTO: 9.4 FL (ref 6–12)
POTASSIUM BLD-SCNC: 4 MMOL/L (ref 3.5–5.3)
PROT SERPL-MCNC: 7.8 G/DL (ref 6.3–8.7)
RBC # BLD AUTO: 3.45 10*6/MM3 (ref 4.2–5.4)
SODIUM BLD-SCNC: 136 MMOL/L (ref 135–145)
WBC NRBC COR # BLD: 8.54 10*3/MM3 (ref 4.8–10.8)

## 2018-10-25 PROCEDURE — 86300 IMMUNOASSAY TUMOR CA 15-3: CPT | Performed by: INTERNAL MEDICINE

## 2018-10-25 PROCEDURE — 36415 COLL VENOUS BLD VENIPUNCTURE: CPT | Performed by: INTERNAL MEDICINE

## 2018-10-25 PROCEDURE — 85025 COMPLETE CBC W/AUTO DIFF WBC: CPT | Performed by: INTERNAL MEDICINE

## 2018-10-25 PROCEDURE — 99214 OFFICE O/P EST MOD 30 MIN: CPT | Performed by: INTERNAL MEDICINE

## 2018-10-25 PROCEDURE — 80053 COMPREHEN METABOLIC PANEL: CPT | Performed by: INTERNAL MEDICINE

## 2018-10-25 NOTE — PROGRESS NOTES
Port flushed w/NS only due to patient allergy.  LORETO Yanes RN  [facility]  HEMATOLOGY & ONCOLOGY        Subjective    Metastatic breast cancer on Faslodex and Ibrance     Staging form: Breast, AJCC V7      Clinical stage from 10/11/2016: Stage IV (T2, N1, M1) - ER/ID positive. HER2/maynor negative  VISIT DIAGNOSIS:   Metastatic breast cancer       HEMATOLOGY / ONCOLOGY HISTORY:   Oncology/Hematology History    Patient is a 67-year-old postmenopausal female who had onset of her menses at age 9 and menopause at age 48. She received oral contraceptives for approximately 14 years and did not receive hormonal manipulation. Patient has a maternal cousin had breast cancer. Patient has had no prior breast biopsies. Patient found a palpable left breast mass as well as a left axillary mass. Patient had a bloody nipple discharge which been present for approximately 6 months. On 6/20/2012, a mammogram was performed showing a subareolar mass consistent with malignancy. Bilateral breast ultrasound revealed an ill-defined subareolar mass measuring 2.8-2.5 cm. There is a left axillary mass measuring 1.7 cm with suspicion of extracapsular extension. There is no evidence of disease in the right breast. On 7/9/2012 patient underwent a left breast biopsy and placement of marker clip. Left breast biopsy revealed an infiltrating lobular carcinoma grade 2 with focal ductal carcinoma in situ, solid pattern. A fine-needle biopsy of the left lymph node was consistent with metastatic carcinoma. Breast tumor profile revealed ER: 100%; ID: 98%; HER-2/maynor 2+; FISH: Unamplified; Ki-67: 71%; P 53 1%; DNA aneuploidy. A MRI of the breast were performed body multiple abnormal enhancing masses within the left breast. There appears to be anterior retraction of the pectoralis muscle with no direct extension. There is a moderate to large, layering left pleural effusion appreciated. The right breast reveals 3 moderately enlarged lymph nodes  in the posterior lateral aspect the right breast. These have fatty hilum but followup is recommended. Patient is undergone systemic studies including, on 8/2/2012, a CT scan of the brain showing atrophy. a CT scan that shows showing a small to moderate left pleural effusion with 3 subcentimeter nodule densities in the left lung.   She completed 4 cycles of neoadjuvant treatment with dose dense Adriamycin and Cytoxan. She had a mammogram which showed a significant reduction in the size of her left breast lesion. There is no axillary adenopathy noted. She has had an ultrasound revealing the  lesion reducing from 2.4 cm to 1 cm. Patient underwent a left mastectomy on 03/19/2013 finding negative invasive cancer, negative nodes. A 5% DCIS was noted. The patient has declined hormonal therapy. She did not need radiation therapy.  November 2014, she began to have evidence of lytic bone lesions at T5T6, frontal disease, an 8 mm lucency in the central frontal area of  the skull but nothing intracranial. Bone scan revealed only vertex tracer activity but bilateral ribs and thoracic spine, and other lesions in  her pelvis. At that time, she was started on letrozole since November 2014. She is taking Xgeva. She had progression found in bone in Feb 2015 on scan. She was started on Faslodex, Ibrance and continued xgeva.         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 B) Foci of ductal carcinoma in situ, solid pattern. C) Greatest dimension of the invasive carcinoma is 15.8mm.         3/19/2013 Surgery     Left Mastectomy w/ Axillary Tail: Focal residual ducatal carcinoma in situ (DCIS) 12 lymph nodes negative for metastatic carcinoma.         10/20/2014 Biopsy     Peritoneum Biopsy: Final  Diagnosis: Malignant Cell Neoplasm.           Breast cancer metastasized to bone, unspecified laterality (CMS/HCC)    10/15/2012 -  Other Event     Left mammogram:  Impression:  1. Decreasing spiculation and density in the retroareolar left breast, near a previous biopsy.   2. Definite left axillary lesion is not appreciated.          2/5/2013 -  Other Event     Diagnostic Mammo:  Comparison is made to 10/15/2012 and 6/20/2012  The spiculated mass at 12:00 behind the nipple is nearly resolved.   Impression:  1. The mass on the left side is less apparent after receiving chemotherapy. There has been a good response to chmotherapy on the left side.   2. No suspicious abnormality is seen in the right breast to account for the new palable abnormality at the 4-5:00 position.          3/19/2013 Surgery     Breast Biopsy:  Final Diagnosis:  A. Breast, left mastectomy with axillary tail  1. Focal residual ductal carcinoma in situ (DICS) (less than 5% of tumor bed).  2. Negative for residual invasive carcinoma.   3. 12 Lymph Nodes, negative for metastatic carcinoma (0/12) focally, some lymph nodes demonstrate fibrosis/treatment effect.  B. Skin and soft tissue, left advancement flap:  1. Benign skin and subcutis.   2. Negative for carcinoma.          6/20/2013 -  Other Event     Diagnostic Mammo [facility]:  1. A subareolar mass is consistent with malignancy. Additional involement extends inferiorly sonographically and superolaterally mammograhically as demonstrated by calcifications. The mass measures approx 2.8 cm sonographically, but the involved region is likely much larger including an intraductal componet. The left axillary lymph nodes are also involved.   2. Recommened ultrasound guided biopsy of the left subareolar mass and left axillary lymph node.   3. Breast MRI could also further define multicentric disease on the left.   4. Clear suspicious finding on the right.  Recommend ongoing clinical follow up of palpable  foci.          6/13/2014 -  Other Event     Diagnostic Mammo:  IMPRESSION:  1. History of left breast cancer and mastectomy. Stable benign right breast mammograms.          10/20/2014 Surgery     Final Diagnosis:  Biopsies, pertoneum:  Metastatic carcinoma, morphologically compatible with metastatic mammary carcinoma.          6/15/2015 -  Other Event     Diagnostic Mammo:  IMPRESSION:   1. Negative x-ray report, should not delay biopsy if a dominat or clinically suspicious mass is present.          7/5/2016 -  Other Event     Diagnositic mammogram:  Impression:   Stable right mamogram with no radiographic findings suspicious for malignancy. Recommend continued screening mammography per screening guidelines unless otherwise earlier clinically indicated.          9/18/2016 Initial Diagnosis     Secondary malignant neoplasm of bone and bone marrow          Cancer Staging Information:  Malignant neoplasm of upper-outer quadrant of left female breast    Staging form: Breast, AJCC V7      Clinical stage from 10/11/2016: Stage IV (T2, N1, M1) - Signed by ALBERT Kulkarni on 10/11/2016      INTERVAL HISTORY  Patient ID: Heavenly Yanes is a 73 y.o. year old female history of metastatic breast carcinoma to lung and bone.  She is presently receiving treatment with Ibrance, Faslodex and Xgeva.  Tumor markers continued to decline with a CA 27-29 of 92.2 obtained on 9:30 2016 compared to 105.8 on 07/27/2016.  Mr. Yanes continues to tolerate this treatment regimen well and is here today for evaluation consideration to receive Faslodex and Xgeva.  Last bone mineral density study was obtained 11/21/2014 indicating osteopopenia, we will obtain a repeat bone mineral density study. Ms. Yanes completed 8 teeth pulled 02/14/2017.   Xgeva on hold, resume 05/2017.     Past Medical History:   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   • Bipolar disorder (CMS/HCC)    • Disease  of thyroid gland    • Hypertension    • Malignant neoplasm of upper-outer quadrant of left female breast (CMS/HCC) 9/18/2016   • Secondary malignant neoplasm of bone and bone marrow (CMS/HCC) 9/18/2016     Past Surgical History:   Past Surgical History:   Procedure Laterality Date   • BREAST SURGERY      left breast biopsy and axillary node   • CHOLECYSTECTOMY     • EXTERNAL EAR SURGERY     • MASTECTOMY Left    • PORTACATH PLACEMENT       Social History:   Social History     Social History   • Marital status:      Spouse name: N/A   • Number of children: N/A   • Years of education: N/A     Occupational History   • Not on file.     Social History Main Topics   • Smoking status: Never Smoker   • Smokeless tobacco: Never Used   • Alcohol use No   • Drug use: No   • Sexual activity: Not on file     Other Topics Concern   • Not on file     Social History Narrative   • No narrative on file     Family History:   Family History   Problem Relation Age of Onset   • No Known Problems Daughter    • No Known Problems Son    • Other Mother         complications from a concussion from a fall   • Other Father         old age   • No Known Problems Brother    • No Known Problems Daughter        Review of Systems   Constitutional: Negative.  Negative for activity change, appetite change, chills, diaphoresis, fatigue and fever.   HENT: Negative.  Negative for congestion, ear discharge, ear pain, facial swelling, hearing loss, mouth sores, nosebleeds, postnasal drip, rhinorrhea, sinus pressure, sore throat, tinnitus, trouble swallowing and voice change.    Eyes: Negative.  Negative for photophobia, pain, discharge and visual disturbance.   Respiratory: Negative.  Negative for apnea, cough, chest tightness, shortness of breath, wheezing and stridor.    Cardiovascular: Negative.  Negative for chest pain, palpitations and leg swelling.   Gastrointestinal: Negative.  Negative for abdominal distention, abdominal pain, blood in stool,  constipation, diarrhea, nausea, rectal pain and vomiting.   Endocrine: Negative.  Negative for cold intolerance, heat intolerance, polydipsia, polyphagia and polyuria.   Genitourinary: Negative.  Negative for difficulty urinating, dysuria, flank pain, frequency, hematuria and urgency.   Musculoskeletal: Negative.  Negative for arthralgias, back pain, gait problem, joint swelling and myalgias.   Skin: Negative.  Negative for color change, pallor, rash and wound.   Allergic/Immunologic: Negative.  Negative for environmental allergies, food allergies and immunocompromised state.   Neurological: Negative.  Negative for dizziness, tremors, seizures, syncope, speech difficulty, weakness, light-headedness, numbness and headaches.   Hematological: Negative.  Negative for adenopathy. Does not bruise/bleed easily.   Psychiatric/Behavioral: Negative.  Negative for agitation, confusion, hallucinations, sleep disturbance and suicidal ideas. The patient is not nervous/anxious.    All other systems reviewed and are negative.       Performance Status:  Asymptomatic    Medications:    Current Outpatient Prescriptions   Medication Sig Dispense Refill   • B Complex Vitamins (VITAMIN B COMPLEX PO) Take  by mouth. 1 po daily     • Calcium Carb-Cholecalciferol (CALCIUM 600 + D PO) Take  by mouth.     • capecitabine (XELODA) 500 MG chemo tablet Take 1 tablet by mouth 2 (Two) Times a Day. For 2 weeks on and 1 week off. 42 tablet 11   • DULoxetine (CYMBALTA) 30 MG capsule Take 30 mg by mouth daily.     • exemestane (AROMASIN) 25 MG chemo tablet Take 1 tablet by mouth Daily. 30 tablet 11   • ferrous sulfate 325 (65 FE) MG tablet Take 325 mg by mouth 2 (two) times a day.     • lapatinib (TYKERB) 250 MG chemo tablet Take 5 tablets by mouth Daily. For 21 days on and 7 days off. 105 tablet 11   • Lysine HCl (L-LYSINE) 500 MG tablet tablet Take  by mouth daily.     • Magnesium 250 MG tablet Take  by mouth.     • melatonin 3 MG tablet Take  by  "mouth.     • metoprolol tartrate (LOPRESSOR) 25 MG tablet Take 25 mg by mouth 2 (two) times a day.     • OLANZapine (ZYPREXA) 2.5 MG tablet Take 2.5 mg by mouth every night.     • ondansetron (ZOFRAN) 8 MG tablet Take 1 tablet by mouth Every 8 (Eight) Hours As Needed for Nausea or Vomiting. 30 tablet 5   • Palbociclib 125 MG capsule capsule Take 1 capsule by mouth Daily. 3 weeks on, 1 week off 21 capsule 5   • Probiotic Product (PROBIOTIC DAILY PO) Take  by mouth. 300million cell daily     • raNITIdine (ZANTAC) 75 MG tablet Take 75 mg by mouth 2 (Two) Times a Day.     • terbinafine (lamiSIL) 250 MG tablet      • thyroid 60 MG PO tablet Take 60 mg by mouth daily.     • VALERIAN ROOT PO Take  by mouth. 100mg daily     • Zinc 50 MG tablet Take  by mouth. 1 po daily     • azithromycin (ZITHROMAX) 500 MG tablet Take 1 tablet by mouth Daily. 7 tablet 0   • dexamethasone 0.5 MG/5ML solution Take 10 mL by mouth 4 (Four) Times a Day. Swish for 2 minutes & spit.  Avoid eating & drinking for 1 hour after rinse. 500 mL 5     No current facility-administered medications for this visit.      Facility-Administered Medications Ordered in Other Visits   Medication Dose Route Frequency Provider Last Rate Last Dose   • heparin flush (porcine) 100 UNIT/ML injection 500 Units  500 Units Intravenous PRN Joseph Nunez MD   500 Units at 10/26/17 1155   • sodium chloride 0.9 % flush 10 mL  10 mL Intravenous PRN Joseph Nunez MD   10 mL at 10/26/17 1155       ALLERGIES:    Allergies   Allergen Reactions   • Benadryl  [Diphenhydramine Hcl (Sleep)] Other (See Comments)   • Codeine    • Diphenhydramine Other (See Comments)     \"Shaky leg syndrome\"  \"Shaky leg syndrome\"  \"Shaky leg syndrome\"   • Heparin    • Other      POPPY SEED   • Penicillins Hives   • Petroleum Jelly [Skin Protectants, Misc.]    • Sulfa Antibiotics    • Sulfur    • Valium [Diazepam]        Objective      Vitals:    10/25/18 1048   BP: 124/68   Pulse: 56   Resp: 18   Temp: " "97.2 °F (36.2 °C)   TempSrc: Tympanic   SpO2: 96%   Weight: 66.9 kg (147 lb 6.4 oz)   Height: 165.1 cm (65\")         Current Status 10/25/2018   ECOG score 1       General Appearance: Patient is awake, alert, oriented and in no acute distress. Patient is welldeveloped, wellnourished, and appears stated age.  HEENT: Normocephalic. Sclerae clear, conjunctiva pink, extraocular movements intact, pupils, round, reactive to light and accommodation. Mouth and throat are clear with moist oral mucosa.  NECK: Supple, no jugular venous distention, thyroid not enlarged.  LYMPH: No cervical, supraclavicular, axillary, or inguinal lymphadenopathy.  CHEST: Equal bilateral expansion   LUNGS: Good air movement, no rales, rhonchi, rubs or wheezes with auscultation  CARDIO: Regular sinus rhythm, no murmurs, gallops or rubs.  ABDOMEN: Nondistended, soft, No tenderness, no guarding, no rebound, No hepatosplenomegaly. No abdominal masses. Bowel sounds positive. No hernia  MUSKEL: No joint swelling, decreased motion, or inflammation  EXTREMS: No edema, clubbing, cyanosis, No varicose veins.  NEURO: Grossly nonfocal, Gait is coordinated and smooth, Cognition is preserved.  SKIN: No rashes, no ecchymoses, no petechia.  PSYCH: Oriented to time, place and person. Memory is preserved. Mood and affect appear normal      RECENT LABS:  WBC   Date Value Ref Range Status   10/25/2018 8.54 4.80 - 10.80 10*3/mm3 Final     RBC   Date Value Ref Range Status   10/25/2018 3.45 (L) 4.20 - 5.40 10*6/mm3 Final     Hemoglobin   Date Value Ref Range Status   10/25/2018 11.6 (L) 12.0 - 16.0 g/dL Final     Hematocrit   Date Value Ref Range Status   10/25/2018 34.7 (L) 37.0 - 47.0 % Final     MCV   Date Value Ref Range Status   10/25/2018 100.6 (H) 82.0 - 98.0 fL Final     MCH   Date Value Ref Range Status   10/25/2018 33.6 (H) 28.0 - 32.0 pg Final     MCHC   Date Value Ref Range Status   10/25/2018 33.4 33.0 - 36.0 g/dL Final     RDW   Date Value Ref Range " Status   10/25/2018 21.3 (H) 12.0 - 15.0 % Final     RDW-SD   Date Value Ref Range Status   10/25/2018 79.4 (H) 40.0 - 54.0 fl Final     MPV   Date Value Ref Range Status   10/25/2018 9.4 6.0 - 12.0 fL Final     Platelets   Date Value Ref Range Status   10/25/2018 377 130 - 400 10*3/mm3 Final     Neutrophil %   Date Value Ref Range Status   10/25/2018 53.4 39.0 - 78.0 % Final     Lymphocyte %   Date Value Ref Range Status   10/25/2018 36.7 15.0 - 45.0 % Final     Monocyte %   Date Value Ref Range Status   10/25/2018 6.1 4.0 - 12.0 % Final     Eosinophil %   Date Value Ref Range Status   10/25/2018 2.6 0.0 - 4.0 % Final     Basophil %   Date Value Ref Range Status   10/25/2018 0.6 0.0 - 2.0 % Final     Immature Grans %   Date Value Ref Range Status   10/25/2018 0.6 0.0 - 5.0 % Final     Neutrophils, Absolute   Date Value Ref Range Status   10/25/2018 4.57 1.87 - 8.40 10*3/mm3 Final     Lymphocytes, Absolute   Date Value Ref Range Status   10/25/2018 3.13 0.72 - 4.86 10*3/mm3 Final     Monocytes, Absolute   Date Value Ref Range Status   10/25/2018 0.52 0.19 - 1.30 10*3/mm3 Final     Eosinophils, Absolute   Date Value Ref Range Status   10/25/2018 0.22 0.00 - 0.70 10*3/mm3 Final     Basophils, Absolute   Date Value Ref Range Status   10/25/2018 0.05 0.00 - 0.20 10*3/mm3 Final     Immature Grans, Absolute   Date Value Ref Range Status   10/25/2018 0.05 (H) 0.00 - 0.03 10*3/mm3 Final     nRBC   Date Value Ref Range Status   10/25/2018 0.0 0.0 - 0.0 /100 WBC Final     Lab on 10/25/2018   Component Date Value Ref Range Status   • Glucose 10/25/2018 86  70 - 100 mg/dL Final   • BUN 10/25/2018 14  5 - 21 mg/dL Final   • Creatinine 10/25/2018 1.13  0.50 - 1.40 mg/dL Final   • Sodium 10/25/2018 136  135 - 145 mmol/L Final   • Potassium 10/25/2018 4.0  3.5 - 5.3 mmol/L Final   • Chloride 10/25/2018 98  98 - 110 mmol/L Final   • CO2 10/25/2018 28.0  24.0 - 31.0 mmol/L Final   • Calcium 10/25/2018 10.1  8.4 - 10.4 mg/dL Final   •  Total Protein 10/25/2018 7.8  6.3 - 8.7 g/dL Final   • Albumin 10/25/2018 4.20  3.50 - 5.00 g/dL Final   • ALT (SGPT) 10/25/2018 <15  0 - 54 U/L Final   • AST (SGOT) 10/25/2018 40  7 - 45 U/L Final   • Alkaline Phosphatase 10/25/2018 89  24 - 120 U/L Final   • Total Bilirubin 10/25/2018 0.4  0.1 - 1.0 mg/dL Final   • eGFR Non African Amer 10/25/2018 47* >60 mL/min/1.73 Final   • Globulin 10/25/2018 3.6  gm/dL Final   • A/G Ratio 10/25/2018 1.2  1.1 - 2.5 g/dL Final   • BUN/Creatinine Ratio 10/25/2018 12.4  7.0 - 25.0 Final   • Anion Gap 10/25/2018 10.0  4.0 - 13.0 mmol/L Final   • WBC 10/25/2018 8.54  4.80 - 10.80 10*3/mm3 Final   • RBC 10/25/2018 3.45* 4.20 - 5.40 10*6/mm3 Final   • Hemoglobin 10/25/2018 11.6* 12.0 - 16.0 g/dL Final   • Hematocrit 10/25/2018 34.7* 37.0 - 47.0 % Final   • MCV 10/25/2018 100.6* 82.0 - 98.0 fL Final   • MCH 10/25/2018 33.6* 28.0 - 32.0 pg Final   • MCHC 10/25/2018 33.4  33.0 - 36.0 g/dL Final   • RDW 10/25/2018 21.3* 12.0 - 15.0 % Final   • RDW-SD 10/25/2018 79.4* 40.0 - 54.0 fl Final   • MPV 10/25/2018 9.4  6.0 - 12.0 fL Final   • Platelets 10/25/2018 377  130 - 400 10*3/mm3 Final   • Neutrophil % 10/25/2018 53.4  39.0 - 78.0 % Final   • Lymphocyte % 10/25/2018 36.7  15.0 - 45.0 % Final   • Monocyte % 10/25/2018 6.1  4.0 - 12.0 % Final   • Eosinophil % 10/25/2018 2.6  0.0 - 4.0 % Final   • Basophil % 10/25/2018 0.6  0.0 - 2.0 % Final   • Immature Grans % 10/25/2018 0.6  0.0 - 5.0 % Final   • Neutrophils, Absolute 10/25/2018 4.57  1.87 - 8.40 10*3/mm3 Final   • Lymphocytes, Absolute 10/25/2018 3.13  0.72 - 4.86 10*3/mm3 Final   • Monocytes, Absolute 10/25/2018 0.52  0.19 - 1.30 10*3/mm3 Final   • Eosinophils, Absolute 10/25/2018 0.22  0.00 - 0.70 10*3/mm3 Final   • Basophils, Absolute 10/25/2018 0.05  0.00 - 0.20 10*3/mm3 Final   • Immature Grans, Absolute 10/25/2018 0.05* 0.00 - 0.03 10*3/mm3 Final   • nRBC 10/25/2018 0.0  0.0 - 0.0 /100 WBC Final             Assessment/Plan      Patient Active Problem List   Diagnosis   • Malignant neoplasm of upper-outer quadrant of left female breast (CMS/HCC)   • Breast cancer metastasized to bone, unspecified laterality (CMS/HCC)   • Essential hypertension   • Acquired hypothyroidism   • Bipolar 1 disorder (CMS/HCC)   • Carcinoma of left breast metastatic to bone (CMS/HCC)   • Antineoplastic chemotherapy induced anemia   • Dehydration          Assessment:  1. Stage IV metastatic breast carcinoma, ER/AK positive. HER2/maynor negative undergoing Ibrance, Faslodex and Xgeva with overall stable with stable CA27.29 with slow decline. She is tolerating the treatment very well. Faslodex cycle 17 today and will continue to HOLD Xgeva due to upcoming dental work, last dose of Xgeva given on 09/29/2016.  01/2017 restaging CT chest abdomen and pelvis and bone scan showed stable diffuse sclerotic bony lesions, unchanged from 7/1/2016.Next scan July 2017    2.  Mild leukopenia without neutropenia.  Likely secondary to Ibrance.  Continue to monitor.     3.  Macrocytic anemia.  Hemoglobin 11.1.  02/2016, B12 and folate normal    4.  Status post 8 teeth extraction.     Recommendation/Plan:  1.  Proceed with Faslodex cycle 20.  2. Continue Ibrance 125 mg 3 weeks on, one-week off.  Reduce dose if leukopenia worsens.   3. Follow up in 28 days for Faslodex cycle 19.   Repeat CA 27.29.  Previous CA 27.29 have remained stable, the last one done on 11/2017 was 89 Stable. Continue Faslodex q monthly.    Additionally she is anemic with a hemoglobin of 10 g percent with decreasing GFR.  I do suspect that she may have some form of chronic kidney disease, as evaluated by Nephrology, CKD from tn.  No need for EPO as she is asyptomatic.  Redraw CA 27.29 today. Cont faslodex + Ibrance for now until repeat CA 27.29 returns. Last CA 27.29 in Feb 2018 slightly increased to 96. I have discussed different options with her:  #1. Stop Ibrance and switch to a new CD4K inhabitor Kailyn or  Johnenzio.  #. Stop Ibrance and switch to Affinitor + faslodex.  #. Cont Ibrance and Faslodex with just a subtle rise, since clinically pt is doing better.    At this point will restage with CT scans CAP at [facility] and compare to one done Jan 2017. If progression on CT scans and CA 27.29 then go for the [facility] trial of using MTOR inhabitor Everlimos + Exemestine. Bone scan shows progressive dz. Therefore will stop Faslodex. Start Exemestine 25mg daily in combination with Affinitor 10mg daily. Redraw her tumor today.  Calcium improved but AST higher.    DC Exemestine and Affinitor as the CA 27.29 yulia to 140. Considering this will start Xeloda 500mg twice daily 2 Wks ON and one wk OFF.    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 Will add Her 2 Tykerb 1250mg q day x 21 days in combo + Oral Xeloda 500mg bid 3 wks On and 1 WK OFF. Will draw CA 27.29.    Since she started Lapatinib 1250 mg q day + Xeloda combo from 8/22/18 she has had significant dirrohea and lately has been throwing up and today her Creat is up to 2.3 from 0.9 and GFR down to 21 as well as she has lost 7 ILBS WT and appears very dehydrated. All meds were stopped last week and she received IVF and today creat is back down to 1.25 and GFR upto 41. Redraw CAproved the best would be either to reduce the dose and keep both Lapitinib and Xeloda on board, she will call me tomorrow. Therefor, last week the Tykerb dose was reduced to 2 tabs q day ( 500mg ) daily 3 wks On and 1 wk OFF Plus Xeloda 500mg 1 tab q day 2 Wks ON and 1K OFF. See  How this schedulle works and therefore, will draw CA27.29 again today.  Joseph Nunez MD    10/25/2018    11:22 AM

## 2018-10-26 LAB — CANCER AG27-29 SERPL-ACNC: 131.8 U/ML (ref 0–38.6)

## 2018-10-30 ENCOUNTER — TELEPHONE (OUTPATIENT)
Dept: ONCOLOGY | Facility: CLINIC | Age: 73
End: 2018-10-30

## 2018-10-30 NOTE — TELEPHONE ENCOUNTER
Received call from patient, she called with concerns after her recent Tumor Maker had gone up and wanted Dr Nunez to tweek her Xeloda medication in hopes this would bring down the Marker Numbers.    Discussed patients concerns with Dr Nunez, he reviewed her recent Tumor Marker result of Ca 27.29 of 131.8, which is up from 122.8 aprox one month ago   Current dose of Xeloda 1 tab po bid, 2 weeks on 1 week off. Per Dr Nunez instructions she was instructed to continue on same dose at this time, he was not concerned with the rise of her Marker result at this time, since this was only a slight increase. She was informed will recheck her marker again at her next jenni apt next month. Heavenly klein/kwan of the medication instructions.

## 2018-11-06 ENCOUNTER — LAB (OUTPATIENT)
Dept: LAB | Facility: HOSPITAL | Age: 73
End: 2018-11-06

## 2018-11-06 ENCOUNTER — TRANSCRIBE ORDERS (OUTPATIENT)
Dept: ONCOLOGY | Facility: CLINIC | Age: 73
End: 2018-11-06

## 2018-11-06 ENCOUNTER — TRANSCRIBE ORDERS (OUTPATIENT)
Dept: ADMINISTRATIVE | Facility: HOSPITAL | Age: 73
End: 2018-11-06

## 2018-11-06 DIAGNOSIS — N18.30 CHRONIC KIDNEY DISEASE, STAGE III (MODERATE) (HCC): Primary | ICD-10-CM

## 2018-11-06 DIAGNOSIS — N18.30 CHRONIC KIDNEY DISEASE, STAGE III (MODERATE) (HCC): ICD-10-CM

## 2018-11-06 LAB
ALBUMIN SERPL-MCNC: 3.8 G/DL (ref 3.5–5)
ANION GAP SERPL CALCULATED.3IONS-SCNC: 11 MMOL/L (ref 4–13)
BACTERIA UR QL AUTO: ABNORMAL /HPF
BILIRUB UR QL STRIP: NEGATIVE
BUN BLD-MCNC: 15 MG/DL (ref 5–21)
BUN/CREAT SERPL: 11.7 (ref 7–25)
CALCIUM SPEC-SCNC: 9.9 MG/DL (ref 8.4–10.4)
CHLORIDE SERPL-SCNC: 97 MMOL/L (ref 98–110)
CLARITY UR: CLEAR
CO2 SERPL-SCNC: 27 MMOL/L (ref 24–31)
COLOR UR: YELLOW
CREAT BLD-MCNC: 1.28 MG/DL (ref 0.5–1.4)
CREAT UR-MCNC: 44.2 MG/DL
DEPRECATED RDW RBC AUTO: 68.8 FL (ref 40–54)
ERYTHROCYTE [DISTWIDTH] IN BLOOD BY AUTOMATED COUNT: 17.9 % (ref 12–15)
GFR SERPL CREATININE-BSD FRML MDRD: 41 ML/MIN/1.73
GLUCOSE BLD-MCNC: 91 MG/DL (ref 70–100)
GLUCOSE UR STRIP-MCNC: NEGATIVE MG/DL
HCT VFR BLD AUTO: 34.2 % (ref 37–47)
HGB BLD-MCNC: 11.5 G/DL (ref 12–16)
HGB UR QL STRIP.AUTO: NEGATIVE
HYALINE CASTS UR QL AUTO: ABNORMAL /LPF
KETONES UR QL STRIP: NEGATIVE
LEUKOCYTE ESTERASE UR QL STRIP.AUTO: ABNORMAL
MCH RBC QN AUTO: 34.5 PG (ref 28–32)
MCHC RBC AUTO-ENTMCNC: 33.6 G/DL (ref 33–36)
MCV RBC AUTO: 102.7 FL (ref 82–98)
NITRITE UR QL STRIP: NEGATIVE
PH UR STRIP.AUTO: <=5 [PH] (ref 5–8)
PHOSPHATE SERPL-MCNC: 4.2 MG/DL (ref 2.5–4.5)
PLATELET # BLD AUTO: 410 10*3/MM3 (ref 130–400)
PMV BLD AUTO: 9.8 FL (ref 6–12)
POTASSIUM BLD-SCNC: 3.8 MMOL/L (ref 3.5–5.3)
PROT UR QL STRIP: NEGATIVE
PROT UR-MCNC: 13 MG/DL (ref 0–13.5)
PTH-INTACT SERPL-MCNC: 11.4 PG/ML (ref 7.5–53.5)
RBC # BLD AUTO: 3.33 10*6/MM3 (ref 4.2–5.4)
RBC # UR: ABNORMAL /HPF
REF LAB TEST METHOD: ABNORMAL
SODIUM BLD-SCNC: 135 MMOL/L (ref 135–145)
SP GR UR STRIP: 1.01 (ref 1–1.03)
SQUAMOUS #/AREA URNS HPF: ABNORMAL /HPF
URATE SERPL-MCNC: 9.9 MG/DL (ref 2.7–7.5)
UROBILINOGEN UR QL STRIP: ABNORMAL
WBC NRBC COR # BLD: 10.48 10*3/MM3 (ref 4.8–10.8)
WBC UR QL AUTO: ABNORMAL /HPF

## 2018-11-06 PROCEDURE — 36415 COLL VENOUS BLD VENIPUNCTURE: CPT

## 2018-11-06 PROCEDURE — 84156 ASSAY OF PROTEIN URINE: CPT | Performed by: INTERNAL MEDICINE

## 2018-11-06 PROCEDURE — 85027 COMPLETE CBC AUTOMATED: CPT | Performed by: INTERNAL MEDICINE

## 2018-11-06 PROCEDURE — 82570 ASSAY OF URINE CREATININE: CPT | Performed by: INTERNAL MEDICINE

## 2018-11-06 PROCEDURE — 80069 RENAL FUNCTION PANEL: CPT | Performed by: INTERNAL MEDICINE

## 2018-11-06 PROCEDURE — 83970 ASSAY OF PARATHORMONE: CPT | Performed by: INTERNAL MEDICINE

## 2018-11-06 PROCEDURE — 81001 URINALYSIS AUTO W/SCOPE: CPT | Performed by: INTERNAL MEDICINE

## 2018-11-06 PROCEDURE — 84550 ASSAY OF BLOOD/URIC ACID: CPT | Performed by: INTERNAL MEDICINE

## 2018-11-12 ENCOUNTER — OFFICE VISIT (OUTPATIENT)
Dept: ONCOLOGY | Facility: CLINIC | Age: 73
End: 2018-11-12

## 2018-11-12 ENCOUNTER — TELEPHONE (OUTPATIENT)
Dept: ONCOLOGY | Facility: CLINIC | Age: 73
End: 2018-11-12

## 2018-11-12 ENCOUNTER — APPOINTMENT (OUTPATIENT)
Dept: ULTRASOUND IMAGING | Facility: HOSPITAL | Age: 73
End: 2018-11-12

## 2018-11-12 ENCOUNTER — HOSPITAL ENCOUNTER (EMERGENCY)
Facility: HOSPITAL | Age: 73
Discharge: HOME | End: 2018-11-12
Attending: EMERGENCY MEDICINE | Admitting: EMERGENCY MEDICINE

## 2018-11-12 ENCOUNTER — APPOINTMENT (OUTPATIENT)
Dept: CT IMAGING | Facility: HOSPITAL | Age: 73
End: 2018-11-12

## 2018-11-12 ENCOUNTER — APPOINTMENT (OUTPATIENT)
Dept: LAB | Facility: HOSPITAL | Age: 73
End: 2018-11-12

## 2018-11-12 VITALS
OXYGEN SATURATION: 97 % | WEIGHT: 158 LBS | BODY MASS INDEX: 26.33 KG/M2 | HEART RATE: 91 BPM | HEIGHT: 65 IN | SYSTOLIC BLOOD PRESSURE: 119 MMHG | DIASTOLIC BLOOD PRESSURE: 79 MMHG | TEMPERATURE: 98.2 F | RESPIRATION RATE: 18 BRPM

## 2018-11-12 VITALS
OXYGEN SATURATION: 98 % | RESPIRATION RATE: 16 BRPM | SYSTOLIC BLOOD PRESSURE: 128 MMHG | HEART RATE: 88 BPM | DIASTOLIC BLOOD PRESSURE: 86 MMHG | BODY MASS INDEX: 26.48 KG/M2 | TEMPERATURE: 98.8 F | HEIGHT: 65 IN | WEIGHT: 158.9 LBS

## 2018-11-12 DIAGNOSIS — C79.51 CARCINOMA OF LEFT BREAST METASTATIC TO BONE (HCC): Primary | ICD-10-CM

## 2018-11-12 DIAGNOSIS — C50.912 CARCINOMA OF LEFT BREAST METASTATIC TO BONE (HCC): Primary | ICD-10-CM

## 2018-11-12 DIAGNOSIS — R30.9 PAINFUL URINATION: ICD-10-CM

## 2018-11-12 DIAGNOSIS — C79.51 BREAST CANCER METASTASIZED TO BONE, UNSPECIFIED LATERALITY (HCC): Primary | ICD-10-CM

## 2018-11-12 DIAGNOSIS — C50.919 BREAST CANCER METASTASIZED TO BONE, UNSPECIFIED LATERALITY (HCC): Primary | ICD-10-CM

## 2018-11-12 DIAGNOSIS — R18.0 MALIGNANT ASCITES: Primary | ICD-10-CM

## 2018-11-12 DIAGNOSIS — C50.412 MALIGNANT NEOPLASM OF UPPER-OUTER QUADRANT OF LEFT FEMALE BREAST, UNSPECIFIED ESTROGEN RECEPTOR STATUS (HCC): Primary | ICD-10-CM

## 2018-11-12 LAB
ALBUMIN SERPL-MCNC: 4.2 G/DL (ref 3.5–5)
ALBUMIN/GLOB SERPL: 1 G/DL (ref 1.1–2.5)
ALP SERPL-CCNC: 124 U/L (ref 24–120)
ALT SERPL W P-5'-P-CCNC: 15 U/L (ref 0–54)
ANION GAP SERPL CALCULATED.3IONS-SCNC: 14 MMOL/L (ref 4–13)
APPEARANCE FLD: ABNORMAL
AST SERPL-CCNC: 36 U/L (ref 7–45)
BACTERIA UR QL AUTO: ABNORMAL /HPF
BASOPHILS # BLD AUTO: 0.03 10*3/MM3 (ref 0–0.2)
BASOPHILS NFR BLD AUTO: 0.3 % (ref 0–2)
BILIRUB SERPL-MCNC: 0.5 MG/DL (ref 0.1–1)
BILIRUB UR QL STRIP: ABNORMAL
BUN BLD-MCNC: 10 MG/DL (ref 5–21)
BUN/CREAT SERPL: 8.1 (ref 7–25)
CALCIUM SPEC-SCNC: 10.7 MG/DL (ref 8.4–10.4)
CHLORIDE SERPL-SCNC: 89 MMOL/L (ref 98–110)
CLARITY UR: ABNORMAL
CO2 SERPL-SCNC: 27 MMOL/L (ref 24–31)
COD CRY URNS QL: ABNORMAL /HPF
COLOR FLD: YELLOW
COLOR UR: YELLOW
CREAT BLD-MCNC: 1.24 MG/DL (ref 0.5–1.4)
DEPRECATED RDW RBC AUTO: 60.4 FL (ref 40–54)
EOSINOPHIL # BLD AUTO: 0.07 10*3/MM3 (ref 0–0.7)
EOSINOPHIL NFR BLD AUTO: 0.7 % (ref 0–4)
ERYTHROCYTE [DISTWIDTH] IN BLOOD BY AUTOMATED COUNT: 16.4 % (ref 12–15)
FINE GRAN CASTS URNS QL MICRO: ABNORMAL /LPF
GFR SERPL CREATININE-BSD FRML MDRD: 42 ML/MIN/1.73
GLOBULIN UR ELPH-MCNC: 4.1 GM/DL
GLUCOSE BLD-MCNC: 121 MG/DL (ref 70–100)
GLUCOSE UR STRIP-MCNC: NEGATIVE MG/DL
HCT VFR BLD AUTO: 36.9 % (ref 37–47)
HGB BLD-MCNC: 12.6 G/DL (ref 12–16)
HGB UR QL STRIP.AUTO: ABNORMAL
HOLD SPECIMEN: NORMAL
HOLD SPECIMEN: NORMAL
HYALINE CASTS UR QL AUTO: ABNORMAL /LPF
IMM GRANULOCYTES # BLD: 0.06 10*3/MM3 (ref 0–0.03)
IMM GRANULOCYTES NFR BLD: 0.6 % (ref 0–5)
INR PPP: 1.06 (ref 0.91–1.09)
KETONES UR QL STRIP: ABNORMAL
LEUKOCYTE ESTERASE UR QL STRIP.AUTO: ABNORMAL
LYMPHOCYTES # BLD AUTO: 2.6 10*3/MM3 (ref 0.72–4.86)
LYMPHOCYTES NFR BLD AUTO: 26.7 % (ref 15–45)
LYMPHOCYTES NFR FLD MANUAL: 10 %
MCH RBC QN AUTO: 34.8 PG (ref 28–32)
MCHC RBC AUTO-ENTMCNC: 34.1 G/DL (ref 33–36)
MCV RBC AUTO: 101.9 FL (ref 82–98)
MONOCYTES # BLD AUTO: 0.62 10*3/MM3 (ref 0.19–1.3)
MONOCYTES NFR BLD AUTO: 6.4 % (ref 4–12)
MUCOUS THREADS URNS QL MICRO: ABNORMAL /HPF
NEUTROPHILS # BLD AUTO: 6.37 10*3/MM3 (ref 1.87–8.4)
NEUTROPHILS NFR BLD AUTO: 65.3 % (ref 39–78)
NEUTROPHILS NFR FLD MANUAL: 5 %
NITRITE UR QL STRIP: NEGATIVE
NRBC BLD MANUAL-RTO: 0 /100 WBC (ref 0–0)
PH UR STRIP.AUTO: <=5 [PH] (ref 5–8)
PLATELET # BLD AUTO: 480 10*3/MM3 (ref 130–400)
PMV BLD AUTO: 9.4 FL (ref 6–12)
POTASSIUM BLD-SCNC: 4.1 MMOL/L (ref 3.5–5.3)
PROT SERPL-MCNC: 8.3 G/DL (ref 6.3–8.7)
PROT UR QL STRIP: ABNORMAL
PROTHROMBIN TIME: 14.1 SECONDS (ref 11.9–14.6)
RBC # BLD AUTO: 3.62 10*6/MM3 (ref 4.2–5.4)
RBC # FLD AUTO: 0 /MM3
RBC # UR: ABNORMAL /HPF
REF LAB TEST METHOD: ABNORMAL
SODIUM BLD-SCNC: 130 MMOL/L (ref 135–145)
SP GR UR STRIP: >=1.03 (ref 1–1.03)
SQUAMOUS #/AREA URNS HPF: ABNORMAL /HPF
UNCLASSIFIED CELLS, FLUID: 85 %
UROBILINOGEN UR QL STRIP: ABNORMAL
WBC # FLD: 82 /MM3
WBC NRBC COR # BLD: 9.75 10*3/MM3 (ref 4.8–10.8)
WBC UR QL AUTO: ABNORMAL /HPF

## 2018-11-12 PROCEDURE — 80053 COMPREHEN METABOLIC PANEL: CPT | Performed by: INTERNAL MEDICINE

## 2018-11-12 PROCEDURE — 36415 COLL VENOUS BLD VENIPUNCTURE: CPT

## 2018-11-12 PROCEDURE — 76942 ECHO GUIDE FOR BIOPSY: CPT

## 2018-11-12 PROCEDURE — 85610 PROTHROMBIN TIME: CPT | Performed by: EMERGENCY MEDICINE

## 2018-11-12 PROCEDURE — 86300 IMMUNOASSAY TUMOR CA 15-3: CPT | Performed by: INTERNAL MEDICINE

## 2018-11-12 PROCEDURE — 88312 SPECIAL STAINS GROUP 1: CPT | Performed by: EMERGENCY MEDICINE

## 2018-11-12 PROCEDURE — 87086 URINE CULTURE/COLONY COUNT: CPT | Performed by: INTERNAL MEDICINE

## 2018-11-12 PROCEDURE — 87015 SPECIMEN INFECT AGNT CONCNTJ: CPT | Performed by: EMERGENCY MEDICINE

## 2018-11-12 PROCEDURE — 99284 EMERGENCY DEPT VISIT MOD MDM: CPT

## 2018-11-12 PROCEDURE — 82042 OTHER SOURCE ALBUMIN QUAN EA: CPT | Performed by: EMERGENCY MEDICINE

## 2018-11-12 PROCEDURE — 82945 GLUCOSE OTHER FLUID: CPT | Performed by: EMERGENCY MEDICINE

## 2018-11-12 PROCEDURE — 89051 BODY FLUID CELL COUNT: CPT | Performed by: EMERGENCY MEDICINE

## 2018-11-12 PROCEDURE — 87186 SC STD MICRODIL/AGAR DIL: CPT | Performed by: INTERNAL MEDICINE

## 2018-11-12 PROCEDURE — 88342 IMHCHEM/IMCYTCHM 1ST ANTB: CPT | Performed by: EMERGENCY MEDICINE

## 2018-11-12 PROCEDURE — 87088 URINE BACTERIA CULTURE: CPT | Performed by: INTERNAL MEDICINE

## 2018-11-12 PROCEDURE — 88361 TUMOR IMMUNOHISTOCHEM/COMPUT: CPT

## 2018-11-12 PROCEDURE — 88360 TUMOR IMMUNOHISTOCHEM/MANUAL: CPT | Performed by: EMERGENCY MEDICINE

## 2018-11-12 PROCEDURE — 82150 ASSAY OF AMYLASE: CPT | Performed by: EMERGENCY MEDICINE

## 2018-11-12 PROCEDURE — 82247 BILIRUBIN TOTAL: CPT | Performed by: EMERGENCY MEDICINE

## 2018-11-12 PROCEDURE — 36415 COLL VENOUS BLD VENIPUNCTURE: CPT | Performed by: INTERNAL MEDICINE

## 2018-11-12 PROCEDURE — 83615 LACTATE (LD) (LDH) ENZYME: CPT | Performed by: EMERGENCY MEDICINE

## 2018-11-12 PROCEDURE — 25010000002 IOPAMIDOL 61 % SOLUTION: Performed by: EMERGENCY MEDICINE

## 2018-11-12 PROCEDURE — 74177 CT ABD & PELVIS W/CONTRAST: CPT

## 2018-11-12 PROCEDURE — 88305 TISSUE EXAM BY PATHOLOGIST: CPT | Performed by: EMERGENCY MEDICINE

## 2018-11-12 PROCEDURE — 88341 IMHCHEM/IMCYTCHM EA ADD ANTB: CPT | Performed by: EMERGENCY MEDICINE

## 2018-11-12 PROCEDURE — 85025 COMPLETE CBC W/AUTO DIFF WBC: CPT | Performed by: INTERNAL MEDICINE

## 2018-11-12 PROCEDURE — 76705 ECHO EXAM OF ABDOMEN: CPT

## 2018-11-12 PROCEDURE — 87205 SMEAR GRAM STAIN: CPT | Performed by: EMERGENCY MEDICINE

## 2018-11-12 PROCEDURE — 99214 OFFICE O/P EST MOD 30 MIN: CPT | Performed by: INTERNAL MEDICINE

## 2018-11-12 PROCEDURE — 87070 CULTURE OTHR SPECIMN AEROBIC: CPT | Performed by: EMERGENCY MEDICINE

## 2018-11-12 PROCEDURE — 88112 CYTOPATH CELL ENHANCE TECH: CPT | Performed by: EMERGENCY MEDICINE

## 2018-11-12 PROCEDURE — 81001 URINALYSIS AUTO W/SCOPE: CPT | Performed by: INTERNAL MEDICINE

## 2018-11-12 RX ADMIN — SODIUM CHLORIDE 500 ML: 9 INJECTION, SOLUTION INTRAVENOUS at 15:57

## 2018-11-12 RX ADMIN — IOPAMIDOL 55 ML: 612 INJECTION, SOLUTION INTRAVENOUS at 16:10

## 2018-11-12 NOTE — ED PROVIDER NOTES
"Subjective   History of Present Illness    73-year-old female history of metastatic breast cancer presenting with abdominal pain and new onset ascites.    Patient was sent to the emergency department by Dr. Nunez, the patient's oncologist for an emergent CT scan. Patient notes over the past four days. She has been having increasing abdominal distention and increasing pain. That several episodes of nonbloody, nonbloody, nonbilious emesis    Patient denies any associated fevers, chills, diarrhea, dysuria    Review of Systems   Constitutional: Negative for chills and fever.   HENT: Negative for nosebleeds.    Eyes: Negative for redness.   Respiratory: Negative for shortness of breath.    Cardiovascular: Negative for chest pain.   Gastrointestinal: Positive for abdominal distention, abdominal pain, nausea and vomiting.   Genitourinary: Negative for flank pain.   Musculoskeletal: Negative for gait problem.   Skin: Negative for wound.   Neurological: Negative for syncope and weakness.   Psychiatric/Behavioral: The patient is not hyperactive.        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   • Bipolar disorder (CMS/[facility])    • Disease of thyroid gland    • Hypertension    • Malignant neoplasm of upper-outer quadrant of left female breast (CMS/HCC) 9/18/2016   • Secondary malignant neoplasm of bone and bone marrow (CMS/[facility]) 9/18/2016       Allergies   Allergen Reactions   • Benadryl  [Diphenhydramine Hcl (Sleep)] Other (See Comments)   • Codeine    • Diphenhydramine Other (See Comments)     \"Shaky leg syndrome\"  \"Shaky leg syndrome\"  \"Shaky leg syndrome\"   • Heparin    • Other      POPPY SEED   • Penicillins Hives   • Petroleum Jelly [Skin Protectants, Misc.]    • Sulfa Antibiotics    • Sulfur    • Valium [Diazepam]        Past Surgical History:   Procedure Laterality Date   • BREAST SURGERY      left breast biopsy and axillary node   • CHOLECYSTECTOMY     • EXTERNAL EAR " SURGERY     • MASTECTOMY Left    • PORTACATH PLACEMENT         Family History   Problem Relation Age of Onset   • No Known Problems Daughter    • No Known Problems Son    • Other Mother         complications from a concussion from a fall   • Other Father         old age   • No Known Problems Brother    • No Known Problems Daughter        Social History     Socioeconomic History   • Marital status:      Spouse name: Not on file   • Number of children: Not on file   • Years of education: Not on file   • Highest education level: Not on file   Tobacco Use   • Smoking status: Never Smoker   • Smokeless tobacco: Never Used   Substance and Sexual Activity   • Alcohol use: No   • Drug use: No           Objective   Physical Exam   Constitutional: She is oriented to person, place, and time. She appears well-developed and well-nourished.   HENT:   Head: Normocephalic and atraumatic.   Eyes: Conjunctivae are normal.   Neck: Normal range of motion.   Cardiovascular: Normal rate, regular rhythm and intact distal pulses.   Pulmonary/Chest: Effort normal and breath sounds normal. No respiratory distress.   Abdominal: Soft. She exhibits distension. There is tenderness.   Mild diffuse tenderness   Musculoskeletal: She exhibits no edema.   Neurological: She is alert and oriented to person, place, and time.   Skin: Skin is warm and dry.   Psychiatric: She has a normal mood and affect.   Nursing note and vitals reviewed.      Procedures           ED Course                  MDM  Number of Diagnoses or Management Options  Malignant ascites: new and requires workup  Diagnosis management comments: 73-year-old female presenting with new onset ascites and abdominal pain. Concern for new metastases. Patient will undergo a CT scan of the abdomen and pelvis to evaluate for new metastatic disease. This will require follow-up with the patient's oncologist regarding further management.    Update:  CT scan reveals peritoneal enhancement and  large volume ascites concerning for peritoneal metastasis.  A large volume paracentesis was performed by radiology that the removal of 5 L. Patient was then discharged to follow up tomorrow morning in her oncologist clinic.       Amount and/or Complexity of Data Reviewed  Clinical lab tests: reviewed  Tests in the radiology section of CPT®: reviewed    Patient Progress  Patient progress: improved        Final diagnoses:   Malignant ascites            Zhang Tinsley MD  11/12/18 [id number]

## 2018-11-12 NOTE — TELEPHONE ENCOUNTER
Received call from patient, Heavenly Yanes, she states that she has had a rough weekend with c/o severe pain located URQ abdominal area along with posterior back pain (flank area). She wishes to come in today for eval and lab work.   Apt was jenni today for her to see Dr Nunez along with lab apt. Patient will arrive as soon as she can, she lives in [place], [place].

## 2018-11-12 NOTE — PROGRESS NOTES
Port flushed w/NS only due to patient allergy.  LORETO Yanes RN  [facility]  HEMATOLOGY & ONCOLOGY        Subjective    Metastatic breast cancer on Faslodex and Ibrance     Staging form: Breast, AJCC V7      Clinical stage from 10/11/2016: Stage IV (T2, N1, M1) - ER/NC positive. HER2/maynor negative  VISIT DIAGNOSIS:   Metastatic breast cancer       HEMATOLOGY / ONCOLOGY HISTORY:   Oncology/Hematology History    Patient is a 67-year-old postmenopausal female who had onset of her menses at age 9 and menopause at age 48. She received oral contraceptives for approximately 14 years and did not receive hormonal manipulation. Patient has a maternal cousin had breast cancer. Patient has had no prior breast biopsies. Patient found a palpable left breast mass as well as a left axillary mass. Patient had a bloody nipple discharge which been present for approximately 6 months. On 6/20/2012, a mammogram was performed showing a subareolar mass consistent with malignancy. Bilateral breast ultrasound revealed an ill-defined subareolar mass measuring 2.8-2.5 cm. There is a left axillary mass measuring 1.7 cm with suspicion of extracapsular extension. There is no evidence of disease in the right breast. On 7/9/2012 patient underwent a left breast biopsy and placement of marker clip. Left breast biopsy revealed an infiltrating lobular carcinoma grade 2 with focal ductal carcinoma in situ, solid pattern. A fine-needle biopsy of the left lymph node was consistent with metastatic carcinoma. Breast tumor profile revealed ER: 100%; NC: 98%; HER-2/maynor 2+; FISH: Unamplified; Ki-67: 71%; P 53 1%; DNA aneuploidy. A MRI of the breast were performed body multiple abnormal enhancing masses within the left breast. There appears to be anterior retraction of the pectoralis muscle with no direct extension. There is a moderate to large, layering left pleural effusion appreciated. The right breast reveals 3 moderately enlarged lymph nodes  in the posterior lateral aspect the right breast. These have fatty hilum but followup is recommended. Patient is undergone systemic studies including, on 8/2/2012, a CT scan of the brain showing atrophy. a CT scan that shows showing a small to moderate left pleural effusion with 3 subcentimeter nodule densities in the left lung.   She completed 4 cycles of neoadjuvant treatment with dose dense Adriamycin and Cytoxan. She had a mammogram which showed a significant reduction in the size of her left breast lesion. There is no axillary adenopathy noted. She has had an ultrasound revealing the  lesion reducing from 2.4 cm to 1 cm. Patient underwent a left mastectomy on 03/19/2013 finding negative invasive cancer, negative nodes. A 5% DCIS was noted. The patient has declined hormonal therapy. She did not need radiation therapy.  November 2014, she began to have evidence of lytic bone lesions at T5T6, frontal disease, an 8 mm lucency in the central frontal area of  the skull but nothing intracranial. Bone scan revealed only vertex tracer activity but bilateral ribs and thoracic spine, and other lesions in  her pelvis. At that time, she was started on letrozole since November 2014. She is taking Xgeva. She had progression found in bone in Feb 2015 on scan. She was started on Faslodex, Ibrance and continued xgeva.         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 B) Foci of ductal carcinoma in situ, solid pattern. C) Greatest dimension of the invasive carcinoma is 15.8mm.         3/19/2013 Surgery     Left Mastectomy w/ Axillary Tail: Focal residual ducatal carcinoma in situ (DCIS) 12 lymph nodes negative for metastatic carcinoma.         10/20/2014 Biopsy     Peritoneum Biopsy: Final  Diagnosis: Malignant Cell Neoplasm.           Breast cancer metastasized to bone, unspecified laterality (CMS/HCC)    10/15/2012 -  Other Event     Left mammogram:  Impression:  1. Decreasing spiculation and density in the retroareolar left breast, near a previous biopsy.   2. Definite left axillary lesion is not appreciated.          2/5/2013 -  Other Event     Diagnostic Mammo:  Comparison is made to 10/15/2012 and 6/20/2012  The spiculated mass at 12:00 behind the nipple is nearly resolved.   Impression:  1. The mass on the left side is less apparent after receiving chemotherapy. There has been a good response to chmotherapy on the left side.   2. No suspicious abnormality is seen in the right breast to account for the new palable abnormality at the 4-5:00 position.          3/19/2013 Surgery     Breast Biopsy:  Final Diagnosis:  A. Breast, left mastectomy with axillary tail  1. Focal residual ductal carcinoma in situ (DICS) (less than 5% of tumor bed).  2. Negative for residual invasive carcinoma.   3. 12 Lymph Nodes, negative for metastatic carcinoma (0/12) focally, some lymph nodes demonstrate fibrosis/treatment effect.  B. Skin and soft tissue, left advancement flap:  1. Benign skin and subcutis.   2. Negative for carcinoma.          6/20/2013 -  Other Event     Diagnostic Mammo [facility]:  1. A subareolar mass is consistent with malignancy. Additional involement extends inferiorly sonographically and superolaterally mammograhically as demonstrated by calcifications. The mass measures approx 2.8 cm sonographically, but the involved region is likely much larger including an intraductal componet. The left axillary lymph nodes are also involved.   2. Recommened ultrasound guided biopsy of the left subareolar mass and left axillary lymph node.   3. Breast MRI could also further define multicentric disease on the left.   4. Clear suspicious finding on the right.  Recommend ongoing clinical follow up of palpable  foci.          6/13/2014 -  Other Event     Diagnostic Mammo:  IMPRESSION:  1. History of left breast cancer and mastectomy. Stable benign right breast mammograms.          10/20/2014 Surgery     Final Diagnosis:  Biopsies, pertoneum:  Metastatic carcinoma, morphologically compatible with metastatic mammary carcinoma.          6/15/2015 -  Other Event     Diagnostic Mammo:  IMPRESSION:   1. Negative x-ray report, should not delay biopsy if a dominat or clinically suspicious mass is present.          7/5/2016 -  Other Event     Diagnositic mammogram:  Impression:   Stable right mamogram with no radiographic findings suspicious for malignancy. Recommend continued screening mammography per screening guidelines unless otherwise earlier clinically indicated.          9/18/2016 Initial Diagnosis     Secondary malignant neoplasm of bone and bone marrow          Cancer Staging Information:  Malignant neoplasm of upper-outer quadrant of left female breast    Staging form: Breast, AJCC V7      Clinical stage from 10/11/2016: Stage IV (T2, N1, M1) - Signed by ALBERT Kulkarni on 10/11/2016      INTERVAL HISTORY  Patient ID: Heavenly Yanes is a 73 y.o. year old female history of metastatic breast carcinoma to lung and bone.  She is presently receiving treatment with Ibrance, Faslodex and Xgeva.  Tumor markers continued to decline with a CA 27-29 of 92.2 obtained on 9:30 2016 compared to 105.8 on 07/27/2016.  Mr. Yanes continues to tolerate this treatment regimen well and is here today for evaluation consideration to receive Faslodex and Xgeva.  Last bone mineral density study was obtained 11/21/2014 indicating osteopopenia, we will obtain a repeat bone mineral density study. Ms. Yanes completed 8 teeth pulled 02/14/2017.   Xgeva on hold, resume 05/2017.     Past Medical History:   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   • Bipolar disorder (CMS/HCC)    • Disease  of thyroid gland    • Hypertension    • Malignant neoplasm of upper-outer quadrant of left female breast (CMS/HCC) 9/18/2016   • Secondary malignant neoplasm of bone and bone marrow (CMS/HCC) 9/18/2016     Past Surgical History:   Past Surgical History:   Procedure Laterality Date   • BREAST SURGERY      left breast biopsy and axillary node   • CHOLECYSTECTOMY     • EXTERNAL EAR SURGERY     • MASTECTOMY Left    • PORTACATH PLACEMENT       Social History:   Social History     Socioeconomic History   • Marital status:      Spouse name: Not on file   • Number of children: Not on file   • Years of education: Not on file   • Highest education level: Not on file   Social Needs   • Financial resource strain: Not on file   • Food insecurity - worry: Not on file   • Food insecurity - inability: Not on file   • Transportation needs - medical: Not on file   • Transportation needs - non-medical: Not on file   Occupational History   • Not on file   Tobacco Use   • Smoking status: Never Smoker   • Smokeless tobacco: Never Used   Substance and Sexual Activity   • Alcohol use: No   • Drug use: No   • Sexual activity: Not on file   Other Topics Concern   • Not on file   Social History Narrative   • Not on file     Family History:   Family History   Problem Relation Age of Onset   • No Known Problems Daughter    • No Known Problems Son    • Other Mother         complications from a concussion from a fall   • Other Father         old age   • No Known Problems Brother    • No Known Problems Daughter        Review of Systems   Constitutional: Negative.  Negative for activity change, appetite change, chills, diaphoresis, fatigue and fever.   HENT: Negative.  Negative for congestion, ear discharge, ear pain, facial swelling, hearing loss, mouth sores, nosebleeds, postnasal drip, rhinorrhea, sinus pressure, sore throat, tinnitus, trouble swallowing and voice change.    Eyes: Negative.  Negative for photophobia, pain, discharge and  visual disturbance.   Respiratory: Negative.  Negative for apnea, cough, chest tightness, shortness of breath, wheezing and stridor.    Cardiovascular: Negative.  Negative for chest pain, palpitations and leg swelling.   Gastrointestinal: Negative.  Negative for abdominal distention, abdominal pain, blood in stool, constipation, diarrhea, nausea, rectal pain and vomiting.   Endocrine: Negative.  Negative for cold intolerance, heat intolerance, polydipsia, polyphagia and polyuria.   Genitourinary: Negative.  Negative for difficulty urinating, dysuria, flank pain, frequency, hematuria and urgency.   Musculoskeletal: Negative.  Negative for arthralgias, back pain, gait problem, joint swelling and myalgias.   Skin: Negative.  Negative for color change, pallor, rash and wound.   Allergic/Immunologic: Negative.  Negative for environmental allergies, food allergies and immunocompromised state.   Neurological: Negative.  Negative for dizziness, tremors, seizures, syncope, speech difficulty, weakness, light-headedness, numbness and headaches.   Hematological: Negative.  Negative for adenopathy. Does not bruise/bleed easily.   Psychiatric/Behavioral: Negative.  Negative for agitation, confusion, hallucinations, sleep disturbance and suicidal ideas. The patient is not nervous/anxious.    All other systems reviewed and are negative.       Performance Status:  Asymptomatic    Medications:    Current Outpatient Medications   Medication Sig Dispense Refill   • B Complex Vitamins (VITAMIN B COMPLEX PO) Take  by mouth. 1 po daily     • Calcium Carb-Cholecalciferol (CALCIUM 600 + D PO) Take  by mouth.     • capecitabine (XELODA) 500 MG chemo tablet Take 1 tablet by mouth 2 (Two) Times a Day. For 2 weeks on and 1 week off. 42 tablet 11   • lapatinib (TYKERB) 250 MG chemo tablet Take 5 tablets by mouth Daily. For 21 days on and 7 days off. 105 tablet 11   • metoprolol tartrate (LOPRESSOR) 25 MG tablet Take 25 mg by mouth 2 (two) times  "a day.     • OLANZapine (ZYPREXA) 2.5 MG tablet Take 2.5 mg by mouth every night.     • ondansetron (ZOFRAN) 8 MG tablet Take 1 tablet by mouth Every 8 (Eight) Hours As Needed for Nausea or Vomiting. 30 tablet 5   • thyroid 60 MG PO tablet Take 60 mg by mouth daily.       No current facility-administered medications for this visit.      Facility-Administered Medications Ordered in Other Visits   Medication Dose Route Frequency Provider Last Rate Last Dose   • heparin flush (porcine) 100 UNIT/ML injection 500 Units  500 Units Intravenous PRN Joseph Nunez MD   500 Units at 10/26/17 1155   • sodium chloride 0.9 % flush 10 mL  10 mL Intravenous PRN Joseph Nunez MD   10 mL at 10/26/17 1155       ALLERGIES:    Allergies   Allergen Reactions   • Benadryl  [Diphenhydramine Hcl (Sleep)] Other (See Comments)   • Codeine    • Diphenhydramine Other (See Comments)     \"Shaky leg syndrome\"  \"Shaky leg syndrome\"  \"Shaky leg syndrome\"   • Heparin    • Other      POPPY SEED   • Penicillins Hives   • Petroleum Jelly [Skin Protectants, Misc.]    • Sulfa Antibiotics    • Sulfur    • Valium [Diazepam]        Objective      Vitals:    11/12/18 1053   BP: 128/86   Pulse: 88   Resp: 16   Temp: 98.8 °F (37.1 °C)   TempSrc: Tympanic   SpO2: 98%   Weight: 72.1 kg (158 lb 14.4 oz)   Height: 165.1 cm (65\")         Current Status 11/12/2018   ECOG score 1       General Appearance: Patient is awake, alert, oriented and in no acute distress. Patient is welldeveloped, wellnourished, and appears stated age.  HEENT: Normocephalic. Sclerae clear, conjunctiva pink, extraocular movements intact, pupils, round, reactive to light and accommodation. Mouth and throat are clear with moist oral mucosa.  NECK: Supple, no jugular venous distention, thyroid not enlarged.  LYMPH: No cervical, supraclavicular, axillary, or inguinal lymphadenopathy.  CHEST: Equal bilateral expansion   LUNGS: Good air movement, no rales, rhonchi, rubs or wheezes with " auscultation  CARDIO: Regular sinus rhythm, no murmurs, gallops or rubs.  ABDOMEN: Nondistended, soft, No tenderness, no guarding, no rebound, No hepatosplenomegaly. No abdominal masses. Bowel sounds positive. No hernia  MUSKEL: No joint swelling, decreased motion, or inflammation  EXTREMS: No edema, clubbing, cyanosis, No varicose veins.  NEURO: Grossly nonfocal, Gait is coordinated and smooth, Cognition is preserved.  SKIN: No rashes, no ecchymoses, no petechia.  PSYCH: Oriented to time, place and person. Memory is preserved. Mood and affect appear normal      RECENT LABS:  WBC   Date Value Ref Range Status   11/12/2018 9.75 4.80 - 10.80 10*3/mm3 Final     RBC   Date Value Ref Range Status   11/12/2018 3.62 (L) 4.20 - 5.40 10*6/mm3 Final     Hemoglobin   Date Value Ref Range Status   11/12/2018 12.6 12.0 - 16.0 g/dL Final     Hematocrit   Date Value Ref Range Status   11/12/2018 36.9 (L) 37.0 - 47.0 % Final     MCV   Date Value Ref Range Status   11/12/2018 101.9 (H) 82.0 - 98.0 fL Final     MCH   Date Value Ref Range Status   11/12/2018 34.8 (H) 28.0 - 32.0 pg Final     MCHC   Date Value Ref Range Status   11/12/2018 34.1 33.0 - 36.0 g/dL Final     RDW   Date Value Ref Range Status   11/12/2018 16.4 (H) 12.0 - 15.0 % Final     RDW-SD   Date Value Ref Range Status   11/12/2018 60.4 (H) 40.0 - 54.0 fl Final     MPV   Date Value Ref Range Status   11/12/2018 9.4 6.0 - 12.0 fL Final     Platelets   Date Value Ref Range Status   11/12/2018 480 (H) 130 - 400 10*3/mm3 Final     Neutrophil %   Date Value Ref Range Status   11/12/2018 65.3 39.0 - 78.0 % Final     Lymphocyte %   Date Value Ref Range Status   11/12/2018 26.7 15.0 - 45.0 % Final     Monocyte %   Date Value Ref Range Status   11/12/2018 6.4 4.0 - 12.0 % Final     Eosinophil %   Date Value Ref Range Status   11/12/2018 0.7 0.0 - 4.0 % Final     Basophil %   Date Value Ref Range Status   11/12/2018 0.3 0.0 - 2.0 % Final     Immature Grans %   Date Value Ref  Range Status   11/12/2018 0.6 0.0 - 5.0 % Final     Neutrophils, Absolute   Date Value Ref Range Status   11/12/2018 6.37 1.87 - 8.40 10*3/mm3 Final     Lymphocytes, Absolute   Date Value Ref Range Status   11/12/2018 2.60 0.72 - 4.86 10*3/mm3 Final     Monocytes, Absolute   Date Value Ref Range Status   11/12/2018 0.62 0.19 - 1.30 10*3/mm3 Final     Eosinophils, Absolute   Date Value Ref Range Status   11/12/2018 0.07 0.00 - 0.70 10*3/mm3 Final     Basophils, Absolute   Date Value Ref Range Status   11/12/2018 0.03 0.00 - 0.20 10*3/mm3 Final     Immature Grans, Absolute   Date Value Ref Range Status   11/12/2018 0.06 (H) 0.00 - 0.03 10*3/mm3 Final     nRBC   Date Value Ref Range Status   11/12/2018 0.0 0.0 - 0.0 /100 WBC Final     Orders Only on 11/12/2018   Component Date Value Ref Range Status   • Glucose 11/12/2018 121* 70 - 100 mg/dL Final   • BUN 11/12/2018 10  5 - 21 mg/dL Final   • Creatinine 11/12/2018 1.24  0.50 - 1.40 mg/dL Final   • Sodium 11/12/2018 130* 135 - 145 mmol/L Final   • Potassium 11/12/2018 4.1  3.5 - 5.3 mmol/L Final   • Chloride 11/12/2018 89* 98 - 110 mmol/L Final   • CO2 11/12/2018 27.0  24.0 - 31.0 mmol/L Final   • Calcium 11/12/2018 10.7* 8.4 - 10.4 mg/dL Final   • Total Protein 11/12/2018 8.3  6.3 - 8.7 g/dL Final   • Albumin 11/12/2018 4.20  3.50 - 5.00 g/dL Final   • ALT (SGPT) 11/12/2018 15  0 - 54 U/L Final   • AST (SGOT) 11/12/2018 36  7 - 45 U/L Final   • Alkaline Phosphatase 11/12/2018 124* 24 - 120 U/L Final   • Total Bilirubin 11/12/2018 0.5  0.1 - 1.0 mg/dL Final   • eGFR Non African Amer 11/12/2018 42* >60 mL/min/1.73 Final   • Globulin 11/12/2018 4.1  gm/dL Final   • A/G Ratio 11/12/2018 1.0* 1.1 - 2.5 g/dL Final   • BUN/Creatinine Ratio 11/12/2018 8.1  7.0 - 25.0 Final   • Anion Gap 11/12/2018 14.0* 4.0 - 13.0 mmol/L Final   • WBC 11/12/2018 9.75  4.80 - 10.80 10*3/mm3 Final   • RBC 11/12/2018 3.62* 4.20 - 5.40 10*6/mm3 Final   • Hemoglobin 11/12/2018 12.6  12.0 - 16.0  g/dL Final   • Hematocrit 11/12/2018 36.9* 37.0 - 47.0 % Final   • MCV 11/12/2018 101.9* 82.0 - 98.0 fL Final   • MCH 11/12/2018 34.8* 28.0 - 32.0 pg Final   • MCHC 11/12/2018 34.1  33.0 - 36.0 g/dL Final   • RDW 11/12/2018 16.4* 12.0 - 15.0 % Final   • RDW-SD 11/12/2018 60.4* 40.0 - 54.0 fl Final   • MPV 11/12/2018 9.4  6.0 - 12.0 fL Final   • Platelets 11/12/2018 480* 130 - 400 10*3/mm3 Final   • Neutrophil % 11/12/2018 65.3  39.0 - 78.0 % Final   • Lymphocyte % 11/12/2018 26.7  15.0 - 45.0 % Final   • Monocyte % 11/12/2018 6.4  4.0 - 12.0 % Final   • Eosinophil % 11/12/2018 0.7  0.0 - 4.0 % Final   • Basophil % 11/12/2018 0.3  0.0 - 2.0 % Final   • Immature Grans % 11/12/2018 0.6  0.0 - 5.0 % Final   • Neutrophils, Absolute 11/12/2018 6.37  1.87 - 8.40 10*3/mm3 Final   • Lymphocytes, Absolute 11/12/2018 2.60  0.72 - 4.86 10*3/mm3 Final   • Monocytes, Absolute 11/12/2018 0.62  0.19 - 1.30 10*3/mm3 Final   • Eosinophils, Absolute 11/12/2018 0.07  0.00 - 0.70 10*3/mm3 Final   • Basophils, Absolute 11/12/2018 0.03  0.00 - 0.20 10*3/mm3 Final   • Immature Grans, Absolute 11/12/2018 0.06* 0.00 - 0.03 10*3/mm3 Final   • nRBC 11/12/2018 0.0  0.0 - 0.0 /100 WBC Final   Lab on 11/06/2018   Component Date Value Ref Range Status   • Glucose 11/06/2018 91  70 - 100 mg/dL Final   • BUN 11/06/2018 15  5 - 21 mg/dL Final   • Creatinine 11/06/2018 1.28  0.50 - 1.40 mg/dL Final   • Sodium 11/06/2018 135  135 - 145 mmol/L Final   • Potassium 11/06/2018 3.8  3.5 - 5.3 mmol/L Final   • Chloride 11/06/2018 97* 98 - 110 mmol/L Final   • CO2 11/06/2018 27.0  24.0 - 31.0 mmol/L Final   • Calcium 11/06/2018 9.9  8.4 - 10.4 mg/dL Final   • Albumin 11/06/2018 3.80  3.50 - 5.00 g/dL Final   • Phosphorus 11/06/2018 4.2  2.5 - 4.5 mg/dL Final   • Anion Gap 11/06/2018 11.0  4.0 - 13.0 mmol/L Final   • BUN/Creatinine Ratio 11/06/2018 11.7  7.0 - 25.0 Final   • eGFR Non African Amer 11/06/2018 41* >60 mL/min/1.73 Final   • WBC 11/06/2018 10.48   4.80 - 10.80 10*3/mm3 Final   • RBC 11/06/2018 3.33* 4.20 - 5.40 10*6/mm3 Final   • Hemoglobin 11/06/2018 11.5* 12.0 - 16.0 g/dL Final   • Hematocrit 11/06/2018 34.2* 37.0 - 47.0 % Final   • MCV 11/06/2018 102.7* 82.0 - 98.0 fL Final   • MCH 11/06/2018 34.5* 28.0 - 32.0 pg Final   • MCHC 11/06/2018 33.6  33.0 - 36.0 g/dL Final   • RDW 11/06/2018 17.9* 12.0 - 15.0 % Final   • RDW-SD 11/06/2018 68.8* 40.0 - 54.0 fl Final   • MPV 11/06/2018 9.8  6.0 - 12.0 fL Final   • Platelets 11/06/2018 410* 130 - 400 10*3/mm3 Final   • Uric Acid 11/06/2018 9.9* 2.7 - 7.5 mg/dL Final   • Total Protein, Urine 11/06/2018 13.0  0.0 - 13.5 mg/dL Final   • Creatinine, Urine 11/06/2018 44.2  mg/dL Final   • PTH, Intact 11/06/2018 11.4  7.5 - 53.5 pg/mL Final   • Color, UA 11/06/2018 Yellow  Yellow, Straw Final   • Appearance, UA 11/06/2018 Clear  Clear Final   • pH, UA 11/06/2018 <=5.0  5.0 - 8.0 Final   • Specific Gravity, UA 11/06/2018 1.011  1.005 - 1.030 Final   • Glucose, UA 11/06/2018 Negative  Negative Final   • Ketones, UA 11/06/2018 Negative  Negative Final   • Bilirubin, UA 11/06/2018 Negative  Negative Final   • Blood, UA 11/06/2018 Negative  Negative Final   • Protein, UA 11/06/2018 Negative  Negative Final   • Leuk Esterase, UA 11/06/2018 Trace* Negative Final   • Nitrite, UA 11/06/2018 Negative  Negative Final   • Urobilinogen, UA 11/06/2018 0.2 E.U./dL  0.2 - 1.0 E.U./dL Final   • RBC, UA 11/06/2018 0-2* None Seen /HPF Final   • WBC, UA 11/06/2018 0-2* None Seen /HPF Final   • Bacteria, UA 11/06/2018 None Seen  None Seen /HPF Final   • Squamous Epithelial Cells, UA 11/06/2018 0-2  None Seen, 0-2 /HPF Final   • Hyaline Casts, UA 11/06/2018 None Seen  None Seen /LPF Final   • Methodology 11/06/2018 Automated Microscopy   Final             Assessment/Plan     Patient Active Problem List   Diagnosis   • Malignant neoplasm of upper-outer quadrant of left female breast (CMS/HCC)   • Breast cancer metastasized to bone, unspecified  laterality (CMS/HCC)   • Essential hypertension   • Acquired hypothyroidism   • Bipolar 1 disorder (CMS/HCC)   • Carcinoma of left breast metastatic to bone (CMS/HCC)   • Antineoplastic chemotherapy induced anemia   • Dehydration          Assessment:  1. Stage IV metastatic breast carcinoma, ER/N[place] positive. HER2/maynor negative undergoing Ibrance, Faslodex and Xgeva with overall stable with stable CA27.29 with slow decline. She is tolerating the treatment very well. Faslodex cycle 17 today and will continue to HOLD Xgeva due to upcoming dental work, last dose of Xgeva given on 09/29/2016.  01/2017 restaging CT chest abdomen and pelvis and bone scan showed stable diffuse sclerotic bony lesions, unchanged from 7/1/2016.Next scan July 2017    2.  Mild leukopenia without neutropenia.  Likely secondary to Ibrance.  Continue to monitor.     3.  Macrocytic anemia.  Hemoglobin 11.1.  02/2016, B12 and folate normal    4.  Status post 8 teeth extraction.     Recommendation/Plan:  1.  Proceed with Faslodex cycle 20.  2. Continue Ibrance 125 mg 3 weeks on, one-week off.  Reduce dose if leukopenia worsens.   3. Follow up in 28 days for Faslodex cycle 19.   Repeat CA 27.29.  Previous CA 27.29 have remained stable, the last one done on 11/2017 was 89 Stable. Continue Faslodex q monthly.    Additionally she is anemic with a hemoglobin of 10 g percent with decreasing GFR.  I do suspect that she may have some form of chronic kidney disease, as evaluated by Nephrology, [facility].  No need for EPO as she is asyptomatic.  Redraw CA 27.29 today. Cont faslodex + Ibrance for now until repeat CA 27.29 returns. Last CA 27.29 in Feb 2018 slightly increased to 96. I have discussed different options with her:  #1. Stop Ibrance and switch to a new CD4K inhabitor Kisqali or Verenzio.  #. Stop Ibrance and switch to Affinitor + faslodex.  #. Cont Ibrance and Faslodex with just a subtle rise, since clinically pt is doing better.    At this point  will restage with CT scans [facility] at [facility] and compare to one done Jan 2017. If progression on CT scans and CA 27.29 then go for the [employer] trial of using MTOR inhabitor Everlimos + Exemestine. Bone scan shows progressive dz. Therefore will stop Faslodex. Start Exemestine 25mg daily in combination with Affinitor 10mg daily. Redraw her tumor today.  Calcium improved but AST higher.    DC Exemestine and Affinitor as the CA 27.29 yulia to 140. Considering this will start Xeloda 500mg twice daily 2 Wks ON and one wk OFF.    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 Will add Her 2 Tykerb 1250mg q day x 21 days in combo + Oral Xeloda 500mg bid 3 wks On and 1 WK OFF. Will draw CA 27.29.    Since she started Lapatinib 1250 mg q day + Xeloda combo from 8/22/18 she has had significant dirrohea and lately has been throwing up and today her Creat is up to 2.3 from 0.9 and GFR down to 21 as well as she has lost 7 ILBS WT and appears very dehydrated. All meds were stopped last week and she received IVF and today creat is back down to 1.25 and GFR upto 41. Redraw CAproved the best would be either to reduce the dose and keep both Lapitinib and Xeloda on board, she will call me tomorrow. Therefor, last week the Tykerb dose was reduced to 2 tabs q day ( 500mg ) daily 3 wks On and 1 wk OFF Plus Xeloda 500mg 1 tab q day 2 Wks ON and 1K OFF. See  How this schedulle works and therefore, will draw CA27.29 again today.    She walked in today c/o pain in abdmn and R Lumbar area. On my exam, herabdmn is distended with Ascitis. I need to get a stat CT scan with 50% IV contrast and Pre and post contrast 500cc N/S.RTC tomorrow morning. 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  Joseph Nunez MD    11/12/2018    11:11 AM

## 2018-11-12 NOTE — ED NOTES
Pt and spouse updated results final and Dr. Tinsley to discuss.  No other concerns at this time     Quinton Patel RN  11/12/18 0781

## 2018-11-12 NOTE — TELEPHONE ENCOUNTER
Per Dr Nunez instruction, patient called and informed that we would not be able to get her jenni for Scans until this coming Thursday 11/15/18 at any of the local facilities [facility] or [facility]. Due to patients current condition and feeling so bad since last Thursday 11/8/18, Dr Nunez wants her to come to the ER Dept here at  to be eval and possible admission thru the ER Dept so the scans can be performed sooner than later since she is having so much discomfort from abdominal distention and pain, difficulties with urination, posterior flank pain. Patient v/u of our conversation and will go to the ER Dept at .

## 2018-11-12 NOTE — TELEPHONE ENCOUNTER
Per Dr Nunez instructions, call placed to  ER Dept and spoke with Lila. Informed her that Dr Nunez was having one of his patients, Mrs. Heavenly Yanes sent to ER due to uncontrolled pain located posterior flank area along with abdominal pain and discomfort, problems with urination and retention due to distended abdomen. These problems began last Thursday 11/8/18 and she states she has been miserable all weekend and has even resorted to taking pain medication which she has not had to take for quite some time.   Lila was informed that she was seen earlier in Dr Nunez office due to these symptoms and complaints and he had ordered Scans STAT for today, but patient was unable to be jenni at [facility], [facility], or [facility] due to patient load. He feels she is unable to wait until Thursday 11/15/18 for the scans due to her issues.   He was in hopes that she will be able to go through the ER Dept and get the scans performed sooner this way.   Informed Lila that the CT Chest/Ab/Pelvis along with Bone Scan needs to be ordered with 50% IV dye/contrast with 500 cc Normal Saline pre and post procedure. Lila v/kwan of Dr Nunez's orders and recommendations.

## 2018-11-13 ENCOUNTER — INFUSION (OUTPATIENT)
Dept: ONCOLOGY | Facility: HOSPITAL | Age: 73
End: 2018-11-13

## 2018-11-13 ENCOUNTER — OFFICE VISIT (OUTPATIENT)
Dept: ONCOLOGY | Facility: CLINIC | Age: 73
End: 2018-11-13

## 2018-11-13 VITALS
RESPIRATION RATE: 18 BRPM | HEART RATE: 82 BPM | DIASTOLIC BLOOD PRESSURE: 68 MMHG | OXYGEN SATURATION: 95 % | WEIGHT: 145 LBS | SYSTOLIC BLOOD PRESSURE: 130 MMHG | TEMPERATURE: 98.6 F | BODY MASS INDEX: 24.16 KG/M2 | HEIGHT: 65 IN

## 2018-11-13 VITALS
HEART RATE: 77 BPM | SYSTOLIC BLOOD PRESSURE: 130 MMHG | BODY MASS INDEX: 24.29 KG/M2 | DIASTOLIC BLOOD PRESSURE: 56 MMHG | OXYGEN SATURATION: 98 % | RESPIRATION RATE: 17 BRPM | WEIGHT: 145.8 LBS | HEIGHT: 65 IN | TEMPERATURE: 98.4 F

## 2018-11-13 DIAGNOSIS — C50.912 CARCINOMA OF LEFT BREAST METASTATIC TO BONE (HCC): ICD-10-CM

## 2018-11-13 DIAGNOSIS — C79.51 BREAST CANCER METASTASIZED TO BONE, UNSPECIFIED LATERALITY (HCC): ICD-10-CM

## 2018-11-13 DIAGNOSIS — I10 ESSENTIAL HYPERTENSION: Chronic | ICD-10-CM

## 2018-11-13 DIAGNOSIS — C79.51 CARCINOMA OF LEFT BREAST METASTATIC TO BONE (HCC): Primary | ICD-10-CM

## 2018-11-13 DIAGNOSIS — C79.51 CARCINOMA OF LEFT BREAST METASTATIC TO BONE (HCC): ICD-10-CM

## 2018-11-13 DIAGNOSIS — Z01.818 EXAMINATION PRIOR TO CHEMOTHERAPY: ICD-10-CM

## 2018-11-13 DIAGNOSIS — D64.81 ANTINEOPLASTIC CHEMOTHERAPY INDUCED ANEMIA: Primary | ICD-10-CM

## 2018-11-13 DIAGNOSIS — C50.412 MALIGNANT NEOPLASM OF UPPER-OUTER QUADRANT OF LEFT FEMALE BREAST, UNSPECIFIED ESTROGEN RECEPTOR STATUS (HCC): ICD-10-CM

## 2018-11-13 DIAGNOSIS — D72.0 GENETIC ANOMALIES OF LEUKOCYTES (HCC): ICD-10-CM

## 2018-11-13 DIAGNOSIS — C50.919 BREAST CANCER METASTASIZED TO BONE, UNSPECIFIED LATERALITY (HCC): ICD-10-CM

## 2018-11-13 DIAGNOSIS — T45.1X5A ANTINEOPLASTIC CHEMOTHERAPY INDUCED ANEMIA: Primary | ICD-10-CM

## 2018-11-13 DIAGNOSIS — C50.912 CARCINOMA OF LEFT BREAST METASTATIC TO BONE (HCC): Primary | ICD-10-CM

## 2018-11-13 LAB — CANCER AG27-29 SERPL-ACNC: 200.4 U/ML (ref 0–38.6)

## 2018-11-13 PROCEDURE — 25010000002 TRASTUZUMAB PER 10 MG: Performed by: INTERNAL MEDICINE

## 2018-11-13 PROCEDURE — 25010000002 PERTUZUMAB 420 MG/14ML SOLUTION 420 MG VIAL: Performed by: INTERNAL MEDICINE

## 2018-11-13 PROCEDURE — 96367 TX/PROPH/DG ADDL SEQ IV INF: CPT

## 2018-11-13 PROCEDURE — 96413 CHEMO IV INFUSION 1 HR: CPT

## 2018-11-13 PROCEDURE — 25010000002 DOCETAXEL 160 MG/8ML CONCENTRATION 8 ML VIAL: Performed by: INTERNAL MEDICINE

## 2018-11-13 PROCEDURE — 25010000003 DEXAMETHASONE SODIUM PHOSPHATE 100 MG/10ML SOLUTION: Performed by: INTERNAL MEDICINE

## 2018-11-13 PROCEDURE — 96417 CHEMO IV INFUS EACH ADDL SEQ: CPT

## 2018-11-13 PROCEDURE — 96415 CHEMO IV INFUSION ADDL HR: CPT

## 2018-11-13 PROCEDURE — 25010000002 ONDANSETRON PER 1 MG: Performed by: INTERNAL MEDICINE

## 2018-11-13 PROCEDURE — 99214 OFFICE O/P EST MOD 30 MIN: CPT | Performed by: INTERNAL MEDICINE

## 2018-11-13 RX ORDER — SODIUM CHLORIDE 9 MG/ML
250 INJECTION, SOLUTION INTRAVENOUS ONCE
Status: COMPLETED | OUTPATIENT
Start: 2018-11-13 | End: 2018-11-13

## 2018-11-13 RX ORDER — RANITIDINE 150 MG/1
150 TABLET ORAL 2 TIMES DAILY
COMMUNITY
End: 2019-07-19 | Stop reason: SDUPTHER

## 2018-11-13 RX ORDER — LANOLIN ALCOHOL/MO/W.PET/CERES
1000 CREAM (GRAM) TOPICAL DAILY
COMMUNITY
End: 2019-01-31

## 2018-11-13 RX ORDER — SODIUM CHLORIDE 9 MG/ML
250 INJECTION, SOLUTION INTRAVENOUS ONCE
Status: CANCELLED | OUTPATIENT
Start: 2018-11-13

## 2018-11-13 RX ORDER — BIOTIN 1 MG
1000 TABLET ORAL DAILY
COMMUNITY
End: 2019-01-31

## 2018-11-13 RX ORDER — FAMOTIDINE 10 MG/ML
20 INJECTION, SOLUTION INTRAVENOUS AS NEEDED
Status: CANCELLED | OUTPATIENT
Start: 2018-11-13

## 2018-11-13 RX ORDER — SODIUM CHLORIDE 0.9 % (FLUSH) 0.9 %
10 SYRINGE (ML) INJECTION AS NEEDED
Status: DISCONTINUED | OUTPATIENT
Start: 2018-11-13 | End: 2018-11-13 | Stop reason: HOSPADM

## 2018-11-13 RX ORDER — FAMOTIDINE 10 MG/ML
20 INJECTION, SOLUTION INTRAVENOUS AS NEEDED
Status: DISCONTINUED | OUTPATIENT
Start: 2018-11-13 | End: 2018-11-13 | Stop reason: HOSPADM

## 2018-11-13 RX ORDER — SODIUM CHLORIDE 0.9 % (FLUSH) 0.9 %
10 SYRINGE (ML) INJECTION AS NEEDED
Status: CANCELLED | OUTPATIENT
Start: 2018-11-13

## 2018-11-13 RX ORDER — MEPERIDINE HYDROCHLORIDE 50 MG/ML
25 INJECTION INTRAMUSCULAR; INTRAVENOUS; SUBCUTANEOUS
Status: CANCELLED | OUTPATIENT
Start: 2018-11-13 | End: 2018-11-14

## 2018-11-13 RX ORDER — MEPERIDINE HYDROCHLORIDE 50 MG/ML
25 INJECTION INTRAMUSCULAR; INTRAVENOUS; SUBCUTANEOUS
Status: DISCONTINUED | OUTPATIENT
Start: 2018-11-13 | End: 2018-11-13 | Stop reason: HOSPADM

## 2018-11-13 RX ADMIN — TRASTUZUMAB 570 MG: 150 INJECTION, POWDER, LYOPHILIZED, FOR SOLUTION INTRAVENOUS at 13:16

## 2018-11-13 RX ADMIN — PERTUZUMAB 840 MG: 30 INJECTION, SOLUTION, CONCENTRATE INTRAVENOUS at 10:55

## 2018-11-13 RX ADMIN — DEXAMETHASONE SODIUM PHOSPHATE 50 ML: 10 INJECTION, SOLUTION INTRAMUSCULAR; INTRAVENOUS at 10:30

## 2018-11-13 RX ADMIN — Medication 500 UNITS: at 15:00

## 2018-11-13 RX ADMIN — Medication 10 ML: at 15:00

## 2018-11-13 RX ADMIN — SODIUM CHLORIDE 250 ML: 9 INJECTION, SOLUTION INTRAVENOUS at 10:29

## 2018-11-13 RX ADMIN — DOCETAXEL 134 MG: 20 INJECTION, SOLUTION, CONCENTRATE INTRAVENOUS at 12:03

## 2018-11-13 NOTE — PATIENT INSTRUCTIONS
Pertuzumab injection  What is this medicine?  PERTUZUMAB (per TOOZ ue mab) is a monoclonal antibody. It is used to treat breast cancer.  This medicine may be used for other purposes; ask your health care provider or pharmacist if you have questions.  COMMON BRAND NAME(S): MERON  What should I tell my health care provider before I take this medicine?  They need to know if you have any of these conditions:  -heart disease  -heart failure  -high blood pressure  -history of irregular heart beat  -recent or ongoing radiation therapy  -an unusual or allergic reaction to pertuzumab, other medicines, foods, dyes, or preservatives  -pregnant or trying to get pregnant  -breast-feeding  How should I use this medicine?  This medicine is for infusion into a vein. It is given by a health care professional in a hospital or clinic setting.  Talk to your pediatrician regarding the use of this medicine in children. Special care may be needed.  Overdosage: If you think you have taken too much of this medicine contact a poison control center or emergency room at once.  NOTE: This medicine is only for you. Do not share this medicine with others.  What if I miss a dose?  It is important not to miss your dose. Call your doctor or health care professional if you are unable to keep an appointment.  What may interact with this medicine?  Interactions are not expected.  Give your health care provider a list of all the medicines, herbs, non-prescription drugs, or dietary supplements you use. Also tell them if you smoke, drink alcohol, or use illegal drugs. Some items may interact with your medicine.  This list may not describe all possible interactions. Give your health care provider a list of all the medicines, herbs, non-prescription drugs, or dietary supplements you use. Also tell them if you smoke, drink alcohol, or use illegal drugs. Some items may interact with your medicine.  What should I watch for while using this medicine?  Your  condition will be monitored carefully while you are receiving this medicine. Report any side effects. Continue your course of treatment even though you feel ill unless your doctor tells you to stop.  Do not become pregnant while taking this medicine or for 7 months after stopping it. Women should inform their doctor if they wish to become pregnant or think they might be pregnant. Women of child-bearing potential will need to have a negative pregnancy test before starting this medicine. There is a potential for serious side effects to an unborn child. Talk to your health care professional or pharmacist for more information. Do not breast-feed an infant while taking this medicine or for 7 months after stopping it.  Women must use effective birth control with this medicine.  Call your doctor or health care professional for advice if you get a fever, chills or sore throat, or other symptoms of a cold or flu. Do not treat yourself. Try to avoid being around people who are sick.  You may experience fever, chills, and headache during the infusion. Report any side effects during the infusion to your health care professional.  What side effects may I notice from receiving this medicine?  Side effects that you should report to your doctor or health care professional as soon as possible:  -breathing problems  -chest pain or palpitations  -dizziness  -feeling faint or lightheaded  -fever or chills  -skin rash, itching or hives  -sore throat  -swelling of the face, lips, or tongue  -swelling of the legs or ankles  -unusually weak or tired  Side effects that usually do not require medical attention (report to your doctor or health care professional if they continue or are bothersome):  -diarrhea  -hair loss  -nausea, vomiting  -tiredness  This list may not describe all possible side effects. Call your doctor for medical advice about side effects. You may report side effects to FDA at 7-661-FDA-2301.  Where should I keep my  medicine?  This drug is given in a hospital or clinic and will not be stored at home.  NOTE: This sheet is a summary. It may not cover all possible information. If you have questions about this medicine, talk to your doctor, pharmacist, or health care provider.  © 2018 [employer] Standard (2017-01-19 12:08:50)  Trastuzumab injection for infusion  What is this medicine?  TRASTUZUMAB (tras TOO zoo mab) is a monoclonal antibody. It is used to treat breast cancer and stomach cancer.  This medicine may be used for other purposes; ask your health care provider or pharmacist if you have questions.  COMMON BRAND NAME(S): Herceptin  What should I tell my health care provider before I take this medicine?  They need to know if you have any of these conditions:  -heart disease  -heart failure  -lung or breathing disease, like asthma  -an unusual or allergic reaction to trastuzumab, benzyl alcohol, or other medications, foods, dyes, or preservatives  -pregnant or trying to get pregnant  -breast-feeding  How should I use this medicine?  This drug is given as an infusion into a vein. It is administered in a hospital or clinic by a specially trained health care professional.  Talk to your pediatrician regarding the use of this medicine in children. This medicine is not approved for use in children.  Overdosage: If you think you have taken too much of this medicine contact a poison control center or emergency room at once.  NOTE: This medicine is only for you. Do not share this medicine with others.  What if I miss a dose?  It is important not to miss a dose. Call your doctor or health care professional if you are unable to keep an appointment.  What may interact with this medicine?  This medicine may interact with the following medications:  -certain types of chemotherapy, such as daunorubicin, doxorubicin, epirubicin, and idarubicin  This list may not describe all possible interactions. Give your health care provider a list of  all the medicines, herbs, non-prescription drugs, or dietary supplements you use. Also tell them if you smoke, drink alcohol, or use illegal drugs. Some items may interact with your medicine.  What should I watch for while using this medicine?  Visit your doctor for checks on your progress. Report any side effects. Continue your course of treatment even though you feel ill unless your doctor tells you to stop.  Call your doctor or health care professional for advice if you get a fever, chills or sore throat, or other symptoms of a cold or flu. Do not treat yourself. Try to avoid being around people who are sick.  You may experience fever, chills and shaking during your first infusion. These effects are usually mild and can be treated with other medicines. Report any side effects during the infusion to your health care professional. Fever and chills usually do not happen with later infusions.  Do not become pregnant while taking this medicine or for 7 months after stopping it. Women should inform their doctor if they wish to become pregnant or think they might be pregnant. Women of child-bearing potential will need to have a negative pregnancy test before starting this medicine. There is a potential for serious side effects to an unborn child. Talk to your health care professional or pharmacist for more information. Do not breast-feed an infant while taking this medicine or for 7 months after stopping it.  Women must use effective birth control with this medicine.  What side effects may I notice from receiving this medicine?  Side effects that you should report to your doctor or health care professional as soon as possible:  -allergic reactions like skin rash, itching or hives, swelling of the face, lips, or tongue  -chest pain or palpitations  -cough  -dizziness  -feeling faint or lightheaded, falls  -fever  -general ill feeling or flu-like symptoms  -signs of worsening heart failure like breathing problems;  swelling in your legs and feet  -unusually weak or tired  Side effects that usually do not require medical attention (report to your doctor or health care professional if they continue or are bothersome):  -bone pain  -changes in taste  -diarrhea  -joint pain  -nausea/vomiting  -weight loss  This list may not describe all possible side effects. Call your doctor for medical advice about side effects. You may report side effects to FDA at 9-372-FDA-8193.  Where should I keep my medicine?  This drug is given in a hospital or clinic and will not be stored at home.  NOTE: This sheet is a summary. It may not cover all possible information. If you have questions about this medicine, talk to your doctor, pharmacist, or health care provider.  © 2018 [employer]/Gold Standard (2017-12-12 14:37:52)  Docetaxel injection  What is this medicine?  DOCETAXEL (rodriguez se TAX el) is a chemotherapy drug. It targets fast dividing cells, like cancer cells, and causes these cells to die. This medicine is used to treat many types of cancers like breast cancer, certain stomach cancers, head and neck cancer, lung cancer, and prostate cancer.  This medicine may be used for other purposes; ask your health care provider or pharmacist if you have questions.  COMMON BRAND NAME(S): Docefrez, Taxotere  What should I tell my health care provider before I take this medicine?  They need to know if you have any of these conditions:  -infection (especially a virus infection such as chickenpox, cold sores, or herpes)  -liver disease  -low blood counts, like low white cell, platelet, or red cell counts  -an unusual or allergic reaction to docetaxel, polysorbate 80, other chemotherapy agents, other medicines, foods, dyes, or preservatives  -pregnant or trying to get pregnant  -breast-feeding  How should I use this medicine?  This drug is given as an infusion into a vein. It is administered in a hospital or clinic by a specially trained health care  professional.  Talk to your pediatrician regarding the use of this medicine in children. Special care may be needed.  Overdosage: If you think you have taken too much of this medicine contact a poison control center or emergency room at once.  NOTE: This medicine is only for you. Do not share this medicine with others.  What if I miss a dose?  It is important not to miss your dose. Call your doctor or health care professional if you are unable to keep an appointment.  What may interact with this medicine?  -cyclosporine  -erythromycin  -ketoconazole  -medicines to increase blood counts like filgrastim, pegfilgrastim, sargramostim  -vaccines  Talk to your doctor or health care professional before taking any of these medicines:  -acetaminophen  -aspirin  -ibuprofen  -ketoprofen  -naproxen  This list may not describe all possible interactions. Give your health care provider a list of all the medicines, herbs, non-prescription drugs, or dietary supplements you use. Also tell them if you smoke, drink alcohol, or use illegal drugs. Some items may interact with your medicine.  What should I watch for while using this medicine?  Your condition will be monitored carefully while you are receiving this medicine. You will need important blood work done while you are taking this medicine.  This drug may make you feel generally unwell. This is not uncommon, as chemotherapy can affect healthy cells as well as cancer cells. Report any side effects. Continue your course of treatment even though you feel ill unless your doctor tells you to stop.  In some cases, you may be given additional medicines to help with side effects. Follow all directions for their use.  Call your doctor or health care professional for advice if you get a fever, chills or sore throat, or other symptoms of a cold or flu. Do not treat yourself. This drug decreases your body's ability to fight infections. Try to avoid being around people who are sick.  This  medicine may increase your risk to bruise or bleed. Call your doctor or health care professional if you notice any unusual bleeding.  This medicine may contain alcohol in the product. You may get drowsy or dizzy. Do not drive, use machinery, or do anything that needs mental alertness until you know how this medicine affects you. Do not stand or sit up quickly, especially if you are an older patient. This reduces the risk of dizzy or fainting spells. Avoid alcoholic drinks.  Do not become pregnant while taking this medicine. Women should inform their doctor if they wish to become pregnant or think they might be pregnant. There is a potential for serious side effects to an unborn child. Talk to your health care professional or pharmacist for more information. Do not breast-feed an infant while taking this medicine.  What side effects may I notice from receiving this medicine?  Side effects that you should report to your doctor or health care professional as soon as possible:  -allergic reactions like skin rash, itching or hives, swelling of the face, lips, or tongue  -low blood counts - This drug may decrease the number of white blood cells, red blood cells and platelets. You may be at increased risk for infections and bleeding.  -signs of infection - fever or chills, cough, sore throat, pain or difficulty passing urine  -signs of decreased platelets or bleeding - bruising, pinpoint red spots on the skin, black, tarry stools, nosebleeds  -signs of decreased red blood cells - unusually weak or tired, fainting spells, lightheadedness  -breathing problems  -fast or irregular heartbeat  -low blood pressure  -mouth sores  -nausea and vomiting  -pain, swelling, redness or irritation at the injection site  -pain, tingling, numbness in the hands or feet  -swelling of the ankle, feet, hands  -weight gain  Side effects that usually do not require medical attention (report to your doctor or health care professional if they  continue or are bothersome):  -bone pain  -complete hair loss including hair on your head, underarms, pubic hair, eyebrows, and eyelashes  -diarrhea  -excessive tearing  -changes in the color of fingernails  -loosening of the fingernails  -nausea  -muscle pain  -red flush to skin  -sweating  -weak or tired  This list may not describe all possible side effects. Call your doctor for medical advice about side effects. You may report side effects to FDA at 3-555-KPH-1966.  Where should I keep my medicine?  This drug is given in a hospital or clinic and will not be stored at home.  NOTE: This sheet is a summary. It may not cover all possible information. If you have questions about this medicine, talk to your doctor, pharmacist, or health care provider.  © 2018 [employer] Standard (2017-01-19 12:32:56)

## 2018-11-13 NOTE — ED NOTES
Dr. Tinsley notified pt back from [facility], Quinton WHARTON RN  11/12/18 2031

## 2018-11-13 NOTE — PROGRESS NOTES
Port flushed w/NS only due to patient allergy.  LORETO Yanes RN  [facility]  HEMATOLOGY & ONCOLOGY        Subjective    Metastatic breast cancer on Faslodex and Ibrance     Staging form: Breast, AJCC V7      Clinical stage from 10/11/2016: Stage IV (T2, N1, M1) - ER/TX positive. HER2/maynor negative  VISIT DIAGNOSIS:   Metastatic breast cancer       HEMATOLOGY / ONCOLOGY HISTORY:   Oncology/Hematology History    Patient is a 67-year-old postmenopausal female who had onset of her menses at age 9 and menopause at age 48. She received oral contraceptives for approximately 14 years and did not receive hormonal manipulation. Patient has a maternal cousin had breast cancer. Patient has had no prior breast biopsies. Patient found a palpable left breast mass as well as a left axillary mass. Patient had a bloody nipple discharge which been present for approximately 6 months. On 6/20/2012, a mammogram was performed showing a subareolar mass consistent with malignancy. Bilateral breast ultrasound revealed an ill-defined subareolar mass measuring 2.8-2.5 cm. There is a left axillary mass measuring 1.7 cm with suspicion of extracapsular extension. There is no evidence of disease in the right breast. On 7/9/2012 patient underwent a left breast biopsy and placement of marker clip. Left breast biopsy revealed an infiltrating lobular carcinoma grade 2 with focal ductal carcinoma in situ, solid pattern. A fine-needle biopsy of the left lymph node was consistent with metastatic carcinoma. Breast tumor profile revealed ER: 100%; TX: 98%; HER-2/maynor 2+; FISH: Unamplified; Ki-67: 71%; P 53 1%; DNA aneuploidy. A MRI of the breast were performed body multiple abnormal enhancing masses within the left breast. There appears to be anterior retraction of the pectoralis muscle with no direct extension. There is a moderate to large, layering left pleural effusion appreciated. The right breast reveals 3 moderately enlarged lymph nodes  in the posterior lateral aspect the right breast. These have fatty hilum but followup is recommended. Patient is undergone systemic studies including, on 8/2/2012, a CT scan of the brain showing atrophy. a CT scan that shows showing a small to moderate left pleural effusion with 3 subcentimeter nodule densities in the left lung.   She completed 4 cycles of neoadjuvant treatment with dose dense Adriamycin and Cytoxan. She had a mammogram which showed a significant reduction in the size of her left breast lesion. There is no axillary adenopathy noted. She has had an ultrasound revealing the  lesion reducing from 2.4 cm to 1 cm. Patient underwent a left mastectomy on 03/19/2013 finding negative invasive cancer, negative nodes. A 5% DCIS was noted. The patient has declined hormonal therapy. She did not need radiation therapy.  November 2014, she began to have evidence of lytic bone lesions at T5T6, frontal disease, an 8 mm lucency in the central frontal area of  the skull but nothing intracranial. Bone scan revealed only vertex tracer activity but bilateral ribs and thoracic spine, and other lesions in  her pelvis. At that time, she was started on letrozole since November 2014. She is taking Xgeva. She had progression found in bone in Feb 2015 on scan. She was started on Faslodex, Ibrance and continued xgeva.         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 B) Foci of ductal carcinoma in situ, solid pattern. C) Greatest dimension of the invasive carcinoma is 15.8mm.         3/19/2013 Surgery     Left Mastectomy w/ Axillary Tail: Focal residual ducatal carcinoma in situ (DCIS) 12 lymph nodes negative for metastatic carcinoma.         10/20/2014 Biopsy     Peritoneum Biopsy: Final  Diagnosis: Malignant Cell Neoplasm.           Breast cancer metastasized to bone, unspecified laterality (CMS/HCC)    10/15/2012 -  Other Event     Left mammogram:  Impression:  1. Decreasing spiculation and density in the retroareolar left breast, near a previous biopsy.   2. Definite left axillary lesion is not appreciated.          2/5/2013 -  Other Event     Diagnostic Mammo:  Comparison is made to 10/15/2012 and 6/20/2012  The spiculated mass at 12:00 behind the nipple is nearly resolved.   Impression:  1. The mass on the left side is less apparent after receiving chemotherapy. There has been a good response to chmotherapy on the left side.   2. No suspicious abnormality is seen in the right breast to account for the new palable abnormality at the 4-5:00 position.          3/19/2013 Surgery     Breast Biopsy:  Final Diagnosis:  A. Breast, left mastectomy with axillary tail  1. Focal residual ductal carcinoma in situ (DICS) (less than 5% of tumor bed).  2. Negative for residual invasive carcinoma.   3. 12 Lymph Nodes, negative for metastatic carcinoma (0/12) focally, some lymph nodes demonstrate fibrosis/treatment effect.  B. Skin and soft tissue, left advancement flap:  1. Benign skin and subcutis.   2. Negative for carcinoma.          6/20/2013 -  Other Event     Diagnostic Mammo [facility]:  1. A subareolar mass is consistent with malignancy. Additional involement extends inferiorly sonographically and superolaterally mammograhically as demonstrated by calcifications. The mass measures approx 2.8 cm sonographically, but the involved region is likely much larger including an intraductal componet. The left axillary lymph nodes are also involved.   2. Recommened ultrasound guided biopsy of the left subareolar mass and left axillary lymph node.   3. Breast MRI could also further define multicentric disease on the left.   4. Clear suspicious finding on the right.  Recommend ongoing clinical follow up of palpable  foci.          6/13/2014 -  Other Event     Diagnostic Mammo:  IMPRESSION:  1. History of left breast cancer and mastectomy. Stable benign right breast mammograms.          10/20/2014 Surgery     Final Diagnosis:  Biopsies, pertoneum:  Metastatic carcinoma, morphologically compatible with metastatic mammary carcinoma.          6/15/2015 -  Other Event     Diagnostic Mammo:  IMPRESSION:   1. Negative x-ray report, should not delay biopsy if a dominat or clinically suspicious mass is present.          7/5/2016 -  Other Event     Diagnositic mammogram:  Impression:   Stable right mamogram with no radiographic findings suspicious for malignancy. Recommend continued screening mammography per screening guidelines unless otherwise earlier clinically indicated.          9/18/2016 Initial Diagnosis     Secondary malignant neoplasm of bone and bone marrow          Cancer Staging Information:  Malignant neoplasm of upper-outer quadrant of left female breast    Staging form: Breast, AJCC V7      Clinical stage from 10/11/2016: Stage IV (T2, N1, M1) - Signed by ALBERT Kulkarni on 10/11/2016      INTERVAL HISTORY  Patient ID: Heavenly Yanes is a 73 y.o. year old female history of metastatic breast carcinoma to lung and bone.  She is presently receiving treatment with Ibrance, Faslodex and Xgeva.  Tumor markers continued to decline with a CA 27-29 of 92.2 obtained on 9:30 2016 compared to 105.8 on 07/27/2016.  Mr. Yanes continues to tolerate this treatment regimen well and is here today for evaluation consideration to receive Faslodex and Xgeva.  Last bone mineral density study was obtained 11/21/2014 indicating osteopopenia, we will obtain a repeat bone mineral density study. Ms. Yanes completed 8 teeth pulled 02/14/2017.   Xgeva on hold, resume 05/2017.     Past Medical History:   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   • Bipolar disorder (CMS/HCC)    • Disease  of thyroid gland    • Hypertension    • Malignant neoplasm of upper-outer quadrant of left female breast (CMS/HCC) 9/18/2016   • Secondary malignant neoplasm of bone and bone marrow (CMS/HCC) 9/18/2016     Past Surgical History:   Past Surgical History:   Procedure Laterality Date   • BREAST SURGERY      left breast biopsy and axillary node   • CHOLECYSTECTOMY     • EXTERNAL EAR SURGERY     • MASTECTOMY Left    • PORTACATH PLACEMENT       Social History:   Social History     Socioeconomic History   • Marital status:      Spouse name: Not on file   • Number of children: Not on file   • Years of education: Not on file   • Highest education level: Not on file   Social Needs   • Financial resource strain: Not on file   • Food insecurity - worry: Not on file   • Food insecurity - inability: Not on file   • Transportation needs - medical: Not on file   • Transportation needs - non-medical: Not on file   Occupational History   • Not on file   Tobacco Use   • Smoking status: Never Smoker   • Smokeless tobacco: Never Used   Substance and Sexual Activity   • Alcohol use: No   • Drug use: No   • Sexual activity: Not on file   Other Topics Concern   • Not on file   Social History Narrative   • Not on file     Family History:   Family History   Problem Relation Age of Onset   • No Known Problems Daughter    • No Known Problems Son    • Other Mother         complications from a concussion from a fall   • Other Father         old age   • No Known Problems Brother    • No Known Problems Daughter        Review of Systems   Constitutional: Negative.  Negative for activity change, appetite change, chills, diaphoresis, fatigue and fever.   HENT: Negative.  Negative for congestion, ear discharge, ear pain, facial swelling, hearing loss, mouth sores, nosebleeds, postnasal drip, rhinorrhea, sinus pressure, sore throat, tinnitus, trouble swallowing and voice change.    Eyes: Negative.  Negative for photophobia, pain, discharge and  visual disturbance.   Respiratory: Negative.  Negative for apnea, cough, chest tightness, shortness of breath, wheezing and stridor.    Cardiovascular: Negative.  Negative for chest pain, palpitations and leg swelling.   Gastrointestinal: Negative.  Negative for abdominal distention, abdominal pain, blood in stool, constipation, diarrhea, nausea, rectal pain and vomiting.   Endocrine: Negative.  Negative for cold intolerance, heat intolerance, polydipsia, polyphagia and polyuria.   Genitourinary: Negative.  Negative for difficulty urinating, dysuria, flank pain, frequency, hematuria and urgency.   Musculoskeletal: Negative.  Negative for arthralgias, back pain, gait problem, joint swelling and myalgias.   Skin: Negative.  Negative for color change, pallor, rash and wound.   Allergic/Immunologic: Negative.  Negative for environmental allergies, food allergies and immunocompromised state.   Neurological: Negative.  Negative for dizziness, tremors, seizures, syncope, speech difficulty, weakness, light-headedness, numbness and headaches.   Hematological: Negative.  Negative for adenopathy. Does not bruise/bleed easily.   Psychiatric/Behavioral: Negative.  Negative for agitation, confusion, hallucinations, sleep disturbance and suicidal ideas. The patient is not nervous/anxious.    All other systems reviewed and are negative.       Performance Status:  Asymptomatic    Medications:    Current Outpatient Medications   Medication Sig Dispense Refill   • B Complex Vitamins (VITAMIN B COMPLEX PO) Take  by mouth. 1 po daily     • Calcium Carb-Cholecalciferol (CALCIUM 600 + D PO) Take  by mouth.     • capecitabine (XELODA) 500 MG chemo tablet Take 1 tablet by mouth 2 (Two) Times a Day. For 2 weeks on and 1 week off. 42 tablet 11   • metoprolol tartrate (LOPRESSOR) 25 MG tablet Take 25 mg by mouth 2 (two) times a day.     • OLANZapine (ZYPREXA) 2.5 MG tablet Take 2.5 mg by mouth every night.     • ondansetron (ZOFRAN) 8 MG  "tablet Take 1 tablet by mouth Every 8 (Eight) Hours As Needed for Nausea or Vomiting. 30 tablet 5   • thyroid 60 MG PO tablet Take 60 mg by mouth daily.     • lapatinib (TYKERB) 250 MG chemo tablet Take 5 tablets by mouth Daily. For 21 days on and 7 days off. 105 tablet 11     No current facility-administered medications for this visit.      Facility-Administered Medications Ordered in Other Visits   Medication Dose Route Frequency Provider Last Rate Last Dose   • heparin flush (porcine) 100 UNIT/ML injection 500 Units  500 Units Intravenous PRN Joseph Nunez MD   500 Units at 10/26/17 1155   • sodium chloride 0.9 % flush 10 mL  10 mL Intravenous PRN Joseph Nunez MD   10 mL at 10/26/17 1155       ALLERGIES:    Allergies   Allergen Reactions   • Benadryl  [Diphenhydramine Hcl (Sleep)] Other (See Comments)   • Codeine    • Diphenhydramine Other (See Comments)     \"Shaky leg syndrome\"  \"Shaky leg syndrome\"  \"Shaky leg syndrome\"   • Heparin    • Other      POPPY SEED   • Penicillins Hives   • Petroleum Jelly [Skin Protectants, Misc.]    • Sulfa Antibiotics    • Sulfur    • Valium [Diazepam]        Objective      Vitals:    11/13/18 0951   BP: 130/68   Pulse: 82   Resp: 18   Temp: 98.6 °F (37 °C)   TempSrc: Tympanic   SpO2: 95%   Weight: 65.8 kg (145 lb)   Height: 165.1 cm (65\")         Current Status 11/13/2018   ECOG score 1       General Appearance: Patient is awake, alert, oriented and in no acute distress. Patient is welldeveloped, wellnourished, and appears stated age.  HEENT: Normocephalic. Sclerae clear, conjunctiva pink, extraocular movements intact, pupils, round, reactive to light and accommodation. Mouth and throat are clear with moist oral mucosa.  NECK: Supple, no jugular venous distention, thyroid not enlarged.  LYMPH: No cervical, supraclavicular, axillary, or inguinal lymphadenopathy.  CHEST: Equal bilateral expansion   LUNGS: Good air movement, no rales, rhonchi, rubs or wheezes with " auscultation  CARDIO: Regular sinus rhythm, no murmurs, gallops or rubs.  ABDOMEN: Nondistended, soft, No tenderness, no guarding, no rebound, No hepatosplenomegaly. No abdominal masses. Bowel sounds positive. No hernia  MUSKEL: No joint swelling, decreased motion, or inflammation  EXTREMS: No edema, clubbing, cyanosis, No varicose veins.  NEURO: Grossly nonfocal, Gait is coordinated and smooth, Cognition is preserved.  SKIN: No rashes, no ecchymoses, no petechia.  PSYCH: Oriented to time, place and person. Memory is preserved. Mood and affect appear normal      RECENT LABS:  WBC   Date Value Ref Range Status   11/12/2018 9.75 4.80 - 10.80 10*3/mm3 Final     RBC   Date Value Ref Range Status   11/12/2018 3.62 (L) 4.20 - 5.40 10*6/mm3 Final     Hemoglobin   Date Value Ref Range Status   11/12/2018 12.6 12.0 - 16.0 g/dL Final     Hematocrit   Date Value Ref Range Status   11/12/2018 36.9 (L) 37.0 - 47.0 % Final     MCV   Date Value Ref Range Status   11/12/2018 101.9 (H) 82.0 - 98.0 fL Final     MCH   Date Value Ref Range Status   11/12/2018 34.8 (H) 28.0 - 32.0 pg Final     MCHC   Date Value Ref Range Status   11/12/2018 34.1 33.0 - 36.0 g/dL Final     RDW   Date Value Ref Range Status   11/12/2018 16.4 (H) 12.0 - 15.0 % Final     RDW-SD   Date Value Ref Range Status   11/12/2018 60.4 (H) 40.0 - 54.0 fl Final     MPV   Date Value Ref Range Status   11/12/2018 9.4 6.0 - 12.0 fL Final     Platelets   Date Value Ref Range Status   11/12/2018 480 (H) 130 - 400 10*3/mm3 Final     Neutrophil %   Date Value Ref Range Status   11/12/2018 65.3 39.0 - 78.0 % Final     Lymphocyte %   Date Value Ref Range Status   11/12/2018 26.7 15.0 - 45.0 % Final     Monocyte %   Date Value Ref Range Status   11/12/2018 6.4 4.0 - 12.0 % Final     Eosinophil %   Date Value Ref Range Status   11/12/2018 0.7 0.0 - 4.0 % Final     Basophil %   Date Value Ref Range Status   11/12/2018 0.3 0.0 - 2.0 % Final     Immature Grans %   Date Value Ref  Range Status   11/12/2018 0.6 0.0 - 5.0 % Final     Neutrophils, Absolute   Date Value Ref Range Status   11/12/2018 6.37 1.87 - 8.40 10*3/mm3 Final     Lymphocytes, Absolute   Date Value Ref Range Status   11/12/2018 2.60 0.72 - 4.86 10*3/mm3 Final     Monocytes, Absolute   Date Value Ref Range Status   11/12/2018 0.62 0.19 - 1.30 10*3/mm3 Final     Eosinophils, Absolute   Date Value Ref Range Status   11/12/2018 0.07 0.00 - 0.70 10*3/mm3 Final     Basophils, Absolute   Date Value Ref Range Status   11/12/2018 0.03 0.00 - 0.20 10*3/mm3 Final     Immature Grans, Absolute   Date Value Ref Range Status   11/12/2018 0.06 (H) 0.00 - 0.03 10*3/mm3 Final     nRBC   Date Value Ref Range Status   11/12/2018 0.0 0.0 - 0.0 /100 WBC Final     Admission on 11/12/2018, Discharged on 11/12/2018   Component Date Value Ref Range Status   • Protime 11/12/2018 14.1  11.9 - 14.6 Seconds Final   • INR 11/12/2018 1.06  0.91 - 1.09 Final   • BF Culture 11/12/2018 No growth at 24 hours   Preliminary   • Color, Fluid 11/12/2018 Yellow   Final   • Appearance, Fluid 11/12/2018 Hazy* Clear Final   • WBC, Fluid 11/12/2018 82  /mm3 Final   • RBC, Fluid 11/12/2018 0  /mm3 Final   • Neutrophils, Fluid 11/12/2018 5  % Final   • Lymphocytes, Fluid 11/12/2018 10  % Final   • Unclassified Cells, Fluid 11/12/2018 85  % Final   Orders Only on 11/12/2018   Component Date Value Ref Range Status   • CA 27.29 11/12/2018 200.4* 0.0 - 38.6 U/mL Final    [facility]/ACS methodology   Appointment on 11/12/2018   Component Date Value Ref Range Status   • Extra Tube 11/12/2018 Hold for add-ons.   Final    Auto resulted.   • Extra Tube 11/12/2018 Hold for add-ons.   Final    Auto resulted.   Orders Only on 11/12/2018   Component Date Value Ref Range Status   • Glucose 11/12/2018 121* 70 - 100 mg/dL Final   • BUN 11/12/2018 10  5 - 21 mg/dL Final   • Creatinine 11/12/2018 1.24  0.50 - 1.40 mg/dL Final   • Sodium 11/12/2018 130* 135 - 145 mmol/L Final   •  Potassium 11/12/2018 4.1  3.5 - 5.3 mmol/L Final   • Chloride 11/12/2018 89* 98 - 110 mmol/L Final   • CO2 11/12/2018 27.0  24.0 - 31.0 mmol/L Final   • Calcium 11/12/2018 10.7* 8.4 - 10.4 mg/dL Final   • Total Protein 11/12/2018 8.3  6.3 - 8.7 g/dL Final   • Albumin 11/12/2018 4.20  3.50 - 5.00 g/dL Final   • ALT (SGPT) 11/12/2018 15  0 - 54 U/L Final   • AST (SGOT) 11/12/2018 36  7 - 45 U/L Final   • Alkaline Phosphatase 11/12/2018 124* 24 - 120 U/L Final   • Total Bilirubin 11/12/2018 0.5  0.1 - 1.0 mg/dL Final   • eGFR Non African Amer 11/12/2018 42* >60 mL/min/1.73 Final   • Globulin 11/12/2018 4.1  gm/dL Final   • A/G Ratio 11/12/2018 1.0* 1.1 - 2.5 g/dL Final   • BUN/Creatinine Ratio 11/12/2018 8.1  7.0 - 25.0 Final   • Anion Gap 11/12/2018 14.0* 4.0 - 13.0 mmol/L Final   • Urine Culture 11/12/2018 50,000-60,000 CFU/mL Escherichia coli*  Preliminary   • Urine Culture 11/12/2018 10,000-20,000 CFU/mL Mixed Gram Positive Alcira*  Preliminary    Probable Contaminant     • WBC 11/12/2018 9.75  4.80 - 10.80 10*3/mm3 Final   • RBC 11/12/2018 3.62* 4.20 - 5.40 10*6/mm3 Final   • Hemoglobin 11/12/2018 12.6  12.0 - 16.0 g/dL Final   • Hematocrit 11/12/2018 36.9* 37.0 - 47.0 % Final   • MCV 11/12/2018 101.9* 82.0 - 98.0 fL Final   • MCH 11/12/2018 34.8* 28.0 - 32.0 pg Final   • MCHC 11/12/2018 34.1  33.0 - 36.0 g/dL Final   • RDW 11/12/2018 16.4* 12.0 - 15.0 % Final   • RDW-SD 11/12/2018 60.4* 40.0 - 54.0 fl Final   • MPV 11/12/2018 9.4  6.0 - 12.0 fL Final   • Platelets 11/12/2018 480* 130 - 400 10*3/mm3 Final   • Neutrophil % 11/12/2018 65.3  39.0 - 78.0 % Final   • Lymphocyte % 11/12/2018 26.7  15.0 - 45.0 % Final   • Monocyte % 11/12/2018 6.4  4.0 - 12.0 % Final   • Eosinophil % 11/12/2018 0.7  0.0 - 4.0 % Final   • Basophil % 11/12/2018 0.3  0.0 - 2.0 % Final   • Immature Grans % 11/12/2018 0.6  0.0 - 5.0 % Final   • Neutrophils, Absolute 11/12/2018 6.37  1.87 - 8.40 10*3/mm3 Final   • Lymphocytes, Absolute  11/12/2018 2.60  0.72 - 4.86 10*3/mm3 Final   • Monocytes, Absolute 11/12/2018 0.62  0.19 - 1.30 10*3/mm3 Final   • Eosinophils, Absolute 11/12/2018 0.07  0.00 - 0.70 10*3/mm3 Final   • Basophils, Absolute 11/12/2018 0.03  0.00 - 0.20 10*3/mm3 Final   • Immature Grans, Absolute 11/12/2018 0.06* 0.00 - 0.03 10*3/mm3 Final   • nRBC 11/12/2018 0.0  0.0 - 0.0 /100 WBC Final   • Color, UA 11/12/2018 Yellow  Yellow, Straw Final   • Appearance, UA 11/12/2018 Cloudy* Clear Final   • pH, UA 11/12/2018 <=5.0  5.0 - 8.0 Final   • Specific Gravity, UA 11/12/2018 >=1.030* >1.005-<1.030 Final   • Glucose, UA 11/12/2018 Negative  Negative Final   • Ketones, UA 11/12/2018 15 mg/dL (1+)* Negative Final   • Bilirubin, UA 11/12/2018 Moderate (2+)* Negative Final   • Blood, UA 11/12/2018 Moderate (2+)* Negative Final   • Protein, UA 11/12/2018 30 mg/dL (1+)* Negative Final   • Leuk Esterase, UA 11/12/2018 Small (1+)* Negative Final   • Nitrite, UA 11/12/2018 Negative  Negative Final   • Urobilinogen, UA 11/12/2018 0.2 E.U./dL  0.2 - 1.0 E.U./dL Final   • RBC, UA 11/12/2018 0-2* None Seen /HPF Final   • WBC, UA 11/12/2018 0-2* None Seen /HPF Final   • Bacteria, UA 11/12/2018 2+* None Seen /HPF Final   • Squamous Epithelial Cells, UA 11/12/2018 0-2  None Seen, 0-2 /HPF Final   • Hyaline Casts, UA 11/12/2018 3-6  None Seen /LPF Final   • Fine Granular Casts, UA 11/12/2018 0-2  None Seen /LPF Final   • Calcium Oxalate Crystals, UA 11/12/2018 Moderate/2+  None Seen /HPF Final   • Mucus, UA 11/12/2018 Trace  None Seen, Trace /HPF Final   • Methodology 11/12/2018 Automated Microscopy   Final             Assessment/Plan     Patient Active Problem List   Diagnosis   • Malignant neoplasm of upper-outer quadrant of left female breast (CMS/HCC)   • Breast cancer metastasized to bone, unspecified laterality (CMS/HCC)   • Essential hypertension   • Acquired hypothyroidism   • Bipolar 1 disorder (CMS/HCC)   • Carcinoma of left breast metastatic to  bone (CMS/HCC)   • Antineoplastic chemotherapy induced anemia   • Dehydration          Assessment:  1. Stage IV metastatic breast carcinoma, ER/DC positive. HER2/maynor negative undergoing Ibrance, Faslodex and Xgeva with overall stable with stable CA27.29 with slow decline. She is tolerating the treatment very well. Faslodex cycle 17 today and will continue to HOLD Xgeva due to upcoming dental work, last dose of Xgeva given on 09/29/2016.  01/2017 restaging CT chest abdomen and pelvis and bone scan showed stable diffuse sclerotic bony lesions, unchanged from 7/1/2016.Next scan July 2017    2.  Mild leukopenia without neutropenia.  Likely secondary to Ibrance.  Continue to monitor.     3.  Macrocytic anemia.  Hemoglobin 11.1.  02/2016, B12 and folate normal    4.  Status post 8 teeth extraction.     Recommendation/Plan:  1.  Proceed with Faslodex cycle 20.  2. Continue Ibrance 125 mg 3 weeks on, one-week off.  Reduce dose if leukopenia worsens.   3. Follow up in 28 days for Faslodex cycle 19.   Repeat CA 27.29.  Previous CA 27.29 have remained stable, the last one done on 11/2017 was 89 Stable. Continue Faslodex q monthly.    Additionally she is anemic with a hemoglobin of 10 g percent with decreasing GFR.  I do suspect that she may have some form of chronic kidney disease, as evaluated by Nephrology, CKD from [facility].  No need for EPO as she is asyptomatic.  Redraw CA 27.29 today. Cont faslodex + Ibrance for now until repeat CA 27.29 returns. Last CA 27.29 in Feb 2018 slightly increased to 96. I have discussed different options with her:  #1. Stop Ibrance and switch to a new CD4K inhabitor Kisqali or Verenzio.  #. Stop Ibrance and switch to Affinitor + faslodex.  #. Cont Ibrance and Faslodex with just a subtle rise, since clinically pt is doing better.    At this point will restage with CT scans CAP at [facility] and compare to one done Jan 2017. If progression on CT scans and CA 27.29 then go for the [facility] trial of using  MTOR inhabitor Everlimos + Exemestine. Bone scan shows progressive dz. Therefore will stop Faslodex. Start Exemestine 25mg daily in combination with Affinitor 10mg daily. Redraw her tumor today.  Calcium improved but AST higher.    DC Exemestine and Affinitor as the CA 27.29 yulia to 140. Considering this will start Xeloda 500mg twice daily 2 Wks ON and one wk OFF.    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 Will add Her 2 Tykerb 1250mg q day x 21 days in combo + Oral Xeloda 500mg bid 3 wks On and 1 WK OFF. Will draw CA 27.29.    Since she started Lapatinib 1250 mg q day + Xeloda combo from 8/22/18 she has had significant dirrohea and lately has been throwing up and today her Creat is up to 2.3 from 0.9 and GFR down to 21 as well as she has lost 7 ILBS WT and appears very dehydrated. All meds were stopped last week and she received IVF and today creat is back down to 1.25 and GFR upto 41. Redraw CAproved the best would be either to reduce the dose and keep both Lapitinib and Xeloda on board, she will call me tomorrow. Therefor, last week the Tykerb dose was reduced to 2 tabs q day ( 500mg ) daily 3 wks On and 1 wk OFF Plus Xeloda 500mg 1 tab q day 2 Wks ON and 1K OFF. See  How this schedulle works and therefore, will draw CA27.29 again today.    She walked in today c/o pain in abdmn and R Lumbar area. On my exam, herabdmn is distended with Ascitis. I need to get a stat CT scan with 50% IV contrast and Pre and post contrast 500cc N/S.RTC tomorrow morning. If progressive Dz, will Rx with Herceptin + Perjeta as her tumor is Her2+.\    Indeed the CT scan done yesterday shows significant Ascitis, that was large Vol tapped 5.2 l and send to lab for Cytology ( pending ). Her  first time met him yesterday told me that in 2014 he was told that his wife had a laparatomy where they found metastatic Breast cancer throughout her peritoneal cavity ( salt and pepper ). Therefore, considering this new  Ascitis, I  suspect that it is malignent Breast Cancer. Foundation 1 studies on circulating DNA analyses showed she was ERB+. Although Cytology from Ascitis Fluid drawn yesterday, will call lab to create a Cell Block and run for Her 2 Kyle gene testing.  Meanwhile Rx her with Herceptin and Perjeta + taxotere ( loading dose 75mg/m2 and thereafter reduce dose to 35mg/m2 q weekly. Ist dose today. D/W family and pt who agrees. Will stop Tykerb.  Joseph Nunez MD    11/13/2018    9:58 AM

## 2018-11-14 LAB
ALBUMIN FLD-MCNC: 2.4 G/DL
AMYLASE FLD-CCNC: 27 U/L
BACTERIA SPEC AEROBE CULT: ABNORMAL
BACTERIA SPEC AEROBE CULT: ABNORMAL
GLUCOSE FLD-MCNC: 97 MG/DL
LDH FLD-CCNC: 146 IU/L

## 2018-11-14 RX ORDER — NITROFURANTOIN 25; 75 MG/1; MG/1
100 CAPSULE ORAL EVERY 12 HOURS SCHEDULED
Qty: 10 CAPSULE | Refills: 0 | Status: SHIPPED | OUTPATIENT
Start: 2018-11-14 | End: 2018-12-11

## 2018-11-14 NOTE — TELEPHONE ENCOUNTER
Patient identified as having positive urine culture per Dr Nunez instructions after reviewing report ordered.  Macrobid 100 mg bid x 5 days, patient informed sent to [facility]

## 2018-11-15 ENCOUNTER — APPOINTMENT (OUTPATIENT)
Dept: CT IMAGING | Facility: HOSPITAL | Age: 73
End: 2018-11-15
Attending: INTERNAL MEDICINE

## 2018-11-17 LAB
BACTERIA FLD CULT: NORMAL
GRAM STN SPEC: NORMAL

## 2018-11-19 ENCOUNTER — OFFICE VISIT (OUTPATIENT)
Dept: ONCOLOGY | Facility: CLINIC | Age: 73
End: 2018-11-19

## 2018-11-19 ENCOUNTER — LAB (OUTPATIENT)
Dept: LAB | Facility: HOSPITAL | Age: 73
End: 2018-11-19

## 2018-11-19 ENCOUNTER — APPOINTMENT (OUTPATIENT)
Dept: CARDIOLOGY | Facility: HOSPITAL | Age: 73
End: 2018-11-19
Attending: INTERNAL MEDICINE

## 2018-11-19 VITALS
DIASTOLIC BLOOD PRESSURE: 70 MMHG | SYSTOLIC BLOOD PRESSURE: 138 MMHG | OXYGEN SATURATION: 98 % | WEIGHT: 141.6 LBS | HEART RATE: 66 BPM | RESPIRATION RATE: 18 BRPM | BODY MASS INDEX: 23.59 KG/M2 | HEIGHT: 65 IN | TEMPERATURE: 98.2 F

## 2018-11-19 DIAGNOSIS — C50.912 CARCINOMA OF LEFT BREAST METASTATIC TO BONE (HCC): ICD-10-CM

## 2018-11-19 DIAGNOSIS — C79.51 CARCINOMA OF LEFT BREAST METASTATIC TO BONE (HCC): ICD-10-CM

## 2018-11-19 DIAGNOSIS — C50.119 MALIGNANT NEOPLASM OF CENTRAL PORTION OF FEMALE BREAST, UNSPECIFIED ESTROGEN RECEPTOR STATUS, UNSPECIFIED LATERALITY (HCC): Primary | ICD-10-CM

## 2018-11-19 DIAGNOSIS — C50.412 MALIGNANT NEOPLASM OF UPPER-OUTER QUADRANT OF LEFT FEMALE BREAST, UNSPECIFIED ESTROGEN RECEPTOR STATUS (HCC): Primary | ICD-10-CM

## 2018-11-19 DIAGNOSIS — C50.412 MALIGNANT NEOPLASM OF UPPER-OUTER QUADRANT OF LEFT FEMALE BREAST, UNSPECIFIED ESTROGEN RECEPTOR STATUS (HCC): ICD-10-CM

## 2018-11-19 LAB
ALBUMIN SERPL-MCNC: 3.3 G/DL (ref 3.5–5)
ALBUMIN/GLOB SERPL: 1 G/DL (ref 1.1–2.5)
ALP SERPL-CCNC: 91 U/L (ref 24–120)
ALT SERPL W P-5'-P-CCNC: 17 U/L (ref 0–54)
ANION GAP SERPL CALCULATED.3IONS-SCNC: 6 MMOL/L (ref 4–13)
AST SERPL-CCNC: 40 U/L (ref 7–45)
BASOPHILS # BLD AUTO: 0.03 10*3/MM3 (ref 0–0.2)
BASOPHILS NFR BLD AUTO: 0.9 % (ref 0–2)
BILIRUB SERPL-MCNC: 0.3 MG/DL (ref 0.1–1)
BUN BLD-MCNC: 12 MG/DL (ref 5–21)
BUN/CREAT SERPL: 12.5 (ref 7–25)
CALCIUM SPEC-SCNC: 9.5 MG/DL (ref 8.4–10.4)
CHLORIDE SERPL-SCNC: 97 MMOL/L (ref 98–110)
CO2 SERPL-SCNC: 32 MMOL/L (ref 24–31)
CREAT BLD-MCNC: 0.96 MG/DL (ref 0.5–1.4)
DEPRECATED RDW RBC AUTO: 59.1 FL (ref 40–54)
EOSINOPHIL # BLD AUTO: 0.11 10*3/MM3 (ref 0–0.7)
EOSINOPHIL NFR BLD AUTO: 3.2 % (ref 0–4)
ERYTHROCYTE [DISTWIDTH] IN BLOOD BY AUTOMATED COUNT: 16.2 % (ref 12–15)
GFR SERPL CREATININE-BSD FRML MDRD: 57 ML/MIN/1.73
GLOBULIN UR ELPH-MCNC: 3.2 GM/DL
GLUCOSE BLD-MCNC: 105 MG/DL (ref 70–100)
HCT VFR BLD AUTO: 32.7 % (ref 37–47)
HGB BLD-MCNC: 11.2 G/DL (ref 12–16)
HOLD SPECIMEN: NORMAL
HOLD SPECIMEN: NORMAL
IMM GRANULOCYTES # BLD: 0.03 10*3/MM3 (ref 0–0.03)
IMM GRANULOCYTES NFR BLD: 0.9 % (ref 0–5)
LYMPHOCYTES # BLD AUTO: 1.09 10*3/MM3 (ref 0.72–4.86)
LYMPHOCYTES NFR BLD AUTO: 32.2 % (ref 15–45)
MCH RBC QN AUTO: 34.8 PG (ref 28–32)
MCHC RBC AUTO-ENTMCNC: 34.3 G/DL (ref 33–36)
MCV RBC AUTO: 101.6 FL (ref 82–98)
MONOCYTES # BLD AUTO: 0.08 10*3/MM3 (ref 0.19–1.3)
MONOCYTES NFR BLD AUTO: 2.4 % (ref 4–12)
NEUTROPHILS # BLD AUTO: 2.05 10*3/MM3 (ref 1.87–8.4)
NEUTROPHILS NFR BLD AUTO: 60.4 % (ref 39–78)
NRBC BLD MANUAL-RTO: 0 /100 WBC (ref 0–0)
PLATELET # BLD AUTO: 412 10*3/MM3 (ref 130–400)
PMV BLD AUTO: 9.3 FL (ref 6–12)
POTASSIUM BLD-SCNC: 3.9 MMOL/L (ref 3.5–5.3)
PROT SERPL-MCNC: 6.5 G/DL (ref 6.3–8.7)
RBC # BLD AUTO: 3.22 10*6/MM3 (ref 4.2–5.4)
SODIUM BLD-SCNC: 135 MMOL/L (ref 135–145)
WBC NRBC COR # BLD: 3.39 10*3/MM3 (ref 4.8–10.8)

## 2018-11-19 PROCEDURE — 86300 IMMUNOASSAY TUMOR CA 15-3: CPT | Performed by: INTERNAL MEDICINE

## 2018-11-19 PROCEDURE — 36415 COLL VENOUS BLD VENIPUNCTURE: CPT

## 2018-11-19 PROCEDURE — 80053 COMPREHEN METABOLIC PANEL: CPT | Performed by: INTERNAL MEDICINE

## 2018-11-19 PROCEDURE — 85025 COMPLETE CBC W/AUTO DIFF WBC: CPT | Performed by: INTERNAL MEDICINE

## 2018-11-19 PROCEDURE — 99214 OFFICE O/P EST MOD 30 MIN: CPT | Performed by: INTERNAL MEDICINE

## 2018-11-19 RX ORDER — CIPROFLOXACIN 250 MG/1
250 TABLET, FILM COATED ORAL 2 TIMES DAILY
Qty: 14 TABLET | Refills: 0 | Status: SHIPPED | OUTPATIENT
Start: 2018-11-19 | End: 2018-12-04

## 2018-11-19 NOTE — PROGRESS NOTES
Port flushed w/NS only due to patient allergy.  LORETO Yanes RN  [facility]  HEMATOLOGY & ONCOLOGY        Subjective    Metastatic breast cancer on Faslodex and Ibrance     Staging form: Breast, AJCC V7      Clinical stage from 10/11/2016: Stage IV (T2, N1, M1) - ER/MA positive. HER2/maynor negative  VISIT DIAGNOSIS:   Metastatic breast cancer       HEMATOLOGY / ONCOLOGY HISTORY:   Oncology/Hematology History    Patient is a 67-year-old postmenopausal female who had onset of her menses at age 9 and menopause at age 48. She received oral contraceptives for approximately 14 years and did not receive hormonal manipulation. Patient has a maternal cousin had breast cancer. Patient has had no prior breast biopsies. Patient found a palpable left breast mass as well as a left axillary mass. Patient had a bloody nipple discharge which been present for approximately 6 months. On 6/20/2012, a mammogram was performed showing a subareolar mass consistent with malignancy. Bilateral breast ultrasound revealed an ill-defined subareolar mass measuring 2.8-2.5 cm. There is a left axillary mass measuring 1.7 cm with suspicion of extracapsular extension. There is no evidence of disease in the right breast. On 7/9/2012 patient underwent a left breast biopsy and placement of marker clip. Left breast biopsy revealed an infiltrating lobular carcinoma grade 2 with focal ductal carcinoma in situ, solid pattern. A fine-needle biopsy of the left lymph node was consistent with metastatic carcinoma. Breast tumor profile revealed ER: 100%; MA: 98%; HER-2/maynor 2+; FISH: Unamplified; Ki-67: 71%; P 53 1%; DNA aneuploidy. A MRI of the breast were performed body multiple abnormal enhancing masses within the left breast. There appears to be anterior retraction of the pectoralis muscle with no direct extension. There is a moderate to large, layering left pleural effusion appreciated. The right breast reveals 3 moderately enlarged lymph nodes  in the posterior lateral aspect the right breast. These have fatty hilum but followup is recommended. Patient is undergone systemic studies including, on 8/2/2012, a CT scan of the brain showing atrophy. a CT scan that shows showing a small to moderate left pleural effusion with 3 subcentimeter nodule densities in the left lung.   She completed 4 cycles of neoadjuvant treatment with dose dense Adriamycin and Cytoxan. She had a mammogram which showed a significant reduction in the size of her left breast lesion. There is no axillary adenopathy noted. She has had an ultrasound revealing the  lesion reducing from 2.4 cm to 1 cm. Patient underwent a left mastectomy on 03/19/2013 finding negative invasive cancer, negative nodes. A 5% DCIS was noted. The patient has declined hormonal therapy. She did not need radiation therapy.  November 2014, she began to have evidence of lytic bone lesions at T5T6, frontal disease, an 8 mm lucency in the central frontal area of  the skull but nothing intracranial. Bone scan revealed only vertex tracer activity but bilateral ribs and thoracic spine, and other lesions in  her pelvis. At that time, she was started on letrozole since November 2014. She is taking Xgeva. She had progression found in bone in Feb 2015 on scan. She was started on Faslodex, Ibrance and continued xgeva.         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 B) Foci of ductal carcinoma in situ, solid pattern. C) Greatest dimension of the invasive carcinoma is 15.8mm.         3/19/2013 Surgery     Left Mastectomy w/ Axillary Tail: Focal residual ducatal carcinoma in situ (DCIS) 12 lymph nodes negative for metastatic carcinoma.         10/20/2014 Biopsy     Peritoneum Biopsy: Final  Diagnosis: Malignant Cell Neoplasm.           Breast cancer metastasized to bone, unspecified laterality (CMS/HCC)    10/15/2012 -  Other Event     Left mammogram:  Impression:  1. Decreasing spiculation and density in the retroareolar left breast, near a previous biopsy.   2. Definite left axillary lesion is not appreciated.          2/5/2013 -  Other Event     Diagnostic Mammo:  Comparison is made to 10/15/2012 and 6/20/2012  The spiculated mass at 12:00 behind the nipple is nearly resolved.   Impression:  1. The mass on the left side is less apparent after receiving chemotherapy. There has been a good response to chmotherapy on the left side.   2. No suspicious abnormality is seen in the right breast to account for the new palable abnormality at the 4-5:00 position.          3/19/2013 Surgery     Breast Biopsy:  Final Diagnosis:  A. Breast, left mastectomy with axillary tail  1. Focal residual ductal carcinoma in situ (DICS) (less than 5% of tumor bed).  2. Negative for residual invasive carcinoma.   3. 12 Lymph Nodes, negative for metastatic carcinoma (0/12) focally, some lymph nodes demonstrate fibrosis/treatment effect.  B. Skin and soft tissue, left advancement flap:  1. Benign skin and subcutis.   2. Negative for carcinoma.          6/20/2013 -  Other Event     Diagnostic Mammo [facility]:  1. A subareolar mass is consistent with malignancy. Additional involement extends inferiorly sonographically and superolaterally mammograhically as demonstrated by calcifications. The mass measures approx 2.8 cm sonographically, but the involved region is likely much larger including an intraductal componet. The left axillary lymph nodes are also involved.   2. Recommened ultrasound guided biopsy of the left subareolar mass and left axillary lymph node.   3. Breast MRI could also further define multicentric disease on the left.   4. Clear suspicious finding on the right.  Recommend ongoing clinical follow up of palpable  foci.          6/13/2014 -  Other Event     Diagnostic Mammo:  IMPRESSION:  1. History of left breast cancer and mastectomy. Stable benign right breast mammograms.          10/20/2014 Surgery     Final Diagnosis:  Biopsies, pertoneum:  Metastatic carcinoma, morphologically compatible with metastatic mammary carcinoma.          6/15/2015 -  Other Event     Diagnostic Mammo:  IMPRESSION:   1. Negative x-ray report, should not delay biopsy if a dominat or clinically suspicious mass is present.          7/5/2016 -  Other Event     Diagnositic mammogram:  Impression:   Stable right mamogram with no radiographic findings suspicious for malignancy. Recommend continued screening mammography per screening guidelines unless otherwise earlier clinically indicated.          9/18/2016 Initial Diagnosis     Secondary malignant neoplasm of bone and bone marrow          Cancer Staging Information:  Malignant neoplasm of upper-outer quadrant of left female breast    Staging form: Breast, AJCC V7      Clinical stage from 10/11/2016: Stage IV (T2, N1, M1) - Signed by ALBERT Kulkarni on 10/11/2016      INTERVAL HISTORY  Patient ID: Heavenly Yanes is a 73 y.o. year old female history of metastatic breast carcinoma to lung and bone.  She is presently receiving treatment with Ibrance, Faslodex and Xgeva.  Tumor markers continued to decline with a CA 27-29 of 92.2 obtained on 9:30 2016 compared to 105.8 on 07/27/2016.  Mr. Yanes continues to tolerate this treatment regimen well and is here today for evaluation consideration to receive Faslodex and Xgeva.  Last bone mineral density study was obtained 11/21/2014 indicating osteopopenia, we will obtain a repeat bone mineral density study. Ms. Yanes completed 8 teeth pulled 02/14/2017.   Xgeva on hold, resume 05/2017.     Past Medical History:   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   • Bipolar disorder (CMS/HCC)    • Disease  of thyroid gland    • Hypertension    • Malignant neoplasm of upper-outer quadrant of left female breast (CMS/HCC) 9/18/2016   • Secondary malignant neoplasm of bone and bone marrow (CMS/HCC) 9/18/2016     Past Surgical History:   Past Surgical History:   Procedure Laterality Date   • BREAST SURGERY      left breast biopsy and axillary node   • CHOLECYSTECTOMY     • EXTERNAL EAR SURGERY     • MASTECTOMY Left    • PORTACATH PLACEMENT       Social History:   Social History     Socioeconomic History   • Marital status:      Spouse name: Not on file   • Number of children: Not on file   • Years of education: Not on file   • Highest education level: Not on file   Social Needs   • Financial resource strain: Not on file   • Food insecurity - worry: Not on file   • Food insecurity - inability: Not on file   • Transportation needs - medical: Not on file   • Transportation needs - non-medical: Not on file   Occupational History   • Not on file   Tobacco Use   • Smoking status: Never Smoker   • Smokeless tobacco: Never Used   Substance and Sexual Activity   • Alcohol use: No   • Drug use: No   • Sexual activity: Not on file   Other Topics Concern   • Not on file   Social History Narrative   • Not on file     Family History:   Family History   Problem Relation Age of Onset   • No Known Problems Daughter    • No Known Problems Son    • Other Mother         complications from a concussion from a fall   • Other Father         old age   • No Known Problems Brother    • No Known Problems Daughter        Review of Systems   Constitutional: Negative.  Negative for activity change, appetite change, chills, diaphoresis, fatigue and fever.   HENT: Negative.  Negative for congestion, ear discharge, ear pain, facial swelling, hearing loss, mouth sores, nosebleeds, postnasal drip, rhinorrhea, sinus pressure, sore throat, tinnitus, trouble swallowing and voice change.    Eyes: Negative.  Negative for photophobia, pain, discharge and  visual disturbance.   Respiratory: Negative.  Negative for apnea, cough, chest tightness, shortness of breath, wheezing and stridor.    Cardiovascular: Negative.  Negative for chest pain, palpitations and leg swelling.   Gastrointestinal: Negative.  Negative for abdominal distention, abdominal pain, blood in stool, constipation, diarrhea, nausea, rectal pain and vomiting.   Endocrine: Negative.  Negative for cold intolerance, heat intolerance, polydipsia, polyphagia and polyuria.   Genitourinary: Negative.  Negative for difficulty urinating, dysuria, flank pain, frequency, hematuria and urgency.   Musculoskeletal: Negative.  Negative for arthralgias, back pain, gait problem, joint swelling and myalgias.   Skin: Negative.  Negative for color change, pallor, rash and wound.   Allergic/Immunologic: Negative.  Negative for environmental allergies, food allergies and immunocompromised state.   Neurological: Negative.  Negative for dizziness, tremors, seizures, syncope, speech difficulty, weakness, light-headedness, numbness and headaches.   Hematological: Negative.  Negative for adenopathy. Does not bruise/bleed easily.   Psychiatric/Behavioral: Negative.  Negative for agitation, confusion, hallucinations, sleep disturbance and suicidal ideas. The patient is not nervous/anxious.    All other systems reviewed and are negative.       Performance Status:  Asymptomatic    Medications:    Current Outpatient Medications   Medication Sig Dispense Refill   • Biotin 1000 MCG tablet Take 1,000 mcg by mouth Daily.     • Calcium-Magnesium-Vitamin D (CALCIUM 1200+D3 PO) Take 1,200 mg by mouth Daily.     • capecitabine (XELODA) 500 MG chemo tablet Take 1 tablet by mouth 2 (Two) Times a Day. For 2 weeks on and 1 week off. 42 tablet 11   • DEVILS CLAW PO Take  by mouth Daily.     • FOLIC ACID PO Take 800 mg by mouth Daily.     • metoprolol tartrate (LOPRESSOR) 25 MG tablet Take 25 mg by mouth 2 (two) times a day.     • nitrofurantoin,  "macrocrystal-monohydrate, (MACROBID) 100 MG capsule Take 1 capsule by mouth Every 12 (Twelve) Hours. 10 capsule 0   • NON FORMULARY 500 mg. Turmeric curcumin bid     • OLANZapine (ZYPREXA) 2.5 MG tablet Take 2.5 mg by mouth every night.     • ondansetron (ZOFRAN) 8 MG tablet Take 1 tablet by mouth Every 8 (Eight) Hours As Needed for Nausea or Vomiting. 30 tablet 5   • raNITIdine (ZANTAC) 150 MG tablet Take 150 mg by mouth 2 (Two) Times a Day.     • thyroid 60 MG PO tablet Take 60 mg by mouth daily.     • vitamin B-12 (CYANOCOBALAMIN) 1000 MCG tablet Take 1,000 mcg by mouth Daily.       No current facility-administered medications for this visit.      Facility-Administered Medications Ordered in Other Visits   Medication Dose Route Frequency Provider Last Rate Last Dose   • heparin flush (porcine) 100 UNIT/ML injection 500 Units  500 Units Intravenous PRN Joseph Nunez MD   500 Units at 10/26/17 1155   • sodium chloride 0.9 % flush 10 mL  10 mL Intravenous PRN Joseph Nunez MD   10 mL at 10/26/17 1155       ALLERGIES:    Allergies   Allergen Reactions   • Benadryl  [Diphenhydramine Hcl (Sleep)] Other (See Comments)   • Codeine    • Diphenhydramine Other (See Comments)     \"Shaky leg syndrome\"  \"Shaky leg syndrome\"  \"Shaky leg syndrome\"   • Heparin    • Other      POPPY SEED   • Penicillins Hives   • Petroleum Jelly [Skin Protectants, Misc.]    • Sulfa Antibiotics    • Sulfur    • Valium [Diazepam]        Objective      Vitals:    11/19/18 0930   BP: 138/70   Pulse: 66   Resp: 18   Temp: 98.2 °F (36.8 °C)   TempSrc: Tympanic   SpO2: 98%   Weight: 64.2 kg (141 lb 9.6 oz)   Height: 165.1 cm (65\")         Current Status 11/19/2018   ECOG score 1       General Appearance: Patient is awake, alert, oriented and in no acute distress. Patient is welldeveloped, wellnourished, and appears stated age.  HEENT: Normocephalic. Sclerae clear, conjunctiva pink, extraocular movements intact, pupils, round, reactive to light and " accommodation. Mouth and throat are clear with moist oral mucosa.  NECK: Supple, no jugular venous distention, thyroid not enlarged.  LYMPH: No cervical, supraclavicular, axillary, or inguinal lymphadenopathy.  CHEST: Equal bilateral expansion   LUNGS: Good air movement, no rales, rhonchi, rubs or wheezes with auscultation  CARDIO: Regular sinus rhythm, no murmurs, gallops or rubs.  ABDOMEN: Nondistended, soft, No tenderness, no guarding, no rebound, No hepatosplenomegaly. No abdominal masses. Bowel sounds positive. No hernia  MUSKEL: No joint swelling, decreased motion, or inflammation  EXTREMS: No edema, clubbing, cyanosis, No varicose veins.  NEURO: Grossly nonfocal, Gait is coordinated and smooth, Cognition is preserved.  SKIN: No rashes, no ecchymoses, no petechia.  PSYCH: Oriented to time, place and person. Memory is preserved. Mood and affect appear normal      RECENT LABS:  WBC   Date Value Ref Range Status   11/19/2018 3.39 (L) 4.80 - 10.80 10*3/mm3 Final     RBC   Date Value Ref Range Status   11/19/2018 3.22 (L) 4.20 - 5.40 10*6/mm3 Final     Hemoglobin   Date Value Ref Range Status   11/19/2018 11.2 (L) 12.0 - 16.0 g/dL Final     Hematocrit   Date Value Ref Range Status   11/19/2018 32.7 (L) 37.0 - 47.0 % Final     MCV   Date Value Ref Range Status   11/19/2018 101.6 (H) 82.0 - 98.0 fL Final     MCH   Date Value Ref Range Status   11/19/2018 34.8 (H) 28.0 - 32.0 pg Final     MCHC   Date Value Ref Range Status   11/19/2018 34.3 33.0 - 36.0 g/dL Final     RDW   Date Value Ref Range Status   11/19/2018 16.2 (H) 12.0 - 15.0 % Final     RDW-SD   Date Value Ref Range Status   11/19/2018 59.1 (H) 40.0 - 54.0 fl Final     MPV   Date Value Ref Range Status   11/19/2018 9.3 6.0 - 12.0 fL Final     Platelets   Date Value Ref Range Status   11/19/2018 412 (H) 130 - 400 10*3/mm3 Final     Neutrophil %   Date Value Ref Range Status   11/19/2018 60.4 39.0 - 78.0 % Final     Lymphocyte %   Date Value Ref Range Status    11/19/2018 32.2 15.0 - 45.0 % Final     Monocyte %   Date Value Ref Range Status   11/19/2018 2.4 (L) 4.0 - 12.0 % Final     Eosinophil %   Date Value Ref Range Status   11/19/2018 3.2 0.0 - 4.0 % Final     Basophil %   Date Value Ref Range Status   11/19/2018 0.9 0.0 - 2.0 % Final     Immature Grans %   Date Value Ref Range Status   11/19/2018 0.9 0.0 - 5.0 % Final     Neutrophils, Absolute   Date Value Ref Range Status   11/19/2018 2.05 1.87 - 8.40 10*3/mm3 Final     Lymphocytes, Absolute   Date Value Ref Range Status   11/19/2018 1.09 0.72 - 4.86 10*3/mm3 Final     Monocytes, Absolute   Date Value Ref Range Status   11/19/2018 0.08 (L) 0.19 - 1.30 10*3/mm3 Final     Eosinophils, Absolute   Date Value Ref Range Status   11/19/2018 0.11 0.00 - 0.70 10*3/mm3 Final     Basophils, Absolute   Date Value Ref Range Status   11/19/2018 0.03 0.00 - 0.20 10*3/mm3 Final     Immature Grans, Absolute   Date Value Ref Range Status   11/19/2018 0.03 0.00 - 0.03 10*3/mm3 Final     nRBC   Date Value Ref Range Status   11/19/2018 0.0 0.0 - 0.0 /100 WBC Final     Lab on 11/19/2018   Component Date Value Ref Range Status   • Glucose 11/19/2018 105* 70 - 100 mg/dL Final   • BUN 11/19/2018 12  5 - 21 mg/dL Final   • Creatinine 11/19/2018 0.96  0.50 - 1.40 mg/dL Final   • Sodium 11/19/2018 135  135 - 145 mmol/L Final   • Potassium 11/19/2018 3.9  3.5 - 5.3 mmol/L Final   • Chloride 11/19/2018 97* 98 - 110 mmol/L Final   • CO2 11/19/2018 32.0* 24.0 - 31.0 mmol/L Final   • Calcium 11/19/2018 9.5  8.4 - 10.4 mg/dL Final   • Total Protein 11/19/2018 6.5  6.3 - 8.7 g/dL Final   • Albumin 11/19/2018 3.30* 3.50 - 5.00 g/dL Final   • ALT (SGPT) 11/19/2018 17  0 - 54 U/L Final   • AST (SGOT) 11/19/2018 40  7 - 45 U/L Final   • Alkaline Phosphatase 11/19/2018 91  24 - 120 U/L Final   • Total Bilirubin 11/19/2018 0.3  0.1 - 1.0 mg/dL Final   • eGFR Non African Amer 11/19/2018 57* >60 mL/min/1.73 Final   • Globulin 11/19/2018 3.2  gm/dL Final   •  A/G Ratio 11/19/2018 1.0* 1.1 - 2.5 g/dL Final   • BUN/Creatinine Ratio 11/19/2018 12.5  7.0 - 25.0 Final   • Anion Gap 11/19/2018 6.0  4.0 - 13.0 mmol/L Final   • WBC 11/19/2018 3.39* 4.80 - 10.80 10*3/mm3 Final   • RBC 11/19/2018 3.22* 4.20 - 5.40 10*6/mm3 Final   • Hemoglobin 11/19/2018 11.2* 12.0 - 16.0 g/dL Final   • Hematocrit 11/19/2018 32.7* 37.0 - 47.0 % Final   • MCV 11/19/2018 101.6* 82.0 - 98.0 fL Final   • MCH 11/19/2018 34.8* 28.0 - 32.0 pg Final   • MCHC 11/19/2018 34.3  33.0 - 36.0 g/dL Final   • RDW 11/19/2018 16.2* 12.0 - 15.0 % Final   • RDW-SD 11/19/2018 59.1* 40.0 - 54.0 fl Final   • MPV 11/19/2018 9.3  6.0 - 12.0 fL Final   • Platelets 11/19/2018 412* 130 - 400 10*3/mm3 Final   • Neutrophil % 11/19/2018 60.4  39.0 - 78.0 % Final   • Lymphocyte % 11/19/2018 32.2  15.0 - 45.0 % Final   • Monocyte % 11/19/2018 2.4* 4.0 - 12.0 % Final   • Eosinophil % 11/19/2018 3.2  0.0 - 4.0 % Final   • Basophil % 11/19/2018 0.9  0.0 - 2.0 % Final   • Immature Grans % 11/19/2018 0.9  0.0 - 5.0 % Final   • Neutrophils, Absolute 11/19/2018 2.05  1.87 - 8.40 10*3/mm3 Final   • Lymphocytes, Absolute 11/19/2018 1.09  0.72 - 4.86 10*3/mm3 Final   • Monocytes, Absolute 11/19/2018 0.08* 0.19 - 1.30 10*3/mm3 Final   • Eosinophils, Absolute 11/19/2018 0.11  0.00 - 0.70 10*3/mm3 Final   • Basophils, Absolute 11/19/2018 0.03  0.00 - 0.20 10*3/mm3 Final   • Immature Grans, Absolute 11/19/2018 0.03  0.00 - 0.03 10*3/mm3 Final   • nRBC 11/19/2018 0.0  0.0 - 0.0 /100 WBC Final             Assessment/Plan     Patient Active Problem List   Diagnosis   • Malignant neoplasm of upper-outer quadrant of left female breast (CMS/HCC)   • Breast cancer metastasized to bone, unspecified laterality (CMS/HCC)   • Essential hypertension   • Acquired hypothyroidism   • Bipolar 1 disorder (CMS/HCC)   • Carcinoma of left breast metastatic to bone (CMS/HCC)   • Antineoplastic chemotherapy induced anemia   • Dehydration          Assessment:  1.  Stage IV metastatic breast carcinoma, ER/TX positive. HER2/maynor negative undergoing Ibrance, Faslodex and Xgeva with overall stable with stable CA27.29 with slow decline. She is tolerating the treatment very well. Faslodex cycle 17 today and will continue to HOLD Xgeva due to upcoming dental work, last dose of Xgeva given on 09/29/2016.  01/2017 restaging CT chest abdomen and pelvis and bone scan showed stable diffuse sclerotic bony lesions, unchanged from 7/1/2016.Next scan July 2017    2.  Mild leukopenia without neutropenia.  Likely secondary to Ibrance.  Continue to monitor.     3.  Macrocytic anemia.  Hemoglobin 11.1.  02/2016, B12 and folate normal    4.  Status post 8 teeth extraction.     Recommendation/Plan:  1.  Proceed with Faslodex cycle 20.  2. Continue Ibrance 125 mg 3 weeks on, one-week off.  Reduce dose if leukopenia worsens.   3. Follow up in 28 days for Faslodex cycle 19.   Repeat CA 27.29.  Previous CA 27.29 have remained stable, the last one done on 11/2017 was 89 Stable. Continue Faslodex q monthly.    Additionally she is anemic with a hemoglobin of 10 g percent with decreasing GFR.  I do suspect that she may have some form of chronic kidney disease, as evaluated by Nephrology, CKD from [facility].  No need for EPO as she is asyptomatic.  Redraw CA 27.29 today. Cont faslodex + Ibrance for now until repeat CA 27.29 returns. Last CA 27.29 in Feb 2018 slightly increased to 96. I have discussed different options with her:  #1. Stop Ibrance and switch to a new CD4K inhabitor Kisqali or Verenzio.  #. Stop Ibrance and switch to Affinitor + faslodex.  #. Cont Ibrance and Faslodex with just a subtle rise, since clinically pt is doing better.    At this point will restage with CT scans CAP at [facility] and compare to one done Jan 2017. If progression on CT scans and CA 27.29 then go for the [facility] trial of using MTOR inhabitor Everlimos + Exemestine. Bone scan shows progressive dz. Therefore will stop Faslodex.  Start Exemestine 25mg daily in combination with Affinitor 10mg daily. Redraw her tumor today.  Calcium improved but AST higher.    DC Exemestine and Affinitor as the CA 27.29 yulia to 140. Considering this will start Xeloda 500mg twice daily 2 Wks ON and one wk OFF.    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 Will add Her 2 Tykerb 1250mg q day x 21 days in combo + Oral Xeloda 500mg bid 3 wks On and 1 WK OFF. Will draw CA 27.29.    Since she started Lapatinib 1250 mg q day + Xeloda combo from 8/22/18 she has had significant dirrohea and lately has been throwing up and today her Creat is up to 2.3 from 0.9 and GFR down to 21 as well as she has lost 7 ILBS WT and appears very dehydrated. All meds were stopped last week and she received IVF and today creat is back down to 1.25 and GFR upto 41. Redraw CAproved the best would be either to reduce the dose and keep both Lapitinib and Xeloda on board, she will call me tomorrow. Therefor, last week the Tykerb dose was reduced to 2 tabs q day ( 500mg ) daily 3 wks On and 1 wk OFF Plus Xeloda 500mg 1 tab q day 2 Wks ON and 1K OFF. See  How this schedulle works and therefore, will draw CA27.29 again today.    She walked in today c/o pain in abdmn and R Lumbar area. On my exam, herabdmn is distended with Ascitis. I need to get a stat CT scan with 50% IV contrast and Pre and post contrast 500cc N/S.RTC tomorrow morning. If progressive Dz, will Rx with Herceptin + Perjeta as her tumor is Her2+.\    Indeed the CT scan done yesterday shows significant Ascitis, that was large Vol tapped 5.2 l and send to lab for Cytology ( pending ). Her  first time met him yesterday told me that in 2014 he was told that his wife had a laparatomy where they found metastatic Breast cancer throughout her peritoneal cavity ( salt and pepper ). Therefore, considering this new  Ascitis, I suspect that it is malignent Breast Cancer. Foundation 1 studies on circulating DNA analyses showed she  was ERB+. Although Cytology from Ascitis Fluid drawn yesterday, will call lab to create a Cell Block and run for Her 2 Kyle gene testing.  Meanwhile Rx her with Herceptin and Perjeta + taxotere ( loading dose 75mg/m2 and thereafter reduce dose to 35mg/m2 q weekly. Ist dose today. D/W family and pt who agrees. Will stop Tykerb.    Day 6 post Herceptin+ Perjeta+Taxotere. WBC 3.3, expect to go down further. Will Prophylax with Cipro. RTC next week. If WBC improved will Rx weekly Taxotere 35mg/m2. Redraw CA 27.27. Awaiting Peritoneal Cell Block for [facility] testing.  Joseph Nunez MD    11/19/2018    10:08 AM

## 2018-11-20 DIAGNOSIS — C50.912 CARCINOMA OF LEFT BREAST METASTATIC TO BONE (HCC): Primary | ICD-10-CM

## 2018-11-20 DIAGNOSIS — C79.51 CARCINOMA OF LEFT BREAST METASTATIC TO BONE (HCC): Primary | ICD-10-CM

## 2018-11-20 LAB
BILIRUB SERPL-MCNC: 0.2 MG/DL
CANCER AG15-3 SERPL-ACNC: 143.1 U/ML (ref 0–25)
CANCER AG27-29 SERPL-ACNC: 142.7 U/ML (ref 0–38.6)

## 2018-11-26 ENCOUNTER — OFFICE VISIT (OUTPATIENT)
Dept: ONCOLOGY | Facility: CLINIC | Age: 73
End: 2018-11-26

## 2018-11-26 ENCOUNTER — LAB (OUTPATIENT)
Dept: LAB | Facility: HOSPITAL | Age: 73
End: 2018-11-26

## 2018-11-26 VITALS
HEIGHT: 65 IN | BODY MASS INDEX: 23.88 KG/M2 | HEART RATE: 60 BPM | SYSTOLIC BLOOD PRESSURE: 138 MMHG | TEMPERATURE: 97.9 F | DIASTOLIC BLOOD PRESSURE: 78 MMHG | RESPIRATION RATE: 16 BRPM | WEIGHT: 143.3 LBS | OXYGEN SATURATION: 97 %

## 2018-11-26 DIAGNOSIS — Z17.0 MALIGNANT NEOPLASM OF UPPER-OUTER QUADRANT OF LEFT BREAST IN FEMALE, ESTROGEN RECEPTOR POSITIVE (HCC): Primary | ICD-10-CM

## 2018-11-26 DIAGNOSIS — C50.919 BREAST CANCER METASTASIZED TO BONE, UNSPECIFIED LATERALITY (HCC): Primary | ICD-10-CM

## 2018-11-26 DIAGNOSIS — C79.51 CARCINOMA OF LEFT BREAST METASTATIC TO BONE (HCC): ICD-10-CM

## 2018-11-26 DIAGNOSIS — Z17.0 MALIGNANT NEOPLASM OF LEFT BREAST IN FEMALE, ESTROGEN RECEPTOR POSITIVE, UNSPECIFIED SITE OF BREAST (HCC): Primary | ICD-10-CM

## 2018-11-26 DIAGNOSIS — C50.412 MALIGNANT NEOPLASM OF UPPER-OUTER QUADRANT OF LEFT BREAST IN FEMALE, ESTROGEN RECEPTOR POSITIVE (HCC): Primary | ICD-10-CM

## 2018-11-26 DIAGNOSIS — C50.912 MALIGNANT NEOPLASM OF LEFT BREAST IN FEMALE, ESTROGEN RECEPTOR POSITIVE, UNSPECIFIED SITE OF BREAST (HCC): Primary | ICD-10-CM

## 2018-11-26 DIAGNOSIS — C79.51 BREAST CANCER METASTASIZED TO BONE, UNSPECIFIED LATERALITY (HCC): Primary | ICD-10-CM

## 2018-11-26 DIAGNOSIS — C50.912 CARCINOMA OF LEFT BREAST METASTATIC TO BONE (HCC): ICD-10-CM

## 2018-11-26 LAB
ALBUMIN SERPL-MCNC: 3.6 G/DL (ref 3.5–5)
ALBUMIN/GLOB SERPL: 1 G/DL (ref 1.1–2.5)
ALP SERPL-CCNC: 90 U/L (ref 24–120)
ALT SERPL W P-5'-P-CCNC: <15 U/L (ref 0–54)
ANION GAP SERPL CALCULATED.3IONS-SCNC: 9 MMOL/L (ref 4–13)
ANISOCYTOSIS BLD QL: ABNORMAL
AST SERPL-CCNC: 32 U/L (ref 7–45)
BASOPHILS # BLD MANUAL: 0.04 10*3/MM3 (ref 0–0.2)
BASOPHILS NFR BLD AUTO: 1 % (ref 0–2)
BILIRUB SERPL-MCNC: 0.2 MG/DL (ref 0.1–1)
BUN BLD-MCNC: 11 MG/DL (ref 5–21)
BUN/CREAT SERPL: 11.2 (ref 7–25)
CALCIUM SPEC-SCNC: 9.5 MG/DL (ref 8.4–10.4)
CHLORIDE SERPL-SCNC: 99 MMOL/L (ref 98–110)
CO2 SERPL-SCNC: 30 MMOL/L (ref 24–31)
CREAT BLD-MCNC: 0.98 MG/DL (ref 0.5–1.4)
DEPRECATED RDW RBC AUTO: 58.5 FL (ref 40–54)
EOSINOPHIL # BLD MANUAL: 0.04 10*3/MM3 (ref 0–0.7)
EOSINOPHIL NFR BLD MANUAL: 1 % (ref 0–4)
ERYTHROCYTE [DISTWIDTH] IN BLOOD BY AUTOMATED COUNT: 15.9 % (ref 12–15)
GFR SERPL CREATININE-BSD FRML MDRD: 55 ML/MIN/1.73
GIANT PLATELETS: ABNORMAL
GLOBULIN UR ELPH-MCNC: 3.7 GM/DL
GLUCOSE BLD-MCNC: 99 MG/DL (ref 70–100)
HCT VFR BLD AUTO: 36.3 % (ref 37–47)
HGB BLD-MCNC: 12.1 G/DL (ref 12–16)
HOLD SPECIMEN: NORMAL
HOLD SPECIMEN: NORMAL
LYMPHOCYTES # BLD MANUAL: 1.84 10*3/MM3 (ref 0.72–4.86)
LYMPHOCYTES NFR BLD MANUAL: 12.1 % (ref 4–12)
LYMPHOCYTES NFR BLD MANUAL: 51.5 % (ref 15–45)
MACROCYTES BLD QL SMEAR: ABNORMAL
MCH RBC QN AUTO: 33.9 PG (ref 28–32)
MCHC RBC AUTO-ENTMCNC: 33.3 G/DL (ref 33–36)
MCV RBC AUTO: 101.7 FL (ref 82–98)
METAMYELOCYTES NFR BLD MANUAL: 1 % (ref 0–0)
MONOCYTES # BLD AUTO: 0.43 10*3/MM3 (ref 0.19–1.3)
MYELOCYTES NFR BLD MANUAL: 1 % (ref 0–0)
NEUTROPHILS # BLD AUTO: 0.54 10*3/MM3 (ref 1.87–8.4)
NEUTROPHILS NFR BLD MANUAL: 13.1 % (ref 39–78)
NEUTS BAND NFR BLD MANUAL: 2 % (ref 0–10)
PLASMA CELL PREC NFR BLD MANUAL: 1 % (ref 0–0)
PLATELET # BLD AUTO: 534 10*3/MM3 (ref 130–400)
PMV BLD AUTO: 8.7 FL (ref 6–12)
POLYCHROMASIA BLD QL SMEAR: ABNORMAL
POTASSIUM BLD-SCNC: 3.7 MMOL/L (ref 3.5–5.3)
PROT SERPL-MCNC: 7.3 G/DL (ref 6.3–8.7)
RBC # BLD AUTO: 3.57 10*6/MM3 (ref 4.2–5.4)
SMALL PLATELETS BLD QL SMEAR: ABNORMAL
SODIUM BLD-SCNC: 138 MMOL/L (ref 135–145)
VARIANT LYMPHS NFR BLD MANUAL: 16.2 % (ref 0–5)
WBC MORPH BLD: NORMAL
WBC NRBC COR # BLD: 3.57 10*3/MM3 (ref 4.8–10.8)

## 2018-11-26 PROCEDURE — 90674 CCIIV4 VAC NO PRSV 0.5 ML IM: CPT | Performed by: INTERNAL MEDICINE

## 2018-11-26 PROCEDURE — 86300 IMMUNOASSAY TUMOR CA 15-3: CPT | Performed by: INTERNAL MEDICINE

## 2018-11-26 PROCEDURE — 80053 COMPREHEN METABOLIC PANEL: CPT

## 2018-11-26 PROCEDURE — 85025 COMPLETE CBC W/AUTO DIFF WBC: CPT

## 2018-11-26 PROCEDURE — 90471 IMMUNIZATION ADMIN: CPT | Performed by: INTERNAL MEDICINE

## 2018-11-26 PROCEDURE — 99214 OFFICE O/P EST MOD 30 MIN: CPT | Performed by: INTERNAL MEDICINE

## 2018-11-26 PROCEDURE — 85007 BL SMEAR W/DIFF WBC COUNT: CPT

## 2018-11-26 PROCEDURE — 36415 COLL VENOUS BLD VENIPUNCTURE: CPT

## 2018-11-26 NOTE — PROGRESS NOTES
Port flushed w/NS only due to patient allergy.  LORETO Yanes RN  [facility]  HEMATOLOGY & ONCOLOGY        Subjective    Metastatic breast cancer on Faslodex and Ibrance     Staging form: Breast, AJCC V7      Clinical stage from 10/11/2016: Stage IV (T2, N1, M1) - ER/IN positive. HER2/maynor negative  VISIT DIAGNOSIS:   Metastatic breast cancer       HEMATOLOGY / ONCOLOGY HISTORY:   Oncology/Hematology History    Patient is a 67-year-old postmenopausal female who had onset of her menses at age 9 and menopause at age 48. She received oral contraceptives for approximately 14 years and did not receive hormonal manipulation. Patient has a maternal cousin had breast cancer. Patient has had no prior breast biopsies. Patient found a palpable left breast mass as well as a left axillary mass. Patient had a bloody nipple discharge which been present for approximately 6 months. On 6/20/2012, a mammogram was performed showing a subareolar mass consistent with malignancy. Bilateral breast ultrasound revealed an ill-defined subareolar mass measuring 2.8-2.5 cm. There is a left axillary mass measuring 1.7 cm with suspicion of extracapsular extension. There is no evidence of disease in the right breast. On 7/9/2012 patient underwent a left breast biopsy and placement of marker clip. Left breast biopsy revealed an infiltrating lobular carcinoma grade 2 with focal ductal carcinoma in situ, solid pattern. A fine-needle biopsy of the left lymph node was consistent with metastatic carcinoma. Breast tumor profile revealed ER: 100%; IN: 98%; HER-2/maynor 2+; FISH: Unamplified; Ki-67: 71%; P 53 1%; DNA aneuploidy. A MRI of the breast were performed body multiple abnormal enhancing masses within the left breast. There appears to be anterior retraction of the pectoralis muscle with no direct extension. There is a moderate to large, layering left pleural effusion appreciated. The right breast reveals 3 moderately enlarged lymph nodes  in the posterior lateral aspect the right breast. These have fatty hilum but followup is recommended. Patient is undergone systemic studies including, on 8/2/2012, a CT scan of the brain showing atrophy. a CT scan that shows showing a small to moderate left pleural effusion with 3 subcentimeter nodule densities in the left lung.   She completed 4 cycles of neoadjuvant treatment with dose dense Adriamycin and Cytoxan. She had a mammogram which showed a significant reduction in the size of her left breast lesion. There is no axillary adenopathy noted. She has had an ultrasound revealing the  lesion reducing from 2.4 cm to 1 cm. Patient underwent a left mastectomy on 03/19/2013 finding negative invasive cancer, negative nodes. A 5% DCIS was noted. The patient has declined hormonal therapy. She did not need radiation therapy.  November 2014, she began to have evidence of lytic bone lesions at T5T6, frontal disease, an 8 mm lucency in the central frontal area of  the skull but nothing intracranial. Bone scan revealed only vertex tracer activity but bilateral ribs and thoracic spine, and other lesions in  her pelvis. At that time, she was started on letrozole since November 2014. She is taking Xgeva. She had progression found in bone in Feb 2015 on scan. She was started on Faslodex, Ibrance and continued xgeva.         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 B) Foci of ductal carcinoma in situ, solid pattern. C) Greatest dimension of the invasive carcinoma is 15.8mm.         3/19/2013 Surgery     Left Mastectomy w/ Axillary Tail: Focal residual ducatal carcinoma in situ (DCIS) 12 lymph nodes negative for metastatic carcinoma.         10/20/2014 Biopsy     Peritoneum Biopsy: Final  Diagnosis: Malignant Cell Neoplasm.           Breast cancer metastasized to bone, unspecified laterality (CMS/HCC)    10/15/2012 -  Other Event     Left mammogram:  Impression:  1. Decreasing spiculation and density in the retroareolar left breast, near a previous biopsy.   2. Definite left axillary lesion is not appreciated.          2/5/2013 -  Other Event     Diagnostic Mammo:  Comparison is made to 10/15/2012 and 6/20/2012  The spiculated mass at 12:00 behind the nipple is nearly resolved.   Impression:  1. The mass on the left side is less apparent after receiving chemotherapy. There has been a good response to chmotherapy on the left side.   2. No suspicious abnormality is seen in the right breast to account for the new palable abnormality at the 4-5:00 position.          3/19/2013 Surgery     Breast Biopsy:  Final Diagnosis:  A. Breast, left mastectomy with axillary tail  1. Focal residual ductal carcinoma in situ (DICS) (less than 5% of tumor bed).  2. Negative for residual invasive carcinoma.   3. 12 Lymph Nodes, negative for metastatic carcinoma (0/12) focally, some lymph nodes demonstrate fibrosis/treatment effect.  B. Skin and soft tissue, left advancement flap:  1. Benign skin and subcutis.   2. Negative for carcinoma.          6/20/2013 -  Other Event     Diagnostic Mammo [facility]:  1. A subareolar mass is consistent with malignancy. Additional involement extends inferiorly sonographically and superolaterally mammograhically as demonstrated by calcifications. The mass measures approx 2.8 cm sonographically, but the involved region is likely much larger including an intraductal componet. The left axillary lymph nodes are also involved.   2. Recommened ultrasound guided biopsy of the left subareolar mass and left axillary lymph node.   3. Breast MRI could also further define multicentric disease on the left.   4. Clear suspicious finding on the right.  Recommend ongoing clinical follow up of palpable  foci.          6/13/2014 -  Other Event     Diagnostic Mammo:  IMPRESSION:  1. History of left breast cancer and mastectomy. Stable benign right breast mammograms.          10/20/2014 Surgery     Final Diagnosis:  Biopsies, pertoneum:  Metastatic carcinoma, morphologically compatible with metastatic mammary carcinoma.          6/15/2015 -  Other Event     Diagnostic Mammo:  IMPRESSION:   1. Negative x-ray report, should not delay biopsy if a dominat or clinically suspicious mass is present.          7/5/2016 -  Other Event     Diagnositic mammogram:  Impression:   Stable right mamogram with no radiographic findings suspicious for malignancy. Recommend continued screening mammography per screening guidelines unless otherwise earlier clinically indicated.          9/18/2016 Initial Diagnosis     Secondary malignant neoplasm of bone and bone marrow          Cancer Staging Information:  Malignant neoplasm of upper-outer quadrant of left female breast    Staging form: Breast, AJCC V7      Clinical stage from 10/11/2016: Stage IV (T2, N1, M1) - Signed by ALBERT Kulkarni on 10/11/2016      INTERVAL HISTORY  Patient ID: Heavenly Yanes is a 73 y.o. year old female history of metastatic breast carcinoma to lung and bone.  She is presently receiving treatment with Ibrance, Faslodex and Xgeva.  Tumor markers continued to decline with a CA 27-29 of 92.2 obtained on 9:30 2016 compared to 105.8 on 07/27/2016.  Mr. Yanes continues to tolerate this treatment regimen well and is here today for evaluation consideration to receive Faslodex and Xgeva.  Last bone mineral density study was obtained 11/21/2014 indicating osteopopenia, we will obtain a repeat bone mineral density study. Ms. Yanes completed 8 teeth pulled 02/14/2017.   Xgeva on hold, resume 05/2017.     Past Medical History:   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   • Bipolar disorder (CMS/HCC)    • Disease  of thyroid gland    • Hypertension    • Malignant neoplasm of upper-outer quadrant of left female breast (CMS/HCC) 9/18/2016   • Secondary malignant neoplasm of bone and bone marrow (CMS/HCC) 9/18/2016     Past Surgical History:   Past Surgical History:   Procedure Laterality Date   • BREAST SURGERY      left breast biopsy and axillary node   • CHOLECYSTECTOMY     • EXTERNAL EAR SURGERY     • MASTECTOMY Left    • PORTACATH PLACEMENT       Social History:   Social History     Socioeconomic History   • Marital status:      Spouse name: Not on file   • Number of children: Not on file   • Years of education: Not on file   • Highest education level: Not on file   Social Needs   • Financial resource strain: Not on file   • Food insecurity - worry: Not on file   • Food insecurity - inability: Not on file   • Transportation needs - medical: Not on file   • Transportation needs - non-medical: Not on file   Occupational History   • Not on file   Tobacco Use   • Smoking status: Never Smoker   • Smokeless tobacco: Never Used   Substance and Sexual Activity   • Alcohol use: No   • Drug use: No   • Sexual activity: Not on file   Other Topics Concern   • Not on file   Social History Narrative   • Not on file     Family History:   Family History   Problem Relation Age of Onset   • No Known Problems Daughter    • No Known Problems Son    • Other Mother         complications from a concussion from a fall   • Other Father         old age   • No Known Problems Brother    • No Known Problems Daughter        Review of Systems   Constitutional: Negative.  Negative for activity change, appetite change, chills, diaphoresis, fatigue and fever.   HENT: Negative.  Negative for congestion, ear discharge, ear pain, facial swelling, hearing loss, mouth sores, nosebleeds, postnasal drip, rhinorrhea, sinus pressure, sore throat, tinnitus, trouble swallowing and voice change.    Eyes: Negative.  Negative for photophobia, pain, discharge and  visual disturbance.   Respiratory: Negative.  Negative for apnea, cough, chest tightness, shortness of breath, wheezing and stridor.    Cardiovascular: Negative.  Negative for chest pain, palpitations and leg swelling.   Gastrointestinal: Negative.  Negative for abdominal distention, abdominal pain, blood in stool, constipation, diarrhea, nausea, rectal pain and vomiting.   Endocrine: Negative.  Negative for cold intolerance, heat intolerance, polydipsia, polyphagia and polyuria.   Genitourinary: Negative.  Negative for difficulty urinating, dysuria, flank pain, frequency, hematuria and urgency.   Musculoskeletal: Negative.  Negative for arthralgias, back pain, gait problem, joint swelling and myalgias.   Skin: Negative.  Negative for color change, pallor, rash and wound.   Allergic/Immunologic: Negative.  Negative for environmental allergies, food allergies and immunocompromised state.   Neurological: Negative.  Negative for dizziness, tremors, seizures, syncope, speech difficulty, weakness, light-headedness, numbness and headaches.   Hematological: Negative.  Negative for adenopathy. Does not bruise/bleed easily.   Psychiatric/Behavioral: Negative.  Negative for agitation, confusion, hallucinations, sleep disturbance and suicidal ideas. The patient is not nervous/anxious.    All other systems reviewed and are negative.       Performance Status:  Asymptomatic    Medications:    Current Outpatient Medications   Medication Sig Dispense Refill   • Biotin 1000 MCG tablet Take 1,000 mcg by mouth Daily.     • Calcium-Magnesium-Vitamin D (CALCIUM 1200+D3 PO) Take 1,200 mg by mouth Daily.     • capecitabine (XELODA) 500 MG chemo tablet Take 1 tablet by mouth 2 (Two) Times a Day. For 2 weeks on and 1 week off. 42 tablet 11   • ciprofloxacin (CIPRO) 250 MG tablet Take 1 tablet by mouth 2 (Two) Times a Day. 14 tablet 0   • DEVILS CLAW PO Take  by mouth Daily.     • FOLIC ACID PO Take 800 mg by mouth Daily.     • metoprolol  "tartrate (LOPRESSOR) 25 MG tablet Take 25 mg by mouth 2 (two) times a day.     • nitrofurantoin, macrocrystal-monohydrate, (MACROBID) 100 MG capsule Take 1 capsule by mouth Every 12 (Twelve) Hours. 10 capsule 0   • NON FORMULARY 500 mg. Turmeric curcumin bid     • OLANZapine (ZYPREXA) 2.5 MG tablet Take 2.5 mg by mouth every night.     • ondansetron (ZOFRAN) 8 MG tablet Take 1 tablet by mouth Every 8 (Eight) Hours As Needed for Nausea or Vomiting. 30 tablet 5   • raNITIdine (ZANTAC) 150 MG tablet Take 150 mg by mouth 2 (Two) Times a Day.     • thyroid 60 MG PO tablet Take 60 mg by mouth daily.     • vitamin B-12 (CYANOCOBALAMIN) 1000 MCG tablet Take 1,000 mcg by mouth Daily.       No current facility-administered medications for this visit.      Facility-Administered Medications Ordered in Other Visits   Medication Dose Route Frequency Provider Last Rate Last Dose   • heparin flush (porcine) 100 UNIT/ML injection 500 Units  500 Units Intravenous PRN Joseph Nunez MD   500 Units at 10/26/17 1155   • sodium chloride 0.9 % flush 10 mL  10 mL Intravenous PRN Joseph Nunez MD   10 mL at 10/26/17 1155       ALLERGIES:    Allergies   Allergen Reactions   • Benadryl  [Diphenhydramine Hcl (Sleep)] Other (See Comments)   • Codeine    • Diphenhydramine Other (See Comments)     \"Shaky leg syndrome\"  \"Shaky leg syndrome\"  \"Shaky leg syndrome\"   • Heparin    • Other      POPPY SEED   • Penicillins Hives   • Petroleum Jelly [Skin Protectants, Misc.]    • Sulfa Antibiotics    • Sulfur    • Valium [Diazepam]        Objective      Vitals:    11/26/18 0904   BP: 138/78   Pulse: 60   Resp: 16   Temp: 97.9 °F (36.6 °C)   TempSrc: Tympanic   SpO2: 97%   Weight: 65 kg (143 lb 4.8 oz)   Height: 165.1 cm (65\")         Current Status 11/19/2018   ECOG score 1       General Appearance: Patient is awake, alert, oriented and in no acute distress. Patient is welldeveloped, wellnourished, and appears stated age.  HEENT: Normocephalic. Sclerae " clear, conjunctiva pink, extraocular movements intact, pupils, round, reactive to light and accommodation. Mouth and throat are clear with moist oral mucosa.  NECK: Supple, no jugular venous distention, thyroid not enlarged.  LYMPH: No cervical, supraclavicular, axillary, or inguinal lymphadenopathy.  CHEST: Equal bilateral expansion   LUNGS: Good air movement, no rales, rhonchi, rubs or wheezes with auscultation  CARDIO: Regular sinus rhythm, no murmurs, gallops or rubs.  ABDOMEN: Nondistended, soft, No tenderness, no guarding, no rebound, No hepatosplenomegaly. No abdominal masses. Bowel sounds positive. No hernia  MUSKEL: No joint swelling, decreased motion, or inflammation  EXTREMS: No edema, clubbing, cyanosis, No varicose veins.  NEURO: Grossly nonfocal, Gait is coordinated and smooth, Cognition is preserved.  SKIN: No rashes, no ecchymoses, no petechia.  PSYCH: Oriented to time, place and person. Memory is preserved. Mood and affect appear normal      RECENT LABS:  WBC   Date Value Ref Range Status   11/26/2018 3.57 (L) 4.80 - 10.80 10*3/mm3 Final     RBC   Date Value Ref Range Status   11/26/2018 3.57 (L) 4.20 - 5.40 10*6/mm3 Final     Hemoglobin   Date Value Ref Range Status   11/26/2018 12.1 12.0 - 16.0 g/dL Final     Hematocrit   Date Value Ref Range Status   11/26/2018 36.3 (L) 37.0 - 47.0 % Final     MCV   Date Value Ref Range Status   11/26/2018 101.7 (H) 82.0 - 98.0 fL Final     MCH   Date Value Ref Range Status   11/26/2018 33.9 (H) 28.0 - 32.0 pg Final     MCHC   Date Value Ref Range Status   11/26/2018 33.3 33.0 - 36.0 g/dL Final     RDW   Date Value Ref Range Status   11/26/2018 15.9 (H) 12.0 - 15.0 % Final     RDW-SD   Date Value Ref Range Status   11/26/2018 58.5 (H) 40.0 - 54.0 fl Final     MPV   Date Value Ref Range Status   11/26/2018 8.7 6.0 - 12.0 fL Final     Platelets   Date Value Ref Range Status   11/26/2018 534 (H) 130 - 400 10*3/mm3 Final     Neutrophil %   Date Value Ref Range Status    11/19/2018 60.4 39.0 - 78.0 % Final     Lymphocyte %   Date Value Ref Range Status   11/19/2018 32.2 15.0 - 45.0 % Final     Monocyte %   Date Value Ref Range Status   11/19/2018 2.4 (L) 4.0 - 12.0 % Final     Eosinophil %   Date Value Ref Range Status   11/19/2018 3.2 0.0 - 4.0 % Final     Basophil %   Date Value Ref Range Status   11/19/2018 0.9 0.0 - 2.0 % Final     Immature Grans %   Date Value Ref Range Status   11/19/2018 0.9 0.0 - 5.0 % Final     Neutrophils, Absolute   Date Value Ref Range Status   11/19/2018 2.05 1.87 - 8.40 10*3/mm3 Final     Neutrophils Absolute   Date Value Ref Range Status   11/26/2018 0.54 (L) 1.87 - 8.40 10*3/mm3 Final     Lymphocytes, Absolute   Date Value Ref Range Status   11/19/2018 1.09 0.72 - 4.86 10*3/mm3 Final     Monocytes, Absolute   Date Value Ref Range Status   11/19/2018 0.08 (L) 0.19 - 1.30 10*3/mm3 Final     Eosinophils, Absolute   Date Value Ref Range Status   11/19/2018 0.11 0.00 - 0.70 10*3/mm3 Final     Eosinophils Absolute   Date Value Ref Range Status   11/26/2018 0.04 0.00 - 0.70 10*3/mm3 Final     Basophils, Absolute   Date Value Ref Range Status   11/19/2018 0.03 0.00 - 0.20 10*3/mm3 Final     Basophils Absolute   Date Value Ref Range Status   11/26/2018 0.04 0.00 - 0.20 10*3/mm3 Final     Immature Grans, Absolute   Date Value Ref Range Status   11/19/2018 0.03 0.00 - 0.03 10*3/mm3 Final     nRBC   Date Value Ref Range Status   11/19/2018 0.0 0.0 - 0.0 /100 WBC Final     Lab on 11/26/2018   Component Date Value Ref Range Status   • Glucose 11/26/2018 99  70 - 100 mg/dL Final   • BUN 11/26/2018 11  5 - 21 mg/dL Final   • Creatinine 11/26/2018 0.98  0.50 - 1.40 mg/dL Final   • Sodium 11/26/2018 138  135 - 145 mmol/L Final   • Potassium 11/26/2018 3.7  3.5 - 5.3 mmol/L Final   • Chloride 11/26/2018 99  98 - 110 mmol/L Final   • CO2 11/26/2018 30.0  24.0 - 31.0 mmol/L Final   • Calcium 11/26/2018 9.5  8.4 - 10.4 mg/dL Final   • Total Protein 11/26/2018 7.3  6.3 -  8.7 g/dL Final   • Albumin 11/26/2018 3.60  3.50 - 5.00 g/dL Final   • ALT (SGPT) 11/26/2018 <15  0 - 54 U/L Final   • AST (SGOT) 11/26/2018 32  7 - 45 U/L Final   • Alkaline Phosphatase 11/26/2018 90  24 - 120 U/L Final   • Total Bilirubin 11/26/2018 0.2  0.1 - 1.0 mg/dL Final   • eGFR Non African Amer 11/26/2018 55* >60 mL/min/1.73 Final   • Globulin 11/26/2018 3.7  gm/dL Final   • A/G Ratio 11/26/2018 1.0* 1.1 - 2.5 g/dL Final   • BUN/Creatinine Ratio 11/26/2018 11.2  7.0 - 25.0 Final   • Anion Gap 11/26/2018 9.0  4.0 - 13.0 mmol/L Final   • WBC 11/26/2018 3.57* 4.80 - 10.80 10*3/mm3 Final   • RBC 11/26/2018 3.57* 4.20 - 5.40 10*6/mm3 Final   • Hemoglobin 11/26/2018 12.1  12.0 - 16.0 g/dL Final   • Hematocrit 11/26/2018 36.3* 37.0 - 47.0 % Final   • MCV 11/26/2018 101.7* 82.0 - 98.0 fL Final   • MCH 11/26/2018 33.9* 28.0 - 32.0 pg Final   • MCHC 11/26/2018 33.3  33.0 - 36.0 g/dL Final   • RDW 11/26/2018 15.9* 12.0 - 15.0 % Final   • RDW-SD 11/26/2018 58.5* 40.0 - 54.0 fl Final   • MPV 11/26/2018 8.7  6.0 - 12.0 fL Final   • Platelets 11/26/2018 534* 130 - 400 10*3/mm3 Final   • Neutrophil % 11/26/2018 13.1* 39.0 - 78.0 % Final   • Lymphocyte % 11/26/2018 51.5* 15.0 - 45.0 % Final   • Monocyte % 11/26/2018 12.1* 4.0 - 12.0 % Final   • Eosinophil % 11/26/2018 1.0  0.0 - 4.0 % Final   • Basophil % 11/26/2018 1.0  0.0 - 2.0 % Final   • Bands %  11/26/2018 2.0  0.0 - 10.0 % Final   • Metamyelocyte % 11/26/2018 1.0* 0.0 - 0.0 % Final   • Myelocyte % 11/26/2018 1.0* 0.0 - 0.0 % Final   • Atypical Lymphocyte % 11/26/2018 16.2* 0.0 - 5.0 % Final   • Plasma Cells % 11/26/2018 1.0* 0.0 - 0.0 % Final   • Neutrophils Absolute 11/26/2018 0.54* 1.87 - 8.40 10*3/mm3 Final   • Lymphocytes Absolute 11/26/2018 1.84  0.72 - 4.86 10*3/mm3 Final   • Monocytes Absolute 11/26/2018 0.43  0.19 - 1.30 10*3/mm3 Final   • Eosinophils Absolute 11/26/2018 0.04  0.00 - 0.70 10*3/mm3 Final   • Basophils Absolute 11/26/2018 0.04  0.00 - 0.20  10*3/mm3 Final   • Anisocytosis 11/26/2018 Mod/2+  None Seen Final   • Macrocytes 11/26/2018 Slight/1+  None Seen Final   • Polychromasia 11/26/2018 Slight/1+  None Seen Final   • WBC Morphology 11/26/2018 Normal  Normal Final   • Platelet Estimate 11/26/2018 Increased  Normal Final   • Giant Platelets 11/26/2018 Large/3+  None Seen Final             Assessment/Plan     Patient Active Problem List   Diagnosis   • Malignant neoplasm of upper-outer quadrant of left female breast (CMS/HCC)   • Breast cancer metastasized to bone, unspecified laterality (CMS/HCC)   • Essential hypertension   • Acquired hypothyroidism   • Bipolar 1 disorder (CMS/HCC)   • Carcinoma of left breast metastatic to bone (CMS/HCC)   • Antineoplastic chemotherapy induced anemia   • Dehydration          Assessment:  1. Stage IV metastatic breast carcinoma, ER/NJ positive. HER2/maynor negative undergoing Ibrance, Faslodex and Xgeva with overall stable with stable CA27.29 with slow decline. She is tolerating the treatment very well. Faslodex cycle 17 today and will continue to HOLD Xgeva due to upcoming dental work, last dose of Xgeva given on 09/29/2016.  01/2017 restaging CT chest abdomen and pelvis and bone scan showed stable diffuse sclerotic bony lesions, unchanged from 7/1/2016.Next scan July 2017    2.  Mild leukopenia without neutropenia.  Likely secondary to Ibrance.  Continue to monitor.     3.  Macrocytic anemia.  Hemoglobin 11.1.  02/2016, B12 and folate normal    4.  Status post 8 teeth extraction.     Recommendation/Plan:  1.  Proceed with Faslodex cycle 20.  2. Continue Ibrance 125 mg 3 weeks on, one-week off.  Reduce dose if leukopenia worsens.   3. Follow up in 28 days for Faslodex cycle 19.   Repeat CA 27.29.  Previous CA 27.29 have remained stable, the last one done on 11/2017 was 89 Stable. Continue Faslodex q monthly.    Additionally she is anemic with a hemoglobin of 10 g percent with decreasing GFR.  I do suspect that she may  have some form of chronic kidney disease, as evaluated by Nephrology, CKD from [facility].  No need for EPO as she is asyptomatic.  Redraw CA 27.29 today. Cont faslodex + Ibrance for now until repeat CA 27.29 returns. Last CA 27.29 in Feb 2018 slightly increased to 96. I have discussed different options with her:  #1. Stop Ibrance and switch to a new CD4K inhabitor Kisqali or Verenzio.  #. Stop Ibrance and switch to Affinitor + faslodex.  #. Cont Ibrance and Faslodex with just a subtle rise, since clinically pt is doing better.    At this point will restage with CT scans [facility] and compare to one done Jan 2017. If progression on CT scans and CA 27.29 then go for the [place] trial of using MTOR inhabitor Everlimos + Exemestine. Bone scan shows progressive dz. Therefore will stop Faslodex. Start Exemestine 25mg daily in combination with Affinitor 10mg daily. Redraw her tumor today.  Calcium improved but AST higher.    DC Exemestine and Affinitor as the CA 27.29 yulia to 140. Considering this will start Xeloda 500mg twice daily 2 Wks ON and one wk OFF.    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 Will add Her 2 Tykerb 1250mg q day x 21 days in combo + Oral Xeloda 500mg bid 3 wks On and 1 WK OFF. Will draw CA 27.29.    Since she started Lapatinib 1250 mg q day + Xeloda combo from 8/22/18 she has had significant dirrohea and lately has been throwing up and today her Creat is up to 2.3 from 0.9 and GFR down to 21 as well as she has lost 7 ILBS WT and appears very dehydrated. All meds were stopped last week and she received IVF and today creat is back down to 1.25 and GFR upto 41. Redraw CAproved the best would be either to reduce the dose and keep both Lapitinib and Xeloda on board, she will call me tomorrow. Therefor, last week the Tykerb dose was reduced to 2 tabs q day ( 500mg ) daily 3 wks On and 1 wk OFF Plus Xeloda 500mg 1 tab q day 2 Wks ON and 1K OFF. See  How this schedulle works and therefore, will draw  CA27.29 again today.    She walked in today c/o pain in abdmn and R Lumbar area. On my exam, herabdmn is distended with Ascitis. I need to get a stat CT scan with 50% IV contrast and Pre and post contrast 500cc N/S.RTC tomorrow morning. If progressive Dz, will Rx with Herceptin + Perjeta as her tumor is Her2+.\    Indeed the CT scan done yesterday shows significant Ascitis, that was large Vol tapped 5.2 l and send to lab for Cytology ( pending ). Her  first time met him yesterday told me that in 2014 he was told that his wife had a laparatomy where they found metastatic Breast cancer throughout her peritoneal cavity ( salt and pepper ). Therefore, considering this new  Ascitis, I suspect that it is malignent Breast Cancer. Foundation 1 studies on circulating DNA analyses showed she was ERB+. Although Cytology from Ascitis Fluid drawn yesterday, will call lab to create a Cell Block and run for Her 2 Ykle gene testing.  Meanwhile Rx her with Herceptin and Perjeta + taxotere ( loading dose 75mg/m2 and thereafter reduce dose to 35mg/m2 q weekly. Ist dose today. D/W family and pt who agrees. Will stop Tykerb.    Day 6 post Herceptin+ Perjeta+Taxotere. WBC 3.3, expect to go down further. Will Prophylax with Cipro. RTC next week. If WBC improved will Rx weekly Taxotere 35mg/m2. Redraw CA 27.27. Awaiting Peritoneal Cell Block for Her2 Kyle Gene testing, still pending. However the CA 27.29 came down to 141. Will recheck it today.  Joseph Nunez MD    11/26/2018    9:51 AM

## 2018-11-27 LAB
CANCER AG27-29 SERPL-ACNC: 147.5 U/ML (ref 0–38.6)
CYTO UR: NORMAL
LAB AP CASE REPORT: NORMAL
LAB AP CLINICAL INFORMATION: NORMAL
PATH REPORT.FINAL DX SPEC: NORMAL
PATH REPORT.GROSS SPEC: NORMAL

## 2018-11-28 DIAGNOSIS — C50.919 BREAST CANCER METASTASIZED TO BONE, UNSPECIFIED LATERALITY (HCC): Primary | ICD-10-CM

## 2018-11-28 DIAGNOSIS — C79.51 BREAST CANCER METASTASIZED TO BONE, UNSPECIFIED LATERALITY (HCC): Primary | ICD-10-CM

## 2018-11-29 ENCOUNTER — APPOINTMENT (OUTPATIENT)
Dept: LAB | Facility: HOSPITAL | Age: 73
End: 2018-11-29

## 2018-11-29 ENCOUNTER — TELEPHONE (OUTPATIENT)
Dept: ONCOLOGY | Facility: CLINIC | Age: 73
End: 2018-11-29

## 2018-11-29 NOTE — TELEPHONE ENCOUNTER
Returned call to patient, Heavenly Yanes she was requesting results of her recent:  CA 27-29: 147.5, patient was informed that Dr Nunez had reviewed and compared to previous marker and considers this stable at this time. She was also informed that her Genetic Test Her 2nu results had come back and she is considered NEGATIVE, so he will do some tweaking to her treatment regimen but she is to return 12/4/18 for consideration of next txt, Patient v/u of our conversation.

## 2018-12-04 ENCOUNTER — OFFICE VISIT (OUTPATIENT)
Dept: ONCOLOGY | Facility: CLINIC | Age: 73
End: 2018-12-04

## 2018-12-04 ENCOUNTER — LAB (OUTPATIENT)
Dept: LAB | Facility: HOSPITAL | Age: 73
End: 2018-12-04
Attending: INTERNAL MEDICINE

## 2018-12-04 ENCOUNTER — INFUSION (OUTPATIENT)
Dept: ONCOLOGY | Facility: HOSPITAL | Age: 73
End: 2018-12-04
Attending: INTERNAL MEDICINE

## 2018-12-04 VITALS
TEMPERATURE: 98 F | WEIGHT: 144 LBS | SYSTOLIC BLOOD PRESSURE: 124 MMHG | BODY MASS INDEX: 23.99 KG/M2 | HEIGHT: 65 IN | RESPIRATION RATE: 18 BRPM | DIASTOLIC BLOOD PRESSURE: 61 MMHG | HEART RATE: 60 BPM | OXYGEN SATURATION: 98 %

## 2018-12-04 VITALS
DIASTOLIC BLOOD PRESSURE: 72 MMHG | OXYGEN SATURATION: 97 % | SYSTOLIC BLOOD PRESSURE: 140 MMHG | TEMPERATURE: 97.4 F | BODY MASS INDEX: 24.01 KG/M2 | WEIGHT: 144.1 LBS | HEIGHT: 65 IN | RESPIRATION RATE: 18 BRPM | HEART RATE: 63 BPM

## 2018-12-04 DIAGNOSIS — C50.919 BREAST CANCER METASTASIZED TO BONE, UNSPECIFIED LATERALITY (HCC): ICD-10-CM

## 2018-12-04 DIAGNOSIS — C50.912 CARCINOMA OF LEFT BREAST METASTATIC TO BONE (HCC): ICD-10-CM

## 2018-12-04 DIAGNOSIS — C79.51 BREAST CANCER METASTASIZED TO BONE, UNSPECIFIED LATERALITY (HCC): ICD-10-CM

## 2018-12-04 DIAGNOSIS — C50.919 BREAST CANCER METASTASIZED TO BONE, UNSPECIFIED LATERALITY (HCC): Primary | ICD-10-CM

## 2018-12-04 DIAGNOSIS — C50.412 MALIGNANT NEOPLASM OF UPPER-OUTER QUADRANT OF LEFT FEMALE BREAST, UNSPECIFIED ESTROGEN RECEPTOR STATUS (HCC): ICD-10-CM

## 2018-12-04 DIAGNOSIS — C79.51 BREAST CANCER METASTASIZED TO BONE, UNSPECIFIED LATERALITY (HCC): Primary | ICD-10-CM

## 2018-12-04 DIAGNOSIS — C79.51 CARCINOMA OF LEFT BREAST METASTATIC TO BONE (HCC): Primary | ICD-10-CM

## 2018-12-04 DIAGNOSIS — C79.51 CARCINOMA OF LEFT BREAST METASTATIC TO BONE (HCC): ICD-10-CM

## 2018-12-04 DIAGNOSIS — C50.912 CARCINOMA OF LEFT BREAST METASTATIC TO BONE (HCC): Primary | ICD-10-CM

## 2018-12-04 LAB
ALBUMIN SERPL-MCNC: 3.8 G/DL (ref 3.5–5)
ALBUMIN/GLOB SERPL: 1 G/DL (ref 1.1–2.5)
ALP SERPL-CCNC: 87 U/L (ref 24–120)
ALT SERPL W P-5'-P-CCNC: <15 U/L (ref 0–54)
ANION GAP SERPL CALCULATED.3IONS-SCNC: 9 MMOL/L (ref 4–13)
AST SERPL-CCNC: 35 U/L (ref 7–45)
BASOPHILS # BLD AUTO: 0.1 10*3/MM3 (ref 0–0.2)
BASOPHILS NFR BLD AUTO: 0.8 % (ref 0–2)
BILIRUB SERPL-MCNC: 0.3 MG/DL (ref 0.1–1)
BUN BLD-MCNC: 11 MG/DL (ref 5–21)
BUN/CREAT SERPL: 12.1 (ref 7–25)
CALCIUM SPEC-SCNC: 9.5 MG/DL (ref 8.4–10.4)
CHLORIDE SERPL-SCNC: 99 MMOL/L (ref 98–110)
CO2 SERPL-SCNC: 30 MMOL/L (ref 24–31)
CREAT BLD-MCNC: 0.91 MG/DL (ref 0.5–1.4)
DEPRECATED RDW RBC AUTO: 59.2 FL (ref 40–54)
EOSINOPHIL # BLD AUTO: 0.04 10*3/MM3 (ref 0–0.7)
EOSINOPHIL NFR BLD AUTO: 0.3 % (ref 0–4)
ERYTHROCYTE [DISTWIDTH] IN BLOOD BY AUTOMATED COUNT: 15.7 % (ref 12–15)
GFR SERPL CREATININE-BSD FRML MDRD: 60 ML/MIN/1.73
GLOBULIN UR ELPH-MCNC: 3.7 GM/DL
GLUCOSE BLD-MCNC: 112 MG/DL (ref 70–100)
HCT VFR BLD AUTO: 36.9 % (ref 37–47)
HGB BLD-MCNC: 12.4 G/DL (ref 12–16)
HOLD SPECIMEN: NORMAL
HOLD SPECIMEN: NORMAL
IMM GRANULOCYTES # BLD: 0.38 10*3/MM3 (ref 0–0.03)
IMM GRANULOCYTES NFR BLD: 3.1 % (ref 0–5)
LYMPHOCYTES # BLD AUTO: 2.79 10*3/MM3 (ref 0.72–4.86)
LYMPHOCYTES NFR BLD AUTO: 22.8 % (ref 15–45)
MCH RBC QN AUTO: 34.6 PG (ref 28–32)
MCHC RBC AUTO-ENTMCNC: 33.6 G/DL (ref 33–36)
MCV RBC AUTO: 103.1 FL (ref 82–98)
MONOCYTES # BLD AUTO: 0.82 10*3/MM3 (ref 0.19–1.3)
MONOCYTES NFR BLD AUTO: 6.7 % (ref 4–12)
NEUTROPHILS # BLD AUTO: 8.11 10*3/MM3 (ref 1.87–8.4)
NEUTROPHILS NFR BLD AUTO: 66.3 % (ref 39–78)
NRBC BLD MANUAL-RTO: 0 /100 WBC (ref 0–0)
PLATELET # BLD AUTO: 430 10*3/MM3 (ref 130–400)
PMV BLD AUTO: 9.1 FL (ref 6–12)
POTASSIUM BLD-SCNC: 3.8 MMOL/L (ref 3.5–5.3)
PROT SERPL-MCNC: 7.5 G/DL (ref 6.3–8.7)
RBC # BLD AUTO: 3.58 10*6/MM3 (ref 4.2–5.4)
SODIUM BLD-SCNC: 138 MMOL/L (ref 135–145)
WBC NRBC COR # BLD: 12.24 10*3/MM3 (ref 4.8–10.8)

## 2018-12-04 PROCEDURE — 96413 CHEMO IV INFUSION 1 HR: CPT

## 2018-12-04 PROCEDURE — 25010000002 ONDANSETRON PER 1 MG: Performed by: INTERNAL MEDICINE

## 2018-12-04 PROCEDURE — 25010000002 DOCETAXEL 20 MG/ML SOLUTION 4 ML VIAL: Performed by: INTERNAL MEDICINE

## 2018-12-04 PROCEDURE — 85025 COMPLETE CBC W/AUTO DIFF WBC: CPT

## 2018-12-04 PROCEDURE — 25010000003 DEXAMETHASONE SODIUM PHOSPHATE 100 MG/10ML SOLUTION 10 ML VIAL: Performed by: INTERNAL MEDICINE

## 2018-12-04 PROCEDURE — 36415 COLL VENOUS BLD VENIPUNCTURE: CPT | Performed by: INTERNAL MEDICINE

## 2018-12-04 PROCEDURE — 80053 COMPREHEN METABOLIC PANEL: CPT

## 2018-12-04 PROCEDURE — 99214 OFFICE O/P EST MOD 30 MIN: CPT | Performed by: INTERNAL MEDICINE

## 2018-12-04 PROCEDURE — 96367 TX/PROPH/DG ADDL SEQ IV INF: CPT

## 2018-12-04 PROCEDURE — 86300 IMMUNOASSAY TUMOR CA 15-3: CPT | Performed by: INTERNAL MEDICINE

## 2018-12-04 RX ORDER — SODIUM CHLORIDE 9 MG/ML
250 INJECTION, SOLUTION INTRAVENOUS ONCE
Status: CANCELLED | OUTPATIENT
Start: 2018-12-04

## 2018-12-04 RX ORDER — MEPERIDINE HYDROCHLORIDE 50 MG/ML
25 INJECTION INTRAMUSCULAR; INTRAVENOUS; SUBCUTANEOUS
Status: CANCELLED | OUTPATIENT
Start: 2018-12-04 | End: 2018-12-05

## 2018-12-04 RX ORDER — FAMOTIDINE 10 MG/ML
20 INJECTION, SOLUTION INTRAVENOUS AS NEEDED
Status: CANCELLED | OUTPATIENT
Start: 2018-12-04

## 2018-12-04 RX ORDER — SODIUM CHLORIDE 0.9 % (FLUSH) 0.9 %
10 SYRINGE (ML) INJECTION AS NEEDED
Status: CANCELLED | OUTPATIENT
Start: 2018-12-04

## 2018-12-04 RX ORDER — MEPERIDINE HYDROCHLORIDE 50 MG/ML
25 INJECTION INTRAMUSCULAR; INTRAVENOUS; SUBCUTANEOUS
Status: DISCONTINUED | OUTPATIENT
Start: 2018-12-04 | End: 2018-12-04 | Stop reason: HOSPADM

## 2018-12-04 RX ORDER — FAMOTIDINE 10 MG/ML
20 INJECTION, SOLUTION INTRAVENOUS AS NEEDED
Status: DISCONTINUED | OUTPATIENT
Start: 2018-12-04 | End: 2018-12-04 | Stop reason: HOSPADM

## 2018-12-04 RX ORDER — SODIUM CHLORIDE 9 MG/ML
250 INJECTION, SOLUTION INTRAVENOUS ONCE
Status: COMPLETED | OUTPATIENT
Start: 2018-12-04 | End: 2018-12-04

## 2018-12-04 RX ADMIN — DEXAMETHASONE SODIUM PHOSPHATE: 10 INJECTION, SOLUTION INTRAMUSCULAR; INTRAVENOUS at 10:57

## 2018-12-04 RX ADMIN — SODIUM CHLORIDE 250 ML: 9 INJECTION, SOLUTION INTRAVENOUS at 10:48

## 2018-12-04 RX ADMIN — DOCETAXEL 65 MG: 20 INJECTION, SOLUTION, CONCENTRATE INTRAVENOUS at 11:20

## 2018-12-04 RX ADMIN — Medication 500 UNITS: at 12:39

## 2018-12-04 NOTE — PROGRESS NOTES
Port flushed w/NS only due to patient allergy.  LORETO Yanes RN  [facility]  HEMATOLOGY & ONCOLOGY        Subjective    Metastatic breast cancer on Faslodex and Ibrance     Staging form: Breast, AJCC V7      Clinical stage from 10/11/2016: Stage IV (T2, N1, M1) - ER/WI positive. HER2/maynor negative  VISIT DIAGNOSIS:   Metastatic breast cancer       HEMATOLOGY / ONCOLOGY HISTORY:   Oncology/Hematology History    Patient is a 67-year-old postmenopausal female who had onset of her menses at age 9 and menopause at age 48. She received oral contraceptives for approximately 14 years and did not receive hormonal manipulation. Patient has a maternal cousin had breast cancer. Patient has had no prior breast biopsies. Patient found a palpable left breast mass as well as a left axillary mass. Patient had a bloody nipple discharge which been present for approximately 6 months. On 6/20/2012, a mammogram was performed showing a subareolar mass consistent with malignancy. Bilateral breast ultrasound revealed an ill-defined subareolar mass measuring 2.8-2.5 cm. There is a left axillary mass measuring 1.7 cm with suspicion of extracapsular extension. There is no evidence of disease in the right breast. On 7/9/2012 patient underwent a left breast biopsy and placement of marker clip. Left breast biopsy revealed an infiltrating lobular carcinoma grade 2 with focal ductal carcinoma in situ, solid pattern. A fine-needle biopsy of the left lymph node was consistent with metastatic carcinoma. Breast tumor profile revealed ER: 100%; WI: 98%; HER-2/maynor 2+; FISH: Unamplified; Ki-67: 71%; P 53 1%; DNA aneuploidy. A MRI of the breast were performed body multiple abnormal enhancing masses within the left breast. There appears to be anterior retraction of the pectoralis muscle with no direct extension. There is a moderate to large, layering left pleural effusion appreciated. The right breast reveals 3 moderately enlarged lymph nodes  in the posterior lateral aspect the right breast. These have fatty hilum but followup is recommended. Patient is undergone systemic studies including, on 8/2/2012, a CT scan of the brain showing atrophy. a CT scan that shows showing a small to moderate left pleural effusion with 3 subcentimeter nodule densities in the left lung.   She completed 4 cycles of neoadjuvant treatment with dose dense Adriamycin and Cytoxan. She had a mammogram which showed a significant reduction in the size of her left breast lesion. There is no axillary adenopathy noted. She has had an ultrasound revealing the  lesion reducing from 2.4 cm to 1 cm. Patient underwent a left mastectomy on 03/19/2013 finding negative invasive cancer, negative nodes. A 5% DCIS was noted. The patient has declined hormonal therapy. She did not need radiation therapy.  November 2014, she began to have evidence of lytic bone lesions at T5T6, frontal disease, an 8 mm lucency in the central frontal area of  the skull but nothing intracranial. Bone scan revealed only vertex tracer activity but bilateral ribs and thoracic spine, and other lesions in  her pelvis. At that time, she was started on letrozole since November 2014. She is taking Xgeva. She had progression found in bone in Feb 2015 on scan. She was started on Faslodex, Ibrance and continued xgeva.         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 B) Foci of ductal carcinoma in situ, solid pattern. C) Greatest dimension of the invasive carcinoma is 15.8mm.         3/19/2013 Surgery     Left Mastectomy w/ Axillary Tail: Focal residual ducatal carcinoma in situ (DCIS) 12 lymph nodes negative for metastatic carcinoma.         10/20/2014 Biopsy     Peritoneum Biopsy: Final  Diagnosis: Malignant Cell Neoplasm.           Breast cancer metastasized to bone, unspecified laterality (CMS/HCC)    10/15/2012 -  Other Event     Left mammogram:  Impression:  1. Decreasing spiculation and density in the retroareolar left breast, near a previous biopsy.   2. Definite left axillary lesion is not appreciated.          2/5/2013 -  Other Event     Diagnostic Mammo:  Comparison is made to 10/15/2012 and 6/20/2012  The spiculated mass at 12:00 behind the nipple is nearly resolved.   Impression:  1. The mass on the left side is less apparent after receiving chemotherapy. There has been a good response to chmotherapy on the left side.   2. No suspicious abnormality is seen in the right breast to account for the new palable abnormality at the 4-5:00 position.          3/19/2013 Surgery     Breast Biopsy:  Final Diagnosis:  A. Breast, left mastectomy with axillary tail  1. Focal residual ductal carcinoma in situ (DICS) (less than 5% of tumor bed).  2. Negative for residual invasive carcinoma.   3. 12 Lymph Nodes, negative for metastatic carcinoma (0/12) focally, some lymph nodes demonstrate fibrosis/treatment effect.  B. Skin and soft tissue, left advancement flap:  1. Benign skin and subcutis.   2. Negative for carcinoma.          6/20/2013 -  Other Event     Diagnostic Mammo [facility]:  1. A subareolar mass is consistent with malignancy. Additional involement extends inferiorly sonographically and superolaterally mammograhically as demonstrated by calcifications. The mass measures approx 2.8 cm sonographically, but the involved region is likely much larger including an intraductal componet. The left axillary lymph nodes are also involved.   2. Recommened ultrasound guided biopsy of the left subareolar mass and left axillary lymph node.   3. Breast MRI could also further define multicentric disease on the left.   4. Clear suspicious finding on the right.  Recommend ongoing clinical follow up of palpable  foci.          6/13/2014 -  Other Event     Diagnostic Mammo:  IMPRESSION:  1. History of left breast cancer and mastectomy. Stable benign right breast mammograms.          10/20/2014 Surgery     Final Diagnosis:  Biopsies, pertoneum:  Metastatic carcinoma, morphologically compatible with metastatic mammary carcinoma.          6/15/2015 -  Other Event     Diagnostic Mammo:  IMPRESSION:   1. Negative x-ray report, should not delay biopsy if a dominat or clinically suspicious mass is present.          7/5/2016 -  Other Event     Diagnositic mammogram:  Impression:   Stable right mamogram with no radiographic findings suspicious for malignancy. Recommend continued screening mammography per screening guidelines unless otherwise earlier clinically indicated.          9/18/2016 Initial Diagnosis     Secondary malignant neoplasm of bone and bone marrow          Cancer Staging Information:  Malignant neoplasm of upper-outer quadrant of left female breast    Staging form: Breast, AJCC V7      Clinical stage from 10/11/2016: Stage IV (T2, N1, M1) - Signed by ALBERT Kulkarni on 10/11/2016      INTERVAL HISTORY  Patient ID: Heavenly Yanes is a 73 y.o. year old female history of metastatic breast carcinoma to lung and bone.  She is presently receiving treatment with Ibrance, Faslodex and Xgeva.  Tumor markers continued to decline with a CA 27-29 of 92.2 obtained on 9:30 2016 compared to 105.8 on 07/27/2016.  Mr. Yanes continues to tolerate this treatment regimen well and is here today for evaluation consideration to receive Faslodex and Xgeva.  Last bone mineral density study was obtained 11/21/2014 indicating osteopopenia, we will obtain a repeat bone mineral density study. Ms. Yanes completed 8 teeth pulled 02/14/2017.   Xgeva on hold, resume 05/2017.     Past Medical History:   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   • Bipolar disorder (CMS/HCC)    • Disease  of thyroid gland    • Hypertension    • Malignant neoplasm of upper-outer quadrant of left female breast (CMS/HCC) 9/18/2016   • Secondary malignant neoplasm of bone and bone marrow (CMS/HCC) 9/18/2016     Past Surgical History:   Past Surgical History:   Procedure Laterality Date   • BREAST SURGERY      left breast biopsy and axillary node   • CHOLECYSTECTOMY     • EXTERNAL EAR SURGERY     • MASTECTOMY Left    • PORTACATH PLACEMENT       Social History:   Social History     Socioeconomic History   • Marital status:      Spouse name: Not on file   • Number of children: Not on file   • Years of education: Not on file   • Highest education level: Not on file   Social Needs   • Financial resource strain: Not on file   • Food insecurity - worry: Not on file   • Food insecurity - inability: Not on file   • Transportation needs - medical: Not on file   • Transportation needs - non-medical: Not on file   Occupational History   • Not on file   Tobacco Use   • Smoking status: Never Smoker   • Smokeless tobacco: Never Used   Substance and Sexual Activity   • Alcohol use: No   • Drug use: No   • Sexual activity: Not on file   Other Topics Concern   • Not on file   Social History Narrative   • Not on file     Family History:   Family History   Problem Relation Age of Onset   • No Known Problems Daughter    • No Known Problems Son    • Other Mother         complications from a concussion from a fall   • Other Father         old age   • No Known Problems Brother    • No Known Problems Daughter        Review of Systems   Constitutional: Negative.  Negative for activity change, appetite change, chills, diaphoresis, fatigue and fever.   HENT: Negative.  Negative for congestion, ear discharge, ear pain, facial swelling, hearing loss, mouth sores, nosebleeds, postnasal drip, rhinorrhea, sinus pressure, sore throat, tinnitus, trouble swallowing and voice change.    Eyes: Negative.  Negative for photophobia, pain, discharge and  visual disturbance.   Respiratory: Negative.  Negative for apnea, cough, chest tightness, shortness of breath, wheezing and stridor.    Cardiovascular: Negative.  Negative for chest pain, palpitations and leg swelling.   Gastrointestinal: Negative.  Negative for abdominal distention, abdominal pain, blood in stool, constipation, diarrhea, nausea, rectal pain and vomiting.   Endocrine: Negative.  Negative for cold intolerance, heat intolerance, polydipsia, polyphagia and polyuria.   Genitourinary: Negative.  Negative for difficulty urinating, dysuria, flank pain, frequency, hematuria and urgency.   Musculoskeletal: Negative.  Negative for arthralgias, back pain, gait problem, joint swelling and myalgias.   Skin: Negative.  Negative for color change, pallor, rash and wound.   Allergic/Immunologic: Negative.  Negative for environmental allergies, food allergies and immunocompromised state.   Neurological: Negative.  Negative for dizziness, tremors, seizures, syncope, speech difficulty, weakness, light-headedness, numbness and headaches.   Hematological: Negative.  Negative for adenopathy. Does not bruise/bleed easily.   Psychiatric/Behavioral: Negative.  Negative for agitation, confusion, hallucinations, sleep disturbance and suicidal ideas. The patient is not nervous/anxious.    All other systems reviewed and are negative.       Performance Status:  Asymptomatic    Medications:    Current Outpatient Medications   Medication Sig Dispense Refill   • Biotin 1000 MCG tablet Take 1,000 mcg by mouth Daily.     • Calcium-Magnesium-Vitamin D (CALCIUM 1200+D3 PO) Take 1,200 mg by mouth Daily.     • capecitabine (XELODA) 500 MG chemo tablet Take 1 tablet by mouth 2 (Two) Times a Day. For 2 weeks on and 1 week off. 42 tablet 11   • DEVILS CLAW PO Take  by mouth Daily.     • FOLIC ACID PO Take 800 mg by mouth Daily.     • metoprolol tartrate (LOPRESSOR) 25 MG tablet Take 25 mg by mouth 2 (two) times a day.     • nitrofurantoin,  "macrocrystal-monohydrate, (MACROBID) 100 MG capsule Take 1 capsule by mouth Every 12 (Twelve) Hours. 10 capsule 0   • NON FORMULARY 500 mg. Turmeric curcumin bid     • OLANZapine (ZYPREXA) 2.5 MG tablet Take 2.5 mg by mouth every night.     • ondansetron (ZOFRAN) 8 MG tablet Take 1 tablet by mouth Every 8 (Eight) Hours As Needed for Nausea or Vomiting. 30 tablet 5   • raNITIdine (ZANTAC) 150 MG tablet Take 150 mg by mouth 2 (Two) Times a Day.     • thyroid 60 MG PO tablet Take 60 mg by mouth daily.     • vitamin B-12 (CYANOCOBALAMIN) 1000 MCG tablet Take 1,000 mcg by mouth Daily.     • ciprofloxacin (CIPRO) 250 MG tablet Take 1 tablet by mouth 2 (Two) Times a Day. 14 tablet 0     No current facility-administered medications for this visit.      Facility-Administered Medications Ordered in Other Visits   Medication Dose Route Frequency Provider Last Rate Last Dose   • heparin flush (porcine) 100 UNIT/ML injection 500 Units  500 Units Intravenous PRN Joseph Nunez MD   500 Units at 10/26/17 1155   • sodium chloride 0.9 % flush 10 mL  10 mL Intravenous PRN Joseph Nunez MD   10 mL at 10/26/17 1155       ALLERGIES:    Allergies   Allergen Reactions   • Benadryl  [Diphenhydramine Hcl (Sleep)] Other (See Comments)   • Codeine    • Diphenhydramine Other (See Comments)     \"Shaky leg syndrome\"  \"Shaky leg syndrome\"  \"Shaky leg syndrome\"   • Heparin    • Other      POPPY SEED   • Penicillins Hives   • Petroleum Jelly [Skin Protectants, Misc.]    • Sulfa Antibiotics    • Sulfur    • Valium [Diazepam]        Objective      Vitals:    12/04/18 0947   BP: 140/72   Pulse: 63   Resp: 18   Temp: 97.4 °F (36.3 °C)   TempSrc: Tympanic   SpO2: 97%   Weight: 65.4 kg (144 lb 1.6 oz)   Height: 165.1 cm (65\")         Current Status 12/4/2018   ECOG score 1       General Appearance: Patient is awake, alert, oriented and in no acute distress. Patient is welldeveloped, wellnourished, and appears stated age.  HEENT: Normocephalic. Sclerae " clear, conjunctiva pink, extraocular movements intact, pupils, round, reactive to light and accommodation. Mouth and throat are clear with moist oral mucosa.  NECK: Supple, no jugular venous distention, thyroid not enlarged.  LYMPH: No cervical, supraclavicular, axillary, or inguinal lymphadenopathy.  CHEST: Equal bilateral expansion   LUNGS: Good air movement, no rales, rhonchi, rubs or wheezes with auscultation  CARDIO: Regular sinus rhythm, no murmurs, gallops or rubs.  ABDOMEN: Nondistended, soft, No tenderness, no guarding, no rebound, No hepatosplenomegaly. No abdominal masses. Bowel sounds positive. No hernia  MUSKEL: No joint swelling, decreased motion, or inflammation  EXTREMS: No edema, clubbing, cyanosis, No varicose veins.  NEURO: Grossly nonfocal, Gait is coordinated and smooth, Cognition is preserved.  SKIN: No rashes, no ecchymoses, no petechia.  PSYCH: Oriented to time, place and person. Memory is preserved. Mood and affect appear normal      RECENT LABS:  WBC   Date Value Ref Range Status   12/04/2018 12.24 (H) 4.80 - 10.80 10*3/mm3 Final     RBC   Date Value Ref Range Status   12/04/2018 3.58 (L) 4.20 - 5.40 10*6/mm3 Final     Hemoglobin   Date Value Ref Range Status   12/04/2018 12.4 12.0 - 16.0 g/dL Final     Hematocrit   Date Value Ref Range Status   12/04/2018 36.9 (L) 37.0 - 47.0 % Final     MCV   Date Value Ref Range Status   12/04/2018 103.1 (H) 82.0 - 98.0 fL Final     MCH   Date Value Ref Range Status   12/04/2018 34.6 (H) 28.0 - 32.0 pg Final     MCHC   Date Value Ref Range Status   12/04/2018 33.6 33.0 - 36.0 g/dL Final     RDW   Date Value Ref Range Status   12/04/2018 15.7 (H) 12.0 - 15.0 % Final     RDW-SD   Date Value Ref Range Status   12/04/2018 59.2 (H) 40.0 - 54.0 fl Final     MPV   Date Value Ref Range Status   12/04/2018 9.1 6.0 - 12.0 fL Final     Platelets   Date Value Ref Range Status   12/04/2018 430 (H) 130 - 400 10*3/mm3 Final     Neutrophil %   Date Value Ref Range  Status   12/04/2018 66.3 39.0 - 78.0 % Final     Lymphocyte %   Date Value Ref Range Status   12/04/2018 22.8 15.0 - 45.0 % Final     Monocyte %   Date Value Ref Range Status   12/04/2018 6.7 4.0 - 12.0 % Final     Eosinophil %   Date Value Ref Range Status   12/04/2018 0.3 0.0 - 4.0 % Final     Basophil %   Date Value Ref Range Status   12/04/2018 0.8 0.0 - 2.0 % Final     Immature Grans %   Date Value Ref Range Status   12/04/2018 3.1 0.0 - 5.0 % Final     Neutrophils, Absolute   Date Value Ref Range Status   12/04/2018 8.11 1.87 - 8.40 10*3/mm3 Final     Lymphocytes, Absolute   Date Value Ref Range Status   12/04/2018 2.79 0.72 - 4.86 10*3/mm3 Final     Monocytes, Absolute   Date Value Ref Range Status   12/04/2018 0.82 0.19 - 1.30 10*3/mm3 Final     Eosinophils, Absolute   Date Value Ref Range Status   12/04/2018 0.04 0.00 - 0.70 10*3/mm3 Final     Basophils, Absolute   Date Value Ref Range Status   12/04/2018 0.10 0.00 - 0.20 10*3/mm3 Final     Immature Grans, Absolute   Date Value Ref Range Status   12/04/2018 0.38 (H) 0.00 - 0.03 10*3/mm3 Final     nRBC   Date Value Ref Range Status   12/04/2018 0.0 0.0 - 0.0 /100 WBC Final     Lab on 12/04/2018   Component Date Value Ref Range Status   • Glucose 12/04/2018 112* 70 - 100 mg/dL Final   • BUN 12/04/2018 11  5 - 21 mg/dL Final   • Creatinine 12/04/2018 0.91  0.50 - 1.40 mg/dL Final   • Sodium 12/04/2018 138  135 - 145 mmol/L Final   • Potassium 12/04/2018 3.8  3.5 - 5.3 mmol/L Final   • Chloride 12/04/2018 99  98 - 110 mmol/L Final   • CO2 12/04/2018 30.0  24.0 - 31.0 mmol/L Final   • Calcium 12/04/2018 9.5  8.4 - 10.4 mg/dL Final   • Total Protein 12/04/2018 7.5  6.3 - 8.7 g/dL Final   • Albumin 12/04/2018 3.80  3.50 - 5.00 g/dL Final   • ALT (SGPT) 12/04/2018 <15  0 - 54 U/L Final   • AST (SGOT) 12/04/2018 35  7 - 45 U/L Final   • Alkaline Phosphatase 12/04/2018 87  24 - 120 U/L Final   • Total Bilirubin 12/04/2018 0.3  0.1 - 1.0 mg/dL Final   • eGFR Non   Amer 12/04/2018 60* >60 mL/min/1.73 Final   • Globulin 12/04/2018 3.7  gm/dL Final   • A/G Ratio 12/04/2018 1.0* 1.1 - 2.5 g/dL Final   • BUN/Creatinine Ratio 12/04/2018 12.1  7.0 - 25.0 Final   • Anion Gap 12/04/2018 9.0  4.0 - 13.0 mmol/L Final   • WBC 12/04/2018 12.24* 4.80 - 10.80 10*3/mm3 Final   • RBC 12/04/2018 3.58* 4.20 - 5.40 10*6/mm3 Final   • Hemoglobin 12/04/2018 12.4  12.0 - 16.0 g/dL Final   • Hematocrit 12/04/2018 36.9* 37.0 - 47.0 % Final   • MCV 12/04/2018 103.1* 82.0 - 98.0 fL Final   • MCH 12/04/2018 34.6* 28.0 - 32.0 pg Final   • MCHC 12/04/2018 33.6  33.0 - 36.0 g/dL Final   • RDW 12/04/2018 15.7* 12.0 - 15.0 % Final   • RDW-SD 12/04/2018 59.2* 40.0 - 54.0 fl Final   • MPV 12/04/2018 9.1  6.0 - 12.0 fL Final   • Platelets 12/04/2018 430* 130 - 400 10*3/mm3 Final   • Neutrophil % 12/04/2018 66.3  39.0 - 78.0 % Final   • Lymphocyte % 12/04/2018 22.8  15.0 - 45.0 % Final   • Monocyte % 12/04/2018 6.7  4.0 - 12.0 % Final   • Eosinophil % 12/04/2018 0.3  0.0 - 4.0 % Final   • Basophil % 12/04/2018 0.8  0.0 - 2.0 % Final   • Immature Grans % 12/04/2018 3.1  0.0 - 5.0 % Final   • Neutrophils, Absolute 12/04/2018 8.11  1.87 - 8.40 10*3/mm3 Final   • Lymphocytes, Absolute 12/04/2018 2.79  0.72 - 4.86 10*3/mm3 Final   • Monocytes, Absolute 12/04/2018 0.82  0.19 - 1.30 10*3/mm3 Final   • Eosinophils, Absolute 12/04/2018 0.04  0.00 - 0.70 10*3/mm3 Final   • Basophils, Absolute 12/04/2018 0.10  0.00 - 0.20 10*3/mm3 Final   • Immature Grans, Absolute 12/04/2018 0.38* 0.00 - 0.03 10*3/mm3 Final   • nRBC 12/04/2018 0.0  0.0 - 0.0 /100 WBC Final             Assessment/Plan     Patient Active Problem List   Diagnosis   • Malignant neoplasm of upper-outer quadrant of left female breast (CMS/HCC)   • Breast cancer metastasized to bone, unspecified laterality (CMS/HCC)   • Essential hypertension   • Acquired hypothyroidism   • Bipolar 1 disorder (CMS/HCC)   • Carcinoma of left breast metastatic to bone  (CMS/HCC)   • Antineoplastic chemotherapy induced anemia   • Dehydration          Assessment:  1. Stage IV metastatic breast carcinoma, ER/MO positive. HER2/maynor negative undergoing Ibrance, Faslodex and Xgeva with overall stable with stable CA27.29 with slow decline. She is tolerating the treatment very well. Faslodex cycle 17 today and will continue to HOLD Xgeva due to upcoming dental work, last dose of Xgeva given on 09/29/2016.  01/2017 restaging CT chest abdomen and pelvis and bone scan showed stable diffuse sclerotic bony lesions, unchanged from 7/1/2016.Next scan July 2017    2.  Mild leukopenia without neutropenia.  Likely secondary to Ibrance.  Continue to monitor.     3.  Macrocytic anemia.  Hemoglobin 11.1.  02/2016, B12 and folate normal    4.  Status post 8 teeth extraction.     Recommendation/Plan:  1.  Proceed with Faslodex cycle 20.  2. Continue Ibrance 125 mg 3 weeks on, one-week off.  Reduce dose if leukopenia worsens.   3. Follow up in 28 days for Faslodex cycle 19.   Repeat CA 27.29.  Previous CA 27.29 have remained stable, the last one done on 11/2017 was 89 Stable. Continue Faslodex q monthly.    Additionally she is anemic with a hemoglobin of 10 g percent with decreasing GFR.  I do suspect that she may have some form of chronic kidney disease, as evaluated by Nephrology, CKD from [facility].  No need for EPO as she is asyptomatic.  Redraw CA 27.29 today. Cont faslodex + Ibrance for now until repeat CA 27.29 returns. Last CA 27.29 in Feb 2018 slightly increased to 96. I have discussed different options with her:  #1. Stop Ibrance and switch to a new CD4K inhabitor Kisqali or Johnenzio.  #. Stop Ibrance and switch to Affinitor + faslodex.  #. Cont Ibrance and Faslodex with just a subtle rise, since clinically pt is doing better.    At this point will restage with CT scans CAP at [facility] and compare to one done Jan 2017. If progression on CT scans and CA 27.29 then [facility] MTOR  inhabitor Everlimos + Exemestine. Bone scan shows progressive dz. Therefore will stop Faslodex. Start Exemestine 25mg daily in combination with Affinitor 10mg daily. Redraw her tumor today.  Calcium improved but AST higher.    DC Exemestine and Affinitor as the CA 27.29 yulia to 140. Considering this will start Xeloda 500mg twice daily 2 Wks ON and one wk OFF.    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 Will add Her 2 Tykerb 1250mg q day x 21 days in combo + Oral Xeloda 500mg bid 3 wks On and 1 WK OFF. Will draw CA 27.29.    Since she started Lapatinib 1250 mg q day + Xeloda combo from 8/22/18 she has had significant dirrohea and lately has been throwing up and today her Creat is up to 2.3 from 0.9 and GFR down to 21 as well as she has lost 7 ILBS WT and appears very dehydrated. All meds were stopped last week and she received IVF and today creat is back down to 1.25 and GFR upto 41. Redraw CAproved the best would be either to reduce the dose and keep both Lapitinib and Xeloda on board, she will call me tomorrow. Therefor, last week the Tykerb dose was reduced to 2 tabs q day ( 500mg ) daily 3 wks On and 1 wk OFF Plus Xeloda 500mg 1 tab q day 2 Wks ON and 1K OFF. See  How this schedulle works and therefore, will draw CA27.29 again today.    She walked in today c/o pain in abdmn and R Lumbar area. On my exam, herabdmn is distended with Ascitis. I need to get a stat CT scan with 50% IV contrast and Pre and post contrast 500cc N/S.RTC tomorrow morning. If progressive Dz, will Rx with Herceptin + Perjeta as her tumor is Her2+.\    Indeed the CT scan done yesterday shows significant Ascitis, that was large Vol tapped 5.2 l and send to lab for Cytology ( pending ). Her  first time met him yesterday told me that in 2014 he was told that his wife had a laparatomy where they found metastatic Breast cancer throughout her peritoneal cavity ( salt and pepper ). Therefore, considering this new  Ascitis, I suspect  that it is malignent Breast Cancer. Foundation 1 studies on circulating DNA analyses showed she was ERB+. Although Cytology from Ascitis Fluid drawn yesterday, will call lab to create a Cell Block and run for Her 2 Kyle gene testing.  Meanwhile Rx her with Herceptin and Perjeta + taxotere ( loading dose 75mg/m2 and thereafter reduce dose to 35mg/m2 q weekly. Ist dose today. D/W family and pt who agrees. Will stop Tykerb.    Day 6 post Herceptin+ Perjeta+Taxotere. WBC 3.3, expect to go down further. Will Prophylax with Cipro. RTC next week. If WBC improved will Rx weekly Taxotere 35mg/m2. Redraw CA 27.27. Awaiting Peritoneal Cell Block for Her2 Kyle Gene testing, still pending. However the CA 27.29 came down to 141. Will recheck it today.    Her Cell block from Paracentisis cytology+ Breast cancer was reported neg for Her2 gene. Therefore, will Not Rx with anti Her 2 meds. Will only use Taxotere 35mg/m2 weekly x then 1 wk off. Pt agrees.  Joseph Nunez MD    12/4/2018    10:36 AM

## 2018-12-05 LAB — CANCER AG27-29 SERPL-ACNC: 139.3 U/ML (ref 0–38.6)

## 2018-12-11 ENCOUNTER — LAB (OUTPATIENT)
Dept: LAB | Facility: HOSPITAL | Age: 73
End: 2018-12-11
Attending: INTERNAL MEDICINE

## 2018-12-11 ENCOUNTER — INFUSION (OUTPATIENT)
Dept: ONCOLOGY | Facility: HOSPITAL | Age: 73
End: 2018-12-11
Attending: INTERNAL MEDICINE

## 2018-12-11 ENCOUNTER — OFFICE VISIT (OUTPATIENT)
Dept: ONCOLOGY | Facility: CLINIC | Age: 73
End: 2018-12-11

## 2018-12-11 VITALS
HEART RATE: 64 BPM | WEIGHT: 139.1 LBS | HEIGHT: 65 IN | OXYGEN SATURATION: 98 % | TEMPERATURE: 97.9 F | SYSTOLIC BLOOD PRESSURE: 138 MMHG | BODY MASS INDEX: 23.17 KG/M2 | RESPIRATION RATE: 16 BRPM | DIASTOLIC BLOOD PRESSURE: 84 MMHG

## 2018-12-11 VITALS
SYSTOLIC BLOOD PRESSURE: 128 MMHG | WEIGHT: 139 LBS | DIASTOLIC BLOOD PRESSURE: 54 MMHG | OXYGEN SATURATION: 99 % | BODY MASS INDEX: 23.16 KG/M2 | RESPIRATION RATE: 18 BRPM | HEART RATE: 62 BPM | HEIGHT: 65 IN | TEMPERATURE: 98.4 F

## 2018-12-11 DIAGNOSIS — C50.912 CARCINOMA OF LEFT BREAST METASTATIC TO BONE (HCC): ICD-10-CM

## 2018-12-11 DIAGNOSIS — C79.51 CARCINOMA OF LEFT BREAST METASTATIC TO BONE (HCC): ICD-10-CM

## 2018-12-11 DIAGNOSIS — C50.912 CARCINOMA OF LEFT BREAST METASTATIC TO BONE (HCC): Primary | ICD-10-CM

## 2018-12-11 DIAGNOSIS — C50.412 MALIGNANT NEOPLASM OF UPPER-OUTER QUADRANT OF LEFT FEMALE BREAST, UNSPECIFIED ESTROGEN RECEPTOR STATUS (HCC): ICD-10-CM

## 2018-12-11 DIAGNOSIS — Z23 ENCOUNTER FOR ADMINISTRATION OF VACCINE: ICD-10-CM

## 2018-12-11 DIAGNOSIS — C50.919 BREAST CANCER METASTASIZED TO BONE, UNSPECIFIED LATERALITY (HCC): ICD-10-CM

## 2018-12-11 DIAGNOSIS — C79.51 CARCINOMA OF LEFT BREAST METASTATIC TO BONE (HCC): Primary | ICD-10-CM

## 2018-12-11 DIAGNOSIS — C79.51 BREAST CANCER METASTASIZED TO BONE, UNSPECIFIED LATERALITY (HCC): ICD-10-CM

## 2018-12-11 LAB
ALBUMIN SERPL-MCNC: 4 G/DL (ref 3.5–5)
ALBUMIN/GLOB SERPL: 1.1 G/DL (ref 1.1–2.5)
ALP SERPL-CCNC: 77 U/L (ref 24–120)
ALT SERPL W P-5'-P-CCNC: <15 U/L (ref 0–54)
ANION GAP SERPL CALCULATED.3IONS-SCNC: 10 MMOL/L (ref 4–13)
AST SERPL-CCNC: 29 U/L (ref 7–45)
BASOPHILS # BLD AUTO: 0.04 10*3/MM3 (ref 0–0.2)
BASOPHILS NFR BLD AUTO: 0.7 % (ref 0–2)
BILIRUB SERPL-MCNC: 0.4 MG/DL (ref 0.1–1)
BUN BLD-MCNC: 15 MG/DL (ref 5–21)
BUN/CREAT SERPL: 14.7 (ref 7–25)
CALCIUM SPEC-SCNC: 9.8 MG/DL (ref 8.4–10.4)
CHLORIDE SERPL-SCNC: 98 MMOL/L (ref 98–110)
CO2 SERPL-SCNC: 30 MMOL/L (ref 24–31)
CREAT BLD-MCNC: 1.02 MG/DL (ref 0.5–1.4)
DEPRECATED RDW RBC AUTO: 58.7 FL (ref 40–54)
EOSINOPHIL # BLD AUTO: 0.1 10*3/MM3 (ref 0–0.7)
EOSINOPHIL NFR BLD AUTO: 1.7 % (ref 0–4)
ERYTHROCYTE [DISTWIDTH] IN BLOOD BY AUTOMATED COUNT: 15.6 % (ref 12–15)
GFR SERPL CREATININE-BSD FRML MDRD: 53 ML/MIN/1.73
GLOBULIN UR ELPH-MCNC: 3.5 GM/DL
GLUCOSE BLD-MCNC: 108 MG/DL (ref 70–100)
HCT VFR BLD AUTO: 34.8 % (ref 37–47)
HGB BLD-MCNC: 11.7 G/DL (ref 12–16)
HOLD SPECIMEN: NORMAL
HOLD SPECIMEN: NORMAL
IMM GRANULOCYTES # BLD: 0.03 10*3/MM3 (ref 0–0.03)
IMM GRANULOCYTES NFR BLD: 0.5 % (ref 0–5)
LYMPHOCYTES # BLD AUTO: 2.13 10*3/MM3 (ref 0.72–4.86)
LYMPHOCYTES NFR BLD AUTO: 35.1 % (ref 15–45)
MCH RBC QN AUTO: 34.6 PG (ref 28–32)
MCHC RBC AUTO-ENTMCNC: 33.6 G/DL (ref 33–36)
MCV RBC AUTO: 103 FL (ref 82–98)
MONOCYTES # BLD AUTO: 0.26 10*3/MM3 (ref 0.19–1.3)
MONOCYTES NFR BLD AUTO: 4.3 % (ref 4–12)
NEUTROPHILS # BLD AUTO: 3.5 10*3/MM3 (ref 1.87–8.4)
NEUTROPHILS NFR BLD AUTO: 57.7 % (ref 39–78)
NRBC BLD MANUAL-RTO: 0 /100 WBC (ref 0–0)
PLATELET # BLD AUTO: 323 10*3/MM3 (ref 130–400)
PMV BLD AUTO: 10.1 FL (ref 6–12)
POTASSIUM BLD-SCNC: 3.9 MMOL/L (ref 3.5–5.3)
PROT SERPL-MCNC: 7.5 G/DL (ref 6.3–8.7)
RBC # BLD AUTO: 3.38 10*6/MM3 (ref 4.2–5.4)
SODIUM BLD-SCNC: 138 MMOL/L (ref 135–145)
WBC NRBC COR # BLD: 6.06 10*3/MM3 (ref 4.8–10.8)

## 2018-12-11 PROCEDURE — 99214 OFFICE O/P EST MOD 30 MIN: CPT | Performed by: INTERNAL MEDICINE

## 2018-12-11 PROCEDURE — 25010000002 PNEUMOCOCCAL VAC POLYVALENT PER 0.5 ML: Performed by: INTERNAL MEDICINE

## 2018-12-11 PROCEDURE — 25010000002 ONDANSETRON PER 1 MG: Performed by: INTERNAL MEDICINE

## 2018-12-11 PROCEDURE — 25010000002 DOCETAXEL 20 MG/ML SOLUTION 4 ML VIAL: Performed by: INTERNAL MEDICINE

## 2018-12-11 PROCEDURE — 25010000003 DEXAMETHASONE SODIUM PHOSPHATE 100 MG/10ML SOLUTION: Performed by: INTERNAL MEDICINE

## 2018-12-11 PROCEDURE — 96367 TX/PROPH/DG ADDL SEQ IV INF: CPT

## 2018-12-11 PROCEDURE — G0009 ADMIN PNEUMOCOCCAL VACCINE: HCPCS | Performed by: INTERNAL MEDICINE

## 2018-12-11 PROCEDURE — 80053 COMPREHEN METABOLIC PANEL: CPT

## 2018-12-11 PROCEDURE — 96372 THER/PROPH/DIAG INJ SC/IM: CPT

## 2018-12-11 PROCEDURE — 85025 COMPLETE CBC W/AUTO DIFF WBC: CPT

## 2018-12-11 PROCEDURE — 36415 COLL VENOUS BLD VENIPUNCTURE: CPT

## 2018-12-11 PROCEDURE — 90732 PPSV23 VACC 2 YRS+ SUBQ/IM: CPT | Performed by: INTERNAL MEDICINE

## 2018-12-11 PROCEDURE — 96413 CHEMO IV INFUSION 1 HR: CPT

## 2018-12-11 RX ORDER — SODIUM CHLORIDE 9 MG/ML
250 INJECTION, SOLUTION INTRAVENOUS ONCE
Status: COMPLETED | OUTPATIENT
Start: 2018-12-11 | End: 2018-12-11

## 2018-12-11 RX ORDER — MEPERIDINE HYDROCHLORIDE 50 MG/ML
25 INJECTION INTRAMUSCULAR; INTRAVENOUS; SUBCUTANEOUS
Status: CANCELLED | OUTPATIENT
Start: 2018-12-11 | End: 2018-12-12

## 2018-12-11 RX ORDER — MEGESTROL ACETATE 40 MG/1
40 TABLET ORAL 2 TIMES DAILY
Qty: 60 TABLET | Refills: 2 | Status: SHIPPED | OUTPATIENT
Start: 2018-12-11 | End: 2019-04-04 | Stop reason: SDUPTHER

## 2018-12-11 RX ORDER — MEPERIDINE HYDROCHLORIDE 50 MG/ML
25 INJECTION INTRAMUSCULAR; INTRAVENOUS; SUBCUTANEOUS
Status: DISCONTINUED | OUTPATIENT
Start: 2018-12-11 | End: 2018-12-11 | Stop reason: HOSPADM

## 2018-12-11 RX ORDER — SODIUM CHLORIDE 9 MG/ML
250 INJECTION, SOLUTION INTRAVENOUS ONCE
Status: CANCELLED | OUTPATIENT
Start: 2018-12-11

## 2018-12-11 RX ORDER — FAMOTIDINE 10 MG/ML
20 INJECTION, SOLUTION INTRAVENOUS AS NEEDED
Status: DISCONTINUED | OUTPATIENT
Start: 2018-12-11 | End: 2018-12-11 | Stop reason: HOSPADM

## 2018-12-11 RX ORDER — FAMOTIDINE 10 MG/ML
20 INJECTION, SOLUTION INTRAVENOUS AS NEEDED
Status: CANCELLED | OUTPATIENT
Start: 2018-12-11

## 2018-12-11 RX ORDER — SODIUM CHLORIDE 0.9 % (FLUSH) 0.9 %
10 SYRINGE (ML) INJECTION AS NEEDED
Status: CANCELLED | OUTPATIENT
Start: 2018-12-11

## 2018-12-11 RX ADMIN — DEXAMETHASONE SODIUM PHOSPHATE 50 ML: 10 INJECTION, SOLUTION INTRAMUSCULAR; INTRAVENOUS at 11:05

## 2018-12-11 RX ADMIN — SODIUM CHLORIDE 250 ML: 9 INJECTION, SOLUTION INTRAVENOUS at 11:00

## 2018-12-11 RX ADMIN — Medication 500 UNITS: at 12:46

## 2018-12-11 RX ADMIN — DOCETAXEL 65 MG: 20 INJECTION, SOLUTION, CONCENTRATE INTRAVENOUS at 11:26

## 2018-12-11 RX ADMIN — PNEUMOCOCCAL VACCINE POLYVALENT 0.5 ML
25; 25; 25; 25; 25; 25; 25; 25; 25; 25; 25; 25; 25; 25; 25; 25; 25; 25; 25; 25; 25; 25; 25 INJECTION, SOLUTION INTRAMUSCULAR; SUBCUTANEOUS at 12:41

## 2018-12-11 NOTE — PROGRESS NOTES
Port flushed w/NS only due to patient allergy.  LORETO Yanes RN  [facility]  HEMATOLOGY & ONCOLOGY        Subjective    Metastatic breast cancer on Faslodex and Ibrance     Staging form: Breast, AJCC V7      Clinical stage from 10/11/2016: Stage IV (T2, N1, M1) - ER/NE positive. HER2/maynor negative  VISIT DIAGNOSIS:   Metastatic breast cancer       HEMATOLOGY / ONCOLOGY HISTORY:   Oncology/Hematology History    Patient is a 67-year-old postmenopausal female who had onset of her menses at age 9 and menopause at age 48. She received oral contraceptives for approximately 14 years and did not receive hormonal manipulation. Patient has a maternal cousin had breast cancer. Patient has had no prior breast biopsies. Patient found a palpable left breast mass as well as a left axillary mass. Patient had a bloody nipple discharge which been present for approximately 6 months. On 6/20/2012, a mammogram was performed showing a subareolar mass consistent with malignancy. Bilateral breast ultrasound revealed an ill-defined subareolar mass measuring 2.8-2.5 cm. There is a left axillary mass measuring 1.7 cm with suspicion of extracapsular extension. There is no evidence of disease in the right breast. On 7/9/2012 patient underwent a left breast biopsy and placement of marker clip. Left breast biopsy revealed an infiltrating lobular carcinoma grade 2 with focal ductal carcinoma in situ, solid pattern. A fine-needle biopsy of the left lymph node was consistent with metastatic carcinoma. Breast tumor profile revealed ER: 100%; NE: 98%; HER-2/maynor 2+; FISH: Unamplified; Ki-67: 71%; P 53 1%; DNA aneuploidy. A MRI of the breast were performed body multiple abnormal enhancing masses within the left breast. There appears to be anterior retraction of the pectoralis muscle with no direct extension. There is a moderate to large, layering left pleural effusion appreciated. The right breast reveals 3 moderately enlarged lymph nodes  in the posterior lateral aspect the right breast. These have fatty hilum but followup is recommended. Patient is undergone systemic studies including, on 8/2/2012, a CT scan of the brain showing atrophy. a CT scan that shows showing a small to moderate left pleural effusion with 3 subcentimeter nodule densities in the left lung.   She completed 4 cycles of neoadjuvant treatment with dose dense Adriamycin and Cytoxan. She had a mammogram which showed a significant reduction in the size of her left breast lesion. There is no axillary adenopathy noted. She has had an ultrasound revealing the  lesion reducing from 2.4 cm to 1 cm. Patient underwent a left mastectomy on 03/19/2013 finding negative invasive cancer, negative nodes. A 5% DCIS was noted. The patient has declined hormonal therapy. She did not need radiation therapy.  November 2014, she began to have evidence of lytic bone lesions at T5T6, frontal disease, an 8 mm lucency in the central frontal area of  the skull but nothing intracranial. Bone scan revealed only vertex tracer activity but bilateral ribs and thoracic spine, and other lesions in  her pelvis. At that time, she was started on letrozole since November 2014. She is taking Xgeva. She had progression found in bone in Feb 2015 on scan. She was started on Faslodex, Ibrance and continued xgeva.         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 B) Foci of ductal carcinoma in situ, solid pattern. C) Greatest dimension of the invasive carcinoma is 15.8mm.         3/19/2013 Surgery     Left Mastectomy w/ Axillary Tail: Focal residual ducatal carcinoma in situ (DCIS) 12 lymph nodes negative for metastatic carcinoma.         10/20/2014 Biopsy     Peritoneum Biopsy: Final  Diagnosis: Malignant Cell Neoplasm.           Breast cancer metastasized to bone, unspecified laterality (CMS/HCC)    10/15/2012 -  Other Event     Left mammogram:  Impression:  1. Decreasing spiculation and density in the retroareolar left breast, near a previous biopsy.   2. Definite left axillary lesion is not appreciated.          2/5/2013 -  Other Event     Diagnostic Mammo:  Comparison is made to 10/15/2012 and 6/20/2012  The spiculated mass at 12:00 behind the nipple is nearly resolved.   Impression:  1. The mass on the left side is less apparent after receiving chemotherapy. There has been a good response to chmotherapy on the left side.   2. No suspicious abnormality is seen in the right breast to account for the new palable abnormality at the 4-5:00 position.          3/19/2013 Surgery     Breast Biopsy:  Final Diagnosis:  A. Breast, left mastectomy with axillary tail  1. Focal residual ductal carcinoma in situ (DICS) (less than 5% of tumor bed).  2. Negative for residual invasive carcinoma.   3. 12 Lymph Nodes, negative for metastatic carcinoma (0/12) focally, some lymph nodes demonstrate fibrosis/treatment effect.  B. Skin and soft tissue, left advancement flap:  1. Benign skin and subcutis.   2. Negative for carcinoma.          6/20/2013 -  Other Event     Diagnostic Mammo [facility]:  1. A subareolar mass is consistent with malignancy. Additional involement extends inferiorly sonographically and superolaterally mammograhically as demonstrated by calcifications. The mass measures approx 2.8 cm sonographically, but the involved region is likely much larger including an intraductal componet. The left axillary lymph nodes are also involved.   2. Recommened ultrasound guided biopsy of the left subareolar mass and left axillary lymph node.   3. Breast MRI could also further define multicentric disease on the left.   4. Clear suspicious finding on the right.  Recommend ongoing clinical follow up of palpable  foci.          6/13/2014 -  Other Event     Diagnostic Mammo:  IMPRESSION:  1. History of left breast cancer and mastectomy. Stable benign right breast mammograms.          10/20/2014 Surgery     Final Diagnosis:  Biopsies, pertoneum:  Metastatic carcinoma, morphologically compatible with metastatic mammary carcinoma.          6/15/2015 -  Other Event     Diagnostic Mammo:  IMPRESSION:   1. Negative x-ray report, should not delay biopsy if a dominat or clinically suspicious mass is present.          7/5/2016 -  Other Event     Diagnositic mammogram:  Impression:   Stable right mamogram with no radiographic findings suspicious for malignancy. Recommend continued screening mammography per screening guidelines unless otherwise earlier clinically indicated.          9/18/2016 Initial Diagnosis     Secondary malignant neoplasm of bone and bone marrow          Cancer Staging Information:  Malignant neoplasm of upper-outer quadrant of left female breast    Staging form: Breast, AJCC V7      Clinical stage from 10/11/2016: Stage IV (T2, N1, M1) - Signed by ALBERT Kulkarni on 10/11/2016      INTERVAL HISTORY  Patient ID: Heavenly Yanes is a 73 y.o. year old female history of metastatic breast carcinoma to lung and bone.  She is presently receiving treatment with Ibrance, Faslodex and Xgeva.  Tumor markers continued to decline with a CA 27-29 of 92.2 obtained on 9:30 2016 compared to 105.8 on 07/27/2016.  Mr. Yanes continues to tolerate this treatment regimen well and is here today for evaluation consideration to receive Faslodex and Xgeva.  Last bone mineral density study was obtained 11/21/2014 indicating osteopopenia, we will obtain a repeat bone mineral density study. Ms. Yanes completed 8 teeth pulled 02/14/2017.   Xgeva on hold, resume 05/2017.     Past Medical History:   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   • Bipolar disorder (CMS/HCC)    • Disease  of thyroid gland    • Hypertension    • Malignant neoplasm of upper-outer quadrant of left female breast (CMS/HCC) 9/18/2016   • Secondary malignant neoplasm of bone and bone marrow (CMS/HCC) 9/18/2016     Past Surgical History:   Past Surgical History:   Procedure Laterality Date   • BREAST SURGERY      left breast biopsy and axillary node   • CHOLECYSTECTOMY     • EXTERNAL EAR SURGERY     • MASTECTOMY Left    • PORTACATH PLACEMENT       Social History:   Social History     Socioeconomic History   • Marital status:      Spouse name: Not on file   • Number of children: Not on file   • Years of education: Not on file   • Highest education level: Not on file   Social Needs   • Financial resource strain: Not on file   • Food insecurity - worry: Not on file   • Food insecurity - inability: Not on file   • Transportation needs - medical: Not on file   • Transportation needs - non-medical: Not on file   Occupational History   • Not on file   Tobacco Use   • Smoking status: Never Smoker   • Smokeless tobacco: Never Used   Substance and Sexual Activity   • Alcohol use: No   • Drug use: No   • Sexual activity: Not on file   Other Topics Concern   • Not on file   Social History Narrative   • Not on file     Family History:   Family History   Problem Relation Age of Onset   • No Known Problems Daughter    • No Known Problems Son    • Other Mother         complications from a concussion from a fall   • Other Father         old age   • No Known Problems Brother    • No Known Problems Daughter        Review of Systems   Constitutional: Negative.  Negative for activity change, appetite change, chills, diaphoresis, fatigue and fever.   HENT: Negative.  Negative for congestion, ear discharge, ear pain, facial swelling, hearing loss, mouth sores, nosebleeds, postnasal drip, rhinorrhea, sinus pressure, sore throat, tinnitus, trouble swallowing and voice change.    Eyes: Negative.  Negative for photophobia, pain, discharge and  visual disturbance.   Respiratory: Negative.  Negative for apnea, cough, chest tightness, shortness of breath, wheezing and stridor.    Cardiovascular: Negative.  Negative for chest pain, palpitations and leg swelling.   Gastrointestinal: Negative.  Negative for abdominal distention, abdominal pain, blood in stool, constipation, diarrhea, nausea, rectal pain and vomiting.   Endocrine: Negative.  Negative for cold intolerance, heat intolerance, polydipsia, polyphagia and polyuria.   Genitourinary: Negative.  Negative for difficulty urinating, dysuria, flank pain, frequency, hematuria and urgency.   Musculoskeletal: Negative.  Negative for arthralgias, back pain, gait problem, joint swelling and myalgias.   Skin: Negative.  Negative for color change, pallor, rash and wound.   Allergic/Immunologic: Negative.  Negative for environmental allergies, food allergies and immunocompromised state.   Neurological: Negative.  Negative for dizziness, tremors, seizures, syncope, speech difficulty, weakness, light-headedness, numbness and headaches.   Hematological: Negative.  Negative for adenopathy. Does not bruise/bleed easily.   Psychiatric/Behavioral: Negative.  Negative for agitation, confusion, hallucinations, sleep disturbance and suicidal ideas. The patient is not nervous/anxious.    All other systems reviewed and are negative.       Performance Status:  Asymptomatic    Medications:    Current Outpatient Medications   Medication Sig Dispense Refill   • Biotin 1000 MCG tablet Take 1,000 mcg by mouth Daily.     • Calcium-Magnesium-Vitamin D (CALCIUM 1200+D3 PO) Take 1,200 mg by mouth Daily.     • capecitabine (XELODA) 500 MG chemo tablet Take 1 tablet by mouth 2 (Two) Times a Day. For 2 weeks on and 1 week off. 42 tablet 11   • DEVILS CLAW PO Take  by mouth Daily.     • FOLIC ACID PO Take 800 mg by mouth Daily.     • metoprolol tartrate (LOPRESSOR) 25 MG tablet Take 25 mg by mouth 2 (two) times a day.     • nitrofurantoin,  "macrocrystal-monohydrate, (MACROBID) 100 MG capsule Take 1 capsule by mouth Every 12 (Twelve) Hours. 10 capsule 0   • NON FORMULARY 500 mg. Turmeric curcumin bid     • OLANZapine (ZYPREXA) 2.5 MG tablet Take 2.5 mg by mouth every night.     • ondansetron (ZOFRAN) 8 MG tablet Take 1 tablet by mouth Every 8 (Eight) Hours As Needed for Nausea or Vomiting. 30 tablet 5   • raNITIdine (ZANTAC) 150 MG tablet Take 150 mg by mouth 2 (Two) Times a Day.     • thyroid 60 MG PO tablet Take 60 mg by mouth daily.     • vitamin B-12 (CYANOCOBALAMIN) 1000 MCG tablet Take 1,000 mcg by mouth Daily.       No current facility-administered medications for this visit.      Facility-Administered Medications Ordered in Other Visits   Medication Dose Route Frequency Provider Last Rate Last Dose   • heparin flush (porcine) 100 UNIT/ML injection 500 Units  500 Units Intravenous PRN Joseph Nunez MD   500 Units at 10/26/17 1155   • sodium chloride 0.9 % flush 10 mL  10 mL Intravenous PRN Joseph Nunez MD   10 mL at 10/26/17 1155       ALLERGIES:    Allergies   Allergen Reactions   • Benadryl  [Diphenhydramine Hcl (Sleep)] Other (See Comments)   • Codeine    • Diphenhydramine Other (See Comments)     \"Shaky leg syndrome\"  \"Shaky leg syndrome\"  \"Shaky leg syndrome\"   • Heparin    • Other      POPPY SEED   • Penicillins Hives   • Petroleum Jelly [Skin Protectants, Misc.]    • Sulfa Antibiotics    • Sulfur    • Valium [Diazepam]        Objective      Vitals:    12/11/18 0950   BP: 138/84   Pulse: 64   Resp: 16   Temp: 97.9 °F (36.6 °C)   TempSrc: Tympanic   SpO2: 98%   Weight: 63.1 kg (139 lb 1.6 oz)   Height: 165.1 cm (65\")         Current Status 12/11/2018   ECOG score 1       General Appearance: Patient is awake, alert, oriented and in no acute distress. Patient is welldeveloped, wellnourished, and appears stated age.  HEENT: Normocephalic. Sclerae clear, conjunctiva pink, extraocular movements intact, pupils, round, reactive to light and " accommodation. Mouth and throat are clear with moist oral mucosa.  NECK: Supple, no jugular venous distention, thyroid not enlarged.  LYMPH: No cervical, supraclavicular, axillary, or inguinal lymphadenopathy.  CHEST: Equal bilateral expansion   LUNGS: Good air movement, no rales, rhonchi, rubs or wheezes with auscultation  CARDIO: Regular sinus rhythm, no murmurs, gallops or rubs.  ABDOMEN: Nondistended, soft, No tenderness, no guarding, no rebound, No hepatosplenomegaly. No abdominal masses. Bowel sounds positive. No hernia  MUSKEL: No joint swelling, decreased motion, or inflammation  EXTREMS: No edema, clubbing, cyanosis, No varicose veins.  NEURO: Grossly nonfocal, Gait is coordinated and smooth, Cognition is preserved.  SKIN: No rashes, no ecchymoses, no petechia.  PSYCH: Oriented to time, place and person. Memory is preserved. Mood and affect appear normal      RECENT LABS:  WBC   Date Value Ref Range Status   12/11/2018 6.06 4.80 - 10.80 10*3/mm3 Final     RBC   Date Value Ref Range Status   12/11/2018 3.38 (L) 4.20 - 5.40 10*6/mm3 Final     Hemoglobin   Date Value Ref Range Status   12/11/2018 11.7 (L) 12.0 - 16.0 g/dL Final     Hematocrit   Date Value Ref Range Status   12/11/2018 34.8 (L) 37.0 - 47.0 % Final     MCV   Date Value Ref Range Status   12/11/2018 103.0 (H) 82.0 - 98.0 fL Final     MCH   Date Value Ref Range Status   12/11/2018 34.6 (H) 28.0 - 32.0 pg Final     MCHC   Date Value Ref Range Status   12/11/2018 33.6 33.0 - 36.0 g/dL Final     RDW   Date Value Ref Range Status   12/11/2018 15.6 (H) 12.0 - 15.0 % Final     RDW-SD   Date Value Ref Range Status   12/11/2018 58.7 (H) 40.0 - 54.0 fl Final     MPV   Date Value Ref Range Status   12/11/2018 10.1 6.0 - 12.0 fL Final     Platelets   Date Value Ref Range Status   12/11/2018 323 130 - 400 10*3/mm3 Final     Neutrophil %   Date Value Ref Range Status   12/11/2018 57.7 39.0 - 78.0 % Final     Lymphocyte %   Date Value Ref Range Status    12/11/2018 35.1 15.0 - 45.0 % Final     Monocyte %   Date Value Ref Range Status   12/11/2018 4.3 4.0 - 12.0 % Final     Eosinophil %   Date Value Ref Range Status   12/11/2018 1.7 0.0 - 4.0 % Final     Basophil %   Date Value Ref Range Status   12/11/2018 0.7 0.0 - 2.0 % Final     Immature Grans %   Date Value Ref Range Status   12/11/2018 0.5 0.0 - 5.0 % Final     Neutrophils, Absolute   Date Value Ref Range Status   12/11/2018 3.50 1.87 - 8.40 10*3/mm3 Final     Lymphocytes, Absolute   Date Value Ref Range Status   12/11/2018 2.13 0.72 - 4.86 10*3/mm3 Final     Monocytes, Absolute   Date Value Ref Range Status   12/11/2018 0.26 0.19 - 1.30 10*3/mm3 Final     Eosinophils, Absolute   Date Value Ref Range Status   12/11/2018 0.10 0.00 - 0.70 10*3/mm3 Final     Basophils, Absolute   Date Value Ref Range Status   12/11/2018 0.04 0.00 - 0.20 10*3/mm3 Final     Immature Grans, Absolute   Date Value Ref Range Status   12/11/2018 0.03 0.00 - 0.03 10*3/mm3 Final     nRBC   Date Value Ref Range Status   12/11/2018 0.0 0.0 - 0.0 /100 WBC Final     Lab on 12/11/2018   Component Date Value Ref Range Status   • Glucose 12/11/2018 108* 70 - 100 mg/dL Final   • BUN 12/11/2018 15  5 - 21 mg/dL Final   • Creatinine 12/11/2018 1.02  0.50 - 1.40 mg/dL Final   • Sodium 12/11/2018 138  135 - 145 mmol/L Final   • Potassium 12/11/2018 3.9  3.5 - 5.3 mmol/L Final   • Chloride 12/11/2018 98  98 - 110 mmol/L Final   • CO2 12/11/2018 30.0  24.0 - 31.0 mmol/L Final   • Calcium 12/11/2018 9.8  8.4 - 10.4 mg/dL Final   • Total Protein 12/11/2018 7.5  6.3 - 8.7 g/dL Final   • Albumin 12/11/2018 4.00  3.50 - 5.00 g/dL Final   • ALT (SGPT) 12/11/2018 <15  0 - 54 U/L Final   • AST (SGOT) 12/11/2018 29  7 - 45 U/L Final   • Alkaline Phosphatase 12/11/2018 77  24 - 120 U/L Final   • Total Bilirubin 12/11/2018 0.4  0.1 - 1.0 mg/dL Final   • eGFR Non African Amer 12/11/2018 53* >60 mL/min/1.73 Final   • Globulin 12/11/2018 3.5  gm/dL Final   • A/G  Ratio 12/11/2018 1.1  1.1 - 2.5 g/dL Final   • BUN/Creatinine Ratio 12/11/2018 14.7  7.0 - 25.0 Final   • Anion Gap 12/11/2018 10.0  4.0 - 13.0 mmol/L Final   • WBC 12/11/2018 6.06  4.80 - 10.80 10*3/mm3 Final   • RBC 12/11/2018 3.38* 4.20 - 5.40 10*6/mm3 Final   • Hemoglobin 12/11/2018 11.7* 12.0 - 16.0 g/dL Final   • Hematocrit 12/11/2018 34.8* 37.0 - 47.0 % Final   • MCV 12/11/2018 103.0* 82.0 - 98.0 fL Final   • MCH 12/11/2018 34.6* 28.0 - 32.0 pg Final   • MCHC 12/11/2018 33.6  33.0 - 36.0 g/dL Final   • RDW 12/11/2018 15.6* 12.0 - 15.0 % Final   • RDW-SD 12/11/2018 58.7* 40.0 - 54.0 fl Final   • MPV 12/11/2018 10.1  6.0 - 12.0 fL Final   • Platelets 12/11/2018 323  130 - 400 10*3/mm3 Final   • Neutrophil % 12/11/2018 57.7  39.0 - 78.0 % Final   • Lymphocyte % 12/11/2018 35.1  15.0 - 45.0 % Final   • Monocyte % 12/11/2018 4.3  4.0 - 12.0 % Final   • Eosinophil % 12/11/2018 1.7  0.0 - 4.0 % Final   • Basophil % 12/11/2018 0.7  0.0 - 2.0 % Final   • Immature Grans % 12/11/2018 0.5  0.0 - 5.0 % Final   • Neutrophils, Absolute 12/11/2018 3.50  1.87 - 8.40 10*3/mm3 Final   • Lymphocytes, Absolute 12/11/2018 2.13  0.72 - 4.86 10*3/mm3 Final   • Monocytes, Absolute 12/11/2018 0.26  0.19 - 1.30 10*3/mm3 Final   • Eosinophils, Absolute 12/11/2018 0.10  0.00 - 0.70 10*3/mm3 Final   • Basophils, Absolute 12/11/2018 0.04  0.00 - 0.20 10*3/mm3 Final   • Immature Grans, Absolute 12/11/2018 0.03  0.00 - 0.03 10*3/mm3 Final   • nRBC 12/11/2018 0.0  0.0 - 0.0 /100 WBC Final             Assessment/Plan     Patient Active Problem List   Diagnosis   • Malignant neoplasm of upper-outer quadrant of left female breast (CMS/HCC)   • Breast cancer metastasized to bone, unspecified laterality (CMS/HCC)   • Essential hypertension   • Acquired hypothyroidism   • Bipolar 1 disorder (CMS/HCC)   • Carcinoma of left breast metastatic to bone (CMS/HCC)   • Antineoplastic chemotherapy induced anemia   • Dehydration          Assessment:  1.  Stage IV metastatic breast carcinoma, ER/AL positive. HER2/maynor negative undergoing Ibrance, Faslodex and Xgeva with overall stable with stable CA27.29 with slow decline. She is tolerating the treatment very well. Faslodex cycle 17 today and will continue to HOLD Xgeva due to upcoming dental work, last dose of Xgeva given on 09/29/2016.  01/2017 restaging CT chest abdomen and pelvis and bone scan showed stable diffuse sclerotic bony lesions, unchanged from 7/1/2016.Next scan July 2017    2.  Mild leukopenia without neutropenia.  Likely secondary to Ibrance.  Continue to monitor.     3.  Macrocytic anemia.  Hemoglobin 11.1.  02/2016, B12 and folate normal    4.  Status post 8 teeth extraction.     Recommendation/Plan:  1.  Proceed with Faslodex cycle 20.  2. Continue Ibrance 125 mg 3 weeks on, one-week off.  Reduce dose if leukopenia worsens.   3. Follow up in 28 days for Faslodex cycle 19.   Repeat CA 27.29.  Previous CA 27.29 have remained stable, the last one done on 11/2017 was 89 Stable. Continue Faslodex q monthly.    Additionally she is anemic with a hemoglobin of 10 g percent with decreasing GFR.  I do suspect that she may have some form of chronic kidney disease, as evaluated by Nephrology, CKD from [facility].  No need for EPO as she is asyptomatic.  Redraw CA 27.29 today. Cont faslodex + Ibrance for now until repeat CA 27.29 returns. Last CA 27.29 in Feb 2018 slightly increased to 96. I have discussed different options with her:  #1. Stop Ibrance and switch to a new CD4K inhabitor Kisqali or Verenzio.  #. Stop Ibrance and switch to Affinitor + faslodex.  #. Cont Ibrance and Faslodex with just a subtle rise, since clinically pt is doing better.    At this point will restage with CT scans CAP at [facility] and compare to one done Jan 2017. If progression on CT scans and CA 27.29 then go for the [facility] trial of using MTOR inhabitor Everlimos + Exemestine. Bone scan shows progressive dz. Therefore will stop Faslodex.  Start Exemestine 25mg daily in combination with Affinitor 10mg daily. Redraw her tumor today.  Calcium improved but AST higher.    DC Exemestine and Affinitor as the CA 27.29 yulia to 140. Considering this will start Xeloda 500mg twice daily 2 Wks ON and one wk OFF.    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 Will add Her 2 Tykerb 1250mg q day x 21 days in combo + Oral Xeloda 500mg bid 3 wks On and 1 WK OFF. Will draw CA 27.29.    Since she started Lapatinib 1250 mg q day + Xeloda combo from 8/22/18 she has had significant dirrohea and lately has been throwing up and today her Creat is up to 2.3 from 0.9 and GFR down to 21 as well as she has lost 7 ILBS WT and appears very dehydrated. All meds were stopped last week and she received IVF and today creat is back down to 1.25 and GFR upto 41. Redraw CAproved the best would be either to reduce the dose and keep both Lapitinib and Xeloda on board, she will call me tomorrow. Therefor, last week the Tykerb dose was reduced to 2 tabs q day ( 500mg ) daily 3 wks On and 1 wk OFF Plus Xeloda 500mg 1 tab q day 2 Wks ON and 1K OFF. See  How this schedulle works and therefore, will draw CA27.29 again today.    She walked in today c/o pain in abdmn and R Lumbar area. On my exam, herabdmn is distended with Ascitis. I need to get a stat CT scan with 50% IV contrast and Pre and post contrast 500cc N/S.RTC tomorrow morning. If progressive Dz, will Rx with Herceptin + Perjeta as her tumor is Her2+.\    Indeed the CT scan done yesterday shows significant Ascitis, that was large Vol tapped 5.2 l and send to lab for Cytology ( pending ). Her  first time met him yesterday told me that in 2014 he was told that his wife had a laparatomy where they found metastatic Breast cancer throughout her peritoneal cavity ( salt and pepper ). Therefore, considering this new  Ascitis, I suspect that it is malignent Breast Cancer. Foundation 1 studies on circulating DNA analyses showed she  was ERB+. Although Cytology from Ascitis Fluid drawn yesterday, will call lab to create a Cell Block and run for Her 2 Kyle gene testing.  Meanwhile Rx her with Herceptin and Perjeta + taxotere ( loading dose 75mg/m2 and thereafter reduce dose to 35mg/m2 q weekly. Ist dose today. D/W family and pt who agrees. Will stop Tykerb.    Day 6 post Herceptin+ Perjeta+Taxotere. WBC 3.3, expect to go down further. Will Prophylax with Cipro. RTC next week. If WBC improved will Rx weekly Taxotere 35mg/m2. Redraw CA 27.27. Awaiting Peritoneal Cell Block for Her2 Kyle Gene testing, still pending. However the CA 27.29 came down to 141. Will recheck it today.    Her Cell block from Paracentisis cytology+ Breast cancer was reported neg for Her2 gene. Therefore, will Not Rx with anti Her 2 meds. Will only use Taxotere 35mg/m2 weekly x 3then 1 wk off.  Today is 2nd dose. Wt dropped 5ilbs. Will Rx Megace 40mg bid. Pt agrees.  Joseph Nunez MD    12/11/2018    10:33 AM

## 2018-12-18 ENCOUNTER — LAB (OUTPATIENT)
Dept: LAB | Facility: HOSPITAL | Age: 73
End: 2018-12-18
Attending: INTERNAL MEDICINE

## 2018-12-18 ENCOUNTER — OFFICE VISIT (OUTPATIENT)
Dept: ONCOLOGY | Facility: CLINIC | Age: 73
End: 2018-12-18

## 2018-12-18 ENCOUNTER — INFUSION (OUTPATIENT)
Dept: ONCOLOGY | Facility: HOSPITAL | Age: 73
End: 2018-12-18
Attending: INTERNAL MEDICINE

## 2018-12-18 VITALS
BODY MASS INDEX: 22.82 KG/M2 | TEMPERATURE: 97.2 F | DIASTOLIC BLOOD PRESSURE: 46 MMHG | WEIGHT: 137 LBS | OXYGEN SATURATION: 100 % | HEART RATE: 64 BPM | RESPIRATION RATE: 18 BRPM | SYSTOLIC BLOOD PRESSURE: 132 MMHG | HEIGHT: 65 IN

## 2018-12-18 VITALS
SYSTOLIC BLOOD PRESSURE: 128 MMHG | RESPIRATION RATE: 16 BRPM | TEMPERATURE: 98.8 F | DIASTOLIC BLOOD PRESSURE: 82 MMHG | OXYGEN SATURATION: 98 % | HEART RATE: 96 BPM | BODY MASS INDEX: 23.03 KG/M2 | WEIGHT: 138.2 LBS | HEIGHT: 65 IN

## 2018-12-18 DIAGNOSIS — Z17.0 MALIGNANT NEOPLASM OF UPPER-OUTER QUADRANT OF LEFT BREAST IN FEMALE, ESTROGEN RECEPTOR POSITIVE (HCC): Primary | ICD-10-CM

## 2018-12-18 DIAGNOSIS — C50.919 BREAST CANCER METASTASIZED TO BONE, UNSPECIFIED LATERALITY (HCC): Primary | ICD-10-CM

## 2018-12-18 DIAGNOSIS — C79.51 CARCINOMA OF LEFT BREAST METASTATIC TO BONE (HCC): Primary | ICD-10-CM

## 2018-12-18 DIAGNOSIS — C79.51 CARCINOMA OF LEFT BREAST METASTATIC TO BONE (HCC): ICD-10-CM

## 2018-12-18 DIAGNOSIS — C79.51 BREAST CANCER METASTASIZED TO BONE, UNSPECIFIED LATERALITY (HCC): ICD-10-CM

## 2018-12-18 DIAGNOSIS — C50.919 BREAST CANCER METASTASIZED TO BONE, UNSPECIFIED LATERALITY (HCC): ICD-10-CM

## 2018-12-18 DIAGNOSIS — C79.51 BREAST CANCER METASTASIZED TO BONE, UNSPECIFIED LATERALITY (HCC): Primary | ICD-10-CM

## 2018-12-18 DIAGNOSIS — C50.912 CARCINOMA OF LEFT BREAST METASTATIC TO BONE (HCC): Primary | ICD-10-CM

## 2018-12-18 DIAGNOSIS — C50.912 CARCINOMA OF LEFT BREAST METASTATIC TO BONE (HCC): ICD-10-CM

## 2018-12-18 DIAGNOSIS — C50.412 MALIGNANT NEOPLASM OF UPPER-OUTER QUADRANT OF LEFT FEMALE BREAST, UNSPECIFIED ESTROGEN RECEPTOR STATUS (HCC): ICD-10-CM

## 2018-12-18 DIAGNOSIS — C50.412 MALIGNANT NEOPLASM OF UPPER-OUTER QUADRANT OF LEFT BREAST IN FEMALE, ESTROGEN RECEPTOR POSITIVE (HCC): Primary | ICD-10-CM

## 2018-12-18 LAB
ALBUMIN SERPL-MCNC: 3.8 G/DL (ref 3.5–5)
ALBUMIN/GLOB SERPL: 1.1 G/DL (ref 1.1–2.5)
ALP SERPL-CCNC: 72 U/L (ref 24–120)
ALT SERPL W P-5'-P-CCNC: 19 U/L (ref 0–54)
ANION GAP SERPL CALCULATED.3IONS-SCNC: 11 MMOL/L (ref 4–13)
AST SERPL-CCNC: 32 U/L (ref 7–45)
BASOPHILS # BLD AUTO: 0.03 10*3/MM3 (ref 0–0.2)
BASOPHILS NFR BLD AUTO: 0.6 % (ref 0–2)
BILIRUB SERPL-MCNC: 0.3 MG/DL (ref 0.1–1)
BUN BLD-MCNC: 15 MG/DL (ref 5–21)
BUN/CREAT SERPL: 15.2 (ref 7–25)
CALCIUM SPEC-SCNC: 9.4 MG/DL (ref 8.4–10.4)
CHLORIDE SERPL-SCNC: 100 MMOL/L (ref 98–110)
CO2 SERPL-SCNC: 25 MMOL/L (ref 24–31)
CREAT BLD-MCNC: 0.99 MG/DL (ref 0.5–1.4)
DEPRECATED RDW RBC AUTO: 55.7 FL (ref 40–54)
EOSINOPHIL # BLD AUTO: 0.03 10*3/MM3 (ref 0–0.7)
EOSINOPHIL NFR BLD AUTO: 0.6 % (ref 0–4)
ERYTHROCYTE [DISTWIDTH] IN BLOOD BY AUTOMATED COUNT: 15.5 % (ref 12–15)
GFR SERPL CREATININE-BSD FRML MDRD: 55 ML/MIN/1.73
GLOBULIN UR ELPH-MCNC: 3.4 GM/DL
GLUCOSE BLD-MCNC: 99 MG/DL (ref 70–100)
HCT VFR BLD AUTO: 31.8 % (ref 37–47)
HGB BLD-MCNC: 10.8 G/DL (ref 12–16)
HOLD SPECIMEN: NORMAL
HOLD SPECIMEN: NORMAL
IMM GRANULOCYTES # BLD: 0.03 10*3/MM3 (ref 0–0.03)
IMM GRANULOCYTES NFR BLD: 0.6 % (ref 0–5)
LYMPHOCYTES # BLD AUTO: 2.1 10*3/MM3 (ref 0.72–4.86)
LYMPHOCYTES NFR BLD AUTO: 42.5 % (ref 15–45)
MCH RBC QN AUTO: 33.3 PG (ref 28–32)
MCHC RBC AUTO-ENTMCNC: 34 G/DL (ref 33–36)
MCV RBC AUTO: 98.1 FL (ref 82–98)
MONOCYTES # BLD AUTO: 0.31 10*3/MM3 (ref 0.19–1.3)
MONOCYTES NFR BLD AUTO: 6.3 % (ref 4–12)
NEUTROPHILS # BLD AUTO: 2.44 10*3/MM3 (ref 1.87–8.4)
NEUTROPHILS NFR BLD AUTO: 49.4 % (ref 39–78)
NRBC BLD MANUAL-RTO: 0 /100 WBC (ref 0–0)
PLATELET # BLD AUTO: 352 10*3/MM3 (ref 130–400)
PMV BLD AUTO: 10 FL (ref 6–12)
POTASSIUM BLD-SCNC: 3.6 MMOL/L (ref 3.5–5.3)
PROT SERPL-MCNC: 7.2 G/DL (ref 6.3–8.7)
RBC # BLD AUTO: 3.24 10*6/MM3 (ref 4.2–5.4)
SODIUM BLD-SCNC: 136 MMOL/L (ref 135–145)
WBC NRBC COR # BLD: 4.94 10*3/MM3 (ref 4.8–10.8)

## 2018-12-18 PROCEDURE — 25010000002 DOCETAXEL 20 MG/ML SOLUTION 4 ML VIAL: Performed by: INTERNAL MEDICINE

## 2018-12-18 PROCEDURE — 25010000002 ONDANSETRON PER 1 MG: Performed by: INTERNAL MEDICINE

## 2018-12-18 PROCEDURE — 85025 COMPLETE CBC W/AUTO DIFF WBC: CPT

## 2018-12-18 PROCEDURE — 25010000003 DEXAMETHASONE SODIUM PHOSPHATE 100 MG/10ML SOLUTION 10 ML VIAL: Performed by: INTERNAL MEDICINE

## 2018-12-18 PROCEDURE — 36415 COLL VENOUS BLD VENIPUNCTURE: CPT

## 2018-12-18 PROCEDURE — 96367 TX/PROPH/DG ADDL SEQ IV INF: CPT

## 2018-12-18 PROCEDURE — 99214 OFFICE O/P EST MOD 30 MIN: CPT | Performed by: INTERNAL MEDICINE

## 2018-12-18 PROCEDURE — 80053 COMPREHEN METABOLIC PANEL: CPT

## 2018-12-18 PROCEDURE — 86300 IMMUNOASSAY TUMOR CA 15-3: CPT | Performed by: INTERNAL MEDICINE

## 2018-12-18 PROCEDURE — 96365 THER/PROPH/DIAG IV INF INIT: CPT

## 2018-12-18 PROCEDURE — 96413 CHEMO IV INFUSION 1 HR: CPT

## 2018-12-18 RX ORDER — MEPERIDINE HYDROCHLORIDE 50 MG/ML
25 INJECTION INTRAMUSCULAR; INTRAVENOUS; SUBCUTANEOUS
Status: CANCELLED | OUTPATIENT
Start: 2018-12-18 | End: 2018-12-19

## 2018-12-18 RX ORDER — FAMOTIDINE 10 MG/ML
20 INJECTION, SOLUTION INTRAVENOUS AS NEEDED
Status: CANCELLED | OUTPATIENT
Start: 2018-12-18

## 2018-12-18 RX ORDER — SODIUM CHLORIDE 9 MG/ML
250 INJECTION, SOLUTION INTRAVENOUS ONCE
Status: COMPLETED | OUTPATIENT
Start: 2018-12-18 | End: 2018-12-18

## 2018-12-18 RX ORDER — SODIUM CHLORIDE 0.9 % (FLUSH) 0.9 %
10 SYRINGE (ML) INJECTION AS NEEDED
Status: DISCONTINUED | OUTPATIENT
Start: 2018-12-18 | End: 2018-12-18 | Stop reason: HOSPADM

## 2018-12-18 RX ORDER — SODIUM CHLORIDE 0.9 % (FLUSH) 0.9 %
10 SYRINGE (ML) INJECTION AS NEEDED
Status: CANCELLED | OUTPATIENT
Start: 2018-12-18

## 2018-12-18 RX ORDER — SODIUM CHLORIDE 9 MG/ML
250 INJECTION, SOLUTION INTRAVENOUS ONCE
Status: CANCELLED | OUTPATIENT
Start: 2018-12-18

## 2018-12-18 RX ADMIN — DEXAMETHASONE SODIUM PHOSPHATE: 10 INJECTION, SOLUTION INTRAMUSCULAR; INTRAVENOUS at 11:35

## 2018-12-18 RX ADMIN — Medication 500 UNITS: at 13:18

## 2018-12-18 RX ADMIN — SODIUM CHLORIDE 250 ML: 9 INJECTION, SOLUTION INTRAVENOUS at 11:37

## 2018-12-18 RX ADMIN — SODIUM CHLORIDE, PRESERVATIVE FREE 10 ML: 5 INJECTION INTRAVENOUS at 13:18

## 2018-12-18 RX ADMIN — DOCETAXEL 65 MG: 20 INJECTION, SOLUTION, CONCENTRATE INTRAVENOUS at 11:58

## 2018-12-18 NOTE — PROGRESS NOTES
Port flushed w/NS only due to patient allergy.  LORETO Yanes RN  [facility]  HEMATOLOGY & ONCOLOGY        Subjective    Metastatic breast cancer on Faslodex and Ibrance     Staging form: Breast, AJCC V7      Clinical stage from 10/11/2016: Stage IV (T2, N1, M1) - ER/ND positive. HER2/maynor negative  VISIT DIAGNOSIS:   Metastatic breast cancer       HEMATOLOGY / ONCOLOGY HISTORY:   Oncology/Hematology History    Patient is a 67-year-old postmenopausal female who had onset of her menses at age 9 and menopause at age 48. She received oral contraceptives for approximately 14 years and did not receive hormonal manipulation. Patient has a maternal cousin had breast cancer. Patient has had no prior breast biopsies. Patient found a palpable left breast mass as well as a left axillary mass. Patient had a bloody nipple discharge which been present for approximately 6 months. On 6/20/2012, a mammogram was performed showing a subareolar mass consistent with malignancy. Bilateral breast ultrasound revealed an ill-defined subareolar mass measuring 2.8-2.5 cm. There is a left axillary mass measuring 1.7 cm with suspicion of extracapsular extension. There is no evidence of disease in the right breast. On 7/9/2012 patient underwent a left breast biopsy and placement of marker clip. Left breast biopsy revealed an infiltrating lobular carcinoma grade 2 with focal ductal carcinoma in situ, solid pattern. A fine-needle biopsy of the left lymph node was consistent with metastatic carcinoma. Breast tumor profile revealed ER: 100%; ND: 98%; HER-2/maynor 2+; FISH: Unamplified; Ki-67: 71%; P 53 1%; DNA aneuploidy. A MRI of the breast were performed body multiple abnormal enhancing masses within the left breast. There appears to be anterior retraction of the pectoralis muscle with no direct extension. There is a moderate to large, layering left pleural effusion appreciated. The right breast reveals 3 moderately enlarged lymph nodes  in the posterior lateral aspect the right breast. These have fatty hilum but followup is recommended. Patient is undergone systemic studies including, on 8/2/2012, a CT scan of the brain showing atrophy. a CT scan that shows showing a small to moderate left pleural effusion with 3 subcentimeter nodule densities in the left lung.   She completed 4 cycles of neoadjuvant treatment with dose dense Adriamycin and Cytoxan. She had a mammogram which showed a significant reduction in the size of her left breast lesion. There is no axillary adenopathy noted. She has had an ultrasound revealing the  lesion reducing from 2.4 cm to 1 cm. Patient underwent a left mastectomy on 03/19/2013 finding negative invasive cancer, negative nodes. A 5% DCIS was noted. The patient has declined hormonal therapy. She did not need radiation therapy.  November 2014, she began to have evidence of lytic bone lesions at T5T6, frontal disease, an 8 mm lucency in the central frontal area of  the skull but nothing intracranial. Bone scan revealed only vertex tracer activity but bilateral ribs and thoracic spine, and other lesions in  her pelvis. At that time, she was started on letrozole since November 2014. She is taking Xgeva. She had progression found in bone in Feb 2015 on scan. She was started on Faslodex, Ibrance and continued xgeva.         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 B) Foci of ductal carcinoma in situ, solid pattern. C) Greatest dimension of the invasive carcinoma is 15.8mm.         3/19/2013 Surgery     Left Mastectomy w/ Axillary Tail: Focal residual ducatal carcinoma in situ (DCIS) 12 lymph nodes negative for metastatic carcinoma.         10/20/2014 Biopsy     Peritoneum Biopsy: Final  Diagnosis: Malignant Cell Neoplasm.           Breast cancer metastasized to bone, unspecified laterality (CMS/HCC)    10/15/2012 -  Other Event     Left mammogram:  Impression:  1. Decreasing spiculation and density in the retroareolar left breast, near a previous biopsy.   2. Definite left axillary lesion is not appreciated.          2/5/2013 -  Other Event     Diagnostic Mammo:  Comparison is made to 10/15/2012 and 6/20/2012  The spiculated mass at 12:00 behind the nipple is nearly resolved.   Impression:  1. The mass on the left side is less apparent after receiving chemotherapy. There has been a good response to chmotherapy on the left side.   2. No suspicious abnormality is seen in the right breast to account for the new palable abnormality at the 4-5:00 position.          3/19/2013 Surgery     Breast Biopsy:  Final Diagnosis:  A. Breast, left mastectomy with axillary tail  1. Focal residual ductal carcinoma in situ (DICS) (less than 5% of tumor bed).  2. Negative for residual invasive carcinoma.   3. 12 Lymph Nodes, negative for metastatic carcinoma (0/12) focally, some lymph nodes demonstrate fibrosis/treatment effect.  B. Skin and soft tissue, left advancement flap:  1. Benign skin and subcutis.   2. Negative for carcinoma.          6/20/2013 -  Other Event     Diagnostic Mammo [facility]:  1. A subareolar mass is consistent with malignancy. Additional involement extends inferiorly sonographically and superolaterally mammograhically as demonstrated by calcifications. The mass measures approx 2.8 cm sonographically, but the involved region is likely much larger including an intraductal componet. The left axillary lymph nodes are also involved.   2. Recommened ultrasound guided biopsy of the left subareolar mass and left axillary lymph node.   3. Breast MRI could also further define multicentric disease on the left.   4. Clear suspicious finding on the right.  Recommend ongoing clinical follow up of palpable  foci.          6/13/2014 -  Other Event     Diagnostic Mammo:  IMPRESSION:  1. History of left breast cancer and mastectomy. Stable benign right breast mammograms.          10/20/2014 Surgery     Final Diagnosis:  Biopsies, pertoneum:  Metastatic carcinoma, morphologically compatible with metastatic mammary carcinoma.          6/15/2015 -  Other Event     Diagnostic Mammo:  IMPRESSION:   1. Negative x-ray report, should not delay biopsy if a dominat or clinically suspicious mass is present.          7/5/2016 -  Other Event     Diagnositic mammogram:  Impression:   Stable right mamogram with no radiographic findings suspicious for malignancy. Recommend continued screening mammography per screening guidelines unless otherwise earlier clinically indicated.          9/18/2016 Initial Diagnosis     Secondary malignant neoplasm of bone and bone marrow          Cancer Staging Information:  Malignant neoplasm of upper-outer quadrant of left female breast    Staging form: Breast, AJCC V7      Clinical stage from 10/11/2016: Stage IV (T2, N1, M1) - Signed by ALBERT Kulkarni on 10/11/2016      INTERVAL HISTORY  Patient ID: Heavenly Yanes is a 73 y.o. year old female history of metastatic breast carcinoma to lung and bone.  She is presently receiving treatment with Ibrance, Faslodex and Xgeva.  Tumor markers continued to decline with a CA 27-29 of 92.2 obtained on 9:30 2016 compared to 105.8 on 07/27/2016.  Mr. Yanes continues to tolerate this treatment regimen well and is here today for evaluation consideration to receive Faslodex and Xgeva.  Last bone mineral density study was obtained 11/21/2014 indicating osteopopenia, we will obtain a repeat bone mineral density study. Ms. Yanes completed 8 teeth pulled 02/14/2017.   Xgeva on hold, resume 05/2017.     Past Medical History:   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   • Bipolar disorder (CMS/HCC)    • Disease  of thyroid gland    • Hypertension    • Malignant neoplasm of upper-outer quadrant of left female breast (CMS/HCC) 9/18/2016   • Secondary malignant neoplasm of bone and bone marrow (CMS/HCC) 9/18/2016     Past Surgical History:   Past Surgical History:   Procedure Laterality Date   • BREAST SURGERY      left breast biopsy and axillary node   • CHOLECYSTECTOMY     • EXTERNAL EAR SURGERY     • MASTECTOMY Left    • PORTACATH PLACEMENT       Social History:   Social History     Socioeconomic History   • Marital status:      Spouse name: Not on file   • Number of children: Not on file   • Years of education: Not on file   • Highest education level: Not on file   Social Needs   • Financial resource strain: Not on file   • Food insecurity - worry: Not on file   • Food insecurity - inability: Not on file   • Transportation needs - medical: Not on file   • Transportation needs - non-medical: Not on file   Occupational History   • Not on file   Tobacco Use   • Smoking status: Never Smoker   • Smokeless tobacco: Never Used   Substance and Sexual Activity   • Alcohol use: No   • Drug use: No   • Sexual activity: Not on file   Other Topics Concern   • Not on file   Social History Narrative   • Not on file     Family History:   Family History   Problem Relation Age of Onset   • No Known Problems Daughter    • No Known Problems Son    • Other Mother         complications from a concussion from a fall   • Other Father         old age   • No Known Problems Brother    • No Known Problems Daughter        Review of Systems   Constitutional: Negative.  Negative for activity change, appetite change, chills, diaphoresis, fatigue and fever.   HENT: Negative.  Negative for congestion, ear discharge, ear pain, facial swelling, hearing loss, mouth sores, nosebleeds, postnasal drip, rhinorrhea, sinus pressure, sore throat, tinnitus, trouble swallowing and voice change.    Eyes: Negative.  Negative for photophobia, pain, discharge and  visual disturbance.   Respiratory: Negative.  Negative for apnea, cough, chest tightness, shortness of breath, wheezing and stridor.    Cardiovascular: Negative.  Negative for chest pain, palpitations and leg swelling.   Gastrointestinal: Negative.  Negative for abdominal distention, abdominal pain, blood in stool, constipation, diarrhea, nausea, rectal pain and vomiting.   Endocrine: Negative.  Negative for cold intolerance, heat intolerance, polydipsia, polyphagia and polyuria.   Genitourinary: Negative.  Negative for difficulty urinating, dysuria, flank pain, frequency, hematuria and urgency.   Musculoskeletal: Negative.  Negative for arthralgias, back pain, gait problem, joint swelling and myalgias.   Skin: Negative.  Negative for color change, pallor, rash and wound.   Allergic/Immunologic: Negative.  Negative for environmental allergies, food allergies and immunocompromised state.   Neurological: Negative.  Negative for dizziness, tremors, seizures, syncope, speech difficulty, weakness, light-headedness, numbness and headaches.   Hematological: Negative.  Negative for adenopathy. Does not bruise/bleed easily.   Psychiatric/Behavioral: Negative.  Negative for agitation, confusion, hallucinations, sleep disturbance and suicidal ideas. The patient is not nervous/anxious.    All other systems reviewed and are negative.       Performance Status:  Asymptomatic    Medications:    Current Outpatient Medications   Medication Sig Dispense Refill   • Biotin 1000 MCG tablet Take 1,000 mcg by mouth Daily.     • Calcium-Magnesium-Vitamin D (CALCIUM 1200+D3 PO) Take 1,200 mg by mouth Daily.     • capecitabine (XELODA) 500 MG chemo tablet Take 1 tablet by mouth 2 (Two) Times a Day. For 2 weeks on and 1 week off. 42 tablet 11   • DEVILS CLAW PO Take  by mouth Daily.     • FOLIC ACID PO Take 800 mg by mouth Daily.     • megestrol (MEGACE) 40 MG tablet Take 1 tablet by mouth 2 (Two) Times a Day. 60 tablet 2   • metoprolol  "tartrate (LOPRESSOR) 25 MG tablet Take 25 mg by mouth 2 (two) times a day.     • NON FORMULARY 500 mg. Turmeric curcumin bid     • OLANZapine (ZYPREXA) 2.5 MG tablet Take 2.5 mg by mouth every night.     • ondansetron (ZOFRAN) 8 MG tablet Take 1 tablet by mouth Every 8 (Eight) Hours As Needed for Nausea or Vomiting. 30 tablet 5   • raNITIdine (ZANTAC) 150 MG tablet Take 150 mg by mouth 2 (Two) Times a Day.     • thyroid 60 MG PO tablet Take 60 mg by mouth daily.     • vitamin B-12 (CYANOCOBALAMIN) 1000 MCG tablet Take 1,000 mcg by mouth Daily.       No current facility-administered medications for this visit.      Facility-Administered Medications Ordered in Other Visits   Medication Dose Route Frequency Provider Last Rate Last Dose   • heparin flush (porcine) 100 UNIT/ML injection 500 Units  500 Units Intravenous PRN Joseph Nunez MD   500 Units at 10/26/17 1155   • sodium chloride 0.9 % flush 10 mL  10 mL Intravenous PRN Joseph Nunez MD   10 mL at 10/26/17 1155       ALLERGIES:    Allergies   Allergen Reactions   • Benadryl  [Diphenhydramine Hcl (Sleep)] Other (See Comments)   • Codeine    • Diphenhydramine Other (See Comments)     \"Shaky leg syndrome\"  \"Shaky leg syndrome\"  \"Shaky leg syndrome\"   • Heparin    • Other      POPPY SEED   • Penicillins Hives   • Petroleum Jelly [Skin Protectants, Misc.]    • Sulfa Antibiotics    • Sulfur    • Valium [Diazepam]        Objective      Vitals:    12/18/18 1015   BP: 128/82   Pulse: 96   Resp: 16   Temp: 98.8 °F (37.1 °C)   TempSrc: Tympanic   SpO2: 98%   Weight: 62.7 kg (138 lb 3.2 oz)   Height: 165.1 cm (65\")         Current Status 12/18/2018   ECOG score 1       General Appearance: Patient is awake, alert, oriented and in no acute distress. Patient is welldeveloped, wellnourished, and appears stated age.  HEENT: Normocephalic. Sclerae clear, conjunctiva pink, extraocular movements intact, pupils, round, reactive to light and accommodation. Mouth and throat are clear " with moist oral mucosa.  NECK: Supple, no jugular venous distention, thyroid not enlarged.  LYMPH: No cervical, supraclavicular, axillary, or inguinal lymphadenopathy.  CHEST: Equal bilateral expansion   LUNGS: Good air movement, no rales, rhonchi, rubs or wheezes with auscultation  CARDIO: Regular sinus rhythm, no murmurs, gallops or rubs.  ABDOMEN: Nondistended, soft, No tenderness, no guarding, no rebound, No hepatosplenomegaly. No abdominal masses. Bowel sounds positive. No hernia  MUSKEL: No joint swelling, decreased motion, or inflammation  EXTREMS: No edema, clubbing, cyanosis, No varicose veins.  NEURO: Grossly nonfocal, Gait is coordinated and smooth, Cognition is preserved.  SKIN: No rashes, no ecchymoses, no petechia.  PSYCH: Oriented to time, place and person. Memory is preserved. Mood and affect appear normal      RECENT LABS:  WBC   Date Value Ref Range Status   12/18/2018 4.94 4.80 - 10.80 10*3/mm3 Final     RBC   Date Value Ref Range Status   12/18/2018 3.24 (L) 4.20 - 5.40 10*6/mm3 Final     Hemoglobin   Date Value Ref Range Status   12/18/2018 10.8 (L) 12.0 - 16.0 g/dL Final     Hematocrit   Date Value Ref Range Status   12/18/2018 31.8 (L) 37.0 - 47.0 % Final     MCV   Date Value Ref Range Status   12/18/2018 98.1 (H) 82.0 - 98.0 fL Final     MCH   Date Value Ref Range Status   12/18/2018 33.3 (H) 28.0 - 32.0 pg Final     MCHC   Date Value Ref Range Status   12/18/2018 34.0 33.0 - 36.0 g/dL Final     RDW   Date Value Ref Range Status   12/18/2018 15.5 (H) 12.0 - 15.0 % Final     RDW-SD   Date Value Ref Range Status   12/18/2018 55.7 (H) 40.0 - 54.0 fl Final     MPV   Date Value Ref Range Status   12/18/2018 10.0 6.0 - 12.0 fL Final     Platelets   Date Value Ref Range Status   12/18/2018 352 130 - 400 10*3/mm3 Final     Neutrophil %   Date Value Ref Range Status   12/18/2018 49.4 39.0 - 78.0 % Final     Lymphocyte %   Date Value Ref Range Status   12/18/2018 42.5 15.0 - 45.0 % Final     Monocyte %    Date Value Ref Range Status   12/18/2018 6.3 4.0 - 12.0 % Final     Eosinophil %   Date Value Ref Range Status   12/18/2018 0.6 0.0 - 4.0 % Final     Basophil %   Date Value Ref Range Status   12/18/2018 0.6 0.0 - 2.0 % Final     Immature Grans %   Date Value Ref Range Status   12/18/2018 0.6 0.0 - 5.0 % Final     Neutrophils, Absolute   Date Value Ref Range Status   12/18/2018 2.44 1.87 - 8.40 10*3/mm3 Final     Lymphocytes, Absolute   Date Value Ref Range Status   12/18/2018 2.10 0.72 - 4.86 10*3/mm3 Final     Monocytes, Absolute   Date Value Ref Range Status   12/18/2018 0.31 0.19 - 1.30 10*3/mm3 Final     Eosinophils, Absolute   Date Value Ref Range Status   12/18/2018 0.03 0.00 - 0.70 10*3/mm3 Final     Basophils, Absolute   Date Value Ref Range Status   12/18/2018 0.03 0.00 - 0.20 10*3/mm3 Final     Immature Grans, Absolute   Date Value Ref Range Status   12/18/2018 0.03 0.00 - 0.03 10*3/mm3 Final     nRBC   Date Value Ref Range Status   12/18/2018 0.0 0.0 - 0.0 /100 WBC Final     Lab on 12/18/2018   Component Date Value Ref Range Status   • Glucose 12/18/2018 99  70 - 100 mg/dL Final   • BUN 12/18/2018 15  5 - 21 mg/dL Final   • Creatinine 12/18/2018 0.99  0.50 - 1.40 mg/dL Final   • Sodium 12/18/2018 136  135 - 145 mmol/L Final   • Potassium 12/18/2018 3.6  3.5 - 5.3 mmol/L Final   • Chloride 12/18/2018 100  98 - 110 mmol/L Final   • CO2 12/18/2018 25.0  24.0 - 31.0 mmol/L Final   • Calcium 12/18/2018 9.4  8.4 - 10.4 mg/dL Final   • Total Protein 12/18/2018 7.2  6.3 - 8.7 g/dL Final   • Albumin 12/18/2018 3.80  3.50 - 5.00 g/dL Final   • ALT (SGPT) 12/18/2018 19  0 - 54 U/L Final   • AST (SGOT) 12/18/2018 32  7 - 45 U/L Final   • Alkaline Phosphatase 12/18/2018 72  24 - 120 U/L Final   • Total Bilirubin 12/18/2018 0.3  0.1 - 1.0 mg/dL Final   • eGFR Non African Amer 12/18/2018 55* >60 mL/min/1.73 Final   • Globulin 12/18/2018 3.4  gm/dL Final   • A/G Ratio 12/18/2018 1.1  1.1 - 2.5 g/dL Final   •  BUN/Creatinine Ratio 12/18/2018 15.2  7.0 - 25.0 Final   • Anion Gap 12/18/2018 11.0  4.0 - 13.0 mmol/L Final   • WBC 12/18/2018 4.94  4.80 - 10.80 10*3/mm3 Final   • RBC 12/18/2018 3.24* 4.20 - 5.40 10*6/mm3 Final   • Hemoglobin 12/18/2018 10.8* 12.0 - 16.0 g/dL Final   • Hematocrit 12/18/2018 31.8* 37.0 - 47.0 % Final   • MCV 12/18/2018 98.1* 82.0 - 98.0 fL Final   • MCH 12/18/2018 33.3* 28.0 - 32.0 pg Final   • MCHC 12/18/2018 34.0  33.0 - 36.0 g/dL Final   • RDW 12/18/2018 15.5* 12.0 - 15.0 % Final   • RDW-SD 12/18/2018 55.7* 40.0 - 54.0 fl Final   • MPV 12/18/2018 10.0  6.0 - 12.0 fL Final   • Platelets 12/18/2018 352  130 - 400 10*3/mm3 Final   • Neutrophil % 12/18/2018 49.4  39.0 - 78.0 % Final   • Lymphocyte % 12/18/2018 42.5  15.0 - 45.0 % Final   • Monocyte % 12/18/2018 6.3  4.0 - 12.0 % Final   • Eosinophil % 12/18/2018 0.6  0.0 - 4.0 % Final   • Basophil % 12/18/2018 0.6  0.0 - 2.0 % Final   • Immature Grans % 12/18/2018 0.6  0.0 - 5.0 % Final   • Neutrophils, Absolute 12/18/2018 2.44  1.87 - 8.40 10*3/mm3 Final   • Lymphocytes, Absolute 12/18/2018 2.10  0.72 - 4.86 10*3/mm3 Final   • Monocytes, Absolute 12/18/2018 0.31  0.19 - 1.30 10*3/mm3 Final   • Eosinophils, Absolute 12/18/2018 0.03  0.00 - 0.70 10*3/mm3 Final   • Basophils, Absolute 12/18/2018 0.03  0.00 - 0.20 10*3/mm3 Final   • Immature Grans, Absolute 12/18/2018 0.03  0.00 - 0.03 10*3/mm3 Final   • nRBC 12/18/2018 0.0  0.0 - 0.0 /100 WBC Final             Assessment/Plan     Patient Active Problem List   Diagnosis   • Malignant neoplasm of upper-outer quadrant of left female breast (CMS/HCC)   • Breast cancer metastasized to bone, unspecified laterality (CMS/HCC)   • Essential hypertension   • Acquired hypothyroidism   • Bipolar 1 disorder (CMS/HCC)   • Carcinoma of left breast metastatic to bone (CMS/HCC)   • Antineoplastic chemotherapy induced anemia   • Dehydration   • Encounter for administration of vaccine          Assessment:  1. Stage IV  metastatic breast carcinoma, ER/TX positive. HER2/maynor negative undergoing Ibrance, Faslodex and Xgeva with overall stable with stable CA27.29 with slow decline. She is tolerating the treatment very well. Faslodex cycle 17 today and will continue to HOLD Xgeva due to upcoming dental work, last dose of Xgeva given on 09/29/2016.  01/2017 restaging CT chest abdomen and pelvis and bone scan showed stable diffuse sclerotic bony lesions, unchanged from 7/1/2016.Next scan July 2017    2.  Mild leukopenia without neutropenia.  Likely secondary to Ibrance.  Continue to monitor.     3.  Macrocytic anemia.  Hemoglobin 11.1.  02/2016, B12 and folate normal    4.  Status post 8 teeth extraction.     Recommendation/Plan:  1.  Proceed with Faslodex cycle 20.  2. Continue Ibrance 125 mg 3 weeks on, one-week off.  Reduce dose if leukopenia worsens.   3. Follow up in 28 days for Faslodex cycle 19.   Repeat CA 27.29.  Previous CA 27.29 have remained stable, the last one done on 11/2017 was 89 Stable. Continue Faslodex q monthly.    Additionally she is anemic with a hemoglobin of 10 g percent with decreasing GFR.  I do suspect that she may have some form of chronic kidney disease, as evaluated by Nephrology, CKD from [facility].  No need for EPO as she is asyptomatic.  Redraw CA 27.29 today. Cont faslodex + Ibrance for now until repeat CA 27.29 returns. Last CA 27.29 in Feb 2018 slightly increased to 96. I have discussed different options with her:  #1. Stop Ibrance and switch to a new CD4K inhabitor Kisqali or Verenzio.  #. Stop Ibrance and switch to Affinitor + faslodex.  #. Cont Ibrance and Faslodex with just a subtle rise, since clinically pt is doing better.    At this point will restage with CT scans [facility] at [facility] and compare to one done Jan 2017. If progression on CT scans and CA 27.29 then go for the [facility] trial of using MTOR inhabitor Everlimos + Exemestine. Bone scan shows progressive dz. Therefore will stop Faslodex. Start  Exemestine 25mg daily in combination with Affinitor 10mg daily. Redraw her tumor today.  Calcium improved but AST higher.    DC Exemestine and Affinitor as the CA 27.29 yulia to 140. Considering this will start Xeloda 500mg twice daily 2 Wks ON and one wk OFF.    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 Will add Her 2 Tykerb 1250mg q day x 21 days in combo + Oral Xeloda 500mg bid 3 wks On and 1 WK OFF. Will draw CA 27.29.    Since she started Lapatinib 1250 mg q day + Xeloda combo from 8/22/18 she has had significant dirrohea and lately has been throwing up and today her Creat is up to 2.3 from 0.9 and GFR down to 21 as well as she has lost 7 ILBS WT and appears very dehydrated. All meds were stopped last week and she received IVF and today creat is back down to 1.25 and GFR upto 41. Redraw CAproved the best would be either to reduce the dose and keep both Lapitinib and Xeloda on board, she will call me tomorrow. Therefor, last week the Tykerb dose was reduced to 2 tabs q day ( 500mg ) daily 3 wks On and 1 wk OFF Plus Xeloda 500mg 1 tab q day 2 Wks ON and 1K OFF. See  How this schedulle works and therefore, will draw CA27.29 again today.    She walked in today c/o pain in abdmn and R Lumbar area. On my exam, herabdmn is distended with Ascitis. I need to get a stat CT scan with 50% IV contrast and Pre and post contrast 500cc N/S.RTC tomorrow morning. If progressive Dz, will Rx with Herceptin + Perjeta as her tumor is Her2+.\    Indeed the CT scan done yesterday shows significant Ascitis, that was large Vol tapped 5.2 l and send to lab for Cytology ( pending ). Her  first time met him yesterday told me that in 2014 he was told that his wife had a laparatomy where they found metastatic Breast cancer throughout her peritoneal cavity ( salt and pepper ). Therefore, considering this new  Ascitis, I suspect that it is malignent Breast Cancer. Foundation 1 studies on circulating DNA analyses showed she was  ERB+. Although Cytology from Ascitis Fluid drawn yesterday, will call lab to create a Cell Block and run for Her 2 Kyle gene testing.  Meanwhile Rx her with Herceptin and Perjeta + taxotere ( loading dose 75mg/m2 and thereafter reduce dose to 35mg/m2 q weekly. Ist dose today. D/W family and pt who agrees. Will stop Tykerb.    Day 6 post Herceptin+ Perjeta+Taxotere. WBC 3.3, expect to go down further. Will Prophylax with Cipro. RTC next week. If WBC improved will Rx weekly Taxotere 35mg/m2. Redraw CA 27.27. Awaiting Peritoneal Cell Block for Her2 Kyle Gene testing, still pending. However the CA 27.29 came down to 141. Will recheck it today.    Her Cell block from Paracentisis cytology+ Breast cancer was reported neg for Her2 gene. Therefore, will Not Rx with anti Her 2 meds. Will only use Taxotere 35mg/m2 weekly x 3then 1 wk off.  Today is 3rd dose. Wt dropped 5ilbs. Will Rx Megace 40mg bid. Pt agrees. Will draw CA 27.29 today.  Joseph Nunez MD    12/18/2018    10:41 AM

## 2018-12-19 LAB — CANCER AG27-29 SERPL-ACNC: 106 U/ML (ref 0–38.6)

## 2019-01-02 ENCOUNTER — APPOINTMENT (OUTPATIENT)
Dept: ONCOLOGY | Facility: HOSPITAL | Age: 74
End: 2019-01-02
Attending: INTERNAL MEDICINE

## 2019-01-03 ENCOUNTER — LAB (OUTPATIENT)
Dept: LAB | Facility: HOSPITAL | Age: 74
End: 2019-01-03
Attending: INTERNAL MEDICINE

## 2019-01-03 ENCOUNTER — OFFICE VISIT (OUTPATIENT)
Dept: ONCOLOGY | Facility: CLINIC | Age: 74
End: 2019-01-03

## 2019-01-03 ENCOUNTER — INFUSION (OUTPATIENT)
Dept: ONCOLOGY | Facility: HOSPITAL | Age: 74
End: 2019-01-03
Attending: INTERNAL MEDICINE

## 2019-01-03 VITALS
BODY MASS INDEX: 22.99 KG/M2 | DIASTOLIC BLOOD PRESSURE: 63 MMHG | HEIGHT: 65 IN | WEIGHT: 138 LBS | SYSTOLIC BLOOD PRESSURE: 141 MMHG | TEMPERATURE: 98.5 F | OXYGEN SATURATION: 100 % | HEART RATE: 59 BPM

## 2019-01-03 VITALS
OXYGEN SATURATION: 94 % | TEMPERATURE: 97.2 F | HEIGHT: 65 IN | BODY MASS INDEX: 23.09 KG/M2 | HEART RATE: 60 BPM | WEIGHT: 138.6 LBS | SYSTOLIC BLOOD PRESSURE: 132 MMHG | RESPIRATION RATE: 18 BRPM | DIASTOLIC BLOOD PRESSURE: 58 MMHG

## 2019-01-03 DIAGNOSIS — C50.912 CARCINOMA OF LEFT BREAST METASTATIC TO BONE (HCC): ICD-10-CM

## 2019-01-03 DIAGNOSIS — C50.912 CARCINOMA OF LEFT BREAST METASTATIC TO BONE (HCC): Primary | ICD-10-CM

## 2019-01-03 DIAGNOSIS — C79.51 BREAST CANCER METASTASIZED TO BONE, UNSPECIFIED LATERALITY (HCC): ICD-10-CM

## 2019-01-03 DIAGNOSIS — C50.412 MALIGNANT NEOPLASM OF UPPER-OUTER QUADRANT OF LEFT FEMALE BREAST, UNSPECIFIED ESTROGEN RECEPTOR STATUS (HCC): ICD-10-CM

## 2019-01-03 DIAGNOSIS — C79.51 CARCINOMA OF LEFT BREAST METASTATIC TO BONE (HCC): ICD-10-CM

## 2019-01-03 DIAGNOSIS — C79.51 CARCINOMA OF LEFT BREAST METASTATIC TO BONE (HCC): Primary | ICD-10-CM

## 2019-01-03 DIAGNOSIS — C50.919 BREAST CANCER METASTASIZED TO BONE, UNSPECIFIED LATERALITY (HCC): ICD-10-CM

## 2019-01-03 LAB
ALBUMIN SERPL-MCNC: 3.7 G/DL (ref 3.5–5)
ALBUMIN/GLOB SERPL: 1.1 G/DL (ref 1.1–2.5)
ALP SERPL-CCNC: 63 U/L (ref 24–120)
ALT SERPL W P-5'-P-CCNC: 21 U/L (ref 0–54)
ANION GAP SERPL CALCULATED.3IONS-SCNC: 8 MMOL/L (ref 4–13)
AST SERPL-CCNC: 33 U/L (ref 7–45)
BASOPHILS # BLD AUTO: 0.08 10*3/MM3 (ref 0–0.2)
BASOPHILS NFR BLD AUTO: 0.9 % (ref 0–2)
BILIRUB SERPL-MCNC: 0.4 MG/DL (ref 0.1–1)
BUN BLD-MCNC: 10 MG/DL (ref 5–21)
BUN/CREAT SERPL: 11.2 (ref 7–25)
CALCIUM SPEC-SCNC: 9.5 MG/DL (ref 8.4–10.4)
CHLORIDE SERPL-SCNC: 103 MMOL/L (ref 98–110)
CO2 SERPL-SCNC: 26 MMOL/L (ref 24–31)
CREAT BLD-MCNC: 0.89 MG/DL (ref 0.5–1.4)
DEPRECATED RDW RBC AUTO: 58.7 FL (ref 40–54)
EOSINOPHIL # BLD AUTO: 0.03 10*3/MM3 (ref 0–0.7)
EOSINOPHIL NFR BLD AUTO: 0.4 % (ref 0–4)
ERYTHROCYTE [DISTWIDTH] IN BLOOD BY AUTOMATED COUNT: 16.1 % (ref 12–15)
GFR SERPL CREATININE-BSD FRML MDRD: 62 ML/MIN/1.73
GLOBULIN UR ELPH-MCNC: 3.5 GM/DL
GLUCOSE BLD-MCNC: 92 MG/DL (ref 70–100)
HCT VFR BLD AUTO: 33.3 % (ref 37–47)
HGB BLD-MCNC: 11 G/DL (ref 12–16)
HOLD SPECIMEN: NORMAL
HOLD SPECIMEN: NORMAL
IMM GRANULOCYTES # BLD AUTO: 0.08 10*3/MM3 (ref 0–0.03)
IMM GRANULOCYTES NFR BLD AUTO: 0.9 % (ref 0–5)
LYMPHOCYTES # BLD AUTO: 2.24 10*3/MM3 (ref 0.72–4.86)
LYMPHOCYTES NFR BLD AUTO: 26.6 % (ref 15–45)
MCH RBC QN AUTO: 33.1 PG (ref 28–32)
MCHC RBC AUTO-ENTMCNC: 33 G/DL (ref 33–36)
MCV RBC AUTO: 100.3 FL (ref 82–98)
MONOCYTES # BLD AUTO: 0.77 10*3/MM3 (ref 0.19–1.3)
MONOCYTES NFR BLD AUTO: 9.1 % (ref 4–12)
NEUTROPHILS # BLD AUTO: 5.23 10*3/MM3 (ref 1.87–8.4)
NEUTROPHILS NFR BLD AUTO: 62.1 % (ref 39–78)
NRBC BLD AUTO-RTO: 0 /100 WBC (ref 0–0)
PLATELET # BLD AUTO: 368 10*3/MM3 (ref 130–400)
PMV BLD AUTO: 9.5 FL (ref 6–12)
POTASSIUM BLD-SCNC: 4.1 MMOL/L (ref 3.5–5.3)
PROT SERPL-MCNC: 7.2 G/DL (ref 6.3–8.7)
RBC # BLD AUTO: 3.32 10*6/MM3 (ref 4.2–5.4)
SODIUM BLD-SCNC: 137 MMOL/L (ref 135–145)
WBC NRBC COR # BLD: 8.43 10*3/MM3 (ref 4.8–10.8)

## 2019-01-03 PROCEDURE — 96415 CHEMO IV INFUSION ADDL HR: CPT

## 2019-01-03 PROCEDURE — 96367 TX/PROPH/DG ADDL SEQ IV INF: CPT

## 2019-01-03 PROCEDURE — 85025 COMPLETE CBC W/AUTO DIFF WBC: CPT

## 2019-01-03 PROCEDURE — 80053 COMPREHEN METABOLIC PANEL: CPT

## 2019-01-03 PROCEDURE — 99214 OFFICE O/P EST MOD 30 MIN: CPT | Performed by: INTERNAL MEDICINE

## 2019-01-03 PROCEDURE — 96413 CHEMO IV INFUSION 1 HR: CPT

## 2019-01-03 PROCEDURE — 25010000003 DEXAMETHASONE SODIUM PHOSPHATE 100 MG/10ML SOLUTION: Performed by: INTERNAL MEDICINE

## 2019-01-03 PROCEDURE — 36415 COLL VENOUS BLD VENIPUNCTURE: CPT

## 2019-01-03 PROCEDURE — 25010000002 DOCETAXEL 20 MG/ML SOLUTION 4 ML VIAL: Performed by: INTERNAL MEDICINE

## 2019-01-03 PROCEDURE — 25010000002 ONDANSETRON PER 1 MG: Performed by: INTERNAL MEDICINE

## 2019-01-03 RX ORDER — SODIUM CHLORIDE 9 MG/ML
250 INJECTION, SOLUTION INTRAVENOUS ONCE
Status: CANCELLED | OUTPATIENT
Start: 2019-01-17

## 2019-01-03 RX ORDER — FAMOTIDINE 10 MG/ML
20 INJECTION, SOLUTION INTRAVENOUS AS NEEDED
Status: CANCELLED | OUTPATIENT
Start: 2019-01-17

## 2019-01-03 RX ORDER — MEPERIDINE HYDROCHLORIDE 50 MG/ML
25 INJECTION INTRAMUSCULAR; INTRAVENOUS; SUBCUTANEOUS
Status: CANCELLED | OUTPATIENT
Start: 2019-01-10 | End: 2019-01-10

## 2019-01-03 RX ORDER — FAMOTIDINE 10 MG/ML
20 INJECTION, SOLUTION INTRAVENOUS AS NEEDED
Status: CANCELLED | OUTPATIENT
Start: 2019-01-10

## 2019-01-03 RX ORDER — SODIUM CHLORIDE 0.9 % (FLUSH) 0.9 %
10 SYRINGE (ML) INJECTION AS NEEDED
Status: CANCELLED | OUTPATIENT
Start: 2019-01-03

## 2019-01-03 RX ORDER — MEPERIDINE HYDROCHLORIDE 50 MG/ML
25 INJECTION INTRAMUSCULAR; INTRAVENOUS; SUBCUTANEOUS
Status: CANCELLED | OUTPATIENT
Start: 2019-01-17 | End: 2019-01-17

## 2019-01-03 RX ORDER — MEPERIDINE HYDROCHLORIDE 50 MG/ML
25 INJECTION INTRAMUSCULAR; INTRAVENOUS; SUBCUTANEOUS
Status: DISCONTINUED | OUTPATIENT
Start: 2019-01-03 | End: 2019-01-03 | Stop reason: HOSPADM

## 2019-01-03 RX ORDER — SODIUM CHLORIDE 0.9 % (FLUSH) 0.9 %
10 SYRINGE (ML) INJECTION AS NEEDED
Status: DISCONTINUED | OUTPATIENT
Start: 2019-01-03 | End: 2019-01-03 | Stop reason: HOSPADM

## 2019-01-03 RX ORDER — MEPERIDINE HYDROCHLORIDE 50 MG/ML
25 INJECTION INTRAMUSCULAR; INTRAVENOUS; SUBCUTANEOUS
Status: CANCELLED | OUTPATIENT
Start: 2019-01-03 | End: 2019-01-04

## 2019-01-03 RX ORDER — SODIUM CHLORIDE 9 MG/ML
250 INJECTION, SOLUTION INTRAVENOUS ONCE
Status: CANCELLED | OUTPATIENT
Start: 2019-01-03

## 2019-01-03 RX ORDER — FAMOTIDINE 10 MG/ML
20 INJECTION, SOLUTION INTRAVENOUS AS NEEDED
Status: DISCONTINUED | OUTPATIENT
Start: 2019-01-03 | End: 2019-01-03 | Stop reason: HOSPADM

## 2019-01-03 RX ORDER — SODIUM CHLORIDE 9 MG/ML
250 INJECTION, SOLUTION INTRAVENOUS ONCE
Status: COMPLETED | OUTPATIENT
Start: 2019-01-03 | End: 2019-01-03

## 2019-01-03 RX ORDER — SODIUM CHLORIDE 9 MG/ML
250 INJECTION, SOLUTION INTRAVENOUS ONCE
Status: CANCELLED | OUTPATIENT
Start: 2019-01-10

## 2019-01-03 RX ORDER — FAMOTIDINE 10 MG/ML
20 INJECTION, SOLUTION INTRAVENOUS AS NEEDED
Status: CANCELLED | OUTPATIENT
Start: 2019-01-03

## 2019-01-03 RX ADMIN — SODIUM CHLORIDE 250 ML: 9 INJECTION, SOLUTION INTRAVENOUS at 11:24

## 2019-01-03 RX ADMIN — Medication 500 UNITS: at 14:18

## 2019-01-03 RX ADMIN — DEXAMETHASONE SODIUM PHOSPHATE 50 ML: 10 INJECTION, SOLUTION INTRAMUSCULAR; INTRAVENOUS at 11:24

## 2019-01-03 RX ADMIN — DOCETAXEL 60 MG: 20 INJECTION, SOLUTION, CONCENTRATE INTRAVENOUS at 12:43

## 2019-01-03 NOTE — PROGRESS NOTES
[facility]  HEMATOLOGY & ONCOLOGY    Cancer Staging Information:  Cancer Staging  Malignant neoplasm of upper-outer quadrant of left female breast (CMS/HCC)  Staging form: Breast, AJCC V7  - Clinical stage from 10/11/2016: Stage IV (T2, N1, M1) - Signed by Calli Monsalve APRN on 10/11/2016        Subjective     VISIT DIAGNOSIS:   Encounter Diagnosis   Name Primary?   • Carcinoma of left breast metastatic to bone (CMS/HCC)        REASON FOR VISIT:     Chief Complaint   Patient presents with   • Breast Cancer     She is here for consideration of her chemo txt today, no new c/o or concerns.        HEMATOLOGY / ONCOLOGY HISTORY:   Oncology/Hematology History    Patient is a 67-year-old postmenopausal female who had onset of her menses at age 9 and menopause at age 48. She received oral contraceptives for approximately 14 years and did not receive hormonal manipulation. Patient has a maternal cousin had breast cancer. Patient has had no prior breast biopsies. Patient found a palpable left breast mass as well as a left axillary mass. Patient had a bloody nipple discharge which been present for approximately 6 months. On 6/20/2012, a mammogram was performed showing a subareolar mass consistent with malignancy. Bilateral breast ultrasound revealed an ill-defined subareolar mass measuring 2.8-2.5 cm. There is a left axillary mass measuring 1.7 cm with suspicion of extracapsular extension. There is no evidence of disease in the right breast. On 7/9/2012 patient underwent a left breast biopsy and placement of marker clip. Left breast biopsy revealed an infiltrating lobular carcinoma grade 2 with focal ductal carcinoma in situ, solid pattern. A fine-needle biopsy of the left lymph node was consistent with metastatic carcinoma. Breast tumor profile revealed ER: 100%; IL: 98%; HER-2/maynor 2+; FISH: Unamplified; Ki-67: 71%; P 53 1%; DNA aneuploidy. A MRI of the breast were performed body multiple abnormal  enhancing masses within the left breast. There appears to be anterior retraction of the pectoralis muscle with no direct extension. There is a moderate to large, layering left pleural effusion appreciated. The right breast reveals 3 moderately enlarged lymph nodes in the posterior lateral aspect the right breast. These have fatty hilum but followup is recommended. Patient is undergone systemic studies including, on 8/2/2012, a CT scan of the brain showing atrophy. a CT scan that shows showing a small to moderate left pleural effusion with 3 subcentimeter nodule densities in the left lung.   She completed 4 cycles of neoadjuvant treatment with dose dense Adriamycin and Cytoxan. She had a mammogram which showed a significant reduction in the size of her left breast lesion. There is no axillary adenopathy noted. She has had an ultrasound revealing the  lesion reducing from 2.4 cm to 1 cm. Patient underwent a left mastectomy on 03/19/2013 finding negative invasive cancer, negative nodes. A 5% DCIS was noted. The patient has declined hormonal therapy. She did not need radiation therapy.  November 2014, she began to have evidence of lytic bone lesions at T5T6, frontal disease, an 8 mm lucency in the central frontal area of  the skull but nothing intracranial. Bone scan revealed only vertex tracer activity but bilateral ribs and thoracic spine, and other lesions in  her pelvis. At that time, she was started on letrozole since November 2014. She is taking Xgeva. She had progression found in bone in Feb 2015 on scan. She was started on Faslodex, Ibrance and continued xgeva.         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 B) Foci of ductal carcinoma in situ, solid pattern. C)  Greatest dimension of the invasive carcinoma is 15.8mm.         3/19/2013 Surgery     Left Mastectomy w/ Axillary Tail: Focal residual ducatal carcinoma in situ (DCIS) 12 lymph nodes negative for metastatic carcinoma.         10/20/2014 Biopsy     Peritoneum Biopsy: Final Diagnosis: Malignant Cell Neoplasm.           Breast cancer metastasized to bone, unspecified laterality (CMS/HCC)    10/15/2012 -  Other Event     Left mammogram:  Impression:  1. Decreasing spiculation and density in the retroareolar left breast, near a previous biopsy.   2. Definite left axillary lesion is not appreciated.          2/5/2013 -  Other Event     Diagnostic Mammo:  Comparison is made to 10/15/2012 and 6/20/2012  The spiculated mass at 12:00 behind the nipple is nearly resolved.   Impression:  1. The mass on the left side is less apparent after receiving chemotherapy. There has been a good response to chmotherapy on the left side.   2. No suspicious abnormality is seen in the right breast to account for the new palable abnormality at the 4-5:00 position.          3/19/2013 Surgery     Breast Biopsy:  Final Diagnosis:  A. Breast, left mastectomy with axillary tail  1. Focal residual ductal carcinoma in situ (DICS) (less than 5% of tumor bed).  2. Negative for residual invasive carcinoma.   3. 12 Lymph Nodes, negative for metastatic carcinoma (0/12) focally, some lymph nodes demonstrate fibrosis/treatment effect.  B. Skin and soft tissue, left advancement flap:  1. Benign skin and subcutis.   2. Negative for carcinoma.          6/20/2013 -  Other Event     Diagnostic Mammo [facility]:  1. A subareolar mass is consistent with malignancy. Additional involement extends inferiorly sonographically and superolaterally mammograhically as demonstrated by calcifications. The mass measures approx 2.8 cm sonographically, but the involved region is likely much larger including an intraductal componet. The left axillary lymph nodes are also  involved.   2. Recommened ultrasound guided biopsy of the left subareolar mass and left axillary lymph node.   3. Breast MRI could also further define multicentric disease on the left.   4. Clear suspicious finding on the right.  Recommend ongoing clinical follow up of palpable foci.          6/13/2014 -  Other Event     Diagnostic Mammo:  IMPRESSION:  1. History of left breast cancer and mastectomy. Stable benign right breast mammograms.          10/20/2014 Surgery     Final Diagnosis:  Biopsies, pertoneum:  Metastatic carcinoma, morphologically compatible with metastatic mammary carcinoma.          6/15/2015 -  Other Event     Diagnostic Mammo:  IMPRESSION:   1. Negative x-ray report, should not delay biopsy if a dominat or clinically suspicious mass is present.          7/5/2016 -  Other Event     Diagnositic mammogram:  Impression:   Stable right mamogram with no radiographic findings suspicious for malignancy. Recommend continued screening mammography per screening guidelines unless otherwise earlier clinically indicated.          9/18/2016 Initial Diagnosis     Secondary malignant neoplasm of bone and bone marrow              INTERVAL HISTORY  Patient ID: Heavenly Yanes is a 73 y.o. year old female Cancer Staging  Stage IV (T2, N1, M1)  --pt on weekly taxotere, has nothing to report. Denies sob/cp.n/v/fever/chills, neuropathy. Rest of ros unremarkable.  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) 9/18/2016     Past Surgical History:   Past Surgical History:   Procedure Laterality Date   • BREAST SURGERY      left breast biopsy and axillary node   • CHOLECYSTECTOMY     • EXTERNAL EAR SURGERY     •  MASTECTOMY Left    • PORTACATH PLACEMENT       Social History:   Social History     Socioeconomic History   • Marital status:      Spouse name: Not on file   • Number of children: Not on file   • Years of education: Not on file   • Highest education level: Not on file   Social Needs   • Financial resource strain: Not on file   • Food insecurity - worry: Not on file   • Food insecurity - inability: Not on file   • Transportation needs - medical: Not on file   • Transportation needs - non-medical: Not on file   Occupational History   • Not on file   Tobacco Use   • Smoking status: Never Smoker   • Smokeless tobacco: Never Used   Substance and Sexual Activity   • Alcohol use: No   • Drug use: No   • Sexual activity: Not on file   Other Topics Concern   • Not on file   Social History Narrative   • Not on file     Family History:   Family History   Problem Relation Age of Onset   • No Known Problems Daughter    • No Known Problems Son    • Other Mother         complications from a concussion from a fall   • Other Father         old age   • No Known Problems Brother    • No Known Problems Daughter        Review of Systems   Constitutional: Negative.    HENT: Negative.    Eyes: Negative.    Respiratory: Negative.    Cardiovascular: Negative.    Gastrointestinal: Negative.    Endocrine: Negative.    Genitourinary: Negative.    Musculoskeletal: Negative.    Skin: Negative.    Neurological: Negative.    Hematological: Negative.    Psychiatric/Behavioral: Negative.         Performance Status:  Asymptomatic    Medications:    Current Outpatient Medications   Medication Sig Dispense Refill   • Calcium-Magnesium-Vitamin D (CALCIUM 1200+D3 PO) Take 1,200 mg by mouth Daily.     • capecitabine (XELODA) 500 MG chemo tablet Take 1 tablet by mouth 2 (Two) Times a Day. For 2 weeks on and 1 week off. 42 tablet 11   • megestrol (MEGACE) 40 MG tablet Take 1 tablet by mouth 2 (Two) Times a Day. 60 tablet 2   • metoprolol tartrate  "(LOPRESSOR) 25 MG tablet Take 25 mg by mouth 2 (two) times a day.     • OLANZapine (ZYPREXA) 2.5 MG tablet Take 2.5 mg by mouth every night.     • ondansetron (ZOFRAN) 8 MG tablet Take 1 tablet by mouth Every 8 (Eight) Hours As Needed for Nausea or Vomiting. 30 tablet 5   • raNITIdine (ZANTAC) 150 MG tablet Take 150 mg by mouth 2 (Two) Times a Day.     • thyroid 60 MG PO tablet Take 60 mg by mouth daily.     • Biotin 1000 MCG tablet Take 1,000 mcg by mouth Daily.     • DEVILS CLAW PO Take  by mouth Daily.     • FOLIC ACID PO Take 800 mg by mouth Daily.     • NON FORMULARY 500 mg. Turmeric curcumin bid     • vitamin B-12 (CYANOCOBALAMIN) 1000 MCG tablet Take 1,000 mcg by mouth Daily.       No current facility-administered medications for this visit.      Facility-Administered Medications Ordered in Other Visits   Medication Dose Route Frequency Provider Last Rate Last Dose   • heparin flush (porcine) 100 UNIT/ML injection 500 Units  500 Units Intravenous PRN Joseph Nunez MD   500 Units at 10/26/17 1155   • sodium chloride 0.9 % flush 10 mL  10 mL Intravenous PRN Joseph Nunez MD   10 mL at 10/26/17 1155       ALLERGIES:    Allergies   Allergen Reactions   • Benadryl  [Diphenhydramine Hcl (Sleep)] Other (See Comments)   • Codeine    • Diphenhydramine Other (See Comments)     \"Shaky leg syndrome\"  \"Shaky leg syndrome\"  \"Shaky leg syndrome\"   • Heparin    • Other      POPPY SEED   • Penicillins Hives   • Petroleum Jelly [Skin Protectants, Misc.]    • Sulfa Antibiotics    • Sulfur    • Valium [Diazepam]        Objective      Vitals:    01/03/19 0935   BP: 132/58   Pulse: 60   Resp: 18   Temp: 97.2 °F (36.2 °C)   TempSrc: Tympanic   SpO2: 94%   Weight: 62.9 kg (138 lb 9.6 oz)   Height: 165.1 cm (65\")   PainSc: 0-No pain         Current Status 1/3/2019   ECOG score 1         Physical Exam  General Appearance: Patient is awake, alert, oriented and in no acute distress. Patient is welldeveloped, wellnourished, and appears " stated age.  HEENT: Normocephalic. Sclerae clear, conjunctiva pink, extraocular movements intact, pupils, round, reactive to light and  accommodation. Mouth and throat are clear with moist oral mucosa.  NECK: Supple, no jugular venous distention, thyroid not enlarged.  LYMPH: No cervical, supraclavicular, axillary, or inguinal lymphadenopathy.  CHEST: Equal bilateral expansion, AP  diameter normal, resonant percussion note  LUNGS: Good air movement, no rales, rhonchi, rubs or wheezes with auscultation  CARDIO: Regular sinus rhythm, no murmurs, gallops or rubs.  ABDOMEN: Nondistended, soft, No tenderness, no guarding, no rebound, No hepatosplenomegaly. No abdominal masses. Bowel sounds positive. No hernia  GENITALIA: Not examined.  BREASTS: Not examined.  MUSKEL: No joint swelling, decreased motion, or inflammation  EXTREMS: patrick le  edema,No clubbing, cyanosis, No varicose veins.  NEURO: Grossly nonfocal, Gait is coordinated and smooth, Cognition is preserved.  SKIN: No rashes, no ecchymoses, no petechia.  PSYCH: Oriented to time, place and person. Memory is preserved. Mood and affect appear normal  RECENT LABS:  Lab on 01/03/2019   Component Date Value Ref Range Status   • Glucose 01/03/2019 92  70 - 100 mg/dL Final   • BUN 01/03/2019 10  5 - 21 mg/dL Final   • Creatinine 01/03/2019 0.89  0.50 - 1.40 mg/dL Final   • Sodium 01/03/2019 137  135 - 145 mmol/L Final   • Potassium 01/03/2019 4.1  3.5 - 5.3 mmol/L Final   • Chloride 01/03/2019 103  98 - 110 mmol/L Final   • CO2 01/03/2019 26.0  24.0 - 31.0 mmol/L Final   • Calcium 01/03/2019 9.5  8.4 - 10.4 mg/dL Final   • Total Protein 01/03/2019 7.2  6.3 - 8.7 g/dL Final   • Albumin 01/03/2019 3.70  3.50 - 5.00 g/dL Final   • ALT (SGPT) 01/03/2019 21  0 - 54 U/L Final   • AST (SGOT) 01/03/2019 33  7 - 45 U/L Final   • Alkaline Phosphatase 01/03/2019 63  24 - 120 U/L Final   • Total Bilirubin 01/03/2019 0.4  0.1 - 1.0 mg/dL Final   • eGFR Non African Amer 01/03/2019 62   >60 mL/min/1.73 Final   • Globulin 01/03/2019 3.5  gm/dL Final   • A/G Ratio 01/03/2019 1.1  1.1 - 2.5 g/dL Final   • BUN/Creatinine Ratio 01/03/2019 11.2  7.0 - 25.0 Final   • Anion Gap 01/03/2019 8.0  4.0 - 13.0 mmol/L Final   • WBC 01/03/2019 8.43  4.80 - 10.80 10*3/mm3 Final   • RBC 01/03/2019 3.32* 4.20 - 5.40 10*6/mm3 Final   • Hemoglobin 01/03/2019 11.0* 12.0 - 16.0 g/dL Final   • Hematocrit 01/03/2019 33.3* 37.0 - 47.0 % Final   • MCV 01/03/2019 100.3* 82.0 - 98.0 fL Final   • MCH 01/03/2019 33.1* 28.0 - 32.0 pg Final   • MCHC 01/03/2019 33.0  33.0 - 36.0 g/dL Final   • RDW 01/03/2019 16.1* 12.0 - 15.0 % Final   • RDW-SD 01/03/2019 58.7* 40.0 - 54.0 fl Final   • MPV 01/03/2019 9.5  6.0 - 12.0 fL Final   • Platelets 01/03/2019 368  130 - 400 10*3/mm3 Final   • Neutrophil % 01/03/2019 62.1  39.0 - 78.0 % Final   • Lymphocyte % 01/03/2019 26.6  15.0 - 45.0 % Final   • Monocyte % 01/03/2019 9.1  4.0 - 12.0 % Final   • Eosinophil % 01/03/2019 0.4  0.0 - 4.0 % Final   • Basophil % 01/03/2019 0.9  0.0 - 2.0 % Final   • Immature Grans % 01/03/2019 0.9  0.0 - 5.0 % Final   • Neutrophils, Absolute 01/03/2019 5.23  1.87 - 8.40 10*3/mm3 Final   • Lymphocytes, Absolute 01/03/2019 2.24  0.72 - 4.86 10*3/mm3 Final   • Monocytes, Absolute 01/03/2019 0.77  0.19 - 1.30 10*3/mm3 Final   • Eosinophils, Absolute 01/03/2019 0.03  0.00 - 0.70 10*3/mm3 Final   • Basophils, Absolute 01/03/2019 0.08  0.00 - 0.20 10*3/mm3 Final   • Immature Grans, Absolute 01/03/2019 0.08* 0.00 - 0.03 10*3/mm3 Final   • nRBC 01/03/2019 0.0  0.0 - 0.0 /100 WBC Final   • Extra Tube 01/03/2019 Hold for add-ons.   Final    Auto resulted.   • Extra Tube 01/03/2019 Hold for add-ons.   Final    Auto resulted.       RADIOLOGY:  No results found.         Assessment/Plan  Heavenly Yanes is a 73 y.o. year old female with met breast cancer s/p multiple lines of CMT currently on taxotere weekly with good tolerance.    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 Labs reviewed wbc 8.43, hg 11.0, plt 368. Ok for treatment today    2. Psych: on olanzopine    3. HTN: on metoprolol    4. Gerd: on ranitidine       Moise Sotelo MD    1/3/2019    10:37 AM

## 2019-01-09 ENCOUNTER — APPOINTMENT (OUTPATIENT)
Dept: ONCOLOGY | Facility: HOSPITAL | Age: 74
End: 2019-01-09
Attending: INTERNAL MEDICINE

## 2019-01-10 ENCOUNTER — TELEPHONE (OUTPATIENT)
Dept: ONCOLOGY | Facility: CLINIC | Age: 74
End: 2019-01-10

## 2019-01-10 ENCOUNTER — INFUSION (OUTPATIENT)
Dept: ONCOLOGY | Facility: HOSPITAL | Age: 74
End: 2019-01-10
Attending: INTERNAL MEDICINE

## 2019-01-10 ENCOUNTER — OFFICE VISIT (OUTPATIENT)
Dept: ONCOLOGY | Facility: CLINIC | Age: 74
End: 2019-01-10

## 2019-01-10 ENCOUNTER — HOSPITAL ENCOUNTER (OUTPATIENT)
Dept: GENERAL RADIOLOGY | Facility: HOSPITAL | Age: 74
Discharge: HOME | End: 2019-01-10
Attending: INTERNAL MEDICINE | Admitting: INTERNAL MEDICINE

## 2019-01-10 ENCOUNTER — LAB (OUTPATIENT)
Dept: LAB | Facility: HOSPITAL | Age: 74
End: 2019-01-10
Attending: INTERNAL MEDICINE

## 2019-01-10 VITALS
BODY MASS INDEX: 22.56 KG/M2 | HEART RATE: 76 BPM | OXYGEN SATURATION: 98 % | RESPIRATION RATE: 16 BRPM | WEIGHT: 135.4 LBS | DIASTOLIC BLOOD PRESSURE: 82 MMHG | HEIGHT: 65 IN | SYSTOLIC BLOOD PRESSURE: 146 MMHG | TEMPERATURE: 97.9 F

## 2019-01-10 VITALS
HEIGHT: 65 IN | TEMPERATURE: 98.6 F | WEIGHT: 136 LBS | OXYGEN SATURATION: 100 % | SYSTOLIC BLOOD PRESSURE: 140 MMHG | HEART RATE: 65 BPM | DIASTOLIC BLOOD PRESSURE: 58 MMHG | BODY MASS INDEX: 22.66 KG/M2 | RESPIRATION RATE: 18 BRPM

## 2019-01-10 DIAGNOSIS — R06.09 DYSPNEA ON EXERTION: ICD-10-CM

## 2019-01-10 DIAGNOSIS — C79.51 CARCINOMA OF LEFT BREAST METASTATIC TO BONE (HCC): Primary | ICD-10-CM

## 2019-01-10 DIAGNOSIS — R06.09 DYSPNEA ON EXERTION: Primary | ICD-10-CM

## 2019-01-10 DIAGNOSIS — C50.912 CARCINOMA OF LEFT BREAST METASTATIC TO BONE (HCC): ICD-10-CM

## 2019-01-10 DIAGNOSIS — C50.412 MALIGNANT NEOPLASM OF UPPER-OUTER QUADRANT OF LEFT FEMALE BREAST, UNSPECIFIED ESTROGEN RECEPTOR STATUS (HCC): ICD-10-CM

## 2019-01-10 DIAGNOSIS — C79.51 BREAST CANCER METASTASIZED TO BONE, UNSPECIFIED LATERALITY (HCC): ICD-10-CM

## 2019-01-10 DIAGNOSIS — C50.919 BREAST CANCER METASTASIZED TO BONE, UNSPECIFIED LATERALITY (HCC): ICD-10-CM

## 2019-01-10 DIAGNOSIS — C79.51 CARCINOMA OF LEFT BREAST METASTATIC TO BONE (HCC): ICD-10-CM

## 2019-01-10 DIAGNOSIS — C50.912 CARCINOMA OF LEFT BREAST METASTATIC TO BONE (HCC): Primary | ICD-10-CM

## 2019-01-10 LAB
ALBUMIN SERPL-MCNC: 4.2 G/DL (ref 3.5–5)
ALBUMIN/GLOB SERPL: 1.3 G/DL (ref 1.1–2.5)
ALP SERPL-CCNC: 61 U/L (ref 24–120)
ALT SERPL W P-5'-P-CCNC: 22 U/L (ref 0–54)
ANION GAP SERPL CALCULATED.3IONS-SCNC: 13 MMOL/L (ref 4–13)
AST SERPL-CCNC: 32 U/L (ref 7–45)
BASOPHILS # BLD AUTO: 0.07 10*3/MM3 (ref 0–0.2)
BASOPHILS NFR BLD AUTO: 1.1 % (ref 0–2)
BILIRUB SERPL-MCNC: 0.4 MG/DL (ref 0.1–1)
BUN BLD-MCNC: 15 MG/DL (ref 5–21)
BUN/CREAT SERPL: 15.6 (ref 7–25)
CALCIUM SPEC-SCNC: 10.1 MG/DL (ref 8.4–10.4)
CHLORIDE SERPL-SCNC: 99 MMOL/L (ref 98–110)
CO2 SERPL-SCNC: 27 MMOL/L (ref 24–31)
CREAT BLD-MCNC: 0.96 MG/DL (ref 0.5–1.4)
DEPRECATED RDW RBC AUTO: 55 FL (ref 40–54)
EOSINOPHIL # BLD AUTO: 0.01 10*3/MM3 (ref 0–0.7)
EOSINOPHIL NFR BLD AUTO: 0.2 % (ref 0–4)
ERYTHROCYTE [DISTWIDTH] IN BLOOD BY AUTOMATED COUNT: 15.5 % (ref 12–15)
GFR SERPL CREATININE-BSD FRML MDRD: 57 ML/MIN/1.73
GLOBULIN UR ELPH-MCNC: 3.3 GM/DL
GLUCOSE BLD-MCNC: 111 MG/DL (ref 70–100)
HCT VFR BLD AUTO: 32.7 % (ref 37–47)
HGB BLD-MCNC: 11.2 G/DL (ref 12–16)
HOLD SPECIMEN: NORMAL
HOLD SPECIMEN: NORMAL
IMM GRANULOCYTES # BLD AUTO: 0.06 10*3/MM3 (ref 0–0.03)
IMM GRANULOCYTES NFR BLD AUTO: 1 % (ref 0–5)
LYMPHOCYTES # BLD AUTO: 2.43 10*3/MM3 (ref 0.72–4.86)
LYMPHOCYTES NFR BLD AUTO: 39.9 % (ref 15–45)
MCH RBC QN AUTO: 33.5 PG (ref 28–32)
MCHC RBC AUTO-ENTMCNC: 34.3 G/DL (ref 33–36)
MCV RBC AUTO: 97.9 FL (ref 82–98)
MONOCYTES # BLD AUTO: 0.25 10*3/MM3 (ref 0.19–1.3)
MONOCYTES NFR BLD AUTO: 4.1 % (ref 4–12)
NEUTROPHILS # BLD AUTO: 3.27 10*3/MM3 (ref 1.87–8.4)
NEUTROPHILS NFR BLD AUTO: 53.7 % (ref 39–78)
NRBC BLD AUTO-RTO: 0 /100 WBC (ref 0–0)
PLATELET # BLD AUTO: 371 10*3/MM3 (ref 130–400)
PMV BLD AUTO: 10.2 FL (ref 6–12)
POTASSIUM BLD-SCNC: 3.7 MMOL/L (ref 3.5–5.3)
PROT SERPL-MCNC: 7.5 G/DL (ref 6.3–8.7)
RBC # BLD AUTO: 3.34 10*6/MM3 (ref 4.2–5.4)
SODIUM BLD-SCNC: 139 MMOL/L (ref 135–145)
WBC NRBC COR # BLD: 6.09 10*3/MM3 (ref 4.8–10.8)

## 2019-01-10 PROCEDURE — 96413 CHEMO IV INFUSION 1 HR: CPT

## 2019-01-10 PROCEDURE — 36415 COLL VENOUS BLD VENIPUNCTURE: CPT

## 2019-01-10 PROCEDURE — 80053 COMPREHEN METABOLIC PANEL: CPT

## 2019-01-10 PROCEDURE — 25010000002 DOCETAXEL 20 MG/ML SOLUTION 4 ML VIAL: Performed by: INTERNAL MEDICINE

## 2019-01-10 PROCEDURE — 96367 TX/PROPH/DG ADDL SEQ IV INF: CPT

## 2019-01-10 PROCEDURE — 85025 COMPLETE CBC W/AUTO DIFF WBC: CPT

## 2019-01-10 PROCEDURE — 25010000003 DEXAMETHASONE SODIUM PHOSPHATE 100 MG/10ML SOLUTION 10 ML VIAL

## 2019-01-10 PROCEDURE — 96366 THER/PROPH/DIAG IV INF ADDON: CPT

## 2019-01-10 PROCEDURE — 71046 X-RAY EXAM CHEST 2 VIEWS: CPT

## 2019-01-10 PROCEDURE — 99214 OFFICE O/P EST MOD 30 MIN: CPT | Performed by: INTERNAL MEDICINE

## 2019-01-10 PROCEDURE — 25010000002 DEXAMETHASONE PER 1 MG

## 2019-01-10 PROCEDURE — 25010000002 ONDANSETRON PER 1 MG

## 2019-01-10 RX ORDER — FAMOTIDINE 10 MG/ML
20 INJECTION, SOLUTION INTRAVENOUS AS NEEDED
Status: DISCONTINUED | OUTPATIENT
Start: 2019-01-10 | End: 2019-01-10 | Stop reason: HOSPADM

## 2019-01-10 RX ORDER — MEPERIDINE HYDROCHLORIDE 50 MG/ML
25 INJECTION INTRAMUSCULAR; INTRAVENOUS; SUBCUTANEOUS
Status: DISCONTINUED | OUTPATIENT
Start: 2019-01-10 | End: 2019-01-10 | Stop reason: HOSPADM

## 2019-01-10 RX ORDER — SODIUM CHLORIDE 9 MG/ML
250 INJECTION, SOLUTION INTRAVENOUS ONCE
Status: COMPLETED | OUTPATIENT
Start: 2019-01-10 | End: 2019-01-10

## 2019-01-10 RX ORDER — SODIUM CHLORIDE 0.9 % (FLUSH) 0.9 %
10 SYRINGE (ML) INJECTION AS NEEDED
Status: DISCONTINUED | OUTPATIENT
Start: 2019-01-10 | End: 2019-01-10 | Stop reason: HOSPADM

## 2019-01-10 RX ORDER — SODIUM CHLORIDE 0.9 % (FLUSH) 0.9 %
10 SYRINGE (ML) INJECTION AS NEEDED
Status: CANCELLED | OUTPATIENT
Start: 2019-01-10

## 2019-01-10 RX ADMIN — Medication 500 UNITS: at 11:54

## 2019-01-10 RX ADMIN — SODIUM CHLORIDE 250 ML: 9 INJECTION, SOLUTION INTRAVENOUS at 10:15

## 2019-01-10 RX ADMIN — DOCETAXEL 60 MG: 20 INJECTION, SOLUTION, CONCENTRATE INTRAVENOUS at 10:36

## 2019-01-10 RX ADMIN — SODIUM CHLORIDE, PRESERVATIVE FREE 10 ML: 5 INJECTION INTRAVENOUS at 11:54

## 2019-01-10 NOTE — PROGRESS NOTES
[facility]  HEMATOLOGY & ONCOLOGY    Cancer Staging Information:  Cancer Staging  Malignant neoplasm of upper-outer quadrant of left female breast (CMS/HCC)  Staging form: Breast, AJCC V7  - Clinical stage from 10/11/2016: Stage IV (T2, N1, M1) - Signed by Calli Monsalve APRN on 10/11/2016        Subjective     VISIT DIAGNOSIS:   Encounter Diagnoses   Name Primary?   • Carcinoma of left breast metastatic to bone (CMS/HCC)    • Dyspnea on exertion Yes       REASON FOR VISIT:     Chief Complaint   Patient presents with   • Breast Cancer     mets to bone. treatment today   • Breathing Difficulty     with very little excertion, started on tuesday of this week        HEMATOLOGY / ONCOLOGY HISTORY:   Oncology/Hematology History    Patient is a 67-year-old postmenopausal female who had onset of her menses at age 9 and menopause at age 48. She received oral contraceptives for approximately 14 years and did not receive hormonal manipulation. Patient has a maternal cousin had breast cancer. Patient has had no prior breast biopsies. Patient found a palpable left breast mass as well as a left axillary mass. Patient had a bloody nipple discharge which been present for approximately 6 months. On 6/20/2012, a mammogram was performed showing a subareolar mass consistent with malignancy. Bilateral breast ultrasound revealed an ill-defined subareolar mass measuring 2.8-2.5 cm. There is a left axillary mass measuring 1.7 cm with suspicion of extracapsular extension. There is no evidence of disease in the right breast. On 7/9/2012 patient underwent a left breast biopsy and placement of marker clip. Left breast biopsy revealed an infiltrating lobular carcinoma grade 2 with focal ductal carcinoma in situ, solid pattern. A fine-needle biopsy of the left lymph node was consistent with metastatic carcinoma. Breast tumor profile revealed ER: 100%; MA: 98%; HER-2/maynor 2+; FISH: Unamplified; Ki-67: 71%; P 53 1%; DNA  aneuploidy. A MRI of the breast were performed body multiple abnormal enhancing masses within the left breast. There appears to be anterior retraction of the pectoralis muscle with no direct extension. There is a moderate to large, layering left pleural effusion appreciated. The right breast reveals 3 moderately enlarged lymph nodes in the posterior lateral aspect the right breast. These have fatty hilum but followup is recommended. Patient is undergone systemic studies including, on 8/2/2012, a CT scan of the brain showing atrophy. a CT scan that shows showing a small to moderate left pleural effusion with 3 subcentimeter nodule densities in the left lung.   She completed 4 cycles of neoadjuvant treatment with dose dense Adriamycin and Cytoxan. She had a mammogram which showed a significant reduction in the size of her left breast lesion. There is no axillary adenopathy noted. She has had an ultrasound revealing the  lesion reducing from 2.4 cm to 1 cm. Patient underwent a left mastectomy on 03/19/2013 finding negative invasive cancer, negative nodes. A 5% DCIS was noted. The patient has declined hormonal therapy. She did not need radiation therapy.  November 2014, she began to have evidence of lytic bone lesions at T5T6, frontal disease, an 8 mm lucency in the central frontal area of  the skull but nothing intracranial. Bone scan revealed only vertex tracer activity but bilateral ribs and thoracic spine, and other lesions in  her pelvis. At that time, she was started on letrozole since November 2014. She is taking Xgeva. She had progression found in bone in Feb 2015 on scan. She was started on Faslodex, Ibrance and continued xgeva.         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 B) Foci of ductal carcinoma in situ, solid pattern. C) Greatest dimension of the invasive carcinoma is 15.8mm.         3/19/2013 Surgery     Left Mastectomy w/ Axillary Tail: Focal residual ducatal carcinoma in situ (DCIS) 12 lymph nodes negative for metastatic carcinoma.         10/20/2014 Biopsy     Peritoneum Biopsy: Final Diagnosis: Malignant Cell Neoplasm.           Breast cancer metastasized to bone, unspecified laterality (CMS/HCC)    10/15/2012 -  Other Event     Left mammogram:  Impression:  1. Decreasing spiculation and density in the retroareolar left breast, near a previous biopsy.   2. Definite left axillary lesion is not appreciated.          2/5/2013 -  Other Event     Diagnostic Mammo:  Comparison is made to 10/15/2012 and 6/20/2012  The spiculated mass at 12:00 behind the nipple is nearly resolved.   Impression:  1. The mass on the left side is less apparent after receiving chemotherapy. There has been a good response to chmotherapy on the left side.   2. No suspicious abnormality is seen in the right breast to account for the new palable abnormality at the 4-5:00 position.          3/19/2013 Surgery     Breast Biopsy:  Final Diagnosis:  A. Breast, left mastectomy with axillary tail  1. Focal residual ductal carcinoma in situ (DICS) (less than 5% of tumor bed).  2. Negative for residual invasive carcinoma.   3. 12 Lymph Nodes, negative for metastatic carcinoma (0/12) focally, some lymph nodes demonstrate fibrosis/treatment effect.  B. Skin and soft tissue, left advancement flap:  1. Benign skin and subcutis.   2. Negative for carcinoma.          6/20/2013 -  Other Event     Diagnostic Mammo [facility]:  1. A subareolar mass is consistent with malignancy. Additional involement extends inferiorly sonographically and superolaterally mammograhically as demonstrated by calcifications. The mass measures approx 2.8 cm sonographically, but the involved region is likely much larger including an  intraductal componet. The left axillary lymph nodes are also involved.   2. Recommened ultrasound guided biopsy of the left subareolar mass and left axillary lymph node.   3. Breast MRI could also further define multicentric disease on the left.   4. Clear suspicious finding on the right.  Recommend ongoing clinical follow up of palpable foci.          6/13/2014 -  Other Event     Diagnostic Mammo:  IMPRESSION:  1. History of left breast cancer and mastectomy. Stable benign right breast mammograms.          10/20/2014 Surgery     Final Diagnosis:  Biopsies, pertoneum:  Metastatic carcinoma, morphologically compatible with metastatic mammary carcinoma.          6/15/2015 -  Other Event     Diagnostic Mammo:  IMPRESSION:   1. Negative x-ray report, should not delay biopsy if a dominat or clinically suspicious mass is present.          7/5/2016 -  Other Event     Diagnositic mammogram:  Impression:   Stable right mamogram with no radiographic findings suspicious for malignancy. Recommend continued screening mammography per screening guidelines unless otherwise earlier clinically indicated.          9/18/2016 Initial Diagnosis     Secondary malignant neoplasm of bone and bone marrow              INTERVAL HISTORY  Patient ID: Heavenly Yanes is a 73 y.o. year old female Cancer Staging  Stage IV (T2, N1, M1)  --complaining of dyspnea on exersion since last Tuesday. She feels like she ran a mile     Denies /cp.n/v/fever/chills, neuropathy. Rest of ros unremarkable.  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) 9/18/2016     Past Surgical History:   Past Surgical History:   Procedure Laterality Date   • BREAST SURGERY       left breast biopsy and axillary node   • CHOLECYSTECTOMY     • EXTERNAL EAR SURGERY     • MASTECTOMY Left    • PORTACATH PLACEMENT       Social History:   Social History     Socioeconomic History   • Marital status:      Spouse name: Not on file   • Number of children: Not on file   • Years of education: Not on file   • Highest education level: Not on file   Social Needs   • Financial resource strain: Not on file   • Food insecurity - worry: Not on file   • Food insecurity - inability: Not on file   • Transportation needs - medical: Not on file   • Transportation needs - non-medical: Not on file   Occupational History   • Not on file   Tobacco Use   • Smoking status: Never Smoker   • Smokeless tobacco: Never Used   Substance and Sexual Activity   • Alcohol use: No   • Drug use: No   • Sexual activity: Not on file   Other Topics Concern   • Not on file   Social History Narrative   • Not on file     Family History:   Family History   Problem Relation Age of Onset   • No Known Problems Daughter    • No Known Problems Son    • Other Mother         complications from a concussion from a fall   • Other Father         old age   • No Known Problems Brother    • No Known Problems Daughter        Review of Systems   Constitutional: Negative.    HENT: Negative.    Eyes: Negative.    Respiratory: Positive for shortness of breath.    Cardiovascular: Negative.    Gastrointestinal: Negative.    Endocrine: Negative.    Genitourinary: Negative.    Musculoskeletal: Negative.    Skin: Negative.    Neurological: Negative.    Hematological: Negative.    Psychiatric/Behavioral: Negative.         Performance Status:  Asymptomatic    Medications:    Current Outpatient Medications   Medication Sig Dispense Refill   • Biotin 1000 MCG tablet Take 1,000 mcg by mouth Daily.     • Calcium-Magnesium-Vitamin D (CALCIUM 1200+D3 PO) Take 1,200 mg by mouth Daily.     • capecitabine (XELODA) 500 MG chemo tablet Take 1 tablet by mouth 2 (Two)  "Times a Day. For 2 weeks on and 1 week off. 42 tablet 11   • DEVILS CLAW PO Take  by mouth Daily.     • FOLIC ACID PO Take 800 mg by mouth Daily.     • megestrol (MEGACE) 40 MG tablet Take 1 tablet by mouth 2 (Two) Times a Day. 60 tablet 2   • metoprolol tartrate (LOPRESSOR) 25 MG tablet Take 25 mg by mouth 2 (two) times a day.     • NON FORMULARY 500 mg. Turmeric curcumin bid     • OLANZapine (ZYPREXA) 2.5 MG tablet Take 2.5 mg by mouth every night.     • ondansetron (ZOFRAN) 8 MG tablet Take 1 tablet by mouth Every 8 (Eight) Hours As Needed for Nausea or Vomiting. 30 tablet 5   • raNITIdine (ZANTAC) 150 MG tablet Take 150 mg by mouth 2 (Two) Times a Day.     • thyroid 60 MG PO tablet Take 60 mg by mouth daily.     • vitamin B-12 (CYANOCOBALAMIN) 1000 MCG tablet Take 1,000 mcg by mouth Daily.       No current facility-administered medications for this visit.      Facility-Administered Medications Ordered in Other Visits   Medication Dose Route Frequency Provider Last Rate Last Dose   • heparin flush (porcine) 100 UNIT/ML injection 500 Units  500 Units Intravenous PRN Joseph Nunez MD   500 Units at 10/26/17 1155   • sodium chloride 0.9 % flush 10 mL  10 mL Intravenous PRN Joseph Nunez MD   10 mL at 10/26/17 1155       ALLERGIES:    Allergies   Allergen Reactions   • Benadryl  [Diphenhydramine Hcl (Sleep)] Other (See Comments)   • Codeine    • Diphenhydramine Other (See Comments)     \"Shaky leg syndrome\"  \"Shaky leg syndrome\"  \"Shaky leg syndrome\"   • Heparin    • Other      POPPY SEED   • Penicillins Hives   • Petroleum Jelly [Skin Protectants, Misc.]    • Sulfa Antibiotics    • Sulfur    • Valium [Diazepam]        Objective      Vitals:    01/10/19 0924   BP: 146/82   Pulse: 76   Resp: 16   Temp: 97.9 °F (36.6 °C)   TempSrc: Oral   SpO2: 98%   Weight: 61.4 kg (135 lb 6.4 oz)   Height: 165.1 cm (65\")         Current Status 1/10/2019   ECOG score 0         Physical Exam    General Appearance: Patient is awake, " alert, oriented and in no acute distress. Patient is welldeveloped, wellnourished, and appears stated age.  HEENT: Normocephalic. Sclerae clear, conjunctiva pink, extraocular movements intact, pupils, round, reactive to light and  accommodation. Mouth and throat are clear with moist oral mucosa.  NECK: Supple, no jugular venous distention, thyroid not enlarged.  LYMPH: No cervical, supraclavicular, axillary, or inguinal lymphadenopathy.  CHEST: Equal bilateral expansion, AP  diameter normal, resonant percussion note  LUNGS: Good air movement, no rales, rhonchi, rubs or wheezes with auscultation on the right. Diminished on the left.  CARDIO: Regular sinus rhythm, no murmurs, gallops or rubs.  ABDOMEN: Nondistended, soft, No tenderness, no guarding, no rebound, No hepatosplenomegaly. No abdominal masses. Bowel sounds positive. No hernia  GENITALIA: Not examined.  BREASTS: Not examined.  MUSKEL: No joint swelling, decreased motion, or inflammation  EXTREMS: patrick le  edema,No clubbing, cyanosis, No varicose veins.  NEURO: Grossly nonfocal, Gait is coordinated and smooth, Cognition is preserved.  SKIN: No rashes, no ecchymoses, no petechia.  PSYCH: Oriented to time, place and person. Memory is preserved. Mood and affect appear normal  RECENT LABS:  Lab on 01/10/2019   Component Date Value Ref Range Status   • WBC 01/10/2019 6.09  4.80 - 10.80 10*3/mm3 Final   • RBC 01/10/2019 3.34* 4.20 - 5.40 10*6/mm3 Final   • Hemoglobin 01/10/2019 11.2* 12.0 - 16.0 g/dL Final   • Hematocrit 01/10/2019 32.7* 37.0 - 47.0 % Final   • MCV 01/10/2019 97.9  82.0 - 98.0 fL Final   • MCH 01/10/2019 33.5* 28.0 - 32.0 pg Final   • MCHC 01/10/2019 34.3  33.0 - 36.0 g/dL Final   • RDW 01/10/2019 15.5* 12.0 - 15.0 % Final   • RDW-SD 01/10/2019 55.0* 40.0 - 54.0 fl Final   • MPV 01/10/2019 10.2  6.0 - 12.0 fL Final   • Platelets 01/10/2019 371  130 - 400 10*3/mm3 Final   • Neutrophil % 01/10/2019 53.7  39.0 - 78.0 % Final   • Lymphocyte % 01/10/2019  39.9  15.0 - 45.0 % Final   • Monocyte % 01/10/2019 4.1  4.0 - 12.0 % Final   • Eosinophil % 01/10/2019 0.2  0.0 - 4.0 % Final   • Basophil % 01/10/2019 1.1  0.0 - 2.0 % Final   • Immature Grans % 01/10/2019 1.0  0.0 - 5.0 % Final   • Neutrophils, Absolute 01/10/2019 3.27  1.87 - 8.40 10*3/mm3 Final   • Lymphocytes, Absolute 01/10/2019 2.43  0.72 - 4.86 10*3/mm3 Final   • Monocytes, Absolute 01/10/2019 0.25  0.19 - 1.30 10*3/mm3 Final   • Eosinophils, Absolute 01/10/2019 0.01  0.00 - 0.70 10*3/mm3 Final   • Basophils, Absolute 01/10/2019 0.07  0.00 - 0.20 10*3/mm3 Final   • Immature Grans, Absolute 01/10/2019 0.06* 0.00 - 0.03 10*3/mm3 Final   • nRBC 01/10/2019 0.0  0.0 - 0.0 /100 WBC Final       RADIOLOGY:  No results found.         Assessment/Plan  Heavenly Yanes is a 73 y.o. year old female with met breast cancer s/p multiple lines of CMT currently on taxotere weekly with good tolerance.    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 Labs reviewed wbc 6.08, hg 11.2, plt 371. Ok for treatment today    2. Psych: on olanzopine    3. HTN: on metoprolol    4. Gerd: on ranitidine  5. Dyspnea: get a cxr to evaluate       Moise Sotelo MD    1/10/2019    9:41 AM

## 2019-01-10 NOTE — TELEPHONE ENCOUNTER
----- Message from Moise Sotelo MD sent at 1/10/2019  1:24 PM CST -----  Please contact patient with result. Normal. We will get echo to evaluate her dyspnea.

## 2019-01-10 NOTE — TELEPHONE ENCOUNTER
Left message for Heavenly that her XRAY was normal and that Dr. Sotelo wants to do a 2DEcho to check to see why she is so short of breath and that Dr. HAMEED also wants to know if she sees a cardiologist.

## 2019-01-16 ENCOUNTER — APPOINTMENT (OUTPATIENT)
Dept: ONCOLOGY | Facility: HOSPITAL | Age: 74
End: 2019-01-16
Attending: INTERNAL MEDICINE

## 2019-01-17 ENCOUNTER — INFUSION (OUTPATIENT)
Dept: ONCOLOGY | Facility: HOSPITAL | Age: 74
End: 2019-01-17
Attending: INTERNAL MEDICINE

## 2019-01-17 ENCOUNTER — TRANSCRIBE ORDERS (OUTPATIENT)
Dept: ADMINISTRATIVE | Facility: HOSPITAL | Age: 74
End: 2019-01-17

## 2019-01-17 ENCOUNTER — OFFICE VISIT (OUTPATIENT)
Dept: ONCOLOGY | Facility: CLINIC | Age: 74
End: 2019-01-17

## 2019-01-17 ENCOUNTER — HOSPITAL ENCOUNTER (OUTPATIENT)
Dept: ULTRASOUND IMAGING | Facility: HOSPITAL | Age: 74
Discharge: HOME | End: 2019-01-17
Attending: INTERNAL MEDICINE | Admitting: INTERNAL MEDICINE

## 2019-01-17 ENCOUNTER — LAB (OUTPATIENT)
Dept: LAB | Facility: HOSPITAL | Age: 74
End: 2019-01-17
Attending: INTERNAL MEDICINE

## 2019-01-17 VITALS
TEMPERATURE: 99.8 F | DIASTOLIC BLOOD PRESSURE: 45 MMHG | OXYGEN SATURATION: 100 % | BODY MASS INDEX: 22.82 KG/M2 | HEIGHT: 65 IN | SYSTOLIC BLOOD PRESSURE: 138 MMHG | HEART RATE: 81 BPM | WEIGHT: 137 LBS | RESPIRATION RATE: 16 BRPM

## 2019-01-17 VITALS
HEIGHT: 65 IN | TEMPERATURE: 98.1 F | HEART RATE: 76 BPM | SYSTOLIC BLOOD PRESSURE: 130 MMHG | OXYGEN SATURATION: 99 % | DIASTOLIC BLOOD PRESSURE: 64 MMHG | WEIGHT: 137 LBS | RESPIRATION RATE: 18 BRPM | BODY MASS INDEX: 22.82 KG/M2

## 2019-01-17 DIAGNOSIS — C50.912 CARCINOMA OF LEFT BREAST METASTATIC TO BONE (HCC): ICD-10-CM

## 2019-01-17 DIAGNOSIS — C79.51 CARCINOMA OF LEFT BREAST METASTATIC TO BONE (HCC): Primary | ICD-10-CM

## 2019-01-17 DIAGNOSIS — C50.912 MALIGNANT NEOPLASM OF LEFT BREAST IN FEMALE, ESTROGEN RECEPTOR POSITIVE, UNSPECIFIED SITE OF BREAST (HCC): Primary | ICD-10-CM

## 2019-01-17 DIAGNOSIS — C79.51 CARCINOMA OF LEFT BREAST METASTATIC TO BONE (HCC): ICD-10-CM

## 2019-01-17 DIAGNOSIS — Z17.0 MALIGNANT NEOPLASM OF LEFT BREAST IN FEMALE, ESTROGEN RECEPTOR POSITIVE, UNSPECIFIED SITE OF BREAST (HCC): Primary | ICD-10-CM

## 2019-01-17 DIAGNOSIS — M79.89 SWELLING OF LIMB: Primary | ICD-10-CM

## 2019-01-17 DIAGNOSIS — M79.89 SWELLING OF LIMB: ICD-10-CM

## 2019-01-17 DIAGNOSIS — C50.912 CARCINOMA OF LEFT BREAST METASTATIC TO BONE (HCC): Primary | ICD-10-CM

## 2019-01-17 DIAGNOSIS — M79.89 SWELLING OF EXTREMITY OF UNKNOWN ETIOLOGY: Primary | ICD-10-CM

## 2019-01-17 PROBLEM — T45.1X5A CHEMOTHERAPY INDUCED NEUTROPENIA (HCC): Status: ACTIVE | Noted: 2019-01-17

## 2019-01-17 PROBLEM — D70.1 CHEMOTHERAPY INDUCED NEUTROPENIA (HCC): Status: ACTIVE | Noted: 2019-01-17

## 2019-01-17 LAB
ALBUMIN SERPL-MCNC: 4.2 G/DL (ref 3.5–5)
ALBUMIN/GLOB SERPL: 1.3 G/DL (ref 1.1–2.5)
ALP SERPL-CCNC: 63 U/L (ref 24–120)
ALT SERPL W P-5'-P-CCNC: 26 U/L (ref 0–54)
ANION GAP SERPL CALCULATED.3IONS-SCNC: 11 MMOL/L (ref 4–13)
AST SERPL-CCNC: 36 U/L (ref 7–45)
BASOPHILS # BLD AUTO: 0.03 10*3/MM3 (ref 0–0.2)
BASOPHILS NFR BLD AUTO: 0.9 % (ref 0–2)
BILIRUB SERPL-MCNC: 0.8 MG/DL (ref 0.1–1)
BUN BLD-MCNC: 14 MG/DL (ref 5–21)
BUN/CREAT SERPL: 15.6 (ref 7–25)
CALCIUM SPEC-SCNC: 9.6 MG/DL (ref 8.4–10.4)
CHLORIDE SERPL-SCNC: 95 MMOL/L (ref 98–110)
CO2 SERPL-SCNC: 28 MMOL/L (ref 24–31)
CREAT BLD-MCNC: 0.9 MG/DL (ref 0.5–1.4)
DEPRECATED RDW RBC AUTO: 54.2 FL (ref 40–54)
EOSINOPHIL # BLD AUTO: 0.01 10*3/MM3 (ref 0–0.7)
EOSINOPHIL NFR BLD AUTO: 0.3 % (ref 0–4)
ERYTHROCYTE [DISTWIDTH] IN BLOOD BY AUTOMATED COUNT: 15.5 % (ref 12–15)
GFR SERPL CREATININE-BSD FRML MDRD: 61 ML/MIN/1.73
GLOBULIN UR ELPH-MCNC: 3.2 GM/DL
GLUCOSE BLD-MCNC: 112 MG/DL (ref 70–100)
HCT VFR BLD AUTO: 27.6 % (ref 37–47)
HGB BLD-MCNC: 9.4 G/DL (ref 12–16)
HOLD SPECIMEN: NORMAL
HOLD SPECIMEN: NORMAL
IMM GRANULOCYTES # BLD AUTO: 0.02 10*3/MM3 (ref 0–0.03)
IMM GRANULOCYTES NFR BLD AUTO: 0.6 % (ref 0–5)
LYMPHOCYTES # BLD AUTO: 1.25 10*3/MM3 (ref 0.72–4.86)
LYMPHOCYTES NFR BLD AUTO: 38.8 % (ref 15–45)
MCH RBC QN AUTO: 33 PG (ref 28–32)
MCHC RBC AUTO-ENTMCNC: 34.1 G/DL (ref 33–36)
MCV RBC AUTO: 96.8 FL (ref 82–98)
MONOCYTES # BLD AUTO: 0.4 10*3/MM3 (ref 0.19–1.3)
MONOCYTES NFR BLD AUTO: 12.4 % (ref 4–12)
NEUTROPHILS # BLD AUTO: 1.51 10*3/MM3 (ref 1.87–8.4)
NEUTROPHILS NFR BLD AUTO: 47 % (ref 39–78)
NRBC BLD AUTO-RTO: 0 /100 WBC (ref 0–0)
PLATELET # BLD AUTO: 327 10*3/MM3 (ref 130–400)
PMV BLD AUTO: 9.9 FL (ref 6–12)
POTASSIUM BLD-SCNC: 3.2 MMOL/L (ref 3.5–5.3)
PROT SERPL-MCNC: 7.4 G/DL (ref 6.3–8.7)
RBC # BLD AUTO: 2.85 10*6/MM3 (ref 4.2–5.4)
SODIUM BLD-SCNC: 134 MMOL/L (ref 135–145)
WBC NRBC COR # BLD: 3.22 10*3/MM3 (ref 4.8–10.8)

## 2019-01-17 PROCEDURE — 25010000002 DOCETAXEL 20 MG/ML SOLUTION 4 ML VIAL: Performed by: INTERNAL MEDICINE

## 2019-01-17 PROCEDURE — 93970 EXTREMITY STUDY: CPT

## 2019-01-17 PROCEDURE — 36415 COLL VENOUS BLD VENIPUNCTURE: CPT

## 2019-01-17 PROCEDURE — 99214 OFFICE O/P EST MOD 30 MIN: CPT | Performed by: INTERNAL MEDICINE

## 2019-01-17 PROCEDURE — 80053 COMPREHEN METABOLIC PANEL: CPT

## 2019-01-17 PROCEDURE — 25010000002 ONDANSETRON PER 1 MG: Performed by: INTERNAL MEDICINE

## 2019-01-17 PROCEDURE — 96367 TX/PROPH/DG ADDL SEQ IV INF: CPT

## 2019-01-17 PROCEDURE — 93970 EXTREMITY STUDY: CPT | Performed by: SURGERY

## 2019-01-17 PROCEDURE — 85025 COMPLETE CBC W/AUTO DIFF WBC: CPT

## 2019-01-17 PROCEDURE — 96413 CHEMO IV INFUSION 1 HR: CPT

## 2019-01-17 PROCEDURE — 25010000003 DEXAMETHASONE SODIUM PHOSPHATE 100 MG/10ML SOLUTION: Performed by: INTERNAL MEDICINE

## 2019-01-17 RX ORDER — SODIUM CHLORIDE 9 MG/ML
250 INJECTION, SOLUTION INTRAVENOUS ONCE
Status: COMPLETED | OUTPATIENT
Start: 2019-01-17 | End: 2019-01-17

## 2019-01-17 RX ORDER — CIPROFLOXACIN 500 MG/1
500 TABLET, FILM COATED ORAL 2 TIMES DAILY
Qty: 20 TABLET | Refills: 0 | Status: SHIPPED | OUTPATIENT
Start: 2019-01-17 | End: 2019-01-31

## 2019-01-17 RX ADMIN — SODIUM CHLORIDE 250 ML: 9 INJECTION, SOLUTION INTRAVENOUS at 12:17

## 2019-01-17 RX ADMIN — DOCETAXEL 60 MG: 20 INJECTION, SOLUTION, CONCENTRATE INTRAVENOUS at 12:39

## 2019-01-17 RX ADMIN — Medication 500 UNITS: at 14:10

## 2019-01-17 RX ADMIN — DEXAMETHASONE SODIUM PHOSPHATE 50 ML: 10 INJECTION, SOLUTION INTRAMUSCULAR; INTRAVENOUS at 12:20

## 2019-01-17 NOTE — PROGRESS NOTES
[facility]  HEMATOLOGY & ONCOLOGY    Cancer Staging Information:  Cancer Staging  Malignant neoplasm of upper-outer quadrant of left female breast (CMS/HCC)  Staging form: Breast, AJCC V7  - Clinical stage from 10/11/2016: Stage IV (T2, N1, M1) - Signed by Calli Monsalve APRN on 10/11/2016        Subjective     VISIT DIAGNOSIS:   Encounter Diagnoses   Name Primary?   • Carcinoma of left breast metastatic to bone (CMS/HCC)    • Swelling of extremity of unknown etiology Yes       REASON FOR VISIT:     Chief Complaint   Patient presents with   • Breast Cancer     She is here for consideration txt for her metastatic breast cancer.   • Leg Swelling     She has c/o left leg swelling (double in size from right leg) denies pain, states this must of happened over nite, unable to put her usual shoes on this am.    • Gait Problem     Left leg swelling and difficulty walking        HEMATOLOGY / ONCOLOGY HISTORY:   Oncology/Hematology History    Patient is a 67-year-old postmenopausal female who had onset of her menses at age 9 and menopause at age 48. She received oral contraceptives for approximately 14 years and did not receive hormonal manipulation. Patient has a maternal cousin had breast cancer. Patient has had no prior breast biopsies. Patient found a palpable left breast mass as well as a left axillary mass. Patient had a bloody nipple discharge which been present for approximately 6 months. On 6/20/2012, a mammogram was performed showing a subareolar mass consistent with malignancy. Bilateral breast ultrasound revealed an ill-defined subareolar mass measuring 2.8-2.5 cm. There is a left axillary mass measuring 1.7 cm with suspicion of extracapsular extension. There is no evidence of disease in the right breast. On 7/9/2012 patient underwent a left breast biopsy and placement of marker clip. Left breast biopsy revealed an infiltrating lobular carcinoma grade 2 with focal ductal carcinoma in  situ, solid pattern. A fine-needle biopsy of the left lymph node was consistent with metastatic carcinoma. Breast tumor profile revealed ER: 100%; HI: 98%; HER-2/maynor 2+; FISH: Unamplified; Ki-67: 71%; P 53 1%; DNA aneuploidy. A MRI of the breast were performed body multiple abnormal enhancing masses within the left breast. There appears to be anterior retraction of the pectoralis muscle with no direct extension. There is a moderate to large, layering left pleural effusion appreciated. The right breast reveals 3 moderately enlarged lymph nodes in the posterior lateral aspect the right breast. These have fatty hilum but followup is recommended. Patient is undergone systemic studies including, on 8/2/2012, a CT scan of the brain showing atrophy. a CT scan that shows showing a small to moderate left pleural effusion with 3 subcentimeter nodule densities in the left lung.   She completed 4 cycles of neoadjuvant treatment with dose dense Adriamycin and Cytoxan. She had a mammogram which showed a significant reduction in the size of her left breast lesion. There is no axillary adenopathy noted. She has had an ultrasound revealing the  lesion reducing from 2.4 cm to 1 cm. Patient underwent a left mastectomy on 03/19/2013 finding negative invasive cancer, negative nodes. A 5% DCIS was noted. The patient has declined hormonal therapy. She did not need radiation therapy.  November 2014, she began to have evidence of lytic bone lesions at T5T6, frontal disease, an 8 mm lucency in the central frontal area of  the skull but nothing intracranial. Bone scan revealed only vertex tracer activity but bilateral ribs and thoracic spine, and other lesions in  her pelvis. At that time, she was started on letrozole since November 2014. She is taking Xgeva. She had progression found in bone in Feb 2015 on scan. She was started on Faslodex, Ibrance and continued xgeva.         Malignant neoplasm of upper-outer quadrant of left female breast  (CMS/HCC)    7/9/2012 Initial Diagnosis     Malignant neoplasm of upper-outer quadrant of left female breast         7/10/2012 Biopsy     Left Breast Biopsy & Axillary Node FNA: A) Infiltrating lobular carcinoma, Grade 2. B) Foci of ductal carcinoma in situ, solid pattern. C) Greatest dimension of the invasive carcinoma is 15.8mm.         3/19/2013 Surgery     Left Mastectomy w/ Axillary Tail: Focal residual ducatal carcinoma in situ (DCIS) 12 lymph nodes negative for metastatic carcinoma.         10/20/2014 Biopsy     Peritoneum Biopsy: Final Diagnosis: Malignant Cell Neoplasm.           Breast cancer metastasized to bone, unspecified laterality (CMS/HCC)    10/15/2012 -  Other Event     Left mammogram:  Impression:  1. Decreasing spiculation and density in the retroareolar left breast, near a previous biopsy.   2. Definite left axillary lesion is not appreciated.          2/5/2013 -  Other Event     Diagnostic Mammo:  Comparison is made to 10/15/2012 and 6/20/2012  The spiculated mass at 12:00 behind the nipple is nearly resolved.   Impression:  1. The mass on the left side is less apparent after receiving chemotherapy. There has been a good response to chmotherapy on the left side.   2. No suspicious abnormality is seen in the right breast to account for the new palable abnormality at the 4-5:00 position.          3/19/2013 Surgery     Breast Biopsy:  Final Diagnosis:  A. Breast, left mastectomy with axillary tail  1. Focal residual ductal carcinoma in situ (DICS) (less than 5% of tumor bed).  2. Negative for residual invasive carcinoma.   3. 12 Lymph Nodes, negative for metastatic carcinoma (0/12) focally, some lymph nodes demonstrate fibrosis/treatment effect.  B. Skin and soft tissue, left advancement flap:  1. Benign skin and subcutis.   2. Negative for carcinoma.          6/20/2013 -  Other Event     Diagnostic Mammo [facility]:  1. A subareolar mass is consistent with malignancy. Additional involement  extends inferiorly sonographically and superolaterally mammograhically as demonstrated by calcifications. The mass measures approx 2.8 cm sonographically, but the involved region is likely much larger including an intraductal componet. The left axillary lymph nodes are also involved.   2. Recommened ultrasound guided biopsy of the left subareolar mass and left axillary lymph node.   3. Breast MRI could also further define multicentric disease on the left.   4. Clear suspicious finding on the right.  Recommend ongoing clinical follow up of palpable foci.          6/13/2014 -  Other Event     Diagnostic Mammo:  IMPRESSION:  1. History of left breast cancer and mastectomy. Stable benign right breast mammograms.          10/20/2014 Surgery     Final Diagnosis:  Biopsies, pertoneum:  Metastatic carcinoma, morphologically compatible with metastatic mammary carcinoma.          6/15/2015 -  Other Event     Diagnostic Mammo:  IMPRESSION:   1. Negative x-ray report, should not delay biopsy if a dominat or clinically suspicious mass is present.          7/5/2016 -  Other Event     Diagnositic mammogram:  Impression:   Stable right mamogram with no radiographic findings suspicious for malignancy. Recommend continued screening mammography per screening guidelines unless otherwise earlier clinically indicated.          9/18/2016 Initial Diagnosis     Secondary malignant neoplasm of bone and bone marrow              INTERVAL HISTORY  Patient ID: Heavenly Yanes is a 73 y.o. year old female Cancer Staging  Stage IV (T2, N1, M1)  --complaining of dyspnea on exersion since last Tuesday. She feels like she ran a mile  --noticed left leg swelling since this morning. It happened overnight. Right leg unremarkable.   Denies /cp.n/v/fever/chills, neuropathy. Rest of ros unremarkable.  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) 9/18/2016     Past Surgical History:   Past Surgical History:   Procedure Laterality Date   • BREAST SURGERY      left breast biopsy and axillary node   • CHOLECYSTECTOMY     • EXTERNAL EAR SURGERY     • MASTECTOMY Left    • PORTACATH PLACEMENT       Social History:   Social History     Socioeconomic History   • Marital status:      Spouse name: Not on file   • Number of children: Not on file   • Years of education: Not on file   • Highest education level: Not on file   Social Needs   • Financial resource strain: Not on file   • Food insecurity - worry: Not on file   • Food insecurity - inability: Not on file   • Transportation needs - medical: Not on file   • Transportation needs - non-medical: Not on file   Occupational History   • Not on file   Tobacco Use   • Smoking status: Never Smoker   • Smokeless tobacco: Never Used   Substance and Sexual Activity   • Alcohol use: No   • Drug use: No   • Sexual activity: Not on file   Other Topics Concern   • Not on file   Social History Narrative   • Not on file     Family History:   Family History   Problem Relation Age of Onset   • No Known Problems Daughter    • No Known Problems Son    • Other Mother         complications from a concussion from a fall   • Other Father         old age   • No Known Problems Brother    • No Known Problems Daughter        Review of Systems   Constitutional: Negative.    HENT: Negative.    Eyes: Negative.    Respiratory: Positive for shortness of breath.    Cardiovascular: Negative.    Gastrointestinal: Negative.    Endocrine: Negative.    Genitourinary: Negative.    Musculoskeletal: Negative.         Left leg swelling   Skin: Negative.    Neurological: Negative.    Hematological: Negative.    Psychiatric/Behavioral: Negative.      "    Performance Status:  Asymptomatic    Medications:    Current Outpatient Medications   Medication Sig Dispense Refill   • Biotin 1000 MCG tablet Take 1,000 mcg by mouth Daily.     • Calcium-Magnesium-Vitamin D (CALCIUM 1200+D3 PO) Take 1,200 mg by mouth Daily.     • capecitabine (XELODA) 500 MG chemo tablet Take 1 tablet by mouth 2 (Two) Times a Day. For 2 weeks on and 1 week off. 42 tablet 11   • DEVILS CLAW PO Take  by mouth Daily.     • FOLIC ACID PO Take 800 mg by mouth Daily.     • megestrol (MEGACE) 40 MG tablet Take 1 tablet by mouth 2 (Two) Times a Day. 60 tablet 2   • metoprolol tartrate (LOPRESSOR) 25 MG tablet Take 25 mg by mouth 2 (two) times a day.     • NON FORMULARY 500 mg. Turmeric curcumin bid     • OLANZapine (ZYPREXA) 2.5 MG tablet Take 2.5 mg by mouth every night.     • ondansetron (ZOFRAN) 8 MG tablet Take 1 tablet by mouth Every 8 (Eight) Hours As Needed for Nausea or Vomiting. 30 tablet 5   • raNITIdine (ZANTAC) 150 MG tablet Take 150 mg by mouth 2 (Two) Times a Day.     • thyroid 60 MG PO tablet Take 60 mg by mouth daily.     • vitamin B-12 (CYANOCOBALAMIN) 1000 MCG tablet Take 1,000 mcg by mouth Daily.       No current facility-administered medications for this visit.      Facility-Administered Medications Ordered in Other Visits   Medication Dose Route Frequency Provider Last Rate Last Dose   • heparin flush (porcine) 100 UNIT/ML injection 500 Units  500 Units Intravenous PRN Joseph Nunez MD   500 Units at 10/26/17 1155   • sodium chloride 0.9 % flush 10 mL  10 mL Intravenous PRN Joseph Nunez MD   10 mL at 10/26/17 1155       ALLERGIES:    Allergies   Allergen Reactions   • Benadryl  [Diphenhydramine Hcl (Sleep)] Other (See Comments)   • Codeine    • Diphenhydramine Other (See Comments)     \"Shaky leg syndrome\"  \"Shaky leg syndrome\"  \"Shaky leg syndrome\"   • Heparin    • Other      POPPY SEED   • Penicillins Hives   • Petroleum Jelly [Skin Protectants, Misc.]    • Sulfa Antibiotics  " "  • Sulfur    • Valium [Diazepam]        Objective      Vitals:    01/17/19 0936   BP: 130/64   Pulse: 76   Resp: 18   Temp: 98.1 °F (36.7 °C)   TempSrc: Tympanic   SpO2: 99%   Weight: 62.1 kg (137 lb)   Height: 165.1 cm (65\")   PainSc: 0-No pain         Current Status 1/17/2019   ECOG score 2         Physical Exam    General Appearance: Patient is awake, alert, oriented and in no acute distress. Patient is welldeveloped, wellnourished, and appears stated age.  HEENT: Normocephalic. Sclerae clear, conjunctiva pink, extraocular movements intact, pupils, round, reactive to light and  accommodation. Mouth and throat are clear with moist oral mucosa.  NECK: Supple, no jugular venous distention, thyroid not enlarged.  LYMPH: No cervical, supraclavicular, axillary, or inguinal lymphadenopathy.  CHEST: Equal bilateral expansion, AP  diameter normal, resonant percussion note  LUNGS: Good air movement, no rales, rhonchi, rubs or wheezes with auscultation on the right. Diminished on the left.  CARDIO: Regular sinus rhythm, no murmurs, gallops or rubs.  ABDOMEN: Nondistended, soft, No tenderness, no guarding, no rebound, No hepatosplenomegaly. No abdominal masses. Bowel sounds positive. No hernia  GENITALIA: Not examined.  BREASTS: Not examined.  MUSKEL: No joint swelling, decreased motion, or inflammation  EXTREMS: patrick le  Edema with left LE greater in size than right,No clubbing, cyanosis, No varicose veins.  NEURO: Grossly nonfocal, Gait is coordinated and smooth, Cognition is preserved.  SKIN: No rashes, no ecchymoses, no petechia.  PSYCH: Oriented to time, place and person. Memory is preserved. Mood and affect appear normal  RECENT LABS:  Lab on 01/17/2019   Component Date Value Ref Range Status   • WBC 01/17/2019 3.22* 4.80 - 10.80 10*3/mm3 Final   • RBC 01/17/2019 2.85* 4.20 - 5.40 10*6/mm3 Final   • Hemoglobin 01/17/2019 9.4* 12.0 - 16.0 g/dL Final   • Hematocrit 01/17/2019 27.6* 37.0 - 47.0 % Final   • MCV 01/17/2019 " 96.8  82.0 - 98.0 fL Final   • MCH 01/17/2019 33.0* 28.0 - 32.0 pg Final   • MCHC 01/17/2019 34.1  33.0 - 36.0 g/dL Final   • RDW 01/17/2019 15.5* 12.0 - 15.0 % Final   • RDW-SD 01/17/2019 54.2* 40.0 - 54.0 fl Final   • MPV 01/17/2019 9.9  6.0 - 12.0 fL Final   • Platelets 01/17/2019 327  130 - 400 10*3/mm3 Final   • Neutrophil % 01/17/2019 47.0  39.0 - 78.0 % Final   • Lymphocyte % 01/17/2019 38.8  15.0 - 45.0 % Final   • Monocyte % 01/17/2019 12.4* 4.0 - 12.0 % Final   • Eosinophil % 01/17/2019 0.3  0.0 - 4.0 % Final   • Basophil % 01/17/2019 0.9  0.0 - 2.0 % Final   • Immature Grans % 01/17/2019 0.6  0.0 - 5.0 % Final   • Neutrophils, Absolute 01/17/2019 1.51* 1.87 - 8.40 10*3/mm3 Final   • Lymphocytes, Absolute 01/17/2019 1.25  0.72 - 4.86 10*3/mm3 Final   • Monocytes, Absolute 01/17/2019 0.40  0.19 - 1.30 10*3/mm3 Final   • Eosinophils, Absolute 01/17/2019 0.01  0.00 - 0.70 10*3/mm3 Final   • Basophils, Absolute 01/17/2019 0.03  0.00 - 0.20 10*3/mm3 Final   • Immature Grans, Absolute 01/17/2019 0.02  0.00 - 0.03 10*3/mm3 Final   • nRBC 01/17/2019 0.0  0.0 - 0.0 /100 WBC Final       RADIOLOGY:  Xr Chest Pa & Lateral    Result Date: 1/10/2019  Narrative: EXAMINATION: XR CHEST PA AND LATERAL-  1/10/2019 12:35 PM CST  HISTORY: dyspneic; C50.912-Malignant neoplasm of unspecified site of left female breast; C79.51-Secondary malignant neoplasm of bone; R06.09-Other forms of dyspnea.  2 view chest x-ray with no comparison.  Normal heart size. Aortic arch calcification.  Normal appearance of a right subclavian port.  Patchy sclerotic disease noted throughout all bones is compatible with the known history of metastatic disease.  Chronic lung changes with mild hyperexpansion. There is pleural thickening or small amount of pleural fluid on the left.  Summary: 1. No acute lung disease. This report was finalized on 01/10/2019 12:47 by Dr. Reyes Borrero MD.           Assessment/Plan  Heavenly Yanes is a 73 y.o. year old  female with met breast cancer s/p multiple lines of CMT currently on taxotere weekly with good tolerance.    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 Labs reviewed wbc 3.22, hg 9.4, plt 327. Ok for treatment today  --start neupogen on Monday until anc above 2000  --rx cipro for prophylaxis of neutropenic fever    2. Psych: on olanzopine    3. HTN: on metoprolol    4. Gerd: on ranitidine  5. Dyspnea: get a cxr to evaluate  6. Acute left leg swelling: ordered doppler of left leg to r/o DVT which extensive DVT of left LE.   --gave xarelto starter sample pack. Recommendation for xarelto based on new data from the Phase 3 CASSINI study which showed a 60 percent reduction of venous thromboembolism (VTE, or blood clot) events with oral anticoagulant Xarelto (rivaroxaban) compared to placebo in high-risk patients with cancer         Moise Sotelo MD    1/17/2019    9:52 AM

## 2019-01-17 NOTE — TELEPHONE ENCOUNTER
Patient has blood clot left leg.  Verbal order from Dr. Sotelo to start patient on Xarelto 15 mg twice a day for 3 weeks then 20 mg daily.  Patient given samples of 15 mg xarelto enough for 2 weeks.  Instructed patient on how to take the xarelto.  Written information given to patient.  Instructed her to call if she has any problems.

## 2019-01-21 ENCOUNTER — INFUSION (OUTPATIENT)
Dept: ONCOLOGY | Facility: HOSPITAL | Age: 74
End: 2019-01-21
Attending: INTERNAL MEDICINE

## 2019-01-21 ENCOUNTER — LAB (OUTPATIENT)
Dept: LAB | Facility: HOSPITAL | Age: 74
End: 2019-01-21
Attending: INTERNAL MEDICINE

## 2019-01-21 VITALS
DIASTOLIC BLOOD PRESSURE: 56 MMHG | TEMPERATURE: 99.1 F | SYSTOLIC BLOOD PRESSURE: 119 MMHG | RESPIRATION RATE: 20 BRPM | WEIGHT: 140 LBS | BODY MASS INDEX: 23.32 KG/M2 | OXYGEN SATURATION: 98 % | HEART RATE: 78 BPM | HEIGHT: 65 IN

## 2019-01-21 DIAGNOSIS — D70.1 CHEMOTHERAPY INDUCED NEUTROPENIA (HCC): Primary | ICD-10-CM

## 2019-01-21 DIAGNOSIS — C79.51 CARCINOMA OF LEFT BREAST METASTATIC TO BONE (HCC): ICD-10-CM

## 2019-01-21 DIAGNOSIS — C50.912 CARCINOMA OF LEFT BREAST METASTATIC TO BONE (HCC): ICD-10-CM

## 2019-01-21 DIAGNOSIS — T45.1X5A CHEMOTHERAPY INDUCED NEUTROPENIA (HCC): Primary | ICD-10-CM

## 2019-01-21 LAB
ALBUMIN SERPL-MCNC: 3.6 G/DL (ref 3.5–5)
ALBUMIN/GLOB SERPL: 1.3 G/DL (ref 1.1–2.5)
ALP SERPL-CCNC: 59 U/L (ref 24–120)
ALT SERPL W P-5'-P-CCNC: 19 U/L (ref 0–54)
ANION GAP SERPL CALCULATED.3IONS-SCNC: 10 MMOL/L (ref 4–13)
AST SERPL-CCNC: 25 U/L (ref 7–45)
BASOPHILS # BLD AUTO: 0.06 10*3/MM3 (ref 0–0.2)
BASOPHILS NFR BLD AUTO: 1.3 % (ref 0–2)
BILIRUB SERPL-MCNC: 0.4 MG/DL (ref 0.1–1)
BUN BLD-MCNC: 10 MG/DL (ref 5–21)
BUN/CREAT SERPL: 11.1 (ref 7–25)
CALCIUM SPEC-SCNC: 8.8 MG/DL (ref 8.4–10.4)
CHLORIDE SERPL-SCNC: 92 MMOL/L (ref 98–110)
CO2 SERPL-SCNC: 32 MMOL/L (ref 24–31)
CREAT BLD-MCNC: 0.9 MG/DL (ref 0.5–1.4)
DEPRECATED RDW RBC AUTO: 55.5 FL (ref 40–54)
EOSINOPHIL # BLD AUTO: 0 10*3/MM3 (ref 0–0.7)
EOSINOPHIL NFR BLD AUTO: 0 % (ref 0–4)
ERYTHROCYTE [DISTWIDTH] IN BLOOD BY AUTOMATED COUNT: 15.5 % (ref 12–15)
GFR SERPL CREATININE-BSD FRML MDRD: 61 ML/MIN/1.73
GLOBULIN UR ELPH-MCNC: 2.8 GM/DL
GLUCOSE BLD-MCNC: 103 MG/DL (ref 70–100)
HCT VFR BLD AUTO: 25.1 % (ref 37–47)
HGB BLD-MCNC: 8.6 G/DL (ref 12–16)
IMM GRANULOCYTES # BLD AUTO: 0.09 10*3/MM3 (ref 0–0.03)
IMM GRANULOCYTES NFR BLD AUTO: 2 % (ref 0–5)
LYMPHOCYTES # BLD AUTO: 1.14 10*3/MM3 (ref 0.72–4.86)
LYMPHOCYTES NFR BLD AUTO: 25.3 % (ref 15–45)
MCH RBC QN AUTO: 33.9 PG (ref 28–32)
MCHC RBC AUTO-ENTMCNC: 34.3 G/DL (ref 33–36)
MCV RBC AUTO: 98.8 FL (ref 82–98)
MONOCYTES # BLD AUTO: 0.23 10*3/MM3 (ref 0.19–1.3)
MONOCYTES NFR BLD AUTO: 5.1 % (ref 4–12)
NEUTROPHILS # BLD AUTO: 2.99 10*3/MM3 (ref 1.87–8.4)
NEUTROPHILS NFR BLD AUTO: 66.3 % (ref 39–78)
NRBC BLD AUTO-RTO: 0 /100 WBC (ref 0–0)
PLATELET # BLD AUTO: 381 10*3/MM3 (ref 130–400)
PMV BLD AUTO: 9.8 FL (ref 6–12)
POTASSIUM BLD-SCNC: 3.2 MMOL/L (ref 3.5–5.3)
PROT SERPL-MCNC: 6.4 G/DL (ref 6.3–8.7)
RBC # BLD AUTO: 2.54 10*6/MM3 (ref 4.2–5.4)
SODIUM BLD-SCNC: 134 MMOL/L (ref 135–145)
WBC NRBC COR # BLD: 4.51 10*3/MM3 (ref 4.8–10.8)

## 2019-01-21 PROCEDURE — 36415 COLL VENOUS BLD VENIPUNCTURE: CPT

## 2019-01-21 PROCEDURE — G0463 HOSPITAL OUTPT CLINIC VISIT: HCPCS

## 2019-01-21 PROCEDURE — 85025 COMPLETE CBC W/AUTO DIFF WBC: CPT

## 2019-01-21 PROCEDURE — 80053 COMPREHEN METABOLIC PANEL: CPT

## 2019-01-21 NOTE — PROGRESS NOTES
1450 Called abnormal labs to Sandra Ricci office and discussed no injections necessary today. Nilsa Cagle RN

## 2019-01-22 ENCOUNTER — LAB (OUTPATIENT)
Dept: LAB | Facility: HOSPITAL | Age: 74
End: 2019-01-22
Attending: INTERNAL MEDICINE

## 2019-01-22 ENCOUNTER — INFUSION (OUTPATIENT)
Dept: ONCOLOGY | Facility: HOSPITAL | Age: 74
End: 2019-01-22
Attending: INTERNAL MEDICINE

## 2019-01-22 VITALS
SYSTOLIC BLOOD PRESSURE: 123 MMHG | DIASTOLIC BLOOD PRESSURE: 57 MMHG | HEART RATE: 68 BPM | TEMPERATURE: 98.9 F | RESPIRATION RATE: 18 BRPM | OXYGEN SATURATION: 100 %

## 2019-01-22 DIAGNOSIS — D70.1 CHEMOTHERAPY INDUCED NEUTROPENIA (HCC): ICD-10-CM

## 2019-01-22 DIAGNOSIS — T45.1X5A CHEMOTHERAPY INDUCED NEUTROPENIA (HCC): ICD-10-CM

## 2019-01-22 LAB
BASOPHILS # BLD AUTO: 0.05 10*3/MM3 (ref 0–0.2)
BASOPHILS NFR BLD AUTO: 0.8 % (ref 0–2)
DEPRECATED RDW RBC AUTO: 54.8 FL (ref 40–54)
EOSINOPHIL # BLD AUTO: 0 10*3/MM3 (ref 0–0.7)
EOSINOPHIL NFR BLD AUTO: 0 % (ref 0–4)
ERYTHROCYTE [DISTWIDTH] IN BLOOD BY AUTOMATED COUNT: 15.7 % (ref 12–15)
HCT VFR BLD AUTO: 26.3 % (ref 37–47)
HGB BLD-MCNC: 8.9 G/DL (ref 12–16)
IMM GRANULOCYTES # BLD AUTO: 0.1 10*3/MM3 (ref 0–0.03)
IMM GRANULOCYTES NFR BLD AUTO: 1.6 % (ref 0–5)
LYMPHOCYTES # BLD AUTO: 2.19 10*3/MM3 (ref 0.72–4.86)
LYMPHOCYTES NFR BLD AUTO: 35.4 % (ref 15–45)
MCH RBC QN AUTO: 32.7 PG (ref 28–32)
MCHC RBC AUTO-ENTMCNC: 33.8 G/DL (ref 33–36)
MCV RBC AUTO: 96.7 FL (ref 82–98)
MONOCYTES # BLD AUTO: 0.39 10*3/MM3 (ref 0.19–1.3)
MONOCYTES NFR BLD AUTO: 6.3 % (ref 4–12)
NEUTROPHILS # BLD AUTO: 3.46 10*3/MM3 (ref 1.87–8.4)
NEUTROPHILS NFR BLD AUTO: 55.9 % (ref 39–78)
NRBC BLD AUTO-RTO: 0 /100 WBC (ref 0–0)
PLATELET # BLD AUTO: 388 10*3/MM3 (ref 130–400)
PMV BLD AUTO: 9.5 FL (ref 6–12)
RBC # BLD AUTO: 2.72 10*6/MM3 (ref 4.2–5.4)
WBC NRBC COR # BLD: 6.19 10*3/MM3 (ref 4.8–10.8)

## 2019-01-22 PROCEDURE — 85025 COMPLETE CBC W/AUTO DIFF WBC: CPT

## 2019-01-22 PROCEDURE — G0463 HOSPITAL OUTPT CLINIC VISIT: HCPCS

## 2019-01-22 PROCEDURE — 36415 COLL VENOUS BLD VENIPUNCTURE: CPT

## 2019-01-22 NOTE — PROGRESS NOTES
1305 - s/w Sandra RN at office re: lab levels. Hold Neupogen injection today and re-check blood on 01/24 or 01/25 - NEAL Ryan RN

## 2019-01-23 ENCOUNTER — TELEPHONE (OUTPATIENT)
Dept: ONCOLOGY | Facility: CLINIC | Age: 74
End: 2019-01-23

## 2019-01-23 ENCOUNTER — APPOINTMENT (OUTPATIENT)
Dept: LAB | Facility: HOSPITAL | Age: 74
End: 2019-01-23
Attending: INTERNAL MEDICINE

## 2019-01-23 ENCOUNTER — APPOINTMENT (OUTPATIENT)
Dept: ONCOLOGY | Facility: HOSPITAL | Age: 74
End: 2019-01-23
Attending: INTERNAL MEDICINE

## 2019-01-23 NOTE — TELEPHONE ENCOUNTER
Reviewed Heavenly's CBC results with Dr. Sotelo.  Notified Heavenly that her labs are good and she does not have to come in for neupogen the rest of the week.   unknown

## 2019-01-24 ENCOUNTER — HOSPITAL ENCOUNTER (OUTPATIENT)
Dept: CARDIOLOGY | Facility: HOSPITAL | Age: 74
Discharge: HOME | End: 2019-01-24
Attending: INTERNAL MEDICINE | Admitting: INTERNAL MEDICINE

## 2019-01-24 ENCOUNTER — APPOINTMENT (OUTPATIENT)
Dept: ONCOLOGY | Facility: HOSPITAL | Age: 74
End: 2019-01-24
Attending: INTERNAL MEDICINE

## 2019-01-24 ENCOUNTER — APPOINTMENT (OUTPATIENT)
Dept: LAB | Facility: HOSPITAL | Age: 74
End: 2019-01-24
Attending: INTERNAL MEDICINE

## 2019-01-24 VITALS
WEIGHT: 140 LBS | DIASTOLIC BLOOD PRESSURE: 77 MMHG | HEIGHT: 65 IN | BODY MASS INDEX: 23.32 KG/M2 | SYSTOLIC BLOOD PRESSURE: 161 MMHG

## 2019-01-24 DIAGNOSIS — I10 ESSENTIAL HYPERTENSION: ICD-10-CM

## 2019-01-24 DIAGNOSIS — Z01.818 EXAMINATION PRIOR TO CHEMOTHERAPY: ICD-10-CM

## 2019-01-24 LAB
BH CV ECHO MEAS - AI DEC SLOPE: 239 CM/SEC^2
BH CV ECHO MEAS - AI MAX PG: 69.2 MMHG
BH CV ECHO MEAS - AI MAX VEL: 416 CM/SEC
BH CV ECHO MEAS - AI P1/2T: 509.8 MSEC
BH CV ECHO MEAS - AO MAX PG (FULL): 0.86 MMHG
BH CV ECHO MEAS - AO MAX PG: 7.6 MMHG
BH CV ECHO MEAS - AO MEAN PG (FULL): 0 MMHG
BH CV ECHO MEAS - AO MEAN PG: 4 MMHG
BH CV ECHO MEAS - AO ROOT AREA (BSA CORRECTED): 1.8
BH CV ECHO MEAS - AO ROOT AREA: 7.1 CM^2
BH CV ECHO MEAS - AO ROOT DIAM: 3 CM
BH CV ECHO MEAS - AO V2 MAX: 138 CM/SEC
BH CV ECHO MEAS - AO V2 MEAN: 100 CM/SEC
BH CV ECHO MEAS - AO V2 VTI: 30.3 CM
BH CV ECHO MEAS - AVA(I,A): 2.7 CM^2
BH CV ECHO MEAS - AVA(I,D): 2.7 CM^2
BH CV ECHO MEAS - AVA(V,A): 3 CM^2
BH CV ECHO MEAS - AVA(V,D): 3 CM^2
BH CV ECHO MEAS - BSA(HAYCOCK): 1.7 M^2
BH CV ECHO MEAS - BSA: 1.7 M^2
BH CV ECHO MEAS - BZI_BMI: 23.3 KILOGRAMS/M^2
BH CV ECHO MEAS - BZI_METRIC_HEIGHT: 165.1 CM
BH CV ECHO MEAS - BZI_METRIC_WEIGHT: 63.5 KG
BH CV ECHO MEAS - EDV(CUBED): 55.7 ML
BH CV ECHO MEAS - EDV(MOD-SP4): 41.1 ML
BH CV ECHO MEAS - EDV(TEICH): 62.7 ML
BH CV ECHO MEAS - EF(CUBED): 68.8 %
BH CV ECHO MEAS - EF(MOD-SP4): 59.9 %
BH CV ECHO MEAS - EF(TEICH): 61.1 %
BH CV ECHO MEAS - ESV(CUBED): 17.4 ML
BH CV ECHO MEAS - ESV(MOD-SP4): 16.5 ML
BH CV ECHO MEAS - ESV(TEICH): 24.4 ML
BH CV ECHO MEAS - FS: 32.2 %
BH CV ECHO MEAS - IVS/LVPW: 0.91
BH CV ECHO MEAS - IVSD: 0.95 CM
BH CV ECHO MEAS - LA DIMENSION: 4.4 CM
BH CV ECHO MEAS - LA/AO: 1.5
BH CV ECHO MEAS - LAT PEAK E' VEL: 6.2 CM/SEC
BH CV ECHO MEAS - LV DIASTOLIC VOL/BSA (35-75): 24.2 ML/M^2
BH CV ECHO MEAS - LV MASS(C)D: 117.3 GRAMS
BH CV ECHO MEAS - LV MASS(C)DI: 69 GRAMS/M^2
BH CV ECHO MEAS - LV MAX PG: 6.8 MMHG
BH CV ECHO MEAS - LV MEAN PG: 4 MMHG
BH CV ECHO MEAS - LV SYSTOLIC VOL/BSA (12-30): 9.7 ML/M^2
BH CV ECHO MEAS - LV V1 MAX: 130 CM/SEC
BH CV ECHO MEAS - LV V1 MEAN: 92.1 CM/SEC
BH CV ECHO MEAS - LV V1 VTI: 25.6 CM
BH CV ECHO MEAS - LVIDD: 3.8 CM
BH CV ECHO MEAS - LVIDS: 2.6 CM
BH CV ECHO MEAS - LVLD AP4: 6.4 CM
BH CV ECHO MEAS - LVLS AP4: 5.2 CM
BH CV ECHO MEAS - LVOT AREA (M): 3.1 CM^2
BH CV ECHO MEAS - LVOT AREA: 3.1 CM^2
BH CV ECHO MEAS - LVOT DIAM: 2 CM
BH CV ECHO MEAS - LVPWD: 1 CM
BH CV ECHO MEAS - MED PEAK E' VEL: 5.44 CM/SEC
BH CV ECHO MEAS - MR ALIAS VEL: 41.9 CM/SEC
BH CV ECHO MEAS - MR ERO: 0.04 CM^2
BH CV ECHO MEAS - MR FLOW RATE: 23.7 CM^3/SEC
BH CV ECHO MEAS - MR MAX PG: 128.1 MMHG
BH CV ECHO MEAS - MR MAX VEL: 566 CM/SEC
BH CV ECHO MEAS - MR MEAN PG: 79 MMHG
BH CV ECHO MEAS - MR MEAN VEL: 410 CM/SEC
BH CV ECHO MEAS - MR PISA RADIUS: 0.3 CM
BH CV ECHO MEAS - MR PISA: 0.57 CM^2
BH CV ECHO MEAS - MR VOLUME: 8.2 ML
BH CV ECHO MEAS - MR VTI: 196 CM
BH CV ECHO MEAS - MV A MAX VEL: 75.8 CM/SEC
BH CV ECHO MEAS - MV DEC TIME: 0.28 SEC
BH CV ECHO MEAS - MV E MAX VEL: 55.9 CM/SEC
BH CV ECHO MEAS - MV E/A: 0.74
BH CV ECHO MEAS - PA MAX PG: 1.7 MMHG
BH CV ECHO MEAS - PA V2 MAX: 65 CM/SEC
BH CV ECHO MEAS - RAP SYSTOLE: 5 MMHG
BH CV ECHO MEAS - RVSP: 35 MMHG
BH CV ECHO MEAS - SI(AO): 126 ML/M^2
BH CV ECHO MEAS - SI(CUBED): 22.6 ML/M^2
BH CV ECHO MEAS - SI(LVOT): 47.3 ML/M^2
BH CV ECHO MEAS - SI(MOD-SP4): 14.5 ML/M^2
BH CV ECHO MEAS - SI(TEICH): 22.6 ML/M^2
BH CV ECHO MEAS - SV(AO): 214.2 ML
BH CV ECHO MEAS - SV(CUBED): 38.4 ML
BH CV ECHO MEAS - SV(LVOT): 80.4 ML
BH CV ECHO MEAS - SV(MOD-SP4): 24.6 ML
BH CV ECHO MEAS - SV(TEICH): 38.4 ML
BH CV ECHO MEAS - TR MAX VEL: 274 CM/SEC
BH CV ECHO MEASUREMENTS AVERAGE E/E' RATIO: 9.6
LEFT ATRIUM VOLUME INDEX: 34.5 ML/M2
LEFT ATRIUM VOLUME: 58.7 CM3
LV EF 2D ECHO EST: 60 %
MAXIMAL PREDICTED HEART RATE: 145 BPM
STRESS TARGET HR: 123 BPM

## 2019-01-24 PROCEDURE — 0399T ADULT TRANSTHORACIC ECHO COMPLETE W/ CONT IF NECESSARY PER PROTOCOL: CPT | Performed by: INTERNAL MEDICINE

## 2019-01-24 PROCEDURE — 0399T HC MYOCARDL STRAIN IMAG QUAN ASSMT PER SESS: CPT

## 2019-01-24 PROCEDURE — 93306 TTE W/DOPPLER COMPLETE: CPT | Performed by: INTERNAL MEDICINE

## 2019-01-24 PROCEDURE — 93306 TTE W/DOPPLER COMPLETE: CPT

## 2019-01-25 ENCOUNTER — APPOINTMENT (OUTPATIENT)
Dept: LAB | Facility: HOSPITAL | Age: 74
End: 2019-01-25
Attending: INTERNAL MEDICINE

## 2019-01-25 ENCOUNTER — APPOINTMENT (OUTPATIENT)
Dept: ONCOLOGY | Facility: HOSPITAL | Age: 74
End: 2019-01-25
Attending: INTERNAL MEDICINE

## 2019-01-30 ENCOUNTER — HOSPITAL ENCOUNTER (OUTPATIENT)
Dept: ULTRASOUND IMAGING | Facility: HOSPITAL | Age: 74
Discharge: HOME | End: 2019-01-30
Attending: INTERNAL MEDICINE | Admitting: INTERNAL MEDICINE

## 2019-01-30 DIAGNOSIS — M79.89 SWELLING OF EXTREMITY OF UNKNOWN ETIOLOGY: ICD-10-CM

## 2019-01-30 DIAGNOSIS — C50.912 CARCINOMA OF LEFT BREAST METASTATIC TO BONE (HCC): ICD-10-CM

## 2019-01-30 DIAGNOSIS — C79.51 CARCINOMA OF LEFT BREAST METASTATIC TO BONE (HCC): ICD-10-CM

## 2019-01-30 PROCEDURE — 93971 EXTREMITY STUDY: CPT | Performed by: SURGERY

## 2019-01-30 PROCEDURE — 93971 EXTREMITY STUDY: CPT

## 2019-01-31 ENCOUNTER — OFFICE VISIT (OUTPATIENT)
Dept: ONCOLOGY | Facility: CLINIC | Age: 74
End: 2019-01-31

## 2019-01-31 ENCOUNTER — LAB (OUTPATIENT)
Dept: LAB | Facility: HOSPITAL | Age: 74
End: 2019-01-31
Attending: INTERNAL MEDICINE

## 2019-01-31 ENCOUNTER — INFUSION (OUTPATIENT)
Dept: ONCOLOGY | Facility: HOSPITAL | Age: 74
End: 2019-01-31
Attending: INTERNAL MEDICINE

## 2019-01-31 VITALS
SYSTOLIC BLOOD PRESSURE: 153 MMHG | BODY MASS INDEX: 23.19 KG/M2 | HEIGHT: 65 IN | TEMPERATURE: 96.5 F | WEIGHT: 139.2 LBS | HEART RATE: 64 BPM | DIASTOLIC BLOOD PRESSURE: 56 MMHG | RESPIRATION RATE: 16 BRPM | OXYGEN SATURATION: 100 %

## 2019-01-31 VITALS
SYSTOLIC BLOOD PRESSURE: 138 MMHG | TEMPERATURE: 97.4 F | OXYGEN SATURATION: 96 % | WEIGHT: 138.6 LBS | HEIGHT: 65 IN | BODY MASS INDEX: 23.09 KG/M2 | RESPIRATION RATE: 18 BRPM | HEART RATE: 76 BPM | DIASTOLIC BLOOD PRESSURE: 70 MMHG

## 2019-01-31 DIAGNOSIS — D75.839 THROMBOCYTOSIS: ICD-10-CM

## 2019-01-31 DIAGNOSIS — C50.912 CARCINOMA OF LEFT BREAST METASTATIC TO BONE (HCC): Primary | ICD-10-CM

## 2019-01-31 DIAGNOSIS — C79.51 CARCINOMA OF LEFT BREAST METASTATIC TO BONE (HCC): Primary | ICD-10-CM

## 2019-01-31 DIAGNOSIS — I50.9 CHRONIC CONGESTIVE HEART FAILURE, UNSPECIFIED HEART FAILURE TYPE (HCC): Primary | ICD-10-CM

## 2019-01-31 DIAGNOSIS — D70.1 CHEMOTHERAPY INDUCED NEUTROPENIA (HCC): ICD-10-CM

## 2019-01-31 DIAGNOSIS — C79.51 BREAST CANCER METASTASIZED TO BONE, UNSPECIFIED LATERALITY (HCC): ICD-10-CM

## 2019-01-31 DIAGNOSIS — C50.912 CARCINOMA OF LEFT BREAST METASTATIC TO BONE (HCC): ICD-10-CM

## 2019-01-31 DIAGNOSIS — C50.919 BREAST CANCER METASTASIZED TO BONE, UNSPECIFIED LATERALITY (HCC): ICD-10-CM

## 2019-01-31 DIAGNOSIS — C50.412 MALIGNANT NEOPLASM OF UPPER-OUTER QUADRANT OF LEFT FEMALE BREAST, UNSPECIFIED ESTROGEN RECEPTOR STATUS (HCC): ICD-10-CM

## 2019-01-31 DIAGNOSIS — C79.51 CARCINOMA OF LEFT BREAST METASTATIC TO BONE (HCC): ICD-10-CM

## 2019-01-31 DIAGNOSIS — T45.1X5A CHEMOTHERAPY INDUCED NEUTROPENIA (HCC): ICD-10-CM

## 2019-01-31 LAB
ALBUMIN SERPL-MCNC: 3.9 G/DL (ref 3.5–5)
ALBUMIN/GLOB SERPL: 1.2 G/DL (ref 1.1–2.5)
ALP SERPL-CCNC: 60 U/L (ref 24–120)
ALT SERPL W P-5'-P-CCNC: <15 U/L (ref 0–54)
ANION GAP SERPL CALCULATED.3IONS-SCNC: 5 MMOL/L (ref 4–13)
AST SERPL-CCNC: 33 U/L (ref 7–45)
BASOPHILS # BLD AUTO: 0.09 10*3/MM3 (ref 0–0.2)
BASOPHILS NFR BLD AUTO: 0.9 % (ref 0–2)
BILIRUB SERPL-MCNC: 0.5 MG/DL (ref 0.1–1)
BUN BLD-MCNC: 11 MG/DL (ref 5–21)
BUN/CREAT SERPL: 11.7 (ref 7–25)
CALCIUM SPEC-SCNC: 9.6 MG/DL (ref 8.4–10.4)
CHLORIDE SERPL-SCNC: 101 MMOL/L (ref 98–110)
CO2 SERPL-SCNC: 29 MMOL/L (ref 24–31)
CREAT BLD-MCNC: 0.94 MG/DL (ref 0.5–1.4)
CYTOLOGIST CVX/VAG CYTO: NORMAL
DEPRECATED RDW RBC AUTO: 64.6 FL (ref 40–54)
EOSINOPHIL # BLD AUTO: 0.02 10*3/MM3 (ref 0–0.7)
EOSINOPHIL NFR BLD AUTO: 0.2 % (ref 0–4)
ERYTHROCYTE [DISTWIDTH] IN BLOOD BY AUTOMATED COUNT: 17.8 % (ref 12–15)
FERRITIN SERPL-MCNC: 768 NG/ML (ref 11.1–264)
GFR SERPL CREATININE-BSD FRML MDRD: 58 ML/MIN/1.73
GLOBULIN UR ELPH-MCNC: 3.2 GM/DL
GLUCOSE BLD-MCNC: 101 MG/DL (ref 70–100)
HCT VFR BLD AUTO: 29.3 % (ref 37–47)
HGB BLD-MCNC: 9.5 G/DL (ref 12–16)
IMM GRANULOCYTES # BLD AUTO: 0.2 10*3/MM3 (ref 0–0.03)
IMM GRANULOCYTES NFR BLD AUTO: 2.1 % (ref 0–5)
IRON 24H UR-MRATE: 61 MCG/DL (ref 42–180)
IRON SATN MFR SERPL: 24 % (ref 20–45)
LYMPHOCYTES # BLD AUTO: 2.36 10*3/MM3 (ref 0.72–4.86)
LYMPHOCYTES NFR BLD AUTO: 24.6 % (ref 15–45)
MCH RBC QN AUTO: 32.5 PG (ref 28–32)
MCHC RBC AUTO-ENTMCNC: 32.4 G/DL (ref 33–36)
MCV RBC AUTO: 100.3 FL (ref 82–98)
MONOCYTES # BLD AUTO: 0.95 10*3/MM3 (ref 0.19–1.3)
MONOCYTES NFR BLD AUTO: 9.9 % (ref 4–12)
NEUTROPHILS # BLD AUTO: 5.98 10*3/MM3 (ref 1.87–8.4)
NEUTROPHILS NFR BLD AUTO: 62.3 % (ref 39–78)
NRBC BLD AUTO-RTO: 0 /100 WBC (ref 0–0)
PATH INTERP BLD-IMP: NORMAL
PLATELET # BLD AUTO: 717 10*3/MM3 (ref 130–400)
PMV BLD AUTO: 8.6 FL (ref 6–12)
POTASSIUM BLD-SCNC: 3.9 MMOL/L (ref 3.5–5.3)
PROT SERPL-MCNC: 7.1 G/DL (ref 6.3–8.7)
RBC # BLD AUTO: 2.92 10*6/MM3 (ref 4.2–5.4)
SODIUM BLD-SCNC: 135 MMOL/L (ref 135–145)
TIBC SERPL-MCNC: 256 MCG/DL (ref 225–420)
WBC NRBC COR # BLD: 9.6 10*3/MM3 (ref 4.8–10.8)

## 2019-01-31 PROCEDURE — 25010000002 ONDANSETRON PER 1 MG

## 2019-01-31 PROCEDURE — 83540 ASSAY OF IRON: CPT | Performed by: INTERNAL MEDICINE

## 2019-01-31 PROCEDURE — 85060 BLOOD SMEAR INTERPRETATION: CPT

## 2019-01-31 PROCEDURE — 25010000002 DEXAMETHASONE PER 1 MG

## 2019-01-31 PROCEDURE — 96367 TX/PROPH/DG ADDL SEQ IV INF: CPT

## 2019-01-31 PROCEDURE — 82728 ASSAY OF FERRITIN: CPT | Performed by: INTERNAL MEDICINE

## 2019-01-31 PROCEDURE — 85025 COMPLETE CBC W/AUTO DIFF WBC: CPT

## 2019-01-31 PROCEDURE — 96413 CHEMO IV INFUSION 1 HR: CPT

## 2019-01-31 PROCEDURE — 80053 COMPREHEN METABOLIC PANEL: CPT | Performed by: INTERNAL MEDICINE

## 2019-01-31 PROCEDURE — 83550 IRON BINDING TEST: CPT | Performed by: INTERNAL MEDICINE

## 2019-01-31 PROCEDURE — 36415 COLL VENOUS BLD VENIPUNCTURE: CPT

## 2019-01-31 PROCEDURE — 99214 OFFICE O/P EST MOD 30 MIN: CPT | Performed by: INTERNAL MEDICINE

## 2019-01-31 PROCEDURE — 25010000002 DOCETAXEL 20 MG/ML SOLUTION 4 ML VIAL: Performed by: INTERNAL MEDICINE

## 2019-01-31 RX ORDER — MEPERIDINE HYDROCHLORIDE 50 MG/ML
25 INJECTION INTRAMUSCULAR; INTRAVENOUS; SUBCUTANEOUS
Status: CANCELLED | OUTPATIENT
Start: 2019-02-07 | End: 2019-02-08

## 2019-01-31 RX ORDER — SODIUM CHLORIDE 9 MG/ML
250 INJECTION, SOLUTION INTRAVENOUS ONCE
Status: CANCELLED | OUTPATIENT
Start: 2019-01-31

## 2019-01-31 RX ORDER — SODIUM CHLORIDE 9 MG/ML
250 INJECTION, SOLUTION INTRAVENOUS ONCE
Status: CANCELLED | OUTPATIENT
Start: 2019-02-07

## 2019-01-31 RX ORDER — MEPERIDINE HYDROCHLORIDE 50 MG/ML
25 INJECTION INTRAMUSCULAR; INTRAVENOUS; SUBCUTANEOUS
Status: CANCELLED | OUTPATIENT
Start: 2019-02-14 | End: 2019-02-15

## 2019-01-31 RX ORDER — FAMOTIDINE 10 MG/ML
20 INJECTION, SOLUTION INTRAVENOUS AS NEEDED
Status: CANCELLED | OUTPATIENT
Start: 2019-02-14

## 2019-01-31 RX ORDER — MEPERIDINE HYDROCHLORIDE 50 MG/ML
25 INJECTION INTRAMUSCULAR; INTRAVENOUS; SUBCUTANEOUS
Status: DISCONTINUED | OUTPATIENT
Start: 2019-01-31 | End: 2019-01-31 | Stop reason: HOSPADM

## 2019-01-31 RX ORDER — SODIUM CHLORIDE 0.9 % (FLUSH) 0.9 %
10 SYRINGE (ML) INJECTION AS NEEDED
Status: DISCONTINUED | OUTPATIENT
Start: 2019-01-31 | End: 2019-01-31 | Stop reason: HOSPADM

## 2019-01-31 RX ORDER — FAMOTIDINE 10 MG/ML
20 INJECTION, SOLUTION INTRAVENOUS AS NEEDED
Status: DISCONTINUED | OUTPATIENT
Start: 2019-01-31 | End: 2019-01-31 | Stop reason: HOSPADM

## 2019-01-31 RX ORDER — SODIUM CHLORIDE 9 MG/ML
250 INJECTION, SOLUTION INTRAVENOUS ONCE
Status: COMPLETED | OUTPATIENT
Start: 2019-01-31 | End: 2019-01-31

## 2019-01-31 RX ORDER — SODIUM CHLORIDE 0.9 % (FLUSH) 0.9 %
10 SYRINGE (ML) INJECTION AS NEEDED
Status: CANCELLED | OUTPATIENT
Start: 2019-01-31

## 2019-01-31 RX ORDER — SODIUM CHLORIDE 9 MG/ML
250 INJECTION, SOLUTION INTRAVENOUS ONCE
Status: CANCELLED | OUTPATIENT
Start: 2019-02-14

## 2019-01-31 RX ORDER — MEPERIDINE HYDROCHLORIDE 50 MG/ML
25 INJECTION INTRAMUSCULAR; INTRAVENOUS; SUBCUTANEOUS
Status: CANCELLED | OUTPATIENT
Start: 2019-01-31 | End: 2019-02-01

## 2019-01-31 RX ORDER — FAMOTIDINE 10 MG/ML
20 INJECTION, SOLUTION INTRAVENOUS AS NEEDED
Status: CANCELLED | OUTPATIENT
Start: 2019-02-07

## 2019-01-31 RX ORDER — FAMOTIDINE 10 MG/ML
20 INJECTION, SOLUTION INTRAVENOUS AS NEEDED
Status: CANCELLED | OUTPATIENT
Start: 2019-01-31

## 2019-01-31 RX ADMIN — SODIUM CHLORIDE, PRESERVATIVE FREE 10 ML: 5 INJECTION INTRAVENOUS at 12:02

## 2019-01-31 RX ADMIN — Medication: at 10:19

## 2019-01-31 RX ADMIN — DOCETAXEL 60 MG: 20 INJECTION, SOLUTION, CONCENTRATE INTRAVENOUS at 10:43

## 2019-01-31 RX ADMIN — SODIUM CHLORIDE 250 ML: 9 INJECTION, SOLUTION INTRAVENOUS at 10:15

## 2019-01-31 NOTE — PROGRESS NOTES
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   • Carcinoma of left breast metastatic to bone (CMS/HCC)        REASON FOR VISIT:     Chief Complaint   Patient presents with   • Breast Cancer     She is here for consideration of her txt today for metastatic breast cancer, she c/o having some type of cold allergy type symptoms with post nasal drip, watery eyes, and cough productive with clear colored sputum. Does not report fever or chills at this time. She does have scans to review today, questions how many more txt does she have remaining        HEMATOLOGY / ONCOLOGY HISTORY:   Oncology/Hematology History    Patient is a 67-year-old postmenopausal female who had onset of her menses at age 9 and menopause at age 48. She received oral contraceptives for approximately 14 years and did not receive hormonal manipulation. Patient has a maternal cousin had breast cancer. Patient has had no prior breast biopsies. Patient found a palpable left breast mass as well as a left axillary mass. Patient had a bloody nipple discharge which been present for approximately 6 months. On 6/20/2012, a mammogram was performed showing a subareolar mass consistent with malignancy. Bilateral breast ultrasound revealed an ill-defined subareolar mass measuring 2.8-2.5 cm. There is a left axillary mass measuring 1.7 cm with suspicion of extracapsular extension. There is no evidence of disease in the right breast. On 7/9/2012 patient underwent a left breast biopsy and placement of marker clip. Left breast biopsy revealed an infiltrating lobular carcinoma grade 2 with focal ductal carcinoma in situ, solid  pattern. A fine-needle biopsy of the left lymph node was consistent with metastatic carcinoma. Breast tumor profile revealed ER: 100%; NV: 98%; HER-2/maynor 2+; FISH: Unamplified; Ki-67: 71%; P 53 1%; DNA aneuploidy. A MRI of the breast were performed body multiple abnormal enhancing masses within the left breast. There appears to be anterior retraction of the pectoralis muscle with no direct extension. There is a moderate to large, layering left pleural effusion appreciated. The right breast reveals 3 moderately enlarged lymph nodes in the posterior lateral aspect the right breast. These have fatty hilum but followup is recommended. Patient is undergone systemic studies including, on 8/2/2012, a CT scan of the brain showing atrophy. a CT scan that shows showing a small to moderate left pleural effusion with 3 subcentimeter nodule densities in the left lung.   She completed 4 cycles of neoadjuvant treatment with dose dense Adriamycin and Cytoxan. She had a mammogram which showed a significant reduction in the size of her left breast lesion. There is no axillary adenopathy noted. She has had an ultrasound revealing the  lesion reducing from 2.4 cm to 1 cm. Patient underwent a left mastectomy on 03/19/2013 finding negative invasive cancer, negative nodes. A 5% DCIS was noted. The patient has declined hormonal therapy. She did not need radiation therapy.  November 2014, she began to have evidence of lytic bone lesions at T5T6, frontal disease, an 8 mm lucency in the central frontal area of  the skull but nothing intracranial. Bone scan revealed only vertex tracer activity but bilateral ribs and thoracic spine, and other lesions in  her pelvis. At that time, she was started on letrozole since November 2014. She is taking Xgeva. She had progression found in bone in Feb 2015 on scan. She was started on Faslodex, Ibrance and continued xgeva.         Malignant neoplasm of upper-outer quadrant of left female breast (CMS/HCC)     7/9/2012 Initial Diagnosis     Malignant neoplasm of upper-outer quadrant of left female breast         7/10/2012 Biopsy     Left Breast Biopsy & Axillary Node FNA: A) Infiltrating lobular carcinoma, Grade 2. B) Foci of ductal carcinoma in situ, solid pattern. C) Greatest dimension of the invasive carcinoma is 15.8mm.         3/19/2013 Surgery     Left Mastectomy w/ Axillary Tail: Focal residual ducatal carcinoma in situ (DCIS) 12 lymph nodes negative for metastatic carcinoma.         10/20/2014 Biopsy     Peritoneum Biopsy: Final Diagnosis: Malignant Cell Neoplasm.           Breast cancer metastasized to bone, unspecified laterality (CMS/HCC)    10/15/2012 -  Other Event     Left mammogram:  Impression:  1. Decreasing spiculation and density in the retroareolar left breast, near a previous biopsy.   2. Definite left axillary lesion is not appreciated.          2/5/2013 -  Other Event     Diagnostic Mammo:  Comparison is made to 10/15/2012 and 6/20/2012  The spiculated mass at 12:00 behind the nipple is nearly resolved.   Impression:  1. The mass on the left side is less apparent after receiving chemotherapy. There has been a good response to chmotherapy on the left side.   2. No suspicious abnormality is seen in the right breast to account for the new palable abnormality at the 4-5:00 position.          3/19/2013 Surgery     Breast Biopsy:  Final Diagnosis:  A. Breast, left mastectomy with axillary tail  1. Focal residual ductal carcinoma in situ (DICS) (less than 5% of tumor bed).  2. Negative for residual invasive carcinoma.   3. 12 Lymph Nodes, negative for metastatic carcinoma (0/12) focally, some lymph nodes demonstrate fibrosis/treatment effect.  B. Skin and soft tissue, left advancement flap:  1. Benign skin and subcutis.   2. Negative for carcinoma.          6/20/2013 -  Other Event     Diagnostic Mammo [facility]:  1. A subareolar mass is consistent with malignancy. Additional involement extends  inferiorly sonographically and superolaterally mammograhically as demonstrated by calcifications. The mass measures approx 2.8 cm sonographically, but the involved region is likely much larger including an intraductal componet. The left axillary lymph nodes are also involved.   2. Recommened ultrasound guided biopsy of the left subareolar mass and left axillary lymph node.   3. Breast MRI could also further define multicentric disease on the left.   4. Clear suspicious finding on the right.  Recommend ongoing clinical follow up of palpable foci.          6/13/2014 -  Other Event     Diagnostic Mammo:  IMPRESSION:  1. History of left breast cancer and mastectomy. Stable benign right breast mammograms.          10/20/2014 Surgery     Final Diagnosis:  Biopsies, pertoneum:  Metastatic carcinoma, morphologically compatible with metastatic mammary carcinoma.          6/15/2015 -  Other Event     Diagnostic Mammo:  IMPRESSION:   1. Negative x-ray report, should not delay biopsy if a dominat or clinically suspicious mass is present.          7/5/2016 -  Other Event     Diagnositic mammogram:  Impression:   Stable right mamogram with no radiographic findings suspicious for malignancy. Recommend continued screening mammography per screening guidelines unless otherwise earlier clinically indicated.          9/18/2016 Initial Diagnosis     Secondary malignant neoplasm of bone and bone marrow              INTERVAL HISTORY  Patient ID: Heavenly Yanes is a 73 y.o. year old female Cancer Staging  Stage IV (T2, N1, M1)  --complaining of dyspnea on exersion since last Tuesday. She feels like she ran a mile     Denies /cp.n/v/fever/chills, neuropathy. Rest of ros unremarkable.  Past Medical History:   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   • Bipolar disorder (CMS/HCC)    • Disease of thyroid gland    • Hypertension    • Malignant neoplasm of upper-outer quadrant of left  female breast (CMS/[facility]) 9/18/2016   • Secondary malignant neoplasm of bone and bone marrow (CMS/[facility]) 9/18/2016     Past Surgical History:   Past Surgical History:   Procedure Laterality Date   • BREAST SURGERY      left breast biopsy and axillary node   • CHOLECYSTECTOMY     • EXTERNAL EAR SURGERY     • MASTECTOMY Left    • PORTACATH PLACEMENT       Social History:   Social History     Socioeconomic History   • Marital status:      Spouse name: Not on file   • Number of children: Not on file   • Years of education: Not on file   • Highest education level: Not on file   Social Needs   • Financial resource strain: Not on file   • Food insecurity - worry: Not on file   • Food insecurity - inability: Not on file   • Transportation needs - medical: Not on file   • Transportation needs - non-medical: Not on file   Occupational History   • Not on file   Tobacco Use   • Smoking status: Never Smoker   • Smokeless tobacco: Never Used   Substance and Sexual Activity   • Alcohol use: No   • Drug use: No   • Sexual activity: Not on file   Other Topics Concern   • Not on file   Social History Narrative   • Not on file     Family History:   Family History   Problem Relation Age of Onset   • No Known Problems Daughter    • No Known Problems Son    • Other Mother         complications from a concussion from a fall   • Other Father         old age   • No Known Problems Brother    • No Known Problems Daughter        Review of Systems   Constitutional: Negative.    HENT: Negative.    Eyes: Negative.    Respiratory: Positive for shortness of breath.    Cardiovascular: Negative.    Gastrointestinal: Negative.    Endocrine: Negative.    Genitourinary: Negative.    Musculoskeletal: Negative.    Skin: Negative.    Neurological: Negative.    Hematological: Negative.    Psychiatric/Behavioral: Negative.         Performance Status:  Asymptomatic    Medications:    Current Outpatient Medications   Medication Sig Dispense Refill   •  "Calcium-Magnesium-Vitamin D (CALCIUM 1200+D3 PO) Take 1,200 mg by mouth Daily.     • capecitabine (XELODA) 500 MG chemo tablet Take 1 tablet by mouth 2 (Two) Times a Day. For 2 weeks on and 1 week off. 42 tablet 11   • megestrol (MEGACE) 40 MG tablet Take 1 tablet by mouth 2 (Two) Times a Day. 60 tablet 2   • metoprolol tartrate (LOPRESSOR) 25 MG tablet Take 25 mg by mouth 2 (two) times a day.     • NON FORMULARY 500 mg. Turmeric curcumin bid     • OLANZapine (ZYPREXA) 2.5 MG tablet Take 2.5 mg by mouth every night.     • ondansetron (ZOFRAN) 8 MG tablet Take 1 tablet by mouth Every 8 (Eight) Hours As Needed for Nausea or Vomiting. 30 tablet 5   • raNITIdine (ZANTAC) 150 MG tablet Take 150 mg by mouth 2 (Two) Times a Day.     • rivaroxaban (XARELTO) 15 MG tablet Take 1 tablet by mouth 2 (Two) Times a Day With Meals. 14 tablet 0   • rivaroxaban (XARELTO) 20 MG tablet Take 1 tablet by mouth Daily. 30 tablet 6   • thyroid 60 MG PO tablet Take 60 mg by mouth daily.       No current facility-administered medications for this visit.      Facility-Administered Medications Ordered in Other Visits   Medication Dose Route Frequency Provider Last Rate Last Dose   • heparin flush (porcine) 100 UNIT/ML injection 500 Units  500 Units Intravenous PRN Joseph Nunez MD   500 Units at 10/26/17 1155   • sodium chloride 0.9 % flush 10 mL  10 mL Intravenous PRN Joseph Nunez MD   10 mL at 10/26/17 1155       ALLERGIES:    Allergies   Allergen Reactions   • Benadryl  [Diphenhydramine Hcl (Sleep)] Other (See Comments)   • Codeine    • Diphenhydramine Other (See Comments)     \"Shaky leg syndrome\"  \"Shaky leg syndrome\"  \"Shaky leg syndrome\"   • Heparin    • Other      POPPY SEED   • Penicillins Hives   • Petroleum Jelly [Skin Protectants, Misc.]    • Sulfa Antibiotics    • Sulfur    • Valium [Diazepam]        Objective      Vitals:    01/31/19 0904   BP: 138/70   Pulse: 76   Resp: 18   Temp: 97.4 °F (36.3 °C)   TempSrc: Tympanic   SpO2: " "96%   Weight: 62.9 kg (138 lb 9.6 oz)   Height: 165.1 cm (65\")   PainSc: 0-No pain         Current Status 1/31/2019   ECOG score 2         Physical Exam    General Appearance: Patient is awake, alert, oriented and in no acute distress. Patient is welldeveloped, wellnourished, and appears stated age.  HEENT: Normocephalic. Sclerae clear, conjunctiva pink, extraocular movements intact, pupils, round, reactive to light and  accommodation. Mouth and throat are clear with moist oral mucosa.  NECK: Supple, no jugular venous distention, thyroid not enlarged.  LYMPH: No cervical, supraclavicular, axillary, or inguinal lymphadenopathy.  CHEST: Equal bilateral expansion, AP  diameter normal, resonant percussion note  LUNGS: Good air movement, no rales, rhonchi, rubs or wheezes with auscultation on the right. Diminished on the left.  CARDIO: Regular sinus rhythm, no murmurs, gallops or rubs.  ABDOMEN: Nondistended, soft, No tenderness, no guarding, no rebound, No hepatosplenomegaly. No abdominal masses. Bowel sounds positive. No hernia  GENITALIA: Not examined.  BREASTS: Not examined.  MUSKEL: No joint swelling, decreased motion, or inflammation  EXTREMS: patrick le  edema,No clubbing, cyanosis, No varicose veins.  NEURO: Grossly nonfocal, Gait is coordinated and smooth, Cognition is preserved.  SKIN: No rashes, no ecchymoses, no petechia.  PSYCH: Oriented to time, place and person. Memory is preserved. Mood and affect appear normal  RECENT LABS:  Lab on 01/31/2019   Component Date Value Ref Range Status   • WBC 01/31/2019 9.60  4.80 - 10.80 10*3/mm3 Final   • RBC 01/31/2019 2.92* 4.20 - 5.40 10*6/mm3 Final   • Hemoglobin 01/31/2019 9.5* 12.0 - 16.0 g/dL Final   • Hematocrit 01/31/2019 29.3* 37.0 - 47.0 % Final   • MCV 01/31/2019 100.3* 82.0 - 98.0 fL Final   • MCH 01/31/2019 32.5* 28.0 - 32.0 pg Final   • MCHC 01/31/2019 32.4* 33.0 - 36.0 g/dL Final   • RDW 01/31/2019 17.8* 12.0 - 15.0 % Final   • RDW-SD 01/31/2019 64.6* 40.0 - " 54.0 fl Final   • MPV 01/31/2019 8.6  6.0 - 12.0 fL Final   • Platelets 01/31/2019 717* 130 - 400 10*3/mm3 Final   • Neutrophil % 01/31/2019 62.3  39.0 - 78.0 % Final   • Lymphocyte % 01/31/2019 24.6  15.0 - 45.0 % Final   • Monocyte % 01/31/2019 9.9  4.0 - 12.0 % Final   • Eosinophil % 01/31/2019 0.2  0.0 - 4.0 % Final   • Basophil % 01/31/2019 0.9  0.0 - 2.0 % Final   • Immature Grans % 01/31/2019 2.1  0.0 - 5.0 % Final   • Neutrophils, Absolute 01/31/2019 5.98  1.87 - 8.40 10*3/mm3 Final   • Lymphocytes, Absolute 01/31/2019 2.36  0.72 - 4.86 10*3/mm3 Final   • Monocytes, Absolute 01/31/2019 0.95  0.19 - 1.30 10*3/mm3 Final   • Eosinophils, Absolute 01/31/2019 0.02  0.00 - 0.70 10*3/mm3 Final   • Basophils, Absolute 01/31/2019 0.09  0.00 - 0.20 10*3/mm3 Final   • Immature Grans, Absolute 01/31/2019 0.20* 0.00 - 0.03 10*3/mm3 Final   • nRBC 01/31/2019 0.0  0.0 - 0.0 /100 WBC Final       RADIOLOGY:  Us Venous Doppler Lower Extremity Left (duplex)    Result Date: 1/30/2019  Narrative: History: Swelling      Impression: Impression: 1. There is evidence of deep venous thrombosis of the left lower extremity. The clot burden is improved from last study. 2. There is also evidence of superficial clot in the greater saphenous vein. There is a Baker's cyst in the popliteal fossa.  Comments: Left lower extremity venous duplex exam was performed using color Doppler flow, Doppler wave form analysis, and grayscale imaging, with and without compression. There is evidence of deep venous thrombosis in the iliac and common femoral veins. There is no thrombus identified in the saphenofemoral junction or the greater saphenous vein. There is no evidence of clot in the right common femoral vein.  This report was finalized on 01/30/2019 13:24 by Dr. Terrell Collins MD.    Us Venous Doppler Lower Extremity Bilateral (duplex)    Result Date: 1/17/2019  Narrative: History: Swelling      Impression: Impression: There is evidence of deep  venous thrombosis in the left lower extremity distal external iliac, common femoral, superficial femoral, profunda femoris, popliteal, posterior tibial, and peroneal veins. There is no evidence of DVT in the right lower extremity. There is evidence of superficial thrombophlebitis in the left greater saphenous and lesser saphenous veins. .  Comments: Bilateral lower extremity venous duplex exam was performed using color Doppler flow, Doppler waveform analysis, and grayscale imaging, with and without compression. There is evidence of DVT present in the left external iliac, common femoral, superficial femoral, profunda femoris, popliteal, posterior tibial, and peroneal veins. There is also evidence of superficial thrombophlebitis in the left greater saphenous and lesser saphenous veins. There is no evidence of DVT or superficial thrombophlebitis in the right lower extremity.   This report was finalized on 01/17/2019 12:17 by Dr. Alonso Kumari MD.    Xr Chest Pa & Lateral    Result Date: 1/10/2019  Narrative: EXAMINATION: XR CHEST PA AND LATERAL-  1/10/2019 12:35 PM CST  HISTORY: dyspneic; C50.912-Malignant neoplasm of unspecified site of left female breast; C79.51-Secondary malignant neoplasm of bone; R06.09-Other forms of dyspnea.  2 view chest x-ray with no comparison.  Normal heart size. Aortic arch calcification.  Normal appearance of a right subclavian port.  Patchy sclerotic disease noted throughout all bones is compatible with the known history of metastatic disease.  Chronic lung changes with mild hyperexpansion. There is pleural thickening or small amount of pleural fluid on the left.  Summary: 1. No acute lung disease. This report was finalized on 01/10/2019 12:47 by Dr. Reyes Borrero MD.           Assessment/Plan  Heavenly Yanes is a 73 y.o. year old female with met breast cancer s/p multiple lines of CMT currently on taxotere weekly and xeloda with good tolerance.    Patient Active Problem List   Diagnosis    • Malignant neoplasm of upper-outer quadrant of left female breast (CMS/HCC)   • Breast cancer metastasized to bone, unspecified laterality (CMS/HCC)   • Essential hypertension   • Acquired hypothyroidism   • Bipolar 1 disorder (CMS/HCC)   • Carcinoma of left breast metastatic to bone (CMS/HCC)   • Antineoplastic chemotherapy induced anemia   • Dehydration   • Encounter for administration of vaccine   • Chemotherapy induced neutropenia (CMS/HCC)          1.Metastatic breast ca: currently on taxotere 3weeks on 1 week off. Labs reviewed wbc 9.6, hg 9.5, plt 717. Ok for treatment today  --also on xeloda  --CT c/a/p and bone scan before next treatment      2. Psych: on olanzapine    3. HTN: on metoprolol    4. Gerd: on ranitidine  5. Dyspnea: get a cxr to evaluate  6. Anemia: will check iron profile, ferritin. Her creatinine is normal at 0.94.   7. DVT on xarelto indefinite.  --Doppler left LE  1/30/19 There is evidence of deep venous thrombosis of the left lower extremity. The clot burden is improved from last study. There is also evidence of superficial clot in the greater saphenous vein. There is a Baker's cyst in the popliteal fossa.  8. CHF: LVEF 65% with diastolic dysfunction. referral to cardiology to maximized her heart function.  9. Thrombocytosis: suspect reactive from anemia. Will check iron profile.    Moise Sotelo MD    1/31/2019    9:26 AM

## 2019-02-01 DIAGNOSIS — C50.912 CARCINOMA OF LEFT BREAST METASTATIC TO BONE (HCC): ICD-10-CM

## 2019-02-01 DIAGNOSIS — C79.51 CARCINOMA OF LEFT BREAST METASTATIC TO BONE (HCC): ICD-10-CM

## 2019-02-06 DIAGNOSIS — C50.912 CARCINOMA OF LEFT BREAST METASTATIC TO BONE (HCC): ICD-10-CM

## 2019-02-06 DIAGNOSIS — C79.51 CARCINOMA OF LEFT BREAST METASTATIC TO BONE (HCC): ICD-10-CM

## 2019-02-07 ENCOUNTER — HOSPITAL ENCOUNTER (OUTPATIENT)
Dept: GENERAL RADIOLOGY | Facility: HOSPITAL | Age: 74
Discharge: HOME | End: 2019-02-07
Admitting: INTERNAL MEDICINE

## 2019-02-07 ENCOUNTER — LAB (OUTPATIENT)
Dept: LAB | Facility: HOSPITAL | Age: 74
End: 2019-02-07
Attending: INTERNAL MEDICINE

## 2019-02-07 ENCOUNTER — OFFICE VISIT (OUTPATIENT)
Dept: ONCOLOGY | Facility: CLINIC | Age: 74
End: 2019-02-07

## 2019-02-07 ENCOUNTER — INFUSION (OUTPATIENT)
Dept: ONCOLOGY | Facility: HOSPITAL | Age: 74
End: 2019-02-07
Attending: INTERNAL MEDICINE

## 2019-02-07 VITALS
SYSTOLIC BLOOD PRESSURE: 116 MMHG | WEIGHT: 132.1 LBS | HEART RATE: 97 BPM | TEMPERATURE: 99.7 F | DIASTOLIC BLOOD PRESSURE: 64 MMHG | BODY MASS INDEX: 22.01 KG/M2 | OXYGEN SATURATION: 93 % | HEIGHT: 65 IN | RESPIRATION RATE: 18 BRPM

## 2019-02-07 VITALS
BODY MASS INDEX: 21.99 KG/M2 | RESPIRATION RATE: 20 BRPM | HEIGHT: 65 IN | OXYGEN SATURATION: 95 % | WEIGHT: 132 LBS | SYSTOLIC BLOOD PRESSURE: 126 MMHG | TEMPERATURE: 98.7 F | DIASTOLIC BLOOD PRESSURE: 47 MMHG | HEART RATE: 74 BPM

## 2019-02-07 DIAGNOSIS — C50.912 CARCINOMA OF LEFT BREAST METASTATIC TO BONE (HCC): ICD-10-CM

## 2019-02-07 DIAGNOSIS — R05.9 COUGH: ICD-10-CM

## 2019-02-07 DIAGNOSIS — C79.51 BREAST CANCER METASTASIZED TO BONE, UNSPECIFIED LATERALITY (HCC): ICD-10-CM

## 2019-02-07 DIAGNOSIS — C79.51 CARCINOMA OF LEFT BREAST METASTATIC TO BONE (HCC): ICD-10-CM

## 2019-02-07 DIAGNOSIS — C50.919 BREAST CANCER METASTASIZED TO BONE, UNSPECIFIED LATERALITY (HCC): ICD-10-CM

## 2019-02-07 DIAGNOSIS — R05.9 COUGH: Primary | ICD-10-CM

## 2019-02-07 DIAGNOSIS — C79.51 CARCINOMA OF LEFT BREAST METASTATIC TO BONE (HCC): Primary | ICD-10-CM

## 2019-02-07 DIAGNOSIS — C50.412 MALIGNANT NEOPLASM OF UPPER-OUTER QUADRANT OF LEFT FEMALE BREAST, UNSPECIFIED ESTROGEN RECEPTOR STATUS (HCC): ICD-10-CM

## 2019-02-07 DIAGNOSIS — C50.912 CARCINOMA OF LEFT BREAST METASTATIC TO BONE (HCC): Primary | ICD-10-CM

## 2019-02-07 LAB
ALBUMIN SERPL-MCNC: 4.2 G/DL (ref 3.5–5)
ALBUMIN/GLOB SERPL: 1.3 G/DL (ref 1.1–2.5)
ALP SERPL-CCNC: 64 U/L (ref 24–120)
ALT SERPL W P-5'-P-CCNC: <15 U/L (ref 0–54)
ANION GAP SERPL CALCULATED.3IONS-SCNC: 11 MMOL/L (ref 4–13)
AST SERPL-CCNC: 27 U/L (ref 7–45)
BASOPHILS # BLD AUTO: 0.09 10*3/MM3 (ref 0–0.2)
BASOPHILS NFR BLD AUTO: 0.6 % (ref 0–2)
BILIRUB SERPL-MCNC: 0.5 MG/DL (ref 0.1–1)
BUN BLD-MCNC: 15 MG/DL (ref 5–21)
BUN/CREAT SERPL: 15.6 (ref 7–25)
CALCIUM SPEC-SCNC: 9.8 MG/DL (ref 8.4–10.4)
CHLORIDE SERPL-SCNC: 94 MMOL/L (ref 98–110)
CO2 SERPL-SCNC: 28 MMOL/L (ref 24–31)
CREAT BLD-MCNC: 0.96 MG/DL (ref 0.5–1.4)
DEPRECATED RDW RBC AUTO: 62 FL (ref 40–54)
EOSINOPHIL # BLD AUTO: 0.01 10*3/MM3 (ref 0–0.7)
EOSINOPHIL NFR BLD AUTO: 0.1 % (ref 0–4)
ERYTHROCYTE [DISTWIDTH] IN BLOOD BY AUTOMATED COUNT: 17.1 % (ref 12–15)
GFR SERPL CREATININE-BSD FRML MDRD: 57 ML/MIN/1.73
GLOBULIN UR ELPH-MCNC: 3.2 GM/DL
GLUCOSE BLD-MCNC: 110 MG/DL (ref 70–100)
HCT VFR BLD AUTO: 28.5 % (ref 37–47)
HGB BLD-MCNC: 9.6 G/DL (ref 12–16)
HOLD SPECIMEN: NORMAL
HOLD SPECIMEN: NORMAL
IMM GRANULOCYTES # BLD AUTO: 0.17 10*3/MM3 (ref 0–0.03)
IMM GRANULOCYTES NFR BLD AUTO: 1.1 % (ref 0–5)
LYMPHOCYTES # BLD AUTO: 1.43 10*3/MM3 (ref 0.72–4.86)
LYMPHOCYTES NFR BLD AUTO: 9.5 % (ref 15–45)
MCH RBC QN AUTO: 33.6 PG (ref 28–32)
MCHC RBC AUTO-ENTMCNC: 33.7 G/DL (ref 33–36)
MCV RBC AUTO: 99.7 FL (ref 82–98)
MONOCYTES # BLD AUTO: 0.47 10*3/MM3 (ref 0.19–1.3)
MONOCYTES NFR BLD AUTO: 3.1 % (ref 4–12)
NEUTROPHILS # BLD AUTO: 12.91 10*3/MM3 (ref 1.87–8.4)
NEUTROPHILS NFR BLD AUTO: 85.6 % (ref 39–78)
NRBC BLD AUTO-RTO: 0 /100 WBC (ref 0–0)
PLATELET # BLD AUTO: 608 10*3/MM3 (ref 130–400)
PMV BLD AUTO: 9.4 FL (ref 6–12)
POTASSIUM BLD-SCNC: 3.6 MMOL/L (ref 3.5–5.3)
PROT SERPL-MCNC: 7.4 G/DL (ref 6.3–8.7)
RBC # BLD AUTO: 2.86 10*6/MM3 (ref 4.2–5.4)
SODIUM BLD-SCNC: 133 MMOL/L (ref 135–145)
WBC NRBC COR # BLD: 15.08 10*3/MM3 (ref 4.8–10.8)

## 2019-02-07 PROCEDURE — 96413 CHEMO IV INFUSION 1 HR: CPT

## 2019-02-07 PROCEDURE — 86300 IMMUNOASSAY TUMOR CA 15-3: CPT

## 2019-02-07 PROCEDURE — 85025 COMPLETE CBC W/AUTO DIFF WBC: CPT

## 2019-02-07 PROCEDURE — 99214 OFFICE O/P EST MOD 30 MIN: CPT | Performed by: INTERNAL MEDICINE

## 2019-02-07 PROCEDURE — 36415 COLL VENOUS BLD VENIPUNCTURE: CPT

## 2019-02-07 PROCEDURE — 25010000002 ONDANSETRON PER 1 MG: Performed by: INTERNAL MEDICINE

## 2019-02-07 PROCEDURE — 25010000002 DOCETAXEL 20 MG/ML SOLUTION 4 ML VIAL: Performed by: INTERNAL MEDICINE

## 2019-02-07 PROCEDURE — 80053 COMPREHEN METABOLIC PANEL: CPT

## 2019-02-07 PROCEDURE — 25010000003 DEXAMETHASONE SODIUM PHOSPHATE 100 MG/10ML SOLUTION: Performed by: INTERNAL MEDICINE

## 2019-02-07 PROCEDURE — 96367 TX/PROPH/DG ADDL SEQ IV INF: CPT

## 2019-02-07 PROCEDURE — 71046 X-RAY EXAM CHEST 2 VIEWS: CPT

## 2019-02-07 PROCEDURE — 25010000002 CIPROFLOXACIN PER 200 MG: Performed by: INTERNAL MEDICINE

## 2019-02-07 RX ORDER — SODIUM CHLORIDE 9 MG/ML
250 INJECTION, SOLUTION INTRAVENOUS ONCE
Status: DISCONTINUED | OUTPATIENT
Start: 2019-02-07 | End: 2019-02-07 | Stop reason: HOSPADM

## 2019-02-07 RX ORDER — SODIUM CHLORIDE 0.9 % (FLUSH) 0.9 %
10 SYRINGE (ML) INJECTION AS NEEDED
Status: CANCELLED | OUTPATIENT
Start: 2019-02-07

## 2019-02-07 RX ORDER — CIPROFLOXACIN 2 MG/ML
400 INJECTION, SOLUTION INTRAVENOUS ONCE
Status: COMPLETED | OUTPATIENT
Start: 2019-02-07 | End: 2019-02-07

## 2019-02-07 RX ORDER — SODIUM CHLORIDE 0.9 % (FLUSH) 0.9 %
10 SYRINGE (ML) INJECTION AS NEEDED
Status: DISCONTINUED | OUTPATIENT
Start: 2019-02-07 | End: 2019-02-07 | Stop reason: HOSPADM

## 2019-02-07 RX ORDER — FAMOTIDINE 10 MG/ML
20 INJECTION, SOLUTION INTRAVENOUS AS NEEDED
Status: DISCONTINUED | OUTPATIENT
Start: 2019-02-07 | End: 2019-02-07 | Stop reason: HOSPADM

## 2019-02-07 RX ORDER — SODIUM CHLORIDE 9 MG/ML
250 INJECTION, SOLUTION INTRAVENOUS ONCE
Status: CANCELLED | OUTPATIENT
Start: 2019-02-07

## 2019-02-07 RX ORDER — CIPROFLOXACIN 500 MG/1
500 TABLET, FILM COATED ORAL 2 TIMES DAILY
Qty: 20 TABLET | Refills: 0 | Status: SHIPPED | OUTPATIENT
Start: 2019-02-07 | End: 2019-02-11 | Stop reason: ALTCHOICE

## 2019-02-07 RX ORDER — MEPERIDINE HYDROCHLORIDE 50 MG/ML
25 INJECTION INTRAMUSCULAR; INTRAVENOUS; SUBCUTANEOUS
Status: DISCONTINUED | OUTPATIENT
Start: 2019-02-07 | End: 2019-02-07 | Stop reason: HOSPADM

## 2019-02-07 RX ORDER — SODIUM CHLORIDE 9 MG/ML
250 INJECTION, SOLUTION INTRAVENOUS ONCE
Status: COMPLETED | OUTPATIENT
Start: 2019-02-07 | End: 2019-02-07

## 2019-02-07 RX ORDER — CIPROFLOXACIN 2 MG/ML
400 INJECTION, SOLUTION INTRAVENOUS ONCE
Status: CANCELLED
Start: 2019-02-07 | End: 2019-02-07

## 2019-02-07 RX ADMIN — CIPROFLOXACIN 400 MG: 2 INJECTION, SOLUTION INTRAVENOUS at 09:09

## 2019-02-07 RX ADMIN — DEXAMETHASONE SODIUM PHOSPHATE 50 ML: 10 INJECTION, SOLUTION INTRAMUSCULAR; INTRAVENOUS at 10:12

## 2019-02-07 RX ADMIN — DOCETAXEL 60 MG: 20 INJECTION, SOLUTION, CONCENTRATE INTRAVENOUS at 10:30

## 2019-02-07 RX ADMIN — SODIUM CHLORIDE 250 ML: 9 INJECTION, SOLUTION INTRAVENOUS at 09:09

## 2019-02-07 NOTE — PROGRESS NOTES
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   • Carcinoma of left breast metastatic to bone (CMS/HCC)        REASON FOR VISIT:     Chief Complaint   Patient presents with   • Breast Cancer     She is here for consideration of her txt today   • Weight Loss     Patient has 7 lb wt loss from previous visit, states she just does not have any appetite or desire to eat, poor taste   • Fever     Patient does have elevated temp oral and tempanic, states she just does not feel well, she appears pale and weakness        HEMATOLOGY / ONCOLOGY HISTORY:   Oncology/Hematology History    Patient is a 67-year-old postmenopausal female who had onset of her menses at age 9 and menopause at age 48. She received oral contraceptives for approximately 14 years and did not receive hormonal manipulation. Patient has a maternal cousin had breast cancer. Patient has had no prior breast biopsies. Patient found a palpable left breast mass as well as a left axillary mass. Patient had a bloody nipple discharge which been present for approximately 6 months. On 6/20/2012, a mammogram was performed showing a subareolar mass consistent with malignancy. Bilateral breast ultrasound revealed an ill-defined subareolar mass measuring 2.8-2.5 cm. There is a left axillary mass measuring 1.7 cm with suspicion of extracapsular extension. There is no evidence of disease in the right breast. On 7/9/2012 patient underwent a left breast biopsy and placement of marker clip. Left breast biopsy revealed an infiltrating lobular carcinoma grade 2 with focal ductal carcinoma in situ, solid pattern. A fine-needle biopsy of  the left lymph node was consistent with metastatic carcinoma. Breast tumor profile revealed ER: 100%; IL: 98%; HER-2/maynor 2+; FISH: Unamplified; Ki-67: 71%; P 53 1%; DNA aneuploidy. A MRI of the breast were performed body multiple abnormal enhancing masses within the left breast. There appears to be anterior retraction of the pectoralis muscle with no direct extension. There is a moderate to large, layering left pleural effusion appreciated. The right breast reveals 3 moderately enlarged lymph nodes in the posterior lateral aspect the right breast. These have fatty hilum but followup is recommended. Patient is undergone systemic studies including, on 8/2/2012, a CT scan of the brain showing atrophy. a CT scan that shows showing a small to moderate left pleural effusion with 3 subcentimeter nodule densities in the left lung.   She completed 4 cycles of neoadjuvant treatment with dose dense Adriamycin and Cytoxan. She had a mammogram which showed a significant reduction in the size of her left breast lesion. There is no axillary adenopathy noted. She has had an ultrasound revealing the  lesion reducing from 2.4 cm to 1 cm. Patient underwent a left mastectomy on 03/19/2013 finding negative invasive cancer, negative nodes. A 5% DCIS was noted. The patient has declined hormonal therapy. She did not need radiation therapy.  November 2014, she began to have evidence of lytic bone lesions at T5T6, frontal disease, an 8 mm lucency in the central frontal area of  the skull but nothing intracranial. Bone scan revealed only vertex tracer activity but bilateral ribs and thoracic spine, and other lesions in  her pelvis. At that time, she was started on letrozole since November 2014. She is taking Xgeva. She had progression found in bone in Feb 2015 on scan. She was started on Faslodex, Ibrance and continued xgeva.         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 B) Foci of ductal carcinoma in situ, solid pattern. C) Greatest dimension of the invasive carcinoma is 15.8mm.         3/19/2013 Surgery     Left Mastectomy w/ Axillary Tail: Focal residual ducatal carcinoma in situ (DCIS) 12 lymph nodes negative for metastatic carcinoma.         10/20/2014 Biopsy     Peritoneum Biopsy: Final Diagnosis: Malignant Cell Neoplasm.           Breast cancer metastasized to bone, unspecified laterality (CMS/HCC)    10/15/2012 -  Other Event     Left mammogram:  Impression:  1. Decreasing spiculation and density in the retroareolar left breast, near a previous biopsy.   2. Definite left axillary lesion is not appreciated.          2/5/2013 -  Other Event     Diagnostic Mammo:  Comparison is made to 10/15/2012 and 6/20/2012  The spiculated mass at 12:00 behind the nipple is nearly resolved.   Impression:  1. The mass on the left side is less apparent after receiving chemotherapy. There has been a good response to chmotherapy on the left side.   2. No suspicious abnormality is seen in the right breast to account for the new palable abnormality at the 4-5:00 position.          3/19/2013 Surgery     Breast Biopsy:  Final Diagnosis:  A. Breast, left mastectomy with axillary tail  1. Focal residual ductal carcinoma in situ (DICS) (less than 5% of tumor bed).  2. Negative for residual invasive carcinoma.   3. 12 Lymph Nodes, negative for metastatic carcinoma (0/12) focally, some lymph nodes demonstrate fibrosis/treatment effect.  B. Skin and soft tissue, left advancement flap:  1. Benign skin and subcutis.   2. Negative for carcinoma.          6/20/2013 -  Other Event     Diagnostic Mammo [facility]:  1. A subareolar mass is consistent with malignancy. Additional involement extends inferiorly sonographically and  superolaterally mammograhically as demonstrated by calcifications. The mass measures approx 2.8 cm sonographically, but the involved region is likely much larger including an intraductal componet. The left axillary lymph nodes are also involved.   2. Recommened ultrasound guided biopsy of the left subareolar mass and left axillary lymph node.   3. Breast MRI could also further define multicentric disease on the left.   4. Clear suspicious finding on the right.  Recommend ongoing clinical follow up of palpable foci.          6/13/2014 -  Other Event     Diagnostic Mammo:  IMPRESSION:  1. History of left breast cancer and mastectomy. Stable benign right breast mammograms.          10/20/2014 Surgery     Final Diagnosis:  Biopsies, pertoneum:  Metastatic carcinoma, morphologically compatible with metastatic mammary carcinoma.          6/15/2015 -  Other Event     Diagnostic Mammo:  IMPRESSION:   1. Negative x-ray report, should not delay biopsy if a dominat or clinically suspicious mass is present.          7/5/2016 -  Other Event     Diagnositic mammogram:  Impression:   Stable right mamogram with no radiographic findings suspicious for malignancy. Recommend continued screening mammography per screening guidelines unless otherwise earlier clinically indicated.          9/18/2016 Initial Diagnosis     Secondary malignant neoplasm of bone and bone marrow              INTERVAL HISTORY  Patient ID: Heavenly Yanes is a 73 y.o. year old female Cancer Staging  Stage IV (T2, N1, M1)  --complaining of dyspnea on exersion since last Tuesday. She feels like she ran a mile  2/7/19: coughing worse since last night, spiked a temp ltoday. Overall does not fell good. Has not taken her apetite suppressant hence does not have apetite.   Denies /cp.n/v/fever/chills, neuropathy. Rest of ros unremarkable.  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) 9/18/2016     Past Surgical History:   Past Surgical History:   Procedure Laterality Date   • BREAST SURGERY      left breast biopsy and axillary node   • CHOLECYSTECTOMY     • EXTERNAL EAR SURGERY     • MASTECTOMY Left    • PORTACATH PLACEMENT       Social History:   Social History     Socioeconomic History   • Marital status:      Spouse name: Not on file   • Number of children: Not on file   • Years of education: Not on file   • Highest education level: Not on file   Social Needs   • Financial resource strain: Not on file   • Food insecurity - worry: Not on file   • Food insecurity - inability: Not on file   • Transportation needs - medical: Not on file   • Transportation needs - non-medical: Not on file   Occupational History   • Not on file   Tobacco Use   • Smoking status: Never Smoker   • Smokeless tobacco: Never Used   Substance and Sexual Activity   • Alcohol use: No   • Drug use: No   • Sexual activity: Not on file   Other Topics Concern   • Not on file   Social History Narrative   • Not on file     Family History:   Family History   Problem Relation Age of Onset   • No Known Problems Daughter    • No Known Problems Son    • Other Mother         complications from a concussion from a fall   • Other Father         old age   • No Known Problems Brother    • No Known Problems Daughter        Review of Systems   Constitutional: Negative.    HENT: Negative.    Eyes: Negative.    Respiratory: Positive for shortness of breath.    Cardiovascular: Negative.    Gastrointestinal: Negative.    Endocrine: Negative.    Genitourinary: Negative.    Musculoskeletal: Negative.    Skin: Negative.    Neurological: Negative.    Hematological: Negative.    Psychiatric/Behavioral: Negative.      "    Performance Status:  Asymptomatic    Medications:    Current Outpatient Medications   Medication Sig Dispense Refill   • Calcium-Magnesium-Vitamin D (CALCIUM 1200+D3 PO) Take 1,200 mg by mouth Daily.     • capecitabine (XELODA) 500 MG chemo tablet Take 1 tablet by mouth 2 (Two) Times a Day. For 2 weeks on and 1 week off. 42 tablet 11   • metoprolol tartrate (LOPRESSOR) 25 MG tablet Take 25 mg by mouth 2 (two) times a day.     • OLANZapine (ZYPREXA) 2.5 MG tablet Take 2.5 mg by mouth every night.     • ondansetron (ZOFRAN) 8 MG tablet Take 1 tablet by mouth Every 8 (Eight) Hours As Needed for Nausea or Vomiting. (Patient taking differently: Take 8 mg by mouth Every 8 (Eight) Hours As Needed for Nausea or Vomiting.) 30 tablet 5   • raNITIdine (ZANTAC) 150 MG tablet Take 150 mg by mouth 2 (Two) Times a Day.     • thyroid 60 MG PO tablet Take 60 mg by mouth daily.     • megestrol (MEGACE) 40 MG tablet Take 1 tablet by mouth 2 (Two) Times a Day. 60 tablet 2   • rivaroxaban (XARELTO) 20 MG tablet Take 1 tablet by mouth Daily. 30 tablet 6     No current facility-administered medications for this visit.      Facility-Administered Medications Ordered in Other Visits   Medication Dose Route Frequency Provider Last Rate Last Dose   • heparin flush (porcine) 100 UNIT/ML injection 500 Units  500 Units Intravenous PRN Joseph Nunez MD   500 Units at 10/26/17 1155   • sodium chloride 0.9 % flush 10 mL  10 mL Intravenous PRN Joseph Nunez MD   10 mL at 10/26/17 1155       ALLERGIES:    Allergies   Allergen Reactions   • Benadryl  [Diphenhydramine Hcl (Sleep)] Other (See Comments)   • Codeine    • Diphenhydramine Other (See Comments)     \"Shaky leg syndrome\"  \"Shaky leg syndrome\"  \"Shaky leg syndrome\"   • Heparin    • Other      POPPY SEED   • Penicillins Hives   • Petroleum Jelly [Skin Protectants, Misc.]    • Sulfa Antibiotics    • Sulfur    • Valium [Diazepam]        Objective      Vitals:    02/07/19 0756 02/07/19 0800 " "  BP: 116/64    Pulse: 97    Resp: 18    Temp: (!) 100.8 °F (38.2 °C) 99.7 °F (37.6 °C)   TempSrc: Tympanic Oral   SpO2: 93%    Weight: 59.9 kg (132 lb 1.6 oz)    Height: 165.1 cm (65\")    PainSc: 0-No pain          Current Status 2/7/2019   ECOG score 2         Physical Exam    General Appearance: Patient is awake, alert, oriented and in no acute distress. Patient is welldeveloped, wellnourished, and appears stated age.  HEENT: Normocephalic. Sclerae clear, conjunctiva pink, extraocular movements intact, pupils, round, reactive to light and  accommodation. Mouth and throat are clear with moist oral mucosa.  NECK: Supple, no jugular venous distention, thyroid not enlarged.  LYMPH: No cervical, supraclavicular, axillary, or inguinal lymphadenopathy.  CHEST: Equal bilateral expansion, AP  diameter normal, resonant percussion note  LUNGS: Good air movement, no rales, rhonchi, rubs or wheezes with auscultation on the right. Diminished on the left.  CARDIO: Regular sinus rhythm, no murmurs, gallops or rubs.  ABDOMEN: Nondistended, soft, No tenderness, no guarding, no rebound, No hepatosplenomegaly. No abdominal masses. Bowel sounds positive. No hernia  GENITALIA: Not examined.  BREASTS: Not examined.  MUSKEL: No joint swelling, decreased motion, or inflammation  EXTREMS: patrick le  edema,No clubbing, cyanosis, No varicose veins.  NEURO: Grossly nonfocal, Gait is coordinated and smooth, Cognition is preserved.  SKIN: No rashes, no ecchymoses, no petechia.  PSYCH: Oriented to time, place and person. Memory is preserved. Mood and affect appear normal  RECENT LABS:  Lab on 02/07/2019   Component Date Value Ref Range Status   • Glucose 02/07/2019 110* 70 - 100 mg/dL Final   • BUN 02/07/2019 15  5 - 21 mg/dL Final   • Creatinine 02/07/2019 0.96  0.50 - 1.40 mg/dL Final   • Sodium 02/07/2019 133* 135 - 145 mmol/L Final   • Potassium 02/07/2019 3.6  3.5 - 5.3 mmol/L Final   • Chloride 02/07/2019 94* 98 - 110 mmol/L Final   • CO2 " 02/07/2019 28.0  24.0 - 31.0 mmol/L Final   • Calcium 02/07/2019 9.8  8.4 - 10.4 mg/dL Final   • Total Protein 02/07/2019 7.4  6.3 - 8.7 g/dL Final   • Albumin 02/07/2019 4.20  3.50 - 5.00 g/dL Final   • ALT (SGPT) 02/07/2019 <15  0 - 54 U/L Final   • AST (SGOT) 02/07/2019 27  7 - 45 U/L Final   • Alkaline Phosphatase 02/07/2019 64  24 - 120 U/L Final   • Total Bilirubin 02/07/2019 0.5  0.1 - 1.0 mg/dL Final   • eGFR Non African Amer 02/07/2019 57* >60 mL/min/1.73 Final   • Globulin 02/07/2019 3.2  gm/dL Final   • A/G Ratio 02/07/2019 1.3  1.1 - 2.5 g/dL Final   • BUN/Creatinine Ratio 02/07/2019 15.6  7.0 - 25.0 Final   • Anion Gap 02/07/2019 11.0  4.0 - 13.0 mmol/L Final   • WBC 02/07/2019 15.08* 4.80 - 10.80 10*3/mm3 Final   • RBC 02/07/2019 2.86* 4.20 - 5.40 10*6/mm3 Final   • Hemoglobin 02/07/2019 9.6* 12.0 - 16.0 g/dL Final   • Hematocrit 02/07/2019 28.5* 37.0 - 47.0 % Final   • MCV 02/07/2019 99.7* 82.0 - 98.0 fL Final   • MCH 02/07/2019 33.6* 28.0 - 32.0 pg Final   • MCHC 02/07/2019 33.7  33.0 - 36.0 g/dL Final   • RDW 02/07/2019 17.1* 12.0 - 15.0 % Final   • RDW-SD 02/07/2019 62.0* 40.0 - 54.0 fl Final   • MPV 02/07/2019 9.4  6.0 - 12.0 fL Final   • Platelets 02/07/2019 608* 130 - 400 10*3/mm3 Final   • Neutrophil % 02/07/2019 85.6* 39.0 - 78.0 % Final   • Lymphocyte % 02/07/2019 9.5* 15.0 - 45.0 % Final   • Monocyte % 02/07/2019 3.1* 4.0 - 12.0 % Final   • Eosinophil % 02/07/2019 0.1  0.0 - 4.0 % Final   • Basophil % 02/07/2019 0.6  0.0 - 2.0 % Final   • Immature Grans % 02/07/2019 1.1  0.0 - 5.0 % Final   • Neutrophils, Absolute 02/07/2019 12.91* 1.87 - 8.40 10*3/mm3 Final   • Lymphocytes, Absolute 02/07/2019 1.43  0.72 - 4.86 10*3/mm3 Final   • Monocytes, Absolute 02/07/2019 0.47  0.19 - 1.30 10*3/mm3 Final   • Eosinophils, Absolute 02/07/2019 0.01  0.00 - 0.70 10*3/mm3 Final   • Basophils, Absolute 02/07/2019 0.09  0.00 - 0.20 10*3/mm3 Final   • Immature Grans, Absolute 02/07/2019 0.17* 0.00 - 0.03  10*3/mm3 Final   • nRBC 02/07/2019 0.0  0.0 - 0.0 /100 WBC Final       RADIOLOGY:  Us Venous Doppler Lower Extremity Left (duplex)    Result Date: 1/30/2019  Narrative: History: Swelling      Impression: Impression: 1. There is evidence of deep venous thrombosis of the left lower extremity. The clot burden is improved from last study. 2. There is also evidence of superficial clot in the greater saphenous vein. There is a Baker's cyst in the popliteal fossa.  Comments: Left lower extremity venous duplex exam was performed using color Doppler flow, Doppler wave form analysis, and grayscale imaging, with and without compression. There is evidence of deep venous thrombosis in the iliac and common femoral veins. There is no thrombus identified in the saphenofemoral junction or the greater saphenous vein. There is no evidence of clot in the right common femoral vein.  This report was finalized on 01/30/2019 13:24 by Dr. Terrell Collins MD.    Us Venous Doppler Lower Extremity Bilateral (duplex)    Result Date: 1/17/2019  Narrative: History: Swelling      Impression: Impression: There is evidence of deep venous thrombosis in the left lower extremity distal external iliac, common femoral, superficial femoral, profunda femoris, popliteal, posterior tibial, and peroneal veins. There is no evidence of DVT in the right lower extremity. There is evidence of superficial thrombophlebitis in the left greater saphenous and lesser saphenous veins. .  Comments: Bilateral lower extremity venous duplex exam was performed using color Doppler flow, Doppler waveform analysis, and grayscale imaging, with and without compression. There is evidence of DVT present in the left external iliac, common femoral, superficial femoral, profunda femoris, popliteal, posterior tibial, and peroneal veins. There is also evidence of superficial thrombophlebitis in the left greater saphenous and lesser saphenous veins. There is no evidence of DVT or  superficial thrombophlebitis in the right lower extremity.   This report was finalized on 01/17/2019 12:17 by Dr. Alonso Kumari MD.    Xr Chest Pa & Lateral    Result Date: 1/10/2019  Narrative: EXAMINATION: XR CHEST PA AND LATERAL-  1/10/2019 12:35 PM CST  HISTORY: dyspneic; C50.912-Malignant neoplasm of unspecified site of left female breast; C79.51-Secondary malignant neoplasm of bone; R06.09-Other forms of dyspnea.  2 view chest x-ray with no comparison.  Normal heart size. Aortic arch calcification.  Normal appearance of a right subclavian port.  Patchy sclerotic disease noted throughout all bones is compatible with the known history of metastatic disease.  Chronic lung changes with mild hyperexpansion. There is pleural thickening or small amount of pleural fluid on the left.  Summary: 1. No acute lung disease. This report was finalized on 01/10/2019 12:47 by Dr. Reyes Borrero MD.           Assessment/Plan  Heavenly Yanes is a 73 y.o. year old female with met breast cancer s/p multiple lines of CMT currently on taxotere weekly and xeloda with good tolerance.    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 Labs reviewed wbc 9.6, hg 9.5, plt 717. Ok for treatment today  --also on xeloda  --CT c/a/p and bone scan before next treatment      2. Psych: on olanzapine    3. HTN: on metoprolol    4. Gerd: on ranitidine  5. Dyspnea: get a cxr to evaluate  6. Anemia: will check iron profile, ferritin. Her  creatinine is normal at 0.94.   7. DVT on xarelto indefinite.  --Doppler left LE  1/30/19 There is evidence of deep venous thrombosis of the left lower extremity. The clot burden is improved from last study. There is also evidence of superficial clot in the greater saphenous vein. There is a Baker's cyst in the popliteal fossa.  8. CHF: LVEF 65% with diastolic dysfunction. referral to cardiology to maximized her heart function.  9. Thrombocytosis: suspect reactive from anemia. Will check iron profile.  10. Bronchitis: elevated white count , fever and cough. Give cipro IV in clinic, then rx cipro for 10 days.    Moise Sotelo MD    2/7/2019    8:22 AM

## 2019-02-07 NOTE — PROGRESS NOTES
0913 [facility] Dr. Sotelo called and gave ok to proceed with chemo after antibiotic infused. Nilsa Cagle RN

## 2019-02-08 LAB
CANCER AG15-3 SERPL-ACNC: 60.4 U/ML (ref 0–25)
CANCER AG27-29 SERPL-ACNC: 62.4 U/ML (ref 0–38.6)

## 2019-02-11 ENCOUNTER — TELEPHONE (OUTPATIENT)
Dept: ONCOLOGY | Facility: CLINIC | Age: 74
End: 2019-02-11

## 2019-02-11 RX ORDER — LEVOFLOXACIN 500 MG/1
500 TABLET, FILM COATED ORAL DAILY
Qty: 10 TABLET | Refills: 0 | Status: SHIPPED | OUTPATIENT
Start: 2019-02-11 | End: 2019-03-13

## 2019-02-11 NOTE — TELEPHONE ENCOUNTER
----- Message from Moise Sotelo MD sent at 2/11/2019  1:57 PM CST -----  Please pt, cxr shows possible pneumonia. change antibiotics to a respiratory fluoroquinolone, such as levofloxacin 750 mg orally once daily or moxifloxacin 400 mg orally once daily. She should stop cipro

## 2019-02-11 NOTE — PROGRESS NOTES
Please pt, cxr shows possible pneumonia. change antibiotics to a respiratory fluoroquinolone, such as levofloxacin 750 mg orally once daily or moxifloxacin 400 mg orally once daily. She should stop cipro

## 2019-02-11 NOTE — TELEPHONE ENCOUNTER
Left message for Heavenly that CXR showed possible pneumonia and that Dr. Sotelo is prescribing a different antibiotic for her and that she needs stop the Cipro.

## 2019-02-14 ENCOUNTER — INFUSION (OUTPATIENT)
Dept: ONCOLOGY | Facility: HOSPITAL | Age: 74
End: 2019-02-14
Attending: INTERNAL MEDICINE

## 2019-02-14 ENCOUNTER — OFFICE VISIT (OUTPATIENT)
Dept: ONCOLOGY | Facility: CLINIC | Age: 74
End: 2019-02-14

## 2019-02-14 ENCOUNTER — LAB (OUTPATIENT)
Dept: LAB | Facility: HOSPITAL | Age: 74
End: 2019-02-14
Attending: INTERNAL MEDICINE

## 2019-02-14 VITALS
SYSTOLIC BLOOD PRESSURE: 149 MMHG | DIASTOLIC BLOOD PRESSURE: 58 MMHG | WEIGHT: 132 LBS | OXYGEN SATURATION: 100 % | BODY MASS INDEX: 24.29 KG/M2 | HEART RATE: 80 BPM | RESPIRATION RATE: 18 BRPM | HEIGHT: 62 IN | TEMPERATURE: 97.5 F

## 2019-02-14 VITALS
WEIGHT: 131 LBS | RESPIRATION RATE: 16 BRPM | BODY MASS INDEX: 21.83 KG/M2 | HEART RATE: 78 BPM | TEMPERATURE: 99.1 F | HEIGHT: 65 IN | OXYGEN SATURATION: 96 % | DIASTOLIC BLOOD PRESSURE: 82 MMHG | SYSTOLIC BLOOD PRESSURE: 118 MMHG

## 2019-02-14 DIAGNOSIS — C50.912 CARCINOMA OF LEFT BREAST METASTATIC TO BONE (HCC): ICD-10-CM

## 2019-02-14 DIAGNOSIS — C50.919 BREAST CANCER METASTASIZED TO BONE, UNSPECIFIED LATERALITY (HCC): ICD-10-CM

## 2019-02-14 DIAGNOSIS — C79.51 BREAST CANCER METASTASIZED TO BONE, UNSPECIFIED LATERALITY (HCC): ICD-10-CM

## 2019-02-14 DIAGNOSIS — C50.412 MALIGNANT NEOPLASM OF UPPER-OUTER QUADRANT OF LEFT FEMALE BREAST, UNSPECIFIED ESTROGEN RECEPTOR STATUS (HCC): ICD-10-CM

## 2019-02-14 DIAGNOSIS — C50.912 CARCINOMA OF LEFT BREAST METASTATIC TO BONE (HCC): Primary | ICD-10-CM

## 2019-02-14 DIAGNOSIS — C79.51 CARCINOMA OF LEFT BREAST METASTATIC TO BONE (HCC): ICD-10-CM

## 2019-02-14 DIAGNOSIS — C79.51 CARCINOMA OF LEFT BREAST METASTATIC TO BONE (HCC): Primary | ICD-10-CM

## 2019-02-14 LAB
ALBUMIN SERPL-MCNC: 3.8 G/DL (ref 3.5–5)
ALBUMIN/GLOB SERPL: 1.2 G/DL (ref 1.1–2.5)
ALP SERPL-CCNC: 54 U/L (ref 24–120)
ALT SERPL W P-5'-P-CCNC: <15 U/L (ref 0–54)
ANION GAP SERPL CALCULATED.3IONS-SCNC: 7 MMOL/L (ref 4–13)
AST SERPL-CCNC: 26 U/L (ref 7–45)
BASOPHILS # BLD AUTO: 0.03 10*3/MM3 (ref 0–0.2)
BASOPHILS NFR BLD AUTO: 0.8 % (ref 0–2)
BILIRUB SERPL-MCNC: 0.4 MG/DL (ref 0.1–1)
BUN BLD-MCNC: 11 MG/DL (ref 5–21)
BUN/CREAT SERPL: 10.2 (ref 7–25)
CALCIUM SPEC-SCNC: 9.7 MG/DL (ref 8.4–10.4)
CHLORIDE SERPL-SCNC: 100 MMOL/L (ref 98–110)
CO2 SERPL-SCNC: 27 MMOL/L (ref 24–31)
CREAT BLD-MCNC: 1.08 MG/DL (ref 0.5–1.4)
DEPRECATED RDW RBC AUTO: 60.8 FL (ref 40–54)
EOSINOPHIL # BLD AUTO: 0.01 10*3/MM3 (ref 0–0.7)
EOSINOPHIL NFR BLD AUTO: 0.3 % (ref 0–4)
ERYTHROCYTE [DISTWIDTH] IN BLOOD BY AUTOMATED COUNT: 17.2 % (ref 12–15)
GFR SERPL CREATININE-BSD FRML MDRD: 49 ML/MIN/1.73
GLOBULIN UR ELPH-MCNC: 3.1 GM/DL
GLUCOSE BLD-MCNC: 117 MG/DL (ref 70–100)
HCT VFR BLD AUTO: 27.1 % (ref 37–47)
HGB BLD-MCNC: 9 G/DL (ref 12–16)
HOLD SPECIMEN: NORMAL
HOLD SPECIMEN: NORMAL
IMM GRANULOCYTES # BLD AUTO: 0.03 10*3/MM3 (ref 0–0.05)
IMM GRANULOCYTES NFR BLD AUTO: 0.8 % (ref 0–5)
LYMPHOCYTES # BLD AUTO: 1.74 10*3/MM3 (ref 0.72–4.86)
LYMPHOCYTES NFR BLD AUTO: 44.1 % (ref 15–45)
MCH RBC QN AUTO: 32.8 PG (ref 28–32)
MCHC RBC AUTO-ENTMCNC: 33.2 G/DL (ref 33–36)
MCV RBC AUTO: 98.9 FL (ref 82–98)
MONOCYTES # BLD AUTO: 0.34 10*3/MM3 (ref 0.19–1.3)
MONOCYTES NFR BLD AUTO: 8.6 % (ref 4–12)
NEUTROPHILS # BLD AUTO: 1.8 10*3/MM3 (ref 1.87–8.4)
NEUTROPHILS NFR BLD AUTO: 45.4 % (ref 39–78)
NRBC BLD AUTO-RTO: 0 /100 WBC (ref 0–0)
PLATELET # BLD AUTO: 464 10*3/MM3 (ref 130–400)
PMV BLD AUTO: 9.5 FL (ref 6–12)
POTASSIUM BLD-SCNC: 3.5 MMOL/L (ref 3.5–5.3)
PROT SERPL-MCNC: 6.9 G/DL (ref 6.3–8.7)
RBC # BLD AUTO: 2.74 10*6/MM3 (ref 4.2–5.4)
SODIUM BLD-SCNC: 134 MMOL/L (ref 135–145)
WBC NRBC COR # BLD: 3.95 10*3/MM3 (ref 4.8–10.8)

## 2019-02-14 PROCEDURE — 99214 OFFICE O/P EST MOD 30 MIN: CPT | Performed by: INTERNAL MEDICINE

## 2019-02-14 PROCEDURE — 85025 COMPLETE CBC W/AUTO DIFF WBC: CPT

## 2019-02-14 PROCEDURE — 80053 COMPREHEN METABOLIC PANEL: CPT

## 2019-02-14 PROCEDURE — 96413 CHEMO IV INFUSION 1 HR: CPT

## 2019-02-14 PROCEDURE — 96366 THER/PROPH/DIAG IV INF ADDON: CPT

## 2019-02-14 PROCEDURE — 25010000002 ONDANSETRON PER 1 MG: Performed by: INTERNAL MEDICINE

## 2019-02-14 PROCEDURE — 25010000002 FUROSEMIDE PER 20 MG: Performed by: INTERNAL MEDICINE

## 2019-02-14 PROCEDURE — 25010000002 DOCETAXEL 20 MG/ML SOLUTION 4 ML VIAL: Performed by: INTERNAL MEDICINE

## 2019-02-14 PROCEDURE — 36415 COLL VENOUS BLD VENIPUNCTURE: CPT

## 2019-02-14 PROCEDURE — 25010000003 DEXAMETHASONE SODIUM PHOSPHATE 100 MG/10ML SOLUTION 10 ML VIAL: Performed by: INTERNAL MEDICINE

## 2019-02-14 PROCEDURE — 96375 TX/PRO/DX INJ NEW DRUG ADDON: CPT

## 2019-02-14 RX ORDER — SODIUM CHLORIDE 9 MG/ML
250 INJECTION, SOLUTION INTRAVENOUS ONCE
Status: CANCELLED | OUTPATIENT
Start: 2019-02-14

## 2019-02-14 RX ORDER — FUROSEMIDE 10 MG/ML
20 INJECTION INTRAMUSCULAR; INTRAVENOUS ONCE
Status: COMPLETED | OUTPATIENT
Start: 2019-02-14 | End: 2019-02-14

## 2019-02-14 RX ORDER — SODIUM CHLORIDE 9 MG/ML
250 INJECTION, SOLUTION INTRAVENOUS ONCE
Status: COMPLETED | OUTPATIENT
Start: 2019-02-14 | End: 2019-02-14

## 2019-02-14 RX ORDER — FUROSEMIDE 10 MG/ML
20 INJECTION INTRAMUSCULAR; INTRAVENOUS ONCE
Status: CANCELLED
Start: 2019-02-14 | End: 2019-02-14

## 2019-02-14 RX ORDER — SODIUM CHLORIDE 0.9 % (FLUSH) 0.9 %
10 SYRINGE (ML) INJECTION AS NEEDED
Status: CANCELLED | OUTPATIENT
Start: 2019-02-14

## 2019-02-14 RX ORDER — FAMOTIDINE 10 MG/ML
20 INJECTION, SOLUTION INTRAVENOUS AS NEEDED
Status: DISCONTINUED | OUTPATIENT
Start: 2019-02-14 | End: 2019-02-14 | Stop reason: HOSPADM

## 2019-02-14 RX ORDER — MEPERIDINE HYDROCHLORIDE 50 MG/ML
25 INJECTION INTRAMUSCULAR; INTRAVENOUS; SUBCUTANEOUS
Status: DISCONTINUED | OUTPATIENT
Start: 2019-02-14 | End: 2019-02-14 | Stop reason: HOSPADM

## 2019-02-14 RX ORDER — SODIUM CHLORIDE 0.9 % (FLUSH) 0.9 %
10 SYRINGE (ML) INJECTION AS NEEDED
Status: DISCONTINUED | OUTPATIENT
Start: 2019-02-14 | End: 2019-02-14 | Stop reason: HOSPADM

## 2019-02-14 RX ADMIN — DOCETAXEL 60 MG: 20 INJECTION, SOLUTION, CONCENTRATE INTRAVENOUS at 11:25

## 2019-02-14 RX ADMIN — FUROSEMIDE 20 MG: 10 INJECTION, SOLUTION INTRAVENOUS at 11:01

## 2019-02-14 RX ADMIN — SODIUM CHLORIDE, PRESERVATIVE FREE 10 ML: 5 INJECTION INTRAVENOUS at 12:45

## 2019-02-14 RX ADMIN — Medication: at 11:04

## 2019-02-14 RX ADMIN — SODIUM CHLORIDE 250 ML: 9 INJECTION, SOLUTION INTRAVENOUS at 10:52

## 2019-02-14 NOTE — PROGRESS NOTES
[facility]  HEMATOLOGY & ONCOLOGY    Cancer Staging Information:  Cancer Staging  Malignant neoplasm of upper-outer quadrant of left female breast (CMS/HCC)  Staging form: Breast, AJCC V7  - Clinical stage from 10/11/2016: Stage IV (T2, N1, M1) - Signed by Calli Monsalve APRN on 10/11/2016        Subjective     VISIT DIAGNOSIS:   Encounter Diagnosis   Name Primary?   • Carcinoma of left breast metastatic to bone (CMS/HCC)        REASON FOR VISIT:     Chief Complaint   Patient presents with   • Fatigue     INCREASED WEAKNESS FROM LAST WEEK   • Breast Cancer     hERE FOR CONSIDERATION OF TREATMENT.        HEMATOLOGY / ONCOLOGY HISTORY:   Oncology/Hematology History    Patient is a 67-year-old postmenopausal female who had onset of her menses at age 9 and menopause at age 48. She received oral contraceptives for approximately 14 years and did not receive hormonal manipulation. Patient has a maternal cousin had breast cancer. Patient has had no prior breast biopsies. Patient found a palpable left breast mass as well as a left axillary mass. Patient had a bloody nipple discharge which been present for approximately 6 months. On 6/20/2012, a mammogram was performed showing a subareolar mass consistent with malignancy. Bilateral breast ultrasound revealed an ill-defined subareolar mass measuring 2.8-2.5 cm. There is a left axillary mass measuring 1.7 cm with suspicion of extracapsular extension. There is no evidence of disease in the right breast. On 7/9/2012 patient underwent a left breast biopsy and placement of marker clip. Left breast biopsy revealed an infiltrating lobular carcinoma grade 2 with focal ductal carcinoma in situ, solid pattern. A fine-needle biopsy of the left lymph node was consistent with metastatic carcinoma. Breast tumor profile revealed ER: 100%; NJ: 98%; HER-2/maynor 2+; FISH: Unamplified; Ki-67: 71%; P 53 1%; DNA aneuploidy. A MRI of the breast were performed body multiple  abnormal enhancing masses within the left breast. There appears to be anterior retraction of the pectoralis muscle with no direct extension. There is a moderate to large, layering left pleural effusion appreciated. The right breast reveals 3 moderately enlarged lymph nodes in the posterior lateral aspect the right breast. These have fatty hilum but followup is recommended. Patient is undergone systemic studies including, on 8/2/2012, a CT scan of the brain showing atrophy. a CT scan that shows showing a small to moderate left pleural effusion with 3 subcentimeter nodule densities in the left lung.   She completed 4 cycles of neoadjuvant treatment with dose dense Adriamycin and Cytoxan. She had a mammogram which showed a significant reduction in the size of her left breast lesion. There is no axillary adenopathy noted. She has had an ultrasound revealing the  lesion reducing from 2.4 cm to 1 cm. Patient underwent a left mastectomy on 03/19/2013 finding negative invasive cancer, negative nodes. A 5% DCIS was noted. The patient has declined hormonal therapy. She did not need radiation therapy.  November 2014, she began to have evidence of lytic bone lesions at T5T6, frontal disease, an 8 mm lucency in the central frontal area of  the skull but nothing intracranial. Bone scan revealed only vertex tracer activity but bilateral ribs and thoracic spine, and other lesions in  her pelvis. At that time, she was started on letrozole since November 2014. She is taking Xgeva. She had progression found in bone in Feb 2015 on scan. She was started on Faslodex, Ibrance and continued xgeva.         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 B) Foci of ductal carcinoma in situ, solid pattern.  C) Greatest dimension of the invasive carcinoma is 15.8mm.         3/19/2013 Surgery     Left Mastectomy w/ Axillary Tail: Focal residual ducatal carcinoma in situ (DCIS) 12 lymph nodes negative for metastatic carcinoma.         10/20/2014 Biopsy     Peritoneum Biopsy: Final Diagnosis: Malignant Cell Neoplasm.           Breast cancer metastasized to bone, unspecified laterality (CMS/HCC)    10/15/2012 -  Other Event     Left mammogram:  Impression:  1. Decreasing spiculation and density in the retroareolar left breast, near a previous biopsy.   2. Definite left axillary lesion is not appreciated.          2/5/2013 -  Other Event     Diagnostic Mammo:  Comparison is made to 10/15/2012 and 6/20/2012  The spiculated mass at 12:00 behind the nipple is nearly resolved.   Impression:  1. The mass on the left side is less apparent after receiving chemotherapy. There has been a good response to chmotherapy on the left side.   2. No suspicious abnormality is seen in the right breast to account for the new palable abnormality at the 4-5:00 position.          3/19/2013 Surgery     Breast Biopsy:  Final Diagnosis:  A. Breast, left mastectomy with axillary tail  1. Focal residual ductal carcinoma in situ (DICS) (less than 5% of tumor bed).  2. Negative for residual invasive carcinoma.   3. 12 Lymph Nodes, negative for metastatic carcinoma (0/12) focally, some lymph nodes demonstrate fibrosis/treatment effect.  B. Skin and soft tissue, left advancement flap:  1. Benign skin and subcutis.   2. Negative for carcinoma.          6/20/2013 -  Other Event     Diagnostic Mammo [facility]:  1. A subareolar mass is consistent with malignancy. Additional involement extends inferiorly sonographically and superolaterally mammograhically as demonstrated by calcifications. The mass measures approx 2.8 cm sonographically, but the involved region is likely much larger including an intraductal componet. The left axillary lymph nodes are also  involved.   2. Recommened ultrasound guided biopsy of the left subareolar mass and left axillary lymph node.   3. Breast MRI could also further define multicentric disease on the left.   4. Clear suspicious finding on the right.  Recommend ongoing clinical follow up of palpable foci.          6/13/2014 -  Other Event     Diagnostic Mammo:  IMPRESSION:  1. History of left breast cancer and mastectomy. Stable benign right breast mammograms.          10/20/2014 Surgery     Final Diagnosis:  Biopsies, pertoneum:  Metastatic carcinoma, morphologically compatible with metastatic mammary carcinoma.          6/15/2015 -  Other Event     Diagnostic Mammo:  IMPRESSION:   1. Negative x-ray report, should not delay biopsy if a dominat or clinically suspicious mass is present.          7/5/2016 -  Other Event     Diagnositic mammogram:  Impression:   Stable right mamogram with no radiographic findings suspicious for malignancy. Recommend continued screening mammography per screening guidelines unless otherwise earlier clinically indicated.          9/18/2016 Initial Diagnosis     Secondary malignant neoplasm of bone and bone marrow              INTERVAL HISTORY  Patient ID: Heavenly Yanes is a 73 y.o. year old female Cancer Staging  Stage IV (T2, N1, M1)  --complaining of dyspnea on exersion since last Tuesday. She feels like she ran a mile  2/14/19: cxr showed pneumonia. Already on levofloxacin. Coughing not worse, she did not run temp.  Overall feels weak.  -restarted the appetite suppressant and is starting to eat more but not a whole lot  --Denies /cp/n/v/fever/chills, neuropathy. Rest of ros unremarkable.  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) 9/18/2016   • Secondary malignant neoplasm  of bone and bone marrow (CMS/[facility]) 9/18/2016     Past Surgical History:   Past Surgical History:   Procedure Laterality Date   • BREAST SURGERY      left breast biopsy and axillary node   • CHOLECYSTECTOMY     • EXTERNAL EAR SURGERY     • MASTECTOMY Left    • PORTACATH PLACEMENT       Social History:   Social History     Socioeconomic History   • Marital status:      Spouse name: Not on file   • Number of children: Not on file   • Years of education: Not on file   • Highest education level: Not on file   Social Needs   • Financial resource strain: Not on file   • Food insecurity - worry: Not on file   • Food insecurity - inability: Not on file   • Transportation needs - medical: Not on file   • Transportation needs - non-medical: Not on file   Occupational History   • Not on file   Tobacco Use   • Smoking status: Never Smoker   • Smokeless tobacco: Never Used   Substance and Sexual Activity   • Alcohol use: No   • Drug use: No   • Sexual activity: Not on file   Other Topics Concern   • Not on file   Social History Narrative   • Not on file     Family History:   Family History   Problem Relation Age of Onset   • No Known Problems Daughter    • No Known Problems Son    • Other Mother         complications from a concussion from a fall   • Other Father         old age   • No Known Problems Brother    • No Known Problems Daughter        Review of Systems   Constitutional: Positive for fatigue.   HENT: Negative.    Eyes: Negative.    Respiratory: Positive for shortness of breath.    Cardiovascular: Negative.    Gastrointestinal: Negative.    Endocrine: Negative.    Genitourinary: Negative.    Musculoskeletal: Negative.    Skin: Negative.    Neurological: Negative.    Hematological: Negative.    Psychiatric/Behavioral: Negative.         Performance Status:  Asymptomatic    Medications:    Current Outpatient Medications   Medication Sig Dispense Refill   • Calcium-Magnesium-Vitamin D (CALCIUM 1200+D3 PO) Take 1,200  "mg by mouth Daily.     • capecitabine (XELODA) 500 MG chemo tablet Take 1 tablet by mouth 2 (Two) Times a Day. For 2 weeks on and 1 week off. 42 tablet 11   • levoFLOXacin (LEVAQUIN) 500 MG tablet Take 1 tablet by mouth Daily. 10 tablet 0   • megestrol (MEGACE) 40 MG tablet Take 1 tablet by mouth 2 (Two) Times a Day. 60 tablet 2   • metoprolol tartrate (LOPRESSOR) 25 MG tablet Take 25 mg by mouth 2 (two) times a day.     • OLANZapine (ZYPREXA) 2.5 MG tablet Take 2.5 mg by mouth every night.     • ondansetron (ZOFRAN) 8 MG tablet Take 1 tablet by mouth Every 8 (Eight) Hours As Needed for Nausea or Vomiting. (Patient taking differently: Take 8 mg by mouth Every 8 (Eight) Hours As Needed for Nausea or Vomiting.) 30 tablet 5   • raNITIdine (ZANTAC) 150 MG tablet Take 150 mg by mouth 2 (Two) Times a Day.     • rivaroxaban (XARELTO) 20 MG tablet Take 1 tablet by mouth Daily. 30 tablet 6   • thyroid 60 MG PO tablet Take 60 mg by mouth daily.       No current facility-administered medications for this visit.      Facility-Administered Medications Ordered in Other Visits   Medication Dose Route Frequency Provider Last Rate Last Dose   • heparin flush (porcine) 100 UNIT/ML injection 500 Units  500 Units Intravenous PRN Joseph Nunez MD   500 Units at 10/26/17 1155   • sodium chloride 0.9 % flush 10 mL  10 mL Intravenous PRN Joseph Nunez MD   10 mL at 10/26/17 1155       ALLERGIES:    Allergies   Allergen Reactions   • Benadryl  [Diphenhydramine Hcl (Sleep)] Other (See Comments)   • Codeine    • Diphenhydramine Other (See Comments)     \"Shaky leg syndrome\"  \"Shaky leg syndrome\"  \"Shaky leg syndrome\"   • Heparin    • Other      POPPY SEED   • Penicillins Hives   • Petroleum Jelly [Skin Protectants, Misc.]    • Sulfa Antibiotics    • Sulfur    • Valium [Diazepam]        Objective      Vitals:    02/14/19 0940   BP: 118/82   Pulse: 78   Resp: 16   Temp: 99.1 °F (37.3 °C)   TempSrc: Oral   SpO2: 96%   Weight: 59.4 kg (131 lb) " "  Height: 165.1 cm (65\")         Current Status 2/7/2019   ECOG score 2         Physical Exam  unchanged  General Appearance: Patient is awake, alert, oriented and in no acute distress. Patient is welldeveloped, wellnourished, and appears stated age.  HEENT: Normocephalic. Sclerae clear, conjunctiva pink, extraocular movements intact, pupils, round, reactive to light and  accommodation. Mouth and throat are clear with moist oral mucosa.  NECK: Supple, no jugular venous distention, thyroid not enlarged.  LYMPH: No cervical, supraclavicular, axillary, or inguinal lymphadenopathy.  CHEST: Equal bilateral expansion, AP  diameter normal, resonant percussion note  LUNGS: Good air movement, no rales, rhonchi, rubs or wheezes with auscultation on the right. Diminished on the left.  CARDIO: Regular sinus rhythm, no murmurs, gallops or rubs.  ABDOMEN: Nondistended, soft, No tenderness, no guarding, no rebound, No hepatosplenomegaly. No abdominal masses. Bowel sounds positive. No hernia  GENITALIA: Not examined.  BREASTS: Not examined.  MUSKEL: No joint swelling, decreased motion, or inflammation  EXTREMS: patrick le  edema,No clubbing, cyanosis, No varicose veins.  NEURO: Grossly nonfocal, Gait is coordinated and smooth, Cognition is preserved.  SKIN: No rashes, no ecchymoses, no petechia.  PSYCH: Oriented to time, place and person. Memory is preserved. Mood and affect appear normal  RECENT LABS:  Lab on 02/14/2019   Component Date Value Ref Range Status   • WBC 02/14/2019 3.95* 4.80 - 10.80 10*3/mm3 Final   • RBC 02/14/2019 2.74* 4.20 - 5.40 10*6/mm3 Final   • Hemoglobin 02/14/2019 9.0* 12.0 - 16.0 g/dL Final   • Hematocrit 02/14/2019 27.1* 37.0 - 47.0 % Final   • MCV 02/14/2019 98.9* 82.0 - 98.0 fL Final   • MCH 02/14/2019 32.8* 28.0 - 32.0 pg Final   • MCHC 02/14/2019 33.2  33.0 - 36.0 g/dL Final   • RDW 02/14/2019 17.2* 12.0 - 15.0 % Final   • RDW-SD 02/14/2019 60.8* 40.0 - 54.0 fl Final   • MPV 02/14/2019 9.5  6.0 - 12.0 fL " Final   • Platelets 02/14/2019 464* 130 - 400 10*3/mm3 Final   • Neutrophil % 02/14/2019 45.4  39.0 - 78.0 % Final   • Lymphocyte % 02/14/2019 44.1  15.0 - 45.0 % Final   • Monocyte % 02/14/2019 8.6  4.0 - 12.0 % Final   • Eosinophil % 02/14/2019 0.3  0.0 - 4.0 % Final   • Basophil % 02/14/2019 0.8  0.0 - 2.0 % Final   • Immature Grans % 02/14/2019 0.8  0.0 - 5.0 % Final   • Neutrophils, Absolute 02/14/2019 1.80* 1.87 - 8.40 10*3/mm3 Final   • Lymphocytes, Absolute 02/14/2019 1.74  0.72 - 4.86 10*3/mm3 Final   • Monocytes, Absolute 02/14/2019 0.34  0.19 - 1.30 10*3/mm3 Final   • Eosinophils, Absolute 02/14/2019 0.01  0.00 - 0.70 10*3/mm3 Final   • Basophils, Absolute 02/14/2019 0.03  0.00 - 0.20 10*3/mm3 Final   • Immature Grans, Absolute 02/14/2019 0.03  0.00 - 0.05 10*3/mm3 Final   • nRBC 02/14/2019 0.0  0.0 - 0.0 /100 WBC Final       RADIOLOGY:  Us Venous Doppler Lower Extremity Left (duplex)    Result Date: 1/30/2019  Narrative: History: Swelling      Impression: Impression: 1. There is evidence of deep venous thrombosis of the left lower extremity. The clot burden is improved from last study. 2. There is also evidence of superficial clot in the greater saphenous vein. There is a Baker's cyst in the popliteal fossa.  Comments: Left lower extremity venous duplex exam was performed using color Doppler flow, Doppler wave form analysis, and grayscale imaging, with and without compression. There is evidence of deep venous thrombosis in the iliac and common femoral veins. There is no thrombus identified in the saphenofemoral junction or the greater saphenous vein. There is no evidence of clot in the right common femoral vein.  This report was finalized on 01/30/2019 13:24 by Dr. Terrell Collins MD.    Us Venous Doppler Lower Extremity Bilateral (duplex)    Result Date: 1/17/2019  Narrative: History: Swelling      Impression: Impression: There is evidence of deep venous thrombosis in the left lower extremity distal  external iliac, common femoral, superficial femoral, profunda femoris, popliteal, posterior tibial, and peroneal veins. There is no evidence of DVT in the right lower extremity. There is evidence of superficial thrombophlebitis in the left greater saphenous and lesser saphenous veins. .  Comments: Bilateral lower extremity venous duplex exam was performed using color Doppler flow, Doppler waveform analysis, and grayscale imaging, with and without compression. There is evidence of DVT present in the left external iliac, common femoral, superficial femoral, profunda femoris, popliteal, posterior tibial, and peroneal veins. There is also evidence of superficial thrombophlebitis in the left greater saphenous and lesser saphenous veins. There is no evidence of DVT or superficial thrombophlebitis in the right lower extremity.   This report was finalized on 01/17/2019 12:17 by Dr. Alonso Kumari MD.    Xr Chest Pa & Lateral    Result Date: 2/7/2019  Narrative: EXAMINATION: XR CHEST PA AND LATERAL-  2/7/2019 12:16 PM CST  TWO-VIEW CHEST:  HISTORY: Cough . Metastatic breast cancer  Frontal and lateral projection chest radiograph obtained.  COMPARISON:  1/10/2019  FINDINGS:  Changes from left mastectomy observed.  There is diffuse osseous metastases in observed.  Right-sided infusion catheter appreciated.  There is left-sided pleural reaction again identified. There are mild patchy airspace opacities identified in the left lower lobe not described previously, acute infectious/inflammatory change, pneumonia considered. Follow-up imaging recommended to assure resolution and benignity.  There is chronic interstitial lung changes. .  The heart is normal in size, pulmonary circulation appropriate, without heart failure.  There is levoscoliosis of the thoracolumbar junction.                                                                                                                 Impression: 1. Patchy left lower lobe airspace  opacities, acute infiltrate/pneumonia considered. Follow-up recommended. 2. Chronic lung changes. 3. Diffuse osseous metastases.   This report was finalized on 02/07/2019 12:19 by Dr. Naun Pierre MD.           Assessment/Plan  Heavenly Yanes is a 73 y.o. year old female with met breast cancer s/p multiple lines of CMT currently on taxotere weekly and xeloda with good tolerance.    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 Labs reviewed wbc 9.6, hg 9.5, plt 717. Ok for treatment today  --also on xeloda  --CT c/a/p and bone scan before next treatment      2. Psych: on olanzapine    3. HTN: on metoprolol    4. Gerd: on ranitidine  5. Dyspnea: get a cxr to evaluate  6. Anemia: will check iron profile, ferritin. Her creatinine is normal at 0.94.   7. DVT on xarelto indefinite.  --Doppler left LE  1/30/19 There is evidence of deep venous thrombosis of the left lower extremity. The clot burden is improved from last study. There is also evidence of superficial clot in the greater saphenous vein. There is a Baker's cyst in the popliteal fossa.  8. CHF: LVEF 65% with diastolic dysfunction. referral to cardiology to maximized her heart function.  9. Thrombocytosis: suspect reactive from anemia. Will check iron profile.  10.left lung pneumonia:  --cxr showed pneumonia. Pt already on levofloxacin. Will complete 14 days.   11: LE edema: give 20mg lasix once today    Moise Sotelo  MD    2/14/2019    9:46 AM

## 2019-02-14 NOTE — PATIENT INSTRUCTIONS
Docetaxel injection  What is this medicine?  DOCETAXEL (rodriguez se TAX el) is a chemotherapy drug. It targets fast dividing cells, like cancer cells, and causes these cells to die. This medicine is used to treat many types of cancers like breast cancer, certain stomach cancers, head and neck cancer, lung cancer, and prostate cancer.  This medicine may be used for other purposes; ask your health care provider or pharmacist if you have questions.  COMMON BRAND NAME(S): Jjambrocioliyah Taxoterabiodun  What should I tell my health care provider before I take this medicine?  They need to know if you have any of these conditions:  -infection (especially a virus infection such as chickenpox, cold sores, or herpes)  -liver disease  -low blood counts, like low white cell, platelet, or red cell counts  -an unusual or allergic reaction to docetaxel, polysorbate 80, other chemotherapy agents, other medicines, foods, dyes, or preservatives  -pregnant or trying to get pregnant  -breast-feeding  How should I use this medicine?  This drug is given as an infusion into a vein. It is administered in a hospital or clinic by a specially trained health care professional.  Talk to your pediatrician regarding the use of this medicine in children. Special care may be needed.  Overdosage: If you think you have taken too much of this medicine contact a poison control center or emergency room at once.  NOTE: This medicine is only for you. Do not share this medicine with others.  What if I miss a dose?  It is important not to miss your dose. Call your doctor or health care professional if you are unable to keep an appointment.  What may interact with this medicine?  -cyclosporine  -erythromycin  -ketoconazole  -medicines to increase blood counts like filgrastim, pegfilgrastim, sargramostim  -vaccines  Talk to your doctor or health care professional before taking any of these medicines:  -acetaminophen  -aspirin  -ibuprofen  -ketoprofen  -naproxen  This list  may not describe all possible interactions. Give your health care provider a list of all the medicines, herbs, non-prescription drugs, or dietary supplements you use. Also tell them if you smoke, drink alcohol, or use illegal drugs. Some items may interact with your medicine.  What should I watch for while using this medicine?  Your condition will be monitored carefully while you are receiving this medicine. You will need important blood work done while you are taking this medicine.  This drug may make you feel generally unwell. This is not uncommon, as chemotherapy can affect healthy cells as well as cancer cells. Report any side effects. Continue your course of treatment even though you feel ill unless your doctor tells you to stop.  In some cases, you may be given additional medicines to help with side effects. Follow all directions for their use.  Call your doctor or health care professional for advice if you get a fever, chills or sore throat, or other symptoms of a cold or flu. Do not treat yourself. This drug decreases your body's ability to fight infections. Try to avoid being around people who are sick.  This medicine may increase your risk to bruise or bleed. Call your doctor or health care professional if you notice any unusual bleeding.  This medicine may contain alcohol in the product. You may get drowsy or dizzy. Do not drive, use machinery, or do anything that needs mental alertness until you know how this medicine affects you. Do not stand or sit up quickly, especially if you are an older patient. This reduces the risk of dizzy or fainting spells. Avoid alcoholic drinks.  Do not become pregnant while taking this medicine. Women should inform their doctor if they wish to become pregnant or think they might be pregnant. There is a potential for serious side effects to an unborn child. Talk to your health care professional or pharmacist for more information. Do not breast-feed an infant while taking  this medicine.  What side effects may I notice from receiving this medicine?  Side effects that you should report to your doctor or health care professional as soon as possible:  -allergic reactions like skin rash, itching or hives, swelling of the face, lips, or tongue  -low blood counts - This drug may decrease the number of white blood cells, red blood cells and platelets. You may be at increased risk for infections and bleeding.  -signs of infection - fever or chills, cough, sore throat, pain or difficulty passing urine  -signs of decreased platelets or bleeding - bruising, pinpoint red spots on the skin, black, tarry stools, nosebleeds  -signs of decreased red blood cells - unusually weak or tired, fainting spells, lightheadedness  -breathing problems  -fast or irregular heartbeat  -low blood pressure  -mouth sores  -nausea and vomiting  -pain, swelling, redness or irritation at the injection site  -pain, tingling, numbness in the hands or feet  -swelling of the ankle, feet, hands  -weight gain  Side effects that usually do not require medical attention (report to your doctor or health care professional if they continue or are bothersome):  -bone pain  -complete hair loss including hair on your head, underarms, pubic hair, eyebrows, and eyelashes  -diarrhea  -excessive tearing  -changes in the color of fingernails  -loosening of the fingernails  -nausea  -muscle pain  -red flush to skin  -sweating  -weak or tired  This list may not describe all possible side effects. Call your doctor for medical advice about side effects. You may report side effects to FDA at 6-282-FDA-8351.  Where should I keep my medicine?  This drug is given in a hospital or clinic and will not be stored at home.  NOTE: This sheet is a summary. It may not cover all possible information. If you have questions about this medicine, talk to your doctor, pharmacist, or health care provider.  © 2018 [employer]vier/[employer] Standard (2017-01-19  12:32:56)  Filgrastim, G-CSF injection  What is this medicine?  FILGRASTIM, G-CSF (prasanth GRA stim) is a granulocyte colony-stimulating factor that stimulates the growth of neutrophils, a type of white blood cell (WBC) important in the body's fight against infection. It is used to reduce the incidence of fever and infection in patients with certain types of cancer who are receiving chemotherapy that affects the bone marrow, to stimulate blood cell production for removal of WBCs from the body prior to a bone marrow transplantation, to reduce the incidence of fever and infection in patients who have severe chronic neutropenia, and to improve survival outcomes following high-dose radiation exposure that is toxic to the bone marrow.  This medicine may be used for other purposes; ask your health care provider or pharmacist if you have questions.  COMMON BRAND NAME(S): Deion Jennifernate  What should I tell my health care provider before I take this medicine?  They need to know if you have any of these conditions:  -kidney disease  -latex allergy  -ongoing radiation therapy  -sickle cell disease  -an unusual or allergic reaction to filgrastim, pegfilgrastim, other medicines, foods, dyes, or preservatives  -pregnant or trying to get pregnant  -breast-feeding  How should I use this medicine?  This medicine is for injection under the skin or infusion into a vein. As an infusion into a vein, it is usually given by a health care professional in a hospital or clinic setting. If you get this medicine at home, you will be taught how to prepare and give this medicine. Refer to the Instructions for Use that come with your medication packaging. Use exactly as directed. Take your medicine at regular intervals. Do not take your medicine more often than directed.  It is important that you put your used needles and syringes in a special sharps container. Do not put them in a trash can. If you do not have a sharps container, call your  pharmacist or healthcare provider to get one.  Talk to your pediatrician regarding the use of this medicine in children. While this drug may be prescribed for children as young as 7 months for selected conditions, precautions do apply.  Overdosage: If you think you have taken too much of this medicine contact a poison control center or emergency room at once.  NOTE: This medicine is only for you. Do not share this medicine with others.  What if I miss a dose?  It is important not to miss your dose. Call your doctor or health care professional if you miss a dose.  What may interact with this medicine?  This medicine may interact with the following medications:  -medicines that may cause a release of neutrophils, such as lithium  This list may not describe all possible interactions. Give your health care provider a list of all the medicines, herbs, non-prescription drugs, or dietary supplements you use. Also tell them if you smoke, drink alcohol, or use illegal drugs. Some items may interact with your medicine.  What should I watch for while using this medicine?  You may need blood work done while you are taking this medicine.  What side effects may I notice from receiving this medicine?  Side effects that you should report to your doctor or health care professional as soon as possible:  -allergic reactions like skin rash, itching or hives, swelling of the face, lips, or tongue  -dizziness or feeling faint  -fever  -pain, redness, or irritation at site where injected  -pinpoint red spots on the skin  -shortness of breath or breathing problems  -signs and symptoms of kidney injury like trouble passing urine, change in the amount of urine, or red or dark-brown urine  -stomach or side pain, or pain at the shoulder  -swelling  -tiredness  -  unusual bleeding or bruising  Side effects that usually do not require medical attention (report to your doctor or health care professional if they continue or are bothersome):  -bone  pain  -headache  -muscle pain  This list may not describe all possible side effects. Call your doctor for medical advice about side effects. You may report side effects to FDA at 3-198-MPU-4821.  Where should I keep my medicine?  Keep out of the reach of children.  Store in a refrigerator between 2 and 8 degrees C (36 and 46 degrees F). Do not freeze. Keep in carton to protect from light. Throw away this medicine if vials or syringes are left out of the refrigerator for more than 24 hours. Throw away any unused medicine after the expiration date.  NOTE: This sheet is a summary. It may not cover all possible information. If you have questions about this medicine, talk to your doctor, pharmacist, or health care provider.  © 2018 Elsevier/[employer] Standard (2016-07-06 10:57:13)

## 2019-02-15 ENCOUNTER — INFUSION (OUTPATIENT)
Dept: ONCOLOGY | Facility: HOSPITAL | Age: 74
End: 2019-02-15
Attending: INTERNAL MEDICINE

## 2019-02-15 VITALS
RESPIRATION RATE: 18 BRPM | OXYGEN SATURATION: 99 % | SYSTOLIC BLOOD PRESSURE: 138 MMHG | BODY MASS INDEX: 24.11 KG/M2 | HEIGHT: 62 IN | WEIGHT: 131 LBS | DIASTOLIC BLOOD PRESSURE: 68 MMHG | TEMPERATURE: 98.4 F | HEART RATE: 91 BPM

## 2019-02-15 DIAGNOSIS — T45.1X5A CHEMOTHERAPY INDUCED NEUTROPENIA (HCC): Primary | ICD-10-CM

## 2019-02-15 DIAGNOSIS — D70.1 CHEMOTHERAPY INDUCED NEUTROPENIA (HCC): Primary | ICD-10-CM

## 2019-02-15 PROCEDURE — 96372 THER/PROPH/DIAG INJ SC/IM: CPT

## 2019-02-15 PROCEDURE — 25010000002 FILGRASTIM PER 480 MCG: Performed by: INTERNAL MEDICINE

## 2019-02-15 RX ADMIN — FILGRASTIM 480 MCG: 480 INJECTION, SOLUTION INTRAVENOUS; SUBCUTANEOUS at 11:40

## 2019-02-15 NOTE — PATIENT INSTRUCTIONS
Filgrastim, G-CSF injection  What is this medicine?  FILGRASTIM, G-CSF (prasanth GRA stim) is a granulocyte colony-stimulating factor that stimulates the growth of neutrophils, a type of white blood cell (WBC) important in the body's fight against infection. It is used to reduce the incidence of fever and infection in patients with certain types of cancer who are receiving chemotherapy that affects the bone marrow, to stimulate blood cell production for removal of WBCs from the body prior to a bone marrow transplantation, to reduce the incidence of fever and infection in patients who have severe chronic neutropenia, and to improve survival outcomes following high-dose radiation exposure that is toxic to the bone marrow.  This medicine may be used for other purposes; ask your health care provider or pharmacist if you have questions.  COMMON BRAND NAME(S): Deion Filikimberlybrent  What should I tell my health care provider before I take this medicine?  They need to know if you have any of these conditions:  -kidney disease  -latex allergy  -ongoing radiation therapy  -sickle cell disease  -an unusual or allergic reaction to filgrastim, pegfilgrastim, other medicines, foods, dyes, or preservatives  -pregnant or trying to get pregnant  -breast-feeding  How should I use this medicine?  This medicine is for injection under the skin or infusion into a vein. As an infusion into a vein, it is usually given by a health care professional in a hospital or clinic setting. If you get this medicine at home, you will be taught how to prepare and give this medicine. Refer to the Instructions for Use that come with your medication packaging. Use exactly as directed. Take your medicine at regular intervals. Do not take your medicine more often than directed.  It is important that you put your used needles and syringes in a special sharps container. Do not put them in a trash can. If you do not have a sharps container, call your pharmacist or  healthcare provider to get one.  Talk to your pediatrician regarding the use of this medicine in children. While this drug may be prescribed for children as young as 7 months for selected conditions, precautions do apply.  Overdosage: If you think you have taken too much of this medicine contact a poison control center or emergency room at once.  NOTE: This medicine is only for you. Do not share this medicine with others.  What if I miss a dose?  It is important not to miss your dose. Call your doctor or health care professional if you miss a dose.  What may interact with this medicine?  This medicine may interact with the following medications:  -medicines that may cause a release of neutrophils, such as lithium  This list may not describe all possible interactions. Give your health care provider a list of all the medicines, herbs, non-prescription drugs, or dietary supplements you use. Also tell them if you smoke, drink alcohol, or use illegal drugs. Some items may interact with your medicine.  What should I watch for while using this medicine?  You may need blood work done while you are taking this medicine.  What side effects may I notice from receiving this medicine?  Side effects that you should report to your doctor or health care professional as soon as possible:  -allergic reactions like skin rash, itching or hives, swelling of the face, lips, or tongue  -dizziness or feeling faint  -fever  -pain, redness, or irritation at site where injected  -pinpoint red spots on the skin  -shortness of breath or breathing problems  -signs and symptoms of kidney injury like trouble passing urine, change in the amount of urine, or red or dark-brown urine  -stomach or side pain, or pain at the shoulder  -swelling  -tiredness  -  unusual bleeding or bruising  Side effects that usually do not require medical attention (report to your doctor or health care professional if they continue or are bothersome):  -bone  pain  -headache  -muscle pain  This list may not describe all possible side effects. Call your doctor for medical advice about side effects. You may report side effects to FDA at 2-941-VHH-1915.  Where should I keep my medicine?  Keep out of the reach of children.  Store in a refrigerator between 2 and 8 degrees C (36 and 46 degrees F). Do not freeze. Keep in carton to protect from light. Throw away this medicine if vials or syringes are left out of the refrigerator for more than 24 hours. Throw away any unused medicine after the expiration date.  NOTE: This sheet is a summary. It may not cover all possible information. If you have questions about this medicine, talk to your doctor, pharmacist, or health care provider.  © 2018 [employer] Standard (2016-07-06 10:57:13)

## 2019-02-18 ENCOUNTER — APPOINTMENT (OUTPATIENT)
Dept: ONCOLOGY | Facility: HOSPITAL | Age: 74
End: 2019-02-18
Attending: INTERNAL MEDICINE

## 2019-02-18 ENCOUNTER — TELEPHONE (OUTPATIENT)
Dept: ONCOLOGY | Facility: CLINIC | Age: 74
End: 2019-02-18

## 2019-02-18 DIAGNOSIS — R53.1 WEAKNESS GENERALIZED: ICD-10-CM

## 2019-02-18 DIAGNOSIS — R26.89 UNSTABLE BALANCE: Primary | ICD-10-CM

## 2019-02-18 NOTE — TELEPHONE ENCOUNTER
Call from [facility] requesting an order for a walker with a seat.  Patient requested that script be faxed to [phone]

## 2019-02-19 ENCOUNTER — INFUSION (OUTPATIENT)
Dept: ONCOLOGY | Facility: HOSPITAL | Age: 74
End: 2019-02-19
Attending: INTERNAL MEDICINE

## 2019-02-19 ENCOUNTER — TELEPHONE (OUTPATIENT)
Dept: ONCOLOGY | Facility: CLINIC | Age: 74
End: 2019-02-19

## 2019-02-19 ENCOUNTER — HOSPITAL ENCOUNTER (OUTPATIENT)
Dept: NUCLEAR MEDICINE | Facility: HOSPITAL | Age: 74
Discharge: HOME | End: 2019-02-19

## 2019-02-19 ENCOUNTER — HOSPITAL ENCOUNTER (OUTPATIENT)
Dept: CT IMAGING | Facility: HOSPITAL | Age: 74
Discharge: HOME | End: 2019-02-19
Admitting: INTERNAL MEDICINE

## 2019-02-19 VITALS
TEMPERATURE: 98 F | RESPIRATION RATE: 20 BRPM | OXYGEN SATURATION: 100 % | SYSTOLIC BLOOD PRESSURE: 134 MMHG | HEART RATE: 90 BPM | WEIGHT: 132 LBS | HEIGHT: 65 IN | DIASTOLIC BLOOD PRESSURE: 67 MMHG | BODY MASS INDEX: 21.99 KG/M2

## 2019-02-19 DIAGNOSIS — T45.1X5A CHEMOTHERAPY INDUCED NEUTROPENIA (HCC): Primary | ICD-10-CM

## 2019-02-19 DIAGNOSIS — C79.51 CARCINOMA OF LEFT BREAST METASTATIC TO BONE (HCC): ICD-10-CM

## 2019-02-19 DIAGNOSIS — C50.912 CARCINOMA OF LEFT BREAST METASTATIC TO BONE (HCC): ICD-10-CM

## 2019-02-19 DIAGNOSIS — D70.1 CHEMOTHERAPY INDUCED NEUTROPENIA (HCC): Primary | ICD-10-CM

## 2019-02-19 PROCEDURE — 96372 THER/PROPH/DIAG INJ SC/IM: CPT

## 2019-02-19 PROCEDURE — A9561 TC99M OXIDRONATE: HCPCS | Performed by: INTERNAL MEDICINE

## 2019-02-19 PROCEDURE — 78306 BONE IMAGING WHOLE BODY: CPT

## 2019-02-19 PROCEDURE — 25010000002 IOPAMIDOL 61 % SOLUTION: Performed by: INTERNAL MEDICINE

## 2019-02-19 PROCEDURE — 0 TECHNETIUM OXIDRONATE KIT: Performed by: INTERNAL MEDICINE

## 2019-02-19 PROCEDURE — 25010000002 FILGRASTIM PER 480 MCG: Performed by: INTERNAL MEDICINE

## 2019-02-19 PROCEDURE — 71260 CT THORAX DX C+: CPT

## 2019-02-19 PROCEDURE — 74177 CT ABD & PELVIS W/CONTRAST: CPT

## 2019-02-19 RX ADMIN — IOPAMIDOL 150 ML: 612 INJECTION, SOLUTION INTRAVENOUS at 11:07

## 2019-02-19 RX ADMIN — TECHNETIUM TC 99M OXIDRONATE 1 DOSE: 3.15 INJECTION, POWDER, LYOPHILIZED, FOR SOLUTION INTRAVENOUS at 11:06

## 2019-02-19 RX ADMIN — FILGRASTIM 480 MCG: 480 INJECTION, SOLUTION INTRAVENOUS; SUBCUTANEOUS at 10:36

## 2019-02-19 NOTE — PATIENT INSTRUCTIONS
High-Protein and High-Calorie Diet  Eating high-protein and high-calorie foods can help you to gain weight, heal after an injury, and recover after an illness or surgery.  What is my plan?  The specific amount of daily protein and calories you need depends on:  · Your body weight.  · The reason this diet is recommended for you.    Generally, a high-protein, high-calorie diet involves:  · Eating 250-500 extra calories each day.  · Making sure that 10-35% of your daily calories come from protein.    Talk to your health care provider about how much protein and how many calories you need each day. Follow the diet as directed by your health care provider.  What do I need to know about this diet?  · Ask your health care provider if you should take a nutritional supplement.  · Try to eat six small meals each day instead of three large meals.  · Eat a balanced diet, including one food that is high in protein at each meal.  · Keep nutritious snacks handy, such as nuts, trail mixes, dried fruit, and yogurt.  · If you have kidney disease or diabetes, eating too much protein may put extra stress on your kidneys. Talk to your health care provider if you have either of those conditions.  What are some high-protein foods?  Grains  Quinoa. Bulgur wheat.  Vegetables  Soybeans. Peas.  Meats and Other Protein Sources  Beef, pork, and poultry. Fish and seafood. Eggs. Tofu. Textured vegetable protein (TVP). Peanut butter. Nuts and seeds. Dried beans. Protein powders.  Dairy  Whole milk. Whole-milk yogurt. Powdered milk. Cheese. Cottage Cheese. Eggnog.  Beverages  High-protein supplement drinks. Soy milk.  Other  Protein bars.  The items listed above may not be a complete list of recommended foods or beverages. Contact your dietitian for more options.  What are some high-calorie foods?  Grains  Pasta. Quick breads. Muffins. Pancakes. Ready-to-eat cereal.  Vegetables  Vegetables cooked in oil or butter. Fried potatoes.  Fruits  Dried  fruit. Fruit leather. Canned fruit in syrup. Fruit juice. Avocados.  Meats and Other Protein Sources  Peanut butter. Nuts and seeds.  Dairy  Heavy cream. Whipped cream. Cream cheese. Sour cream. Ice cream. Custard. Pudding.  Beverages  Meal-replacement beverages. Nutrition shakes. Fruit juice. Sugar-sweetened soft drinks.  Condiments  Salad dressing. Mayonnaise. [place] sauce. Fruit preserves or jelly. Honey. Syrup.  Sweets/Desserts  Cake. Cookies. Pie. Pastries. Candy bars. Chocolate.  Fats and Oils  Butter or margarine. Oil. Gravy.  Other  Meal-replacement bars.  The items listed above may not be a complete list of recommended foods or beverages. Contact your dietitian for more options.  What are some tips for including high-protein and high-calorie foods in my diet?  · Add whole milk, half-and-half, or heavy cream to cereal, pudding, soup, or hot cocoa.  · Add whole milk to instant breakfast drinks.  · Add peanut butter to oatmeal or smoothies.  · Add powdered milk to baked goods, smoothies, or milkshakes.  · Add powdered milk, cream, or butter to mashed potatoes.  · Add cheese to cooked vegetables.  · Make whole-milk yogurt parfaits. Top them with granola, fruit, or nuts.  · Add cottage cheese to your fruit.  · Add avocados, cheese, or both to sandwiches or salads.  · Add meat, poultry, or seafood to rice, pasta, casseroles, salads, and soups.  · Use mayonnaise when making egg salad, chicken salad, or tuna salad.  · Use peanut butter as a topping for pretzels, celery, or crackers.  · Add beans to casseroles, dips, and spreads.  · Add pureed beans to sauces and soups.  · Replace calorie-free drinks with calorie-containing drinks, such as milk and fruit juice.  This information is not intended to replace advice given to you by your health care provider. Make sure you discuss any questions you have with your health care provider.  Document Released: 12/18/2006 Document Revised: 05/25/2017 Document Reviewed:  06/02/2015  Elsevier Interactive Patient Education © 2018 [employer]

## 2019-02-19 NOTE — TELEPHONE ENCOUNTER
Received second call from patient, she called and states it was a false alarm she doesn't think she is turning yellow and does not want to come in for lab work.     Returned call to patient but unable to contact, message was left for her to call me back so we can discuss her concerns.

## 2019-02-19 NOTE — TELEPHONE ENCOUNTER
Received call from patient [facility], she called and feels like she needs to be seen, she states that her daughter thinks she is looking yellow and she needs some lab work to be performed.

## 2019-02-21 NOTE — TELEPHONE ENCOUNTER
Call from [facility] stating she has not received the xarelto script yet.  Month supply sent to [facility] until she gets the mail order.

## 2019-02-25 ENCOUNTER — TELEPHONE (OUTPATIENT)
Dept: ONCOLOGY | Facility: CLINIC | Age: 74
End: 2019-02-25

## 2019-02-25 NOTE — TELEPHONE ENCOUNTER
Received call from patient requesting her Xarelto prescription be sent to [facility] instead of her local Pharmacy due to cost, new script was sent to Mail order and patient notified.

## 2019-02-28 ENCOUNTER — LAB (OUTPATIENT)
Dept: LAB | Facility: HOSPITAL | Age: 74
End: 2019-02-28
Attending: INTERNAL MEDICINE

## 2019-02-28 ENCOUNTER — INFUSION (OUTPATIENT)
Dept: ONCOLOGY | Facility: HOSPITAL | Age: 74
End: 2019-02-28
Attending: INTERNAL MEDICINE

## 2019-02-28 ENCOUNTER — OFFICE VISIT (OUTPATIENT)
Dept: ONCOLOGY | Facility: CLINIC | Age: 74
End: 2019-02-28

## 2019-02-28 VITALS
SYSTOLIC BLOOD PRESSURE: 132 MMHG | WEIGHT: 136 LBS | HEART RATE: 79 BPM | HEIGHT: 65 IN | DIASTOLIC BLOOD PRESSURE: 63 MMHG | RESPIRATION RATE: 18 BRPM | TEMPERATURE: 98.1 F | BODY MASS INDEX: 22.66 KG/M2 | OXYGEN SATURATION: 100 %

## 2019-02-28 VITALS
SYSTOLIC BLOOD PRESSURE: 118 MMHG | DIASTOLIC BLOOD PRESSURE: 72 MMHG | TEMPERATURE: 97.9 F | HEART RATE: 80 BPM | HEIGHT: 65 IN | WEIGHT: 137 LBS | BODY MASS INDEX: 22.82 KG/M2 | RESPIRATION RATE: 16 BRPM | OXYGEN SATURATION: 97 %

## 2019-02-28 DIAGNOSIS — C50.912 CARCINOMA OF LEFT BREAST METASTATIC TO BONE (HCC): Primary | ICD-10-CM

## 2019-02-28 DIAGNOSIS — C79.51 CARCINOMA OF LEFT BREAST METASTATIC TO BONE (HCC): ICD-10-CM

## 2019-02-28 DIAGNOSIS — C79.51 CARCINOMA OF LEFT BREAST METASTATIC TO BONE (HCC): Primary | ICD-10-CM

## 2019-02-28 DIAGNOSIS — C50.412 MALIGNANT NEOPLASM OF UPPER-OUTER QUADRANT OF LEFT FEMALE BREAST, UNSPECIFIED ESTROGEN RECEPTOR STATUS (HCC): ICD-10-CM

## 2019-02-28 DIAGNOSIS — C79.51 BREAST CANCER METASTASIZED TO BONE, UNSPECIFIED LATERALITY (HCC): ICD-10-CM

## 2019-02-28 DIAGNOSIS — C50.919 BREAST CANCER METASTASIZED TO BONE, UNSPECIFIED LATERALITY (HCC): ICD-10-CM

## 2019-02-28 DIAGNOSIS — C50.912 CARCINOMA OF LEFT BREAST METASTATIC TO BONE (HCC): ICD-10-CM

## 2019-02-28 LAB
ALBUMIN SERPL-MCNC: 4 G/DL (ref 3.5–5)
ALBUMIN/GLOB SERPL: 1.3 G/DL (ref 1.1–2.5)
ALP SERPL-CCNC: 68 U/L (ref 24–120)
ALT SERPL W P-5'-P-CCNC: 19 U/L (ref 0–54)
ANION GAP SERPL CALCULATED.3IONS-SCNC: 7 MMOL/L (ref 4–13)
ANISOCYTOSIS BLD QL: ABNORMAL
AST SERPL-CCNC: 30 U/L (ref 7–45)
BILIRUB SERPL-MCNC: 0.3 MG/DL (ref 0.1–1)
BUN BLD-MCNC: 23 MG/DL (ref 5–21)
BUN/CREAT SERPL: 23.5 (ref 7–25)
CALCIUM SPEC-SCNC: 9.5 MG/DL (ref 8.4–10.4)
CHLORIDE SERPL-SCNC: 98 MMOL/L (ref 98–110)
CO2 SERPL-SCNC: 27 MMOL/L (ref 24–31)
CREAT BLD-MCNC: 0.98 MG/DL (ref 0.5–1.4)
DEPRECATED RDW RBC AUTO: 70 FL (ref 40–54)
ERYTHROCYTE [DISTWIDTH] IN BLOOD BY AUTOMATED COUNT: 18.8 % (ref 12–15)
GFR SERPL CREATININE-BSD FRML MDRD: 55 ML/MIN/1.73
GIANT PLATELETS: ABNORMAL
GLOBULIN UR ELPH-MCNC: 3 GM/DL
GLUCOSE BLD-MCNC: 104 MG/DL (ref 70–100)
HCT VFR BLD AUTO: 30.9 % (ref 37–47)
HGB BLD-MCNC: 10.2 G/DL (ref 12–16)
HOLD SPECIMEN: NORMAL
LYMPHOCYTES # BLD MANUAL: 2.04 10*3/MM3 (ref 0.72–4.86)
LYMPHOCYTES NFR BLD MANUAL: 22.4 % (ref 15–45)
LYMPHOCYTES NFR BLD MANUAL: 9.2 % (ref 4–12)
MACROCYTES BLD QL SMEAR: ABNORMAL
MCH RBC QN AUTO: 33.7 PG (ref 28–32)
MCHC RBC AUTO-ENTMCNC: 33 G/DL (ref 33–36)
MCV RBC AUTO: 102 FL (ref 82–98)
METAMYELOCYTES NFR BLD MANUAL: 2 % (ref 0–0)
MONOCYTES # BLD AUTO: 0.84 10*3/MM3 (ref 0.19–1.3)
MYELOCYTES NFR BLD MANUAL: 1 % (ref 0–0)
NEUTROPHILS # BLD AUTO: 5.94 10*3/MM3 (ref 1.87–8.4)
NEUTROPHILS NFR BLD MANUAL: 60.2 % (ref 39–78)
NEUTS BAND NFR BLD MANUAL: 5.1 % (ref 0–10)
NEUTS VAC BLD QL SMEAR: ABNORMAL
PLATELET # BLD AUTO: 429 10*3/MM3 (ref 130–400)
PMV BLD AUTO: 9.3 FL (ref 6–12)
POTASSIUM BLD-SCNC: 3.7 MMOL/L (ref 3.5–5.3)
PROT SERPL-MCNC: 7 G/DL (ref 6.3–8.7)
RBC # BLD AUTO: 3.03 10*6/MM3 (ref 4.2–5.4)
SODIUM BLD-SCNC: 132 MMOL/L (ref 135–145)
WBC NRBC COR # BLD: 9.09 10*3/MM3 (ref 4.8–10.8)

## 2019-02-28 PROCEDURE — 99214 OFFICE O/P EST MOD 30 MIN: CPT | Performed by: INTERNAL MEDICINE

## 2019-02-28 PROCEDURE — 36415 COLL VENOUS BLD VENIPUNCTURE: CPT

## 2019-02-28 PROCEDURE — 85025 COMPLETE CBC W/AUTO DIFF WBC: CPT

## 2019-02-28 PROCEDURE — 96365 THER/PROPH/DIAG IV INF INIT: CPT

## 2019-02-28 PROCEDURE — 25010000002 ONDANSETRON PER 1 MG: Performed by: INTERNAL MEDICINE

## 2019-02-28 PROCEDURE — 96413 CHEMO IV INFUSION 1 HR: CPT

## 2019-02-28 PROCEDURE — 85007 BL SMEAR W/DIFF WBC COUNT: CPT

## 2019-02-28 PROCEDURE — 25010000002 DOCETAXEL 20 MG/ML SOLUTION 4 ML VIAL: Performed by: INTERNAL MEDICINE

## 2019-02-28 PROCEDURE — 25010000002 DEXAMETHASONE PER 1 MG: Performed by: INTERNAL MEDICINE

## 2019-02-28 PROCEDURE — 80053 COMPREHEN METABOLIC PANEL: CPT

## 2019-02-28 PROCEDURE — 96367 TX/PROPH/DG ADDL SEQ IV INF: CPT

## 2019-02-28 RX ORDER — SODIUM CHLORIDE 0.9 % (FLUSH) 0.9 %
10 SYRINGE (ML) INJECTION AS NEEDED
Status: DISCONTINUED | OUTPATIENT
Start: 2019-02-28 | End: 2019-02-28 | Stop reason: HOSPADM

## 2019-02-28 RX ORDER — SODIUM CHLORIDE 9 MG/ML
250 INJECTION, SOLUTION INTRAVENOUS ONCE
Status: CANCELLED | OUTPATIENT
Start: 2019-03-14

## 2019-02-28 RX ORDER — FAMOTIDINE 10 MG/ML
20 INJECTION, SOLUTION INTRAVENOUS AS NEEDED
Status: CANCELLED | OUTPATIENT
Start: 2019-03-14

## 2019-02-28 RX ORDER — FAMOTIDINE 10 MG/ML
20 INJECTION, SOLUTION INTRAVENOUS AS NEEDED
Status: DISCONTINUED | OUTPATIENT
Start: 2019-02-28 | End: 2019-02-28 | Stop reason: HOSPADM

## 2019-02-28 RX ORDER — FAMOTIDINE 10 MG/ML
20 INJECTION, SOLUTION INTRAVENOUS AS NEEDED
Status: CANCELLED | OUTPATIENT
Start: 2019-02-28

## 2019-02-28 RX ORDER — SODIUM CHLORIDE 9 MG/ML
250 INJECTION, SOLUTION INTRAVENOUS ONCE
Status: COMPLETED | OUTPATIENT
Start: 2019-02-28 | End: 2019-02-28

## 2019-02-28 RX ORDER — SODIUM CHLORIDE 9 MG/ML
250 INJECTION, SOLUTION INTRAVENOUS ONCE
Status: CANCELLED | OUTPATIENT
Start: 2019-02-28

## 2019-02-28 RX ORDER — MEPERIDINE HYDROCHLORIDE 50 MG/ML
25 INJECTION INTRAMUSCULAR; INTRAVENOUS; SUBCUTANEOUS
Status: CANCELLED | OUTPATIENT
Start: 2019-03-07 | End: 2019-03-08

## 2019-02-28 RX ORDER — FAMOTIDINE 10 MG/ML
20 INJECTION, SOLUTION INTRAVENOUS AS NEEDED
Status: CANCELLED | OUTPATIENT
Start: 2019-03-07

## 2019-02-28 RX ORDER — MEPERIDINE HYDROCHLORIDE 50 MG/ML
25 INJECTION INTRAMUSCULAR; INTRAVENOUS; SUBCUTANEOUS
Status: CANCELLED | OUTPATIENT
Start: 2019-03-14 | End: 2019-03-15

## 2019-02-28 RX ORDER — MEPERIDINE HYDROCHLORIDE 50 MG/ML
25 INJECTION INTRAMUSCULAR; INTRAVENOUS; SUBCUTANEOUS
Status: CANCELLED | OUTPATIENT
Start: 2019-02-28 | End: 2019-03-01

## 2019-02-28 RX ORDER — SODIUM CHLORIDE 9 MG/ML
250 INJECTION, SOLUTION INTRAVENOUS ONCE
Status: CANCELLED | OUTPATIENT
Start: 2019-03-07

## 2019-02-28 RX ORDER — MEPERIDINE HYDROCHLORIDE 50 MG/ML
25 INJECTION INTRAMUSCULAR; INTRAVENOUS; SUBCUTANEOUS
Status: DISCONTINUED | OUTPATIENT
Start: 2019-02-28 | End: 2019-02-28 | Stop reason: HOSPADM

## 2019-02-28 RX ORDER — SODIUM CHLORIDE 0.9 % (FLUSH) 0.9 %
10 SYRINGE (ML) INJECTION AS NEEDED
Status: CANCELLED | OUTPATIENT
Start: 2019-02-28

## 2019-02-28 RX ADMIN — DEXAMETHASONE SODIUM PHOSPHATE: 10 INJECTION, SOLUTION INTRAMUSCULAR; INTRAVENOUS at 11:10

## 2019-02-28 RX ADMIN — DOCETAXEL ANHYDROUS 60 MG: 20 INJECTION, SOLUTION INTRAVENOUS at 11:59

## 2019-02-28 RX ADMIN — SODIUM CHLORIDE 250 ML: 9 INJECTION, SOLUTION INTRAVENOUS at 11:08

## 2019-02-28 RX ADMIN — SODIUM CHLORIDE, PRESERVATIVE FREE 10 ML: 5 INJECTION INTRAVENOUS at 13:14

## 2019-02-28 NOTE — PROGRESS NOTES
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   • Carcinoma of left breast metastatic to bone (CMS/HCC)        REASON FOR VISIT:     Chief Complaint   Patient presents with   • Breast Cancer     Here for consideration of treatment        HEMATOLOGY / ONCOLOGY HISTORY:   Oncology/Hematology History    Patient is a 67-year-old postmenopausal female who had onset of her menses at age 9 and menopause at age 48. She received oral contraceptives for approximately 14 years and did not receive hormonal manipulation. Patient has a maternal cousin had breast cancer. Patient has had no prior breast biopsies. Patient found a palpable left breast mass as well as a left axillary mass. Patient had a bloody nipple discharge which been present for approximately 6 months. On 6/20/2012, a mammogram was performed showing a subareolar mass consistent with malignancy. Bilateral breast ultrasound revealed an ill-defined subareolar mass measuring 2.8-2.5 cm. There is a left axillary mass measuring 1.7 cm with suspicion of extracapsular extension. There is no evidence of disease in the right breast. On 7/9/2012 patient underwent a left breast biopsy and placement of marker clip. Left breast biopsy revealed an infiltrating lobular carcinoma grade 2 with focal ductal carcinoma in situ, solid pattern. A fine-needle biopsy of the left lymph node was consistent with metastatic carcinoma. Breast tumor profile revealed ER: 100%; KS: 98%; HER-2/maynor 2+; FISH: Unamplified; Ki-67: 71%; P 53 1%; DNA aneuploidy. A MRI of the breast were performed body multiple abnormal enhancing masses within the left breast.  There appears to be anterior retraction of the pectoralis muscle with no direct extension. There is a moderate to large, layering left pleural effusion appreciated. The right breast reveals 3 moderately enlarged lymph nodes in the posterior lateral aspect the right breast. These have fatty hilum but followup is recommended. Patient is undergone systemic studies including, on 8/2/2012, a CT scan of the brain showing atrophy. a CT scan that shows showing a small to moderate left pleural effusion with 3 subcentimeter nodule densities in the left lung.   She completed 4 cycles of neoadjuvant treatment with dose dense Adriamycin and Cytoxan. She had a mammogram which showed a significant reduction in the size of her left breast lesion. There is no axillary adenopathy noted. She has had an ultrasound revealing the  lesion reducing from 2.4 cm to 1 cm. Patient underwent a left mastectomy on 03/19/2013 finding negative invasive cancer, negative nodes. A 5% DCIS was noted. The patient has declined hormonal therapy. She did not need radiation therapy.  November 2014, she began to have evidence of lytic bone lesions at T5T6, frontal disease, an 8 mm lucency in the central frontal area of  the skull but nothing intracranial. Bone scan revealed only vertex tracer activity but bilateral ribs and thoracic spine, and other lesions in  her pelvis. At that time, she was started on letrozole since November 2014. She is taking Xgeva. She had progression found in bone in Feb 2015 on scan. She was started on Faslodex, Ibrance and continued xgeva.         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 B) Foci of ductal carcinoma in situ, solid pattern. C) Greatest dimension of the invasive carcinoma is  15.8mm.         3/19/2013 Surgery     Left Mastectomy w/ Axillary Tail: Focal residual ducatal carcinoma in situ (DCIS) 12 lymph nodes negative for metastatic carcinoma.         10/20/2014 Biopsy     Peritoneum Biopsy: Final Diagnosis: Malignant Cell Neoplasm.           Breast cancer metastasized to bone, unspecified laterality (CMS/HCC)    10/15/2012 -  Other Event     Left mammogram:  Impression:  1. Decreasing spiculation and density in the retroareolar left breast, near a previous biopsy.   2. Definite left axillary lesion is not appreciated.          2/5/2013 -  Other Event     Diagnostic Mammo:  Comparison is made to 10/15/2012 and 6/20/2012  The spiculated mass at 12:00 behind the nipple is nearly resolved.   Impression:  1. The mass on the left side is less apparent after receiving chemotherapy. There has been a good response to chmotherapy on the left side.   2. No suspicious abnormality is seen in the right breast to account for the new palable abnormality at the 4-5:00 position.          3/19/2013 Surgery     Breast Biopsy:  Final Diagnosis:  A. Breast, left mastectomy with axillary tail  1. Focal residual ductal carcinoma in situ (DICS) (less than 5% of tumor bed).  2. Negative for residual invasive carcinoma.   3. 12 Lymph Nodes, negative for metastatic carcinoma (0/12) focally, some lymph nodes demonstrate fibrosis/treatment effect.  B. Skin and soft tissue, left advancement flap:  1. Benign skin and subcutis.   2. Negative for carcinoma.          6/20/2013 -  Other Event     Diagnostic Mammo [facility]:  1. A subareolar mass is consistent with malignancy. Additional involement extends inferiorly sonographically and superolaterally mammograhically as demonstrated by calcifications. The mass measures approx 2.8 cm sonographically, but the involved region is likely much larger including an intraductal componet. The left axillary lymph nodes are also involved.   2. Recommened ultrasound guided biopsy of  the left subareolar mass and left axillary lymph node.   3. Breast MRI could also further define multicentric disease on the left.   4. Clear suspicious finding on the right.  Recommend ongoing clinical follow up of palpable foci.          6/13/2014 -  Other Event     Diagnostic Mammo:  IMPRESSION:  1. History of left breast cancer and mastectomy. Stable benign right breast mammograms.          10/20/2014 Surgery     Final Diagnosis:  Biopsies, pertoneum:  Metastatic carcinoma, morphologically compatible with metastatic mammary carcinoma.          6/15/2015 -  Other Event     Diagnostic Mammo:  IMPRESSION:   1. Negative x-ray report, should not delay biopsy if a dominat or clinically suspicious mass is present.          7/5/2016 -  Other Event     Diagnositic mammogram:  Impression:   Stable right mamogram with no radiographic findings suspicious for malignancy. Recommend continued screening mammography per screening guidelines unless otherwise earlier clinically indicated.          9/18/2016 Initial Diagnosis     Secondary malignant neoplasm of bone and bone marrow              INTERVAL HISTORY  Patient ID: Heavenly Yanes is a 73 y.o. year old female Cancer Staging  Stage IV (T2, N1, M1)  --complaining of dyspnea on exersion since last Tuesday. She feels like she ran a mile  2/14/19: cxr showed pneumonia. Already on levofloxacin. Coughing not worse, she did not run temp.  Overall feels weak.  -restarted the appetite suppressant and is starting to eat more but not a whole lot  --2/28/19: CT c/ap with some response to therapy. New left chest inflammatory lesion to be followed ion next imaging. Bone scan unremarkable for progression. Denies /cp/n/v/fever/chills, neuropathy. Rest of ros unremarkable.  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) 9/18/2016     Past Surgical History:   Past Surgical History:   Procedure Laterality Date   • BREAST SURGERY      left breast biopsy and axillary node   • CHOLECYSTECTOMY     • EXTERNAL EAR SURGERY     • MASTECTOMY Left    • PORTACATH PLACEMENT       Social History:   Social History     Socioeconomic History   • Marital status:      Spouse name: Not on file   • Number of children: Not on file   • Years of education: Not on file   • Highest education level: Not on file   Social Needs   • Financial resource strain: Not on file   • Food insecurity - worry: Not on file   • Food insecurity - inability: Not on file   • Transportation needs - medical: Not on file   • Transportation needs - non-medical: Not on file   Occupational History   • Not on file   Tobacco Use   • Smoking status: Never Smoker   • Smokeless tobacco: Never Used   Substance and Sexual Activity   • Alcohol use: No   • Drug use: No   • Sexual activity: Not on file   Other Topics Concern   • Not on file   Social History Narrative   • Not on file     Family History:   Family History   Problem Relation Age of Onset   • No Known Problems Daughter    • No Known Problems Son    • Other Mother         complications from a concussion from a fall   • Other Father         old age   • No Known Problems Brother    • No Known Problems Daughter        Review of Systems   Constitutional: Positive for fatigue.   HENT: Negative.    Eyes: Negative.    Respiratory: Positive for shortness of breath.    Cardiovascular: Negative.    Gastrointestinal: Negative.    Endocrine: Negative.    Genitourinary: Negative.    Musculoskeletal: Negative.    Skin: Negative.    Neurological: Negative.    Hematological: Negative.    Psychiatric/Behavioral: Negative.         Performance Status:  Asymptomatic    Medications:    Current Outpatient  Medications   Medication Sig Dispense Refill   • Calcium-Magnesium-Vitamin D (CALCIUM 1200+D3 PO) Take 1,200 mg by mouth Daily.     • capecitabine (XELODA) 500 MG chemo tablet Take 1 tablet by mouth 2 (Two) Times a Day. For 2 weeks on and 1 week off. 42 tablet 11   • levoFLOXacin (LEVAQUIN) 500 MG tablet Take 1 tablet by mouth Daily. 10 tablet 0   • megestrol (MEGACE) 40 MG tablet Take 1 tablet by mouth 2 (Two) Times a Day. 60 tablet 2   • metoprolol tartrate (LOPRESSOR) 25 MG tablet Take 25 mg by mouth 2 (two) times a day.     • OLANZapine (ZYPREXA) 2.5 MG tablet Take 2.5 mg by mouth every night.     • ondansetron (ZOFRAN) 8 MG tablet Take 1 tablet by mouth Every 8 (Eight) Hours As Needed for Nausea or Vomiting. (Patient taking differently: Take 8 mg by mouth Every 8 (Eight) Hours As Needed for Nausea or Vomiting.) 30 tablet 5   • raNITIdine (ZANTAC) 150 MG tablet Take 150 mg by mouth 2 (Two) Times a Day.     • rivaroxaban (XARELTO) 20 MG tablet Take 1 tablet by mouth Daily. 30 tablet 5   • thyroid 60 MG PO tablet Take 60 mg by mouth daily.       No current facility-administered medications for this visit.      Facility-Administered Medications Ordered in Other Visits   Medication Dose Route Frequency Provider Last Rate Last Dose   • DOCEtaxel 60 mg in sodium chloride 0.9 % 253 mL chemo IVPB  35 mg/m2 (Treatment Plan Adjusted) Intravenous Once Moise Sotelo MD       • famotidine (PEPCID) injection 20 mg  20 mg Intravenous PRN Moise Sotelo MD       • heparin flush (porcine) 100 UNIT/ML injection 500 Units  500 Units Intravenous PRN Joseph Nunez MD   500 Units at 10/26/17 1155   • hydrocortisone sodium succinate (Solu-CORTEF) injection 100 mg  100 mg Intravenous PRN Moise Sotelo MD       • meperidine (DEMEROL) injection 25 mg  25 mg Intravenous Q20 Min PRN Moise Sotelo MD       • sodium chloride 0.9 % flush 10 mL  10 mL Intravenous PRN Joseph Nunez MD    "10 mL at 10/26/17 1155   • sodium chloride 0.9 % flush 10 mL  10 mL Intravenous PRN Joseph Nunez MD           ALLERGIES:    Allergies   Allergen Reactions   • Benadryl  [Diphenhydramine Hcl (Sleep)] Other (See Comments)   • Codeine    • Diphenhydramine Other (See Comments)     \"Shaky leg syndrome\"  \"Shaky leg syndrome\"  \"Shaky leg syndrome\"   • Heparin    • Other      POPPY SEED   • Penicillins Hives   • Petroleum Jelly [Skin Protectants, Misc.]    • Sulfa Antibiotics    • Sulfur    • Valium [Diazepam]        Objective      Vitals:    02/28/19 0915   BP: 118/72   Pulse: 80   Resp: 16   Temp: 97.9 °F (36.6 °C)   TempSrc: Oral   SpO2: 97%   Weight: 62.1 kg (137 lb)   Height: 165.1 cm (65\")         Current Status 2/28/2019   ECOG score 2         Physical Exam  unchanged  General Appearance: Patient is awake, alert, oriented and in no acute distress. Patient is welldeveloped, wellnourished, and appears stated age.  HEENT: Normocephalic. Sclerae clear, conjunctiva pink, extraocular movements intact, pupils, round, reactive to light and  accommodation. Mouth and throat are clear with moist oral mucosa.  NECK: Supple, no jugular venous distention, thyroid not enlarged.  LYMPH: No cervical, supraclavicular, axillary, or inguinal lymphadenopathy.  CHEST: Equal bilateral expansion, AP  diameter normal, resonant percussion note  LUNGS: Good air movement, no rales, rhonchi, rubs or wheezes with auscultation on the right. Diminished on the left.  CARDIO: Regular sinus rhythm, no murmurs, gallops or rubs.  ABDOMEN: Nondistended, soft, No tenderness, no guarding, no rebound, No hepatosplenomegaly. No abdominal masses. Bowel sounds positive. No hernia  GENITALIA: Not examined.  BREASTS: Not examined.  MUSKEL: No joint swelling, decreased motion, or inflammation  EXTREMS: patrick le  edema,No clubbing, cyanosis, No varicose veins.  NEURO: Grossly nonfocal, Gait is coordinated and smooth, Cognition is preserved.  SKIN: No rashes, no " ecchymoses, no petechia.  PSYCH: Oriented to time, place and person. Memory is preserved. Mood and affect appear normal  RECENT LABS:  Lab on 02/28/2019   Component Date Value Ref Range Status   • Glucose 02/28/2019 104* 70 - 100 mg/dL Final   • BUN 02/28/2019 23* 5 - 21 mg/dL Final   • Creatinine 02/28/2019 0.98  0.50 - 1.40 mg/dL Final   • Sodium 02/28/2019 132* 135 - 145 mmol/L Final   • Potassium 02/28/2019 3.7  3.5 - 5.3 mmol/L Final   • Chloride 02/28/2019 98  98 - 110 mmol/L Final   • CO2 02/28/2019 27.0  24.0 - 31.0 mmol/L Final   • Calcium 02/28/2019 9.5  8.4 - 10.4 mg/dL Final   • Total Protein 02/28/2019 7.0  6.3 - 8.7 g/dL Final   • Albumin 02/28/2019 4.00  3.50 - 5.00 g/dL Final   • ALT (SGPT) 02/28/2019 19  0 - 54 U/L Final   • AST (SGOT) 02/28/2019 30  7 - 45 U/L Final   • Alkaline Phosphatase 02/28/2019 68  24 - 120 U/L Final   • Total Bilirubin 02/28/2019 0.3  0.1 - 1.0 mg/dL Final   • eGFR Non African Amer 02/28/2019 55* >60 mL/min/1.73 Final   • Globulin 02/28/2019 3.0  gm/dL Final   • A/G Ratio 02/28/2019 1.3  1.1 - 2.5 g/dL Final   • BUN/Creatinine Ratio 02/28/2019 23.5  7.0 - 25.0 Final   • Anion Gap 02/28/2019 7.0  4.0 - 13.0 mmol/L Final   • WBC 02/28/2019 9.09  4.80 - 10.80 10*3/mm3 Final   • RBC 02/28/2019 3.03* 4.20 - 5.40 10*6/mm3 Final   • Hemoglobin 02/28/2019 10.2* 12.0 - 16.0 g/dL Final   • Hematocrit 02/28/2019 30.9* 37.0 - 47.0 % Final   • MCV 02/28/2019 102.0* 82.0 - 98.0 fL Final   • MCH 02/28/2019 33.7* 28.0 - 32.0 pg Final   • MCHC 02/28/2019 33.0  33.0 - 36.0 g/dL Final   • RDW 02/28/2019 18.8* 12.0 - 15.0 % Final   • RDW-SD 02/28/2019 70.0* 40.0 - 54.0 fl Final   • MPV 02/28/2019 9.3  6.0 - 12.0 fL Final   • Platelets 02/28/2019 429* 130 - 400 10*3/mm3 Final   • Extra Tube 02/28/2019 Hold for add-ons.   Final    Auto resulted.   • Neutrophil % 02/28/2019 60.2  39.0 - 78.0 % Final   • Lymphocyte % 02/28/2019 22.4  15.0 - 45.0 % Final   • Monocyte % 02/28/2019 9.2  4.0 - 12.0 %  Final   • Bands %  02/28/2019 5.1  0.0 - 10.0 % Final   • Metamyelocyte % 02/28/2019 2.0* 0.0 - 0.0 % Final   • Myelocyte % 02/28/2019 1.0* 0.0 - 0.0 % Final   • Neutrophils Absolute 02/28/2019 5.94  1.87 - 8.40 10*3/mm3 Final   • Lymphocytes Absolute 02/28/2019 2.04  0.72 - 4.86 10*3/mm3 Final   • Monocytes Absolute 02/28/2019 0.84  0.19 - 1.30 10*3/mm3 Final   • Anisocytosis 02/28/2019 Mod/2+  None Seen Final   • Macrocytes 02/28/2019 Mod/2+  None Seen Final   • Vacuolated Neutrophils 02/28/2019 Slight/1+  None Seen Final   • Giant Platelets 02/28/2019 Slight/1+  None Seen Final       RADIOLOGY:  Ct Chest With Contrast    Result Date: 2/19/2019  Narrative: CT CHEST W CONTRAST- 2/19/2019 11:05 AM CST  HISTORY: Metastatic breast cancer   COMPARISON: CT of the chest from an outside facility 5/4/2018.  DOSE LENGTH PRODUCT: 154 mGy cm. Automated exposure control was also utilized to decrease patient radiation dose.  TECHNIQUE: Following the intravenous administration of Isovue contrast, helical CT tomographic images of the chest were acquired. Multiplanar reformatted images were also provided for review.  FINDINGS:  Neck base: The imaged portion of the neck and thyroid gland is unremarkable.  Lungs: Ill-defined, patchy opacities are seen in the LEFT lower lung. Mild subpleural interstitial thickening is seen in bilateral lungs. No solid nodules are identified. There is no pleural effusion. The trachea and bronchial tree are patent.  Heart: Normal in size and appearance. There is no pericardial effusion.  Vasculature: There is aortic atherosclerosis without stenosis or aneurysm. No coronary arteriosclerosis identified. The great vessels are normal in appearance. The pulmonary arteries are normal in appearance.  Mediastinum and Lymph nodes: No enlarged axillary, hilar, or mediastinal lymph nodes. A small hiatal hernia is present.  Bones and soft tissues: Diffuse metastatic lesions are seen in the bones. Nondisplaced  fractures are seen in the upper posterior LEFT ribs. There is mild compression of the T7 vertebral body with approximately 10% vertebral body height loss. This is unchanged in height since 1/10/2019. The LEFT breast has been removed. The patient's port extends into the SVC.  Upper abdomen: Findings in the upper abdomen are described in a separate dictation.      Impression: 1. Inflammatory opacities in the LEFT lower lobe and bilateral subpleural interstitial thickening. These should be followed in approximately 6 weeks after antibiotic therapy. 2. Diffuse bony metastases again identified.   This report was finalized on 02/19/2019 13:42 by Dr. Osmani Gillette MD.    Nm Bone Scan Whole Body    Result Date: 2/19/2019  Narrative: NM BONE SCAN WHOLE BODY- 2/19/2019 11:03 AM CST  HISTORY: response to therapy; C50.912-Malignant neoplasm of unspecified site of left female breast; C79.51-Secondary malignant neoplasm of bone  COMPARISON: CT chest, abdomen, and pelvis 2/19/2019  RADIOPHARMACEUTICAL: 19 mCi Tc99m HDP  TECHNIQUE: Anterior and posterior whole-body images are acquired approximately two hours postinjection.  FINDINGS: Diffuse sclerotic lesions seen throughout the bones on the CT are not well demonstrated by bone scan. There is increased uptake of radiotracer in the L2 vertebral body, which corresponds to the largest blastic lesion seen on CT.  There is also focally increased uptake in the LEFT coracoid process, which corresponds to a lucent and sclerotic focus seen on CT.      Impression: 1. Diffuse bony metastases are better appreciated by CT. 2. There is focally increased uptake in the LEFT coracoid process and L2 vertebral body. This report was finalized on 02/19/2019 13:38 by Dr. Osmani Gillette MD.    Ct Abdomen Pelvis With Contrast    Result Date: 2/19/2019  Narrative: EXAMINATION: CT ABDOMEN PELVIS W CONTRAST- 2/19/2019 1:08 PM CST  HISTORY: Evaluate response to therapy; C50.912-Malignant neoplasm of  unspecified site of left female breast; C79.51-Secondary malignant neoplasm of bone  DOSE: 217 mGycm (Automatic exposure control technique was implemented in an effort to keep the radiation dose as low as possible without compromising image quality)  REPORT: Spiral CT of the abdomen and pelvis was performed after administration of intravenous contrast from the lung bases through the pubic symphysis. Reconstructed coronal and sagittal images are also reviewed.  COMPARISON: CT abdomen pelvis with contrast 11/12/2018.  There is evidence previous left mastectomy. Review of lung windows demonstrates new nodular interstitial infiltrate in the left base. Please review today's chest CT for additional comments. Cholecystectomy clips are present. The liver and spleen are homogeneous and within normal limits. The kidneys enhance normally, there is mild bilateral renal atrophy greater on the left. No renal mass or hydronephrosis is identified. The pancreas and adrenal glands are within normal limits. A small sliding-type hiatal hernia is noted. Large volume of loculated ascites seen previously is partially resolved, a small amount of free fluid remains in the pelvis. Peritoneal thickening seen previously is not identified on today's exam. The bladder is decompressed. There is a stable calcified mass in the left uterus compatible with a calcified uterine fibroid. This is stable. There is mild mucosal thickening involving segments of colon, including the sigmoid, ascending and hepatic flexure. These segments of colon are not well distended. No free air is identified. No measurable intra-abdominal or pelvic lymphadenopathy is identified. Review of bone windows shows innumerable sclerotic metastases throughout the skeletal structures, which appear unchanged.      Impression: 1. Interval near complete resolution of a large amount of loculated ascites, the small amount of residual free fluid is noted in the pelvis. Thickening of the  peritoneum seen before is not identified. This may represent a positive response to chemotherapy. 2. Stable appearance of innumerable osteoblastic skeletal metastases. 3. Mild mucosal thickening involving segments of the colon may be exaggerated by underdistention. 4. New nodular interstitial thickening of the left lower lobe, better demonstrated on the chest CT performed today. This report was finalized on 02/19/2019 13:24 by Dr. David Richard MD.    Us Venous Doppler Lower Extremity Left (duplex)    Result Date: 1/30/2019  Narrative: History: Swelling      Impression: Impression: 1. There is evidence of deep venous thrombosis of the left lower extremity. The clot burden is improved from last study. 2. There is also evidence of superficial clot in the greater saphenous vein. There is a Baker's cyst in the popliteal fossa.  Comments: Left lower extremity venous duplex exam was performed using color Doppler flow, Doppler wave form analysis, and grayscale imaging, with and without compression. There is evidence of deep venous thrombosis in the iliac and common femoral veins. There is no thrombus identified in the saphenofemoral junction or the greater saphenous vein. There is no evidence of clot in the right common femoral vein.  This report was finalized on 01/30/2019 13:24 by Dr. Terrell Collins MD.    Xr Chest Pa & Lateral    Result Date: 2/7/2019  Narrative: EXAMINATION: XR CHEST PA AND LATERAL-  2/7/2019 12:16 PM CST  TWO-VIEW CHEST:  HISTORY: Cough . Metastatic breast cancer  Frontal and lateral projection chest radiograph obtained.  COMPARISON:  1/10/2019  FINDINGS:  Changes from left mastectomy observed.  There is diffuse osseous metastases in observed.  Right-sided infusion catheter appreciated.  There is left-sided pleural reaction again identified. There are mild patchy airspace opacities identified in the left lower lobe not described previously, acute infectious/inflammatory change, pneumonia  considered. Follow-up imaging recommended to assure resolution and benignity.  There is chronic interstitial lung changes. .  The heart is normal in size, pulmonary circulation appropriate, without heart failure.  There is levoscoliosis of the thoracolumbar junction.                                                                                                                 Impression: 1. Patchy left lower lobe airspace opacities, acute infiltrate/pneumonia considered. Follow-up recommended. 2. Chronic lung changes. 3. Diffuse osseous metastases.   This report was finalized on 02/07/2019 12:19 by Dr. Naun Pierre MD.           Assessment/Plan  [facility] Yanes is a 73 y.o. year old female with met breast cancer s/p multiple lines of CMT currently on taxotere weekly and xeloda with good tolerance.    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 Labs reviewed wbc 9.09, hg 10.2, plt 429. Ok for treatment today  --also on xeloda  --CT c/a/p 2/19 showed stable dz. Some new Inflammatory opacities in the LEFT lower lobe and bilateral  subpleural interstitial thickening.   -bone scan without evidence of dz progression  --primo taxotere weekly. Pt also on xeloda [id number], then repeat.    2. Psych: on olanzapine    3. HTN: on metoprolol    4. Gerd: on ranitidine  5. Dyspnea: get a cxr to evaluate  6. Anemia: will check iron profile, ferritin. Her creatinine is normal at 0.94.   7. DVT on  xarelto indefinite.  --Doppler left LE  1/30/19 There is evidence of deep venous thrombosis of the left lower extremity. The clot burden is improved from last study. There is also evidence of superficial clot in the greater saphenous vein. There is a Baker's cyst in the popliteal fossa.  8. CHF: LVEF 65% with diastolic dysfunction. referral to cardiology to maximized her heart function.  9. Thrombocytosis: suspect reactive from anemia. Will check iron profile.  10.left lung pneumonia:  --cxr showed pneumonia. Pt already on levofloxacin. Will complete 14 days.   11: LE edema: give 20mg lasix once today    Moise Sotelo MD    2/28/2019    11:55 AM

## 2019-03-07 ENCOUNTER — INFUSION (OUTPATIENT)
Dept: ONCOLOGY | Facility: HOSPITAL | Age: 74
End: 2019-03-07
Attending: INTERNAL MEDICINE

## 2019-03-07 ENCOUNTER — OFFICE VISIT (OUTPATIENT)
Dept: ONCOLOGY | Facility: CLINIC | Age: 74
End: 2019-03-07

## 2019-03-07 ENCOUNTER — LAB (OUTPATIENT)
Dept: LAB | Facility: HOSPITAL | Age: 74
End: 2019-03-07
Attending: INTERNAL MEDICINE

## 2019-03-07 VITALS
DIASTOLIC BLOOD PRESSURE: 58 MMHG | TEMPERATURE: 97.4 F | OXYGEN SATURATION: 100 % | SYSTOLIC BLOOD PRESSURE: 132 MMHG | HEART RATE: 71 BPM | WEIGHT: 135 LBS | RESPIRATION RATE: 16 BRPM | BODY MASS INDEX: 22.49 KG/M2 | HEIGHT: 65 IN

## 2019-03-07 VITALS
TEMPERATURE: 97.9 F | HEART RATE: 79 BPM | WEIGHT: 135 LBS | BODY MASS INDEX: 22.49 KG/M2 | OXYGEN SATURATION: 98 % | SYSTOLIC BLOOD PRESSURE: 114 MMHG | DIASTOLIC BLOOD PRESSURE: 68 MMHG | HEIGHT: 65 IN

## 2019-03-07 DIAGNOSIS — C50.412 MALIGNANT NEOPLASM OF UPPER-OUTER QUADRANT OF LEFT FEMALE BREAST, UNSPECIFIED ESTROGEN RECEPTOR STATUS (HCC): ICD-10-CM

## 2019-03-07 DIAGNOSIS — C50.912 CARCINOMA OF LEFT BREAST METASTATIC TO BONE (HCC): ICD-10-CM

## 2019-03-07 DIAGNOSIS — J32.9 CHRONIC SINUSITIS WITH RECURRENT BRONCHITIS: ICD-10-CM

## 2019-03-07 DIAGNOSIS — J40 CHRONIC SINUSITIS WITH RECURRENT BRONCHITIS: Primary | ICD-10-CM

## 2019-03-07 DIAGNOSIS — Z95.828 PORT-A-CATH IN PLACE: ICD-10-CM

## 2019-03-07 DIAGNOSIS — J40 CHRONIC SINUSITIS WITH RECURRENT BRONCHITIS: ICD-10-CM

## 2019-03-07 DIAGNOSIS — C79.51 BREAST CANCER METASTASIZED TO BONE, UNSPECIFIED LATERALITY (HCC): ICD-10-CM

## 2019-03-07 DIAGNOSIS — C79.51 CARCINOMA OF LEFT BREAST METASTATIC TO BONE (HCC): Primary | ICD-10-CM

## 2019-03-07 DIAGNOSIS — C50.919 BREAST CANCER METASTASIZED TO BONE, UNSPECIFIED LATERALITY (HCC): ICD-10-CM

## 2019-03-07 DIAGNOSIS — C79.51 CARCINOMA OF LEFT BREAST METASTATIC TO BONE (HCC): ICD-10-CM

## 2019-03-07 DIAGNOSIS — C50.912 CARCINOMA OF LEFT BREAST METASTATIC TO BONE (HCC): Primary | ICD-10-CM

## 2019-03-07 DIAGNOSIS — J32.9 CHRONIC SINUSITIS WITH RECURRENT BRONCHITIS: Primary | ICD-10-CM

## 2019-03-07 LAB
ALBUMIN SERPL-MCNC: 3.9 G/DL (ref 3.5–5)
ALBUMIN/GLOB SERPL: 1.3 G/DL (ref 1.1–2.5)
ALP SERPL-CCNC: 53 U/L (ref 24–120)
ALT SERPL W P-5'-P-CCNC: <15 U/L (ref 0–54)
ANION GAP SERPL CALCULATED.3IONS-SCNC: 9 MMOL/L (ref 4–13)
AST SERPL-CCNC: 22 U/L (ref 7–45)
BASOPHILS # BLD AUTO: 0.06 10*3/MM3 (ref 0–0.2)
BASOPHILS NFR BLD AUTO: 1.4 % (ref 0–2)
BILIRUB SERPL-MCNC: 0.5 MG/DL (ref 0.1–1)
BUN BLD-MCNC: 16 MG/DL (ref 5–21)
BUN/CREAT SERPL: 18.2 (ref 7–25)
CALCIUM SPEC-SCNC: 9.8 MG/DL (ref 8.4–10.4)
CHLORIDE SERPL-SCNC: 99 MMOL/L (ref 98–110)
CO2 SERPL-SCNC: 28 MMOL/L (ref 24–31)
CREAT BLD-MCNC: 0.88 MG/DL (ref 0.5–1.4)
DEPRECATED RDW RBC AUTO: 64.4 FL (ref 40–54)
EOSINOPHIL # BLD AUTO: 0.02 10*3/MM3 (ref 0–0.7)
EOSINOPHIL NFR BLD AUTO: 0.5 % (ref 0–4)
ERYTHROCYTE [DISTWIDTH] IN BLOOD BY AUTOMATED COUNT: 17.5 % (ref 12–15)
GFR SERPL CREATININE-BSD FRML MDRD: 63 ML/MIN/1.73
GLOBULIN UR ELPH-MCNC: 3.1 GM/DL
GLUCOSE BLD-MCNC: 115 MG/DL (ref 70–100)
HCT VFR BLD AUTO: 30.4 % (ref 37–47)
HGB BLD-MCNC: 10.2 G/DL (ref 12–16)
HOLD SPECIMEN: NORMAL
HOLD SPECIMEN: NORMAL
IMM GRANULOCYTES # BLD AUTO: 0.03 10*3/MM3 (ref 0–0.05)
IMM GRANULOCYTES NFR BLD AUTO: 0.7 % (ref 0–5)
LYMPHOCYTES # BLD AUTO: 1.8 10*3/MM3 (ref 0.72–4.86)
LYMPHOCYTES NFR BLD AUTO: 42.3 % (ref 15–45)
MCH RBC QN AUTO: 34.3 PG (ref 28–32)
MCHC RBC AUTO-ENTMCNC: 33.6 G/DL (ref 33–36)
MCV RBC AUTO: 102.4 FL (ref 82–98)
MONOCYTES # BLD AUTO: 0.26 10*3/MM3 (ref 0.19–1.3)
MONOCYTES NFR BLD AUTO: 6.1 % (ref 4–12)
NEUTROPHILS # BLD AUTO: 2.09 10*3/MM3 (ref 1.87–8.4)
NEUTROPHILS NFR BLD AUTO: 49 % (ref 39–78)
NRBC BLD AUTO-RTO: 0 /100 WBC (ref 0–0)
PLATELET # BLD AUTO: 458 10*3/MM3 (ref 130–400)
PMV BLD AUTO: 9.7 FL (ref 6–12)
POTASSIUM BLD-SCNC: 3.9 MMOL/L (ref 3.5–5.3)
PROT SERPL-MCNC: 7 G/DL (ref 6.3–8.7)
RBC # BLD AUTO: 2.97 10*6/MM3 (ref 4.2–5.4)
SODIUM BLD-SCNC: 136 MMOL/L (ref 135–145)
WBC NRBC COR # BLD: 4.26 10*3/MM3 (ref 4.8–10.8)

## 2019-03-07 PROCEDURE — 25010000002 ONDANSETRON PER 1 MG: Performed by: INTERNAL MEDICINE

## 2019-03-07 PROCEDURE — 96413 CHEMO IV INFUSION 1 HR: CPT

## 2019-03-07 PROCEDURE — 82784 ASSAY IGA/IGD/IGG/IGM EACH: CPT

## 2019-03-07 PROCEDURE — 36415 COLL VENOUS BLD VENIPUNCTURE: CPT

## 2019-03-07 PROCEDURE — 25010000002 DOCETAXEL 20 MG/ML SOLUTION 4 ML VIAL: Performed by: INTERNAL MEDICINE

## 2019-03-07 PROCEDURE — 80053 COMPREHEN METABOLIC PANEL: CPT

## 2019-03-07 PROCEDURE — 96367 TX/PROPH/DG ADDL SEQ IV INF: CPT

## 2019-03-07 PROCEDURE — 85025 COMPLETE CBC W/AUTO DIFF WBC: CPT

## 2019-03-07 PROCEDURE — 25010000003 DEXAMETHASONE SODIUM PHOSPHATE 100 MG/10ML SOLUTION: Performed by: INTERNAL MEDICINE

## 2019-03-07 PROCEDURE — 99214 OFFICE O/P EST MOD 30 MIN: CPT | Performed by: INTERNAL MEDICINE

## 2019-03-07 RX ORDER — FAMOTIDINE 10 MG/ML
20 INJECTION, SOLUTION INTRAVENOUS AS NEEDED
Status: DISCONTINUED | OUTPATIENT
Start: 2019-03-07 | End: 2019-03-07 | Stop reason: HOSPADM

## 2019-03-07 RX ORDER — SODIUM CHLORIDE 0.9 % (FLUSH) 0.9 %
10 SYRINGE (ML) INJECTION AS NEEDED
Status: CANCELLED | OUTPATIENT
Start: 2019-03-07

## 2019-03-07 RX ORDER — SODIUM CHLORIDE 0.9 % (FLUSH) 0.9 %
10 SYRINGE (ML) INJECTION AS NEEDED
Status: DISCONTINUED | OUTPATIENT
Start: 2019-03-07 | End: 2019-03-07 | Stop reason: HOSPADM

## 2019-03-07 RX ORDER — SODIUM CHLORIDE 9 MG/ML
250 INJECTION, SOLUTION INTRAVENOUS ONCE
Status: COMPLETED | OUTPATIENT
Start: 2019-03-07 | End: 2019-03-07

## 2019-03-07 RX ORDER — MEPERIDINE HYDROCHLORIDE 50 MG/ML
25 INJECTION INTRAMUSCULAR; INTRAVENOUS; SUBCUTANEOUS
Status: DISCONTINUED | OUTPATIENT
Start: 2019-03-07 | End: 2019-03-07 | Stop reason: HOSPADM

## 2019-03-07 RX ADMIN — DOCETAXEL 60 MG: 20 INJECTION, SOLUTION, CONCENTRATE INTRAVENOUS at 10:10

## 2019-03-07 RX ADMIN — DEXAMETHASONE SODIUM PHOSPHATE 50 ML: 10 INJECTION, SOLUTION INTRAMUSCULAR; INTRAVENOUS at 09:51

## 2019-03-07 RX ADMIN — Medication 500 UNITS: at 11:31

## 2019-03-07 RX ADMIN — SODIUM CHLORIDE 250 ML: 9 INJECTION, SOLUTION INTRAVENOUS at 09:51

## 2019-03-07 NOTE — PROGRESS NOTES
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   • Carcinoma of left breast metastatic to bone (CMS/HCC)        REASON FOR VISIT:     Chief Complaint   Patient presents with   • Breast Cancer     She is here for consideration of her txt today, she did have to see her PCP due to ear and eye infection, currently taking antibiotic        HEMATOLOGY / ONCOLOGY HISTORY:   Oncology/Hematology History    Patient is a 67-year-old postmenopausal female who had onset of her menses at age 9 and menopause at age 48. She received oral contraceptives for approximately 14 years and did not receive hormonal manipulation. Patient has a maternal cousin had breast cancer. Patient has had no prior breast biopsies. Patient found a palpable left breast mass as well as a left axillary mass. Patient had a bloody nipple discharge which been present for approximately 6 months. On 6/20/2012, a mammogram was performed showing a subareolar mass consistent with malignancy. Bilateral breast ultrasound revealed an ill-defined subareolar mass measuring 2.8-2.5 cm. There is a left axillary mass measuring 1.7 cm with suspicion of extracapsular extension. There is no evidence of disease in the right breast. On 7/9/2012 patient underwent a left breast biopsy and placement of marker clip. Left breast biopsy revealed an infiltrating lobular carcinoma grade 2 with focal ductal carcinoma in situ, solid pattern. A fine-needle biopsy of the left lymph node was consistent with metastatic carcinoma. Breast tumor profile revealed ER: 100%; NH: 98%; HER-2/maynor 2+; FISH: Unamplified; Ki-67: 71%; P 53 1%; DNA aneuploidy. A  MRI of the breast were performed body multiple abnormal enhancing masses within the left breast. There appears to be anterior retraction of the pectoralis muscle with no direct extension. There is a moderate to large, layering left pleural effusion appreciated. The right breast reveals 3 moderately enlarged lymph nodes in the posterior lateral aspect the right breast. These have fatty hilum but followup is recommended. Patient is undergone systemic studies including, on 8/2/2012, a CT scan of the brain showing atrophy. a CT scan that shows showing a small to moderate left pleural effusion with 3 subcentimeter nodule densities in the left lung.   She completed 4 cycles of neoadjuvant treatment with dose dense Adriamycin and Cytoxan. She had a mammogram which showed a significant reduction in the size of her left breast lesion. There is no axillary adenopathy noted. She has had an ultrasound revealing the  lesion reducing from 2.4 cm to 1 cm. Patient underwent a left mastectomy on 03/19/2013 finding negative invasive cancer, negative nodes. A 5% DCIS was noted. The patient has declined hormonal therapy. She did not need radiation therapy.  November 2014, she began to have evidence of lytic bone lesions at T5T6, frontal disease, an 8 mm lucency in the central frontal area of  the skull but nothing intracranial. Bone scan revealed only vertex tracer activity but bilateral ribs and thoracic spine, and other lesions in  her pelvis. At that time, she was started on letrozole since November 2014. She is taking Xgeva. She had progression found in bone in Feb 2015 on scan. She was started on Faslodex, Ibrance and continued xgeva.         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 B)  Foci of ductal carcinoma in situ, solid pattern. C) Greatest dimension of the invasive carcinoma is 15.8mm.         3/19/2013 Surgery     Left Mastectomy w/ Axillary Tail: Focal residual ducatal carcinoma in situ (DCIS) 12 lymph nodes negative for metastatic carcinoma.         10/20/2014 Biopsy     Peritoneum Biopsy: Final Diagnosis: Malignant Cell Neoplasm.           Breast cancer metastasized to bone, unspecified laterality (CMS/HCC)    10/15/2012 -  Other Event     Left mammogram:  Impression:  1. Decreasing spiculation and density in the retroareolar left breast, near a previous biopsy.   2. Definite left axillary lesion is not appreciated.          2/5/2013 -  Other Event     Diagnostic Mammo:  Comparison is made to 10/15/2012 and 6/20/2012  The spiculated mass at 12:00 behind the nipple is nearly resolved.   Impression:  1. The mass on the left side is less apparent after receiving chemotherapy. There has been a good response to chmotherapy on the left side.   2. No suspicious abnormality is seen in the right breast to account for the new palable abnormality at the 4-5:00 position.          3/19/2013 Surgery     Breast Biopsy:  Final Diagnosis:  A. Breast, left mastectomy with axillary tail  1. Focal residual ductal carcinoma in situ (DICS) (less than 5% of tumor bed).  2. Negative for residual invasive carcinoma.   3. 12 Lymph Nodes, negative for metastatic carcinoma (0/12) focally, some lymph nodes demonstrate fibrosis/treatment effect.  B. Skin and soft tissue, left advancement flap:  1. Benign skin and subcutis.   2. Negative for carcinoma.          6/20/2013 -  Other Event     Diagnostic Mammo [facility]:  1. A subareolar mass is consistent with malignancy. Additional involement extends inferiorly sonographically and superolaterally mammograhically as demonstrated by calcifications. The mass measures approx 2.8 cm sonographically, but the involved region is likely much larger including an intraductal  componet. The left axillary lymph nodes are also involved.   2. Recommened ultrasound guided biopsy of the left subareolar mass and left axillary lymph node.   3. Breast MRI could also further define multicentric disease on the left.   4. Clear suspicious finding on the right.  Recommend ongoing clinical follow up of palpable foci.          6/13/2014 -  Other Event     Diagnostic Mammo:  IMPRESSION:  1. History of left breast cancer and mastectomy. Stable benign right breast mammograms.          10/20/2014 Surgery     Final Diagnosis:  Biopsies, pertoneum:  Metastatic carcinoma, morphologically compatible with metastatic mammary carcinoma.          6/15/2015 -  Other Event     Diagnostic Mammo:  IMPRESSION:   1. Negative x-ray report, should not delay biopsy if a dominat or clinically suspicious mass is present.          7/5/2016 -  Other Event     Diagnositic mammogram:  Impression:   Stable right mamogram with no radiographic findings suspicious for malignancy. Recommend continued screening mammography per screening guidelines unless otherwise earlier clinically indicated.          9/18/2016 Initial Diagnosis     Secondary malignant neoplasm of bone and bone marrow              INTERVAL HISTORY  Patient ID: Heavenly Yanes is a 73 y.o. year old female Cancer Staging  Stage IV (T2, N1, M1)  --complaining of dyspnea on exersion since last Tuesday. She feels like she ran a mile  2/14/19: cxr showed pneumonia. Already on levofloxacin. Coughing not worse, she did not run temp.  Overall feels weak.  -restarted the appetite suppressant and is starting to eat more but not a whole lot  --2/28/19: CT c/ap with some response to therapy. New left chest inflammatory lesion to be followed ion next imaging. Bone scan unremarkable for progression.  3/7/19: taking antibiotics for ear infection.denies fever. Denies /cp/n/v/, neuropathy. Rest of ros unremarkable. PE unchanged.  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) 9/18/2016     Past Surgical History:   Past Surgical History:   Procedure Laterality Date   • BREAST SURGERY      left breast biopsy and axillary node   • CHOLECYSTECTOMY     • EXTERNAL EAR SURGERY     • MASTECTOMY Left    • PORTACATH PLACEMENT       Social History:   Social History     Socioeconomic History   • Marital status:      Spouse name: Not on file   • Number of children: Not on file   • Years of education: Not on file   • Highest education level: Not on file   Social Needs   • Financial resource strain: Not on file   • Food insecurity - worry: Not on file   • Food insecurity - inability: Not on file   • Transportation needs - medical: Not on file   • Transportation needs - non-medical: Not on file   Occupational History   • Not on file   Tobacco Use   • Smoking status: Never Smoker   • Smokeless tobacco: Never Used   Substance and Sexual Activity   • Alcohol use: No   • Drug use: No   • Sexual activity: Not on file   Other Topics Concern   • Not on file   Social History Narrative   • Not on file     Family History:   Family History   Problem Relation Age of Onset   • No Known Problems Daughter    • No Known Problems Son    • Other Mother         complications from a concussion from a fall   • Other Father         old age   • No Known Problems Brother    • No Known Problems Daughter        Review of Systems   Constitutional: Positive for fatigue.   HENT: Negative.    Eyes: Negative.    Respiratory: Positive for shortness of breath.    Cardiovascular: Negative.    Gastrointestinal: Negative.    Endocrine: Negative.    Genitourinary: Negative.    Musculoskeletal: Negative.    Skin: Negative.   "  Neurological: Negative.    Hematological: Negative.    Psychiatric/Behavioral: Negative.         Performance Status:  Asymptomatic    Medications:    Current Outpatient Medications   Medication Sig Dispense Refill   • Calcium-Magnesium-Vitamin D (CALCIUM 1200+D3 PO) Take 1,200 mg by mouth Daily.     • capecitabine (XELODA) 500 MG chemo tablet Take 1 tablet by mouth 2 (Two) Times a Day. For 2 weeks on and 1 week off. 42 tablet 11   • levoFLOXacin (LEVAQUIN) 500 MG tablet Take 1 tablet by mouth Daily. 10 tablet 0   • megestrol (MEGACE) 40 MG tablet Take 1 tablet by mouth 2 (Two) Times a Day. 60 tablet 2   • metoprolol tartrate (LOPRESSOR) 25 MG tablet Take 25 mg by mouth 2 (two) times a day.     • OLANZapine (ZYPREXA) 2.5 MG tablet Take 2.5 mg by mouth every night.     • ondansetron (ZOFRAN) 8 MG tablet Take 1 tablet by mouth Every 8 (Eight) Hours As Needed for Nausea or Vomiting. (Patient taking differently: Take 8 mg by mouth Every 8 (Eight) Hours As Needed for Nausea or Vomiting.) 30 tablet 5   • raNITIdine (ZANTAC) 150 MG tablet Take 150 mg by mouth 2 (Two) Times a Day.     • rivaroxaban (XARELTO) 20 MG tablet Take 1 tablet by mouth Daily. 30 tablet 5   • thyroid 60 MG PO tablet Take 60 mg by mouth daily.       No current facility-administered medications for this visit.      Facility-Administered Medications Ordered in Other Visits   Medication Dose Route Frequency Provider Last Rate Last Dose   • heparin flush (porcine) 100 UNIT/ML injection 500 Units  500 Units Intravenous PRN Joseph Nunez MD   500 Units at 10/26/17 1155   • sodium chloride 0.9 % flush 10 mL  10 mL Intravenous PRN Joseph Nunez MD   10 mL at 10/26/17 1155       ALLERGIES:    Allergies   Allergen Reactions   • Benadryl  [Diphenhydramine Hcl (Sleep)] Other (See Comments)   • Codeine    • Diphenhydramine Other (See Comments)     \"Shaky leg syndrome\"  \"Shaky leg syndrome\"  \"Shaky leg syndrome\"   • Heparin    • Other      POPPY SEED   • " "Penicillins Hives   • Petroleum Jelly [Skin Protectants, Misc.]    • Sulfa Antibiotics    • Sulfur    • Valium [Diazepam]        Objective      Vitals:    03/07/19 0839   BP: 114/68   Pulse: 79   Temp: 97.9 °F (36.6 °C)   TempSrc: Tympanic   SpO2: 98%   Weight: 61.2 kg (135 lb)   Height: 165.1 cm (65\")   PainSc: 0-No pain         Current Status 3/7/2019   ECOG score 2         Physical Exam  unchanged  General Appearance: Patient is awake, alert, oriented and in no acute distress. Patient is welldeveloped, wellnourished, and appears stated age.  HEENT: Normocephalic. Sclerae clear, conjunctiva pink, extraocular movements intact, pupils, round, reactive to light and  accommodation. Mouth and throat are clear with moist oral mucosa.  NECK: Supple, no jugular venous distention, thyroid not enlarged.  LYMPH: No cervical, supraclavicular, axillary, or inguinal lymphadenopathy.  CHEST: Equal bilateral expansion, AP  diameter normal, resonant percussion note  LUNGS: Good air movement, no rales, rhonchi, rubs or wheezes with auscultation on the right. Diminished on the left.  CARDIO: Regular sinus rhythm, no murmurs, gallops or rubs.  ABDOMEN: Nondistended, soft, No tenderness, no guarding, no rebound, No hepatosplenomegaly. No abdominal masses. Bowel sounds positive. No hernia  GENITALIA: Not examined.  BREASTS: Not examined.  MUSKEL: No joint swelling, decreased motion, or inflammation  EXTREMS: patrick le  edema,No clubbing, cyanosis, No varicose veins.  NEURO: Grossly nonfocal, Gait is coordinated and smooth, Cognition is preserved.  SKIN: No rashes, no ecchymoses, no petechia.  PSYCH: Oriented to time, place and person. Memory is preserved. Mood and affect appear normal  RECENT LABS:  Lab on 03/07/2019   Component Date Value Ref Range Status   • Glucose 03/07/2019 115* 70 - 100 mg/dL Final   • BUN 03/07/2019 16  5 - 21 mg/dL Final   • Creatinine 03/07/2019 0.88  0.50 - 1.40 mg/dL Final   • Sodium 03/07/2019 136  135 - 145 " mmol/L Final   • Potassium 03/07/2019 3.9  3.5 - 5.3 mmol/L Final   • Chloride 03/07/2019 99  98 - 110 mmol/L Final   • CO2 03/07/2019 28.0  24.0 - 31.0 mmol/L Final   • Calcium 03/07/2019 9.8  8.4 - 10.4 mg/dL Final   • Total Protein 03/07/2019 7.0  6.3 - 8.7 g/dL Final   • Albumin 03/07/2019 3.90  3.50 - 5.00 g/dL Final   • ALT (SGPT) 03/07/2019 <15  0 - 54 U/L Final   • AST (SGOT) 03/07/2019 22  7 - 45 U/L Final   • Alkaline Phosphatase 03/07/2019 53  24 - 120 U/L Final   • Total Bilirubin 03/07/2019 0.5  0.1 - 1.0 mg/dL Final   • eGFR Non African Amer 03/07/2019 63  >60 mL/min/1.73 Final   • Globulin 03/07/2019 3.1  gm/dL Final   • A/G Ratio 03/07/2019 1.3  1.1 - 2.5 g/dL Final   • BUN/Creatinine Ratio 03/07/2019 18.2  7.0 - 25.0 Final   • Anion Gap 03/07/2019 9.0  4.0 - 13.0 mmol/L Final   • WBC 03/07/2019 4.26* 4.80 - 10.80 10*3/mm3 Final   • RBC 03/07/2019 2.97* 4.20 - 5.40 10*6/mm3 Final   • Hemoglobin 03/07/2019 10.2* 12.0 - 16.0 g/dL Final   • Hematocrit 03/07/2019 30.4* 37.0 - 47.0 % Final   • MCV 03/07/2019 102.4* 82.0 - 98.0 fL Final   • MCH 03/07/2019 34.3* 28.0 - 32.0 pg Final   • MCHC 03/07/2019 33.6  33.0 - 36.0 g/dL Final   • RDW 03/07/2019 17.5* 12.0 - 15.0 % Final   • RDW-SD 03/07/2019 64.4* 40.0 - 54.0 fl Final   • MPV 03/07/2019 9.7  6.0 - 12.0 fL Final   • Platelets 03/07/2019 458* 130 - 400 10*3/mm3 Final   • Neutrophil % 03/07/2019 49.0  39.0 - 78.0 % Final   • Lymphocyte % 03/07/2019 42.3  15.0 - 45.0 % Final   • Monocyte % 03/07/2019 6.1  4.0 - 12.0 % Final   • Eosinophil % 03/07/2019 0.5  0.0 - 4.0 % Final   • Basophil % 03/07/2019 1.4  0.0 - 2.0 % Final   • Immature Grans % 03/07/2019 0.7  0.0 - 5.0 % Final   • Neutrophils, Absolute 03/07/2019 2.09  1.87 - 8.40 10*3/mm3 Final   • Lymphocytes, Absolute 03/07/2019 1.80  0.72 - 4.86 10*3/mm3 Final   • Monocytes, Absolute 03/07/2019 0.26  0.19 - 1.30 10*3/mm3 Final   • Eosinophils, Absolute 03/07/2019 0.02  0.00 - 0.70 10*3/mm3 Final   •  Basophils, Absolute 03/07/2019 0.06  0.00 - 0.20 10*3/mm3 Final   • Immature Grans, Absolute 03/07/2019 0.03  0.00 - 0.05 10*3/mm3 Final   • nRBC 03/07/2019 0.0  0.0 - 0.0 /100 WBC Final       RADIOLOGY:  Ct Chest With Contrast    Result Date: 2/19/2019  Narrative: CT CHEST W CONTRAST- 2/19/2019 11:05 AM CST  HISTORY: Metastatic breast cancer   COMPARISON: CT of the chest from an outside facility 5/4/2018.  DOSE LENGTH PRODUCT: 154 mGy cm. Automated exposure control was also utilized to decrease patient radiation dose.  TECHNIQUE: Following the intravenous administration of Isovue contrast, helical CT tomographic images of the chest were acquired. Multiplanar reformatted images were also provided for review.  FINDINGS:  Neck base: The imaged portion of the neck and thyroid gland is unremarkable.  Lungs: Ill-defined, patchy opacities are seen in the LEFT lower lung. Mild subpleural interstitial thickening is seen in bilateral lungs. No solid nodules are identified. There is no pleural effusion. The trachea and bronchial tree are patent.  Heart: Normal in size and appearance. There is no pericardial effusion.  Vasculature: There is aortic atherosclerosis without stenosis or aneurysm. No coronary arteriosclerosis identified. The great vessels are normal in appearance. The pulmonary arteries are normal in appearance.  Mediastinum and Lymph nodes: No enlarged axillary, hilar, or mediastinal lymph nodes. A small hiatal hernia is present.  Bones and soft tissues: Diffuse metastatic lesions are seen in the bones. Nondisplaced fractures are seen in the upper posterior LEFT ribs. There is mild compression of the T7 vertebral body with approximately 10% vertebral body height loss. This is unchanged in height since 1/10/2019. The LEFT breast has been removed. The patient's port extends into the SVC.  Upper abdomen: Findings in the upper abdomen are described in a separate dictation.      Impression: 1. Inflammatory opacities in  the LEFT lower lobe and bilateral subpleural interstitial thickening. These should be followed in approximately 6 weeks after antibiotic therapy. 2. Diffuse bony metastases again identified.   This report was finalized on 02/19/2019 13:42 by Dr. Osmani Gillette MD.    Nm Bone Scan Whole Body    Result Date: 2/19/2019  Narrative: NM BONE SCAN WHOLE BODY- 2/19/2019 11:03 AM CST  HISTORY: response to therapy; C50.912-Malignant neoplasm of unspecified site of left female breast; C79.51-Secondary malignant neoplasm of bone  COMPARISON: CT chest, abdomen, and pelvis 2/19/2019  RADIOPHARMACEUTICAL: 19 mCi Tc99m HDP  TECHNIQUE: Anterior and posterior whole-body images are acquired approximately two hours postinjection.  FINDINGS: Diffuse sclerotic lesions seen throughout the bones on the CT are not well demonstrated by bone scan. There is increased uptake of radiotracer in the L2 vertebral body, which corresponds to the largest blastic lesion seen on CT.  There is also focally increased uptake in the LEFT coracoid process, which corresponds to a lucent and sclerotic focus seen on CT.      Impression: 1. Diffuse bony metastases are better appreciated by CT. 2. There is focally increased uptake in the LEFT coracoid process and L2 vertebral body. This report was finalized on 02/19/2019 13:38 by Dr. Osmani Gillette MD.    Ct Abdomen Pelvis With Contrast    Result Date: 2/19/2019  Narrative: EXAMINATION: CT ABDOMEN PELVIS W CONTRAST- 2/19/2019 1:08 PM CST  HISTORY: Evaluate response to therapy; C50.912-Malignant neoplasm of unspecified site of left female breast; C79.51-Secondary malignant neoplasm of bone  DOSE: 217 mGycm (Automatic exposure control technique was implemented in an effort to keep the radiation dose as low as possible without compromising image quality)  REPORT: Spiral CT of the abdomen and pelvis was performed after administration of intravenous contrast from the lung bases through the pubic symphysis.  Reconstructed coronal and sagittal images are also reviewed.  COMPARISON: CT abdomen pelvis with contrast 11/12/2018.  There is evidence previous left mastectomy. Review of lung windows demonstrates new nodular interstitial infiltrate in the left base. Please review today's chest CT for additional comments. Cholecystectomy clips are present. The liver and spleen are homogeneous and within normal limits. The kidneys enhance normally, there is mild bilateral renal atrophy greater on the left. No renal mass or hydronephrosis is identified. The pancreas and adrenal glands are within normal limits. A small sliding-type hiatal hernia is noted. Large volume of loculated ascites seen previously is partially resolved, a small amount of free fluid remains in the pelvis. Peritoneal thickening seen previously is not identified on today's exam. The bladder is decompressed. There is a stable calcified mass in the left uterus compatible with a calcified uterine fibroid. This is stable. There is mild mucosal thickening involving segments of colon, including the sigmoid, ascending and hepatic flexure. These segments of colon are not well distended. No free air is identified. No measurable intra-abdominal or pelvic lymphadenopathy is identified. Review of bone windows shows innumerable sclerotic metastases throughout the skeletal structures, which appear unchanged.      Impression: 1. Interval near complete resolution of a large amount of loculated ascites, the small amount of residual free fluid is noted in the pelvis. Thickening of the peritoneum seen before is not identified. This may represent a positive response to chemotherapy. 2. Stable appearance of innumerable osteoblastic skeletal metastases. 3. Mild mucosal thickening involving segments of the colon may be exaggerated by underdistention. 4. New nodular interstitial thickening of the left lower lobe, better demonstrated on the chest CT performed today. This report was  finalized on 02/19/2019 13:24 by Dr. David Richard MD.    Xr Chest Pa & Lateral    Result Date: 2/7/2019  Narrative: EXAMINATION: XR CHEST PA AND LATERAL-  2/7/2019 12:16 PM CST  TWO-VIEW CHEST:  HISTORY: Cough . Metastatic breast cancer  Frontal and lateral projection chest radiograph obtained.  COMPARISON:  1/10/2019  FINDINGS:  Changes from left mastectomy observed.  There is diffuse osseous metastases in observed.  Right-sided infusion catheter appreciated.  There is left-sided pleural reaction again identified. There are mild patchy airspace opacities identified in the left lower lobe not described previously, acute infectious/inflammatory change, pneumonia considered. Follow-up imaging recommended to assure resolution and benignity.  There is chronic interstitial lung changes. .  The heart is normal in size, pulmonary circulation appropriate, without heart failure.  There is levoscoliosis of the thoracolumbar junction.                                                                                                                 Impression: 1. Patchy left lower lobe airspace opacities, acute infiltrate/pneumonia considered. Follow-up recommended. 2. Chronic lung changes. 3. Diffuse osseous metastases.   This report was finalized on 02/07/2019 12:19 by Dr. Naun Pierre MD.           Assessment/Plan  Heavenly Yanes is a 73 y.o. year old female with met breast cancer s/p multiple lines of CMT currently on taxotere weekly and xeloda with good tolerance.    Patient Active Problem List   Diagnosis   • Malignant neoplasm of upper-outer quadrant of left female breast (CMS/HCC)   • Breast cancer metastasized to bone, unspecified laterality (CMS/HCC)   • Essential hypertension   • Acquired hypothyroidism   • Bipolar 1 disorder (CMS/HCC)   • Carcinoma of left breast metastatic to bone (CMS/HCC)   • Antineoplastic chemotherapy induced anemia   • Dehydration   • Encounter for administration of vaccine   • Chemotherapy  induced neutropenia (CMS/HCC)   • Cough          1.Metastatic breast ca: currently on taxotere 3weeks on 1 week off. Labs reviewed wbc 4.26, hg 10.2, plt 458. Ok for treatment today  --also on xeloda  --CT c/a/p 2/19 showed stable dz. Some new Inflammatory opacities in the LEFT lower lobe and bilateral subpleural interstitial thickening.   -bone scan without evidence of dz progression  --primo taxotere weekly. Pt also on xeloda [id number], then repeat.    2. Psych: on olanzapine    3. HTN: on metoprolol    4. Gerd: on ranitidine  5. Dyspnea: get a cxr to evaluate  6. Anemia: will check iron profile, ferritin. Her creatinine is normal at 0.94.   7. DVT on xarelto indefinite.  --Doppler left LE  1/30/19 There is evidence of deep venous thrombosis of the left lower extremity. The clot burden is improved from last study. There is also evidence of superficial clot in the greater saphenous vein. There is a Baker's cyst in the popliteal fossa.  8. CHF: LVEF 65% with diastolic dysfunction. referral to cardiology to maximized her heart function.  9. Thrombocytosis: suspect reactive from anemia. Will check iron profile.  10.left lung pneumonia:  --cxr showed pneumonia. Pt already on levofloxacin. Will complete 14 days.   11: LE edema: give 20mg lasix once today    Moise Sotelo MD    3/7/2019    9:11 AM

## 2019-03-08 LAB
IGA SERPL-MCNC: 233 MG/DL (ref 64–422)
IGG SERPL-MCNC: 1033 MG/DL (ref 700–1600)
IGM SERPL-MCNC: 140 MG/DL (ref 26–217)

## 2019-03-13 ENCOUNTER — LAB (OUTPATIENT)
Dept: LAB | Facility: HOSPITAL | Age: 74
End: 2019-03-13

## 2019-03-13 ENCOUNTER — OFFICE VISIT (OUTPATIENT)
Dept: ONCOLOGY | Facility: CLINIC | Age: 74
End: 2019-03-13

## 2019-03-13 VITALS
DIASTOLIC BLOOD PRESSURE: 66 MMHG | RESPIRATION RATE: 16 BRPM | BODY MASS INDEX: 22.33 KG/M2 | OXYGEN SATURATION: 90 % | WEIGHT: 134 LBS | TEMPERATURE: 97.7 F | HEART RATE: 76 BPM | SYSTOLIC BLOOD PRESSURE: 116 MMHG | HEIGHT: 65 IN

## 2019-03-13 DIAGNOSIS — C79.51 CARCINOMA OF LEFT BREAST METASTATIC TO BONE (HCC): ICD-10-CM

## 2019-03-13 DIAGNOSIS — C50.912 CARCINOMA OF LEFT BREAST METASTATIC TO BONE (HCC): ICD-10-CM

## 2019-03-13 LAB
ALBUMIN SERPL-MCNC: 3.8 G/DL (ref 3.5–5)
ALBUMIN/GLOB SERPL: 1.3 G/DL (ref 1.1–2.5)
ALP SERPL-CCNC: 57 U/L (ref 24–120)
ALT SERPL W P-5'-P-CCNC: 16 U/L (ref 0–54)
ANION GAP SERPL CALCULATED.3IONS-SCNC: 10 MMOL/L (ref 4–13)
AST SERPL-CCNC: 23 U/L (ref 7–45)
BASOPHILS # BLD AUTO: 0.03 10*3/MM3 (ref 0–0.2)
BASOPHILS NFR BLD AUTO: 0.9 % (ref 0–2)
BILIRUB SERPL-MCNC: 0.5 MG/DL (ref 0.1–1)
BUN BLD-MCNC: 12 MG/DL (ref 5–21)
BUN/CREAT SERPL: 14.3 (ref 7–25)
CALCIUM SPEC-SCNC: 9.4 MG/DL (ref 8.4–10.4)
CHLORIDE SERPL-SCNC: 99 MMOL/L (ref 98–110)
CO2 SERPL-SCNC: 27 MMOL/L (ref 24–31)
CREAT BLD-MCNC: 0.84 MG/DL (ref 0.5–1.4)
DEPRECATED RDW RBC AUTO: 63 FL (ref 40–54)
EOSINOPHIL # BLD AUTO: 0.01 10*3/MM3 (ref 0–0.7)
EOSINOPHIL NFR BLD AUTO: 0.3 % (ref 0–4)
ERYTHROCYTE [DISTWIDTH] IN BLOOD BY AUTOMATED COUNT: 16.7 % (ref 12–15)
GFR SERPL CREATININE-BSD FRML MDRD: 66 ML/MIN/1.73
GLOBULIN UR ELPH-MCNC: 2.9 GM/DL
GLUCOSE BLD-MCNC: 116 MG/DL (ref 70–100)
HCT VFR BLD AUTO: 28.7 % (ref 37–47)
HGB BLD-MCNC: 9.5 G/DL (ref 12–16)
IMM GRANULOCYTES # BLD AUTO: 0.01 10*3/MM3 (ref 0–0.05)
IMM GRANULOCYTES NFR BLD AUTO: 0.3 % (ref 0–5)
LYMPHOCYTES # BLD AUTO: 1.63 10*3/MM3 (ref 0.72–4.86)
LYMPHOCYTES NFR BLD AUTO: 49.2 % (ref 15–45)
MCH RBC QN AUTO: 34.1 PG (ref 28–32)
MCHC RBC AUTO-ENTMCNC: 33.1 G/DL (ref 33–36)
MCV RBC AUTO: 102.9 FL (ref 82–98)
MONOCYTES # BLD AUTO: 0.2 10*3/MM3 (ref 0.19–1.3)
MONOCYTES NFR BLD AUTO: 6 % (ref 4–12)
NEUTROPHILS # BLD AUTO: 1.43 10*3/MM3 (ref 1.87–8.4)
NEUTROPHILS NFR BLD AUTO: 43.3 % (ref 39–78)
NRBC BLD AUTO-RTO: 0 /100 WBC (ref 0–0)
PLATELET # BLD AUTO: 420 10*3/MM3 (ref 130–400)
PMV BLD AUTO: 9.7 FL (ref 6–12)
POTASSIUM BLD-SCNC: 3.6 MMOL/L (ref 3.5–5.3)
PROT SERPL-MCNC: 6.7 G/DL (ref 6.3–8.7)
RBC # BLD AUTO: 2.79 10*6/MM3 (ref 4.2–5.4)
SODIUM BLD-SCNC: 136 MMOL/L (ref 135–145)
WBC NRBC COR # BLD: 3.31 10*3/MM3 (ref 4.8–10.8)

## 2019-03-13 PROCEDURE — 80053 COMPREHEN METABOLIC PANEL: CPT

## 2019-03-13 PROCEDURE — 86300 IMMUNOASSAY TUMOR CA 15-3: CPT

## 2019-03-13 PROCEDURE — 99214 OFFICE O/P EST MOD 30 MIN: CPT | Performed by: INTERNAL MEDICINE

## 2019-03-13 PROCEDURE — 36415 COLL VENOUS BLD VENIPUNCTURE: CPT

## 2019-03-13 PROCEDURE — 85025 COMPLETE CBC W/AUTO DIFF WBC: CPT

## 2019-03-13 NOTE — PROGRESS NOTES
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   • Carcinoma of left breast metastatic to bone (CMS/HCC)        REASON FOR VISIT:     Chief Complaint   Patient presents with   • Left Breast Mets to bone     Here for consideration of treatment for tomorrow.        HEMATOLOGY / ONCOLOGY HISTORY:   Oncology/Hematology History    Patient is a 67-year-old postmenopausal female who had onset of her menses at age 9 and menopause at age 48. She received oral contraceptives for approximately 14 years and did not receive hormonal manipulation. Patient has a maternal cousin had breast cancer. Patient has had no prior breast biopsies. Patient found a palpable left breast mass as well as a left axillary mass. Patient had a bloody nipple discharge which been present for approximately 6 months. On 6/20/2012, a mammogram was performed showing a subareolar mass consistent with malignancy. Bilateral breast ultrasound revealed an ill-defined subareolar mass measuring 2.8-2.5 cm. There is a left axillary mass measuring 1.7 cm with suspicion of extracapsular extension. There is no evidence of disease in the right breast. On 7/9/2012 patient underwent a left breast biopsy and placement of marker clip. Left breast biopsy revealed an infiltrating lobular carcinoma grade 2 with focal ductal carcinoma in situ, solid pattern. A fine-needle biopsy of the left lymph node was consistent with metastatic carcinoma. Breast tumor profile revealed ER: 100%; WI: 98%; HER-2/maynor 2+; FISH: Unamplified; Ki-67: 71%; P 53 1%; DNA aneuploidy. A MRI of the breast were performed body multiple abnormal enhancing masses  within the left breast. There appears to be anterior retraction of the pectoralis muscle with no direct extension. There is a moderate to large, layering left pleural effusion appreciated. The right breast reveals 3 moderately enlarged lymph nodes in the posterior lateral aspect the right breast. These have fatty hilum but followup is recommended. Patient is undergone systemic studies including, on 8/2/2012, a CT scan of the brain showing atrophy. a CT scan that shows showing a small to moderate left pleural effusion with 3 subcentimeter nodule densities in the left lung.   She completed 4 cycles of neoadjuvant treatment with dose dense Adriamycin and Cytoxan. She had a mammogram which showed a significant reduction in the size of her left breast lesion. There is no axillary adenopathy noted. She has had an ultrasound revealing the  lesion reducing from 2.4 cm to 1 cm. Patient underwent a left mastectomy on 03/19/2013 finding negative invasive cancer, negative nodes. A 5% DCIS was noted. The patient has declined hormonal therapy. She did not need radiation therapy.  November 2014, she began to have evidence of lytic bone lesions at T5T6, frontal disease, an 8 mm lucency in the central frontal area of  the skull but nothing intracranial. Bone scan revealed only vertex tracer activity but bilateral ribs and thoracic spine, and other lesions in  her pelvis. At that time, she was started on letrozole since November 2014. She is taking Xgeva. She had progression found in bone in Feb 2015 on scan. She was started on Faslodex, Ibrance and continued xgeva.         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 B) Foci of ductal carcinoma in situ, solid pattern. C) Greatest dimension of  the invasive carcinoma is 15.8mm.         3/19/2013 Surgery     Left Mastectomy w/ Axillary Tail: Focal residual ducatal carcinoma in situ (DCIS) 12 lymph nodes negative for metastatic carcinoma.         10/20/2014 Biopsy     Peritoneum Biopsy: Final Diagnosis: Malignant Cell Neoplasm.           Breast cancer metastasized to bone, unspecified laterality (CMS/HCC)    10/15/2012 -  Other Event     Left mammogram:  Impression:  1. Decreasing spiculation and density in the retroareolar left breast, near a previous biopsy.   2. Definite left axillary lesion is not appreciated.          2/5/2013 -  Other Event     Diagnostic Mammo:  Comparison is made to 10/15/2012 and 6/20/2012  The spiculated mass at 12:00 behind the nipple is nearly resolved.   Impression:  1. The mass on the left side is less apparent after receiving chemotherapy. There has been a good response to chmotherapy on the left side.   2. No suspicious abnormality is seen in the right breast to account for the new palable abnormality at the 4-5:00 position.          3/19/2013 Surgery     Breast Biopsy:  Final Diagnosis:  A. Breast, left mastectomy with axillary tail  1. Focal residual ductal carcinoma in situ (DICS) (less than 5% of tumor bed).  2. Negative for residual invasive carcinoma.   3. 12 Lymph Nodes, negative for metastatic carcinoma (0/12) focally, some lymph nodes demonstrate fibrosis/treatment effect.  B. Skin and soft tissue, left advancement flap:  1. Benign skin and subcutis.   2. Negative for carcinoma.          6/20/2013 -  Other Event     Diagnostic Mammo [facility]:  1. A subareolar mass is consistent with malignancy. Additional involement extends inferiorly sonographically and superolaterally mammograhically as demonstrated by calcifications. The mass measures approx 2.8 cm sonographically, but the involved region is likely much larger including an intraductal componet. The left axillary lymph nodes are also involved.   2. Recommened  ultrasound guided biopsy of the left subareolar mass and left axillary lymph node.   3. Breast MRI could also further define multicentric disease on the left.   4. Clear suspicious finding on the right.  Recommend ongoing clinical follow up of palpable foci.          6/13/2014 -  Other Event     Diagnostic Mammo:  IMPRESSION:  1. History of left breast cancer and mastectomy. Stable benign right breast mammograms.          10/20/2014 Surgery     Final Diagnosis:  Biopsies, pertoneum:  Metastatic carcinoma, morphologically compatible with metastatic mammary carcinoma.          6/15/2015 -  Other Event     Diagnostic Mammo:  IMPRESSION:   1. Negative x-ray report, should not delay biopsy if a dominat or clinically suspicious mass is present.          7/5/2016 -  Other Event     Diagnositic mammogram:  Impression:   Stable right mamogram with no radiographic findings suspicious for malignancy. Recommend continued screening mammography per screening guidelines unless otherwise earlier clinically indicated.          9/18/2016 Initial Diagnosis     Secondary malignant neoplasm of bone and bone marrow              INTERVAL HISTORY  Patient ID: Heavenly Yanes is a 73 y.o. year old female Cancer Staging  Stage IV (T2, N1, M1)  --complaining of dyspnea on exersion since last Tuesday. She feels like she ran a mile  2/14/19: cxr showed pneumonia. Already on levofloxacin. Coughing not worse, she did not run temp.  Overall feels weak.  -restarted the appetite suppressant and is starting to eat more but not a whole lot  --2/28/19: CT c/ap with some response to therapy. New left chest inflammatory lesion to be followed ion next imaging. Bone scan unremarkable for progression.  3/13/19: coughing improved. denies fever. Denies /cp/n/v/, neuropathy. Rest of ros unremarkable. PE unchanged.  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) 9/18/2016     Past Surgical History:   Past Surgical History:   Procedure Laterality Date   • BREAST SURGERY      left breast biopsy and axillary node   • CHOLECYSTECTOMY     • EXTERNAL EAR SURGERY     • MASTECTOMY Left    • PORTACATH PLACEMENT       Social History:   Social History     Socioeconomic History   • Marital status:      Spouse name: Not on file   • Number of children: Not on file   • Years of education: Not on file   • Highest education level: Not on file   Social Needs   • Financial resource strain: Not on file   • Food insecurity - worry: Not on file   • Food insecurity - inability: Not on file   • Transportation needs - medical: Not on file   • Transportation needs - non-medical: Not on file   Occupational History   • Not on file   Tobacco Use   • Smoking status: Never Smoker   • Smokeless tobacco: Never Used   Substance and Sexual Activity   • Alcohol use: No   • Drug use: No   • Sexual activity: Not on file   Other Topics Concern   • Not on file   Social History Narrative   • Not on file     Family History:   Family History   Problem Relation Age of Onset   • No Known Problems Daughter    • No Known Problems Son    • Other Mother         complications from a concussion from a fall   • Other Father         old age   • No Known Problems Brother    • No Known Problems Daughter        Review of Systems   Constitutional: Positive for fatigue.   HENT: Negative.    Eyes: Negative.    Respiratory: Positive for shortness of breath.    Cardiovascular: Negative.    Gastrointestinal: Negative.    Endocrine: Negative.    Genitourinary: Negative.    Musculoskeletal: Negative.    Skin: Negative.    Neurological: Negative.    Hematological: Negative.    Psychiatric/Behavioral: Negative.      "    Performance Status:  Asymptomatic    Medications:    Current Outpatient Medications   Medication Sig Dispense Refill   • Calcium-Magnesium-Vitamin D (CALCIUM 1200+D3 PO) Take 1,200 mg by mouth Daily.     • capecitabine (XELODA) 500 MG chemo tablet Take 1 tablet by mouth 2 (Two) Times a Day. For 2 weeks on and 1 week off. 42 tablet 11   • megestrol (MEGACE) 40 MG tablet Take 1 tablet by mouth 2 (Two) Times a Day. 60 tablet 2   • metoprolol tartrate (LOPRESSOR) 25 MG tablet Take 25 mg by mouth 2 (two) times a day.     • OLANZapine (ZYPREXA) 2.5 MG tablet Take 2.5 mg by mouth every night.     • ondansetron (ZOFRAN) 8 MG tablet Take 1 tablet by mouth Every 8 (Eight) Hours As Needed for Nausea or Vomiting. (Patient taking differently: Take 8 mg by mouth Every 8 (Eight) Hours As Needed for Nausea or Vomiting.) 30 tablet 5   • raNITIdine (ZANTAC) 150 MG tablet Take 150 mg by mouth 2 (Two) Times a Day.     • rivaroxaban (XARELTO) 20 MG tablet Take 1 tablet by mouth Daily. 30 tablet 5   • thyroid 60 MG PO tablet Take 60 mg by mouth daily.       No current facility-administered medications for this visit.      Facility-Administered Medications Ordered in Other Visits   Medication Dose Route Frequency Provider Last Rate Last Dose   • heparin flush (porcine) 100 UNIT/ML injection 500 Units  500 Units Intravenous PRN Joseph uNnez MD   500 Units at 10/26/17 1155   • sodium chloride 0.9 % flush 10 mL  10 mL Intravenous PRN Joseph Nunez MD   10 mL at 10/26/17 1155       ALLERGIES:    Allergies   Allergen Reactions   • Benadryl  [Diphenhydramine Hcl (Sleep)] Other (See Comments)   • Codeine    • Diphenhydramine Other (See Comments)     \"Shaky leg syndrome\"  \"Shaky leg syndrome\"  \"Shaky leg syndrome\"   • Heparin    • Other      POPPY SEED   • Penicillins Hives   • Petroleum Jelly [Skin Protectants, Misc.]    • Sulfa Antibiotics    • Sulfur    • Valium [Diazepam]        Objective      Vitals:    03/13/19 1014   BP: 116/66 " "  Pulse: 76   Resp: 16   Temp: 97.7 °F (36.5 °C)   TempSrc: Oral   SpO2: 90%   Weight: 60.8 kg (134 lb)   Height: 165.1 cm (65\")         Current Status 3/7/2019   ECOG score 2         Physical Exam  unchanged  General Appearance: Patient is awake, alert, oriented and in no acute distress. Patient is welldeveloped, wellnourished, and appears stated age.  HEENT: Normocephalic. Sclerae clear, conjunctiva pink, extraocular movements intact, pupils, round, reactive to light and  accommodation. Mouth and throat are clear with moist oral mucosa.  NECK: Supple, no jugular venous distention, thyroid not enlarged.  LYMPH: No cervical, supraclavicular, axillary, or inguinal lymphadenopathy.  CHEST: Equal bilateral expansion, AP  diameter normal, resonant percussion note  LUNGS: Good air movement, no rales, rhonchi, rubs or wheezes with auscultation on the right. Diminished on the left.  CARDIO: Regular sinus rhythm, no murmurs, gallops or rubs.  ABDOMEN: Nondistended, soft, No tenderness, no guarding, no rebound, No hepatosplenomegaly. No abdominal masses. Bowel sounds positive. No hernia  GENITALIA: Not examined.  BREASTS: Not examined.  MUSKEL: No joint swelling, decreased motion, or inflammation  EXTREMS: patrick le  edema,No clubbing, cyanosis, No varicose veins.  NEURO: Grossly nonfocal, Gait is coordinated and smooth, Cognition is preserved.  SKIN: No rashes, no ecchymoses, no petechia.  PSYCH: Oriented to time, place and person. Memory is preserved. Mood and affect appear normal  RECENT LABS:  Lab on 03/13/2019   Component Date Value Ref Range Status   • Glucose 03/13/2019 116* 70 - 100 mg/dL Final   • BUN 03/13/2019 12  5 - 21 mg/dL Final   • Creatinine 03/13/2019 0.84  0.50 - 1.40 mg/dL Final   • Sodium 03/13/2019 136  135 - 145 mmol/L Final   • Potassium 03/13/2019 3.6  3.5 - 5.3 mmol/L Final   • Chloride 03/13/2019 99  98 - 110 mmol/L Final   • CO2 03/13/2019 27.0  24.0 - 31.0 mmol/L Final   • Calcium 03/13/2019 9.4  " 8.4 - 10.4 mg/dL Final   • Total Protein 03/13/2019 6.7  6.3 - 8.7 g/dL Final   • Albumin 03/13/2019 3.80  3.50 - 5.00 g/dL Final   • ALT (SGPT) 03/13/2019 16  0 - 54 U/L Final   • AST (SGOT) 03/13/2019 23  7 - 45 U/L Final   • Alkaline Phosphatase 03/13/2019 57  24 - 120 U/L Final   • Total Bilirubin 03/13/2019 0.5  0.1 - 1.0 mg/dL Final   • eGFR Non African Amer 03/13/2019 66  >60 mL/min/1.73 Final   • Globulin 03/13/2019 2.9  gm/dL Final   • A/G Ratio 03/13/2019 1.3  1.1 - 2.5 g/dL Final   • BUN/Creatinine Ratio 03/13/2019 14.3  7.0 - 25.0 Final   • Anion Gap 03/13/2019 10.0  4.0 - 13.0 mmol/L Final   • WBC 03/13/2019 3.31* 4.80 - 10.80 10*3/mm3 Final   • RBC 03/13/2019 2.79* 4.20 - 5.40 10*6/mm3 Final   • Hemoglobin 03/13/2019 9.5* 12.0 - 16.0 g/dL Final   • Hematocrit 03/13/2019 28.7* 37.0 - 47.0 % Final   • MCV 03/13/2019 102.9* 82.0 - 98.0 fL Final   • MCH 03/13/2019 34.1* 28.0 - 32.0 pg Final   • MCHC 03/13/2019 33.1  33.0 - 36.0 g/dL Final   • RDW 03/13/2019 16.7* 12.0 - 15.0 % Final   • RDW-SD 03/13/2019 63.0* 40.0 - 54.0 fl Final   • MPV 03/13/2019 9.7  6.0 - 12.0 fL Final   • Platelets 03/13/2019 420* 130 - 400 10*3/mm3 Final   • Neutrophil % 03/13/2019 43.3  39.0 - 78.0 % Final   • Lymphocyte % 03/13/2019 49.2* 15.0 - 45.0 % Final   • Monocyte % 03/13/2019 6.0  4.0 - 12.0 % Final   • Eosinophil % 03/13/2019 0.3  0.0 - 4.0 % Final   • Basophil % 03/13/2019 0.9  0.0 - 2.0 % Final   • Immature Grans % 03/13/2019 0.3  0.0 - 5.0 % Final   • Neutrophils, Absolute 03/13/2019 1.43* 1.87 - 8.40 10*3/mm3 Final   • Lymphocytes, Absolute 03/13/2019 1.63  0.72 - 4.86 10*3/mm3 Final   • Monocytes, Absolute 03/13/2019 0.20  0.19 - 1.30 10*3/mm3 Final   • Eosinophils, Absolute 03/13/2019 0.01  0.00 - 0.70 10*3/mm3 Final   • Basophils, Absolute 03/13/2019 0.03  0.00 - 0.20 10*3/mm3 Final   • Immature Grans, Absolute 03/13/2019 0.01  0.00 - 0.05 10*3/mm3 Final   • nRBC 03/13/2019 0.0  0.0 - 0.0 /100 WBC Final   Lab on  03/07/2019   Component Date Value Ref Range Status   • Glucose 03/07/2019 115* 70 - 100 mg/dL Final   • BUN 03/07/2019 16  5 - 21 mg/dL Final   • Creatinine 03/07/2019 0.88  0.50 - 1.40 mg/dL Final   • Sodium 03/07/2019 136  135 - 145 mmol/L Final   • Potassium 03/07/2019 3.9  3.5 - 5.3 mmol/L Final   • Chloride 03/07/2019 99  98 - 110 mmol/L Final   • CO2 03/07/2019 28.0  24.0 - 31.0 mmol/L Final   • Calcium 03/07/2019 9.8  8.4 - 10.4 mg/dL Final   • Total Protein 03/07/2019 7.0  6.3 - 8.7 g/dL Final   • Albumin 03/07/2019 3.90  3.50 - 5.00 g/dL Final   • ALT (SGPT) 03/07/2019 <15  0 - 54 U/L Final   • AST (SGOT) 03/07/2019 22  7 - 45 U/L Final   • Alkaline Phosphatase 03/07/2019 53  24 - 120 U/L Final   • Total Bilirubin 03/07/2019 0.5  0.1 - 1.0 mg/dL Final   • eGFR Non African Amer 03/07/2019 63  >60 mL/min/1.73 Final   • Globulin 03/07/2019 3.1  gm/dL Final   • A/G Ratio 03/07/2019 1.3  1.1 - 2.5 g/dL Final   • BUN/Creatinine Ratio 03/07/2019 18.2  7.0 - 25.0 Final   • Anion Gap 03/07/2019 9.0  4.0 - 13.0 mmol/L Final   • WBC 03/07/2019 4.26* 4.80 - 10.80 10*3/mm3 Final   • RBC 03/07/2019 2.97* 4.20 - 5.40 10*6/mm3 Final   • Hemoglobin 03/07/2019 10.2* 12.0 - 16.0 g/dL Final   • Hematocrit 03/07/2019 30.4* 37.0 - 47.0 % Final   • MCV 03/07/2019 102.4* 82.0 - 98.0 fL Final   • MCH 03/07/2019 34.3* 28.0 - 32.0 pg Final   • MCHC 03/07/2019 33.6  33.0 - 36.0 g/dL Final   • RDW 03/07/2019 17.5* 12.0 - 15.0 % Final   • RDW-SD 03/07/2019 64.4* 40.0 - 54.0 fl Final   • MPV 03/07/2019 9.7  6.0 - 12.0 fL Final   • Platelets 03/07/2019 458* 130 - 400 10*3/mm3 Final   • Neutrophil % 03/07/2019 49.0  39.0 - 78.0 % Final   • Lymphocyte % 03/07/2019 42.3  15.0 - 45.0 % Final   • Monocyte % 03/07/2019 6.1  4.0 - 12.0 % Final   • Eosinophil % 03/07/2019 0.5  0.0 - 4.0 % Final   • Basophil % 03/07/2019 1.4  0.0 - 2.0 % Final   • Immature Grans % 03/07/2019 0.7  0.0 - 5.0 % Final   • Neutrophils, Absolute 03/07/2019 2.09  1.87 -  8.40 10*3/mm3 Final   • Lymphocytes, Absolute 03/07/2019 1.80  0.72 - 4.86 10*3/mm3 Final   • Monocytes, Absolute 03/07/2019 0.26  0.19 - 1.30 10*3/mm3 Final   • Eosinophils, Absolute 03/07/2019 0.02  0.00 - 0.70 10*3/mm3 Final   • Basophils, Absolute 03/07/2019 0.06  0.00 - 0.20 10*3/mm3 Final   • Immature Grans, Absolute 03/07/2019 0.03  0.00 - 0.05 10*3/mm3 Final   • nRBC 03/07/2019 0.0  0.0 - 0.0 /100 WBC Final   • Extra Tube 03/07/2019 Hold for add-ons.   Final    Auto resulted.   • Extra Tube 03/07/2019 Hold for add-ons.   Final    Auto resulted.   • IgG 03/07/2019 1033  700 - 1600 mg/dL Final   • IgM 03/07/2019 140  26 - 217 mg/dL Final   • IgA 03/07/2019 233  64 - 422 mg/dL Final       RADIOLOGY:  Ct Chest With Contrast    Result Date: 2/19/2019  Narrative: CT CHEST W CONTRAST- 2/19/2019 11:05 AM CST  HISTORY: Metastatic breast cancer   COMPARISON: CT of the chest from an outside facility 5/4/2018.  DOSE LENGTH PRODUCT: 154 mGy cm. Automated exposure control was also utilized to decrease patient radiation dose.  TECHNIQUE: Following the intravenous administration of Isovue contrast, helical CT tomographic images of the chest were acquired. Multiplanar reformatted images were also provided for review.  FINDINGS:  Neck base: The imaged portion of the neck and thyroid gland is unremarkable.  Lungs: Ill-defined, patchy opacities are seen in the LEFT lower lung. Mild subpleural interstitial thickening is seen in bilateral lungs. No solid nodules are identified. There is no pleural effusion. The trachea and bronchial tree are patent.  Heart: Normal in size and appearance. There is no pericardial effusion.  Vasculature: There is aortic atherosclerosis without stenosis or aneurysm. No coronary arteriosclerosis identified. The great vessels are normal in appearance. The pulmonary arteries are normal in appearance.  Mediastinum and Lymph nodes: No enlarged axillary, hilar, or mediastinal lymph nodes. A small hiatal  hernia is present.  Bones and soft tissues: Diffuse metastatic lesions are seen in the bones. Nondisplaced fractures are seen in the upper posterior LEFT ribs. There is mild compression of the T7 vertebral body with approximately 10% vertebral body height loss. This is unchanged in height since 1/10/2019. The LEFT breast has been removed. The patient's port extends into the SVC.  Upper abdomen: Findings in the upper abdomen are described in a separate dictation.      Impression: 1. Inflammatory opacities in the LEFT lower lobe and bilateral subpleural interstitial thickening. These should be followed in approximately 6 weeks after antibiotic therapy. 2. Diffuse bony metastases again identified.   This report was finalized on 02/19/2019 13:42 by Dr. Osmani Gillette MD.    Nm Bone Scan Whole Body    Result Date: 2/19/2019  Narrative: NM BONE SCAN WHOLE BODY- 2/19/2019 11:03 AM CST  HISTORY: response to therapy; C50.912-Malignant neoplasm of unspecified site of left female breast; C79.51-Secondary malignant neoplasm of bone  COMPARISON: CT chest, abdomen, and pelvis 2/19/2019  RADIOPHARMACEUTICAL: 19 mCi Tc99m HDP  TECHNIQUE: Anterior and posterior whole-body images are acquired approximately two hours postinjection.  FINDINGS: Diffuse sclerotic lesions seen throughout the bones on the CT are not well demonstrated by bone scan. There is increased uptake of radiotracer in the L2 vertebral body, which corresponds to the largest blastic lesion seen on CT.  There is also focally increased uptake in the LEFT coracoid process, which corresponds to a lucent and sclerotic focus seen on CT.      Impression: 1. Diffuse bony metastases are better appreciated by CT. 2. There is focally increased uptake in the LEFT coracoid process and L2 vertebral body. This report was finalized on 02/19/2019 13:38 by Dr. Osmani Gillette MD.    Ct Abdomen Pelvis With Contrast    Result Date: 2/19/2019  Narrative: EXAMINATION: CT ABDOMEN PELVIS W  CONTRAST- 2/19/2019 1:08 PM CST  HISTORY: Evaluate response to therapy; C50.912-Malignant neoplasm of unspecified site of left female breast; C79.51-Secondary malignant neoplasm of bone  DOSE: 217 mGycm (Automatic exposure control technique was implemented in an effort to keep the radiation dose as low as possible without compromising image quality)  REPORT: Spiral CT of the abdomen and pelvis was performed after administration of intravenous contrast from the lung bases through the pubic symphysis. Reconstructed coronal and sagittal images are also reviewed.  COMPARISON: CT abdomen pelvis with contrast 11/12/2018.  There is evidence previous left mastectomy. Review of lung windows demonstrates new nodular interstitial infiltrate in the left base. Please review today's chest CT for additional comments. Cholecystectomy clips are present. The liver and spleen are homogeneous and within normal limits. The kidneys enhance normally, there is mild bilateral renal atrophy greater on the left. No renal mass or hydronephrosis is identified. The pancreas and adrenal glands are within normal limits. A small sliding-type hiatal hernia is noted. Large volume of loculated ascites seen previously is partially resolved, a small amount of free fluid remains in the pelvis. Peritoneal thickening seen previously is not identified on today's exam. The bladder is decompressed. There is a stable calcified mass in the left uterus compatible with a calcified uterine fibroid. This is stable. There is mild mucosal thickening involving segments of colon, including the sigmoid, ascending and hepatic flexure. These segments of colon are not well distended. No free air is identified. No measurable intra-abdominal or pelvic lymphadenopathy is identified. Review of bone windows shows innumerable sclerotic metastases throughout the skeletal structures, which appear unchanged.      Impression: 1. Interval near complete resolution of a large amount of  loculated ascites, the small amount of residual free fluid is noted in the pelvis. Thickening of the peritoneum seen before is not identified. This may represent a positive response to chemotherapy. 2. Stable appearance of innumerable osteoblastic skeletal metastases. 3. Mild mucosal thickening involving segments of the colon may be exaggerated by underdistention. 4. New nodular interstitial thickening of the left lower lobe, better demonstrated on the chest CT performed today. This report was finalized on 02/19/2019 13:24 by Dr. David Richard MD.           Assessment/Plan  Heavenly Yanes is a 73 y.o. year old female with met breast cancer s/p multiple lines of CMT currently on taxotere weekly and xeloda with good tolerance.    Patient Active Problem List   Diagnosis   • Malignant neoplasm of upper-outer quadrant of left female breast (CMS/HCC)   • Breast cancer metastasized to bone, unspecified laterality (CMS/HCC)   • Essential hypertension   • Acquired hypothyroidism   • Bipolar 1 disorder (CMS/HCC)   • Carcinoma of left breast metastatic to bone (CMS/HCC)   • Antineoplastic chemotherapy induced anemia   • Dehydration   • Encounter for administration of vaccine   • Chemotherapy induced neutropenia (CMS/HCC)   • Cough   • Port-A-Cath in place          1.Metastatic breast ca: currently on taxotere 3weeks on 1 week off.  --also on xeloda  --CT c/a/p 2/19 showed stable dz. Some new Inflammatory opacities in the LEFT lower lobe and bilateral subpleural interstitial thickening.   -bone scan without evidence of dz progression  --primo taxotere weekly. Pt also on xeloda [id number], then repeat.  --labs reviewed wbc 3.31, Hg 9.5, plt 420. Ok for treatment tomorrow.    2. Psych: on olanzapine    3. HTN: on metoprolol    4. Gerd: on ranitidine  5. Dyspnea: get a cxr to evaluate  6. Anemia: will check iron profile, ferritin. Her creatinine is normal at 0.94.   7. DVT on xarelto indefinite.  --Doppler left LE  1/30/19  There is evidence of deep venous thrombosis of the left lower extremity. The clot burden is improved from last study. There is also evidence of superficial clot in the greater saphenous vein. There is a Baker's cyst in the popliteal fossa.  8. CHF: LVEF 65% with diastolic dysfunction. referral to cardiology to maximized her heart function.  9. Thrombocytosis: suspect reactive from anemia. Will check iron profile.  10.left lung pneumonia:  --cxr showed pneumonia. Pt already on levofloxacin. Will complete 14 days.   11: LE edema: give 20mg lasix once today    Moise Sotelo MD    3/13/2019    11:05 AM

## 2019-03-14 ENCOUNTER — INFUSION (OUTPATIENT)
Dept: ONCOLOGY | Facility: HOSPITAL | Age: 74
End: 2019-03-14
Attending: INTERNAL MEDICINE

## 2019-03-14 VITALS
SYSTOLIC BLOOD PRESSURE: 129 MMHG | RESPIRATION RATE: 20 BRPM | WEIGHT: 135 LBS | BODY MASS INDEX: 22.49 KG/M2 | OXYGEN SATURATION: 100 % | HEART RATE: 69 BPM | HEIGHT: 65 IN | TEMPERATURE: 97.8 F | DIASTOLIC BLOOD PRESSURE: 64 MMHG

## 2019-03-14 DIAGNOSIS — C50.912 CARCINOMA OF LEFT BREAST METASTATIC TO BONE (HCC): Primary | ICD-10-CM

## 2019-03-14 DIAGNOSIS — Z95.828 PORT-A-CATH IN PLACE: ICD-10-CM

## 2019-03-14 DIAGNOSIS — C79.51 BREAST CANCER METASTASIZED TO BONE, UNSPECIFIED LATERALITY (HCC): ICD-10-CM

## 2019-03-14 DIAGNOSIS — C50.412 MALIGNANT NEOPLASM OF UPPER-OUTER QUADRANT OF LEFT FEMALE BREAST, UNSPECIFIED ESTROGEN RECEPTOR STATUS (HCC): ICD-10-CM

## 2019-03-14 DIAGNOSIS — C50.919 BREAST CANCER METASTASIZED TO BONE, UNSPECIFIED LATERALITY (HCC): ICD-10-CM

## 2019-03-14 DIAGNOSIS — C79.51 CARCINOMA OF LEFT BREAST METASTATIC TO BONE (HCC): Primary | ICD-10-CM

## 2019-03-14 LAB
CANCER AG15-3 SERPL-ACNC: 61.4 U/ML (ref 0–25)
CANCER AG27-29 SERPL-ACNC: 59.5 U/ML (ref 0–38.6)

## 2019-03-14 PROCEDURE — 25010000002 ONDANSETRON PER 1 MG: Performed by: INTERNAL MEDICINE

## 2019-03-14 PROCEDURE — 25010000002 DOCETAXEL 20 MG/ML SOLUTION 4 ML VIAL: Performed by: INTERNAL MEDICINE

## 2019-03-14 PROCEDURE — 96413 CHEMO IV INFUSION 1 HR: CPT

## 2019-03-14 PROCEDURE — 25010000003 DEXAMETHASONE SODIUM PHOSPHATE 100 MG/10ML SOLUTION 10 ML VIAL: Performed by: INTERNAL MEDICINE

## 2019-03-14 PROCEDURE — 96367 TX/PROPH/DG ADDL SEQ IV INF: CPT

## 2019-03-14 RX ORDER — MEPERIDINE HYDROCHLORIDE 50 MG/ML
25 INJECTION INTRAMUSCULAR; INTRAVENOUS; SUBCUTANEOUS
Status: DISCONTINUED | OUTPATIENT
Start: 2019-03-14 | End: 2019-03-14 | Stop reason: HOSPADM

## 2019-03-14 RX ORDER — SODIUM CHLORIDE 9 MG/ML
250 INJECTION, SOLUTION INTRAVENOUS ONCE
Status: COMPLETED | OUTPATIENT
Start: 2019-03-14 | End: 2019-03-14

## 2019-03-14 RX ORDER — SODIUM CHLORIDE 0.9 % (FLUSH) 0.9 %
10 SYRINGE (ML) INJECTION AS NEEDED
Status: DISCONTINUED | OUTPATIENT
Start: 2019-03-14 | End: 2019-03-14 | Stop reason: HOSPADM

## 2019-03-14 RX ORDER — FAMOTIDINE 10 MG/ML
20 INJECTION, SOLUTION INTRAVENOUS AS NEEDED
Status: DISCONTINUED | OUTPATIENT
Start: 2019-03-14 | End: 2019-03-14 | Stop reason: HOSPADM

## 2019-03-14 RX ORDER — SODIUM CHLORIDE 0.9 % (FLUSH) 0.9 %
10 SYRINGE (ML) INJECTION AS NEEDED
Status: CANCELLED | OUTPATIENT
Start: 2019-03-14

## 2019-03-14 RX ADMIN — Medication: at 10:49

## 2019-03-14 RX ADMIN — SODIUM CHLORIDE, PRESERVATIVE FREE 10 ML: 5 INJECTION INTRAVENOUS at 12:28

## 2019-03-14 RX ADMIN — SODIUM CHLORIDE 250 ML: 9 INJECTION, SOLUTION INTRAVENOUS at 10:48

## 2019-03-14 RX ADMIN — DOCETAXEL 60 MG: 20 INJECTION, SOLUTION, CONCENTRATE INTRAVENOUS at 11:16

## 2019-03-14 RX ADMIN — Medication 500 UNITS: at 12:28

## 2019-03-14 NOTE — PROGRESS NOTES
[id number] Called office and reviewed low ANC and HGB below 10. Sandra reports doctor aware and ok to treat. Nilsa Cagle RN

## 2019-03-27 ENCOUNTER — OFFICE VISIT (OUTPATIENT)
Dept: ONCOLOGY | Facility: CLINIC | Age: 74
End: 2019-03-27

## 2019-03-27 ENCOUNTER — INFUSION (OUTPATIENT)
Dept: ONCOLOGY | Facility: HOSPITAL | Age: 74
End: 2019-03-27
Attending: INTERNAL MEDICINE

## 2019-03-27 ENCOUNTER — LAB (OUTPATIENT)
Dept: LAB | Facility: HOSPITAL | Age: 74
End: 2019-03-27
Attending: INTERNAL MEDICINE

## 2019-03-27 VITALS
WEIGHT: 133.8 LBS | HEART RATE: 64 BPM | SYSTOLIC BLOOD PRESSURE: 148 MMHG | DIASTOLIC BLOOD PRESSURE: 68 MMHG | RESPIRATION RATE: 16 BRPM | HEIGHT: 65 IN | BODY MASS INDEX: 22.29 KG/M2 | TEMPERATURE: 97.7 F | OXYGEN SATURATION: 97 %

## 2019-03-27 VITALS
SYSTOLIC BLOOD PRESSURE: 112 MMHG | HEIGHT: 65 IN | HEART RATE: 65 BPM | RESPIRATION RATE: 18 BRPM | OXYGEN SATURATION: 100 % | DIASTOLIC BLOOD PRESSURE: 53 MMHG | TEMPERATURE: 97.6 F | BODY MASS INDEX: 22.29 KG/M2 | WEIGHT: 133.8 LBS

## 2019-03-27 DIAGNOSIS — C50.919 BREAST CANCER METASTASIZED TO BONE, UNSPECIFIED LATERALITY (HCC): ICD-10-CM

## 2019-03-27 DIAGNOSIS — Z95.828 PORT-A-CATH IN PLACE: ICD-10-CM

## 2019-03-27 DIAGNOSIS — C79.51 BREAST CANCER METASTASIZED TO BONE, UNSPECIFIED LATERALITY (HCC): ICD-10-CM

## 2019-03-27 DIAGNOSIS — C50.412 MALIGNANT NEOPLASM OF UPPER-OUTER QUADRANT OF LEFT FEMALE BREAST, UNSPECIFIED ESTROGEN RECEPTOR STATUS (HCC): ICD-10-CM

## 2019-03-27 DIAGNOSIS — C50.912 CARCINOMA OF LEFT BREAST METASTATIC TO BONE (HCC): ICD-10-CM

## 2019-03-27 DIAGNOSIS — C79.51 CARCINOMA OF LEFT BREAST METASTATIC TO BONE (HCC): ICD-10-CM

## 2019-03-27 DIAGNOSIS — C50.912 CARCINOMA OF LEFT BREAST METASTATIC TO BONE (HCC): Primary | ICD-10-CM

## 2019-03-27 DIAGNOSIS — C79.51 CARCINOMA OF LEFT BREAST METASTATIC TO BONE (HCC): Primary | ICD-10-CM

## 2019-03-27 LAB
ALBUMIN SERPL-MCNC: 3.9 G/DL (ref 3.5–5)
ALBUMIN/GLOB SERPL: 1.3 G/DL (ref 1.1–2.5)
ALP SERPL-CCNC: 62 U/L (ref 24–120)
ALT SERPL W P-5'-P-CCNC: <15 U/L (ref 0–54)
ANION GAP SERPL CALCULATED.3IONS-SCNC: 8 MMOL/L (ref 4–13)
AST SERPL-CCNC: 26 U/L (ref 7–45)
BASOPHILS # BLD AUTO: 0.04 10*3/MM3 (ref 0–0.2)
BASOPHILS NFR BLD AUTO: 0.7 % (ref 0–2)
BILIRUB SERPL-MCNC: 0.4 MG/DL (ref 0.1–1)
BUN BLD-MCNC: 8 MG/DL (ref 5–21)
BUN/CREAT SERPL: 8.3 (ref 7–25)
CALCIUM SPEC-SCNC: 9.3 MG/DL (ref 8.4–10.4)
CHLORIDE SERPL-SCNC: 101 MMOL/L (ref 98–110)
CO2 SERPL-SCNC: 28 MMOL/L (ref 24–31)
CREAT BLD-MCNC: 0.96 MG/DL (ref 0.5–1.4)
DEPRECATED RDW RBC AUTO: 58.7 FL (ref 40–54)
EOSINOPHIL # BLD AUTO: 0 10*3/MM3 (ref 0–0.7)
EOSINOPHIL NFR BLD AUTO: 0 % (ref 0–4)
ERYTHROCYTE [DISTWIDTH] IN BLOOD BY AUTOMATED COUNT: 15.9 % (ref 12–15)
GFR SERPL CREATININE-BSD FRML MDRD: 57 ML/MIN/1.73
GLOBULIN UR ELPH-MCNC: 2.9 GM/DL
GLUCOSE BLD-MCNC: 106 MG/DL (ref 70–100)
HCT VFR BLD AUTO: 33.8 % (ref 37–47)
HGB BLD-MCNC: 11 G/DL (ref 12–16)
HOLD SPECIMEN: NORMAL
HOLD SPECIMEN: NORMAL
IMM GRANULOCYTES # BLD AUTO: 0.03 10*3/MM3 (ref 0–0.05)
IMM GRANULOCYTES NFR BLD AUTO: 0.6 % (ref 0–5)
LYMPHOCYTES # BLD AUTO: 1.89 10*3/MM3 (ref 0.72–4.86)
LYMPHOCYTES NFR BLD AUTO: 34.7 % (ref 15–45)
MCH RBC QN AUTO: 33.4 PG (ref 28–32)
MCHC RBC AUTO-ENTMCNC: 32.5 G/DL (ref 33–36)
MCV RBC AUTO: 102.7 FL (ref 82–98)
MONOCYTES # BLD AUTO: 0.56 10*3/MM3 (ref 0.19–1.3)
MONOCYTES NFR BLD AUTO: 10.3 % (ref 4–12)
NEUTROPHILS # BLD AUTO: 2.93 10*3/MM3 (ref 1.87–8.4)
NEUTROPHILS NFR BLD AUTO: 53.7 % (ref 39–78)
NRBC BLD AUTO-RTO: 0 /100 WBC (ref 0–0)
PLATELET # BLD AUTO: 432 10*3/MM3 (ref 130–400)
PMV BLD AUTO: 9.4 FL (ref 6–12)
POTASSIUM BLD-SCNC: 3.2 MMOL/L (ref 3.5–5.3)
PROT SERPL-MCNC: 6.8 G/DL (ref 6.3–8.7)
RBC # BLD AUTO: 3.29 10*6/MM3 (ref 4.2–5.4)
SODIUM BLD-SCNC: 137 MMOL/L (ref 135–145)
WBC NRBC COR # BLD: 5.45 10*3/MM3 (ref 4.8–10.8)

## 2019-03-27 PROCEDURE — 25010000002 DOCETAXEL 20 MG/ML SOLUTION 4 ML VIAL: Performed by: INTERNAL MEDICINE

## 2019-03-27 PROCEDURE — 25010000003 DEXAMETHASONE SODIUM PHOSPHATE 100 MG/10ML SOLUTION 10 ML VIAL: Performed by: INTERNAL MEDICINE

## 2019-03-27 PROCEDURE — 99214 OFFICE O/P EST MOD 30 MIN: CPT | Performed by: INTERNAL MEDICINE

## 2019-03-27 PROCEDURE — 96367 TX/PROPH/DG ADDL SEQ IV INF: CPT

## 2019-03-27 PROCEDURE — 80053 COMPREHEN METABOLIC PANEL: CPT

## 2019-03-27 PROCEDURE — 25010000002 ONDANSETRON PER 1 MG: Performed by: INTERNAL MEDICINE

## 2019-03-27 PROCEDURE — 85025 COMPLETE CBC W/AUTO DIFF WBC: CPT

## 2019-03-27 PROCEDURE — 36415 COLL VENOUS BLD VENIPUNCTURE: CPT

## 2019-03-27 PROCEDURE — 96413 CHEMO IV INFUSION 1 HR: CPT

## 2019-03-27 RX ORDER — FAMOTIDINE 10 MG/ML
20 INJECTION, SOLUTION INTRAVENOUS AS NEEDED
Status: CANCELLED | OUTPATIENT
Start: 2019-04-04

## 2019-03-27 RX ORDER — SODIUM CHLORIDE 9 MG/ML
250 INJECTION, SOLUTION INTRAVENOUS ONCE
Status: CANCELLED | OUTPATIENT
Start: 2019-04-11

## 2019-03-27 RX ORDER — SODIUM CHLORIDE 0.9 % (FLUSH) 0.9 %
10 SYRINGE (ML) INJECTION AS NEEDED
Status: CANCELLED | OUTPATIENT
Start: 2019-03-27

## 2019-03-27 RX ORDER — SODIUM CHLORIDE 9 MG/ML
250 INJECTION, SOLUTION INTRAVENOUS ONCE
Status: CANCELLED | OUTPATIENT
Start: 2019-04-04

## 2019-03-27 RX ORDER — MEPERIDINE HYDROCHLORIDE 50 MG/ML
25 INJECTION INTRAMUSCULAR; INTRAVENOUS; SUBCUTANEOUS
Status: CANCELLED | OUTPATIENT
Start: 2019-04-11 | End: 2019-04-12

## 2019-03-27 RX ORDER — MEPERIDINE HYDROCHLORIDE 50 MG/ML
25 INJECTION INTRAMUSCULAR; INTRAVENOUS; SUBCUTANEOUS
Status: CANCELLED | OUTPATIENT
Start: 2019-04-04 | End: 2019-04-05

## 2019-03-27 RX ORDER — MEPERIDINE HYDROCHLORIDE 50 MG/ML
25 INJECTION INTRAMUSCULAR; INTRAVENOUS; SUBCUTANEOUS
Status: CANCELLED | OUTPATIENT
Start: 2019-03-27 | End: 2019-03-28

## 2019-03-27 RX ORDER — FAMOTIDINE 10 MG/ML
20 INJECTION, SOLUTION INTRAVENOUS AS NEEDED
Status: CANCELLED | OUTPATIENT
Start: 2019-04-11

## 2019-03-27 RX ORDER — SODIUM CHLORIDE 0.9 % (FLUSH) 0.9 %
10 SYRINGE (ML) INJECTION AS NEEDED
Status: DISCONTINUED | OUTPATIENT
Start: 2019-03-27 | End: 2019-03-27 | Stop reason: HOSPADM

## 2019-03-27 RX ORDER — MEPERIDINE HYDROCHLORIDE 50 MG/ML
25 INJECTION INTRAMUSCULAR; INTRAVENOUS; SUBCUTANEOUS
Status: DISCONTINUED | OUTPATIENT
Start: 2019-03-27 | End: 2019-03-27 | Stop reason: HOSPADM

## 2019-03-27 RX ORDER — SODIUM CHLORIDE 9 MG/ML
250 INJECTION, SOLUTION INTRAVENOUS ONCE
Status: CANCELLED | OUTPATIENT
Start: 2019-03-27

## 2019-03-27 RX ORDER — SODIUM CHLORIDE 9 MG/ML
250 INJECTION, SOLUTION INTRAVENOUS ONCE
Status: COMPLETED | OUTPATIENT
Start: 2019-03-27 | End: 2019-03-27

## 2019-03-27 RX ORDER — FAMOTIDINE 10 MG/ML
20 INJECTION, SOLUTION INTRAVENOUS AS NEEDED
Status: DISCONTINUED | OUTPATIENT
Start: 2019-03-27 | End: 2019-03-27 | Stop reason: HOSPADM

## 2019-03-27 RX ORDER — FAMOTIDINE 10 MG/ML
20 INJECTION, SOLUTION INTRAVENOUS AS NEEDED
Status: CANCELLED | OUTPATIENT
Start: 2019-03-27

## 2019-03-27 RX ADMIN — DOCETAXEL 60 MG: 20 INJECTION, SOLUTION, CONCENTRATE INTRAVENOUS at 12:50

## 2019-03-27 RX ADMIN — Medication 500 UNITS: at 14:14

## 2019-03-27 RX ADMIN — Medication: at 12:14

## 2019-03-27 RX ADMIN — SODIUM CHLORIDE, PRESERVATIVE FREE 10 ML: 5 INJECTION INTRAVENOUS at 14:14

## 2019-03-27 RX ADMIN — SODIUM CHLORIDE 250 ML: 9 INJECTION, SOLUTION INTRAVENOUS at 12:13

## 2019-03-27 NOTE — PROGRESS NOTES
[facility]  HEMATOLOGY & ONCOLOGY    Cancer Staging Information:  Cancer Staging  Malignant neoplasm of upper-outer quadrant of left female breast (CMS/HCC)  Staging form: Breast, AJCC V7  - Clinical stage from 10/11/2016: Stage IV (T2, N1, M1) - Signed by Calli Monsalve APRN on 10/11/2016        Subjective     VISIT DIAGNOSIS:   Encounter Diagnosis   Name Primary?   • Carcinoma of left breast metastatic to bone (CMS/HCC)        REASON FOR VISIT:     Chief Complaint   Patient presents with   • Breast Cancer     Here for consideration of chemo for tomorrow.        HEMATOLOGY / ONCOLOGY HISTORY:   Oncology/Hematology History    Patient is a 67-year-old postmenopausal female who had onset of her menses at age 9 and menopause at age 48. She received oral contraceptives for approximately 14 years and did not receive hormonal manipulation. Patient has a maternal cousin had breast cancer. Patient has had no prior breast biopsies. Patient found a palpable left breast mass as well as a left axillary mass. Patient had a bloody nipple discharge which been present for approximately 6 months. On 6/20/2012, a mammogram was performed showing a subareolar mass consistent with malignancy. Bilateral breast ultrasound revealed an ill-defined subareolar mass measuring 2.8-2.5 cm. There is a left axillary mass measuring 1.7 cm with suspicion of extracapsular extension. There is no evidence of disease in the right breast. On 7/9/2012 patient underwent a left breast biopsy and placement of marker clip. Left breast biopsy revealed an infiltrating lobular carcinoma grade 2 with focal ductal carcinoma in situ, solid pattern. A fine-needle biopsy of the left lymph node was consistent with metastatic carcinoma. Breast tumor profile revealed ER: 100%; ND: 98%; HER-2/maynor 2+; FISH: Unamplified; Ki-67: 71%; P 53 1%; DNA aneuploidy. A MRI of the breast were performed body multiple abnormal enhancing masses within the left  breast. There appears to be anterior retraction of the pectoralis muscle with no direct extension. There is a moderate to large, layering left pleural effusion appreciated. The right breast reveals 3 moderately enlarged lymph nodes in the posterior lateral aspect the right breast. These have fatty hilum but followup is recommended. Patient is undergone systemic studies including, on 8/2/2012, a CT scan of the brain showing atrophy. a CT scan that shows showing a small to moderate left pleural effusion with 3 subcentimeter nodule densities in the left lung.   She completed 4 cycles of neoadjuvant treatment with dose dense Adriamycin and Cytoxan. She had a mammogram which showed a significant reduction in the size of her left breast lesion. There is no axillary adenopathy noted. She has had an ultrasound revealing the  lesion reducing from 2.4 cm to 1 cm. Patient underwent a left mastectomy on 03/19/2013 finding negative invasive cancer, negative nodes. A 5% DCIS was noted. The patient has declined hormonal therapy. She did not need radiation therapy.  November 2014, she began to have evidence of lytic bone lesions at T5T6, frontal disease, an 8 mm lucency in the central frontal area of  the skull but nothing intracranial. Bone scan revealed only vertex tracer activity but bilateral ribs and thoracic spine, and other lesions in  her pelvis. At that time, she was started on letrozole since November 2014. She is taking Xgeva. She had progression found in bone in Feb 2015 on scan. She was started on Faslodex, Ibrance and continued xgeva.         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 B) Foci of ductal carcinoma in situ, solid pattern. C) Greatest dimension of the invasive  carcinoma is 15.8mm.         3/19/2013 Surgery     Left Mastectomy w/ Axillary Tail: Focal residual ducatal carcinoma in situ (DCIS) 12 lymph nodes negative for metastatic carcinoma.         10/20/2014 Biopsy     Peritoneum Biopsy: Final Diagnosis: Malignant Cell Neoplasm.           Breast cancer metastasized to bone, unspecified laterality (CMS/HCC)    10/15/2012 -  Other Event     Left mammogram:  Impression:  1. Decreasing spiculation and density in the retroareolar left breast, near a previous biopsy.   2. Definite left axillary lesion is not appreciated.          2/5/2013 -  Other Event     Diagnostic Mammo:  Comparison is made to 10/15/2012 and 6/20/2012  The spiculated mass at 12:00 behind the nipple is nearly resolved.   Impression:  1. The mass on the left side is less apparent after receiving chemotherapy. There has been a good response to chmotherapy on the left side.   2. No suspicious abnormality is seen in the right breast to account for the new palable abnormality at the 4-5:00 position.          3/19/2013 Surgery     Breast Biopsy:  Final Diagnosis:  A. Breast, left mastectomy with axillary tail  1. Focal residual ductal carcinoma in situ (DICS) (less than 5% of tumor bed).  2. Negative for residual invasive carcinoma.   3. 12 Lymph Nodes, negative for metastatic carcinoma (0/12) focally, some lymph nodes demonstrate fibrosis/treatment effect.  B. Skin and soft tissue, left advancement flap:  1. Benign skin and subcutis.   2. Negative for carcinoma.          6/20/2013 -  Other Event     Diagnostic Mammo [facility]:  1. A subareolar mass is consistent with malignancy. Additional involement extends inferiorly sonographically and superolaterally mammograhically as demonstrated by calcifications. The mass measures approx 2.8 cm sonographically, but the involved region is likely much larger including an intraductal componet. The left axillary lymph nodes are also involved.   2. Recommened ultrasound  guided biopsy of the left subareolar mass and left axillary lymph node.   3. Breast MRI could also further define multicentric disease on the left.   4. Clear suspicious finding on the right.  Recommend ongoing clinical follow up of palpable foci.          6/13/2014 -  Other Event     Diagnostic Mammo:  IMPRESSION:  1. History of left breast cancer and mastectomy. Stable benign right breast mammograms.          10/20/2014 Surgery     Final Diagnosis:  Biopsies, pertoneum:  Metastatic carcinoma, morphologically compatible with metastatic mammary carcinoma.          6/15/2015 -  Other Event     Diagnostic Mammo:  IMPRESSION:   1. Negative x-ray report, should not delay biopsy if a dominat or clinically suspicious mass is present.          7/5/2016 -  Other Event     Diagnositic mammogram:  Impression:   Stable right mamogram with no radiographic findings suspicious for malignancy. Recommend continued screening mammography per screening guidelines unless otherwise earlier clinically indicated.          9/18/2016 Initial Diagnosis     Secondary malignant neoplasm of bone and bone marrow                INTERVAL HISTORY  Patient ID: Heavenly Yanes is a 74 y.o. year old female Cancer Staging  Stage IV (T2, N1, M1)  --complaining of dyspnea on exersion since last Tuesday. She feels like she ran a mile  2/14/19: cxr showed pneumonia. Already on levofloxacin. Coughing not worse, she did not run temp.  Overall feels weak.  -restarted the appetite suppressant and is starting to eat more but not a whole lot  --2/28/19: CT c/ap with some response to therapy. New left chest inflammatory lesion to be followed ion next imaging. Bone scan unremarkable for progression.  3/27/19: coughing improved. denies fever. Denies /cp/n/v/, neuropathy. Rest of ros unremarkable. PE unchanged.  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) 9/18/2016     Past Surgical History:   Past Surgical History:   Procedure Laterality Date   • BREAST SURGERY      left breast biopsy and axillary node   • CHOLECYSTECTOMY     • EXTERNAL EAR SURGERY     • MASTECTOMY Left    • PORTACATH PLACEMENT       Social History:   Social History     Socioeconomic History   • Marital status:      Spouse name: Not on file   • Number of children: Not on file   • Years of education: Not on file   • Highest education level: Not on file   Tobacco Use   • Smoking status: Never Smoker   • Smokeless tobacco: Never Used   Substance and Sexual Activity   • Alcohol use: No   • Drug use: No     Family History:   Family History   Problem Relation Age of Onset   • No Known Problems Daughter    • No Known Problems Son    • Other Mother         complications from a concussion from a fall   • Other Father         old age   • No Known Problems Brother    • No Known Problems Daughter        Review of Systems   Constitutional: Positive for fatigue.   HENT: Negative.    Eyes: Negative.    Respiratory: Positive for shortness of breath.    Cardiovascular: Negative.    Gastrointestinal: Negative.    Endocrine: Negative.    Genitourinary: Negative.    Musculoskeletal: Negative.    Skin: Negative.    Neurological: Negative.    Hematological: Negative.    Psychiatric/Behavioral: Negative.         Performance Status:  Asymptomatic    Medications:    Current Outpatient Medications   Medication Sig Dispense Refill   • Calcium-Magnesium-Vitamin D (CALCIUM 1200+D3 PO) Take 1,200 mg by mouth Daily.     • capecitabine (XELODA) 500 MG chemo tablet Take 1 tablet by mouth 2 (Two) Times a Day. For 2 weeks on and 1 week off. 42 tablet 11   • megestrol (MEGACE) 40 MG tablet Take 1 tablet by mouth 2 (Two) Times a  "Day. 60 tablet 2   • metoprolol tartrate (LOPRESSOR) 25 MG tablet Take 25 mg by mouth 2 (two) times a day.     • OLANZapine (ZYPREXA) 2.5 MG tablet Take 2.5 mg by mouth every night.     • ondansetron (ZOFRAN) 8 MG tablet Take 1 tablet by mouth Every 8 (Eight) Hours As Needed for Nausea or Vomiting. (Patient taking differently: Take 8 mg by mouth Every 8 (Eight) Hours As Needed for Nausea or Vomiting.) 30 tablet 5   • raNITIdine (ZANTAC) 150 MG tablet Take 150 mg by mouth 2 (Two) Times a Day.     • rivaroxaban (XARELTO) 20 MG tablet Take 1 tablet by mouth Daily. 30 tablet 5   • thyroid 60 MG PO tablet Take 60 mg by mouth daily.       No current facility-administered medications for this visit.      Facility-Administered Medications Ordered in Other Visits   Medication Dose Route Frequency Provider Last Rate Last Dose   • heparin flush (porcine) 100 UNIT/ML injection 500 Units  500 Units Intravenous PRN Joseph Nunez MD   500 Units at 10/26/17 1155   • sodium chloride 0.9 % flush 10 mL  10 mL Intravenous PRN Joseph Nunez MD   10 mL at 10/26/17 1155       ALLERGIES:    Allergies   Allergen Reactions   • Benadryl  [Diphenhydramine Hcl (Sleep)] Other (See Comments)   • Codeine    • Diphenhydramine Other (See Comments)     \"Shaky leg syndrome\"  \"Shaky leg syndrome\"  \"Shaky leg syndrome\"   • Heparin    • Other      POPPY SEED   • Penicillins Hives   • Petroleum Jelly [Skin Protectants, Misc.]    • Sulfa Antibiotics    • Sulfur    • Valium [Diazepam]        Objective      Vitals:    03/27/19 0928   BP: 148/68   Pulse: 64   Resp: 16   Temp: 97.7 °F (36.5 °C)   TempSrc: Oral   SpO2: 97%   Weight: 60.7 kg (133 lb 12.8 oz)   Height: 165.1 cm (65\")         Current Status 3/7/2019   ECOG score 2         Physical Exam  unchanged  General Appearance: Patient is awake, alert, oriented and in no acute distress. Patient is welldeveloped, wellnourished, and appears stated age.  HEENT: Normocephalic. Sclerae clear, conjunctiva pink, " extraocular movements intact, pupils, round, reactive to light and  accommodation. Mouth and throat are clear with moist oral mucosa.  NECK: Supple, no jugular venous distention, thyroid not enlarged.  LYMPH: No cervical, supraclavicular, axillary, or inguinal lymphadenopathy.  CHEST: Equal bilateral expansion, AP  diameter normal, resonant percussion note  LUNGS: Good air movement, no rales, rhonchi, rubs or wheezes with auscultation on the right. Diminished on the left.  CARDIO: Regular sinus rhythm, no murmurs, gallops or rubs.  ABDOMEN: Nondistended, soft, No tenderness, no guarding, no rebound, No hepatosplenomegaly. No abdominal masses. Bowel sounds positive. No hernia  GENITALIA: Not examined.  BREASTS: Not examined.  MUSKEL: No joint swelling, decreased motion, or inflammation  EXTREMS: patrick le  edema,No clubbing, cyanosis, No varicose veins.  NEURO: Grossly nonfocal, Gait is coordinated and smooth, Cognition is preserved.  SKIN: No rashes, no ecchymoses, no petechia.  PSYCH: Oriented to time, place and person. Memory is preserved. Mood and affect appear normal  RECENT LABS:  Lab on 03/27/2019   Component Date Value Ref Range Status   • Glucose 03/27/2019 106* 70 - 100 mg/dL Final   • BUN 03/27/2019 8  5 - 21 mg/dL Final   • Creatinine 03/27/2019 0.96  0.50 - 1.40 mg/dL Final   • Sodium 03/27/2019 137  135 - 145 mmol/L Final   • Potassium 03/27/2019 3.2* 3.5 - 5.3 mmol/L Final   • Chloride 03/27/2019 101  98 - 110 mmol/L Final   • CO2 03/27/2019 28.0  24.0 - 31.0 mmol/L Final   • Calcium 03/27/2019 9.3  8.4 - 10.4 mg/dL Final   • Total Protein 03/27/2019 6.8  6.3 - 8.7 g/dL Final   • Albumin 03/27/2019 3.90  3.50 - 5.00 g/dL Final   • ALT (SGPT) 03/27/2019 <15  0 - 54 U/L Final   • AST (SGOT) 03/27/2019 26  7 - 45 U/L Final   • Alkaline Phosphatase 03/27/2019 62  24 - 120 U/L Final   • Total Bilirubin 03/27/2019 0.4  0.1 - 1.0 mg/dL Final   • eGFR Non African Amer 03/27/2019 57* >60 mL/min/1.73 Final   •  Globulin 03/27/2019 2.9  gm/dL Final   • A/G Ratio 03/27/2019 1.3  1.1 - 2.5 g/dL Final   • BUN/Creatinine Ratio 03/27/2019 8.3  7.0 - 25.0 Final   • Anion Gap 03/27/2019 8.0  4.0 - 13.0 mmol/L Final   • WBC 03/27/2019 5.45  4.80 - 10.80 10*3/mm3 Final   • RBC 03/27/2019 3.29* 4.20 - 5.40 10*6/mm3 Final   • Hemoglobin 03/27/2019 11.0* 12.0 - 16.0 g/dL Final   • Hematocrit 03/27/2019 33.8* 37.0 - 47.0 % Final   • MCV 03/27/2019 102.7* 82.0 - 98.0 fL Final   • MCH 03/27/2019 33.4* 28.0 - 32.0 pg Final   • MCHC 03/27/2019 32.5* 33.0 - 36.0 g/dL Final   • RDW 03/27/2019 15.9* 12.0 - 15.0 % Final   • RDW-SD 03/27/2019 58.7* 40.0 - 54.0 fl Final   • MPV 03/27/2019 9.4  6.0 - 12.0 fL Final   • Platelets 03/27/2019 432* 130 - 400 10*3/mm3 Final   • Neutrophil % 03/27/2019 53.7  39.0 - 78.0 % Final   • Lymphocyte % 03/27/2019 34.7  15.0 - 45.0 % Final   • Monocyte % 03/27/2019 10.3  4.0 - 12.0 % Final   • Eosinophil % 03/27/2019 0.0  0.0 - 4.0 % Final   • Basophil % 03/27/2019 0.7  0.0 - 2.0 % Final   • Immature Grans % 03/27/2019 0.6  0.0 - 5.0 % Final   • Neutrophils, Absolute 03/27/2019 2.93  1.87 - 8.40 10*3/mm3 Final   • Lymphocytes, Absolute 03/27/2019 1.89  0.72 - 4.86 10*3/mm3 Final   • Monocytes, Absolute 03/27/2019 0.56  0.19 - 1.30 10*3/mm3 Final   • Eosinophils, Absolute 03/27/2019 0.00  0.00 - 0.70 10*3/mm3 Final   • Basophils, Absolute 03/27/2019 0.04  0.00 - 0.20 10*3/mm3 Final   • Immature Grans, Absolute 03/27/2019 0.03  0.00 - 0.05 10*3/mm3 Final   • nRBC 03/27/2019 0.0  0.0 - 0.0 /100 WBC Final   • Extra Tube 03/27/2019 Hold for add-ons.   Final    Auto resulted.   • Extra Tube 03/27/2019 Hold for add-ons.   Final    Auto resulted.       RADIOLOGY:  No results found.         Assessment/Plan  Heavenly Yanes is a 74 y.o. year old female with met breast cancer s/p multiple lines of CMT currently on taxotere weekly and xeloda with good tolerance.    Patient Active Problem List   Diagnosis   • Malignant  neoplasm of upper-outer quadrant of left female breast (CMS/HCC)   • Breast cancer metastasized to bone, unspecified laterality (CMS/HCC)   • Essential hypertension   • Acquired hypothyroidism   • Bipolar 1 disorder (CMS/HCC)   • Carcinoma of left breast metastatic to bone (CMS/HCC)   • Antineoplastic chemotherapy induced anemia   • Dehydration   • Encounter for administration of vaccine   • Chemotherapy induced neutropenia (CMS/HCC)   • Cough   • Port-A-Cath in place          1.Metastatic breast ca: currently on taxotere 3weeks on 1 week off.  --also on xeloda  --CT c/a/p 2/19 showed stable dz. Some new Inflammatory opacities in the LEFT lower lobe and bilateral subpleural interstitial thickening.   -bone scan without evidence of dz progression  --primo taxotere weekly. Pt also on xeloda [id number], then repeat.  --labs reviewed wbc 3.31, Hg 9.5, plt 420. Ok for treatment tomorrow.    2. Psych: on olanzapine    3. HTN: on metoprolol    4. Gerd: on ranitidine  5. Dyspnea: get a cxr to evaluate  6. Anemia: will check iron profile, ferritin. Her creatinine is normal at 0.94.   7. DVT on xarelto indefinite.  --Doppler left LE  1/30/19 There is evidence of deep venous thrombosis of the left lower extremity. The clot burden is improved from last study. There is also evidence of superficial clot in the greater saphenous vein. There is a Baker's cyst in the popliteal fossa.  8. CHF: LVEF 65% with diastolic dysfunction. referral to cardiology to maximized her heart function.  9. Thrombocytosis: suspect reactive from anemia. Will check iron profile.  10.left lung pneumonia:  --cxr showed pneumonia. Pt already on levofloxacin. Will complete 14 days.   11: LE edema: give 20mg lasix once today    Moise Sotelo MD    3/27/2019    11:19 AM

## 2019-04-04 ENCOUNTER — LAB (OUTPATIENT)
Dept: LAB | Facility: HOSPITAL | Age: 74
End: 2019-04-04

## 2019-04-04 ENCOUNTER — INFUSION (OUTPATIENT)
Dept: ONCOLOGY | Facility: HOSPITAL | Age: 74
End: 2019-04-04

## 2019-04-04 ENCOUNTER — OFFICE VISIT (OUTPATIENT)
Dept: ONCOLOGY | Facility: CLINIC | Age: 74
End: 2019-04-04

## 2019-04-04 VITALS
TEMPERATURE: 98.5 F | BODY MASS INDEX: 21.99 KG/M2 | OXYGEN SATURATION: 100 % | HEIGHT: 65 IN | DIASTOLIC BLOOD PRESSURE: 52 MMHG | RESPIRATION RATE: 18 BRPM | SYSTOLIC BLOOD PRESSURE: 117 MMHG | HEART RATE: 70 BPM | WEIGHT: 132 LBS

## 2019-04-04 VITALS
OXYGEN SATURATION: 94 % | TEMPERATURE: 98.1 F | HEART RATE: 72 BPM | SYSTOLIC BLOOD PRESSURE: 112 MMHG | DIASTOLIC BLOOD PRESSURE: 68 MMHG | WEIGHT: 131.8 LBS | BODY MASS INDEX: 21.96 KG/M2 | HEIGHT: 65 IN | RESPIRATION RATE: 16 BRPM

## 2019-04-04 DIAGNOSIS — C50.912 CARCINOMA OF LEFT BREAST METASTATIC TO BONE (HCC): Primary | ICD-10-CM

## 2019-04-04 DIAGNOSIS — C50.919 BREAST CANCER METASTASIZED TO BONE, UNSPECIFIED LATERALITY (HCC): ICD-10-CM

## 2019-04-04 DIAGNOSIS — C79.51 BREAST CANCER METASTASIZED TO BONE, UNSPECIFIED LATERALITY (HCC): ICD-10-CM

## 2019-04-04 DIAGNOSIS — C79.51 CARCINOMA OF LEFT BREAST METASTATIC TO BONE (HCC): Primary | ICD-10-CM

## 2019-04-04 DIAGNOSIS — C50.412 MALIGNANT NEOPLASM OF UPPER-OUTER QUADRANT OF LEFT FEMALE BREAST, UNSPECIFIED ESTROGEN RECEPTOR STATUS (HCC): ICD-10-CM

## 2019-04-04 DIAGNOSIS — C79.51 CARCINOMA OF LEFT BREAST METASTATIC TO BONE (HCC): ICD-10-CM

## 2019-04-04 DIAGNOSIS — Z95.828 PORT-A-CATH IN PLACE: ICD-10-CM

## 2019-04-04 DIAGNOSIS — C50.912 CARCINOMA OF LEFT BREAST METASTATIC TO BONE (HCC): ICD-10-CM

## 2019-04-04 LAB
ALBUMIN SERPL-MCNC: 3.8 G/DL (ref 3.5–5)
ALBUMIN/GLOB SERPL: 1.5 G/DL (ref 1.1–2.5)
ALP SERPL-CCNC: 53 U/L (ref 24–120)
ALT SERPL W P-5'-P-CCNC: <15 U/L (ref 0–54)
ANION GAP SERPL CALCULATED.3IONS-SCNC: 8 MMOL/L (ref 4–13)
ANISOCYTOSIS BLD QL: ABNORMAL
AST SERPL-CCNC: 27 U/L (ref 7–45)
BASOPHILS # BLD MANUAL: 0.04 10*3/MM3 (ref 0–0.2)
BASOPHILS NFR BLD AUTO: 1 % (ref 0–2)
BILIRUB SERPL-MCNC: 0.4 MG/DL (ref 0.1–1)
BUN BLD-MCNC: 8 MG/DL (ref 5–21)
BUN/CREAT SERPL: 9 (ref 7–25)
CALCIUM SPEC-SCNC: 9.5 MG/DL (ref 8.4–10.4)
CHLORIDE SERPL-SCNC: 96 MMOL/L (ref 98–110)
CO2 SERPL-SCNC: 31 MMOL/L (ref 24–31)
CREAT BLD-MCNC: 0.89 MG/DL (ref 0.5–1.4)
DEPRECATED RDW RBC AUTO: 58.5 FL (ref 40–54)
EOSINOPHIL # BLD MANUAL: 0.04 10*3/MM3 (ref 0–0.7)
EOSINOPHIL NFR BLD MANUAL: 1 % (ref 0–4)
ERYTHROCYTE [DISTWIDTH] IN BLOOD BY AUTOMATED COUNT: 16 % (ref 12–15)
GFR SERPL CREATININE-BSD FRML MDRD: 62 ML/MIN/1.73
GIANT PLATELETS: ABNORMAL
GLOBULIN UR ELPH-MCNC: 2.5 GM/DL
GLUCOSE BLD-MCNC: 98 MG/DL (ref 70–100)
HCT VFR BLD AUTO: 33.5 % (ref 37–47)
HGB BLD-MCNC: 11.2 G/DL (ref 12–16)
HOLD SPECIMEN: NORMAL
HOLD SPECIMEN: NORMAL
HYPOCHROMIA BLD QL: ABNORMAL
LYMPHOCYTES # BLD MANUAL: 1.37 10*3/MM3 (ref 0.72–4.86)
LYMPHOCYTES NFR BLD MANUAL: 36.5 % (ref 15–45)
LYMPHOCYTES NFR BLD MANUAL: 7.3 % (ref 4–12)
MACROCYTES BLD QL SMEAR: ABNORMAL
MCH RBC QN AUTO: 34.1 PG (ref 28–32)
MCHC RBC AUTO-ENTMCNC: 33.4 G/DL (ref 33–36)
MCV RBC AUTO: 102.1 FL (ref 82–98)
MONOCYTES # BLD AUTO: 0.27 10*3/MM3 (ref 0.19–1.3)
NEUTROPHILS # BLD AUTO: 1.56 10*3/MM3 (ref 1.87–8.4)
NEUTROPHILS NFR BLD MANUAL: 41.7 % (ref 39–78)
PLATELET # BLD AUTO: 423 10*3/MM3 (ref 130–400)
PMV BLD AUTO: 10 FL (ref 6–12)
POTASSIUM BLD-SCNC: 3.5 MMOL/L (ref 3.5–5.3)
PROT SERPL-MCNC: 6.3 G/DL (ref 6.3–8.7)
RBC # BLD AUTO: 3.28 10*6/MM3 (ref 4.2–5.4)
SMALL PLATELETS BLD QL SMEAR: ABNORMAL
SODIUM BLD-SCNC: 135 MMOL/L (ref 135–145)
VARIANT LYMPHS NFR BLD MANUAL: 12.5 % (ref 0–5)
WBC MORPH BLD: NORMAL
WBC NRBC COR # BLD: 3.74 10*3/MM3 (ref 4.8–10.8)

## 2019-04-04 PROCEDURE — 25010000002 ONDANSETRON PER 1 MG: Performed by: INTERNAL MEDICINE

## 2019-04-04 PROCEDURE — 36415 COLL VENOUS BLD VENIPUNCTURE: CPT

## 2019-04-04 PROCEDURE — 85025 COMPLETE CBC W/AUTO DIFF WBC: CPT | Performed by: INTERNAL MEDICINE

## 2019-04-04 PROCEDURE — 99214 OFFICE O/P EST MOD 30 MIN: CPT | Performed by: INTERNAL MEDICINE

## 2019-04-04 PROCEDURE — 25010000002 DEXAMETHASONE PER 1 MG: Performed by: INTERNAL MEDICINE

## 2019-04-04 PROCEDURE — 96413 CHEMO IV INFUSION 1 HR: CPT

## 2019-04-04 PROCEDURE — 25010000002 DOCETAXEL 20 MG/ML SOLUTION 4 ML VIAL: Performed by: INTERNAL MEDICINE

## 2019-04-04 PROCEDURE — 80053 COMPREHEN METABOLIC PANEL: CPT | Performed by: INTERNAL MEDICINE

## 2019-04-04 PROCEDURE — 96367 TX/PROPH/DG ADDL SEQ IV INF: CPT

## 2019-04-04 PROCEDURE — 85007 BL SMEAR W/DIFF WBC COUNT: CPT | Performed by: INTERNAL MEDICINE

## 2019-04-04 RX ORDER — MEPERIDINE HYDROCHLORIDE 50 MG/ML
25 INJECTION INTRAMUSCULAR; INTRAVENOUS; SUBCUTANEOUS
Status: DISCONTINUED | OUTPATIENT
Start: 2019-04-04 | End: 2019-04-04 | Stop reason: HOSPADM

## 2019-04-04 RX ORDER — SODIUM CHLORIDE 0.9 % (FLUSH) 0.9 %
10 SYRINGE (ML) INJECTION AS NEEDED
Status: DISCONTINUED | OUTPATIENT
Start: 2019-04-04 | End: 2019-04-04 | Stop reason: HOSPADM

## 2019-04-04 RX ORDER — FAMOTIDINE 10 MG/ML
20 INJECTION, SOLUTION INTRAVENOUS AS NEEDED
Status: DISCONTINUED | OUTPATIENT
Start: 2019-04-04 | End: 2019-04-04 | Stop reason: HOSPADM

## 2019-04-04 RX ORDER — SODIUM CHLORIDE 9 MG/ML
250 INJECTION, SOLUTION INTRAVENOUS ONCE
Status: COMPLETED | OUTPATIENT
Start: 2019-04-04 | End: 2019-04-04

## 2019-04-04 RX ORDER — ONDANSETRON HYDROCHLORIDE 8 MG/1
8 TABLET, FILM COATED ORAL EVERY 8 HOURS PRN
Qty: 30 TABLET | Refills: 5 | Status: SHIPPED | OUTPATIENT
Start: 2019-04-04

## 2019-04-04 RX ORDER — SODIUM CHLORIDE 0.9 % (FLUSH) 0.9 %
10 SYRINGE (ML) INJECTION AS NEEDED
Status: CANCELLED | OUTPATIENT
Start: 2019-04-04

## 2019-04-04 RX ORDER — MEGESTROL ACETATE 40 MG/1
40 TABLET ORAL 2 TIMES DAILY
Qty: 60 TABLET | Refills: 5 | Status: SHIPPED | OUTPATIENT
Start: 2019-04-04

## 2019-04-04 RX ADMIN — SODIUM CHLORIDE, PRESERVATIVE FREE 10 ML: 5 INJECTION INTRAVENOUS at 11:38

## 2019-04-04 RX ADMIN — SODIUM CHLORIDE 250 ML: 9 INJECTION, SOLUTION INTRAVENOUS at 10:04

## 2019-04-04 RX ADMIN — DEXAMETHASONE SODIUM PHOSPHATE: 4 INJECTION, SOLUTION INTRAMUSCULAR; INTRAVENOUS at 10:04

## 2019-04-04 RX ADMIN — Medication 500 UNITS: at 11:38

## 2019-04-04 RX ADMIN — DOCETAXEL 60 MG: 20 INJECTION, SOLUTION, CONCENTRATE INTRAVENOUS at 10:25

## 2019-04-04 NOTE — PROGRESS NOTES
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   • Carcinoma of left breast metastatic to bone (CMS/HCC)        REASON FOR VISIT:     Chief Complaint   Patient presents with   • Breast Cancer     Here for treatment   • Decreased appetite     would like somethingto help her eat.   • Nausea     need snausea meds        HEMATOLOGY / ONCOLOGY HISTORY:   Oncology/Hematology History    Patient is a 67-year-old postmenopausal female who had onset of her menses at age 9 and menopause at age 48. She received oral contraceptives for approximately 14 years and did not receive hormonal manipulation. Patient has a maternal cousin had breast cancer. Patient has had no prior breast biopsies. Patient found a palpable left breast mass as well as a left axillary mass. Patient had a bloody nipple discharge which been present for approximately 6 months. On 6/20/2012, a mammogram was performed showing a subareolar mass consistent with malignancy. Bilateral breast ultrasound revealed an ill-defined subareolar mass measuring 2.8-2.5 cm. There is a left axillary mass measuring 1.7 cm with suspicion of extracapsular extension. There is no evidence of disease in the right breast. On 7/9/2012 patient underwent a left breast biopsy and placement of marker clip. Left breast biopsy revealed an infiltrating lobular carcinoma grade 2 with focal ductal carcinoma in situ, solid pattern. A fine-needle biopsy of the left lymph node was consistent with metastatic carcinoma. Breast tumor profile revealed ER: 100%; OR: 98%; HER-2/maynor 2+; FISH: Unamplified; Ki-67: 71%; P 53 1%; DNA aneuploidy. A MRI of the breast  were performed body multiple abnormal enhancing masses within the left breast. There appears to be anterior retraction of the pectoralis muscle with no direct extension. There is a moderate to large, layering left pleural effusion appreciated. The right breast reveals 3 moderately enlarged lymph nodes in the posterior lateral aspect the right breast. These have fatty hilum but followup is recommended. Patient is undergone systemic studies including, on 8/2/2012, a CT scan of the brain showing atrophy. a CT scan that shows showing a small to moderate left pleural effusion with 3 subcentimeter nodule densities in the left lung.   She completed 4 cycles of neoadjuvant treatment with dose dense Adriamycin and Cytoxan. She had a mammogram which showed a significant reduction in the size of her left breast lesion. There is no axillary adenopathy noted. She has had an ultrasound revealing the  lesion reducing from 2.4 cm to 1 cm. Patient underwent a left mastectomy on 03/19/2013 finding negative invasive cancer, negative nodes. A 5% DCIS was noted. The patient has declined hormonal therapy. She did not need radiation therapy.  November 2014, she began to have evidence of lytic bone lesions at T5T6, frontal disease, an 8 mm lucency in the central frontal area of  the skull but nothing intracranial. Bone scan revealed only vertex tracer activity but bilateral ribs and thoracic spine, and other lesions in  her pelvis. At that time, she was started on letrozole since November 2014. She is taking Xgeva. She had progression found in bone in Feb 2015 on scan. She was started on Faslodex, Ibrance and continued xgeva.         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 B) Foci of ductal  carcinoma in situ, solid pattern. C) Greatest dimension of the invasive carcinoma is 15.8mm.         3/19/2013 Surgery     Left Mastectomy w/ Axillary Tail: Focal residual ducatal carcinoma in situ (DCIS) 12 lymph nodes negative for metastatic carcinoma.         10/20/2014 Biopsy     Peritoneum Biopsy: Final Diagnosis: Malignant Cell Neoplasm.           Breast cancer metastasized to bone, unspecified laterality (CMS/HCC)    10/15/2012 -  Other Event     Left mammogram:  Impression:  1. Decreasing spiculation and density in the retroareolar left breast, near a previous biopsy.   2. Definite left axillary lesion is not appreciated.          2/5/2013 -  Other Event     Diagnostic Mammo:  Comparison is made to 10/15/2012 and 6/20/2012  The spiculated mass at 12:00 behind the nipple is nearly resolved.   Impression:  1. The mass on the left side is less apparent after receiving chemotherapy. There has been a good response to chmotherapy on the left side.   2. No suspicious abnormality is seen in the right breast to account for the new palable abnormality at the 4-5:00 position.          3/19/2013 Surgery     Breast Biopsy:  Final Diagnosis:  A. Breast, left mastectomy with axillary tail  1. Focal residual ductal carcinoma in situ (DICS) (less than 5% of tumor bed).  2. Negative for residual invasive carcinoma.   3. 12 Lymph Nodes, negative for metastatic carcinoma (0/12) focally, some lymph nodes demonstrate fibrosis/treatment effect.  B. Skin and soft tissue, left advancement flap:  1. Benign skin and subcutis.   2. Negative for carcinoma.          6/20/2013 -  Other Event     Diagnostic Mammo [facility]:  1. A subareolar mass is consistent with malignancy. Additional involement extends inferiorly sonographically and superolaterally mammograhically as demonstrated by calcifications. The mass measures approx 2.8 cm sonographically, but the involved region is likely much larger including an intraductal componet. The left  axillary lymph nodes are also involved.   2. Recommened ultrasound guided biopsy of the left subareolar mass and left axillary lymph node.   3. Breast MRI could also further define multicentric disease on the left.   4. Clear suspicious finding on the right.  Recommend ongoing clinical follow up of palpable foci.          6/13/2014 -  Other Event     Diagnostic Mammo:  IMPRESSION:  1. History of left breast cancer and mastectomy. Stable benign right breast mammograms.          10/20/2014 Surgery     Final Diagnosis:  Biopsies, pertoneum:  Metastatic carcinoma, morphologically compatible with metastatic mammary carcinoma.          6/15/2015 -  Other Event     Diagnostic Mammo:  IMPRESSION:   1. Negative x-ray report, should not delay biopsy if a dominat or clinically suspicious mass is present.          7/5/2016 -  Other Event     Diagnositic mammogram:  Impression:   Stable right mamogram with no radiographic findings suspicious for malignancy. Recommend continued screening mammography per screening guidelines unless otherwise earlier clinically indicated.          9/18/2016 Initial Diagnosis     Secondary malignant neoplasm of bone and bone marrow                INTERVAL HISTORY  Patient ID: Heavenly Yanes is a 74 y.o. year old female Cancer Staging  Stage IV (T2, N1, M1)  --complaining of dyspnea on exersion since last Tuesday. She feels like she ran a mile  2/14/19: cxr showed pneumonia. Already on levofloxacin. Coughing not worse, she did not run temp.  Overall feels weak.  -restarted the appetite suppressant and is starting to eat more but not a whole lot  --2/28/19: CT c/ap with some response to therapy. New left chest inflammatory lesion to be followed ion next imaging. Bone scan unremarkable for progression.  4/4/19: coughing improved. denies fever. Denies /cp/n/v/, neuropathy/ no focal weakness.   Rest of ros unremarkable. 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) 9/18/2016     Past Surgical History:   Past Surgical History:   Procedure Laterality Date   • BREAST SURGERY      left breast biopsy and axillary node   • CHOLECYSTECTOMY     • EXTERNAL EAR SURGERY     • MASTECTOMY Left    • PORTACATH PLACEMENT       Social History:   Social History     Socioeconomic History   • Marital status:      Spouse name: Not on file   • Number of children: Not on file   • Years of education: Not on file   • Highest education level: Not on file   Tobacco Use   • Smoking status: Never Smoker   • Smokeless tobacco: Never Used   Substance and Sexual Activity   • Alcohol use: No   • Drug use: No     Family History:   Family History   Problem Relation Age of Onset   • No Known Problems Daughter    • No Known Problems Son    • Other Mother         complications from a concussion from a fall   • Other Father         old age   • No Known Problems Brother    • No Known Problems Daughter        Review of Systems   Constitutional: Positive for fatigue.   HENT: Negative.    Eyes: Negative.    Respiratory: Positive for shortness of breath.    Cardiovascular: Negative.    Gastrointestinal: Negative.    Endocrine: Negative.    Genitourinary: Negative.    Musculoskeletal: Negative.    Skin: Negative.    Neurological: Negative.    Hematological: Negative.    Psychiatric/Behavioral: Negative.         Performance Status:  Asymptomatic    Medications:    Current Outpatient Medications   Medication Sig Dispense Refill   • Calcium-Magnesium-Vitamin D (CALCIUM 1200+D3 PO) Take 1,200 mg by mouth Daily.     • capecitabine (XELODA) 500 MG chemo tablet Take 1 tablet by mouth 2 (Two) Times a Day. For 2 weeks on and 1 week  "off. 42 tablet 11   • megestrol (MEGACE) 40 MG tablet Take 1 tablet by mouth 2 (Two) Times a Day. 60 tablet 5   • metoprolol tartrate (LOPRESSOR) 25 MG tablet Take 25 mg by mouth 2 (two) times a day.     • OLANZapine (ZYPREXA) 2.5 MG tablet Take 2.5 mg by mouth every night.     • ondansetron (ZOFRAN) 8 MG tablet Take 1 tablet by mouth Every 8 (Eight) Hours As Needed for Nausea or Vomiting. 30 tablet 5   • raNITIdine (ZANTAC) 150 MG tablet Take 150 mg by mouth 2 (Two) Times a Day.     • rivaroxaban (XARELTO) 20 MG tablet Take 1 tablet by mouth Daily. 30 tablet 5   • thyroid 60 MG PO tablet Take 60 mg by mouth daily.       No current facility-administered medications for this visit.      Facility-Administered Medications Ordered in Other Visits   Medication Dose Route Frequency Provider Last Rate Last Dose   • heparin flush (porcine) 100 UNIT/ML injection 500 Units  500 Units Intravenous PRN Joseph Nunez MD   500 Units at 10/26/17 1155   • sodium chloride 0.9 % flush 10 mL  10 mL Intravenous PRN Joseph Nunez MD   10 mL at 10/26/17 1155       ALLERGIES:    Allergies   Allergen Reactions   • Benadryl  [Diphenhydramine Hcl (Sleep)] Other (See Comments)   • Codeine    • Diphenhydramine Other (See Comments)     \"Shaky leg syndrome\"  \"Shaky leg syndrome\"  \"Shaky leg syndrome\"   • Heparin    • Other      POPPY SEED   • Penicillins Hives   • Petroleum Jelly [Skin Protectants, Misc.]    • Sulfa Antibiotics    • Sulfur    • Valium [Diazepam]        Objective      Vitals:    04/04/19 0845   BP: 112/68   Pulse: 72   Resp: 16   Temp: 98.1 °F (36.7 °C)   TempSrc: Tympanic   SpO2: 94%   Weight: 59.8 kg (131 lb 12.8 oz)   Height: 165.1 cm (65\")   PainSc: 0-No pain         Current Status 4/4/2019   ECOG score 1         Physical Exam  unchanged  General Appearance: Patient is awake, alert, oriented and in no acute distress. Patient is welldeveloped, wellnourished, and appears stated age.  HEENT: Normocephalic. Sclerae clear, " conjunctiva pink, extraocular movements intact, pupils, round, reactive to light and  accommodation. Mouth and throat are clear with moist oral mucosa.  NECK: Supple, no jugular venous distention, thyroid not enlarged.  LYMPH: No cervical, supraclavicular, axillary, or inguinal lymphadenopathy.  CHEST: Equal bilateral expansion, AP  diameter normal, resonant percussion note  LUNGS: Good air movement, no rales, rhonchi, rubs or wheezes with auscultation on the right. Diminished on the left.  CARDIO: Regular sinus rhythm, no murmurs, gallops or rubs.  ABDOMEN: Nondistended, soft, No tenderness, no guarding, no rebound, No hepatosplenomegaly. No abdominal masses. Bowel sounds positive. No hernia  GENITALIA: Not examined.  BREASTS: Not examined.  MUSKEL: No joint swelling, decreased motion, or inflammation  EXTREMS: patrick le  edema,No clubbing, cyanosis, No varicose veins.  NEURO: Grossly nonfocal, Gait is coordinated and smooth, Cognition is preserved.  SKIN: No rashes, no ecchymoses, no petechia.  PSYCH: Oriented to time, place and person. Memory is preserved. Mood and affect appear normal  RECENT LABS:  No visits with results within 7 Day(s) from this visit.   Latest known visit with results is:   Lab on 03/27/2019   Component Date Value Ref Range Status   • Glucose 03/27/2019 106* 70 - 100 mg/dL Final   • BUN 03/27/2019 8  5 - 21 mg/dL Final   • Creatinine 03/27/2019 0.96  0.50 - 1.40 mg/dL Final   • Sodium 03/27/2019 137  135 - 145 mmol/L Final   • Potassium 03/27/2019 3.2* 3.5 - 5.3 mmol/L Final   • Chloride 03/27/2019 101  98 - 110 mmol/L Final   • CO2 03/27/2019 28.0  24.0 - 31.0 mmol/L Final   • Calcium 03/27/2019 9.3  8.4 - 10.4 mg/dL Final   • Total Protein 03/27/2019 6.8  6.3 - 8.7 g/dL Final   • Albumin 03/27/2019 3.90  3.50 - 5.00 g/dL Final   • ALT (SGPT) 03/27/2019 <15  0 - 54 U/L Final   • AST (SGOT) 03/27/2019 26  7 - 45 U/L Final   • Alkaline Phosphatase 03/27/2019 62  24 - 120 U/L Final   • Total  Bilirubin 03/27/2019 0.4  0.1 - 1.0 mg/dL Final   • eGFR Non African Amer 03/27/2019 57* >60 mL/min/1.73 Final   • Globulin 03/27/2019 2.9  gm/dL Final   • A/G Ratio 03/27/2019 1.3  1.1 - 2.5 g/dL Final   • BUN/Creatinine Ratio 03/27/2019 8.3  7.0 - 25.0 Final   • Anion Gap 03/27/2019 8.0  4.0 - 13.0 mmol/L Final   • WBC 03/27/2019 5.45  4.80 - 10.80 10*3/mm3 Final   • RBC 03/27/2019 3.29* 4.20 - 5.40 10*6/mm3 Final   • Hemoglobin 03/27/2019 11.0* 12.0 - 16.0 g/dL Final   • Hematocrit 03/27/2019 33.8* 37.0 - 47.0 % Final   • MCV 03/27/2019 102.7* 82.0 - 98.0 fL Final   • MCH 03/27/2019 33.4* 28.0 - 32.0 pg Final   • MCHC 03/27/2019 32.5* 33.0 - 36.0 g/dL Final   • RDW 03/27/2019 15.9* 12.0 - 15.0 % Final   • RDW-SD 03/27/2019 58.7* 40.0 - 54.0 fl Final   • MPV 03/27/2019 9.4  6.0 - 12.0 fL Final   • Platelets 03/27/2019 432* 130 - 400 10*3/mm3 Final   • Neutrophil % 03/27/2019 53.7  39.0 - 78.0 % Final   • Lymphocyte % 03/27/2019 34.7  15.0 - 45.0 % Final   • Monocyte % 03/27/2019 10.3  4.0 - 12.0 % Final   • Eosinophil % 03/27/2019 0.0  0.0 - 4.0 % Final   • Basophil % 03/27/2019 0.7  0.0 - 2.0 % Final   • Immature Grans % 03/27/2019 0.6  0.0 - 5.0 % Final   • Neutrophils, Absolute 03/27/2019 2.93  1.87 - 8.40 10*3/mm3 Final   • Lymphocytes, Absolute 03/27/2019 1.89  0.72 - 4.86 10*3/mm3 Final   • Monocytes, Absolute 03/27/2019 0.56  0.19 - 1.30 10*3/mm3 Final   • Eosinophils, Absolute 03/27/2019 0.00  0.00 - 0.70 10*3/mm3 Final   • Basophils, Absolute 03/27/2019 0.04  0.00 - 0.20 10*3/mm3 Final   • Immature Grans, Absolute 03/27/2019 0.03  0.00 - 0.05 10*3/mm3 Final   • nRBC 03/27/2019 0.0  0.0 - 0.0 /100 WBC Final   • Extra Tube 03/27/2019 Hold for add-ons.   Final    Auto resulted.   • Extra Tube 03/27/2019 Hold for add-ons.   Final    Auto resulted.       RADIOLOGY:  No results found.         Assessment/Plan  Heavenly Yanes is a 74 y.o. year old female with met breast cancer s/p multiple lines of CMT currently  on taxotere weekly and xeloda with good tolerance.    Patient Active Problem List   Diagnosis   • Malignant neoplasm of upper-outer quadrant of left female breast (CMS/HCC)   • Breast cancer metastasized to bone, unspecified laterality (CMS/HCC)   • Essential hypertension   • Acquired hypothyroidism   • Bipolar 1 disorder (CMS/HCC)   • Carcinoma of left breast metastatic to bone (CMS/HCC)   • Antineoplastic chemotherapy induced anemia   • Dehydration   • Encounter for administration of vaccine   • Chemotherapy induced neutropenia (CMS/HCC)   • Cough   • Port-A-Cath in place          1.Metastatic breast ca: currently on taxotere 3weeks on 1 week off.  --also on xeloda  --CT c/a/p 2/19 showed stable dz. Some new Inflammatory opacities in the LEFT lower lobe and bilateral subpleural interstitial thickening.   -bone scan without evidence of dz progression  --primo taxotere weekly. Pt also on xeloda [id number], then repeat.  --labs reviewed with pt, wbc 3.74, Hg 11.2, plt 423. Ok for treatment tomorrow.    2. Psych: on olanzapine    3. HTN: on metoprolol    4. Gerd: on ranitidine  5. Dyspnea: get a cxr to evaluate  6. Anemia: will check iron profile, ferritin. Her creatinine is normal at 0.94.   7. DVT on xarelto indefinite.  --Doppler left LE  1/30/19 There is evidence of deep venous thrombosis of the left lower extremity. The clot burden is improved from last study. There is also evidence of superficial clot in the greater saphenous vein. There is a Baker's cyst in the popliteal fossa.  8. CHF: LVEF 65% with diastolic dysfunction. referral to cardiology to maximized her heart function.  9. Thrombocytosis: suspect reactive from anemia. Will check iron profile.  10.left lung pneumonia:  --cxr showed pneumonia. Pt already on levofloxacin. Will complete 14 days.   11: LE edema: give 20mg lasix once today    Moise Sotelo MD    4/4/2019    8:53 AM

## 2019-04-11 ENCOUNTER — TELEPHONE (OUTPATIENT)
Dept: ONCOLOGY | Facility: CLINIC | Age: 74
End: 2019-04-11

## 2019-04-11 ENCOUNTER — LAB (OUTPATIENT)
Dept: LAB | Facility: HOSPITAL | Age: 74
End: 2019-04-11

## 2019-04-11 ENCOUNTER — OFFICE VISIT (OUTPATIENT)
Dept: ONCOLOGY | Facility: CLINIC | Age: 74
End: 2019-04-11

## 2019-04-11 ENCOUNTER — INFUSION (OUTPATIENT)
Dept: ONCOLOGY | Facility: HOSPITAL | Age: 74
End: 2019-04-11

## 2019-04-11 VITALS
BODY MASS INDEX: 21.86 KG/M2 | HEIGHT: 65 IN | SYSTOLIC BLOOD PRESSURE: 130 MMHG | TEMPERATURE: 98.2 F | OXYGEN SATURATION: 100 % | WEIGHT: 131.2 LBS | DIASTOLIC BLOOD PRESSURE: 74 MMHG | HEART RATE: 101 BPM | RESPIRATION RATE: 18 BRPM

## 2019-04-11 VITALS
RESPIRATION RATE: 16 BRPM | TEMPERATURE: 98.1 F | SYSTOLIC BLOOD PRESSURE: 128 MMHG | WEIGHT: 131.2 LBS | HEIGHT: 65 IN | DIASTOLIC BLOOD PRESSURE: 68 MMHG | HEART RATE: 108 BPM | BODY MASS INDEX: 21.86 KG/M2 | OXYGEN SATURATION: 96 %

## 2019-04-11 DIAGNOSIS — C50.912 CARCINOMA OF LEFT BREAST METASTATIC TO BONE (HCC): ICD-10-CM

## 2019-04-11 DIAGNOSIS — T45.1X5A CHEMOTHERAPY INDUCED NEUTROPENIA (HCC): ICD-10-CM

## 2019-04-11 DIAGNOSIS — C79.51 CARCINOMA OF LEFT BREAST METASTATIC TO BONE (HCC): ICD-10-CM

## 2019-04-11 DIAGNOSIS — C50.412 MALIGNANT NEOPLASM OF UPPER-OUTER QUADRANT OF LEFT FEMALE BREAST, UNSPECIFIED ESTROGEN RECEPTOR STATUS (HCC): ICD-10-CM

## 2019-04-11 DIAGNOSIS — D70.1 CHEMOTHERAPY INDUCED NEUTROPENIA (HCC): ICD-10-CM

## 2019-04-11 DIAGNOSIS — C50.919 BREAST CANCER METASTASIZED TO BONE, UNSPECIFIED LATERALITY (HCC): ICD-10-CM

## 2019-04-11 DIAGNOSIS — C79.51 BREAST CANCER METASTASIZED TO BONE, UNSPECIFIED LATERALITY (HCC): ICD-10-CM

## 2019-04-11 DIAGNOSIS — C79.51 CARCINOMA OF LEFT BREAST METASTATIC TO BONE (HCC): Primary | ICD-10-CM

## 2019-04-11 DIAGNOSIS — C50.912 CARCINOMA OF LEFT BREAST METASTATIC TO BONE (HCC): Primary | ICD-10-CM

## 2019-04-11 DIAGNOSIS — Z95.828 PORT-A-CATH IN PLACE: ICD-10-CM

## 2019-04-11 PROBLEM — R60.9 EDEMA: Status: ACTIVE | Noted: 2019-04-11

## 2019-04-11 LAB
ALBUMIN SERPL-MCNC: 3.7 G/DL (ref 3.5–5)
ALBUMIN/GLOB SERPL: 1.4 G/DL (ref 1.1–2.5)
ALP SERPL-CCNC: 57 U/L (ref 24–120)
ALT SERPL W P-5'-P-CCNC: 16 U/L (ref 0–54)
ANION GAP SERPL CALCULATED.3IONS-SCNC: 10 MMOL/L (ref 4–13)
ANISOCYTOSIS BLD QL: ABNORMAL
AST SERPL-CCNC: 28 U/L (ref 7–45)
BASOPHILS # BLD MANUAL: 0.03 10*3/MM3 (ref 0–0.2)
BASOPHILS NFR BLD AUTO: 1 % (ref 0–2)
BILIRUB SERPL-MCNC: 0.5 MG/DL (ref 0.1–1)
BUN BLD-MCNC: 9 MG/DL (ref 5–21)
BUN/CREAT SERPL: 10.2 (ref 7–25)
CALCIUM SPEC-SCNC: 9.4 MG/DL (ref 8.4–10.4)
CHLORIDE SERPL-SCNC: 98 MMOL/L (ref 98–110)
CO2 SERPL-SCNC: 26 MMOL/L (ref 24–31)
CREAT BLD-MCNC: 0.88 MG/DL (ref 0.5–1.4)
DEPRECATED RDW RBC AUTO: 58.8 FL (ref 40–54)
ERYTHROCYTE [DISTWIDTH] IN BLOOD BY AUTOMATED COUNT: 16.4 % (ref 12–15)
GFR SERPL CREATININE-BSD FRML MDRD: 62 ML/MIN/1.73
GIANT PLATELETS: ABNORMAL
GLOBULIN UR ELPH-MCNC: 2.7 GM/DL
GLUCOSE BLD-MCNC: 97 MG/DL (ref 70–100)
HCT VFR BLD AUTO: 33.1 % (ref 37–47)
HGB BLD-MCNC: 11.2 G/DL (ref 12–16)
HOLD SPECIMEN: NORMAL
HOLD SPECIMEN: NORMAL
LYMPHOCYTES # BLD MANUAL: 1.49 10*3/MM3 (ref 0.72–4.86)
LYMPHOCYTES NFR BLD MANUAL: 5.1 % (ref 4–12)
LYMPHOCYTES NFR BLD MANUAL: 52 % (ref 15–45)
MACROCYTES BLD QL SMEAR: ABNORMAL
MCH RBC QN AUTO: 33.4 PG (ref 28–32)
MCHC RBC AUTO-ENTMCNC: 33.8 G/DL (ref 33–36)
MCV RBC AUTO: 98.8 FL (ref 82–98)
MONOCYTES # BLD AUTO: 0.15 10*3/MM3 (ref 0.19–1.3)
NEUTROPHILS # BLD AUTO: 0.88 10*3/MM3 (ref 1.87–8.4)
NEUTROPHILS NFR BLD MANUAL: 30.6 % (ref 39–78)
NRBC SPEC MANUAL: 1 /100 WBC (ref 0–0)
PLATELET # BLD AUTO: 384 10*3/MM3 (ref 130–400)
PMV BLD AUTO: 10.2 FL (ref 6–12)
POLYCHROMASIA BLD QL SMEAR: ABNORMAL
POTASSIUM BLD-SCNC: 3.5 MMOL/L (ref 3.5–5.3)
PROT SERPL-MCNC: 6.4 G/DL (ref 6.3–8.7)
RBC # BLD AUTO: 3.35 10*6/MM3 (ref 4.2–5.4)
SODIUM BLD-SCNC: 134 MMOL/L (ref 135–145)
VARIANT LYMPHS NFR BLD MANUAL: 11.2 % (ref 0–5)
WBC MORPH BLD: NORMAL
WBC NRBC COR # BLD: 2.86 10*3/MM3 (ref 4.8–10.8)

## 2019-04-11 PROCEDURE — 80053 COMPREHEN METABOLIC PANEL: CPT

## 2019-04-11 PROCEDURE — 25010000002 FILGRASTIM PER 480 MCG: Performed by: INTERNAL MEDICINE

## 2019-04-11 PROCEDURE — 85025 COMPLETE CBC W/AUTO DIFF WBC: CPT

## 2019-04-11 PROCEDURE — 36415 COLL VENOUS BLD VENIPUNCTURE: CPT

## 2019-04-11 PROCEDURE — 96372 THER/PROPH/DIAG INJ SC/IM: CPT

## 2019-04-11 PROCEDURE — 85007 BL SMEAR W/DIFF WBC COUNT: CPT

## 2019-04-11 PROCEDURE — 96375 TX/PRO/DX INJ NEW DRUG ADDON: CPT

## 2019-04-11 PROCEDURE — 25010000002 FUROSEMIDE PER 20 MG: Performed by: INTERNAL MEDICINE

## 2019-04-11 PROCEDURE — 96374 THER/PROPH/DIAG INJ IV PUSH: CPT

## 2019-04-11 PROCEDURE — 99214 OFFICE O/P EST MOD 30 MIN: CPT | Performed by: INTERNAL MEDICINE

## 2019-04-11 RX ORDER — SODIUM CHLORIDE 0.9 % (FLUSH) 0.9 %
10 SYRINGE (ML) INJECTION AS NEEDED
Status: CANCELLED | OUTPATIENT
Start: 2019-04-11

## 2019-04-11 RX ORDER — FUROSEMIDE 10 MG/ML
20 INJECTION INTRAMUSCULAR; INTRAVENOUS ONCE
Status: CANCELLED
Start: 2019-04-11 | End: 2019-04-11

## 2019-04-11 RX ORDER — FUROSEMIDE 20 MG/1
TABLET ORAL
Qty: 30 TABLET | Refills: 0 | Status: SHIPPED | OUTPATIENT
Start: 2019-04-11 | End: 2019-05-30 | Stop reason: SDUPTHER

## 2019-04-11 RX ORDER — POTASSIUM CHLORIDE 20 MEQ/1
TABLET, EXTENDED RELEASE ORAL
Qty: 45 TABLET | Refills: 0 | Status: SHIPPED | OUTPATIENT
Start: 2019-04-11 | End: 2019-05-30 | Stop reason: SDUPTHER

## 2019-04-11 RX ORDER — FUROSEMIDE 10 MG/ML
20 INJECTION INTRAMUSCULAR; INTRAVENOUS ONCE
Status: COMPLETED | OUTPATIENT
Start: 2019-04-11 | End: 2019-04-11

## 2019-04-11 RX ORDER — SODIUM CHLORIDE 0.9 % (FLUSH) 0.9 %
10 SYRINGE (ML) INJECTION AS NEEDED
Status: DISCONTINUED | OUTPATIENT
Start: 2019-04-11 | End: 2019-04-11 | Stop reason: HOSPADM

## 2019-04-11 RX ADMIN — FUROSEMIDE 20 MG: 10 INJECTION, SOLUTION INTRAMUSCULAR; INTRAVENOUS at 10:05

## 2019-04-11 RX ADMIN — Medication 500 UNITS: at 10:11

## 2019-04-11 RX ADMIN — FILGRASTIM 480 MCG: 480 INJECTION, SOLUTION INTRAVENOUS; SUBCUTANEOUS at 10:07

## 2019-04-11 RX ADMIN — SODIUM CHLORIDE, PRESERVATIVE FREE 10 ML: 5 INJECTION INTRAVENOUS at 10:11

## 2019-04-11 NOTE — PROGRESS NOTES
Called Dr. HAMEED's office, spoke with SHASTA Tony, will HOLD treatment today, but patient will receive Lasix 20 mg IV and Neupogen 480 mcg x 1 dose for ANC 0.88 today.

## 2019-04-11 NOTE — PROGRESS NOTES
[facility]  HEMATOLOGY & ONCOLOGY    Cancer Staging Information:  Cancer Staging  Malignant neoplasm of upper-outer quadrant of left female breast (CMS/HCC)  Staging form: Breast, AJCC V7  - Clinical stage from 10/11/2016: Stage IV (T2, N1, M1) - Signed by Calli Monsalve APRN on 10/11/2016        Subjective     VISIT DIAGNOSIS:   Encounter Diagnosis   Name Primary?   • Carcinoma of left breast metastatic to bone (CMS/HCC)        REASON FOR VISIT:     Chief Complaint   Patient presents with   • Bone Mets     Here for doxetacol. Woudl like her Xeloda shipped to her        HEMATOLOGY / ONCOLOGY HISTORY:   Oncology/Hematology History    Patient is a 67-year-old postmenopausal female who had onset of her menses at age 9 and menopause at age 48. She received oral contraceptives for approximately 14 years and did not receive hormonal manipulation. Patient has a maternal cousin had breast cancer. Patient has had no prior breast biopsies. Patient found a palpable left breast mass as well as a left axillary mass. Patient had a bloody nipple discharge which been present for approximately 6 months. On 6/20/2012, a mammogram was performed showing a subareolar mass consistent with malignancy. Bilateral breast ultrasound revealed an ill-defined subareolar mass measuring 2.8-2.5 cm. There is a left axillary mass measuring 1.7 cm with suspicion of extracapsular extension. There is no evidence of disease in the right breast. On 7/9/2012 patient underwent a left breast biopsy and placement of marker clip. Left breast biopsy revealed an infiltrating lobular carcinoma grade 2 with focal ductal carcinoma in situ, solid pattern. A fine-needle biopsy of the left lymph node was consistent with metastatic carcinoma. Breast tumor profile revealed ER: 100%; PA: 98%; HER-2/maynor 2+; FISH: Unamplified; Ki-67: 71%; P 53 1%; DNA aneuploidy. A MRI of the breast were performed body multiple abnormal enhancing masses within  the left breast. There appears to be anterior retraction of the pectoralis muscle with no direct extension. There is a moderate to large, layering left pleural effusion appreciated. The right breast reveals 3 moderately enlarged lymph nodes in the posterior lateral aspect the right breast. These have fatty hilum but followup is recommended. Patient is undergone systemic studies including, on 8/2/2012, a CT scan of the brain showing atrophy. a CT scan that shows showing a small to moderate left pleural effusion with 3 subcentimeter nodule densities in the left lung.   She completed 4 cycles of neoadjuvant treatment with dose dense Adriamycin and Cytoxan. She had a mammogram which showed a significant reduction in the size of her left breast lesion. There is no axillary adenopathy noted. She has had an ultrasound revealing the  lesion reducing from 2.4 cm to 1 cm. Patient underwent a left mastectomy on 03/19/2013 finding negative invasive cancer, negative nodes. A 5% DCIS was noted. The patient has declined hormonal therapy. She did not need radiation therapy.  November 2014, she began to have evidence of lytic bone lesions at T5T6, frontal disease, an 8 mm lucency in the central frontal area of  the skull but nothing intracranial. Bone scan revealed only vertex tracer activity but bilateral ribs and thoracic spine, and other lesions in  her pelvis. At that time, she was started on letrozole since November 2014. She is taking Xgeva. She had progression found in bone in Feb 2015 on scan. She was started on Faslodex, Ibrance and continued xgeva.         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 B) Foci of ductal carcinoma in situ, solid pattern. C) Greatest dimension of the  invasive carcinoma is 15.8mm.         3/19/2013 Surgery     Left Mastectomy w/ Axillary Tail: Focal residual ducatal carcinoma in situ (DCIS) 12 lymph nodes negative for metastatic carcinoma.         10/20/2014 Biopsy     Peritoneum Biopsy: Final Diagnosis: Malignant Cell Neoplasm.           Breast cancer metastasized to bone, unspecified laterality (CMS/HCC)    10/15/2012 -  Other Event     Left mammogram:  Impression:  1. Decreasing spiculation and density in the retroareolar left breast, near a previous biopsy.   2. Definite left axillary lesion is not appreciated.          2/5/2013 -  Other Event     Diagnostic Mammo:  Comparison is made to 10/15/2012 and 6/20/2012  The spiculated mass at 12:00 behind the nipple is nearly resolved.   Impression:  1. The mass on the left side is less apparent after receiving chemotherapy. There has been a good response to chmotherapy on the left side.   2. No suspicious abnormality is seen in the right breast to account for the new palable abnormality at the 4-5:00 position.          3/19/2013 Surgery     Breast Biopsy:  Final Diagnosis:  A. Breast, left mastectomy with axillary tail  1. Focal residual ductal carcinoma in situ (DICS) (less than 5% of tumor bed).  2. Negative for residual invasive carcinoma.   3. 12 Lymph Nodes, negative for metastatic carcinoma (0/12) focally, some lymph nodes demonstrate fibrosis/treatment effect.  B. Skin and soft tissue, left advancement flap:  1. Benign skin and subcutis.   2. Negative for carcinoma.          6/20/2013 -  Other Event     Diagnostic Mammo [facility]:  1. A subareolar mass is consistent with malignancy. Additional involement extends inferiorly sonographically and superolaterally mammograhically as demonstrated by calcifications. The mass measures approx 2.8 cm sonographically, but the involved region is likely much larger including an intraductal componet. The left axillary lymph nodes are also involved.   2. Recommened  ultrasound guided biopsy of the left subareolar mass and left axillary lymph node.   3. Breast MRI could also further define multicentric disease on the left.   4. Clear suspicious finding on the right.  Recommend ongoing clinical follow up of palpable foci.          6/13/2014 -  Other Event     Diagnostic Mammo:  IMPRESSION:  1. History of left breast cancer and mastectomy. Stable benign right breast mammograms.          10/20/2014 Surgery     Final Diagnosis:  Biopsies, pertoneum:  Metastatic carcinoma, morphologically compatible with metastatic mammary carcinoma.          6/15/2015 -  Other Event     Diagnostic Mammo:  IMPRESSION:   1. Negative x-ray report, should not delay biopsy if a dominat or clinically suspicious mass is present.          7/5/2016 -  Other Event     Diagnositic mammogram:  Impression:   Stable right mamogram with no radiographic findings suspicious for malignancy. Recommend continued screening mammography per screening guidelines unless otherwise earlier clinically indicated.          9/18/2016 Initial Diagnosis     Secondary malignant neoplasm of bone and bone marrow                INTERVAL HISTORY  Patient ID: Heavenly Yanes is a 74 y.o. year old female Cancer Staging  Stage IV (T2, N1, M1)  --complaining of dyspnea on exersion since last Tuesday. She feels like she ran a mile  2/14/19: cxr showed pneumonia. Already on levofloxacin. Coughing not worse, she did not run temp.  Overall feels weak.  -restarted the appetite suppressant and is starting to eat more but not a whole lot  --2/28/19: CT c/ap with some response to therapy. New left chest inflammatory lesion to be followed ion next imaging. Bone scan unremarkable for progression.  4/11/19: complaining of increased weakness. Sob on exrtion, apetite is decreased. coughing improved. denies fever. Denies /cp/n/v/, neuropathy/ no focal weakness.   Rest of ros unremarkable. 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) 9/18/2016     Past Surgical History:   Past Surgical History:   Procedure Laterality Date   • BREAST SURGERY      left breast biopsy and axillary node   • CHOLECYSTECTOMY     • EXTERNAL EAR SURGERY     • MASTECTOMY Left    • PORTACATH PLACEMENT       Social History:   Social History     Socioeconomic History   • Marital status:      Spouse name: Not on file   • Number of children: Not on file   • Years of education: Not on file   • Highest education level: Not on file   Tobacco Use   • Smoking status: Never Smoker   • Smokeless tobacco: Never Used   Substance and Sexual Activity   • Alcohol use: No   • Drug use: No     Family History:   Family History   Problem Relation Age of Onset   • No Known Problems Daughter    • No Known Problems Son    • Other Mother         complications from a concussion from a fall   • Other Father         old age   • No Known Problems Brother    • No Known Problems Daughter        Review of Systems   Constitutional: Positive for activity change, appetite change and fatigue. Negative for unexpected weight change.   HENT: Negative.    Eyes: Negative.    Respiratory: Positive for shortness of breath.    Cardiovascular: Positive for leg swelling.   Gastrointestinal: Negative.    Endocrine: Negative.    Genitourinary: Negative.    Musculoskeletal: Negative.    Skin: Negative.    Neurological: Negative.    Hematological: Negative.    Psychiatric/Behavioral: Negative.         Performance Status:  Asymptomatic    Medications:    Current Outpatient Medications   Medication Sig Dispense Refill   • Calcium-Magnesium-Vitamin D (CALCIUM 1200+D3 PO) Take 1,200 mg by mouth Daily.     •  "capecitabine (XELODA) 500 MG chemo tablet Take 1 tablet by mouth 2 (Two) Times a Day. For 2 weeks on and 1 week off. 42 tablet 11   • megestrol (MEGACE) 40 MG tablet Take 1 tablet by mouth 2 (Two) Times a Day. 60 tablet 5   • metoprolol tartrate (LOPRESSOR) 25 MG tablet Take 25 mg by mouth 2 (two) times a day.     • OLANZapine (ZYPREXA) 2.5 MG tablet Take 2.5 mg by mouth every night.     • ondansetron (ZOFRAN) 8 MG tablet Take 1 tablet by mouth Every 8 (Eight) Hours As Needed for Nausea or Vomiting. 30 tablet 5   • raNITIdine (ZANTAC) 150 MG tablet Take 150 mg by mouth 2 (Two) Times a Day.     • rivaroxaban (XARELTO) 20 MG tablet Take 1 tablet by mouth Daily. 30 tablet 5   • thyroid 60 MG PO tablet Take 60 mg by mouth daily.       No current facility-administered medications for this visit.      Facility-Administered Medications Ordered in Other Visits   Medication Dose Route Frequency Provider Last Rate Last Dose   • heparin flush (porcine) 100 UNIT/ML injection 500 Units  500 Units Intravenous PRN Joseph Nunez MD   500 Units at 10/26/17 1155   • sodium chloride 0.9 % flush 10 mL  10 mL Intravenous PRN Joseph Nunez MD   10 mL at 10/26/17 1155       ALLERGIES:    Allergies   Allergen Reactions   • Benadryl  [Diphenhydramine Hcl (Sleep)] Other (See Comments)   • Codeine    • Diphenhydramine Other (See Comments)     \"Shaky leg syndrome\"  \"Shaky leg syndrome\"  \"Shaky leg syndrome\"   • Heparin    • Other      POPPY SEED   • Penicillins Hives   • Petroleum Jelly [Skin Protectants, Misc.]    • Sulfa Antibiotics    • Sulfur    • Valium [Diazepam]        Objective      Vitals:    04/11/19 0828   BP: 128/68   Pulse: 108   Resp: 16   Temp: 98.1 °F (36.7 °C)   TempSrc: Oral   SpO2: 96%   Weight: 59.5 kg (131 lb 3.2 oz)   Height: 165.1 cm (65\")   PainSc: 0-No pain         Current Status 4/11/2019   ECOG score 2         Physical Exam  unchanged  General Appearance: Patient is awake, alert, oriented and in no acute distress. " Patient is welldeveloped, wellnourished, and appears stated age.  HEENT: Normocephalic. Sclerae clear, conjunctiva pink, extraocular movements intact, pupils, round, reactive to light and  accommodation. Mouth and throat are clear with moist oral mucosa.  NECK: Supple, no jugular venous distention, thyroid not enlarged.  LYMPH: No cervical, supraclavicular, axillary, or inguinal lymphadenopathy.  CHEST: Equal bilateral expansion, AP  diameter normal, resonant percussion note  LUNGS: Good air movement, no rales, rhonchi, rubs or wheezes with auscultation on the right. Diminished on the left.  CARDIO: Regular sinus rhythm, no murmurs, gallops or rubs.  ABDOMEN: Nondistended, soft, No tenderness, no guarding, no rebound, No hepatosplenomegaly. No abdominal masses. Bowel sounds positive. No hernia  GENITALIA: Not examined.  BREASTS: Not examined.  MUSKEL: No joint swelling, decreased motion, or inflammation  EXTREMS: patrick le  edema,No clubbing, cyanosis, No varicose veins.  NEURO: Grossly nonfocal, Gait is coordinated and smooth, Cognition is preserved.  SKIN: No rashes, no ecchymoses, no petechia.  PSYCH: Oriented to time, place and person. Memory is preserved. Mood and affect appear normal  RECENT LABS:  Lab on 04/11/2019   Component Date Value Ref Range Status   • WBC 04/11/2019 2.86* 4.80 - 10.80 10*3/mm3 Preliminary   • RBC 04/11/2019 3.35* 4.20 - 5.40 10*6/mm3 Preliminary   • Hemoglobin 04/11/2019 11.2* 12.0 - 16.0 g/dL Preliminary   • Hematocrit 04/11/2019 33.1* 37.0 - 47.0 % Preliminary   • MCV 04/11/2019 98.8* 82.0 - 98.0 fL Preliminary   • MCH 04/11/2019 33.4* 28.0 - 32.0 pg Preliminary   • MCHC 04/11/2019 33.8  33.0 - 36.0 g/dL Preliminary   • RDW 04/11/2019 16.4* 12.0 - 15.0 % Preliminary   • RDW-SD 04/11/2019 58.8* 40.0 - 54.0 fl Preliminary   • MPV 04/11/2019 10.2  6.0 - 12.0 fL Preliminary   • Platelets 04/11/2019 384  130 - 400 10*3/mm3 Preliminary   • nRBC 04/11/2019 0.0  0.0 - 0.0 /100 WBC Preliminary        RADIOLOGY:  No results found.         Assessment/Plan  Heavenly Yanes is a 74 y.o. year old female with met breast cancer s/p multiple lines of CMT currently on taxotere weekly and xeloda with good tolerance.    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  --also on xeloda  --CT c/a/p 2/19 showed stable dz. Some new Inflammatory opacities in the LEFT lower lobe and bilateral subpleural interstitial thickening.   -bone scan without evidence of dz progression  --primo taxotere weekly. Pt also on xeloda [id number], then repeat.  --labs reviewed with pt, wbc 2.86, Hg 11.2, plt 384.   -ANC low 880. Postpone tx. Give neupogen x1. Pt advised to hold xeloda until 2 weeks    2. Psych: on olanzapine    3. HTN: on metoprolol    4. Gerd: on ranitidine  5. Dyspnea: get a cxr to evaluate  6. Anemia: will check iron profile, ferritin. Her creatinine is normal at 0.94.   7. DVT on xarelto indefinite.  --Doppler left LE  1/30/19 There is evidence of deep venous thrombosis of the left lower extremity. The clot burden is improved from last study. There is also evidence of superficial clot in the greater saphenous vein. There is a Baker's cyst in the popliteal fossa.  8. CHF: LVEF 65% with diastolic dysfunction. referral to cardiology to maximized her heart function.  9. Thrombocytosis: suspect reactive from anemia. Will check iron profile.  10.left lung  pneumonia:  --cxr showed pneumonia. Pt already on levofloxacin. Will complete 14 days.   11: LE edema: give 20mg lasix once today. Will rx lasix 20mg for home use prn. rx janine Sotelo MD    4/11/2019    8:57 AM

## 2019-04-11 NOTE — TELEPHONE ENCOUNTER
Per Dr Sotelo patient was informed in txt area in [place] that her txt was being held due to low WBC with ANC of 0.88 and she would receive a one time dose of Neupogen 480 mcg inj, and Lasix 20 mg IV for the swelling of her lower extremities and then go home. She is to hold Xeloda starting today for two weeks then restart, also to  prescriptions called to her pharmacy for Lasix 20 mg po prn for swelling and K-Daniela 40 meq qd, will recheck count at next visit. She v/u of directions. She was encouraged to call if she has any problems, questions or concerns. She v/u.

## 2019-04-25 ENCOUNTER — INFUSION (OUTPATIENT)
Dept: ONCOLOGY | Facility: HOSPITAL | Age: 74
End: 2019-04-25

## 2019-04-25 ENCOUNTER — LAB (OUTPATIENT)
Dept: LAB | Facility: HOSPITAL | Age: 74
End: 2019-04-25

## 2019-04-25 ENCOUNTER — OFFICE VISIT (OUTPATIENT)
Dept: ONCOLOGY | Facility: CLINIC | Age: 74
End: 2019-04-25

## 2019-04-25 VITALS
RESPIRATION RATE: 17 BRPM | DIASTOLIC BLOOD PRESSURE: 60 MMHG | HEIGHT: 65 IN | HEART RATE: 74 BPM | OXYGEN SATURATION: 100 % | WEIGHT: 132 LBS | TEMPERATURE: 97.3 F | SYSTOLIC BLOOD PRESSURE: 130 MMHG | BODY MASS INDEX: 21.99 KG/M2

## 2019-04-25 VITALS
RESPIRATION RATE: 18 BRPM | SYSTOLIC BLOOD PRESSURE: 122 MMHG | WEIGHT: 132.7 LBS | HEART RATE: 58 BPM | OXYGEN SATURATION: 95 % | BODY MASS INDEX: 22.11 KG/M2 | DIASTOLIC BLOOD PRESSURE: 76 MMHG | HEIGHT: 65 IN | TEMPERATURE: 97.6 F

## 2019-04-25 DIAGNOSIS — C79.51 CARCINOMA OF LEFT BREAST METASTATIC TO BONE (HCC): ICD-10-CM

## 2019-04-25 DIAGNOSIS — Z95.828 PORT-A-CATH IN PLACE: ICD-10-CM

## 2019-04-25 DIAGNOSIS — C50.912 CARCINOMA OF LEFT BREAST METASTATIC TO BONE (HCC): ICD-10-CM

## 2019-04-25 DIAGNOSIS — C79.51 CARCINOMA OF LEFT BREAST METASTATIC TO BONE (HCC): Primary | ICD-10-CM

## 2019-04-25 DIAGNOSIS — C50.919 BREAST CANCER METASTASIZED TO BONE, UNSPECIFIED LATERALITY (HCC): ICD-10-CM

## 2019-04-25 DIAGNOSIS — C50.412 MALIGNANT NEOPLASM OF UPPER-OUTER QUADRANT OF LEFT FEMALE BREAST, UNSPECIFIED ESTROGEN RECEPTOR STATUS (HCC): ICD-10-CM

## 2019-04-25 DIAGNOSIS — C50.912 CARCINOMA OF LEFT BREAST METASTATIC TO BONE (HCC): Primary | ICD-10-CM

## 2019-04-25 DIAGNOSIS — C79.51 BREAST CANCER METASTASIZED TO BONE, UNSPECIFIED LATERALITY (HCC): ICD-10-CM

## 2019-04-25 LAB
ALBUMIN SERPL-MCNC: 3.6 G/DL (ref 3.5–5)
ALBUMIN/GLOB SERPL: 1.2 G/DL (ref 1.1–2.5)
ALP SERPL-CCNC: 54 U/L (ref 24–120)
ALT SERPL W P-5'-P-CCNC: <15 U/L (ref 0–54)
ANION GAP SERPL CALCULATED.3IONS-SCNC: 9 MMOL/L (ref 4–13)
AST SERPL-CCNC: 20 U/L (ref 7–45)
BASOPHILS # BLD AUTO: 0.06 10*3/MM3 (ref 0–0.2)
BASOPHILS NFR BLD AUTO: 0.6 % (ref 0–2)
BILIRUB SERPL-MCNC: 0.3 MG/DL (ref 0.1–1)
BUN BLD-MCNC: 11 MG/DL (ref 5–21)
BUN/CREAT SERPL: 12 (ref 7–25)
CALCIUM SPEC-SCNC: 9.6 MG/DL (ref 8.4–10.4)
CHLORIDE SERPL-SCNC: 99 MMOL/L (ref 98–110)
CO2 SERPL-SCNC: 27 MMOL/L (ref 24–31)
CREAT BLD-MCNC: 0.92 MG/DL (ref 0.5–1.4)
DEPRECATED RDW RBC AUTO: 61 FL (ref 40–54)
EOSINOPHIL # BLD AUTO: 0.02 10*3/MM3 (ref 0–0.7)
EOSINOPHIL NFR BLD AUTO: 0.2 % (ref 0–4)
ERYTHROCYTE [DISTWIDTH] IN BLOOD BY AUTOMATED COUNT: 16.9 % (ref 12–15)
GFR SERPL CREATININE-BSD FRML MDRD: 59 ML/MIN/1.73
GLOBULIN UR ELPH-MCNC: 3 GM/DL
GLUCOSE BLD-MCNC: 98 MG/DL (ref 70–100)
HCT VFR BLD AUTO: 33.7 % (ref 37–47)
HGB BLD-MCNC: 11.1 G/DL (ref 12–16)
HOLD SPECIMEN: NORMAL
IMM GRANULOCYTES # BLD AUTO: 0.06 10*3/MM3 (ref 0–0.05)
IMM GRANULOCYTES NFR BLD AUTO: 0.6 % (ref 0–5)
LYMPHOCYTES # BLD AUTO: 2.2 10*3/MM3 (ref 0.72–4.86)
LYMPHOCYTES NFR BLD AUTO: 20.6 % (ref 15–45)
MCH RBC QN AUTO: 32.8 PG (ref 28–32)
MCHC RBC AUTO-ENTMCNC: 32.9 G/DL (ref 33–36)
MCV RBC AUTO: 99.7 FL (ref 82–98)
MONOCYTES # BLD AUTO: 0.71 10*3/MM3 (ref 0.19–1.3)
MONOCYTES NFR BLD AUTO: 6.6 % (ref 4–12)
NEUTROPHILS # BLD AUTO: 7.63 10*3/MM3 (ref 1.87–8.4)
NEUTROPHILS NFR BLD AUTO: 71.4 % (ref 39–78)
NRBC BLD AUTO-RTO: 0 /100 WBC (ref 0–0.2)
PLATELET # BLD AUTO: 378 10*3/MM3 (ref 130–400)
PMV BLD AUTO: 9.8 FL (ref 6–12)
POTASSIUM BLD-SCNC: 3.8 MMOL/L (ref 3.5–5.3)
PROT SERPL-MCNC: 6.6 G/DL (ref 6.3–8.7)
RBC # BLD AUTO: 3.38 10*6/MM3 (ref 4.2–5.4)
SODIUM BLD-SCNC: 135 MMOL/L (ref 135–145)
WBC NRBC COR # BLD: 10.68 10*3/MM3 (ref 4.8–10.8)

## 2019-04-25 PROCEDURE — 96413 CHEMO IV INFUSION 1 HR: CPT

## 2019-04-25 PROCEDURE — 86300 IMMUNOASSAY TUMOR CA 15-3: CPT

## 2019-04-25 PROCEDURE — 80053 COMPREHEN METABOLIC PANEL: CPT

## 2019-04-25 PROCEDURE — 25010000002 DOCETAXEL 20 MG/ML SOLUTION 4 ML VIAL: Performed by: INTERNAL MEDICINE

## 2019-04-25 PROCEDURE — 25010000002 DEXAMETHASONE PER 1 MG: Performed by: INTERNAL MEDICINE

## 2019-04-25 PROCEDURE — 85025 COMPLETE CBC W/AUTO DIFF WBC: CPT

## 2019-04-25 PROCEDURE — 25010000002 ONDANSETRON PER 1 MG: Performed by: INTERNAL MEDICINE

## 2019-04-25 PROCEDURE — 96367 TX/PROPH/DG ADDL SEQ IV INF: CPT

## 2019-04-25 PROCEDURE — 36415 COLL VENOUS BLD VENIPUNCTURE: CPT

## 2019-04-25 PROCEDURE — 99214 OFFICE O/P EST MOD 30 MIN: CPT | Performed by: INTERNAL MEDICINE

## 2019-04-25 RX ORDER — MEPERIDINE HYDROCHLORIDE 50 MG/ML
25 INJECTION INTRAMUSCULAR; INTRAVENOUS; SUBCUTANEOUS
Status: CANCELLED | OUTPATIENT
Start: 2019-05-02 | End: 2019-05-03

## 2019-04-25 RX ORDER — SODIUM CHLORIDE 0.9 % (FLUSH) 0.9 %
10 SYRINGE (ML) INJECTION AS NEEDED
Status: DISCONTINUED | OUTPATIENT
Start: 2019-04-25 | End: 2019-04-25 | Stop reason: HOSPADM

## 2019-04-25 RX ORDER — SODIUM CHLORIDE 9 MG/ML
250 INJECTION, SOLUTION INTRAVENOUS ONCE
Status: CANCELLED | OUTPATIENT
Start: 2019-05-02

## 2019-04-25 RX ORDER — MEPERIDINE HYDROCHLORIDE 50 MG/ML
25 INJECTION INTRAMUSCULAR; INTRAVENOUS; SUBCUTANEOUS
Status: CANCELLED | OUTPATIENT
Start: 2019-04-25 | End: 2019-04-26

## 2019-04-25 RX ORDER — FAMOTIDINE 10 MG/ML
20 INJECTION, SOLUTION INTRAVENOUS AS NEEDED
Status: CANCELLED | OUTPATIENT
Start: 2019-04-25

## 2019-04-25 RX ORDER — MEPERIDINE HYDROCHLORIDE 50 MG/ML
25 INJECTION INTRAMUSCULAR; INTRAVENOUS; SUBCUTANEOUS
Status: DISCONTINUED | OUTPATIENT
Start: 2019-04-25 | End: 2019-04-25 | Stop reason: HOSPADM

## 2019-04-25 RX ORDER — SODIUM CHLORIDE 9 MG/ML
250 INJECTION, SOLUTION INTRAVENOUS ONCE
Status: CANCELLED | OUTPATIENT
Start: 2019-04-25

## 2019-04-25 RX ORDER — FAMOTIDINE 10 MG/ML
20 INJECTION, SOLUTION INTRAVENOUS AS NEEDED
Status: DISCONTINUED | OUTPATIENT
Start: 2019-04-25 | End: 2019-04-25 | Stop reason: HOSPADM

## 2019-04-25 RX ORDER — FAMOTIDINE 10 MG/ML
20 INJECTION, SOLUTION INTRAVENOUS AS NEEDED
Status: CANCELLED | OUTPATIENT
Start: 2019-05-09

## 2019-04-25 RX ORDER — FAMOTIDINE 10 MG/ML
20 INJECTION, SOLUTION INTRAVENOUS AS NEEDED
Status: CANCELLED | OUTPATIENT
Start: 2019-05-02

## 2019-04-25 RX ORDER — MEPERIDINE HYDROCHLORIDE 50 MG/ML
25 INJECTION INTRAMUSCULAR; INTRAVENOUS; SUBCUTANEOUS
Status: CANCELLED | OUTPATIENT
Start: 2019-05-09 | End: 2019-05-10

## 2019-04-25 RX ORDER — SODIUM CHLORIDE 9 MG/ML
250 INJECTION, SOLUTION INTRAVENOUS ONCE
Status: COMPLETED | OUTPATIENT
Start: 2019-04-25 | End: 2019-04-25

## 2019-04-25 RX ORDER — SODIUM CHLORIDE 9 MG/ML
250 INJECTION, SOLUTION INTRAVENOUS ONCE
Status: CANCELLED | OUTPATIENT
Start: 2019-05-09

## 2019-04-25 RX ORDER — SODIUM CHLORIDE 0.9 % (FLUSH) 0.9 %
10 SYRINGE (ML) INJECTION AS NEEDED
Status: CANCELLED | OUTPATIENT
Start: 2019-04-25

## 2019-04-25 RX ADMIN — Medication: at 10:54

## 2019-04-25 RX ADMIN — SODIUM CHLORIDE 250 ML: 9 INJECTION, SOLUTION INTRAVENOUS at 10:50

## 2019-04-25 RX ADMIN — DOCETAXEL 60 MG: 20 INJECTION, SOLUTION, CONCENTRATE INTRAVENOUS at 11:20

## 2019-04-25 RX ADMIN — Medication 500 UNITS: at 12:34

## 2019-04-25 RX ADMIN — SODIUM CHLORIDE, PRESERVATIVE FREE 10 ML: 5 INJECTION INTRAVENOUS at 12:34

## 2019-04-25 NOTE — PROGRESS NOTES
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   • Carcinoma of left breast metastatic to bone (CMS/HCC)        REASON FOR VISIT:     Chief Complaint   Patient presents with   • Breast Cancer     She is here for consideration of her chemo txt today, states she had a good week and no new c/o or concerns today        HEMATOLOGY / ONCOLOGY HISTORY:   Oncology/Hematology History    Patient is a 67-year-old postmenopausal female who had onset of her menses at age 9 and menopause at age 48. She received oral contraceptives for approximately 14 years and did not receive hormonal manipulation. Patient has a maternal cousin had breast cancer. Patient has had no prior breast biopsies. Patient found a palpable left breast mass as well as a left axillary mass. Patient had a bloody nipple discharge which been present for approximately 6 months. On 6/20/2012, a mammogram was performed showing a subareolar mass consistent with malignancy. Bilateral breast ultrasound revealed an ill-defined subareolar mass measuring 2.8-2.5 cm. There is a left axillary mass measuring 1.7 cm with suspicion of extracapsular extension. There is no evidence of disease in the right breast. On 7/9/2012 patient underwent a left breast biopsy and placement of marker clip. Left breast biopsy revealed an infiltrating lobular carcinoma grade 2 with focal ductal carcinoma in situ, solid pattern. A fine-needle biopsy of the left lymph node was consistent with metastatic carcinoma. Breast tumor profile revealed ER: 100%; MI: 98%; HER-2/maynor 2+; FISH: Unamplified; Ki-67: 71%; P 53 1%; DNA aneuploidy. A MRI of the breast  were performed body multiple abnormal enhancing masses within the left breast. There appears to be anterior retraction of the pectoralis muscle with no direct extension. There is a moderate to large, layering left pleural effusion appreciated. The right breast reveals 3 moderately enlarged lymph nodes in the posterior lateral aspect the right breast. These have fatty hilum but followup is recommended. Patient is undergone systemic studies including, on 8/2/2012, a CT scan of the brain showing atrophy. a CT scan that shows showing a small to moderate left pleural effusion with 3 subcentimeter nodule densities in the left lung.   She completed 4 cycles of neoadjuvant treatment with dose dense Adriamycin and Cytoxan. She had a mammogram which showed a significant reduction in the size of her left breast lesion. There is no axillary adenopathy noted. She has had an ultrasound revealing the  lesion reducing from 2.4 cm to 1 cm. Patient underwent a left mastectomy on 03/19/2013 finding negative invasive cancer, negative nodes. A 5% DCIS was noted. The patient has declined hormonal therapy. She did not need radiation therapy.  November 2014, she began to have evidence of lytic bone lesions at T5T6, frontal disease, an 8 mm lucency in the central frontal area of  the skull but nothing intracranial. Bone scan revealed only vertex tracer activity but bilateral ribs and thoracic spine, and other lesions in  her pelvis. At that time, she was started on letrozole since November 2014. She is taking Xgeva. She had progression found in bone in Feb 2015 on scan. She was started on Faslodex, Ibrance and continued xgeva.         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 B) Foci of ductal  carcinoma in situ, solid pattern. C) Greatest dimension of the invasive carcinoma is 15.8mm.         3/19/2013 Surgery     Left Mastectomy w/ Axillary Tail: Focal residual ducatal carcinoma in situ (DCIS) 12 lymph nodes negative for metastatic carcinoma.         10/20/2014 Biopsy     Peritoneum Biopsy: Final Diagnosis: Malignant Cell Neoplasm.           Breast cancer metastasized to bone, unspecified laterality (CMS/HCC)    10/15/2012 -  Other Event     Left mammogram:  Impression:  1. Decreasing spiculation and density in the retroareolar left breast, near a previous biopsy.   2. Definite left axillary lesion is not appreciated.          2/5/2013 -  Other Event     Diagnostic Mammo:  Comparison is made to 10/15/2012 and 6/20/2012  The spiculated mass at 12:00 behind the nipple is nearly resolved.   Impression:  1. The mass on the left side is less apparent after receiving chemotherapy. There has been a good response to chmotherapy on the left side.   2. No suspicious abnormality is seen in the right breast to account for the new palable abnormality at the 4-5:00 position.          3/19/2013 Surgery     Breast Biopsy:  Final Diagnosis:  A. Breast, left mastectomy with axillary tail  1. Focal residual ductal carcinoma in situ (DICS) (less than 5% of tumor bed).  2. Negative for residual invasive carcinoma.   3. 12 Lymph Nodes, negative for metastatic carcinoma (0/12) focally, some lymph nodes demonstrate fibrosis/treatment effect.  B. Skin and soft tissue, left advancement flap:  1. Benign skin and subcutis.   2. Negative for carcinoma.          6/20/2013 -  Other Event     Diagnostic Mammo [facility]:  1. A subareolar mass is consistent with malignancy. Additional involement extends inferiorly sonographically and superolaterally mammograhically as demonstrated by calcifications. The mass measures approx 2.8 cm sonographically, but the involved region is likely much larger including an intraductal componet. The left  axillary lymph nodes are also involved.   2. Recommened ultrasound guided biopsy of the left subareolar mass and left axillary lymph node.   3. Breast MRI could also further define multicentric disease on the left.   4. Clear suspicious finding on the right.  Recommend ongoing clinical follow up of palpable foci.          6/13/2014 -  Other Event     Diagnostic Mammo:  IMPRESSION:  1. History of left breast cancer and mastectomy. Stable benign right breast mammograms.          10/20/2014 Surgery     Final Diagnosis:  Biopsies, pertoneum:  Metastatic carcinoma, morphologically compatible with metastatic mammary carcinoma.          6/15/2015 -  Other Event     Diagnostic Mammo:  IMPRESSION:   1. Negative x-ray report, should not delay biopsy if a dominat or clinically suspicious mass is present.          7/5/2016 -  Other Event     Diagnositic mammogram:  Impression:   Stable right mamogram with no radiographic findings suspicious for malignancy. Recommend continued screening mammography per screening guidelines unless otherwise earlier clinically indicated.          9/18/2016 Initial Diagnosis     Secondary malignant neoplasm of bone and bone marrow                INTERVAL HISTORY  Patient ID: Heavenly Yanes is a 74 y.o. year old female Cancer Staging  Stage IV (T2, N1, M1)  --complaining of dyspnea on exersion since last Tuesday. She feels like she ran a mile  2/14/19: cxr showed pneumonia. Already on levofloxacin. Coughing not worse, she did not run temp.  Overall feels weak.  -restarted the appetite suppressant and is starting to eat more but not a whole lot  --2/28/19: CT c/ap with some response to therapy. New left chest inflammatory lesion to be followed ion next imaging. Bone scan unremarkable for progression.  4/25/19: she is much better since being off therapy for 2 weeks. COugh is improved. Denies sob /cp/n/v/, neuropathy/ no focal weakness.   Rest of ros unremarkable. 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) 9/18/2016     Past Surgical History:   Past Surgical History:   Procedure Laterality Date   • BREAST SURGERY      left breast biopsy and axillary node   • CHOLECYSTECTOMY     • EXTERNAL EAR SURGERY     • MASTECTOMY Left    • PORTACATH PLACEMENT       Social History:   Social History     Socioeconomic History   • Marital status:      Spouse name: Not on file   • Number of children: Not on file   • Years of education: Not on file   • Highest education level: Not on file   Tobacco Use   • Smoking status: Never Smoker   • Smokeless tobacco: Never Used   Substance and Sexual Activity   • Alcohol use: No   • Drug use: No     Family History:   Family History   Problem Relation Age of Onset   • No Known Problems Daughter    • No Known Problems Son    • Other Mother         complications from a concussion from a fall   • Other Father         old age   • No Known Problems Brother    • No Known Problems Daughter        Review of Systems   Constitutional: Positive for activity change, appetite change and fatigue. Negative for unexpected weight change.   HENT: Negative.    Eyes: Negative.    Respiratory: Positive for shortness of breath.    Cardiovascular: Positive for leg swelling.   Gastrointestinal: Negative.    Endocrine: Negative.    Genitourinary: Negative.    Musculoskeletal: Negative.    Skin: Negative.    Neurological: Negative.    Hematological: Negative.    Psychiatric/Behavioral: Negative.         Performance Status:  Asymptomatic    Medications:    Current Outpatient Medications   Medication Sig Dispense Refill   • Calcium-Magnesium-Vitamin D (CALCIUM 1200+D3 PO) Take  "1,200 mg by mouth Daily.     • capecitabine (XELODA) 500 MG chemo tablet Take 1 tablet by mouth 2 (Two) Times a Day. For 2 weeks on and 1 week off. 42 tablet 11   • furosemide (LASIX) 20 MG tablet One tab po qd as needed for swelling take with potassium 30 tablet 0   • megestrol (MEGACE) 40 MG tablet Take 1 tablet by mouth 2 (Two) Times a Day. 60 tablet 5   • metoprolol tartrate (LOPRESSOR) 25 MG tablet Take 25 mg by mouth 2 (two) times a day.     • OLANZapine (ZYPREXA) 2.5 MG tablet Take 2.5 mg by mouth every night.     • ondansetron (ZOFRAN) 8 MG tablet Take 1 tablet by mouth Every 8 (Eight) Hours As Needed for Nausea or Vomiting. 30 tablet 5   • potassium chloride (K-DUR,KLOR-CON) 20 MEQ CR tablet Take one tab po bid on days taking Lasix 45 tablet 0   • raNITIdine (ZANTAC) 150 MG tablet Take 150 mg by mouth 2 (Two) Times a Day.     • rivaroxaban (XARELTO) 20 MG tablet Take 1 tablet by mouth Daily. 30 tablet 5   • thyroid 60 MG PO tablet Take 60 mg by mouth daily.       No current facility-administered medications for this visit.      Facility-Administered Medications Ordered in Other Visits   Medication Dose Route Frequency Provider Last Rate Last Dose   • heparin flush (porcine) 100 UNIT/ML injection 500 Units  500 Units Intravenous PRN Joseph Nunez MD   500 Units at 10/26/17 1155   • sodium chloride 0.9 % flush 10 mL  10 mL Intravenous PRN Joseph Nunez MD   10 mL at 10/26/17 1155       ALLERGIES:    Allergies   Allergen Reactions   • Benadryl  [Diphenhydramine Hcl (Sleep)] Other (See Comments)   • Codeine    • Diphenhydramine Other (See Comments)     \"Shaky leg syndrome\"  \"Shaky leg syndrome\"  \"Shaky leg syndrome\"   • Heparin    • Other      POPPY SEED   • Penicillins Hives   • Petroleum Jelly [Skin Protectants, Misc.]    • Sulfa Antibiotics    • Sulfur    • Valium [Diazepam]        Objective      Vitals:    04/25/19 0942   BP: 122/76   Pulse: 58   Resp: 18   Temp: 97.6 °F (36.4 °C)   TempSrc: Tympanic " "  SpO2: 95%   Weight: 60.2 kg (132 lb 11.2 oz)   Height: 165.1 cm (65\")   PainSc: 0-No pain         Current Status 4/25/2019   ECOG score 2         Physical Examunchanged  General Appearance: Patient is awake, alert, oriented and in no acute distress. Patient is welldeveloped, wellnourished, and appears stated age.  HEENT: Normocephalic. Sclerae clear, conjunctiva pink, extraocular movements intact, pupils, round, reactive to light and  accommodation. Mouth and throat are clear with moist oral mucosa.  NECK: Supple, no jugular venous distention, thyroid not enlarged.  LYMPH: No cervical, supraclavicular, axillary, or inguinal lymphadenopathy.  CHEST: Equal bilateral expansion, AP  diameter normal, resonant percussion note  LUNGS: Good air movement, no rales, rhonchi, rubs or wheezes with auscultation on the right. Diminished on the left.  CARDIO: Regular sinus rhythm, no murmurs, gallops or rubs.  ABDOMEN: Nondistended, soft, No tenderness, no guarding, no rebound, No hepatosplenomegaly. No abdominal masses. Bowel sounds positive. No hernia  GENITALIA: Not examined.  BREASTS: Not examined.  MUSKEL: No joint swelling, decreased motion, or inflammation  EXTREMS: patrick le  edema,No clubbing, cyanosis, No varicose veins.  NEURO: Grossly nonfocal, Gait is coordinated and smooth, Cognition is preserved.  SKIN: No rashes, no ecchymoses, no petechia.  PSYCH: Oriented to time, place and person. Memory is preserved. Mood and affect appear normal  RECENT LABS:  Lab on 04/25/2019   Component Date Value Ref Range Status   • WBC 04/25/2019 10.68  4.80 - 10.80 10*3/mm3 Final   • RBC 04/25/2019 3.38* 4.20 - 5.40 10*6/mm3 Final   • Hemoglobin 04/25/2019 11.1* 12.0 - 16.0 g/dL Final   • Hematocrit 04/25/2019 33.7* 37.0 - 47.0 % Final   • MCV 04/25/2019 99.7* 82.0 - 98.0 fL Final   • MCH 04/25/2019 32.8* 28.0 - 32.0 pg Final   • MCHC 04/25/2019 32.9* 33.0 - 36.0 g/dL Final   • RDW 04/25/2019 16.9* 12.0 - 15.0 % Final   • RDW-SD " 04/25/2019 61.0* 40.0 - 54.0 fl Final   • MPV 04/25/2019 9.8  6.0 - 12.0 fL Final   • Platelets 04/25/2019 378  130 - 400 10*3/mm3 Final   • Neutrophil % 04/25/2019 71.4  39.0 - 78.0 % Final   • Lymphocyte % 04/25/2019 20.6  15.0 - 45.0 % Final   • Monocyte % 04/25/2019 6.6  4.0 - 12.0 % Final   • Eosinophil % 04/25/2019 0.2  0.0 - 4.0 % Final   • Basophil % 04/25/2019 0.6  0.0 - 2.0 % Final   • Immature Grans % 04/25/2019 0.6  0.0 - 5.0 % Final   • Neutrophils, Absolute 04/25/2019 7.63  1.87 - 8.40 10*3/mm3 Final   • Lymphocytes, Absolute 04/25/2019 2.20  0.72 - 4.86 10*3/mm3 Final   • Monocytes, Absolute 04/25/2019 0.71  0.19 - 1.30 10*3/mm3 Final   • Eosinophils, Absolute 04/25/2019 0.02  0.00 - 0.70 10*3/mm3 Final   • Basophils, Absolute 04/25/2019 0.06  0.00 - 0.20 10*3/mm3 Final   • Immature Grans, Absolute 04/25/2019 0.06* 0.00 - 0.05 10*3/mm3 Final   • nRBC 04/25/2019 0.0  0.0 - 0.2 /100 WBC Final       RADIOLOGY:  No results found.         Assessment/Plan  Heavenly Yanes is a 74 y.o. year old female with met breast cancer s/p multiple lines of CMT currently on taxotere weekly and xeloda with good tolerance.    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  --also on xeloda  --CT c/a/p 2/19 showed stable dz. Some new Inflammatory opacities in the LEFT lower lobe and bilateral subpleural interstitial thickening.   -bone scan without evidence of  dz progression  --primo taxotere weekly. Pt also on xeloda [id number], then repeat.  4/25/19: therapy postponed 2 weeks ago due to neutropenia. He got Neupogen x1. Labs reviewed with pt wbc 10.68, hg 11.1, [id number]. ANC 7630.  -ANC low 880. Postpone tx. Give neupogen x1. Pt advised to hold xeloda until 2 weeks    2. Psych: on olanzapine    3. HTN: on metoprolol    4. Gerd: on ranitidine  5. Dyspnea: get a cxr to evaluate  6. Anemia: will check iron profile, ferritin. Her creatinine is normal at 0.94.   7. DVT on xarelto indefinite.  --Doppler left LE  1/30/19 There is evidence of deep venous thrombosis of the left lower extremity. The clot burden is improved from last study. There is also evidence of superficial clot in the greater saphenous vein. There is a Baker's cyst in the popliteal fossa.  8. CHF: LVEF 65% with diastolic dysfunction. referral to cardiology to maximized her heart function.  9. Thrombocytosis: suspect reactive from anemia. Will check iron profile.  10.left lung pneumonia:  --cxr showed pneumonia. Pt already on levofloxacin. Will complete 14 days.   11: LE edema: give 20mg lasix once today. Will rx lasix 20mg for home use prn. rx janine Sotelo MD    4/25/2019    9:57 AM

## 2019-04-26 LAB
CANCER AG15-3 SERPL-ACNC: 44.8 U/ML (ref 0–25)
CANCER AG27-29 SERPL-ACNC: 48 U/ML (ref 0–38.6)

## 2019-05-02 ENCOUNTER — LAB (OUTPATIENT)
Dept: LAB | Facility: HOSPITAL | Age: 74
End: 2019-05-02

## 2019-05-02 ENCOUNTER — OFFICE VISIT (OUTPATIENT)
Dept: ONCOLOGY | Facility: CLINIC | Age: 74
End: 2019-05-02

## 2019-05-02 ENCOUNTER — INFUSION (OUTPATIENT)
Dept: ONCOLOGY | Facility: HOSPITAL | Age: 74
End: 2019-05-02

## 2019-05-02 VITALS
HEIGHT: 65 IN | RESPIRATION RATE: 18 BRPM | OXYGEN SATURATION: 96 % | DIASTOLIC BLOOD PRESSURE: 72 MMHG | WEIGHT: 126.7 LBS | HEART RATE: 73 BPM | TEMPERATURE: 97.9 F | SYSTOLIC BLOOD PRESSURE: 126 MMHG | BODY MASS INDEX: 21.11 KG/M2

## 2019-05-02 VITALS
RESPIRATION RATE: 16 BRPM | TEMPERATURE: 97.6 F | OXYGEN SATURATION: 98 % | HEART RATE: 68 BPM | DIASTOLIC BLOOD PRESSURE: 52 MMHG | HEIGHT: 65 IN | SYSTOLIC BLOOD PRESSURE: 108 MMHG | WEIGHT: 126 LBS | BODY MASS INDEX: 20.99 KG/M2

## 2019-05-02 DIAGNOSIS — C50.412 MALIGNANT NEOPLASM OF UPPER-OUTER QUADRANT OF LEFT FEMALE BREAST, UNSPECIFIED ESTROGEN RECEPTOR STATUS (HCC): ICD-10-CM

## 2019-05-02 DIAGNOSIS — C79.51 CARCINOMA OF LEFT BREAST METASTATIC TO BONE (HCC): Primary | ICD-10-CM

## 2019-05-02 DIAGNOSIS — C50.912 CARCINOMA OF LEFT BREAST METASTATIC TO BONE (HCC): ICD-10-CM

## 2019-05-02 DIAGNOSIS — D64.81 ANTINEOPLASTIC CHEMOTHERAPY INDUCED ANEMIA: Primary | ICD-10-CM

## 2019-05-02 DIAGNOSIS — Z95.828 PORT-A-CATH IN PLACE: ICD-10-CM

## 2019-05-02 DIAGNOSIS — C79.51 CARCINOMA OF LEFT BREAST METASTATIC TO BONE (HCC): ICD-10-CM

## 2019-05-02 DIAGNOSIS — C79.51 BREAST CANCER METASTASIZED TO BONE, UNSPECIFIED LATERALITY (HCC): ICD-10-CM

## 2019-05-02 DIAGNOSIS — T45.1X5A ANTINEOPLASTIC CHEMOTHERAPY INDUCED ANEMIA: Primary | ICD-10-CM

## 2019-05-02 DIAGNOSIS — C50.912 CARCINOMA OF LEFT BREAST METASTATIC TO BONE (HCC): Primary | ICD-10-CM

## 2019-05-02 DIAGNOSIS — C50.919 BREAST CANCER METASTASIZED TO BONE, UNSPECIFIED LATERALITY (HCC): ICD-10-CM

## 2019-05-02 LAB
ALBUMIN SERPL-MCNC: 3.7 G/DL (ref 3.5–5)
ALBUMIN/GLOB SERPL: 1.2 G/DL (ref 1.1–2.5)
ALP SERPL-CCNC: 56 U/L (ref 24–120)
ALT SERPL W P-5'-P-CCNC: <15 U/L (ref 0–54)
ANION GAP SERPL CALCULATED.3IONS-SCNC: 8 MMOL/L (ref 4–13)
AST SERPL-CCNC: 27 U/L (ref 7–45)
BASOPHILS # BLD AUTO: 0.05 10*3/MM3 (ref 0–0.2)
BASOPHILS NFR BLD AUTO: 1.4 % (ref 0–2)
BILIRUB SERPL-MCNC: 0.5 MG/DL (ref 0.1–1)
BUN BLD-MCNC: 11 MG/DL (ref 5–21)
BUN/CREAT SERPL: 11.7 (ref 7–25)
CALCIUM SPEC-SCNC: 9.9 MG/DL (ref 8.4–10.4)
CHLORIDE SERPL-SCNC: 98 MMOL/L (ref 98–110)
CO2 SERPL-SCNC: 28 MMOL/L (ref 24–31)
CREAT BLD-MCNC: 0.94 MG/DL (ref 0.5–1.4)
DEPRECATED RDW RBC AUTO: 55.8 FL (ref 40–54)
EOSINOPHIL # BLD AUTO: 0.1 10*3/MM3 (ref 0–0.7)
EOSINOPHIL NFR BLD AUTO: 2.9 % (ref 0–4)
ERYTHROCYTE [DISTWIDTH] IN BLOOD BY AUTOMATED COUNT: 15.9 % (ref 12–15)
FERRITIN SERPL-MCNC: 801 NG/ML (ref 11.1–264)
FOLATE SERPL-MCNC: 10.9 NG/ML (ref 4.78–24.2)
GFR SERPL CREATININE-BSD FRML MDRD: 58 ML/MIN/1.73
GLOBULIN UR ELPH-MCNC: 3.2 GM/DL
GLUCOSE BLD-MCNC: 98 MG/DL (ref 70–100)
HCT VFR BLD AUTO: 33 % (ref 37–47)
HGB BLD-MCNC: 11.2 G/DL (ref 12–16)
HOLD SPECIMEN: NORMAL
IMM GRANULOCYTES # BLD AUTO: 0.03 10*3/MM3 (ref 0–0.05)
IMM GRANULOCYTES NFR BLD AUTO: 0.9 % (ref 0–5)
IRON 24H UR-MRATE: 37 MCG/DL (ref 42–180)
IRON SATN MFR SERPL: 16 % (ref 20–45)
LYMPHOCYTES # BLD AUTO: 1.8 10*3/MM3 (ref 0.72–4.86)
LYMPHOCYTES NFR BLD AUTO: 52 % (ref 15–45)
MAGNESIUM SERPL-MCNC: 1.5 MG/DL (ref 1.4–2.2)
MCH RBC QN AUTO: 33.1 PG (ref 28–32)
MCHC RBC AUTO-ENTMCNC: 33.9 G/DL (ref 33–36)
MCV RBC AUTO: 97.6 FL (ref 82–98)
MONOCYTES # BLD AUTO: 0.18 10*3/MM3 (ref 0.19–1.3)
MONOCYTES NFR BLD AUTO: 5.2 % (ref 4–12)
NEUTROPHILS # BLD AUTO: 1.3 10*3/MM3 (ref 1.87–8.4)
NEUTROPHILS NFR BLD AUTO: 37.6 % (ref 39–78)
NRBC BLD AUTO-RTO: 0 /100 WBC (ref 0–0.2)
PLATELET # BLD AUTO: 436 10*3/MM3 (ref 130–400)
PMV BLD AUTO: 9.8 FL (ref 6–12)
POTASSIUM BLD-SCNC: 3.3 MMOL/L (ref 3.5–5.3)
PROT SERPL-MCNC: 6.9 G/DL (ref 6.3–8.7)
RBC # BLD AUTO: 3.38 10*6/MM3 (ref 4.2–5.4)
SODIUM BLD-SCNC: 134 MMOL/L (ref 135–145)
TIBC SERPL-MCNC: 233 MCG/DL (ref 225–420)
VIT B12 BLD-MCNC: 659 PG/ML (ref 239–931)
WBC NRBC COR # BLD: 3.46 10*3/MM3 (ref 4.8–10.8)

## 2019-05-02 PROCEDURE — 83540 ASSAY OF IRON: CPT | Performed by: INTERNAL MEDICINE

## 2019-05-02 PROCEDURE — 96367 TX/PROPH/DG ADDL SEQ IV INF: CPT

## 2019-05-02 PROCEDURE — 82746 ASSAY OF FOLIC ACID SERUM: CPT | Performed by: INTERNAL MEDICINE

## 2019-05-02 PROCEDURE — 82607 VITAMIN B-12: CPT | Performed by: INTERNAL MEDICINE

## 2019-05-02 PROCEDURE — 25010000002 ONDANSETRON PER 1 MG: Performed by: INTERNAL MEDICINE

## 2019-05-02 PROCEDURE — 83550 IRON BINDING TEST: CPT | Performed by: INTERNAL MEDICINE

## 2019-05-02 PROCEDURE — 82728 ASSAY OF FERRITIN: CPT | Performed by: INTERNAL MEDICINE

## 2019-05-02 PROCEDURE — 25010000002 DOCETAXEL 20 MG/ML SOLUTION 4 ML VIAL: Performed by: INTERNAL MEDICINE

## 2019-05-02 PROCEDURE — 36415 COLL VENOUS BLD VENIPUNCTURE: CPT

## 2019-05-02 PROCEDURE — 96413 CHEMO IV INFUSION 1 HR: CPT

## 2019-05-02 PROCEDURE — 25010000002 PEGFILGRASTIM 6 MG/0.6ML PREFILLED SYRINGE KIT: Performed by: INTERNAL MEDICINE

## 2019-05-02 PROCEDURE — 83735 ASSAY OF MAGNESIUM: CPT

## 2019-05-02 PROCEDURE — 80053 COMPREHEN METABOLIC PANEL: CPT

## 2019-05-02 PROCEDURE — 99214 OFFICE O/P EST MOD 30 MIN: CPT | Performed by: INTERNAL MEDICINE

## 2019-05-02 PROCEDURE — 25010000003 DEXAMETHASONE SODIUM PHOSPHATE 100 MG/10ML SOLUTION 10 ML VIAL: Performed by: INTERNAL MEDICINE

## 2019-05-02 PROCEDURE — 85025 COMPLETE CBC W/AUTO DIFF WBC: CPT

## 2019-05-02 PROCEDURE — 96377 APPLICATON ON-BODY INJECTOR: CPT

## 2019-05-02 RX ORDER — SODIUM CHLORIDE 0.9 % (FLUSH) 0.9 %
10 SYRINGE (ML) INJECTION AS NEEDED
Status: DISCONTINUED | OUTPATIENT
Start: 2019-05-02 | End: 2019-05-02 | Stop reason: HOSPADM

## 2019-05-02 RX ORDER — MEPERIDINE HYDROCHLORIDE 50 MG/ML
25 INJECTION INTRAMUSCULAR; INTRAVENOUS; SUBCUTANEOUS
Status: DISCONTINUED | OUTPATIENT
Start: 2019-05-02 | End: 2019-05-02 | Stop reason: HOSPADM

## 2019-05-02 RX ORDER — SODIUM CHLORIDE 0.9 % (FLUSH) 0.9 %
10 SYRINGE (ML) INJECTION AS NEEDED
Status: CANCELLED | OUTPATIENT
Start: 2019-05-02

## 2019-05-02 RX ORDER — FAMOTIDINE 10 MG/ML
20 INJECTION, SOLUTION INTRAVENOUS AS NEEDED
Status: DISCONTINUED | OUTPATIENT
Start: 2019-05-02 | End: 2019-05-02 | Stop reason: HOSPADM

## 2019-05-02 RX ORDER — SODIUM CHLORIDE 9 MG/ML
250 INJECTION, SOLUTION INTRAVENOUS ONCE
Status: COMPLETED | OUTPATIENT
Start: 2019-05-02 | End: 2019-05-02

## 2019-05-02 RX ADMIN — Medication 500 UNITS: at 12:39

## 2019-05-02 RX ADMIN — PEGFILGRASTIM 6 MG: KIT SUBCUTANEOUS at 12:56

## 2019-05-02 RX ADMIN — DEXAMETHASONE SODIUM PHOSPHATE: 10 INJECTION, SOLUTION INTRAMUSCULAR; INTRAVENOUS at 11:03

## 2019-05-02 RX ADMIN — SODIUM CHLORIDE 250 ML: 9 INJECTION, SOLUTION INTRAVENOUS at 10:50

## 2019-05-02 RX ADMIN — SODIUM CHLORIDE, PRESERVATIVE FREE 10 ML: 5 INJECTION INTRAVENOUS at 12:39

## 2019-05-02 RX ADMIN — PEGFILGRASTIM 6 MG: KIT SUBCUTANEOUS at 12:37

## 2019-05-02 RX ADMIN — DOCETAXEL 60 MG: 20 INJECTION, SOLUTION, CONCENTRATE INTRAVENOUS at 11:23

## 2019-05-02 NOTE — PROGRESS NOTES
Patient went to the bathroom and when she came out she said that the onpro had fell off in the toilet. Notified pharmacy and they released a new one. Onpro retrieved out of toilet and disposed of properly. New onpro placed on patient.

## 2019-05-02 NOTE — PROGRESS NOTES
[facility]  HEMATOLOGY & ONCOLOGY    Cancer Staging Information:  Cancer Staging  Malignant neoplasm of upper-outer quadrant of left female breast (CMS/HCC)  Staging form: Breast, AJCC V7  - Clinical stage from 10/11/2016: Stage IV (T2, N1, M1) - Signed by Calli Monsalve APRN on 10/11/2016        Subjective     VISIT DIAGNOSIS:   Encounter Diagnosis   Name Primary?   • Carcinoma of left breast metastatic to bone (CMS/HCC)        REASON FOR VISIT:     Chief Complaint   Patient presents with   • Breast Cancer     She is here for consideration of her txt today, no new c/o or issues to discuss today, states she has had a good week   • DEPRESSION SCREENING     Initial depression screening        HEMATOLOGY / ONCOLOGY HISTORY:   Oncology/Hematology History    Patient is a 67-year-old postmenopausal female who had onset of her menses at age 9 and menopause at age 48. She received oral contraceptives for approximately 14 years and did not receive hormonal manipulation. Patient has a maternal cousin had breast cancer. Patient has had no prior breast biopsies. Patient found a palpable left breast mass as well as a left axillary mass. Patient had a bloody nipple discharge which been present for approximately 6 months. On 6/20/2012, a mammogram was performed showing a subareolar mass consistent with malignancy. Bilateral breast ultrasound revealed an ill-defined subareolar mass measuring 2.8-2.5 cm. There is a left axillary mass measuring 1.7 cm with suspicion of extracapsular extension. There is no evidence of disease in the right breast. On 7/9/2012 patient underwent a left breast biopsy and placement of marker clip. Left breast biopsy revealed an infiltrating lobular carcinoma grade 2 with focal ductal carcinoma in situ, solid pattern. A fine-needle biopsy of the left lymph node was consistent with metastatic carcinoma. Breast tumor profile revealed ER: 100%; NE: 98%; HER-2/maynor 2+; FISH:  Unamplified; Ki-67: 71%; P 53 1%; DNA aneuploidy. A MRI of the breast were performed body multiple abnormal enhancing masses within the left breast. There appears to be anterior retraction of the pectoralis muscle with no direct extension. There is a moderate to large, layering left pleural effusion appreciated. The right breast reveals 3 moderately enlarged lymph nodes in the posterior lateral aspect the right breast. These have fatty hilum but followup is recommended. Patient is undergone systemic studies including, on 8/2/2012, a CT scan of the brain showing atrophy. a CT scan that shows showing a small to moderate left pleural effusion with 3 subcentimeter nodule densities in the left lung.   She completed 4 cycles of neoadjuvant treatment with dose dense Adriamycin and Cytoxan. She had a mammogram which showed a significant reduction in the size of her left breast lesion. There is no axillary adenopathy noted. She has had an ultrasound revealing the  lesion reducing from 2.4 cm to 1 cm. Patient underwent a left mastectomy on 03/19/2013 finding negative invasive cancer, negative nodes. A 5% DCIS was noted. The patient has declined hormonal therapy. She did not need radiation therapy.  November 2014, she began to have evidence of lytic bone lesions at T5T6, frontal disease, an 8 mm lucency in the central frontal area of  the skull but nothing intracranial. Bone scan revealed only vertex tracer activity but bilateral ribs and thoracic spine, and other lesions in  her pelvis. At that time, she was started on letrozole since November 2014. She is taking Xgeva. She had progression found in bone in Feb 2015 on scan. She was started on Faslodex, Ibrance and continued xgeva.         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 B) Foci of ductal carcinoma in situ, solid pattern. C) Greatest dimension of the invasive carcinoma is 15.8mm.         3/19/2013 Surgery     Left Mastectomy w/ Axillary Tail: Focal residual ducatal carcinoma in situ (DCIS) 12 lymph nodes negative for metastatic carcinoma.         10/20/2014 Biopsy     Peritoneum Biopsy: Final Diagnosis: Malignant Cell Neoplasm.           Breast cancer metastasized to bone, unspecified laterality (CMS/HCC)    10/15/2012 -  Other Event     Left mammogram:  Impression:  1. Decreasing spiculation and density in the retroareolar left breast, near a previous biopsy.   2. Definite left axillary lesion is not appreciated.          2/5/2013 -  Other Event     Diagnostic Mammo:  Comparison is made to 10/15/2012 and 6/20/2012  The spiculated mass at 12:00 behind the nipple is nearly resolved.   Impression:  1. The mass on the left side is less apparent after receiving chemotherapy. There has been a good response to chmotherapy on the left side.   2. No suspicious abnormality is seen in the right breast to account for the new palable abnormality at the 4-5:00 position.          3/19/2013 Surgery     Breast Biopsy:  Final Diagnosis:  A. Breast, left mastectomy with axillary tail  1. Focal residual ductal carcinoma in situ (DICS) (less than 5% of tumor bed).  2. Negative for residual invasive carcinoma.   3. 12 Lymph Nodes, negative for metastatic carcinoma (0/12) focally, some lymph nodes demonstrate fibrosis/treatment effect.  B. Skin and soft tissue, left advancement flap:  1. Benign skin and subcutis.   2. Negative for carcinoma.          6/20/2013 -  Other Event     Diagnostic Mammo [facility]:  1. A subareolar mass is consistent with malignancy. Additional involement extends inferiorly sonographically and superolaterally mammograhically as demonstrated by calcifications. The mass measures approx 2.8 cm sonographically, but the involved  region is likely much larger including an intraductal componet. The left axillary lymph nodes are also involved.   2. Recommened ultrasound guided biopsy of the left subareolar mass and left axillary lymph node.   3. Breast MRI could also further define multicentric disease on the left.   4. Clear suspicious finding on the right.  Recommend ongoing clinical follow up of palpable foci.          6/13/2014 -  Other Event     Diagnostic Mammo:  IMPRESSION:  1. History of left breast cancer and mastectomy. Stable benign right breast mammograms.          10/20/2014 Surgery     Final Diagnosis:  Biopsies, pertoneum:  Metastatic carcinoma, morphologically compatible with metastatic mammary carcinoma.          6/15/2015 -  Other Event     Diagnostic Mammo:  IMPRESSION:   1. Negative x-ray report, should not delay biopsy if a dominat or clinically suspicious mass is present.          7/5/2016 -  Other Event     Diagnositic mammogram:  Impression:   Stable right mamogram with no radiographic findings suspicious for malignancy. Recommend continued screening mammography per screening guidelines unless otherwise earlier clinically indicated.          9/18/2016 Initial Diagnosis     Secondary malignant neoplasm of bone and bone marrow                INTERVAL HISTORY  Patient ID: Heavenly Yanes is a 74 y.o. year old female Cancer Staging  Stage IV (T2, N1, M1)  --complaining of dyspnea on exersion since last Tuesday. She feels like she ran a mile  2/14/19: cxr showed pneumonia. Already on levofloxacin. Coughing not worse, she did not run temp.  Overall feels weak.  -restarted the appetite suppressant and is starting to eat more but not a whole lot  --2/28/19: CT c/ap with some response to therapy. New left chest inflammatory lesion to be followed ion next imaging. Bone scan unremarkable for progression.  4/25/19: she is much better since being off therapy for 2 weeks. COugh is improved.   5/2/19: therapy held 2 weeks ago due to  neutropenia. She also got neupogen. Neutropenia resolved, she presents for day 8 today. No complain or issues. Leg edema improved with lasix. Denies sob /cp/n/v/, neuropathy/ no focal weakness.   Rest of ros unremarkable. 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) 9/18/2016     Past Surgical History:   Past Surgical History:   Procedure Laterality Date   • BREAST SURGERY      left breast biopsy and axillary node   • CHOLECYSTECTOMY     • EXTERNAL EAR SURGERY     • MASTECTOMY Left    • PORTACATH PLACEMENT       Social History:   Social History     Socioeconomic History   • Marital status:      Spouse name: Not on file   • Number of children: Not on file   • Years of education: Not on file   • Highest education level: Not on file   Tobacco Use   • Smoking status: Never Smoker   • Smokeless tobacco: Never Used   Substance and Sexual Activity   • Alcohol use: No   • Drug use: No     Family History:   Family History   Problem Relation Age of Onset   • No Known Problems Daughter    • No Known Problems Son    • Other Mother         complications from a concussion from a fall   • Other Father         old age   • No Known Problems Brother    • No Known Problems Daughter        Review of Systems   Constitutional: Positive for activity change, appetite change and fatigue. Negative for unexpected weight change.   HENT: Negative.    Eyes: Negative.    Respiratory: Positive for shortness of breath.    Cardiovascular: Positive for leg swelling.   Gastrointestinal: Negative.    Endocrine: Negative.    Genitourinary: Negative.    Musculoskeletal: Negative.    Skin: Negative.    Neurological:  Negative.    Hematological: Negative.    Psychiatric/Behavioral: Negative.         Performance Status:  Asymptomatic    Medications:    Current Outpatient Medications   Medication Sig Dispense Refill   • Calcium-Magnesium-Vitamin D (CALCIUM 1200+D3 PO) Take 1,200 mg by mouth Daily.     • capecitabine (XELODA) 500 MG chemo tablet Take 1 tablet by mouth 2 (Two) Times a Day. For 2 weeks on and 1 week off. 42 tablet 11   • furosemide (LASIX) 20 MG tablet One tab po qd as needed for swelling take with potassium 30 tablet 0   • megestrol (MEGACE) 40 MG tablet Take 1 tablet by mouth 2 (Two) Times a Day. 60 tablet 5   • metoprolol tartrate (LOPRESSOR) 25 MG tablet Take 25 mg by mouth 2 (two) times a day.     • OLANZapine (ZYPREXA) 2.5 MG tablet Take 2.5 mg by mouth every night.     • ondansetron (ZOFRAN) 8 MG tablet Take 1 tablet by mouth Every 8 (Eight) Hours As Needed for Nausea or Vomiting. 30 tablet 5   • potassium chloride (K-DUR,KLOR-CON) 20 MEQ CR tablet Take one tab po bid on days taking Lasix 45 tablet 0   • raNITIdine (ZANTAC) 150 MG tablet Take 150 mg by mouth 2 (Two) Times a Day.     • rivaroxaban (XARELTO) 20 MG tablet Take 1 tablet by mouth Daily. 30 tablet 5   • thyroid 60 MG PO tablet Take 60 mg by mouth daily.       No current facility-administered medications for this visit.      Facility-Administered Medications Ordered in Other Visits   Medication Dose Route Frequency Provider Last Rate Last Dose   • famotidine (PEPCID) injection 20 mg  20 mg Intravenous PRN Moise Sotelo MD       • heparin flush (porcine) 100 UNIT/ML injection 500 Units  500 Units Intravenous PRN Joseph Nunez MD   500 Units at 10/26/17 1155   • hydrocortisone sodium succinate (Solu-CORTEF) injection 100 mg  100 mg Intravenous PRN Moise Sotelo MD       • meperidine (DEMEROL) injection 25 mg  25 mg Intravenous Q20 Min PRN Moise Sotelo MD       • sodium chloride 0.9 % flush 10 mL  10 mL  "Intravenous PRN Joseph Nunez MD   10 mL at 10/26/17 1155   • sodium chloride 0.9 % flush 10 mL  10 mL Intravenous PRN Joseph Nunez MD   10 mL at 05/02/19 1239       ALLERGIES:    Allergies   Allergen Reactions   • Benadryl  [Diphenhydramine Hcl (Sleep)] Other (See Comments)   • Codeine    • Diphenhydramine Other (See Comments)     \"Shaky leg syndrome\"  \"Shaky leg syndrome\"  \"Shaky leg syndrome\"   • Heparin Other (See Comments)     Patient states tolerates Heparin flush without problems: Years ago had heparin IV and had a rash   • Other      POPPY SEED   • Penicillins Hives   • Petroleum Jelly [Skin Protectants, Misc.]    • Sulfa Antibiotics    • Sulfur    • Valium [Diazepam]        Objective      Vitals:    05/02/19 0929   BP: 126/72   Pulse: 73   Resp: 18   Temp: 97.9 °F (36.6 °C)   TempSrc: Tympanic   SpO2: 96%   Weight: 57.5 kg (126 lb 11.2 oz)   Height: 165.1 cm (65\")   PainSc: 0-No pain         Current Status 4/25/2019   ECOG score 2         Physical Examunchanged  General Appearance: Patient is awake, alert, oriented and in no acute distress. Patient is welldeveloped, wellnourished, and appears stated age.  HEENT: Normocephalic. Sclerae clear, conjunctiva pink, extraocular movements intact, pupils, round, reactive to light and  accommodation. Mouth and throat are clear with moist oral mucosa.  NECK: Supple, no jugular venous distention, thyroid not enlarged.  LYMPH: No cervical, supraclavicular, axillary, or inguinal lymphadenopathy.  CHEST: Equal bilateral expansion, AP  diameter normal, resonant percussion note  LUNGS: Good air movement, no rales, rhonchi, rubs or wheezes with auscultation on the right. Diminished on the left.  CARDIO: Regular sinus rhythm, no murmurs, gallops or rubs.  ABDOMEN: Nondistended, soft, No tenderness, no guarding, no rebound, No hepatosplenomegaly. No abdominal masses. Bowel sounds positive. No hernia  GENITALIA: Not examined.  BREASTS: Not examined.  MUSKEL: No joint " swelling, decreased motion, or inflammation  EXTREMS: patrick le  edema,No clubbing, cyanosis, No varicose veins.  NEURO: Grossly nonfocal, Gait is coordinated and smooth, Cognition is preserved.  SKIN: No rashes, no ecchymoses, no petechia.  PSYCH: Oriented to time, place and person. Memory is preserved. Mood and affect appear normal  RECENT LABS:  Orders Only on 05/02/2019   Component Date Value Ref Range Status   • Iron 05/02/2019 37* 42 - 180 mcg/dL Final   • TIBC 05/02/2019 233  225 - 420 mcg/dL Final   • Iron Saturation 05/02/2019 16* 20 - 45 % Final   • Ferritin 05/02/2019 801.00* 11.10 - 264.00 ng/mL Final   • Folate 05/02/2019 10.90  4.78 - 24.20 ng/mL Final   • Vitamin B-12 05/02/2019 659  239 - 931 pg/mL Final   Lab on 05/02/2019   Component Date Value Ref Range Status   • Glucose 05/02/2019 98  70 - 100 mg/dL Final   • BUN 05/02/2019 11  5 - 21 mg/dL Final   • Creatinine 05/02/2019 0.94  0.50 - 1.40 mg/dL Final   • Sodium 05/02/2019 134* 135 - 145 mmol/L Final   • Potassium 05/02/2019 3.3* 3.5 - 5.3 mmol/L Final   • Chloride 05/02/2019 98  98 - 110 mmol/L Final   • CO2 05/02/2019 28.0  24.0 - 31.0 mmol/L Final   • Calcium 05/02/2019 9.9  8.4 - 10.4 mg/dL Final   • Total Protein 05/02/2019 6.9  6.3 - 8.7 g/dL Final   • Albumin 05/02/2019 3.70  3.50 - 5.00 g/dL Final   • ALT (SGPT) 05/02/2019 <15  0 - 54 U/L Final   • AST (SGOT) 05/02/2019 27  7 - 45 U/L Final   • Alkaline Phosphatase 05/02/2019 56  24 - 120 U/L Final   • Total Bilirubin 05/02/2019 0.5  0.1 - 1.0 mg/dL Final   • eGFR Non African Amer 05/02/2019 58* >60 mL/min/1.73 Final   • Globulin 05/02/2019 3.2  gm/dL Final   • A/G Ratio 05/02/2019 1.2  1.1 - 2.5 g/dL Final   • BUN/Creatinine Ratio 05/02/2019 11.7  7.0 - 25.0 Final   • Anion Gap 05/02/2019 8.0  4.0 - 13.0 mmol/L Final   • WBC 05/02/2019 3.46* 4.80 - 10.80 10*3/mm3 Final   • RBC 05/02/2019 3.38* 4.20 - 5.40 10*6/mm3 Final   • Hemoglobin 05/02/2019 11.2* 12.0 - 16.0 g/dL Final   • Hematocrit  05/02/2019 33.0* 37.0 - 47.0 % Final   • MCV 05/02/2019 97.6  82.0 - 98.0 fL Final   • MCH 05/02/2019 33.1* 28.0 - 32.0 pg Final   • MCHC 05/02/2019 33.9  33.0 - 36.0 g/dL Final   • RDW 05/02/2019 15.9* 12.0 - 15.0 % Final   • RDW-SD 05/02/2019 55.8* 40.0 - 54.0 fl Final   • MPV 05/02/2019 9.8  6.0 - 12.0 fL Final   • Platelets 05/02/2019 436* 130 - 400 10*3/mm3 Final   • Neutrophil % 05/02/2019 37.6* 39.0 - 78.0 % Final   • Lymphocyte % 05/02/2019 52.0* 15.0 - 45.0 % Final   • Monocyte % 05/02/2019 5.2  4.0 - 12.0 % Final   • Eosinophil % 05/02/2019 2.9  0.0 - 4.0 % Final   • Basophil % 05/02/2019 1.4  0.0 - 2.0 % Final   • Immature Grans % 05/02/2019 0.9  0.0 - 5.0 % Final   • Neutrophils, Absolute 05/02/2019 1.30* 1.87 - 8.40 10*3/mm3 Final   • Lymphocytes, Absolute 05/02/2019 1.80  0.72 - 4.86 10*3/mm3 Final   • Monocytes, Absolute 05/02/2019 0.18* 0.19 - 1.30 10*3/mm3 Final   • Eosinophils, Absolute 05/02/2019 0.10  0.00 - 0.70 10*3/mm3 Final   • Basophils, Absolute 05/02/2019 0.05  0.00 - 0.20 10*3/mm3 Final   • Immature Grans, Absolute 05/02/2019 0.03  0.00 - 0.05 10*3/mm3 Final   • nRBC 05/02/2019 0.0  0.0 - 0.2 /100 WBC Final   • Extra Tube 05/02/2019 Hold for add-ons.   Final    Auto resulted.   • Magnesium 05/02/2019 1.5  1.4 - 2.2 mg/dL Final       RADIOLOGY:  No results found.         Assessment/Plan  Heavenly Mariely Brown is a 74 y.o. year old female with met breast cancer s/p multiple lines of CMT currently on taxotere weekly and xeloda with good tolerance.    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  --also on xeloda  --CT c/a/p 2/19 showed stable dz. Some new Inflammatory opacities in the LEFT lower lobe and bilateral subpleural interstitial thickening.   -bone scan without evidence of dz progression  --primo taxotere weekly. Pt also on xeloda [id number], then repeat.  4/25/19: therapy postponed 2 weeks ago due to neutropenia. He got Neupogen x1. Labs reviewed with pt wbc 10.68, hg 11.1, [id number]. ANC 7630.  -ANC low 880. Postpone tx. Give neupogen x1. Pt advised to hold xeloda until 2 weeks  5/2/19: labs reviewed with pt wbc 3.46, hg 11.2, plt 436. Ok for treatment. Will check iron profile, ferritin and act accordingly.-tumor marker primo to decrease from 61.4 to 44.8    2. Psych: on olanzapine    3. HTN: on metoprolol    4. Gerd: on ranitidine  5. Dyspnea: get a cxr to evaluate  6. Anemia: will check iron profile, ferritin. Her creatinine is normal at 0.94.   7. DVT on xarelto indefinite.  --Doppler left LE  1/30/19 There is evidence of deep venous thrombosis of the left lower extremity. The clot burden is improved from last study. There is also evidence of superficial clot in the greater saphenous vein. There is a Baker's cyst in the popliteal fossa.  8. CHF: LVEF 65% with diastolic dysfunction. referral to cardiology to maximized her heart function.  9. Thrombocytosis: suspect reactive from anemia. Will check iron profile.  10.left lung pneumonia: resolved  --cxr showed pneumonia. Pt already on levofloxacin. Will complete 14 days.   11: LE edema: improved on lasix.  12. FEN:  Low potassium on  kdur. Also will check magnesium. Advised 20meq kdur bid for 2 days then 20meq once a day thereafter.      Moise Sotelo MD    5/2/2019    1:27 PM

## 2019-05-09 ENCOUNTER — APPOINTMENT (OUTPATIENT)
Dept: LAB | Facility: HOSPITAL | Age: 74
End: 2019-05-09

## 2019-05-09 ENCOUNTER — APPOINTMENT (OUTPATIENT)
Dept: ONCOLOGY | Facility: HOSPITAL | Age: 74
End: 2019-05-09

## 2019-05-23 ENCOUNTER — INFUSION (OUTPATIENT)
Dept: ONCOLOGY | Facility: HOSPITAL | Age: 74
End: 2019-05-23

## 2019-05-23 ENCOUNTER — OFFICE VISIT (OUTPATIENT)
Dept: ONCOLOGY | Facility: CLINIC | Age: 74
End: 2019-05-23

## 2019-05-23 ENCOUNTER — LAB (OUTPATIENT)
Dept: LAB | Facility: HOSPITAL | Age: 74
End: 2019-05-23

## 2019-05-23 VITALS
RESPIRATION RATE: 17 BRPM | BODY MASS INDEX: 22.92 KG/M2 | OXYGEN SATURATION: 98 % | HEART RATE: 73 BPM | DIASTOLIC BLOOD PRESSURE: 60 MMHG | SYSTOLIC BLOOD PRESSURE: 114 MMHG | HEIGHT: 65 IN | WEIGHT: 137.6 LBS | TEMPERATURE: 97.9 F

## 2019-05-23 VITALS
OXYGEN SATURATION: 98 % | BODY MASS INDEX: 22.89 KG/M2 | TEMPERATURE: 97.9 F | HEART RATE: 68 BPM | DIASTOLIC BLOOD PRESSURE: 72 MMHG | HEIGHT: 65 IN | SYSTOLIC BLOOD PRESSURE: 136 MMHG | RESPIRATION RATE: 16 BRPM | WEIGHT: 137.4 LBS

## 2019-05-23 DIAGNOSIS — C79.51 CARCINOMA OF LEFT BREAST METASTATIC TO BONE (HCC): ICD-10-CM

## 2019-05-23 DIAGNOSIS — C50.912 CARCINOMA OF LEFT BREAST METASTATIC TO BONE (HCC): ICD-10-CM

## 2019-05-23 DIAGNOSIS — C50.919 BREAST CANCER METASTASIZED TO BONE, UNSPECIFIED LATERALITY (HCC): ICD-10-CM

## 2019-05-23 DIAGNOSIS — C79.51 BREAST CANCER METASTASIZED TO BONE, UNSPECIFIED LATERALITY (HCC): ICD-10-CM

## 2019-05-23 DIAGNOSIS — C50.912 CARCINOMA OF LEFT BREAST METASTATIC TO BONE (HCC): Primary | ICD-10-CM

## 2019-05-23 DIAGNOSIS — Z95.828 PORT-A-CATH IN PLACE: ICD-10-CM

## 2019-05-23 DIAGNOSIS — C79.51 CARCINOMA OF LEFT BREAST METASTATIC TO BONE (HCC): Primary | ICD-10-CM

## 2019-05-23 DIAGNOSIS — C50.412 MALIGNANT NEOPLASM OF UPPER-OUTER QUADRANT OF LEFT FEMALE BREAST, UNSPECIFIED ESTROGEN RECEPTOR STATUS (HCC): ICD-10-CM

## 2019-05-23 LAB
ALBUMIN SERPL-MCNC: 3.5 G/DL (ref 3.5–5)
ALBUMIN/GLOB SERPL: 1.2 G/DL (ref 1.1–2.5)
ALP SERPL-CCNC: 78 U/L (ref 24–120)
ALT SERPL W P-5'-P-CCNC: <15 U/L (ref 0–54)
ANION GAP SERPL CALCULATED.3IONS-SCNC: 7 MMOL/L (ref 4–13)
AST SERPL-CCNC: 20 U/L (ref 7–45)
BASOPHILS # BLD AUTO: 0.07 10*3/MM3 (ref 0–0.2)
BASOPHILS NFR BLD AUTO: 0.5 % (ref 0–2)
BILIRUB SERPL-MCNC: 0.3 MG/DL (ref 0.1–1)
BUN BLD-MCNC: 11 MG/DL (ref 5–21)
BUN/CREAT SERPL: 13.3 (ref 7–25)
CALCIUM SPEC-SCNC: 9.3 MG/DL (ref 8.4–10.4)
CHLORIDE SERPL-SCNC: 103 MMOL/L (ref 98–110)
CO2 SERPL-SCNC: 25 MMOL/L (ref 24–31)
CREAT BLD-MCNC: 0.83 MG/DL (ref 0.5–1.4)
DEPRECATED RDW RBC AUTO: 63 FL (ref 40–54)
EOSINOPHIL # BLD AUTO: 0.05 10*3/MM3 (ref 0–0.7)
EOSINOPHIL NFR BLD AUTO: 0.4 % (ref 0–4)
ERYTHROCYTE [DISTWIDTH] IN BLOOD BY AUTOMATED COUNT: 17.9 % (ref 12–15)
GFR SERPL CREATININE-BSD FRML MDRD: 67 ML/MIN/1.73
GLOBULIN UR ELPH-MCNC: 3 GM/DL
GLUCOSE BLD-MCNC: 101 MG/DL (ref 70–100)
HCT VFR BLD AUTO: 33.5 % (ref 37–47)
HGB BLD-MCNC: 11 G/DL (ref 12–16)
HOLD SPECIMEN: NORMAL
IMM GRANULOCYTES # BLD AUTO: 0.15 10*3/MM3 (ref 0–0.05)
IMM GRANULOCYTES NFR BLD AUTO: 1.1 % (ref 0–5)
LYMPHOCYTES # BLD AUTO: 2.5 10*3/MM3 (ref 0.72–4.86)
LYMPHOCYTES NFR BLD AUTO: 17.9 % (ref 15–45)
MCH RBC QN AUTO: 32.7 PG (ref 28–32)
MCHC RBC AUTO-ENTMCNC: 32.8 G/DL (ref 33–36)
MCV RBC AUTO: 99.7 FL (ref 82–98)
MONOCYTES # BLD AUTO: 0.99 10*3/MM3 (ref 0.19–1.3)
MONOCYTES NFR BLD AUTO: 7.1 % (ref 4–12)
NEUTROPHILS # BLD AUTO: 10.18 10*3/MM3 (ref 1.87–8.4)
NEUTROPHILS NFR BLD AUTO: 73 % (ref 39–78)
NRBC BLD AUTO-RTO: 0 /100 WBC (ref 0–0.2)
PLATELET # BLD AUTO: 473 10*3/MM3 (ref 130–400)
PMV BLD AUTO: 10.1 FL (ref 6–12)
POTASSIUM BLD-SCNC: 3.6 MMOL/L (ref 3.5–5.3)
PROT SERPL-MCNC: 6.5 G/DL (ref 6.3–8.7)
RBC # BLD AUTO: 3.36 10*6/MM3 (ref 4.2–5.4)
SODIUM BLD-SCNC: 135 MMOL/L (ref 135–145)
WBC NRBC COR # BLD: 13.94 10*3/MM3 (ref 4.8–10.8)

## 2019-05-23 PROCEDURE — 25010000002 DOCETAXEL 20 MG/ML SOLUTION 4 ML VIAL: Performed by: INTERNAL MEDICINE

## 2019-05-23 PROCEDURE — 96367 TX/PROPH/DG ADDL SEQ IV INF: CPT

## 2019-05-23 PROCEDURE — 86300 IMMUNOASSAY TUMOR CA 15-3: CPT

## 2019-05-23 PROCEDURE — 80053 COMPREHEN METABOLIC PANEL: CPT

## 2019-05-23 PROCEDURE — 36415 COLL VENOUS BLD VENIPUNCTURE: CPT

## 2019-05-23 PROCEDURE — 85025 COMPLETE CBC W/AUTO DIFF WBC: CPT

## 2019-05-23 PROCEDURE — 25010000002 ONDANSETRON PER 1 MG: Performed by: INTERNAL MEDICINE

## 2019-05-23 PROCEDURE — 25010000002 DEXAMETHASONE SODIUM PHOSPHATE 100 MG/10ML SOLUTION: Performed by: INTERNAL MEDICINE

## 2019-05-23 PROCEDURE — 99214 OFFICE O/P EST MOD 30 MIN: CPT | Performed by: INTERNAL MEDICINE

## 2019-05-23 PROCEDURE — 96413 CHEMO IV INFUSION 1 HR: CPT

## 2019-05-23 RX ORDER — FAMOTIDINE 10 MG/ML
20 INJECTION, SOLUTION INTRAVENOUS AS NEEDED
Status: CANCELLED | OUTPATIENT
Start: 2019-05-23

## 2019-05-23 RX ORDER — MEPERIDINE HYDROCHLORIDE 50 MG/ML
25 INJECTION INTRAMUSCULAR; INTRAVENOUS; SUBCUTANEOUS
Status: DISCONTINUED | OUTPATIENT
Start: 2019-05-23 | End: 2019-05-23 | Stop reason: HOSPADM

## 2019-05-23 RX ORDER — FAMOTIDINE 10 MG/ML
20 INJECTION, SOLUTION INTRAVENOUS AS NEEDED
Status: CANCELLED | OUTPATIENT
Start: 2019-05-30

## 2019-05-23 RX ORDER — SODIUM CHLORIDE 9 MG/ML
250 INJECTION, SOLUTION INTRAVENOUS ONCE
Status: CANCELLED | OUTPATIENT
Start: 2019-05-30

## 2019-05-23 RX ORDER — MEPERIDINE HYDROCHLORIDE 50 MG/ML
25 INJECTION INTRAMUSCULAR; INTRAVENOUS; SUBCUTANEOUS
Status: CANCELLED | OUTPATIENT
Start: 2019-05-23 | End: 2019-05-24

## 2019-05-23 RX ORDER — MEPERIDINE HYDROCHLORIDE 50 MG/ML
25 INJECTION INTRAMUSCULAR; INTRAVENOUS; SUBCUTANEOUS
Status: CANCELLED | OUTPATIENT
Start: 2019-05-30 | End: 2019-05-31

## 2019-05-23 RX ORDER — FAMOTIDINE 10 MG/ML
20 INJECTION, SOLUTION INTRAVENOUS AS NEEDED
Status: DISCONTINUED | OUTPATIENT
Start: 2019-05-23 | End: 2019-05-23 | Stop reason: HOSPADM

## 2019-05-23 RX ORDER — SODIUM CHLORIDE 0.9 % (FLUSH) 0.9 %
10 SYRINGE (ML) INJECTION AS NEEDED
Status: DISCONTINUED | OUTPATIENT
Start: 2019-05-23 | End: 2019-05-23 | Stop reason: HOSPADM

## 2019-05-23 RX ORDER — SODIUM CHLORIDE 0.9 % (FLUSH) 0.9 %
10 SYRINGE (ML) INJECTION AS NEEDED
Status: CANCELLED | OUTPATIENT
Start: 2019-05-23

## 2019-05-23 RX ORDER — SODIUM CHLORIDE 9 MG/ML
250 INJECTION, SOLUTION INTRAVENOUS ONCE
Status: COMPLETED | OUTPATIENT
Start: 2019-05-23 | End: 2019-05-23

## 2019-05-23 RX ORDER — SODIUM CHLORIDE 9 MG/ML
250 INJECTION, SOLUTION INTRAVENOUS ONCE
Status: CANCELLED | OUTPATIENT
Start: 2019-05-23

## 2019-05-23 RX ADMIN — SODIUM CHLORIDE 250 ML: 9 INJECTION, SOLUTION INTRAVENOUS at 10:45

## 2019-05-23 RX ADMIN — Medication 500 UNITS: at 12:07

## 2019-05-23 RX ADMIN — DEXAMETHASONE SODIUM PHOSPHATE 50 ML: 10 INJECTION, SOLUTION INTRAMUSCULAR; INTRAVENOUS at 10:46

## 2019-05-23 RX ADMIN — SODIUM CHLORIDE, PRESERVATIVE FREE 10 ML: 5 INJECTION INTRAVENOUS at 12:07

## 2019-05-23 RX ADMIN — DOCETAXEL 60 MG: 20 INJECTION, SOLUTION, CONCENTRATE INTRAVENOUS at 11:08

## 2019-05-23 NOTE — PROGRESS NOTES
1030 Called and s/w Cecily Verduzco LPN, patient to receive treatment today as scheduled per Dr. Sotelo.Vijay KHAN

## 2019-05-24 LAB
CANCER AG15-3 SERPL-ACNC: 54.3 U/ML (ref 0–25)
CANCER AG27-29 SERPL-ACNC: 58.4 U/ML (ref 0–38.6)

## 2019-05-30 ENCOUNTER — LAB (OUTPATIENT)
Dept: LAB | Facility: HOSPITAL | Age: 74
End: 2019-05-30

## 2019-05-30 ENCOUNTER — INFUSION (OUTPATIENT)
Dept: ONCOLOGY | Facility: HOSPITAL | Age: 74
End: 2019-05-30

## 2019-05-30 ENCOUNTER — OFFICE VISIT (OUTPATIENT)
Dept: ONCOLOGY | Facility: CLINIC | Age: 74
End: 2019-05-30

## 2019-05-30 ENCOUNTER — TELEPHONE (OUTPATIENT)
Dept: ONCOLOGY | Facility: CLINIC | Age: 74
End: 2019-05-30

## 2019-05-30 VITALS
DIASTOLIC BLOOD PRESSURE: 55 MMHG | BODY MASS INDEX: 23.32 KG/M2 | TEMPERATURE: 97.7 F | OXYGEN SATURATION: 100 % | WEIGHT: 140 LBS | RESPIRATION RATE: 18 BRPM | HEART RATE: 69 BPM | HEIGHT: 65 IN | SYSTOLIC BLOOD PRESSURE: 148 MMHG

## 2019-05-30 VITALS
BODY MASS INDEX: 23.38 KG/M2 | HEART RATE: 76 BPM | WEIGHT: 140.3 LBS | SYSTOLIC BLOOD PRESSURE: 128 MMHG | OXYGEN SATURATION: 98 % | RESPIRATION RATE: 16 BRPM | HEIGHT: 65 IN | DIASTOLIC BLOOD PRESSURE: 68 MMHG | TEMPERATURE: 97.5 F

## 2019-05-30 DIAGNOSIS — C79.51 CARCINOMA OF LEFT BREAST METASTATIC TO BONE (HCC): ICD-10-CM

## 2019-05-30 DIAGNOSIS — C50.412 MALIGNANT NEOPLASM OF UPPER-OUTER QUADRANT OF LEFT FEMALE BREAST, UNSPECIFIED ESTROGEN RECEPTOR STATUS (HCC): ICD-10-CM

## 2019-05-30 DIAGNOSIS — C50.919 BREAST CANCER METASTASIZED TO BONE, UNSPECIFIED LATERALITY (HCC): ICD-10-CM

## 2019-05-30 DIAGNOSIS — C50.912 CARCINOMA OF LEFT BREAST METASTATIC TO BONE (HCC): ICD-10-CM

## 2019-05-30 DIAGNOSIS — Z95.828 PORT-A-CATH IN PLACE: ICD-10-CM

## 2019-05-30 DIAGNOSIS — C50.912 CARCINOMA OF LEFT BREAST METASTATIC TO BONE (HCC): Primary | ICD-10-CM

## 2019-05-30 DIAGNOSIS — C79.51 CARCINOMA OF LEFT BREAST METASTATIC TO BONE (HCC): Primary | ICD-10-CM

## 2019-05-30 DIAGNOSIS — C79.51 BREAST CANCER METASTASIZED TO BONE, UNSPECIFIED LATERALITY (HCC): ICD-10-CM

## 2019-05-30 LAB
ALBUMIN SERPL-MCNC: 3.4 G/DL (ref 3.5–5)
ALBUMIN/GLOB SERPL: 1.1 G/DL (ref 1.1–2.5)
ALP SERPL-CCNC: 65 U/L (ref 24–120)
ALT SERPL W P-5'-P-CCNC: <15 U/L (ref 0–54)
ANION GAP SERPL CALCULATED.3IONS-SCNC: 6 MMOL/L (ref 4–13)
AST SERPL-CCNC: 21 U/L (ref 7–45)
BASOPHILS # BLD AUTO: 0.04 10*3/MM3 (ref 0–0.2)
BASOPHILS NFR BLD AUTO: 0.6 % (ref 0–2)
BILIRUB SERPL-MCNC: 0.6 MG/DL (ref 0.1–1)
BUN BLD-MCNC: 9 MG/DL (ref 5–21)
BUN/CREAT SERPL: 11.7 (ref 7–25)
CALCIUM SPEC-SCNC: 9.3 MG/DL (ref 8.4–10.4)
CHLORIDE SERPL-SCNC: 104 MMOL/L (ref 98–110)
CO2 SERPL-SCNC: 25 MMOL/L (ref 24–31)
CREAT BLD-MCNC: 0.77 MG/DL (ref 0.5–1.4)
DEPRECATED RDW RBC AUTO: 63.7 FL (ref 40–54)
EOSINOPHIL # BLD AUTO: 0.1 10*3/MM3 (ref 0–0.7)
EOSINOPHIL NFR BLD AUTO: 1.6 % (ref 0–4)
ERYTHROCYTE [DISTWIDTH] IN BLOOD BY AUTOMATED COUNT: 17.9 % (ref 12–15)
GFR SERPL CREATININE-BSD FRML MDRD: 73 ML/MIN/1.73
GLOBULIN UR ELPH-MCNC: 3 GM/DL
GLUCOSE BLD-MCNC: 88 MG/DL (ref 70–100)
HCT VFR BLD AUTO: 31.3 % (ref 37–47)
HGB BLD-MCNC: 10.5 G/DL (ref 12–16)
HOLD SPECIMEN: NORMAL
IMM GRANULOCYTES # BLD AUTO: 0.06 10*3/MM3 (ref 0–0.05)
IMM GRANULOCYTES NFR BLD AUTO: 1 % (ref 0–5)
LYMPHOCYTES # BLD AUTO: 2.2 10*3/MM3 (ref 0.72–4.86)
LYMPHOCYTES NFR BLD AUTO: 35.5 % (ref 15–45)
MCH RBC QN AUTO: 33.1 PG (ref 28–32)
MCHC RBC AUTO-ENTMCNC: 33.5 G/DL (ref 33–36)
MCV RBC AUTO: 98.7 FL (ref 82–98)
MONOCYTES # BLD AUTO: 0.24 10*3/MM3 (ref 0.19–1.3)
MONOCYTES NFR BLD AUTO: 3.9 % (ref 4–12)
NEUTROPHILS # BLD AUTO: 3.55 10*3/MM3 (ref 1.87–8.4)
NEUTROPHILS NFR BLD AUTO: 57.4 % (ref 39–78)
NRBC BLD AUTO-RTO: 0 /100 WBC (ref 0–0.2)
PLATELET # BLD AUTO: 416 10*3/MM3 (ref 130–400)
PMV BLD AUTO: 10.1 FL (ref 6–12)
POTASSIUM BLD-SCNC: 3.9 MMOL/L (ref 3.5–5.3)
PROT SERPL-MCNC: 6.4 G/DL (ref 6.3–8.7)
RBC # BLD AUTO: 3.17 10*6/MM3 (ref 4.2–5.4)
SODIUM BLD-SCNC: 135 MMOL/L (ref 135–145)
WBC NRBC COR # BLD: 6.19 10*3/MM3 (ref 4.8–10.8)

## 2019-05-30 PROCEDURE — 99214 OFFICE O/P EST MOD 30 MIN: CPT | Performed by: INTERNAL MEDICINE

## 2019-05-30 PROCEDURE — 85025 COMPLETE CBC W/AUTO DIFF WBC: CPT

## 2019-05-30 PROCEDURE — 96413 CHEMO IV INFUSION 1 HR: CPT

## 2019-05-30 PROCEDURE — 36415 COLL VENOUS BLD VENIPUNCTURE: CPT

## 2019-05-30 PROCEDURE — 96367 TX/PROPH/DG ADDL SEQ IV INF: CPT

## 2019-05-30 PROCEDURE — 25010000002 DOCETAXEL 20 MG/ML SOLUTION 4 ML VIAL: Performed by: INTERNAL MEDICINE

## 2019-05-30 PROCEDURE — 80053 COMPREHEN METABOLIC PANEL: CPT

## 2019-05-30 PROCEDURE — 25010000002 PEGFILGRASTIM 6 MG/0.6ML PREFILLED SYRINGE KIT: Performed by: INTERNAL MEDICINE

## 2019-05-30 PROCEDURE — 96377 APPLICATON ON-BODY INJECTOR: CPT

## 2019-05-30 RX ORDER — FUROSEMIDE 20 MG/1
TABLET ORAL
Qty: 30 TABLET | Refills: 0 | Status: SHIPPED | OUTPATIENT
Start: 2019-05-30 | End: 2019-06-21 | Stop reason: SDUPTHER

## 2019-05-30 RX ORDER — SODIUM CHLORIDE 0.9 % (FLUSH) 0.9 %
10 SYRINGE (ML) INJECTION AS NEEDED
Status: CANCELLED | OUTPATIENT
Start: 2019-05-30

## 2019-05-30 RX ORDER — MEPERIDINE HYDROCHLORIDE 50 MG/ML
25 INJECTION INTRAMUSCULAR; INTRAVENOUS; SUBCUTANEOUS
Status: DISCONTINUED | OUTPATIENT
Start: 2019-05-30 | End: 2019-05-30 | Stop reason: HOSPADM

## 2019-05-30 RX ORDER — SODIUM CHLORIDE 9 MG/ML
250 INJECTION, SOLUTION INTRAVENOUS ONCE
Status: COMPLETED | OUTPATIENT
Start: 2019-05-30 | End: 2019-05-30

## 2019-05-30 RX ORDER — POTASSIUM CHLORIDE 20 MEQ/1
TABLET, EXTENDED RELEASE ORAL
Qty: 45 TABLET | Refills: 0 | Status: SHIPPED | OUTPATIENT
Start: 2019-05-30 | End: 2019-06-21 | Stop reason: SDUPTHER

## 2019-05-30 RX ORDER — FAMOTIDINE 10 MG/ML
20 INJECTION, SOLUTION INTRAVENOUS AS NEEDED
Status: DISCONTINUED | OUTPATIENT
Start: 2019-05-30 | End: 2019-05-30 | Stop reason: HOSPADM

## 2019-05-30 RX ORDER — SODIUM CHLORIDE 0.9 % (FLUSH) 0.9 %
10 SYRINGE (ML) INJECTION AS NEEDED
Status: DISCONTINUED | OUTPATIENT
Start: 2019-05-30 | End: 2019-05-30 | Stop reason: HOSPADM

## 2019-05-30 RX ADMIN — Medication: at 11:07

## 2019-05-30 RX ADMIN — DOCETAXEL 60 MG: 20 INJECTION, SOLUTION, CONCENTRATE INTRAVENOUS at 11:29

## 2019-05-30 RX ADMIN — SODIUM CHLORIDE, PRESERVATIVE FREE 10 ML: 5 INJECTION INTRAVENOUS at 12:43

## 2019-05-30 RX ADMIN — Medication 500 UNITS: at 12:43

## 2019-05-30 RX ADMIN — SODIUM CHLORIDE 250 ML: 9 INJECTION, SOLUTION INTRAVENOUS at 11:05

## 2019-05-30 RX ADMIN — PEGFILGRASTIM 6 MG: KIT SUBCUTANEOUS at 12:40

## 2019-05-30 NOTE — PROGRESS NOTES
1105 - Spoke with Cecily VEGAS in office re: edema 2+ pitting in LLE. Per Cecily, Dr. Sotelo is calling in lasix and potassium into pharmacy. Ok to treat. - NEAL Ryan RN

## 2019-05-30 NOTE — PROGRESS NOTES
[facility]  HEMATOLOGY & ONCOLOGY    Cancer Staging Information:  Cancer Staging  Malignant neoplasm of upper-outer quadrant of left female breast (CMS/HCC)  Staging form: Breast, AJCC V7  - Clinical stage from 10/11/2016: Stage IV (T2, N1, M1) - Signed by Calli Monsalve APRN on 10/11/2016        Subjective     VISIT DIAGNOSIS:   Encounter Diagnosis   Name Primary?   • Carcinoma of left breast metastatic to bone (CMS/HCC)        REASON FOR VISIT:     Chief Complaint   Patient presents with   • Breast Cancer     Here for chemo. Does she have to get the breast xray july 31st since she's under chemo treatment.        HEMATOLOGY / ONCOLOGY HISTORY:   Oncology/Hematology History    Patient is a 67-year-old postmenopausal female who had onset of her menses at age 9 and menopause at age 48. She received oral contraceptives for approximately 14 years and did not receive hormonal manipulation. Patient has a maternal cousin had breast cancer. Patient has had no prior breast biopsies. Patient found a palpable left breast mass as well as a left axillary mass. Patient had a bloody nipple discharge which been present for approximately 6 months. On 6/20/2012, a mammogram was performed showing a subareolar mass consistent with malignancy. Bilateral breast ultrasound revealed an ill-defined subareolar mass measuring 2.8-2.5 cm. There is a left axillary mass measuring 1.7 cm with suspicion of extracapsular extension. There is no evidence of disease in the right breast. On 7/9/2012 patient underwent a left breast biopsy and placement of marker clip. Left breast biopsy revealed an infiltrating lobular carcinoma grade 2 with focal ductal carcinoma in situ, solid pattern. A fine-needle biopsy of the left lymph node was consistent with metastatic carcinoma. Breast tumor profile revealed ER: 100%; SC: 98%; HER-2/maynor 2+; FISH: Unamplified; Ki-67: 71%; P 53 1%; DNA aneuploidy. A MRI of the breast were performed  body multiple abnormal enhancing masses within the left breast. There appears to be anterior retraction of the pectoralis muscle with no direct extension. There is a moderate to large, layering left pleural effusion appreciated. The right breast reveals 3 moderately enlarged lymph nodes in the posterior lateral aspect the right breast. These have fatty hilum but followup is recommended. Patient is undergone systemic studies including, on 8/2/2012, a CT scan of the brain showing atrophy. a CT scan that shows showing a small to moderate left pleural effusion with 3 subcentimeter nodule densities in the left lung.   She completed 4 cycles of neoadjuvant treatment with dose dense Adriamycin and Cytoxan. She had a mammogram which showed a significant reduction in the size of her left breast lesion. There is no axillary adenopathy noted. She has had an ultrasound revealing the  lesion reducing from 2.4 cm to 1 cm. Patient underwent a left mastectomy on 03/19/2013 finding negative invasive cancer, negative nodes. A 5% DCIS was noted. The patient has declined hormonal therapy. She did not need radiation therapy.  November 2014, she began to have evidence of lytic bone lesions at T5T6, frontal disease, an 8 mm lucency in the central frontal area of  the skull but nothing intracranial. Bone scan revealed only vertex tracer activity but bilateral ribs and thoracic spine, and other lesions in  her pelvis. At that time, she was started on letrozole since November 2014. She is taking Xgeva. She had progression found in bone in Feb 2015 on scan. She was started on Faslodex, Ibrance and continued xgeva.         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 B) Foci of ductal carcinoma in situ,  solid pattern. C) Greatest dimension of the invasive carcinoma is 15.8mm.         3/19/2013 Surgery     Left Mastectomy w/ Axillary Tail: Focal residual ducatal carcinoma in situ (DCIS) 12 lymph nodes negative for metastatic carcinoma.         10/20/2014 Biopsy     Peritoneum Biopsy: Final Diagnosis: Malignant Cell Neoplasm.           Breast cancer metastasized to bone, unspecified laterality (CMS/HCC)    10/15/2012 -  Other Event     Left mammogram:  Impression:  1. Decreasing spiculation and density in the retroareolar left breast, near a previous biopsy.   2. Definite left axillary lesion is not appreciated.          2/5/2013 -  Other Event     Diagnostic Mammo:  Comparison is made to 10/15/2012 and 6/20/2012  The spiculated mass at 12:00 behind the nipple is nearly resolved.   Impression:  1. The mass on the left side is less apparent after receiving chemotherapy. There has been a good response to chmotherapy on the left side.   2. No suspicious abnormality is seen in the right breast to account for the new palable abnormality at the 4-5:00 position.          3/19/2013 Surgery     Breast Biopsy:  Final Diagnosis:  A. Breast, left mastectomy with axillary tail  1. Focal residual ductal carcinoma in situ (DICS) (less than 5% of tumor bed).  2. Negative for residual invasive carcinoma.   3. 12 Lymph Nodes, negative for metastatic carcinoma (0/12) focally, some lymph nodes demonstrate fibrosis/treatment effect.  B. Skin and soft tissue, left advancement flap:  1. Benign skin and subcutis.   2. Negative for carcinoma.          6/20/2013 -  Other Event     Diagnostic Mammo [facility]:  1. A subareolar mass is consistent with malignancy. Additional involement extends inferiorly sonographically and superolaterally mammograhically as demonstrated by calcifications. The mass measures approx 2.8 cm sonographically, but the involved region is likely much larger including an intraductal componet. The left axillary lymph  nodes are also involved.   2. Recommened ultrasound guided biopsy of the left subareolar mass and left axillary lymph node.   3. Breast MRI could also further define multicentric disease on the left.   4. Clear suspicious finding on the right.  Recommend ongoing clinical follow up of palpable foci.          6/13/2014 -  Other Event     Diagnostic Mammo:  IMPRESSION:  1. History of left breast cancer and mastectomy. Stable benign right breast mammograms.          10/20/2014 Surgery     Final Diagnosis:  Biopsies, pertoneum:  Metastatic carcinoma, morphologically compatible with metastatic mammary carcinoma.          6/15/2015 -  Other Event     Diagnostic Mammo:  IMPRESSION:   1. Negative x-ray report, should not delay biopsy if a dominat or clinically suspicious mass is present.          7/5/2016 -  Other Event     Diagnositic mammogram:  Impression:   Stable right mamogram with no radiographic findings suspicious for malignancy. Recommend continued screening mammography per screening guidelines unless otherwise earlier clinically indicated.          9/18/2016 Initial Diagnosis     Secondary malignant neoplasm of bone and bone marrow                INTERVAL HISTORY  Patient ID: Heavenly Yanes is a 74 y.o. year old female Cancer Staging  Stage IV (T2, N1, M1)  --complaining of dyspnea on exersion since last Tuesday. She feels like she ran a mile  2/14/19: cxr showed pneumonia. Already on levofloxacin. Coughing not worse, she did not run temp.  Overall feels weak.  -restarted the appetite suppressant and is starting to eat more but not a whole lot  --2/28/19: CT c/ap with some response to therapy. New left chest inflammatory lesion to be followed ion next imaging. Bone scan unremarkable for progression.  4/25/19: she is much better since being off therapy for 2 weeks. COugh is improved.   5/30/19: overall feeling better. Leg edema improved with lasix. She was ordered a mammo by the surgeon which I told her is not  necessary, she has metastatic dz.  -Denies sob /cp/n/v/, neuropathy/ no focal weakness.   Rest of ros unremarkable. 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) 9/18/2016     Past Surgical History:   Past Surgical History:   Procedure Laterality Date   • BREAST SURGERY      left breast biopsy and axillary node   • CHOLECYSTECTOMY     • EXTERNAL EAR SURGERY     • MASTECTOMY Left    • PORTACATH PLACEMENT       Social History:   Social History     Socioeconomic History   • Marital status:      Spouse name: Not on file   • Number of children: Not on file   • Years of education: Not on file   • Highest education level: Not on file   Tobacco Use   • Smoking status: Never Smoker   • Smokeless tobacco: Never Used   Substance and Sexual Activity   • Alcohol use: No   • Drug use: No     Family History:   Family History   Problem Relation Age of Onset   • No Known Problems Daughter    • No Known Problems Son    • Other Mother         complications from a concussion from a fall   • Other Father         old age   • No Known Problems Brother    • No Known Problems Daughter        Review of Systems   Constitutional: Positive for activity change, appetite change and fatigue. Negative for unexpected weight change.   HENT: Negative.    Eyes: Negative.    Respiratory: Positive for shortness of breath.    Cardiovascular: Positive for leg swelling.   Gastrointestinal: Negative.    Endocrine: Negative.    Genitourinary: Negative.    Musculoskeletal: Negative.    Skin: Negative.    Neurological: Negative.    Hematological: Negative.    Psychiatric/Behavioral: Negative.         Performance  "Status:  Asymptomatic    Medications:    Current Outpatient Medications   Medication Sig Dispense Refill   • Calcium-Magnesium-Vitamin D (CALCIUM 1200+D3 PO) Take 1,200 mg by mouth Daily.     • capecitabine (XELODA) 500 MG chemo tablet Take 1 tablet by mouth 2 (Two) Times a Day. For 2 weeks on and 1 week off. 42 tablet 11   • furosemide (LASIX) 20 MG tablet One tab po qd as needed for swelling take with potassium 30 tablet 0   • megestrol (MEGACE) 40 MG tablet Take 1 tablet by mouth 2 (Two) Times a Day. 60 tablet 5   • metoprolol tartrate (LOPRESSOR) 25 MG tablet Take 25 mg by mouth 2 (two) times a day.     • OLANZapine (ZYPREXA) 2.5 MG tablet Take 2.5 mg by mouth every night.     • ondansetron (ZOFRAN) 8 MG tablet Take 1 tablet by mouth Every 8 (Eight) Hours As Needed for Nausea or Vomiting. 30 tablet 5   • potassium chloride (K-DUR,KLOR-CON) 20 MEQ CR tablet Take one tab po bid on days taking Lasix 45 tablet 0   • raNITIdine (ZANTAC) 150 MG tablet Take 150 mg by mouth 2 (Two) Times a Day.     • rivaroxaban (XARELTO) 20 MG tablet Take 1 tablet by mouth Daily. 30 tablet 5   • thyroid 60 MG PO tablet Take 60 mg by mouth daily.       No current facility-administered medications for this visit.      Facility-Administered Medications Ordered in Other Visits   Medication Dose Route Frequency Provider Last Rate Last Dose   • heparin flush (porcine) 100 UNIT/ML injection 500 Units  500 Units Intravenous PRN Joseph Nunez MD   500 Units at 10/26/17 1155   • sodium chloride 0.9 % flush 10 mL  10 mL Intravenous PRN Joseph Nunez MD   10 mL at 10/26/17 1155       ALLERGIES:    Allergies   Allergen Reactions   • Benadryl  [Diphenhydramine Hcl (Sleep)] Other (See Comments)   • Codeine    • Diphenhydramine Other (See Comments)     \"Shaky leg syndrome\"  \"Shaky leg syndrome\"  \"Shaky leg syndrome\"   • Heparin Other (See Comments)     Patient states tolerates Heparin flush without problems: Years ago had heparin IV and had a rash " "  • Other      POPPY SEED   • Penicillins Hives   • Petroleum Jelly [Skin Protectants, Misc.]    • Sulfa Antibiotics    • Sulfur    • Valium [Diazepam]        Objective      Vitals:    05/30/19 0950   BP: 128/68   Pulse: 76   Resp: 16   Temp: 97.5 °F (36.4 °C)   TempSrc: Tympanic   SpO2: 98%   Weight: 63.6 kg (140 lb 4.8 oz)   Height: 165.1 cm (65\")   PainSc: 0-No pain         Current Status 5/30/2019   ECOG score 2         Physical Examunchanged  General Appearance: Patient is awake, alert, oriented and in no acute distress. Patient is welldeveloped, wellnourished, and appears stated age.  HEENT: Normocephalic. Sclerae clear, conjunctiva pink, extraocular movements intact, pupils, round, reactive to light and  accommodation. Mouth and throat are clear with moist oral mucosa.  NECK: Supple, no jugular venous distention, thyroid not enlarged.  LYMPH: No cervical, supraclavicular, axillary, or inguinal lymphadenopathy.  CHEST: Equal bilateral expansion, AP  diameter normal, resonant percussion note  LUNGS: Good air movement, no rales, rhonchi, rubs or wheezes with auscultation on the right. Diminished on the left.  CARDIO: Regular sinus rhythm, no murmurs, gallops or rubs.  ABDOMEN: Nondistended, soft, No tenderness, no guarding, no rebound, No hepatosplenomegaly. No abdominal masses. Bowel sounds positive. No hernia  GENITALIA: Not examined.  BREASTS: Not examined.  MUSKEL: No joint swelling, decreased motion, or inflammation  EXTREMS: patrick le  edema,No clubbing, cyanosis, No varicose veins.  NEURO: Grossly nonfocal, Gait is coordinated and smooth, Cognition is preserved.  SKIN: No rashes, no ecchymoses, no petechia.  PSYCH: Oriented to time, place and person. Memory is preserved. Mood and affect appear normal  RECENT LABS:  Lab on 05/30/2019   Component Date Value Ref Range Status   • Glucose 05/30/2019 88  70 - 100 mg/dL Final   • BUN 05/30/2019 9  5 - 21 mg/dL Final   • Creatinine 05/30/2019 0.77  0.50 - 1.40 mg/dL " Final   • Sodium 05/30/2019 135  135 - 145 mmol/L Final   • Potassium 05/30/2019 3.9  3.5 - 5.3 mmol/L Final   • Chloride 05/30/2019 104  98 - 110 mmol/L Final   • CO2 05/30/2019 25.0  24.0 - 31.0 mmol/L Final   • Calcium 05/30/2019 9.3  8.4 - 10.4 mg/dL Final   • Total Protein 05/30/2019 6.4  6.3 - 8.7 g/dL Final   • Albumin 05/30/2019 3.40* 3.50 - 5.00 g/dL Final   • ALT (SGPT) 05/30/2019 <15  0 - 54 U/L Final   • AST (SGOT) 05/30/2019 21  7 - 45 U/L Final   • Alkaline Phosphatase 05/30/2019 65  24 - 120 U/L Final   • Total Bilirubin 05/30/2019 0.6  0.1 - 1.0 mg/dL Final   • eGFR Non African Amer 05/30/2019 73  >60 mL/min/1.73 Final   • Globulin 05/30/2019 3.0  gm/dL Final   • A/G Ratio 05/30/2019 1.1  1.1 - 2.5 g/dL Final   • BUN/Creatinine Ratio 05/30/2019 11.7  7.0 - 25.0 Final   • Anion Gap 05/30/2019 6.0  4.0 - 13.0 mmol/L Final   • WBC 05/30/2019 6.19  4.80 - 10.80 10*3/mm3 Final   • RBC 05/30/2019 3.17* 4.20 - 5.40 10*6/mm3 Final   • Hemoglobin 05/30/2019 10.5* 12.0 - 16.0 g/dL Final   • Hematocrit 05/30/2019 31.3* 37.0 - 47.0 % Final   • MCV 05/30/2019 98.7* 82.0 - 98.0 fL Final   • MCH 05/30/2019 33.1* 28.0 - 32.0 pg Final   • MCHC 05/30/2019 33.5  33.0 - 36.0 g/dL Final   • RDW 05/30/2019 17.9* 12.0 - 15.0 % Final   • RDW-SD 05/30/2019 63.7* 40.0 - 54.0 fl Final   • MPV 05/30/2019 10.1  6.0 - 12.0 fL Final   • Platelets 05/30/2019 416* 130 - 400 10*3/mm3 Final   • Neutrophil % 05/30/2019 57.4  39.0 - 78.0 % Final   • Lymphocyte % 05/30/2019 35.5  15.0 - 45.0 % Final   • Monocyte % 05/30/2019 3.9* 4.0 - 12.0 % Final   • Eosinophil % 05/30/2019 1.6  0.0 - 4.0 % Final   • Basophil % 05/30/2019 0.6  0.0 - 2.0 % Final   • Immature Grans % 05/30/2019 1.0  0.0 - 5.0 % Final   • Neutrophils, Absolute 05/30/2019 3.55  1.87 - 8.40 10*3/mm3 Final   • Lymphocytes, Absolute 05/30/2019 2.20  0.72 - 4.86 10*3/mm3 Final   • Monocytes, Absolute 05/30/2019 0.24  0.19 - 1.30 10*3/mm3 Final   • Eosinophils, Absolute  05/30/2019 0.10  0.00 - 0.70 10*3/mm3 Final   • Basophils, Absolute 05/30/2019 0.04  0.00 - 0.20 10*3/mm3 Final   • Immature Grans, Absolute 05/30/2019 0.06* 0.00 - 0.05 10*3/mm3 Final   • nRBC 05/30/2019 0.0  0.0 - 0.2 /100 WBC Final       RADIOLOGY:  No results found.         Assessment/Plan  Heavenly Yanes is a 74 y.o. year old female with met breast cancer s/p multiple lines of CMT currently on taxotere weekly and xeloda with good tolerance.    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  --also on xeloda  --CT c/a/p 2/19 showed stable dz. Some new Inflammatory opacities in the LEFT lower lobe and bilateral subpleural interstitial thickening.   -bone scan without evidence of dz progression  --primo taxotere weekly. Pt also on xeloda [id number], then repeat.  4/25/19: therapy postponed 2 weeks ago due to neutropenia. He got Neupogen x1. Labs reviewed with pt wbc 10.68, hg 11.1, [id number]. ANC 7630.  -ANC low 880. Postpone tx. Give neupogen x1. Pt advised to hold xeloda until 2 weeks  5/30/19: labs reviewed with pt wbc 6.19, hg 10.5, plt 416. Ok for treatment.  .-tumor marker primo to decrease from 61.4 to 44.8.   -imaging before next tx.    2. Psych: on olanzapine    3. HTN: on metoprolol    4. Gerd: on ranitidine  5. Dyspnea: get a cxr to evaluate  6. Anemia: will check iron profile, ferritin. Her creatinine is normal at 0.94.   7. DVT on  xarelto indefinite.  --Doppler left LE  1/30/19 There is evidence of deep venous thrombosis of the left lower extremity. The clot burden is improved from last study. There is also evidence of superficial clot in the greater saphenous vein. There is a Baker's cyst in the popliteal fossa.  8. CHF: LVEF 65% with diastolic dysfunction. referral to cardiology to maximized her heart function.  9. Thrombocytosis: suspect reactive from anemia. Will check iron profile.  10.left lung pneumonia: resolved  --cxr showed pneumonia. Pt already on levofloxacin. Will complete 14 days.   11: LE edema: improved on lasix.  12. FEN:  Low potassium on  kdur. Also will check magnesium. Advised 20meq kdur bid for 2 days then 20meq once a day thereafter.  13. otits media of the right ear with effusion. This happens off and on for her. She usually gets some medication from pcp. Given her multiple allergies will have her f/u with pcp with what has worked for her in the past.    Moise Sotelo MD    5/30/2019    10:19 AM

## 2019-05-30 NOTE — TELEPHONE ENCOUNTER
Received call from Greta regarding patient Heavenly Yanes, states that she notes swelling of her left leg with 2+ pitting edema. Questioned if she mentioned this to Dr Sotelo and patient says no.   Informed Dr Sotelo, she states this is not a new problem and has been dealing with this issue off and on, she recommends Lasix 20 meq prn and K+ to be called to patients pharmacy.   Informed Greta of Dr Sotelo plan and scripts called to pharmacy, Greta will notify patient of plan and to  her scripts.

## 2019-05-31 ENCOUNTER — HOSPITAL ENCOUNTER (OUTPATIENT)
Dept: CT IMAGING | Facility: HOSPITAL | Age: 74
Discharge: HOME | End: 2019-05-31
Admitting: INTERNAL MEDICINE

## 2019-05-31 ENCOUNTER — HOSPITAL ENCOUNTER (OUTPATIENT)
Dept: NUCLEAR MEDICINE | Facility: HOSPITAL | Age: 74
Discharge: HOME | End: 2019-05-31

## 2019-05-31 DIAGNOSIS — C50.912 CARCINOMA OF LEFT BREAST METASTATIC TO BONE (HCC): ICD-10-CM

## 2019-05-31 DIAGNOSIS — C79.51 CARCINOMA OF LEFT BREAST METASTATIC TO BONE (HCC): ICD-10-CM

## 2019-05-31 PROCEDURE — A9561 TC99M OXIDRONATE: HCPCS | Performed by: INTERNAL MEDICINE

## 2019-05-31 PROCEDURE — 71260 CT THORAX DX C+: CPT

## 2019-05-31 PROCEDURE — 74177 CT ABD & PELVIS W/CONTRAST: CPT

## 2019-05-31 PROCEDURE — 25010000002 IOPAMIDOL 61 % SOLUTION: Performed by: INTERNAL MEDICINE

## 2019-05-31 PROCEDURE — 78306 BONE IMAGING WHOLE BODY: CPT

## 2019-05-31 PROCEDURE — 0 TECHNETIUM OXIDRONATE KIT: Performed by: INTERNAL MEDICINE

## 2019-05-31 RX ADMIN — TECHNETIUM TC 99M OXIDRONATE 1 DOSE: 3.15 INJECTION, POWDER, LYOPHILIZED, FOR SOLUTION INTRAVENOUS at 12:22

## 2019-05-31 RX ADMIN — IOPAMIDOL 150 ML: 612 INJECTION, SOLUTION INTRAVENOUS at 11:52

## 2019-06-05 DIAGNOSIS — C50.912 CARCINOMA OF LEFT BREAST METASTATIC TO BONE (HCC): ICD-10-CM

## 2019-06-05 DIAGNOSIS — C79.51 CARCINOMA OF LEFT BREAST METASTATIC TO BONE (HCC): ICD-10-CM

## 2019-06-06 ENCOUNTER — APPOINTMENT (OUTPATIENT)
Dept: LAB | Facility: HOSPITAL | Age: 74
End: 2019-06-06

## 2019-06-13 ENCOUNTER — APPOINTMENT (OUTPATIENT)
Dept: LAB | Facility: HOSPITAL | Age: 74
End: 2019-06-13
Attending: INTERNAL MEDICINE

## 2019-06-20 ENCOUNTER — INFUSION (OUTPATIENT)
Dept: ONCOLOGY | Facility: HOSPITAL | Age: 74
End: 2019-06-20
Attending: INTERNAL MEDICINE

## 2019-06-20 ENCOUNTER — OFFICE VISIT (OUTPATIENT)
Dept: ONCOLOGY | Facility: CLINIC | Age: 74
End: 2019-06-20

## 2019-06-20 ENCOUNTER — LAB (OUTPATIENT)
Dept: LAB | Facility: HOSPITAL | Age: 74
End: 2019-06-20
Attending: INTERNAL MEDICINE

## 2019-06-20 VITALS
SYSTOLIC BLOOD PRESSURE: 110 MMHG | WEIGHT: 128.7 LBS | BODY MASS INDEX: 21.44 KG/M2 | RESPIRATION RATE: 16 BRPM | HEART RATE: 64 BPM | OXYGEN SATURATION: 97 % | TEMPERATURE: 98.1 F | DIASTOLIC BLOOD PRESSURE: 66 MMHG | HEIGHT: 65 IN

## 2019-06-20 VITALS
DIASTOLIC BLOOD PRESSURE: 59 MMHG | RESPIRATION RATE: 18 BRPM | OXYGEN SATURATION: 100 % | BODY MASS INDEX: 21.49 KG/M2 | TEMPERATURE: 97.8 F | WEIGHT: 129 LBS | SYSTOLIC BLOOD PRESSURE: 124 MMHG | HEART RATE: 57 BPM | HEIGHT: 65 IN

## 2019-06-20 DIAGNOSIS — C50.919 BREAST CANCER METASTASIZED TO BONE, UNSPECIFIED LATERALITY (HCC): ICD-10-CM

## 2019-06-20 DIAGNOSIS — C79.51 BREAST CANCER METASTASIZED TO BONE, UNSPECIFIED LATERALITY (HCC): ICD-10-CM

## 2019-06-20 DIAGNOSIS — Z95.828 PORT-A-CATH IN PLACE: ICD-10-CM

## 2019-06-20 DIAGNOSIS — C50.912 CARCINOMA OF LEFT BREAST METASTATIC TO BONE (HCC): ICD-10-CM

## 2019-06-20 DIAGNOSIS — C50.412 MALIGNANT NEOPLASM OF UPPER-OUTER QUADRANT OF LEFT FEMALE BREAST, UNSPECIFIED ESTROGEN RECEPTOR STATUS (HCC): ICD-10-CM

## 2019-06-20 DIAGNOSIS — C79.51 CARCINOMA OF LEFT BREAST METASTATIC TO BONE (HCC): Primary | ICD-10-CM

## 2019-06-20 DIAGNOSIS — C79.51 CARCINOMA OF LEFT BREAST METASTATIC TO BONE (HCC): ICD-10-CM

## 2019-06-20 DIAGNOSIS — C50.912 CARCINOMA OF LEFT BREAST METASTATIC TO BONE (HCC): Primary | ICD-10-CM

## 2019-06-20 LAB
ALBUMIN SERPL-MCNC: 4.3 G/DL (ref 3.5–5)
ALBUMIN/GLOB SERPL: 1.4 G/DL (ref 1.1–2.5)
ALP SERPL-CCNC: 72 U/L (ref 24–120)
ALT SERPL W P-5'-P-CCNC: <15 U/L (ref 0–54)
ANION GAP SERPL CALCULATED.3IONS-SCNC: 11 MMOL/L (ref 4–13)
AST SERPL-CCNC: 22 U/L (ref 7–45)
BASOPHILS # BLD AUTO: 0.07 10*3/MM3 (ref 0–0.2)
BASOPHILS NFR BLD AUTO: 0.5 % (ref 0–2)
BILIRUB SERPL-MCNC: 0.6 MG/DL (ref 0.1–1)
BUN BLD-MCNC: 14 MG/DL (ref 5–21)
BUN/CREAT SERPL: 14.6 (ref 7–25)
CALCIUM SPEC-SCNC: 9.3 MG/DL (ref 8.4–10.4)
CHLORIDE SERPL-SCNC: 96 MMOL/L (ref 98–110)
CO2 SERPL-SCNC: 30 MMOL/L (ref 24–31)
CREAT BLD-MCNC: 0.96 MG/DL (ref 0.5–1.4)
DEPRECATED RDW RBC AUTO: 67.8 FL (ref 40–54)
EOSINOPHIL # BLD AUTO: 0.01 10*3/MM3 (ref 0–0.7)
EOSINOPHIL NFR BLD AUTO: 0.1 % (ref 0–4)
ERYTHROCYTE [DISTWIDTH] IN BLOOD BY AUTOMATED COUNT: 18.7 % (ref 12–15)
GFR SERPL CREATININE-BSD FRML MDRD: 57 ML/MIN/1.73
GLOBULIN UR ELPH-MCNC: 3 GM/DL
GLUCOSE BLD-MCNC: 91 MG/DL (ref 70–100)
HCT VFR BLD AUTO: 35 % (ref 37–47)
HGB BLD-MCNC: 11.8 G/DL (ref 12–16)
IMM GRANULOCYTES # BLD AUTO: 0.13 10*3/MM3 (ref 0–0.05)
IMM GRANULOCYTES NFR BLD AUTO: 0.9 % (ref 0–5)
LYMPHOCYTES # BLD AUTO: 2.41 10*3/MM3 (ref 0.72–4.86)
LYMPHOCYTES NFR BLD AUTO: 16 % (ref 15–45)
MCH RBC QN AUTO: 33.3 PG (ref 28–32)
MCHC RBC AUTO-ENTMCNC: 33.7 G/DL (ref 33–36)
MCV RBC AUTO: 98.9 FL (ref 82–98)
MONOCYTES # BLD AUTO: 1.08 10*3/MM3 (ref 0.19–1.3)
MONOCYTES NFR BLD AUTO: 7.2 % (ref 4–12)
NEUTROPHILS # BLD AUTO: 11.34 10*3/MM3 (ref 1.87–8.4)
NEUTROPHILS NFR BLD AUTO: 75.3 % (ref 39–78)
NRBC BLD AUTO-RTO: 0 /100 WBC (ref 0–0.2)
PLATELET # BLD AUTO: 373 10*3/MM3 (ref 130–400)
PMV BLD AUTO: 10.2 FL (ref 6–12)
POTASSIUM BLD-SCNC: 3 MMOL/L (ref 3.5–5.3)
PROT SERPL-MCNC: 7.3 G/DL (ref 6.3–8.7)
RBC # BLD AUTO: 3.54 10*6/MM3 (ref 4.2–5.4)
SODIUM BLD-SCNC: 137 MMOL/L (ref 135–145)
WBC NRBC COR # BLD: 15.04 10*3/MM3 (ref 4.8–10.8)

## 2019-06-20 PROCEDURE — 96367 TX/PROPH/DG ADDL SEQ IV INF: CPT

## 2019-06-20 PROCEDURE — 36415 COLL VENOUS BLD VENIPUNCTURE: CPT

## 2019-06-20 PROCEDURE — 25010000002 ONDANSETRON PER 1 MG: Performed by: INTERNAL MEDICINE

## 2019-06-20 PROCEDURE — 86300 IMMUNOASSAY TUMOR CA 15-3: CPT

## 2019-06-20 PROCEDURE — 96413 CHEMO IV INFUSION 1 HR: CPT

## 2019-06-20 PROCEDURE — 25010000002 DOCETAXEL 20 MG/ML SOLUTION 4 ML VIAL: Performed by: INTERNAL MEDICINE

## 2019-06-20 PROCEDURE — 25010000002 DEXAMETHASONE SODIUM PHOSPHATE 100 MG/10ML SOLUTION: Performed by: INTERNAL MEDICINE

## 2019-06-20 PROCEDURE — 80053 COMPREHEN METABOLIC PANEL: CPT

## 2019-06-20 PROCEDURE — 85025 COMPLETE CBC W/AUTO DIFF WBC: CPT

## 2019-06-20 PROCEDURE — 99214 OFFICE O/P EST MOD 30 MIN: CPT | Performed by: INTERNAL MEDICINE

## 2019-06-20 RX ORDER — FAMOTIDINE 10 MG/ML
20 INJECTION, SOLUTION INTRAVENOUS AS NEEDED
Status: DISCONTINUED | OUTPATIENT
Start: 2019-06-20 | End: 2019-06-20 | Stop reason: HOSPADM

## 2019-06-20 RX ORDER — SODIUM CHLORIDE 0.9 % (FLUSH) 0.9 %
10 SYRINGE (ML) INJECTION AS NEEDED
Status: CANCELLED | OUTPATIENT
Start: 2019-06-20

## 2019-06-20 RX ORDER — MEPERIDINE HYDROCHLORIDE 50 MG/ML
25 INJECTION INTRAMUSCULAR; INTRAVENOUS; SUBCUTANEOUS
Status: CANCELLED | OUTPATIENT
Start: 2019-06-27 | End: 2019-06-28

## 2019-06-20 RX ORDER — SODIUM CHLORIDE 9 MG/ML
250 INJECTION, SOLUTION INTRAVENOUS ONCE
Status: COMPLETED | OUTPATIENT
Start: 2019-06-20 | End: 2019-06-20

## 2019-06-20 RX ORDER — MEPERIDINE HYDROCHLORIDE 50 MG/ML
25 INJECTION INTRAMUSCULAR; INTRAVENOUS; SUBCUTANEOUS
Status: CANCELLED | OUTPATIENT
Start: 2019-06-20 | End: 2019-06-21

## 2019-06-20 RX ORDER — SODIUM CHLORIDE 9 MG/ML
250 INJECTION, SOLUTION INTRAVENOUS ONCE
Status: CANCELLED | OUTPATIENT
Start: 2019-06-27

## 2019-06-20 RX ORDER — CIPROFLOXACIN 500 MG/1
500 TABLET, FILM COATED ORAL 2 TIMES DAILY
Qty: 28 TABLET | Refills: 0 | Status: SHIPPED | OUTPATIENT
Start: 2019-06-20 | End: 2019-12-03

## 2019-06-20 RX ORDER — SODIUM CHLORIDE 0.9 % (FLUSH) 0.9 %
10 SYRINGE (ML) INJECTION AS NEEDED
Status: DISCONTINUED | OUTPATIENT
Start: 2019-06-20 | End: 2019-06-20 | Stop reason: HOSPADM

## 2019-06-20 RX ORDER — FAMOTIDINE 10 MG/ML
20 INJECTION, SOLUTION INTRAVENOUS AS NEEDED
Status: CANCELLED | OUTPATIENT
Start: 2019-06-27

## 2019-06-20 RX ORDER — MEPERIDINE HYDROCHLORIDE 50 MG/ML
25 INJECTION INTRAMUSCULAR; INTRAVENOUS; SUBCUTANEOUS
Status: DISCONTINUED | OUTPATIENT
Start: 2019-06-20 | End: 2019-06-20 | Stop reason: HOSPADM

## 2019-06-20 RX ORDER — FAMOTIDINE 10 MG/ML
20 INJECTION, SOLUTION INTRAVENOUS AS NEEDED
Status: CANCELLED | OUTPATIENT
Start: 2019-06-20

## 2019-06-20 RX ORDER — SODIUM CHLORIDE 9 MG/ML
250 INJECTION, SOLUTION INTRAVENOUS ONCE
Status: CANCELLED | OUTPATIENT
Start: 2019-06-20

## 2019-06-20 RX ADMIN — DOCETAXEL ANHYDROUS 60 MG: 20 INJECTION, SOLUTION INTRAVENOUS at 11:48

## 2019-06-20 RX ADMIN — Medication 500 UNITS: at 13:01

## 2019-06-20 RX ADMIN — SODIUM CHLORIDE, PRESERVATIVE FREE 10 ML: 5 INJECTION INTRAVENOUS at 13:01

## 2019-06-20 RX ADMIN — SODIUM CHLORIDE 250 ML: 9 INJECTION, SOLUTION INTRAVENOUS at 11:15

## 2019-06-20 NOTE — PROGRESS NOTES
[facility]  HEMATOLOGY & ONCOLOGY    Cancer Staging Information:  Cancer Staging  Malignant neoplasm of upper-outer quadrant of left female breast (CMS/HCC)  Staging form: Breast, AJCC V7  - Clinical stage from 10/11/2016: Stage IV (T2, N1, M1) - Signed by Calli Monsalve APRN on 10/11/2016        Subjective     VISIT DIAGNOSIS:   Encounter Diagnosis   Name Primary?   • Carcinoma of left breast metastatic to bone (CMS/HCC)        REASON FOR VISIT:     Chief Complaint   Patient presents with   • Breast Cancer     Here for treatment and to go over scans   • weightloss     12 pounds in 3 weeks        HEMATOLOGY / ONCOLOGY HISTORY:   Oncology/Hematology History    Patient is a 67-year-old postmenopausal female who had onset of her menses at age 9 and menopause at age 48. She received oral contraceptives for approximately 14 years and did not receive hormonal manipulation. Patient has a maternal cousin had breast cancer. Patient has had no prior breast biopsies. Patient found a palpable left breast mass as well as a left axillary mass. Patient had a bloody nipple discharge which been present for approximately 6 months. On 6/20/2012, a mammogram was performed showing a subareolar mass consistent with malignancy. Bilateral breast ultrasound revealed an ill-defined subareolar mass measuring 2.8-2.5 cm. There is a left axillary mass measuring 1.7 cm with suspicion of extracapsular extension. There is no evidence of disease in the right breast. On 7/9/2012 patient underwent a left breast biopsy and placement of marker clip. Left breast biopsy revealed an infiltrating lobular carcinoma grade 2 with focal ductal carcinoma in situ, solid pattern. A fine-needle biopsy of the left lymph node was consistent with metastatic carcinoma. Breast tumor profile revealed ER: 100%; WA: 98%; HER-2/maynor 2+; FISH: Unamplified; Ki-67: 71%; P 53 1%; DNA aneuploidy. A MRI of the breast were performed body multiple  abnormal enhancing masses within the left breast. There appears to be anterior retraction of the pectoralis muscle with no direct extension. There is a moderate to large, layering left pleural effusion appreciated. The right breast reveals 3 moderately enlarged lymph nodes in the posterior lateral aspect the right breast. These have fatty hilum but followup is recommended. Patient is undergone systemic studies including, on 8/2/2012, a CT scan of the brain showing atrophy. a CT scan that shows showing a small to moderate left pleural effusion with 3 subcentimeter nodule densities in the left lung.   She completed 4 cycles of neoadjuvant treatment with dose dense Adriamycin and Cytoxan. She had a mammogram which showed a significant reduction in the size of her left breast lesion. There is no axillary adenopathy noted. She has had an ultrasound revealing the  lesion reducing from 2.4 cm to 1 cm. Patient underwent a left mastectomy on 03/19/2013 finding negative invasive cancer, negative nodes. A 5% DCIS was noted. The patient has declined hormonal therapy. She did not need radiation therapy.  November 2014, she began to have evidence of lytic bone lesions at T5T6, frontal disease, an 8 mm lucency in the central frontal area of  the skull but nothing intracranial. Bone scan revealed only vertex tracer activity but bilateral ribs and thoracic spine, and other lesions in  her pelvis. At that time, she was started on letrozole since November 2014. She is taking Xgeva. She had progression found in bone in Feb 2015 on scan. She was started on Faslodex, Ibrance and continued xgeva.         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 B) Foci of ductal carcinoma in situ, solid pattern.  C) Greatest dimension of the invasive carcinoma is 15.8mm.         3/19/2013 Surgery     Left Mastectomy w/ Axillary Tail: Focal residual ducatal carcinoma in situ (DCIS) 12 lymph nodes negative for metastatic carcinoma.         10/20/2014 Biopsy     Peritoneum Biopsy: Final Diagnosis: Malignant Cell Neoplasm.           Breast cancer metastasized to bone, unspecified laterality (CMS/HCC)    10/15/2012 -  Other Event     Left mammogram:  Impression:  1. Decreasing spiculation and density in the retroareolar left breast, near a previous biopsy.   2. Definite left axillary lesion is not appreciated.          2/5/2013 -  Other Event     Diagnostic Mammo:  Comparison is made to 10/15/2012 and 6/20/2012  The spiculated mass at 12:00 behind the nipple is nearly resolved.   Impression:  1. The mass on the left side is less apparent after receiving chemotherapy. There has been a good response to chmotherapy on the left side.   2. No suspicious abnormality is seen in the right breast to account for the new palable abnormality at the 4-5:00 position.          3/19/2013 Surgery     Breast Biopsy:  Final Diagnosis:  A. Breast, left mastectomy with axillary tail  1. Focal residual ductal carcinoma in situ (DICS) (less than 5% of tumor bed).  2. Negative for residual invasive carcinoma.   3. 12 Lymph Nodes, negative for metastatic carcinoma (0/12) focally, some lymph nodes demonstrate fibrosis/treatment effect.  B. Skin and soft tissue, left advancement flap:  1. Benign skin and subcutis.   2. Negative for carcinoma.          6/20/2013 -  Other Event     Diagnostic Mammo [facility]:  1. A subareolar mass is consistent with malignancy. Additional involement extends inferiorly sonographically and superolaterally mammograhically as demonstrated by calcifications. The mass measures approx 2.8 cm sonographically, but the involved region is likely much larger including an intraductal componet. The left axillary lymph nodes are also  involved.   2. Recommened ultrasound guided biopsy of the left subareolar mass and left axillary lymph node.   3. Breast MRI could also further define multicentric disease on the left.   4. Clear suspicious finding on the right.  Recommend ongoing clinical follow up of palpable foci.          6/13/2014 -  Other Event     Diagnostic Mammo:  IMPRESSION:  1. History of left breast cancer and mastectomy. Stable benign right breast mammograms.          10/20/2014 Surgery     Final Diagnosis:  Biopsies, pertoneum:  Metastatic carcinoma, morphologically compatible with metastatic mammary carcinoma.          6/15/2015 -  Other Event     Diagnostic Mammo:  IMPRESSION:   1. Negative x-ray report, should not delay biopsy if a dominat or clinically suspicious mass is present.          7/5/2016 -  Other Event     Diagnositic mammogram:  Impression:   Stable right mamogram with no radiographic findings suspicious for malignancy. Recommend continued screening mammography per screening guidelines unless otherwise earlier clinically indicated.          9/18/2016 Initial Diagnosis     Secondary malignant neoplasm of bone and bone marrow                INTERVAL HISTORY  Patient ID: Heavenly Yanes is a 74 y.o. year old female Cancer Staging  Stage IV (T2, N1, M1)  --complaining of dyspnea on exersion since last Tuesday. She feels like she ran a mile  2/14/19: cxr showed pneumonia. Already on levofloxacin. Coughing not worse, she did not run temp.  Overall feels weak.  -restarted the appetite suppressant and is starting to eat more but not a whole lot  --2/28/19: CT c/ap with some response to therapy. New left chest inflammatory lesion to be followed ion next imaging. Bone scan unremarkable for progression.  4/25/19: she is much better since being off therapy for 2 weeks. COugh is improved.   6/20/19: has left ear drainage which is her usual ear infection. Leg edema improved with lasix. She was ordered a mammo by the surgeon which I  told her is not necessary, she has metastatic dz.  -Denies sob /cp/n/v/, neuropathy/ no focal weakness.   Rest of ros unremarkable. 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) 9/18/2016     Past Surgical History:   Past Surgical History:   Procedure Laterality Date   • BREAST SURGERY      left breast biopsy and axillary node   • CHOLECYSTECTOMY     • EXTERNAL EAR SURGERY     • MASTECTOMY Left    • PORTACATH PLACEMENT       Social History:   Social History     Socioeconomic History   • Marital status:      Spouse name: Not on file   • Number of children: Not on file   • Years of education: Not on file   • Highest education level: Not on file   Tobacco Use   • Smoking status: Never Smoker   • Smokeless tobacco: Never Used   Substance and Sexual Activity   • Alcohol use: No   • Drug use: No     Family History:   Family History   Problem Relation Age of Onset   • No Known Problems Daughter    • No Known Problems Son    • Other Mother         complications from a concussion from a fall   • Other Father         old age   • No Known Problems Brother    • No Known Problems Daughter        Review of Systems   Constitutional: Positive for activity change, appetite change and fatigue. Negative for unexpected weight change.   HENT: Negative.    Eyes: Negative.    Respiratory: Positive for shortness of breath.    Cardiovascular: Positive for leg swelling.   Gastrointestinal: Negative.    Endocrine: Negative.    Genitourinary: Negative.    Musculoskeletal: Negative.    Skin: Negative.    Neurological: Negative.    Hematological: Negative.    Psychiatric/Behavioral: Negative.         Performance  "Status:  Asymptomatic    Medications:    Current Outpatient Medications   Medication Sig Dispense Refill   • Calcium-Magnesium-Vitamin D (CALCIUM 1200+D3 PO) Take 1,200 mg by mouth Daily.     • capecitabine (XELODA) 500 MG chemo tablet Take 1 tablet by mouth 2 (Two) Times a Day. For 2 weeks on and 1 week off. 42 tablet 11   • furosemide (LASIX) 20 MG tablet One tab po qd as needed for swelling take with potassium 30 tablet 0   • megestrol (MEGACE) 40 MG tablet Take 1 tablet by mouth 2 (Two) Times a Day. 60 tablet 5   • metoprolol tartrate (LOPRESSOR) 25 MG tablet Take 25 mg by mouth 2 (two) times a day.     • OLANZapine (ZYPREXA) 2.5 MG tablet Take 2.5 mg by mouth every night.     • ondansetron (ZOFRAN) 8 MG tablet Take 1 tablet by mouth Every 8 (Eight) Hours As Needed for Nausea or Vomiting. 30 tablet 5   • potassium chloride (K-DUR,KLOR-CON) 20 MEQ CR tablet Take one tab po bid on days taking Lasix 45 tablet 0   • raNITIdine (ZANTAC) 150 MG tablet Take 150 mg by mouth 2 (Two) Times a Day.     • rivaroxaban (XARELTO) 20 MG tablet Take 1 tablet by mouth Daily. 30 tablet 5   • thyroid 60 MG PO tablet Take 60 mg by mouth daily.       No current facility-administered medications for this visit.      Facility-Administered Medications Ordered in Other Visits   Medication Dose Route Frequency Provider Last Rate Last Dose   • heparin flush (porcine) 100 UNIT/ML injection 500 Units  500 Units Intravenous PRN Joseph Nunez MD   500 Units at 10/26/17 1155   • sodium chloride 0.9 % flush 10 mL  10 mL Intravenous PRN Joseph Nunez MD   10 mL at 10/26/17 1155       ALLERGIES:    Allergies   Allergen Reactions   • Benadryl  [Diphenhydramine Hcl (Sleep)] Other (See Comments)   • Codeine    • Diphenhydramine Other (See Comments)     \"Shaky leg syndrome\"  \"Shaky leg syndrome\"  \"Shaky leg syndrome\"   • Heparin Other (See Comments)     Patient states tolerates Heparin flush without problems: Years ago had heparin IV and had a rash " "  • Other      POPPY SEED   • Penicillins Hives   • Petroleum Jelly [Skin Protectants, Misc.]    • Sulfa Antibiotics    • Sulfur    • Valium [Diazepam]        Objective      Vitals:    06/20/19 1007   BP: 110/66   Pulse: 64   Resp: 16   Temp: 98.1 °F (36.7 °C)   TempSrc: Oral   SpO2: 97%   Weight: 58.4 kg (128 lb 11.2 oz)   Height: 165.1 cm (65\")   PainSc: 0-No pain         Current Status 5/30/2019   ECOG score 2         Physical Examunchanged  General Appearance: Patient is awake, alert, oriented and in no acute distress. Patient is welldeveloped, wellnourished, and appears stated age.  HEENT: Normocephalic. Sclerae clear, conjunctiva pink, extraocular movements intact, pupils, round, reactive to light and  accommodation. Mouth and throat are clear with moist oral mucosa.  NECK: Supple, no jugular venous distention, thyroid not enlarged.  LYMPH: No cervical, supraclavicular, axillary, or inguinal lymphadenopathy.  CHEST: Equal bilateral expansion, AP  diameter normal, resonant percussion note  LUNGS: Good air movement, no rales, rhonchi, rubs or wheezes with auscultation on the right. Diminished on the left.  CARDIO: Regular sinus rhythm, no murmurs, gallops or rubs.  ABDOMEN: Nondistended, soft, No tenderness, no guarding, no rebound, No hepatosplenomegaly. No abdominal masses. Bowel sounds positive. No hernia  GENITALIA: Not examined.  BREASTS: Not examined.  MUSKEL: No joint swelling, decreased motion, or inflammation  EXTREMS: patrick le  edema,No clubbing, cyanosis, No varicose veins.  NEURO: Grossly nonfocal, Gait is coordinated and smooth, Cognition is preserved.  SKIN: No rashes, no ecchymoses, no petechia.  PSYCH: Oriented to time, place and person. Memory is preserved. Mood and affect appear normal  RECENT LABS:  Lab on 06/20/2019   Component Date Value Ref Range Status   • Glucose 06/20/2019 91  70 - 100 mg/dL Final   • BUN 06/20/2019 14  5 - 21 mg/dL Final   • Creatinine 06/20/2019 0.96  0.50 - 1.40 mg/dL " Final   • Sodium 06/20/2019 137  135 - 145 mmol/L Final   • Potassium 06/20/2019 3.0* 3.5 - 5.3 mmol/L Final   • Chloride 06/20/2019 96* 98 - 110 mmol/L Final   • CO2 06/20/2019 30.0  24.0 - 31.0 mmol/L Final   • Calcium 06/20/2019 9.3  8.4 - 10.4 mg/dL Final   • Total Protein 06/20/2019 7.3  6.3 - 8.7 g/dL Final   • Albumin 06/20/2019 4.30  3.50 - 5.00 g/dL Final   • ALT (SGPT) 06/20/2019 <15  0 - 54 U/L Final   • AST (SGOT) 06/20/2019 22  7 - 45 U/L Final   • Alkaline Phosphatase 06/20/2019 72  24 - 120 U/L Final   • Total Bilirubin 06/20/2019 0.6  0.1 - 1.0 mg/dL Final   • eGFR Non African Amer 06/20/2019 57* >60 mL/min/1.73 Final   • Globulin 06/20/2019 3.0  gm/dL Final   • A/G Ratio 06/20/2019 1.4  1.1 - 2.5 g/dL Final   • BUN/Creatinine Ratio 06/20/2019 14.6  7.0 - 25.0 Final   • Anion Gap 06/20/2019 11.0  4.0 - 13.0 mmol/L Final   • WBC 06/20/2019 15.04* 4.80 - 10.80 10*3/mm3 Final   • RBC 06/20/2019 3.54* 4.20 - 5.40 10*6/mm3 Final   • Hemoglobin 06/20/2019 11.8* 12.0 - 16.0 g/dL Final   • Hematocrit 06/20/2019 35.0* 37.0 - 47.0 % Final   • MCV 06/20/2019 98.9* 82.0 - 98.0 fL Final   • MCH 06/20/2019 33.3* 28.0 - 32.0 pg Final   • MCHC 06/20/2019 33.7  33.0 - 36.0 g/dL Final   • RDW 06/20/2019 18.7* 12.0 - 15.0 % Final   • RDW-SD 06/20/2019 67.8* 40.0 - 54.0 fl Final   • MPV 06/20/2019 10.2  6.0 - 12.0 fL Final   • Platelets 06/20/2019 373  130 - 400 10*3/mm3 Final   • Neutrophil % 06/20/2019 75.3  39.0 - 78.0 % Final   • Lymphocyte % 06/20/2019 16.0  15.0 - 45.0 % Final   • Monocyte % 06/20/2019 7.2  4.0 - 12.0 % Final   • Eosinophil % 06/20/2019 0.1  0.0 - 4.0 % Final   • Basophil % 06/20/2019 0.5  0.0 - 2.0 % Final   • Immature Grans % 06/20/2019 0.9  0.0 - 5.0 % Final   • Neutrophils, Absolute 06/20/2019 11.34* 1.87 - 8.40 10*3/mm3 Final   • Lymphocytes, Absolute 06/20/2019 2.41  0.72 - 4.86 10*3/mm3 Final   • Monocytes, Absolute 06/20/2019 1.08  0.19 - 1.30 10*3/mm3 Final   • Eosinophils, Absolute  06/20/2019 0.01  0.00 - 0.70 10*3/mm3 Final   • Basophils, Absolute 06/20/2019 0.07  0.00 - 0.20 10*3/mm3 Final   • Immature Grans, Absolute 06/20/2019 0.13* 0.00 - 0.05 10*3/mm3 Final   • nRBC 06/20/2019 0.0  0.0 - 0.2 /100 WBC Final       RADIOLOGY:  Ct Chest With Contrast    Result Date: 5/31/2019  Narrative: CT CHEST W CONTRAST- 5/31/2019 11:49 AM CDT  HISTORY: metastatic breast ca assess for treatment response; C50.912-Malignant neoplasm of unspecified site of left female breast; C79.51-Secondary malignant neoplasm of bone   COMPARISON: 02/19/2019.  DOSE LENGTH PRODUCT: 237 mGy cm. Automated exposure control was also utilized to decrease patient radiation dose.  TECHNIQUE: Following the intravenous administration of Isovue contrast, helical CT tomographic images of the chest were acquired. Multiplanar reformatted images were also provided for review.  FINDINGS:  Neck base: The imaged portion of the neck and thyroid gland is unremarkable.  Lungs: A wedge-shaped area of groundglass opacification is seen in the LEFT lower lobe. This is noted on a background of interlobular septal thickening and pleural thickening. A moderate size RIGHT pleural effusion is present. There is thickening of the LEFT pleura, likely due to previous radiation.  Heart: Normal in size and appearance. Trace pericardial fluid is present.  Vasculature: There is aortic atherosclerosis without stenosis or aneurysm. No coronary arteriosclerosis identified. The great vessels are normal in appearance. The pulmonary arteries are normal in appearance.  Mediastinum and Lymph nodes: No enlarged axillary, hilar, or mediastinal lymph nodes.  Bones and soft tissues: Diffuse osseous metastases are present. There is a stable compression deformity in the midthoracic spine. No acute fractures are identified. LEFT mastectomy has been performed.  Upper abdomen: Findings in the upper abdomen are described in a separate dictation.      Impression: 1. Diffuse  osseous metastases are again noted. 2. Accumulation of a moderate sized RIGHT pleural effusion. 3. LEFT pleural thickening and scarring have slightly increased since the previous study. These changes are likely due to previous radiation. 4. Findings in the upper abdomen are described in a separate dictation. This report was finalized on 05/31/2019 14:18 by Dr. Osmani Gillette MD.    Nm Bone Scan Whole Body    Result Date: 5/31/2019  Narrative: NM BONE SCAN WHOLE BODY- 5/31/2019 12:20 PM CDT  HISTORY: metastatic breast ca assess for treatment response; C50.912-Malignant neoplasm of unspecified site of left female breast; C79.51-Secondary malignant neoplasm of bone  COMPARISON: CT chest, abdomen, and pelvis 05/31/2019 and bone scan 02/19/2019.  RADIOPHARMACEUTICAL: 18.1 mCi Tc99m HDP  TECHNIQUE: Anterior and posterior whole-body images are acquired approximately two hours postinjection.  FINDINGS: Skull: Unremarkable.   Spine: Multifocal spinal metastases are faintly visualized on today's exam. An anterior lesion is seen at L5.  Thorax: No focal area of increased uptake.  Abdomen/pelvis: Physiologic activity is noted in the bilateral kidneys and urinary bladder.  Extremities: There is increased uptake in the LEFT shoulder that is essentially unchanged and may be due to a pathologic fracture through the coracoid process.       Impression: 1. Widespread osteoblastic disease with more focal osteoblastic activity in the region of the LEFT coracoid process. A pathologic fracture should be considered.  This report was finalized on 05/31/2019 15:40 by Dr. Osmani Gillette MD.    Ct Abdomen Pelvis With Contrast    Result Date: 5/31/2019  Narrative: EXAMINATION: CT ABDOMEN PELVIS W CONTRAST-  5/31/2019 2:38 PM CDT  HISTORY: Metastatic breast cancer, assess treatment response  COMPARISON:02/19/2019 abdomen and pelvis CT  TECHNIQUE:  Radiation dose equals  mGy-cm.  Automated exposure control dose reduction technique was  implemented.  Thin section axial imaging was obtained. 2-D sagittal and coronal reconstruction images were generated.  Intravenous contrast was administered.  Oral contrast was ingested.  FINDINGS:  Bilateral pleural effusions identified, greater on the right.  Gallbladder surgically absent. There are no focal liver lesions. There is no biliary ductal dilatation. The hepatic venous and portal venous structures are imaged normally.  The spleen is not enlarged.  There is no adrenal masses.  There are no pancreatic lesions.  There are no urinary tract calculi. There is no focal renal lesions. There is no pelvocaliectasis or obstructive uropathy changes. The urinary bladder is mildly distended without focal bladder wall abnormality.  Calcified uterine fibroids are observed. There are no adnexal masses.  There is stool and gas identified throughout the colon which is not dilated. The segmental wall thickening observed previously in the sigmoid is dramatically decreased in conspicuity with minimal residual. Improved colitis considered.  Small bowel is not dilated. The duodenal sweep is imaged appropriately stomach is nondistended.  There is extensive osteoblastic metastases multiple punctate lesions throughout the thoracolumbar spine ribs and pelvic bones. Proximal femoral lesions also observed. Similar changes noted in the prior examination. The CT appearance is likely unchanged. No obvious progression blastic lesions observed.  Induration in the presacral fluid/induration in the presacral space again identified.  There is edema within the body wall diffusely, question volume overload/third spacing.      Impression: 1. Bilateral pleural effusions greater on the right. 2. Extensive diffuse osteoblastic metastases likely stable. 3. Segmental wall thickening of the sigmoid colon less conspicuous on today's examination. 4. Body wall edema, question third spacing/volume overload. 5. Otherwise, No CT evidence of developing  metastatic disease. This report was finalized on 05/31/2019 14:49 by Dr. Naun Pierre MD.           Assessment/Plan  Heavenly Yanes is a 74 y.o. year old female with met breast cancer s/p multiple lines of CMT currently on taxotere weekly and xeloda with good tolerance.    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  --also on xeloda  --CT c/a/p 2/19 showed stable dz. Some new Inflammatory opacities in the LEFT lower lobe and bilateral subpleural interstitial thickening.   -bone scan without evidence of dz progression  --primo taxotere weekly. Pt also on xeloda [id number], then repeat.  4/25/19: therapy postponed 2 weeks ago due to neutropenia. He got Neupogen x1. Labs reviewed with pt wbc 10.68, hg 11.1, [id number]. ANC 7630.  -ANC low 880. Postpone tx. Give neupogen x1. Pt advised to hold xeloda until 2 weeks  6/20/19: labs reviewed with pt wbc 15.04, Hg 11.8, plt 373 Ok for treatment.  .-tumor marker primo Ca15-3 61.4 /44.8./otday pending ; Ca 27.29 48/58.4/today pending  -imaging before next tx.    2. Psych: on olanzapine    3. HTN: on metoprolol    4. Gerd: on ranitidine  5. Dyspnea: get a cxr to evaluate  6. Anemia: will check iron profile, ferritin. Her creatinine is normal at 0.94.   7. DVT on xarelto indefinite.  --Doppler left LE  1/30/19 There is evidence of deep venous thrombosis of the left lower extremity. The clot burden is improved  from last study. There is also evidence of superficial clot in the greater saphenous vein. There is a Baker's cyst in the popliteal fossa.  8. CHF: LVEF 65% with diastolic dysfunction. referral to cardiology to maximized her heart function.  9. Thrombocytosis: suspect reactive from anemia. Will check iron profile.  10.left lung pneumonia: resolved  --cxr showed pneumonia. Pt already on levofloxacin. Will complete 14 days.   11: LE edema: improved on lasix.  12. FEN:  Low potassium on  kdur. Also will check magnesium. Advised 20meq kdur TID for 7  days then 20meq once a day thereafter.  11. otits media of the right ear with effusion. This happens off and on for her. She usually gets some medication from pcp. Given her multiple allergies will have her f/u with pcp with what has worked for her in the past.  -she took ciprofloxacin left over from prior rx that I gave her for something else and is asking for refill. I will go ahead and refill it. If it does not get better will refer to ent.    Moise Sotelo MD    6/20/2019    10:40 AM

## 2019-06-21 LAB
CANCER AG15-3 SERPL-ACNC: 56 U/ML (ref 0–25)
CANCER AG27-29 SERPL-ACNC: 66.5 U/ML (ref 0–38.6)

## 2019-06-21 RX ORDER — FUROSEMIDE 20 MG/1
TABLET ORAL
Qty: 30 TABLET | Refills: 1 | Status: SHIPPED | OUTPATIENT
Start: 2019-06-21 | End: 2019-07-05 | Stop reason: SDUPTHER

## 2019-06-21 RX ORDER — POTASSIUM CHLORIDE 20 MEQ/1
TABLET, EXTENDED RELEASE ORAL
Qty: 60 TABLET | Refills: 1 | Status: SHIPPED | OUTPATIENT
Start: 2019-06-21

## 2019-06-21 NOTE — TELEPHONE ENCOUNTER
Refill requests for potassium and lasix.  Discussed with Dr. Sotelo and verbal order given to refill scripts.

## 2019-06-27 ENCOUNTER — INFUSION (OUTPATIENT)
Dept: ONCOLOGY | Facility: HOSPITAL | Age: 74
End: 2019-06-27
Attending: INTERNAL MEDICINE

## 2019-06-27 ENCOUNTER — LAB (OUTPATIENT)
Dept: LAB | Facility: HOSPITAL | Age: 74
End: 2019-06-27
Attending: INTERNAL MEDICINE

## 2019-06-27 ENCOUNTER — OFFICE VISIT (OUTPATIENT)
Dept: ONCOLOGY | Facility: CLINIC | Age: 74
End: 2019-06-27

## 2019-06-27 VITALS
BODY MASS INDEX: 21.67 KG/M2 | HEART RATE: 91 BPM | HEIGHT: 65 IN | TEMPERATURE: 98.8 F | OXYGEN SATURATION: 95 % | WEIGHT: 130.1 LBS | RESPIRATION RATE: 18 BRPM | DIASTOLIC BLOOD PRESSURE: 72 MMHG | SYSTOLIC BLOOD PRESSURE: 98 MMHG

## 2019-06-27 VITALS
RESPIRATION RATE: 16 BRPM | DIASTOLIC BLOOD PRESSURE: 65 MMHG | SYSTOLIC BLOOD PRESSURE: 121 MMHG | OXYGEN SATURATION: 100 % | BODY MASS INDEX: 21.66 KG/M2 | HEIGHT: 65 IN | WEIGHT: 130 LBS | HEART RATE: 85 BPM | TEMPERATURE: 98 F

## 2019-06-27 DIAGNOSIS — C50.912 CARCINOMA OF LEFT BREAST METASTATIC TO BONE (HCC): ICD-10-CM

## 2019-06-27 DIAGNOSIS — C79.51 BREAST CANCER METASTASIZED TO BONE, UNSPECIFIED LATERALITY (HCC): ICD-10-CM

## 2019-06-27 DIAGNOSIS — C79.51 CARCINOMA OF LEFT BREAST METASTATIC TO BONE (HCC): ICD-10-CM

## 2019-06-27 DIAGNOSIS — C50.912 CARCINOMA OF LEFT BREAST METASTATIC TO BONE (HCC): Primary | ICD-10-CM

## 2019-06-27 DIAGNOSIS — C79.51 CARCINOMA OF LEFT BREAST METASTATIC TO BONE (HCC): Primary | ICD-10-CM

## 2019-06-27 DIAGNOSIS — Z95.828 PORT-A-CATH IN PLACE: ICD-10-CM

## 2019-06-27 DIAGNOSIS — C50.919 BREAST CANCER METASTASIZED TO BONE, UNSPECIFIED LATERALITY (HCC): ICD-10-CM

## 2019-06-27 DIAGNOSIS — C50.412 MALIGNANT NEOPLASM OF UPPER-OUTER QUADRANT OF LEFT FEMALE BREAST, UNSPECIFIED ESTROGEN RECEPTOR STATUS (HCC): ICD-10-CM

## 2019-06-27 LAB
ALBUMIN SERPL-MCNC: 4 G/DL (ref 3.5–5)
ALBUMIN/GLOB SERPL: 1.3 G/DL (ref 1.1–2.5)
ALP SERPL-CCNC: 66 U/L (ref 24–120)
ALT SERPL W P-5'-P-CCNC: 18 U/L (ref 0–54)
ANION GAP SERPL CALCULATED.3IONS-SCNC: 8 MMOL/L (ref 4–13)
AST SERPL-CCNC: 25 U/L (ref 7–45)
BASOPHILS # BLD AUTO: 0.05 10*3/MM3 (ref 0–0.2)
BASOPHILS NFR BLD AUTO: 1 % (ref 0–2)
BILIRUB SERPL-MCNC: 0.7 MG/DL (ref 0.1–1)
BUN BLD-MCNC: 13 MG/DL (ref 5–21)
BUN/CREAT SERPL: 15.7 (ref 7–25)
CALCIUM SPEC-SCNC: 9.6 MG/DL (ref 8.4–10.4)
CHLORIDE SERPL-SCNC: 99 MMOL/L (ref 98–110)
CO2 SERPL-SCNC: 29 MMOL/L (ref 24–31)
CREAT BLD-MCNC: 0.83 MG/DL (ref 0.5–1.4)
DEPRECATED RDW RBC AUTO: 65.5 FL (ref 40–54)
EOSINOPHIL # BLD AUTO: 0.07 10*3/MM3 (ref 0–0.7)
EOSINOPHIL NFR BLD AUTO: 1.4 % (ref 0–4)
ERYTHROCYTE [DISTWIDTH] IN BLOOD BY AUTOMATED COUNT: 18.5 % (ref 12–15)
GFR SERPL CREATININE-BSD FRML MDRD: 67 ML/MIN/1.73
GLOBULIN UR ELPH-MCNC: 3.1 GM/DL
GLUCOSE BLD-MCNC: 96 MG/DL (ref 70–100)
HCT VFR BLD AUTO: 30.4 % (ref 37–47)
HGB BLD-MCNC: 10.4 G/DL (ref 12–16)
HOLD SPECIMEN: NORMAL
HOLD SPECIMEN: NORMAL
IMM GRANULOCYTES # BLD AUTO: 0.06 10*3/MM3 (ref 0–0.05)
IMM GRANULOCYTES NFR BLD AUTO: 1.2 % (ref 0–5)
LYMPHOCYTES # BLD AUTO: 1.83 10*3/MM3 (ref 0.72–4.86)
LYMPHOCYTES NFR BLD AUTO: 37 % (ref 15–45)
MCH RBC QN AUTO: 33.5 PG (ref 28–32)
MCHC RBC AUTO-ENTMCNC: 34.2 G/DL (ref 33–36)
MCV RBC AUTO: 98.1 FL (ref 82–98)
MONOCYTES # BLD AUTO: 0.23 10*3/MM3 (ref 0.19–1.3)
MONOCYTES NFR BLD AUTO: 4.6 % (ref 4–12)
NEUTROPHILS # BLD AUTO: 2.71 10*3/MM3 (ref 1.87–8.4)
NEUTROPHILS NFR BLD AUTO: 54.8 % (ref 39–78)
NRBC BLD AUTO-RTO: 0 /100 WBC (ref 0–0.2)
PLATELET # BLD AUTO: 391 10*3/MM3 (ref 130–400)
PMV BLD AUTO: 10.1 FL (ref 6–12)
POTASSIUM BLD-SCNC: 3.8 MMOL/L (ref 3.5–5.3)
PROT SERPL-MCNC: 7.1 G/DL (ref 6.3–8.7)
RBC # BLD AUTO: 3.1 10*6/MM3 (ref 4.2–5.4)
SODIUM BLD-SCNC: 136 MMOL/L (ref 135–145)
WBC NRBC COR # BLD: 4.95 10*3/MM3 (ref 4.8–10.8)

## 2019-06-27 PROCEDURE — 25010000002 DOCETAXEL 20 MG/ML SOLUTION 4 ML VIAL: Performed by: INTERNAL MEDICINE

## 2019-06-27 PROCEDURE — 80053 COMPREHEN METABOLIC PANEL: CPT

## 2019-06-27 PROCEDURE — 99214 OFFICE O/P EST MOD 30 MIN: CPT | Performed by: INTERNAL MEDICINE

## 2019-06-27 PROCEDURE — 36415 COLL VENOUS BLD VENIPUNCTURE: CPT

## 2019-06-27 PROCEDURE — 96367 TX/PROPH/DG ADDL SEQ IV INF: CPT

## 2019-06-27 PROCEDURE — 25010000002 DEXAMETHASONE SODIUM PHOSPHATE 100 MG/10ML SOLUTION: Performed by: INTERNAL MEDICINE

## 2019-06-27 PROCEDURE — 96377 APPLICATON ON-BODY INJECTOR: CPT

## 2019-06-27 PROCEDURE — 25010000002 ONDANSETRON PER 1 MG: Performed by: INTERNAL MEDICINE

## 2019-06-27 PROCEDURE — 85025 COMPLETE CBC W/AUTO DIFF WBC: CPT

## 2019-06-27 PROCEDURE — 96413 CHEMO IV INFUSION 1 HR: CPT

## 2019-06-27 PROCEDURE — 25010000002 PEGFILGRASTIM 6 MG/0.6ML PREFILLED SYRINGE KIT: Performed by: INTERNAL MEDICINE

## 2019-06-27 RX ORDER — MEPERIDINE HYDROCHLORIDE 50 MG/ML
25 INJECTION INTRAMUSCULAR; INTRAVENOUS; SUBCUTANEOUS
Status: DISCONTINUED | OUTPATIENT
Start: 2019-06-27 | End: 2019-06-27 | Stop reason: HOSPADM

## 2019-06-27 RX ORDER — SODIUM CHLORIDE 0.9 % (FLUSH) 0.9 %
10 SYRINGE (ML) INJECTION AS NEEDED
Status: CANCELLED | OUTPATIENT
Start: 2019-06-27

## 2019-06-27 RX ORDER — FAMOTIDINE 10 MG/ML
20 INJECTION, SOLUTION INTRAVENOUS AS NEEDED
Status: DISCONTINUED | OUTPATIENT
Start: 2019-06-27 | End: 2019-06-27 | Stop reason: HOSPADM

## 2019-06-27 RX ORDER — SODIUM CHLORIDE 0.9 % (FLUSH) 0.9 %
10 SYRINGE (ML) INJECTION AS NEEDED
Status: DISCONTINUED | OUTPATIENT
Start: 2019-06-27 | End: 2019-06-27 | Stop reason: HOSPADM

## 2019-06-27 RX ORDER — SODIUM CHLORIDE 9 MG/ML
250 INJECTION, SOLUTION INTRAVENOUS ONCE
Status: COMPLETED | OUTPATIENT
Start: 2019-06-27 | End: 2019-06-27

## 2019-06-27 RX ADMIN — PEGFILGRASTIM 6 MG: KIT SUBCUTANEOUS at 13:47

## 2019-06-27 RX ADMIN — SODIUM CHLORIDE 250 ML: 9 INJECTION, SOLUTION INTRAVENOUS at 12:04

## 2019-06-27 RX ADMIN — DOCETAXEL 60 MG: 20 INJECTION, SOLUTION, CONCENTRATE INTRAVENOUS at 12:26

## 2019-06-27 RX ADMIN — Medication 500 UNITS: at 13:50

## 2019-06-27 RX ADMIN — SODIUM CHLORIDE, PRESERVATIVE FREE 10 ML: 5 INJECTION INTRAVENOUS at 13:50

## 2019-06-27 NOTE — PROGRESS NOTES
[facility]  HEMATOLOGY & ONCOLOGY    Cancer Staging Information:  Cancer Staging  Malignant neoplasm of upper-outer quadrant of left female breast (CMS/HCC)  Staging form: Breast, AJCC V7  - Clinical stage from 10/11/2016: Stage IV (T2, N1, M1) - Signed by Calli Monsalve APRN on 10/11/2016        Subjective     VISIT DIAGNOSIS:   Encounter Diagnosis   Name Primary?   • Carcinoma of left breast metastatic to bone (CMS/HCC)        REASON FOR VISIT:     Chief Complaint   Patient presents with   • Breast Cancer     She is here for consideration of her txt today, states she has been feeling weaker (and is in w/c again today) and her  is now doing most of the cooking   • Edema     she continues to have lower extremity swelling with 2+ pitting edema , she does have lasix and K+ on hand and is taking them prn   • Anorexia     c/o loss of appetite, just no desire to eat, requesting something to help boost appetite, reviewing her med list she was written Megace early April 2019 but has not been taking will have  pick script up and restart the medication she does have 5 refills remaining on this prescription        HEMATOLOGY / ONCOLOGY HISTORY:   Oncology/Hematology History    Patient is a 67-year-old postmenopausal female who had onset of her menses at age 9 and menopause at age 48. She received oral contraceptives for approximately 14 years and did not receive hormonal manipulation. Patient has a maternal cousin had breast cancer. Patient has had no prior breast biopsies. Patient found a palpable left breast mass as well as a left axillary mass. Patient had a bloody nipple discharge which been present for approximately 6 months. On 6/20/2012, a mammogram was performed showing a subareolar mass consistent with malignancy. Bilateral breast ultrasound revealed an ill-defined subareolar mass measuring 2.8-2.5 cm. There is a left axillary mass measuring 1.7 cm with suspicion of  extracapsular extension. There is no evidence of disease in the right breast. On 7/9/2012 patient underwent a left breast biopsy and placement of marker clip. Left breast biopsy revealed an infiltrating lobular carcinoma grade 2 with focal ductal carcinoma in situ, solid pattern. A fine-needle biopsy of the left lymph node was consistent with metastatic carcinoma. Breast tumor profile revealed ER: 100%; WI: 98%; HER-2/maynor 2+; FISH: Unamplified; Ki-67: 71%; P 53 1%; DNA aneuploidy. A MRI of the breast were performed body multiple abnormal enhancing masses within the left breast. There appears to be anterior retraction of the pectoralis muscle with no direct extension. There is a moderate to large, layering left pleural effusion appreciated. The right breast reveals 3 moderately enlarged lymph nodes in the posterior lateral aspect the right breast. These have fatty hilum but followup is recommended. Patient is undergone systemic studies including, on 8/2/2012, a CT scan of the brain showing atrophy. a CT scan that shows showing a small to moderate left pleural effusion with 3 subcentimeter nodule densities in the left lung.   She completed 4 cycles of neoadjuvant treatment with dose dense Adriamycin and Cytoxan. She had a mammogram which showed a significant reduction in the size of her left breast lesion. There is no axillary adenopathy noted. She has had an ultrasound revealing the  lesion reducing from 2.4 cm to 1 cm. Patient underwent a left mastectomy on 03/19/2013 finding negative invasive cancer, negative nodes. A 5% DCIS was noted. The patient has declined hormonal therapy. She did not need radiation therapy.  November 2014, she began to have evidence of lytic bone lesions at T5T6, frontal disease, an 8 mm lucency in the central frontal area of  the skull but nothing intracranial. Bone scan revealed only vertex tracer activity but bilateral ribs and thoracic spine, and other lesions in  her pelvis. At that  time, she was started on letrozole since November 2014. She is taking Xgeva. She had progression found in bone in Feb 2015 on scan. She was started on Faslodex, Ibrance and continued xgeva.         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 B) Foci of ductal carcinoma in situ, solid pattern. C) Greatest dimension of the invasive carcinoma is 15.8mm.         3/19/2013 Surgery     Left Mastectomy w/ Axillary Tail: Focal residual ducatal carcinoma in situ (DCIS) 12 lymph nodes negative for metastatic carcinoma.         10/20/2014 Biopsy     Peritoneum Biopsy: Final Diagnosis: Malignant Cell Neoplasm.           Breast cancer metastasized to bone, unspecified laterality (CMS/HCC)    10/15/2012 -  Other Event     Left mammogram:  Impression:  1. Decreasing spiculation and density in the retroareolar left breast, near a previous biopsy.   2. Definite left axillary lesion is not appreciated.          2/5/2013 -  Other Event     Diagnostic Mammo:  Comparison is made to 10/15/2012 and 6/20/2012  The spiculated mass at 12:00 behind the nipple is nearly resolved.   Impression:  1. The mass on the left side is less apparent after receiving chemotherapy. There has been a good response to chmotherapy on the left side.   2. No suspicious abnormality is seen in the right breast to account for the new palable abnormality at the 4-5:00 position.          3/19/2013 Surgery     Breast Biopsy:  Final Diagnosis:  A. Breast, left mastectomy with axillary tail  1. Focal residual ductal carcinoma in situ (DICS) (less than 5% of tumor bed).  2. Negative for residual invasive carcinoma.   3. 12 Lymph Nodes, negative for metastatic carcinoma (0/12) focally, some lymph nodes demonstrate fibrosis/treatment  effect.  B. Skin and soft tissue, left advancement flap:  1. Benign skin and subcutis.   2. Negative for carcinoma.          6/20/2013 -  Other Event     Diagnostic Mammo [facility]:  1. A subareolar mass is consistent with malignancy. Additional involement extends inferiorly sonographically and superolaterally mammograhically as demonstrated by calcifications. The mass measures approx 2.8 cm sonographically, but the involved region is likely much larger including an intraductal componet. The left axillary lymph nodes are also involved.   2. Recommened ultrasound guided biopsy of the left subareolar mass and left axillary lymph node.   3. Breast MRI could also further define multicentric disease on the left.   4. Clear suspicious finding on the right.  Recommend ongoing clinical follow up of palpable foci.          6/13/2014 -  Other Event     Diagnostic Mammo:  IMPRESSION:  1. History of left breast cancer and mastectomy. Stable benign right breast mammograms.          10/20/2014 Surgery     Final Diagnosis:  Biopsies, pertoneum:  Metastatic carcinoma, morphologically compatible with metastatic mammary carcinoma.          6/15/2015 -  Other Event     Diagnostic Mammo:  IMPRESSION:   1. Negative x-ray report, should not delay biopsy if a dominat or clinically suspicious mass is present.          7/5/2016 -  Other Event     Diagnositic mammogram:  Impression:   Stable right mamogram with no radiographic findings suspicious for malignancy. Recommend continued screening mammography per screening guidelines unless otherwise earlier clinically indicated.          9/18/2016 Initial Diagnosis     Secondary malignant neoplasm of bone and bone marrow                INTERVAL HISTORY  Patient ID: Heavenly Yanes is a 74 y.o. year old female Cancer Staging  Stage IV (T2, N1, M1)  --complaining of dyspnea on exersion since last Tuesday. She feels like she ran a mile  2/14/19: cxr showed pneumonia. Already on levofloxacin.  Coughing not worse, she did not run temp.  Overall feels weak.  -restarted the appetite suppressant and is starting to eat more but not a whole lot  --2/28/19: CT c/ap with some response to therapy. New left chest inflammatory lesion to be followed ion next imaging. Bone scan unremarkable for progression.  4/25/19: she is much better since being off therapy for 2 weeks. COugh is improved.   6/20/19: has left ear drainage which is her usual ear infection. Leg edema improved with lasix. She was ordered a mammo by the surgeon which I told her is not necessary, she has metastatic dz.    6/26/19: no new issues. Her ear drainage is better  -Denies sob /cp/n/v/, neuropathy/ no focal weakness.   Rest of ros unremarkable. 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) 9/18/2016     Past Surgical History:   Past Surgical History:   Procedure Laterality Date   • BREAST SURGERY      left breast biopsy and axillary node   • CHOLECYSTECTOMY     • EXTERNAL EAR SURGERY     • MASTECTOMY Left    • PORTACATH PLACEMENT       Social History:   Social History     Socioeconomic History   • Marital status:      Spouse name: Not on file   • Number of children: Not on file   • Years of education: Not on file   • Highest education level: Not on file   Tobacco Use   • Smoking status: Never Smoker   • Smokeless tobacco: Never Used   Substance and Sexual Activity   • Alcohol use: No   • Drug use: No     Family History:   Family History   Problem Relation Age of Onset   • No Known Problems Daughter    • No Known Problems Son    • Other Mother         complications from a concussion from a fall   • Other Father          old age   • No Known Problems Brother    • No Known Problems Daughter        Review of Systems   Constitutional: Positive for activity change, appetite change and fatigue. Negative for unexpected weight change.   HENT: Negative.    Eyes: Negative.    Respiratory: Positive for shortness of breath.    Cardiovascular: Positive for leg swelling.   Gastrointestinal: Negative.    Endocrine: Negative.    Genitourinary: Negative.    Musculoskeletal: Negative.    Skin: Negative.    Neurological: Negative.    Hematological: Negative.    Psychiatric/Behavioral: Negative.         Performance Status:  Asymptomatic    Medications:    Current Outpatient Medications   Medication Sig Dispense Refill   • Calcium-Magnesium-Vitamin D (CALCIUM 1200+D3 PO) Take 1,200 mg by mouth Daily.     • capecitabine (XELODA) 500 MG chemo tablet Take 1 tablet by mouth 2 (Two) Times a Day. For 2 weeks on and 1 week off. 42 tablet 11   • furosemide (LASIX) 20 MG tablet One tab po qd as needed for swelling take with potassium 30 tablet 1   • megestrol (MEGACE) 40 MG tablet Take 1 tablet by mouth 2 (Two) Times a Day. 60 tablet 5   • metoprolol tartrate (LOPRESSOR) 25 MG tablet Take 25 mg by mouth 2 (two) times a day.     • OLANZapine (ZYPREXA) 2.5 MG tablet Take 2.5 mg by mouth every night.     • ondansetron (ZOFRAN) 8 MG tablet Take 1 tablet by mouth Every 8 (Eight) Hours As Needed for Nausea or Vomiting. 30 tablet 5   • potassium chloride (K-DUR,KLOR-CON) 20 MEQ CR tablet Take one tab po bid on days taking Lasix 60 tablet 1   • raNITIdine (ZANTAC) 150 MG tablet Take 150 mg by mouth 2 (Two) Times a Day.     • rivaroxaban (XARELTO) 20 MG tablet Take 1 tablet by mouth Daily. 30 tablet 5   • thyroid 60 MG PO tablet Take 60 mg by mouth daily.     • ciprofloxacin (CIPRO) 500 MG tablet Take 1 tablet by mouth 2 (Two) Times a Day. 28 tablet 0     No current facility-administered medications for this visit.      Facility-Administered Medications Ordered in  "Other Visits   Medication Dose Route Frequency Provider Last Rate Last Dose   • heparin flush (porcine) 100 UNIT/ML injection 500 Units  500 Units Intravenous PRN Joseph Nunez MD   500 Units at 10/26/17 1155   • sodium chloride 0.9 % flush 10 mL  10 mL Intravenous PRN Joseph Nunez MD   10 mL at 10/26/17 1155       ALLERGIES:    Allergies   Allergen Reactions   • Benadryl  [Diphenhydramine Hcl (Sleep)] Other (See Comments)   • Codeine    • Diphenhydramine Other (See Comments)     \"Shaky leg syndrome\"  \"Shaky leg syndrome\"  \"Shaky leg syndrome\"   • Heparin Other (See Comments)     Patient states tolerates Heparin flush without problems: Years ago had heparin IV and had a rash   • Other      POPPY SEED   • Penicillins Hives   • Petroleum Jelly [Skin Protectants, Misc.]    • Sulfa Antibiotics    • Sulfur    • Valium [Diazepam]        Objective      Vitals:    06/27/19 1025   BP: 98/72   Pulse: 91   Resp: 18   Temp: 98.8 °F (37.1 °C)   TempSrc: Tympanic   SpO2: 95%   Weight: 59 kg (130 lb 1.6 oz)   Height: 165.1 cm (65\")   PainSc: 0-No pain         Current Status 6/27/2019   ECOG score 3         Physical Examunchanged  General Appearance: Patient is awake, alert, oriented and in no acute distress. Patient is welldeveloped, wellnourished, and appears stated age.  HEENT: Normocephalic. Sclerae clear, conjunctiva pink, extraocular movements intact, pupils, round, reactive to light and  accommodation. Mouth and throat are clear with moist oral mucosa.  NECK: Supple, no jugular venous distention, thyroid not enlarged.  LYMPH: No cervical, supraclavicular, axillary, or inguinal lymphadenopathy.  CHEST: Equal bilateral expansion, AP  diameter normal, resonant percussion note  LUNGS: Good air movement, no rales, rhonchi, rubs or wheezes with auscultation on the right. Diminished on the left.  CARDIO: Regular sinus rhythm, no murmurs, gallops or rubs.  ABDOMEN: Nondistended, soft, No tenderness, no guarding, no rebound, No " hepatosplenomegaly. No abdominal masses. Bowel sounds positive. No hernia  GENITALIA: Not examined.  BREASTS: Not examined.  MUSKEL: No joint swelling, decreased motion, or inflammation  EXTREMS: patirck le  edema,No clubbing, cyanosis, No varicose veins.  NEURO: Grossly nonfocal, Gait is coordinated and smooth, Cognition is preserved.  SKIN: No rashes, no ecchymoses, no petechia.  PSYCH: Oriented to time, place and person. Memory is preserved. Mood and affect appear normal  RECENT LABS:  Lab on 06/27/2019   Component Date Value Ref Range Status   • Glucose 06/27/2019 96  70 - 100 mg/dL Final   • BUN 06/27/2019 13  5 - 21 mg/dL Final   • Creatinine 06/27/2019 0.83  0.50 - 1.40 mg/dL Final   • Sodium 06/27/2019 136  135 - 145 mmol/L Final   • Potassium 06/27/2019 3.8  3.5 - 5.3 mmol/L Final   • Chloride 06/27/2019 99  98 - 110 mmol/L Final   • CO2 06/27/2019 29.0  24.0 - 31.0 mmol/L Final   • Calcium 06/27/2019 9.6  8.4 - 10.4 mg/dL Final   • Total Protein 06/27/2019 7.1  6.3 - 8.7 g/dL Final   • Albumin 06/27/2019 4.00  3.50 - 5.00 g/dL Final   • ALT (SGPT) 06/27/2019 18  0 - 54 U/L Final   • AST (SGOT) 06/27/2019 25  7 - 45 U/L Final   • Alkaline Phosphatase 06/27/2019 66  24 - 120 U/L Final   • Total Bilirubin 06/27/2019 0.7  0.1 - 1.0 mg/dL Final   • eGFR Non  Amer 06/27/2019 67  >60 mL/min/1.73 Final   • Globulin 06/27/2019 3.1  gm/dL Final   • A/G Ratio 06/27/2019 1.3  1.1 - 2.5 g/dL Final   • BUN/Creatinine Ratio 06/27/2019 15.7  7.0 - 25.0 Final   • Anion Gap 06/27/2019 8.0  4.0 - 13.0 mmol/L Final   • WBC 06/27/2019 4.95  4.80 - 10.80 10*3/mm3 Final   • RBC 06/27/2019 3.10* 4.20 - 5.40 10*6/mm3 Final   • Hemoglobin 06/27/2019 10.4* 12.0 - 16.0 g/dL Final   • Hematocrit 06/27/2019 30.4* 37.0 - 47.0 % Final   • MCV 06/27/2019 98.1* 82.0 - 98.0 fL Final   • MCH 06/27/2019 33.5* 28.0 - 32.0 pg Final   • MCHC 06/27/2019 34.2  33.0 - 36.0 g/dL Final   • RDW 06/27/2019 18.5* 12.0 - 15.0 % Final   • RDW-SD  06/27/2019 65.5* 40.0 - 54.0 fl Final   • MPV 06/27/2019 10.1  6.0 - 12.0 fL Final   • Platelets 06/27/2019 391  130 - 400 10*3/mm3 Final   • Neutrophil % 06/27/2019 54.8  39.0 - 78.0 % Final   • Lymphocyte % 06/27/2019 37.0  15.0 - 45.0 % Final   • Monocyte % 06/27/2019 4.6  4.0 - 12.0 % Final   • Eosinophil % 06/27/2019 1.4  0.0 - 4.0 % Final   • Basophil % 06/27/2019 1.0  0.0 - 2.0 % Final   • Immature Grans % 06/27/2019 1.2  0.0 - 5.0 % Final   • Neutrophils, Absolute 06/27/2019 2.71  1.87 - 8.40 10*3/mm3 Final   • Lymphocytes, Absolute 06/27/2019 1.83  0.72 - 4.86 10*3/mm3 Final   • Monocytes, Absolute 06/27/2019 0.23  0.19 - 1.30 10*3/mm3 Final   • Eosinophils, Absolute 06/27/2019 0.07  0.00 - 0.70 10*3/mm3 Final   • Basophils, Absolute 06/27/2019 0.05  0.00 - 0.20 10*3/mm3 Final   • Immature Grans, Absolute 06/27/2019 0.06* 0.00 - 0.05 10*3/mm3 Final   • nRBC 06/27/2019 0.0  0.0 - 0.2 /100 WBC Final       RADIOLOGY:  Ct Chest With Contrast    Result Date: 5/31/2019  Narrative: CT CHEST W CONTRAST- 5/31/2019 11:49 AM CDT  HISTORY: metastatic breast ca assess for treatment response; C50.912-Malignant neoplasm of unspecified site of left female breast; C79.51-Secondary malignant neoplasm of bone   COMPARISON: 02/19/2019.  DOSE LENGTH PRODUCT: 237 mGy cm. Automated exposure control was also utilized to decrease patient radiation dose.  TECHNIQUE: Following the intravenous administration of Isovue contrast, helical CT tomographic images of the chest were acquired. Multiplanar reformatted images were also provided for review.  FINDINGS:  Neck base: The imaged portion of the neck and thyroid gland is unremarkable.  Lungs: A wedge-shaped area of groundglass opacification is seen in the LEFT lower lobe. This is noted on a background of interlobular septal thickening and pleural thickening. A moderate size RIGHT pleural effusion is present. There is thickening of the LEFT pleura, likely due to previous radiation.   Heart: Normal in size and appearance. Trace pericardial fluid is present.  Vasculature: There is aortic atherosclerosis without stenosis or aneurysm. No coronary arteriosclerosis identified. The great vessels are normal in appearance. The pulmonary arteries are normal in appearance.  Mediastinum and Lymph nodes: No enlarged axillary, hilar, or mediastinal lymph nodes.  Bones and soft tissues: Diffuse osseous metastases are present. There is a stable compression deformity in the midthoracic spine. No acute fractures are identified. LEFT mastectomy has been performed.  Upper abdomen: Findings in the upper abdomen are described in a separate dictation.      Impression: 1. Diffuse osseous metastases are again noted. 2. Accumulation of a moderate sized RIGHT pleural effusion. 3. LEFT pleural thickening and scarring have slightly increased since the previous study. These changes are likely due to previous radiation. 4. Findings in the upper abdomen are described in a separate dictation. This report was finalized on 05/31/2019 14:18 by Dr. Osmani Gillette MD.    Nm Bone Scan Whole Body    Result Date: 5/31/2019  Narrative: NM BONE SCAN WHOLE BODY- 5/31/2019 12:20 PM CDT  HISTORY: metastatic breast ca assess for treatment response; C50.912-Malignant neoplasm of unspecified site of left female breast; C79.51-Secondary malignant neoplasm of bone  COMPARISON: CT chest, abdomen, and pelvis 05/31/2019 and bone scan 02/19/2019.  RADIOPHARMACEUTICAL: 18.1 mCi Tc99m HDP  TECHNIQUE: Anterior and posterior whole-body images are acquired approximately two hours postinjection.  FINDINGS: Skull: Unremarkable.   Spine: Multifocal spinal metastases are faintly visualized on today's exam. An anterior lesion is seen at L5.  Thorax: No focal area of increased uptake.  Abdomen/pelvis: Physiologic activity is noted in the bilateral kidneys and urinary bladder.  Extremities: There is increased uptake in the LEFT shoulder that is essentially  unchanged and may be due to a pathologic fracture through the coracoid process.       Impression: 1. Widespread osteoblastic disease with more focal osteoblastic activity in the region of the LEFT coracoid process. A pathologic fracture should be considered.  This report was finalized on 05/31/2019 15:40 by Dr. Osmani Gillette MD.    Ct Abdomen Pelvis With Contrast    Result Date: 5/31/2019  Narrative: EXAMINATION: CT ABDOMEN PELVIS W CONTRAST-  5/31/2019 2:38 PM CDT  HISTORY: Metastatic breast cancer, assess treatment response  COMPARISON:02/19/2019 abdomen and pelvis CT  TECHNIQUE:  Radiation dose equals  mGy-cm.  Automated exposure control dose reduction technique was implemented.  Thin section axial imaging was obtained. 2-D sagittal and coronal reconstruction images were generated.  Intravenous contrast was administered.  Oral contrast was ingested.  FINDINGS:  Bilateral pleural effusions identified, greater on the right.  Gallbladder surgically absent. There are no focal liver lesions. There is no biliary ductal dilatation. The hepatic venous and portal venous structures are imaged normally.  The spleen is not enlarged.  There is no adrenal masses.  There are no pancreatic lesions.  There are no urinary tract calculi. There is no focal renal lesions. There is no pelvocaliectasis or obstructive uropathy changes. The urinary bladder is mildly distended without focal bladder wall abnormality.  Calcified uterine fibroids are observed. There are no adnexal masses.  There is stool and gas identified throughout the colon which is not dilated. The segmental wall thickening observed previously in the sigmoid is dramatically decreased in conspicuity with minimal residual. Improved colitis considered.  Small bowel is not dilated. The duodenal sweep is imaged appropriately stomach is nondistended.  There is extensive osteoblastic metastases multiple punctate lesions throughout the thoracolumbar spine ribs and pelvic  bones. Proximal femoral lesions also observed. Similar changes noted in the prior examination. The CT appearance is likely unchanged. No obvious progression blastic lesions observed.  Induration in the presacral fluid/induration in the presacral space again identified.  There is edema within the body wall diffusely, question volume overload/third spacing.      Impression: 1. Bilateral pleural effusions greater on the right. 2. Extensive diffuse osteoblastic metastases likely stable. 3. Segmental wall thickening of the sigmoid colon less conspicuous on today's examination. 4. Body wall edema, question third spacing/volume overload. 5. Otherwise, No CT evidence of developing metastatic disease. This report was finalized on 05/31/2019 14:49 by Dr. Naun Pierre MD.           Assessment/Plan  Heavenly Yanes is a 74 y.o. year old female with met breast cancer s/p multiple lines of CMT currently on taxotere weekly and xeloda with good tolerance.    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  --also on xeloda  --CT c/a/p 2/19 showed stable dz. Some new Inflammatory opacities in the LEFT lower lobe and bilateral subpleural interstitial thickening.   -bone scan without evidence of dz progression  --primo taxotere weekly. Pt also on xeloda [id number], then repeat.  4/25/19: therapy postponed 2 weeks ago due to neutropenia. He got Neupogen x1. Labs  reviewed with pt wbc 10.68, hg 11.1, [id number]. ANC 7630.  -ANC low 880. Postpone tx. Give neupogen x1. Pt advised to hold xeloda until 2 weeks  6/20/19: labs reviewed with pt wbc 15.04, Hg 11.8, plt 373 Ok for treatment.  .-tumor marker  Ca15-3 61.4 /44.8./otday pending ; Ca 27.29 48/58.4/today pending  -imaging CT c/a/p 5/31/19 showed stable dz. Bone scan 5/31/19 showed multiple metastatic dz, nothing new. Possible pathologic fracture in the area of the coracoid process. Pt does not endorse pain there.  -labs reviewed today wbc 4.95, Hg 10.4, plt 391. Ok for treatment with taxotere    2. Psych: on olanzapine    3. HTN: on metoprolol    4. Gerd: on ranitidine  5. Dyspnea: get a cxr to evaluate  6. Anemia: will check iron profile, ferritin. Her creatinine is normal at 0.94.   7. DVT on xarelto indefinite.  --Doppler left LE  1/30/19 There is evidence of deep venous thrombosis of the left lower extremity. The clot burden is improved from last study. There is also evidence of superficial clot in the greater saphenous vein. There is a Baker's cyst in the popliteal fossa.  8. CHF: LVEF 65% with diastolic dysfunction. referral to cardiology to maximized her heart function.  9. Thrombocytosis: suspect reactive from anemia. Will check iron profile.  10.left lung pneumonia: resolved  --cxr showed pneumonia. Pt already on levofloxacin. Will complete 14 days.   11: LE edema: improved on lasix.  12. FEN:  Low potassium on  kdur. Also will check magnesium. Advised 20meq kdur TID for 7  days then 20meq once a day thereafter.  13. otits media of the right ear with effusion. This happens off and on for her. She usually gets some medication from pcp. Given her multiple allergies will have her f/u with pcp with what has worked for her in the past.  -she took ciprofloxacin left over from prior rx that I gave her for something else and is asking for refill. I will go ahead and refill it. If it does not get better will refer to  ent.    Moise Sotelo MD    6/27/2019    11:21 AM

## 2019-07-05 RX ORDER — FUROSEMIDE 20 MG/1
TABLET ORAL
Qty: 30 TABLET | Refills: 1 | Status: SHIPPED | OUTPATIENT
Start: 2019-07-05 | End: 2019-07-26 | Stop reason: SDUPTHER

## 2019-07-05 NOTE — TELEPHONE ENCOUNTER
Received call from patient with concerns that she is now out of Lasix and her left lower leg and foot are extremely swollen more so than the right, she denies pain, redness's, weeping, or unusual heat/warmness of the extremity at this time,  she continues on Xarelto as instructed she does have Hx of DVT. Discussed with Dr Sotelo patients problem, she gave verbal order for script to be sent in to patient Pharmacy for Lasix and instructed patient to be sure and continue taking the K+ with it. She v/u and will  script, she was also instructed if she had further questions or concerns over the weekend regarding this matter to please call in to speak to the Physician on call or go to the ER Dept so her problem can be addressed. She v/u.

## 2019-07-11 DIAGNOSIS — C79.51 CARCINOMA OF LEFT BREAST METASTATIC TO BONE (HCC): ICD-10-CM

## 2019-07-11 DIAGNOSIS — C50.912 CARCINOMA OF LEFT BREAST METASTATIC TO BONE (HCC): ICD-10-CM

## 2019-07-18 ENCOUNTER — OFFICE VISIT (OUTPATIENT)
Dept: ONCOLOGY | Facility: CLINIC | Age: 74
End: 2019-07-18

## 2019-07-18 ENCOUNTER — INFUSION (OUTPATIENT)
Dept: ONCOLOGY | Facility: HOSPITAL | Age: 74
End: 2019-07-18

## 2019-07-18 ENCOUNTER — LAB (OUTPATIENT)
Dept: LAB | Facility: HOSPITAL | Age: 74
End: 2019-07-18

## 2019-07-18 VITALS
HEART RATE: 112 BPM | HEIGHT: 65 IN | DIASTOLIC BLOOD PRESSURE: 68 MMHG | BODY MASS INDEX: 21.33 KG/M2 | TEMPERATURE: 98.3 F | WEIGHT: 128 LBS | OXYGEN SATURATION: 99 % | SYSTOLIC BLOOD PRESSURE: 108 MMHG | RESPIRATION RATE: 16 BRPM

## 2019-07-18 VITALS
TEMPERATURE: 97.9 F | HEIGHT: 65 IN | HEART RATE: 86 BPM | RESPIRATION RATE: 18 BRPM | SYSTOLIC BLOOD PRESSURE: 134 MMHG | WEIGHT: 128.2 LBS | OXYGEN SATURATION: 98 % | DIASTOLIC BLOOD PRESSURE: 59 MMHG | BODY MASS INDEX: 21.36 KG/M2

## 2019-07-18 DIAGNOSIS — C50.912 CARCINOMA OF LEFT BREAST METASTATIC TO BONE (HCC): Primary | ICD-10-CM

## 2019-07-18 DIAGNOSIS — Z95.828 PORT-A-CATH IN PLACE: ICD-10-CM

## 2019-07-18 DIAGNOSIS — C50.412 MALIGNANT NEOPLASM OF UPPER-OUTER QUADRANT OF LEFT FEMALE BREAST, UNSPECIFIED ESTROGEN RECEPTOR STATUS (HCC): ICD-10-CM

## 2019-07-18 DIAGNOSIS — C79.51 BREAST CANCER METASTASIZED TO BONE, UNSPECIFIED LATERALITY (HCC): ICD-10-CM

## 2019-07-18 DIAGNOSIS — C50.912 CARCINOMA OF LEFT BREAST METASTATIC TO BONE (HCC): ICD-10-CM

## 2019-07-18 DIAGNOSIS — C50.919 BREAST CANCER METASTASIZED TO BONE, UNSPECIFIED LATERALITY (HCC): ICD-10-CM

## 2019-07-18 DIAGNOSIS — C79.51 CARCINOMA OF LEFT BREAST METASTATIC TO BONE (HCC): ICD-10-CM

## 2019-07-18 DIAGNOSIS — C79.51 CARCINOMA OF LEFT BREAST METASTATIC TO BONE (HCC): Primary | ICD-10-CM

## 2019-07-18 LAB
ALBUMIN SERPL-MCNC: 3.8 G/DL (ref 3.5–5)
ALBUMIN/GLOB SERPL: 1.3 G/DL (ref 1.1–2.5)
ALP SERPL-CCNC: 67 U/L (ref 24–120)
ALT SERPL W P-5'-P-CCNC: <15 U/L (ref 0–54)
ANION GAP SERPL CALCULATED.3IONS-SCNC: 7 MMOL/L (ref 4–13)
AST SERPL-CCNC: 23 U/L (ref 7–45)
BASOPHILS # BLD AUTO: 0.06 10*3/MM3 (ref 0–0.2)
BASOPHILS NFR BLD AUTO: 0.5 % (ref 0–2)
BILIRUB SERPL-MCNC: 0.5 MG/DL (ref 0.1–1)
BUN BLD-MCNC: 14 MG/DL (ref 5–21)
BUN/CREAT SERPL: 18.7 (ref 7–25)
CALCIUM SPEC-SCNC: 10.2 MG/DL (ref 8.4–10.4)
CHLORIDE SERPL-SCNC: 103 MMOL/L (ref 98–110)
CO2 SERPL-SCNC: 28 MMOL/L (ref 24–31)
CREAT BLD-MCNC: 0.75 MG/DL (ref 0.5–1.4)
DEPRECATED RDW RBC AUTO: 69.3 FL (ref 40–54)
EOSINOPHIL # BLD AUTO: 0.01 10*3/MM3 (ref 0–0.7)
EOSINOPHIL NFR BLD AUTO: 0.1 % (ref 0–4)
ERYTHROCYTE [DISTWIDTH] IN BLOOD BY AUTOMATED COUNT: 18.8 % (ref 12–15)
FERRITIN SERPL-MCNC: 404 NG/ML (ref 11.1–264)
GFR SERPL CREATININE-BSD FRML MDRD: 75 ML/MIN/1.73
GLOBULIN UR ELPH-MCNC: 3 GM/DL
GLUCOSE BLD-MCNC: 101 MG/DL (ref 70–100)
HCT VFR BLD AUTO: 34.8 % (ref 37–47)
HGB BLD-MCNC: 11.7 G/DL (ref 12–16)
HOLD SPECIMEN: NORMAL
IMM GRANULOCYTES # BLD AUTO: 0.1 10*3/MM3 (ref 0–0.05)
IMM GRANULOCYTES NFR BLD AUTO: 0.8 % (ref 0–5)
IRON 24H UR-MRATE: 54 MCG/DL (ref 42–180)
IRON SATN MFR SERPL: 21 % (ref 20–45)
LYMPHOCYTES # BLD AUTO: 2.23 10*3/MM3 (ref 0.72–4.86)
LYMPHOCYTES NFR BLD AUTO: 17.1 % (ref 15–45)
MCH RBC QN AUTO: 34.2 PG (ref 28–32)
MCHC RBC AUTO-ENTMCNC: 33.6 G/DL (ref 33–36)
MCV RBC AUTO: 101.8 FL (ref 82–98)
MONOCYTES # BLD AUTO: 0.7 10*3/MM3 (ref 0.19–1.3)
MONOCYTES NFR BLD AUTO: 5.4 % (ref 4–12)
NEUTROPHILS # BLD AUTO: 9.95 10*3/MM3 (ref 1.87–8.4)
NEUTROPHILS NFR BLD AUTO: 76.1 % (ref 39–78)
NRBC BLD AUTO-RTO: 0 /100 WBC (ref 0–0.2)
PLATELET # BLD AUTO: 476 10*3/MM3 (ref 130–400)
PMV BLD AUTO: 9.7 FL (ref 6–12)
POTASSIUM BLD-SCNC: 3.8 MMOL/L (ref 3.5–5.3)
PROT SERPL-MCNC: 6.8 G/DL (ref 6.3–8.7)
RBC # BLD AUTO: 3.42 10*6/MM3 (ref 4.2–5.4)
SODIUM BLD-SCNC: 138 MMOL/L (ref 135–145)
TIBC SERPL-MCNC: 256 MCG/DL (ref 225–420)
WBC NRBC COR # BLD: 13.05 10*3/MM3 (ref 4.8–10.8)

## 2019-07-18 PROCEDURE — 25010000002 DEXAMETHASONE SODIUM PHOSPHATE 100 MG/10ML SOLUTION: Performed by: INTERNAL MEDICINE

## 2019-07-18 PROCEDURE — 25010000002 ONDANSETRON PER 1 MG: Performed by: INTERNAL MEDICINE

## 2019-07-18 PROCEDURE — 83550 IRON BINDING TEST: CPT | Performed by: INTERNAL MEDICINE

## 2019-07-18 PROCEDURE — 83540 ASSAY OF IRON: CPT | Performed by: INTERNAL MEDICINE

## 2019-07-18 PROCEDURE — 25010000002 DOCETAXEL 20 MG/ML SOLUTION 4 ML VIAL: Performed by: INTERNAL MEDICINE

## 2019-07-18 PROCEDURE — 80053 COMPREHEN METABOLIC PANEL: CPT

## 2019-07-18 PROCEDURE — 99214 OFFICE O/P EST MOD 30 MIN: CPT | Performed by: INTERNAL MEDICINE

## 2019-07-18 PROCEDURE — 96413 CHEMO IV INFUSION 1 HR: CPT

## 2019-07-18 PROCEDURE — 82728 ASSAY OF FERRITIN: CPT | Performed by: INTERNAL MEDICINE

## 2019-07-18 PROCEDURE — 85025 COMPLETE CBC W/AUTO DIFF WBC: CPT

## 2019-07-18 PROCEDURE — 86300 IMMUNOASSAY TUMOR CA 15-3: CPT

## 2019-07-18 PROCEDURE — 96367 TX/PROPH/DG ADDL SEQ IV INF: CPT

## 2019-07-18 PROCEDURE — 36415 COLL VENOUS BLD VENIPUNCTURE: CPT

## 2019-07-18 RX ORDER — FAMOTIDINE 10 MG/ML
20 INJECTION, SOLUTION INTRAVENOUS AS NEEDED
Status: CANCELLED | OUTPATIENT
Start: 2019-07-25

## 2019-07-18 RX ORDER — FAMOTIDINE 10 MG/ML
20 INJECTION, SOLUTION INTRAVENOUS AS NEEDED
Status: DISCONTINUED | OUTPATIENT
Start: 2019-07-18 | End: 2019-07-18 | Stop reason: HOSPADM

## 2019-07-18 RX ORDER — SODIUM CHLORIDE 0.9 % (FLUSH) 0.9 %
10 SYRINGE (ML) INJECTION AS NEEDED
Status: CANCELLED | OUTPATIENT
Start: 2019-07-18

## 2019-07-18 RX ORDER — SODIUM CHLORIDE 9 MG/ML
250 INJECTION, SOLUTION INTRAVENOUS ONCE
Status: CANCELLED | OUTPATIENT
Start: 2019-07-25

## 2019-07-18 RX ORDER — SODIUM CHLORIDE 0.9 % (FLUSH) 0.9 %
10 SYRINGE (ML) INJECTION AS NEEDED
Status: DISCONTINUED | OUTPATIENT
Start: 2019-07-18 | End: 2019-07-18 | Stop reason: HOSPADM

## 2019-07-18 RX ORDER — FAMOTIDINE 10 MG/ML
20 INJECTION, SOLUTION INTRAVENOUS AS NEEDED
Status: CANCELLED | OUTPATIENT
Start: 2019-07-18

## 2019-07-18 RX ORDER — MEPERIDINE HYDROCHLORIDE 50 MG/ML
25 INJECTION INTRAMUSCULAR; INTRAVENOUS; SUBCUTANEOUS
Status: DISCONTINUED | OUTPATIENT
Start: 2019-07-18 | End: 2019-07-18 | Stop reason: HOSPADM

## 2019-07-18 RX ORDER — SODIUM CHLORIDE 9 MG/ML
250 INJECTION, SOLUTION INTRAVENOUS ONCE
Status: CANCELLED | OUTPATIENT
Start: 2019-07-18

## 2019-07-18 RX ORDER — MEPERIDINE HYDROCHLORIDE 50 MG/ML
25 INJECTION INTRAMUSCULAR; INTRAVENOUS; SUBCUTANEOUS
Status: CANCELLED | OUTPATIENT
Start: 2019-07-25 | End: 2019-07-26

## 2019-07-18 RX ORDER — MEPERIDINE HYDROCHLORIDE 50 MG/ML
25 INJECTION INTRAMUSCULAR; INTRAVENOUS; SUBCUTANEOUS
Status: CANCELLED | OUTPATIENT
Start: 2019-07-18 | End: 2019-07-19

## 2019-07-18 RX ORDER — SODIUM CHLORIDE 9 MG/ML
250 INJECTION, SOLUTION INTRAVENOUS ONCE
Status: COMPLETED | OUTPATIENT
Start: 2019-07-18 | End: 2019-07-18

## 2019-07-18 RX ADMIN — SODIUM CHLORIDE 250 ML: 9 INJECTION, SOLUTION INTRAVENOUS at 11:20

## 2019-07-18 RX ADMIN — Medication 500 UNITS: at 13:00

## 2019-07-18 RX ADMIN — SODIUM CHLORIDE, PRESERVATIVE FREE 10 ML: 5 INJECTION INTRAVENOUS at 13:00

## 2019-07-18 RX ADMIN — DOCETAXEL ANHYDROUS 60 MG: 20 INJECTION, SOLUTION INTRAVENOUS at 11:39

## 2019-07-18 NOTE — PROGRESS NOTES
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   • Carcinoma of left breast metastatic to bone (CMS/HCC)        REASON FOR VISIT:     Chief Complaint   Patient presents with   • Breast Cancer     Here for treatment    • Fatigue     continually inceases        HEMATOLOGY / ONCOLOGY HISTORY:   Oncology/Hematology History    Patient is a 67-year-old postmenopausal female who had onset of her menses at age 9 and menopause at age 48. She received oral contraceptives for approximately 14 years and did not receive hormonal manipulation. Patient has a maternal cousin had breast cancer. Patient has had no prior breast biopsies. Patient found a palpable left breast mass as well as a left axillary mass. Patient had a bloody nipple discharge which been present for approximately 6 months. On 6/20/2012, a mammogram was performed showing a subareolar mass consistent with malignancy. Bilateral breast ultrasound revealed an ill-defined subareolar mass measuring 2.8-2.5 cm. There is a left axillary mass measuring 1.7 cm with suspicion of extracapsular extension. There is no evidence of disease in the right breast. On 7/9/2012 patient underwent a left breast biopsy and placement of marker clip. Left breast biopsy revealed an infiltrating lobular carcinoma grade 2 with focal ductal carcinoma in situ, solid pattern. A fine-needle biopsy of the left lymph node was consistent with metastatic carcinoma. Breast tumor profile revealed ER: 100%; MD: 98%; HER-2/maynor 2+; FISH: Unamplified; Ki-67: 71%; P 53 1%; DNA aneuploidy. A MRI of the breast were performed body multiple abnormal enhancing masses  within the left breast. There appears to be anterior retraction of the pectoralis muscle with no direct extension. There is a moderate to large, layering left pleural effusion appreciated. The right breast reveals 3 moderately enlarged lymph nodes in the posterior lateral aspect the right breast. These have fatty hilum but followup is recommended. Patient is undergone systemic studies including, on 8/2/2012, a CT scan of the brain showing atrophy. a CT scan that shows showing a small to moderate left pleural effusion with 3 subcentimeter nodule densities in the left lung.   She completed 4 cycles of neoadjuvant treatment with dose dense Adriamycin and Cytoxan. She had a mammogram which showed a significant reduction in the size of her left breast lesion. There is no axillary adenopathy noted. She has had an ultrasound revealing the  lesion reducing from 2.4 cm to 1 cm. Patient underwent a left mastectomy on 03/19/2013 finding negative invasive cancer, negative nodes. A 5% DCIS was noted. The patient has declined hormonal therapy. She did not need radiation therapy.  November 2014, she began to have evidence of lytic bone lesions at T5T6, frontal disease, an 8 mm lucency in the central frontal area of  the skull but nothing intracranial. Bone scan revealed only vertex tracer activity but bilateral ribs and thoracic spine, and other lesions in  her pelvis. At that time, she was started on letrozole since November 2014. She is taking Xgeva. She had progression found in bone in Feb 2015 on scan. She was started on Faslodex, Ibrance and continued xgeva.         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 B) Foci of ductal carcinoma in situ, solid pattern. C) Greatest dimension of  the invasive carcinoma is 15.8mm.         3/19/2013 Surgery     Left Mastectomy w/ Axillary Tail: Focal residual ducatal carcinoma in situ (DCIS) 12 lymph nodes negative for metastatic carcinoma.         10/20/2014 Biopsy     Peritoneum Biopsy: Final Diagnosis: Malignant Cell Neoplasm.           Breast cancer metastasized to bone, unspecified laterality (CMS/HCC)    10/15/2012 -  Other Event     Left mammogram:  Impression:  1. Decreasing spiculation and density in the retroareolar left breast, near a previous biopsy.   2. Definite left axillary lesion is not appreciated.          2/5/2013 -  Other Event     Diagnostic Mammo:  Comparison is made to 10/15/2012 and 6/20/2012  The spiculated mass at 12:00 behind the nipple is nearly resolved.   Impression:  1. The mass on the left side is less apparent after receiving chemotherapy. There has been a good response to chmotherapy on the left side.   2. No suspicious abnormality is seen in the right breast to account for the new palable abnormality at the 4-5:00 position.          3/19/2013 Surgery     Breast Biopsy:  Final Diagnosis:  A. Breast, left mastectomy with axillary tail  1. Focal residual ductal carcinoma in situ (DICS) (less than 5% of tumor bed).  2. Negative for residual invasive carcinoma.   3. 12 Lymph Nodes, negative for metastatic carcinoma (0/12) focally, some lymph nodes demonstrate fibrosis/treatment effect.  B. Skin and soft tissue, left advancement flap:  1. Benign skin and subcutis.   2. Negative for carcinoma.          6/20/2013 -  Other Event     Diagnostic Mammo [facility]:  1. A subareolar mass is consistent with malignancy. Additional involement extends inferiorly sonographically and superolaterally mammograhically as demonstrated by calcifications. The mass measures approx 2.8 cm sonographically, but the involved region is likely much larger including an intraductal componet. The left axillary lymph nodes are also involved.   2. Recommened  ultrasound guided biopsy of the left subareolar mass and left axillary lymph node.   3. Breast MRI could also further define multicentric disease on the left.   4. Clear suspicious finding on the right.  Recommend ongoing clinical follow up of palpable foci.          6/13/2014 -  Other Event     Diagnostic Mammo:  IMPRESSION:  1. History of left breast cancer and mastectomy. Stable benign right breast mammograms.          10/20/2014 Surgery     Final Diagnosis:  Biopsies, pertoneum:  Metastatic carcinoma, morphologically compatible with metastatic mammary carcinoma.          6/15/2015 -  Other Event     Diagnostic Mammo:  IMPRESSION:   1. Negative x-ray report, should not delay biopsy if a dominat or clinically suspicious mass is present.          7/5/2016 -  Other Event     Diagnositic mammogram:  Impression:   Stable right mamogram with no radiographic findings suspicious for malignancy. Recommend continued screening mammography per screening guidelines unless otherwise earlier clinically indicated.          9/18/2016 Initial Diagnosis     Secondary malignant neoplasm of bone and bone marrow                INTERVAL HISTORY  Patient ID: Heavenly Yanes is a 74 y.o. year old female Cancer Staging  Stage IV (T2, N1, M1)  --complaining of dyspnea on exersion since last Tuesday. She feels like she ran a mile  2/14/19: cxr showed pneumonia. Already on levofloxacin. Coughing not worse, she did not run temp.  Overall feels weak.  -restarted the appetite suppressant and is starting to eat more but not a whole lot  --2/28/19: CT c/ap with some response to therapy. New left chest inflammatory lesion to be followed ion next imaging. Bone scan unremarkable for progression.  4/25/19: she is much better since being off therapy for 2 weeks. COugh is improved.   6/20/19: has left ear drainage which is her usual ear infection. Leg edema improved with lasix. She was ordered a mammo by the surgeon which I told her is not necessary,  she has metastatic dz.    7/18/19: no new issues. Her ear drainage is better. She feels weak  -Denies sob /cp/n/v/, neuropathy/ no focal weakness.   Rest of ros unremarkable. 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) 9/18/2016     Past Surgical History:   Past Surgical History:   Procedure Laterality Date   • BREAST SURGERY      left breast biopsy and axillary node   • CHOLECYSTECTOMY     • EXTERNAL EAR SURGERY     • MASTECTOMY Left    • PORTACATH PLACEMENT       Social History:   Social History     Socioeconomic History   • Marital status:      Spouse name: Not on file   • Number of children: Not on file   • Years of education: Not on file   • Highest education level: Not on file   Tobacco Use   • Smoking status: Never Smoker   • Smokeless tobacco: Never Used   Substance and Sexual Activity   • Alcohol use: No   • Drug use: No     Family History:   Family History   Problem Relation Age of Onset   • No Known Problems Daughter    • No Known Problems Son    • Other Mother         complications from a concussion from a fall   • Other Father         old age   • No Known Problems Brother    • No Known Problems Daughter        Review of Systems   Constitutional: Positive for activity change, appetite change and fatigue. Negative for unexpected weight change.   HENT: Negative.    Eyes: Negative.    Respiratory: Positive for shortness of breath.    Cardiovascular: Positive for leg swelling.   Gastrointestinal: Negative.    Endocrine: Negative.    Genitourinary: Negative.    Musculoskeletal: Negative.    Skin: Negative.    Neurological: Negative.    Hematological: Negative.   "  Psychiatric/Behavioral: Negative.         Performance Status:  Asymptomatic    Medications:    Current Outpatient Medications   Medication Sig Dispense Refill   • Calcium-Magnesium-Vitamin D (CALCIUM 1200+D3 PO) Take 1,200 mg by mouth Daily.     • capecitabine (XELODA) 500 MG chemo tablet Take 1 tablet by mouth 2 (Two) Times a Day. For 2 weeks on and 1 week off. 42 tablet 11   • ciprofloxacin (CIPRO) 500 MG tablet Take 1 tablet by mouth 2 (Two) Times a Day. 28 tablet 0   • furosemide (LASIX) 20 MG tablet One tab po qd as needed for swelling take with potassium 30 tablet 1   • megestrol (MEGACE) 40 MG tablet Take 1 tablet by mouth 2 (Two) Times a Day. 60 tablet 5   • metoprolol tartrate (LOPRESSOR) 25 MG tablet Take 25 mg by mouth 2 (two) times a day.     • OLANZapine (ZYPREXA) 2.5 MG tablet Take 2.5 mg by mouth every night.     • ondansetron (ZOFRAN) 8 MG tablet Take 1 tablet by mouth Every 8 (Eight) Hours As Needed for Nausea or Vomiting. 30 tablet 5   • potassium chloride (K-DUR,KLOR-CON) 20 MEQ CR tablet Take one tab po bid on days taking Lasix 60 tablet 1   • raNITIdine (ZANTAC) 150 MG tablet Take 150 mg by mouth 2 (Two) Times a Day.     • rivaroxaban (XARELTO) 20 MG tablet Take 1 tablet by mouth Daily. 30 tablet 5   • thyroid 60 MG PO tablet Take 60 mg by mouth daily.       No current facility-administered medications for this visit.      Facility-Administered Medications Ordered in Other Visits   Medication Dose Route Frequency Provider Last Rate Last Dose   • heparin flush (porcine) 100 UNIT/ML injection 500 Units  500 Units Intravenous PRN Joseph Nunez MD   500 Units at 10/26/17 1155   • sodium chloride 0.9 % flush 10 mL  10 mL Intravenous PRN Joseph Nunez MD   10 mL at 10/26/17 1155       ALLERGIES:    Allergies   Allergen Reactions   • Benadryl  [Diphenhydramine Hcl (Sleep)] Other (See Comments)   • Codeine    • Diphenhydramine Other (See Comments)     \"Shaky leg syndrome\"  \"Shaky leg " "syndrome\"  \"Shaky leg syndrome\"   • Heparin Other (See Comments)     Patient states tolerates Heparin flush without problems: Years ago had heparin IV and had a rash   • Other      POPPY SEED   • Penicillins Hives   • Petroleum Jelly [Skin Protectants, Misc.]    • Sulfa Antibiotics    • Sulfur    • Valium [Diazepam]        Objective      Vitals:    07/18/19 1017   BP: 108/68   Pulse: 112   Resp: 16   Temp: 98.3 °F (36.8 °C)   TempSrc: Oral   SpO2: 99%   Weight: 58.1 kg (128 lb)   Height: 165.1 cm (65\")   PainSc: 0-No pain         Current Status 7/18/2019   ECOG score 3         Physical Examunchanged  General Appearance: Patient is awake, alert, oriented and in no acute distress. Patient is welldeveloped, wellnourished, and appears stated age.  HEENT: Normocephalic. Sclerae clear, conjunctiva pink, extraocular movements intact, pupils, round, reactive to light and  accommodation. Mouth and throat are clear with moist oral mucosa.  NECK: Supple, no jugular venous distention, thyroid not enlarged.  LYMPH: No cervical, supraclavicular, axillary, or inguinal lymphadenopathy.  CHEST: Equal bilateral expansion, AP  diameter normal, resonant percussion note  LUNGS: Good air movement, no rales, rhonchi, rubs or wheezes with auscultation on the right. Diminished on the left.  CARDIO: Regular sinus rhythm, no murmurs, gallops or rubs.  ABDOMEN: Nondistended, soft, No tenderness, no guarding, no rebound, No hepatosplenomegaly. No abdominal masses. Bowel sounds positive. No hernia  GENITALIA: Not examined.  BREASTS: Not examined.  MUSKEL: No joint swelling, decreased motion, or inflammation  EXTREMS: patrick le  edema,No clubbing, cyanosis, No varicose veins.  NEURO: Grossly nonfocal, Gait is coordinated and smooth, Cognition is preserved.  SKIN: No rashes, no ecchymoses, no petechia.  PSYCH: Oriented to time, place and person. Memory is preserved. Mood and affect appear normal  RECENT LABS:  Lab on 07/18/2019   Component Date Value " Ref Range Status   • Glucose 07/18/2019 101* 70 - 100 mg/dL Final   • BUN 07/18/2019 14  5 - 21 mg/dL Final   • Creatinine 07/18/2019 0.75  0.50 - 1.40 mg/dL Final   • Sodium 07/18/2019 138  135 - 145 mmol/L Final   • Potassium 07/18/2019 3.8  3.5 - 5.3 mmol/L Final   • Chloride 07/18/2019 103  98 - 110 mmol/L Final   • CO2 07/18/2019 28.0  24.0 - 31.0 mmol/L Final   • Calcium 07/18/2019 10.2  8.4 - 10.4 mg/dL Final   • Total Protein 07/18/2019 6.8  6.3 - 8.7 g/dL Final   • Albumin 07/18/2019 3.80  3.50 - 5.00 g/dL Final   • ALT (SGPT) 07/18/2019 <15  0 - 54 U/L Final   • AST (SGOT) 07/18/2019 23  7 - 45 U/L Final   • Alkaline Phosphatase 07/18/2019 67  24 - 120 U/L Final   • Total Bilirubin 07/18/2019 0.5  0.1 - 1.0 mg/dL Final   • eGFR Non African Amer 07/18/2019 75  >60 mL/min/1.73 Final   • Globulin 07/18/2019 3.0  gm/dL Final   • A/G Ratio 07/18/2019 1.3  1.1 - 2.5 g/dL Final   • BUN/Creatinine Ratio 07/18/2019 18.7  7.0 - 25.0 Final   • Anion Gap 07/18/2019 7.0  4.0 - 13.0 mmol/L Final   • WBC 07/18/2019 13.05* 4.80 - 10.80 10*3/mm3 Final   • RBC 07/18/2019 3.42* 4.20 - 5.40 10*6/mm3 Final   • Hemoglobin 07/18/2019 11.7* 12.0 - 16.0 g/dL Final   • Hematocrit 07/18/2019 34.8* 37.0 - 47.0 % Final   • MCV 07/18/2019 101.8* 82.0 - 98.0 fL Final   • MCH 07/18/2019 34.2* 28.0 - 32.0 pg Final   • MCHC 07/18/2019 33.6  33.0 - 36.0 g/dL Final   • RDW 07/18/2019 18.8* 12.0 - 15.0 % Final   • RDW-SD 07/18/2019 69.3* 40.0 - 54.0 fl Final   • MPV 07/18/2019 9.7  6.0 - 12.0 fL Final   • Platelets 07/18/2019 476* 130 - 400 10*3/mm3 Final   • Neutrophil % 07/18/2019 76.1  39.0 - 78.0 % Final   • Lymphocyte % 07/18/2019 17.1  15.0 - 45.0 % Final   • Monocyte % 07/18/2019 5.4  4.0 - 12.0 % Final   • Eosinophil % 07/18/2019 0.1  0.0 - 4.0 % Final   • Basophil % 07/18/2019 0.5  0.0 - 2.0 % Final   • Immature Grans % 07/18/2019 0.8  0.0 - 5.0 % Final   • Neutrophils, Absolute 07/18/2019 9.95* 1.87 - 8.40 10*3/mm3 Final   •  Lymphocytes, Absolute 07/18/2019 2.23  0.72 - 4.86 10*3/mm3 Final   • Monocytes, Absolute 07/18/2019 0.70  0.19 - 1.30 10*3/mm3 Final   • Eosinophils, Absolute 07/18/2019 0.01  0.00 - 0.70 10*3/mm3 Final   • Basophils, Absolute 07/18/2019 0.06  0.00 - 0.20 10*3/mm3 Final   • Immature Grans, Absolute 07/18/2019 0.10* 0.00 - 0.05 10*3/mm3 Final   • nRBC 07/18/2019 0.0  0.0 - 0.2 /100 WBC Final       RADIOLOGY:  No results found.         Assessment/Plan  Heavenly Yanes is a 74 y.o. year old female with met breast cancer s/p multiple lines of CMT currently on taxotere weekly and xeloda with good tolerance.    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  --also on xeloda [id number], then repeat.  --CT c/a/p 2/19 showed stable dz. Some new Inflammatory opacities in the LEFT lower lobe and bilateral subpleural interstitial thickening.   -bone scan without evidence of dz progression  --primo taxotere weekly. Pt also on xeloda [id number], then repeat.  4/25/19: therapy postponed 2 weeks ago due to neutropenia. He got Neupogen x1. Labs reviewed with pt wbc 10.68, hg 11.1, [id number]. ANC 7630.  -ANC low 880. Postpone tx. Give neupogen x1. Pt advised to hold xeloda until 2 weeks    -imaging CT c/a/p 5/31/19 showed stable dz. Bone scan 5/31/19 showed multiple metastatic dz, nothing new. Possible pathologic fracture in the area of the coracoid process. Pt  does not endorse pain there.  -labs 7/18/19 reviewed with pt wbc 13.05, Hg 11.7, plt 476. Ok for treatment with taxotere    2. Psych: on olanzapine    3. HTN: on metoprolol    4. Gerd: on ranitidine  5. Dyspnea: get a cxr to evaluate  6. Anemia: will check iron profile, and act accordingly . Her creatinine is normal at 0.94.   7. DVT on xarelto indefinite.  --Doppler left LE  1/30/19 There is evidence of deep venous thrombosis of the left lower extremity. The clot burden is improved from last study. There is also evidence of superficial clot in the greater saphenous vein. There is a Baker's cyst in the popliteal fossa.  8. CHF: LVEF 65% with diastolic dysfunction. referral to cardiology to maximized her heart function.  9. Thrombocytosis: suspect reactive from anemia. Will check iron profile.  10.left lung pneumonia: resolved  --cxr showed pneumonia. Pt already on levofloxacin. Will complete 14 days.   11: LE edema: improved on lasix.  12. FEN:  Low potassium on  kdur. Also will check magnesium. Advised 20meq kdur TID for 7  days then 20meq once a day thereafter.  13. otits media of the right ear with effusion. This happens off and on for her. She usually gets some medication from pcp. Given her multiple allergies will have her f/u with pcp with what has worked for her in the past.  -she took ciprofloxacin left over from prior rx that I gave her for something else and is asking for refill. I will go ahead and refill it. If it does not get better will refer to ent.    Moise Sotelo MD    7/18/2019    10:37 AM

## 2019-07-19 LAB
CANCER AG15-3 SERPL-ACNC: 53.3 U/ML (ref 0–25)
CANCER AG27-29 SERPL-ACNC: 59.6 U/ML (ref 0–38.6)

## 2019-07-19 RX ORDER — RANITIDINE 150 MG/1
150 TABLET ORAL 2 TIMES DAILY
Qty: 60 TABLET | Refills: 5 | Status: SHIPPED | OUTPATIENT
Start: 2019-07-19

## 2019-07-19 NOTE — TELEPHONE ENCOUNTER
Spencer Burdick stating that she is having trouble with reflux and her esophagus and wanting to get a refill of her Zantac.  Verbal order from Dr. Sotelo to refill script.

## 2019-07-25 ENCOUNTER — HOSPITAL ENCOUNTER (OUTPATIENT)
Dept: GENERAL RADIOLOGY | Facility: HOSPITAL | Age: 74
Discharge: HOME | End: 2019-07-25
Admitting: INTERNAL MEDICINE

## 2019-07-25 ENCOUNTER — LAB (OUTPATIENT)
Dept: LAB | Facility: HOSPITAL | Age: 74
End: 2019-07-25

## 2019-07-25 ENCOUNTER — OFFICE VISIT (OUTPATIENT)
Dept: ONCOLOGY | Facility: CLINIC | Age: 74
End: 2019-07-25

## 2019-07-25 ENCOUNTER — INFUSION (OUTPATIENT)
Dept: ONCOLOGY | Facility: HOSPITAL | Age: 74
End: 2019-07-25

## 2019-07-25 VITALS
DIASTOLIC BLOOD PRESSURE: 62 MMHG | WEIGHT: 131.2 LBS | TEMPERATURE: 97.8 F | SYSTOLIC BLOOD PRESSURE: 138 MMHG | RESPIRATION RATE: 16 BRPM | HEART RATE: 76 BPM | OXYGEN SATURATION: 99 % | HEIGHT: 65 IN | BODY MASS INDEX: 21.86 KG/M2

## 2019-07-25 VITALS
WEIGHT: 132 LBS | HEIGHT: 65 IN | TEMPERATURE: 98.5 F | SYSTOLIC BLOOD PRESSURE: 128 MMHG | DIASTOLIC BLOOD PRESSURE: 62 MMHG | RESPIRATION RATE: 18 BRPM | OXYGEN SATURATION: 97 % | BODY MASS INDEX: 21.99 KG/M2 | HEART RATE: 84 BPM

## 2019-07-25 DIAGNOSIS — C79.51 BREAST CANCER METASTASIZED TO BONE, UNSPECIFIED LATERALITY (HCC): ICD-10-CM

## 2019-07-25 DIAGNOSIS — C50.912 CARCINOMA OF LEFT BREAST METASTATIC TO BONE (HCC): Primary | ICD-10-CM

## 2019-07-25 DIAGNOSIS — R06.09 DYSPNEA ON EXERTION: Primary | ICD-10-CM

## 2019-07-25 DIAGNOSIS — C50.919 BREAST CANCER METASTASIZED TO BONE, UNSPECIFIED LATERALITY (HCC): ICD-10-CM

## 2019-07-25 DIAGNOSIS — C79.51 CARCINOMA OF LEFT BREAST METASTATIC TO BONE (HCC): ICD-10-CM

## 2019-07-25 DIAGNOSIS — C50.912 CARCINOMA OF LEFT BREAST METASTATIC TO BONE (HCC): ICD-10-CM

## 2019-07-25 DIAGNOSIS — R06.09 DYSPNEA ON EXERTION: ICD-10-CM

## 2019-07-25 DIAGNOSIS — Z95.828 PORT-A-CATH IN PLACE: ICD-10-CM

## 2019-07-25 DIAGNOSIS — C50.412 MALIGNANT NEOPLASM OF UPPER-OUTER QUADRANT OF LEFT FEMALE BREAST, UNSPECIFIED ESTROGEN RECEPTOR STATUS (HCC): ICD-10-CM

## 2019-07-25 DIAGNOSIS — C79.51 CARCINOMA OF LEFT BREAST METASTATIC TO BONE (HCC): Primary | ICD-10-CM

## 2019-07-25 LAB
ALBUMIN SERPL-MCNC: 3.8 G/DL (ref 3.5–5)
ALBUMIN/GLOB SERPL: 1.3 G/DL (ref 1.1–2.5)
ALP SERPL-CCNC: 59 U/L (ref 24–120)
ALT SERPL W P-5'-P-CCNC: <15 U/L (ref 0–54)
ANION GAP SERPL CALCULATED.3IONS-SCNC: 8 MMOL/L (ref 4–13)
AST SERPL-CCNC: 20 U/L (ref 7–45)
BASOPHILS # BLD AUTO: 0.05 10*3/MM3 (ref 0–0.2)
BASOPHILS NFR BLD AUTO: 0.9 % (ref 0–2)
BILIRUB SERPL-MCNC: 0.6 MG/DL (ref 0.1–1)
BUN BLD-MCNC: 9 MG/DL (ref 5–21)
BUN/CREAT SERPL: 12.5 (ref 7–25)
CALCIUM SPEC-SCNC: 10 MG/DL (ref 8.4–10.4)
CHLORIDE SERPL-SCNC: 100 MMOL/L (ref 98–110)
CO2 SERPL-SCNC: 28 MMOL/L (ref 24–31)
CREAT BLD-MCNC: 0.72 MG/DL (ref 0.5–1.4)
DEPRECATED RDW RBC AUTO: 67 FL (ref 40–54)
EOSINOPHIL # BLD AUTO: 0.06 10*3/MM3 (ref 0–0.7)
EOSINOPHIL NFR BLD AUTO: 1.1 % (ref 0–4)
ERYTHROCYTE [DISTWIDTH] IN BLOOD BY AUTOMATED COUNT: 17.6 % (ref 12–15)
GFR SERPL CREATININE-BSD FRML MDRD: 79 ML/MIN/1.73
GLOBULIN UR ELPH-MCNC: 3 GM/DL
GLUCOSE BLD-MCNC: 124 MG/DL (ref 70–100)
HCT VFR BLD AUTO: 32.4 % (ref 37–47)
HGB BLD-MCNC: 10.9 G/DL (ref 12–16)
HOLD SPECIMEN: NORMAL
HOLD SPECIMEN: NORMAL
IMM GRANULOCYTES # BLD AUTO: 0.08 10*3/MM3 (ref 0–0.05)
IMM GRANULOCYTES NFR BLD AUTO: 1.5 % (ref 0–5)
LYMPHOCYTES # BLD AUTO: 1.54 10*3/MM3 (ref 0.72–4.86)
LYMPHOCYTES NFR BLD AUTO: 28 % (ref 15–45)
MCH RBC QN AUTO: 34.4 PG (ref 28–32)
MCHC RBC AUTO-ENTMCNC: 33.6 G/DL (ref 33–36)
MCV RBC AUTO: 102.2 FL (ref 82–98)
MONOCYTES # BLD AUTO: 0.11 10*3/MM3 (ref 0.19–1.3)
MONOCYTES NFR BLD AUTO: 2 % (ref 4–12)
NEUTROPHILS # BLD AUTO: 3.66 10*3/MM3 (ref 1.87–8.4)
NEUTROPHILS NFR BLD AUTO: 66.5 % (ref 39–78)
NRBC BLD AUTO-RTO: 0 /100 WBC (ref 0–0.2)
PLATELET # BLD AUTO: 418 10*3/MM3 (ref 130–400)
PMV BLD AUTO: 10.1 FL (ref 6–12)
POTASSIUM BLD-SCNC: 3.2 MMOL/L (ref 3.5–5.3)
PROT SERPL-MCNC: 6.8 G/DL (ref 6.3–8.7)
RBC # BLD AUTO: 3.17 10*6/MM3 (ref 4.2–5.4)
SODIUM BLD-SCNC: 136 MMOL/L (ref 135–145)
WBC NRBC COR # BLD: 5.5 10*3/MM3 (ref 4.8–10.8)

## 2019-07-25 PROCEDURE — 96367 TX/PROPH/DG ADDL SEQ IV INF: CPT

## 2019-07-25 PROCEDURE — 25010000002 ONDANSETRON PER 1 MG: Performed by: INTERNAL MEDICINE

## 2019-07-25 PROCEDURE — 80053 COMPREHEN METABOLIC PANEL: CPT

## 2019-07-25 PROCEDURE — 96377 APPLICATON ON-BODY INJECTOR: CPT

## 2019-07-25 PROCEDURE — 36415 COLL VENOUS BLD VENIPUNCTURE: CPT

## 2019-07-25 PROCEDURE — 85025 COMPLETE CBC W/AUTO DIFF WBC: CPT

## 2019-07-25 PROCEDURE — 25010000002 PEGFILGRASTIM 6 MG/0.6ML PREFILLED SYRINGE KIT: Performed by: INTERNAL MEDICINE

## 2019-07-25 PROCEDURE — 96366 THER/PROPH/DIAG IV INF ADDON: CPT

## 2019-07-25 PROCEDURE — 96413 CHEMO IV INFUSION 1 HR: CPT

## 2019-07-25 PROCEDURE — 25010000002 DOCETAXEL 20 MG/ML SOLUTION 4 ML VIAL: Performed by: INTERNAL MEDICINE

## 2019-07-25 PROCEDURE — 25010000002 DEXAMETHASONE SODIUM PHOSPHATE 100 MG/10ML SOLUTION: Performed by: INTERNAL MEDICINE

## 2019-07-25 PROCEDURE — 71046 X-RAY EXAM CHEST 2 VIEWS: CPT

## 2019-07-25 PROCEDURE — 99214 OFFICE O/P EST MOD 30 MIN: CPT | Performed by: INTERNAL MEDICINE

## 2019-07-25 RX ORDER — MEPERIDINE HYDROCHLORIDE 50 MG/ML
25 INJECTION INTRAMUSCULAR; INTRAVENOUS; SUBCUTANEOUS
Status: DISCONTINUED | OUTPATIENT
Start: 2019-07-25 | End: 2019-07-25 | Stop reason: HOSPADM

## 2019-07-25 RX ORDER — FAMOTIDINE 10 MG/ML
20 INJECTION, SOLUTION INTRAVENOUS AS NEEDED
Status: DISCONTINUED | OUTPATIENT
Start: 2019-07-25 | End: 2019-07-25 | Stop reason: HOSPADM

## 2019-07-25 RX ORDER — SODIUM CHLORIDE 9 MG/ML
250 INJECTION, SOLUTION INTRAVENOUS ONCE
Status: COMPLETED | OUTPATIENT
Start: 2019-07-25 | End: 2019-07-25

## 2019-07-25 RX ORDER — SODIUM CHLORIDE 0.9 % (FLUSH) 0.9 %
10 SYRINGE (ML) INJECTION AS NEEDED
Status: CANCELLED | OUTPATIENT
Start: 2019-07-25

## 2019-07-25 RX ORDER — SODIUM CHLORIDE 0.9 % (FLUSH) 0.9 %
10 SYRINGE (ML) INJECTION AS NEEDED
Status: DISCONTINUED | OUTPATIENT
Start: 2019-07-25 | End: 2019-07-25 | Stop reason: HOSPADM

## 2019-07-25 RX ADMIN — SODIUM CHLORIDE, PRESERVATIVE FREE 10 ML: 5 INJECTION INTRAVENOUS at 12:36

## 2019-07-25 RX ADMIN — PEGFILGRASTIM 6 MG: KIT SUBCUTANEOUS at 13:04

## 2019-07-25 RX ADMIN — Medication 500 UNITS: at 12:36

## 2019-07-25 RX ADMIN — DOCETAXEL 60 MG: 20 INJECTION, SOLUTION, CONCENTRATE INTRAVENOUS at 11:18

## 2019-07-25 RX ADMIN — DEXAMETHASONE SODIUM PHOSPHATE 50 ML: 10 INJECTION, SOLUTION INTRAMUSCULAR; INTRAVENOUS at 10:57

## 2019-07-25 RX ADMIN — SODIUM CHLORIDE 250 ML: 9 INJECTION, SOLUTION INTRAVENOUS at 10:55

## 2019-07-25 NOTE — PROGRESS NOTES
[facility]  HEMATOLOGY & ONCOLOGY    Cancer Staging Information:  Cancer Staging  Malignant neoplasm of upper-outer quadrant of left female breast (CMS/HCC)  Staging form: Breast, AJCC V7  - Clinical stage from 10/11/2016: Stage IV (T2, N1, M1) - Signed by Calli Monsalve APRN on 10/11/2016        Subjective     VISIT DIAGNOSIS:   Encounter Diagnosis   Name Primary?   • Carcinoma of left breast metastatic to bone (CMS/HCC)        REASON FOR VISIT:     Chief Complaint   Patient presents with   • Breast Cancer     Here for Doxetaxol   • Shortness of Breath     Started about 3 days ago. Can't walk from bedroom to living room withouot losing breathe.   • Anorexia        HEMATOLOGY / ONCOLOGY HISTORY:   Oncology/Hematology History    Patient is a 67-year-old postmenopausal female who had onset of her menses at age 9 and menopause at age 48. She received oral contraceptives for approximately 14 years and did not receive hormonal manipulation. Patient has a maternal cousin had breast cancer. Patient has had no prior breast biopsies. Patient found a palpable left breast mass as well as a left axillary mass. Patient had a bloody nipple discharge which been present for approximately 6 months. On 6/20/2012, a mammogram was performed showing a subareolar mass consistent with malignancy. Bilateral breast ultrasound revealed an ill-defined subareolar mass measuring 2.8-2.5 cm. There is a left axillary mass measuring 1.7 cm with suspicion of extracapsular extension. There is no evidence of disease in the right breast. On 7/9/2012 patient underwent a left breast biopsy and placement of marker clip. Left breast biopsy revealed an infiltrating lobular carcinoma grade 2 with focal ductal carcinoma in situ, solid pattern. A fine-needle biopsy of the left lymph node was consistent with metastatic carcinoma. Breast tumor profile revealed ER: 100%; SC: 98%; HER-2/maynor 2+; FISH: Unamplified; Ki-67: 71%; P 53 1%;  DNA aneuploidy. A MRI of the breast were performed body multiple abnormal enhancing masses within the left breast. There appears to be anterior retraction of the pectoralis muscle with no direct extension. There is a moderate to large, layering left pleural effusion appreciated. The right breast reveals 3 moderately enlarged lymph nodes in the posterior lateral aspect the right breast. These have fatty hilum but followup is recommended. Patient is undergone systemic studies including, on 8/2/2012, a CT scan of the brain showing atrophy. a CT scan that shows showing a small to moderate left pleural effusion with 3 subcentimeter nodule densities in the left lung.   She completed 4 cycles of neoadjuvant treatment with dose dense Adriamycin and Cytoxan. She had a mammogram which showed a significant reduction in the size of her left breast lesion. There is no axillary adenopathy noted. She has had an ultrasound revealing the  lesion reducing from 2.4 cm to 1 cm. Patient underwent a left mastectomy on 03/19/2013 finding negative invasive cancer, negative nodes. A 5% DCIS was noted. The patient has declined hormonal therapy. She did not need radiation therapy.  November 2014, she began to have evidence of lytic bone lesions at T5T6, frontal disease, an 8 mm lucency in the central frontal area of  the skull but nothing intracranial. Bone scan revealed only vertex tracer activity but bilateral ribs and thoracic spine, and other lesions in  her pelvis. At that time, she was started on letrozole since November 2014. She is taking Xgeva. She had progression found in bone in Feb 2015 on scan. She was started on Faslodex, Ibrance and continued xgeva.         Malignant neoplasm of upper-outer quadrant of left female breast (CMS/HCC)    7/9/2012 Initial Diagnosis     Malignant neoplasm of upper-outer quadrant of left female breast         7/10/2012 Biopsy     Left Breast Biopsy & Axillary Node FNA: A) Infiltrating lobular  carcinoma, Grade 2. B) Foci of ductal carcinoma in situ, solid pattern. C) Greatest dimension of the invasive carcinoma is 15.8mm.         3/19/2013 Surgery     Left Mastectomy w/ Axillary Tail: Focal residual ducatal carcinoma in situ (DCIS) 12 lymph nodes negative for metastatic carcinoma.         10/20/2014 Biopsy     Peritoneum Biopsy: Final Diagnosis: Malignant Cell Neoplasm.           Breast cancer metastasized to bone, unspecified laterality (CMS/HCC)    10/15/2012 -  Other Event     Left mammogram:  Impression:  1. Decreasing spiculation and density in the retroareolar left breast, near a previous biopsy.   2. Definite left axillary lesion is not appreciated.          2/5/2013 -  Other Event     Diagnostic Mammo:  Comparison is made to 10/15/2012 and 6/20/2012  The spiculated mass at 12:00 behind the nipple is nearly resolved.   Impression:  1. The mass on the left side is less apparent after receiving chemotherapy. There has been a good response to chmotherapy on the left side.   2. No suspicious abnormality is seen in the right breast to account for the new palable abnormality at the 4-5:00 position.          3/19/2013 Surgery     Breast Biopsy:  Final Diagnosis:  A. Breast, left mastectomy with axillary tail  1. Focal residual ductal carcinoma in situ (DICS) (less than 5% of tumor bed).  2. Negative for residual invasive carcinoma.   3. 12 Lymph Nodes, negative for metastatic carcinoma (0/12) focally, some lymph nodes demonstrate fibrosis/treatment effect.  B. Skin and soft tissue, left advancement flap:  1. Benign skin and subcutis.   2. Negative for carcinoma.          6/20/2013 -  Other Event     Diagnostic Mammo [facility]:  1. A subareolar mass is consistent with malignancy. Additional involement extends inferiorly sonographically and superolaterally mammograhically as demonstrated by calcifications. The mass measures approx 2.8 cm sonographically, but the involved region is likely much larger  including an intraductal componet. The left axillary lymph nodes are also involved.   2. Recommened ultrasound guided biopsy of the left subareolar mass and left axillary lymph node.   3. Breast MRI could also further define multicentric disease on the left.   4. Clear suspicious finding on the right.  Recommend ongoing clinical follow up of palpable foci.          6/13/2014 -  Other Event     Diagnostic Mammo:  IMPRESSION:  1. History of left breast cancer and mastectomy. Stable benign right breast mammograms.          10/20/2014 Surgery     Final Diagnosis:  Biopsies, pertoneum:  Metastatic carcinoma, morphologically compatible with metastatic mammary carcinoma.          6/15/2015 -  Other Event     Diagnostic Mammo:  IMPRESSION:   1. Negative x-ray report, should not delay biopsy if a dominat or clinically suspicious mass is present.          7/5/2016 -  Other Event     Diagnositic mammogram:  Impression:   Stable right mamogram with no radiographic findings suspicious for malignancy. Recommend continued screening mammography per screening guidelines unless otherwise earlier clinically indicated.          9/18/2016 Initial Diagnosis     Secondary malignant neoplasm of bone and bone marrow                INTERVAL HISTORY  Patient ID: Heavenly Yanes is a 74 y.o. year old female Cancer Staging  Stage IV (T2, N1, M1)  --complaining of dyspnea on exersion since last Tuesday. She feels like she ran a mile  2/14/19: cxr showed pneumonia. Already on levofloxacin. Coughing not worse, she did not run temp.  Overall feels weak.  -restarted the appetite suppressant and is starting to eat more but not a whole lot  --2/28/19: CT c/ap with some response to therapy. New left chest inflammatory lesion to be followed ion next imaging. Bone scan unremarkable for progression.  4/25/19: she is much better since being off therapy for 2 weeks. COugh is improved.   6/20/19: has left ear drainage which is her usual ear infection. Leg  edema improved with lasix. She was ordered a mammo by the surgeon which I told her is not necessary, she has metastatic dz.    7/25/19: complaining of increased dyspnea on exertion. She takes her laxis daily. Also not much appetite even though on megace 40bid. YAIR not worse from prior.  -Denies cp/n/v/, neuropathy/ no focal weakness.   Rest of ros unremarkable. 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) 9/18/2016     Past Surgical History:   Past Surgical History:   Procedure Laterality Date   • BREAST SURGERY      left breast biopsy and axillary node   • CHOLECYSTECTOMY     • EXTERNAL EAR SURGERY     • MASTECTOMY Left    • PORTACATH PLACEMENT       Social History:   Social History     Socioeconomic History   • Marital status:      Spouse name: Not on file   • Number of children: Not on file   • Years of education: Not on file   • Highest education level: Not on file   Tobacco Use   • Smoking status: Never Smoker   • Smokeless tobacco: Never Used   Substance and Sexual Activity   • Alcohol use: No   • Drug use: No     Family History:   Family History   Problem Relation Age of Onset   • No Known Problems Daughter    • No Known Problems Son    • Other Mother         complications from a concussion from a fall   • Other Father         old age   • No Known Problems Brother    • No Known Problems Daughter        Review of Systems   Constitutional: Positive for activity change, appetite change and fatigue. Negative for unexpected weight change.   HENT: Negative.    Eyes: Negative.    Respiratory: Positive for shortness of breath.    Cardiovascular: Positive for leg swelling.    Gastrointestinal: Negative.    Endocrine: Negative.    Genitourinary: Negative.    Musculoskeletal: Negative.    Skin: Negative.    Neurological: Negative.    Hematological: Negative.    Psychiatric/Behavioral: Negative.         Performance Status:  Asymptomatic    Medications:    Current Outpatient Medications   Medication Sig Dispense Refill   • Calcium-Magnesium-Vitamin D (CALCIUM 1200+D3 PO) Take 1,200 mg by mouth Daily.     • capecitabine (XELODA) 500 MG chemo tablet Take 1 tablet by mouth 2 (Two) Times a Day. For 2 weeks on and 1 week off. 42 tablet 11   • ciprofloxacin (CIPRO) 500 MG tablet Take 1 tablet by mouth 2 (Two) Times a Day. 28 tablet 0   • furosemide (LASIX) 20 MG tablet One tab po qd as needed for swelling take with potassium 30 tablet 1   • megestrol (MEGACE) 40 MG tablet Take 1 tablet by mouth 2 (Two) Times a Day. 60 tablet 5   • metoprolol tartrate (LOPRESSOR) 25 MG tablet Take 25 mg by mouth 2 (two) times a day.     • OLANZapine (ZYPREXA) 2.5 MG tablet Take 2.5 mg by mouth every night.     • ondansetron (ZOFRAN) 8 MG tablet Take 1 tablet by mouth Every 8 (Eight) Hours As Needed for Nausea or Vomiting. 30 tablet 5   • potassium chloride (K-DUR,KLOR-CON) 20 MEQ CR tablet Take one tab po bid on days taking Lasix 60 tablet 1   • raNITIdine (ZANTAC) 150 MG tablet Take 1 tablet by mouth 2 (Two) Times a Day. 60 tablet 5   • rivaroxaban (XARELTO) 20 MG tablet Take 1 tablet by mouth Daily. 30 tablet 5   • thyroid 60 MG PO tablet Take 60 mg by mouth daily.       No current facility-administered medications for this visit.      Facility-Administered Medications Ordered in Other Visits   Medication Dose Route Frequency Provider Last Rate Last Dose   • heparin flush (porcine) 100 UNIT/ML injection 500 Units  500 Units Intravenous PRN Joseph Nunez MD   500 Units at 10/26/17 1155   • sodium chloride 0.9 % flush 10 mL  10 mL Intravenous PRN Joseph Nunez MD   10 mL at 10/26/17 1155       ALLERGIES:   "  Allergies   Allergen Reactions   • Benadryl  [Diphenhydramine Hcl (Sleep)] Other (See Comments)   • Codeine    • Diphenhydramine Other (See Comments)     \"Shaky leg syndrome\"  \"Shaky leg syndrome\"  \"Shaky leg syndrome\"   • Heparin Other (See Comments)     Patient states tolerates Heparin flush without problems: Years ago had heparin IV and had a rash   • Other      POPPY SEED   • Penicillins Hives   • Petroleum Jelly [Skin Protectants, Misc.]    • Sulfa Antibiotics    • Sulfur    • Valium [Diazepam]        Objective      Vitals:    07/25/19 0930   BP: 138/62   Pulse: 76   Resp: 16   Temp: 97.8 °F (36.6 °C)   TempSrc: Oral   SpO2: 99%   Weight: 59.5 kg (131 lb 3.2 oz)   Height: 165.1 cm (65\")   PainSc: 0-No pain         Current Status 7/25/2019   ECOG score 2         Physical Examunchanged  General Appearance: Patient is awake, alert, oriented and in no acute distress. Patient is welldeveloped, wellnourished, and appears stated age.  HEENT: Normocephalic. Sclerae clear, conjunctiva pink, extraocular movements intact, pupils, round, reactive to light and  accommodation. Mouth and throat are clear with moist oral mucosa.  NECK: Supple, no jugular venous distention, thyroid not enlarged.  LYMPH: No cervical, supraclavicular, axillary, or inguinal lymphadenopathy.  CHEST: Equal bilateral expansion, AP  diameter normal, resonant percussion note  LUNGS: Good air movement, no rales, rhonchi, rubs or wheezes with auscultation on the right. Diminished on the left.  CARDIO: Regular sinus rhythm, no murmurs, gallops or rubs.  ABDOMEN: Nondistended, soft, No tenderness, no guarding, no rebound, No hepatosplenomegaly. No abdominal masses. Bowel sounds positive. No hernia  GENITALIA: Not examined.  BREASTS: Not examined.  MUSKEL: No joint swelling, decreased motion, or inflammation  EXTREMS: patrick le  Edema L>R,No clubbing, cyanosis, No varicose veins.  NEURO: Grossly nonfocal, Gait is coordinated and smooth, Cognition is " preserved.  SKIN: No rashes, no ecchymoses, no petechia.  PSYCH: Oriented to time, place and person. Memory is preserved. Mood and affect appear normal  RECENT LABS:  Lab on 07/25/2019   Component Date Value Ref Range Status   • WBC 07/25/2019 5.50  4.80 - 10.80 10*3/mm3 Final   • RBC 07/25/2019 3.17* 4.20 - 5.40 10*6/mm3 Final   • Hemoglobin 07/25/2019 10.9* 12.0 - 16.0 g/dL Final   • Hematocrit 07/25/2019 32.4* 37.0 - 47.0 % Final   • MCV 07/25/2019 102.2* 82.0 - 98.0 fL Final   • MCH 07/25/2019 34.4* 28.0 - 32.0 pg Final   • MCHC 07/25/2019 33.6  33.0 - 36.0 g/dL Final   • RDW 07/25/2019 17.6* 12.0 - 15.0 % Final   • RDW-SD 07/25/2019 67.0* 40.0 - 54.0 fl Final   • MPV 07/25/2019 10.1  6.0 - 12.0 fL Final   • Platelets 07/25/2019 418* 130 - 400 10*3/mm3 Final   • Neutrophil % 07/25/2019 66.5  39.0 - 78.0 % Final   • Lymphocyte % 07/25/2019 28.0  15.0 - 45.0 % Final   • Monocyte % 07/25/2019 2.0* 4.0 - 12.0 % Final   • Eosinophil % 07/25/2019 1.1  0.0 - 4.0 % Final   • Basophil % 07/25/2019 0.9  0.0 - 2.0 % Final   • Immature Grans % 07/25/2019 1.5  0.0 - 5.0 % Final   • Neutrophils, Absolute 07/25/2019 3.66  1.87 - 8.40 10*3/mm3 Final   • Lymphocytes, Absolute 07/25/2019 1.54  0.72 - 4.86 10*3/mm3 Final   • Monocytes, Absolute 07/25/2019 0.11* 0.19 - 1.30 10*3/mm3 Final   • Eosinophils, Absolute 07/25/2019 0.06  0.00 - 0.70 10*3/mm3 Final   • Basophils, Absolute 07/25/2019 0.05  0.00 - 0.20 10*3/mm3 Final   • Immature Grans, Absolute 07/25/2019 0.08* 0.00 - 0.05 10*3/mm3 Final   • nRBC 07/25/2019 0.0  0.0 - 0.2 /100 WBC Final       RADIOLOGY:  No results found.         Assessment/Plan  Heavenly Yanes is a 74 y.o. year old female with met breast cancer s/p multiple lines of CMT currently on taxotere weekly and xeloda with good tolerance.    Patient Active Problem List   Diagnosis   • Malignant neoplasm of upper-outer quadrant of left female breast (CMS/HCC)   • Breast cancer metastasized to bone, unspecified  laterality (CMS/HCC)   • Essential hypertension   • Acquired hypothyroidism   • Bipolar 1 disorder (CMS/HCC)   • Carcinoma of left breast metastatic to bone (CMS/HCC)   • Antineoplastic chemotherapy induced anemia   • Dehydration   • Encounter for administration of vaccine   • Chemotherapy induced neutropenia (CMS/HCC)   • Cough   • Port-A-Cath in place   • Edema          1.Metastatic breast ca: currently on weekly taxotere 3weeks on 1 week off.  --also on xeloda [id number], then repeat.  --CT c/a/p 2/19 showed stable dz. Some new Inflammatory opacities in the LEFT lower lobe and bilateral subpleural interstitial thickening.   -bone scan without evidence of dz progression  --primo taxotere weekly. Pt also on xeloda [id number], then repeat.  4/25/19: therapy postponed 2 weeks ago due to neutropenia. He got Neupogen x1. Labs reviewed with pt wbc 10.68, hg 11.1, [id number]. ANC 7630.  -ANC low 880. Postpone tx. Give neupogen x1. Pt advised to hold xeloda until 2 weeks    -imaging CT c/a/p 5/31/19 showed stable dz. Bone scan 5/31/19 showed multiple metastatic dz, nothing new. Possible pathologic fracture in the area of the coracoid process. Pt does not endorse pain there.  -labs 7/25/19 reviewed with pt wbc 5.50, Hg 10.9, plt 418. Ok for treatment with taxotere, will give neulasta. Pt advised to not take xeloda starting the day she usually get neulasta till next infusion cycle.    2. Psych: on olanzapine    3. HTN: on metoprolol    4. Gerd: on ranitidine  5. Dyspnea: get a cxr to evaluate  6. Anemia: will check iron profile, and act accordingly . Her creatinine is normal at 0.94.   7. DVT on xarelto indefinite.  --Doppler left LE  1/30/19 There is evidence of deep venous thrombosis of the left lower extremity. The clot burden is improved from last study. There is also evidence of superficial clot in the greater saphenous vein. There is a Baker's cyst in the popliteal fossa.  8. CHF: LVEF 65% with diastolic  dysfunction. referral to cardiology to maximized her heart function.  9. Thrombocytosis: suspect reactive from anemia. Will check iron profile.  10.left lung pneumonia: resolved  --cxr showed pneumonia. Pt already on levofloxacin. Will complete 14 days.   11: LE edema: improved on lasix.  12. FEN:  Low potassium on  kdur. Also will check magnesium. Advised 20meq kdur TID for 7  days then 20meq once a day thereafter.  13. otits media of the right ear with effusion. This happens off and on for her. She usually gets some medication from pcp. Given her multiple allergies will have her f/u with pcp with what has worked for her in the past.  -she took ciprofloxacin left over from prior rx that I gave her for something else and is asking for refill. I will go ahead and refill it. If it does not get better will refer to ent.  14. Dyspnea with exertion. Worse from prior. Will get CXR to cailin Sotelo MD    7/25/2019    9:51 AM

## 2019-07-26 ENCOUNTER — TELEPHONE (OUTPATIENT)
Dept: ONCOLOGY | Facility: CLINIC | Age: 74
End: 2019-07-26

## 2019-07-26 RX ORDER — FUROSEMIDE 20 MG/1
TABLET ORAL
Qty: 30 TABLET | Refills: 3 | Status: SHIPPED | OUTPATIENT
Start: 2019-07-26 | End: 2019-09-23 | Stop reason: SDUPTHER

## 2019-07-26 NOTE — TELEPHONE ENCOUNTER
Return call from Heavenly.  Stated that she is feeling fine.  Stated that she needs a refill on the Lasix prescription.  Discussed with her that if she starts having problems with her breathing to go to the ER.  Patient verbalized understanding.

## 2019-07-26 NOTE — TELEPHONE ENCOUNTER
Verbal order from Dr. Sotelo to have Heavenly take her Lasix prescription twice today because the chest xray she had yesterday showed fluid overload.  Called Heavenly and left a message concerning this and for her to call the office if she has any questions.

## 2019-08-08 DIAGNOSIS — C50.912 CARCINOMA OF LEFT BREAST METASTATIC TO BONE (HCC): ICD-10-CM

## 2019-08-08 DIAGNOSIS — C79.51 CARCINOMA OF LEFT BREAST METASTATIC TO BONE (HCC): ICD-10-CM

## 2019-08-15 ENCOUNTER — INFUSION (OUTPATIENT)
Dept: ONCOLOGY | Facility: HOSPITAL | Age: 74
End: 2019-08-15

## 2019-08-15 ENCOUNTER — LAB (OUTPATIENT)
Dept: LAB | Facility: HOSPITAL | Age: 74
End: 2019-08-15

## 2019-08-15 ENCOUNTER — OFFICE VISIT (OUTPATIENT)
Dept: ONCOLOGY | Facility: CLINIC | Age: 74
End: 2019-08-15

## 2019-08-15 VITALS
OXYGEN SATURATION: 97 % | TEMPERATURE: 98.6 F | BODY MASS INDEX: 21.16 KG/M2 | HEART RATE: 87 BPM | HEIGHT: 65 IN | RESPIRATION RATE: 16 BRPM | SYSTOLIC BLOOD PRESSURE: 110 MMHG | DIASTOLIC BLOOD PRESSURE: 66 MMHG | WEIGHT: 127 LBS

## 2019-08-15 VITALS
SYSTOLIC BLOOD PRESSURE: 110 MMHG | OXYGEN SATURATION: 97 % | HEIGHT: 65 IN | TEMPERATURE: 97.9 F | HEART RATE: 76 BPM | DIASTOLIC BLOOD PRESSURE: 82 MMHG | WEIGHT: 127 LBS | BODY MASS INDEX: 21.16 KG/M2 | RESPIRATION RATE: 18 BRPM

## 2019-08-15 DIAGNOSIS — C79.51 CARCINOMA OF LEFT BREAST METASTATIC TO BONE (HCC): ICD-10-CM

## 2019-08-15 DIAGNOSIS — C79.51 BREAST CANCER METASTASIZED TO BONE, UNSPECIFIED LATERALITY (HCC): ICD-10-CM

## 2019-08-15 DIAGNOSIS — C50.912 CARCINOMA OF LEFT BREAST METASTATIC TO BONE (HCC): Primary | ICD-10-CM

## 2019-08-15 DIAGNOSIS — Z95.828 PORT-A-CATH IN PLACE: ICD-10-CM

## 2019-08-15 DIAGNOSIS — C50.912 CARCINOMA OF LEFT BREAST METASTATIC TO BONE (HCC): ICD-10-CM

## 2019-08-15 DIAGNOSIS — C79.51 CARCINOMA OF LEFT BREAST METASTATIC TO BONE (HCC): Primary | ICD-10-CM

## 2019-08-15 DIAGNOSIS — C50.919 BREAST CANCER METASTASIZED TO BONE, UNSPECIFIED LATERALITY (HCC): ICD-10-CM

## 2019-08-15 DIAGNOSIS — C50.412 MALIGNANT NEOPLASM OF UPPER-OUTER QUADRANT OF LEFT FEMALE BREAST, UNSPECIFIED ESTROGEN RECEPTOR STATUS (HCC): ICD-10-CM

## 2019-08-15 LAB
ALBUMIN SERPL-MCNC: 3.6 G/DL (ref 3.5–5)
ALBUMIN/GLOB SERPL: 1.2 G/DL (ref 1.1–2.5)
ALP SERPL-CCNC: 49 U/L (ref 24–120)
ALT SERPL W P-5'-P-CCNC: <15 U/L (ref 0–54)
ANION GAP SERPL CALCULATED.3IONS-SCNC: 5 MMOL/L (ref 4–13)
AST SERPL-CCNC: 26 U/L (ref 7–45)
BASOPHILS # BLD AUTO: 0.07 10*3/MM3 (ref 0–0.2)
BASOPHILS NFR BLD AUTO: 0.6 % (ref 0–1.5)
BILIRUB SERPL-MCNC: 0.5 MG/DL (ref 0.1–1)
BUN BLD-MCNC: 17 MG/DL (ref 5–21)
BUN/CREAT SERPL: 19.1 (ref 7–25)
CALCIUM SPEC-SCNC: 9.3 MG/DL (ref 8.4–10.4)
CHLORIDE SERPL-SCNC: 99 MMOL/L (ref 98–110)
CO2 SERPL-SCNC: 31 MMOL/L (ref 24–31)
CREAT BLD-MCNC: 0.89 MG/DL (ref 0.5–1.4)
DEPRECATED RDW RBC AUTO: 64.2 FL (ref 37–54)
EOSINOPHIL # BLD AUTO: 0.03 10*3/MM3 (ref 0–0.4)
EOSINOPHIL NFR BLD AUTO: 0.2 % (ref 0.3–6.2)
ERYTHROCYTE [DISTWIDTH] IN BLOOD BY AUTOMATED COUNT: 17.1 % (ref 12.3–15.4)
GFR SERPL CREATININE-BSD FRML MDRD: 62 ML/MIN/1.73
GLOBULIN UR ELPH-MCNC: 2.9 GM/DL
GLUCOSE BLD-MCNC: 102 MG/DL (ref 70–100)
HCT VFR BLD AUTO: 36.5 % (ref 34–46.6)
HGB BLD-MCNC: 12.4 G/DL (ref 12–15.9)
IMM GRANULOCYTES # BLD AUTO: 0.07 10*3/MM3 (ref 0–0.05)
IMM GRANULOCYTES NFR BLD AUTO: 0.6 % (ref 0–0.5)
LYMPHOCYTES # BLD AUTO: 2.07 10*3/MM3 (ref 0.7–3.1)
LYMPHOCYTES NFR BLD AUTO: 16.6 % (ref 19.6–45.3)
MCH RBC QN AUTO: 35.2 PG (ref 26.6–33)
MCHC RBC AUTO-ENTMCNC: 34 G/DL (ref 31.5–35.7)
MCV RBC AUTO: 103.7 FL (ref 79–97)
MONOCYTES # BLD AUTO: 0.81 10*3/MM3 (ref 0.1–0.9)
MONOCYTES NFR BLD AUTO: 6.5 % (ref 5–12)
NEUTROPHILS # BLD AUTO: 9.39 10*3/MM3 (ref 1.7–7)
NEUTROPHILS NFR BLD AUTO: 75.5 % (ref 42.7–76)
NRBC BLD AUTO-RTO: 0 /100 WBC (ref 0–0.2)
PLATELET # BLD AUTO: 414 10*3/MM3 (ref 140–450)
PMV BLD AUTO: 9.9 FL (ref 6–12)
POTASSIUM BLD-SCNC: 4.1 MMOL/L (ref 3.5–5.3)
PROT SERPL-MCNC: 6.5 G/DL (ref 6.3–8.7)
RBC # BLD AUTO: 3.52 10*6/MM3 (ref 3.77–5.28)
SODIUM BLD-SCNC: 135 MMOL/L (ref 135–145)
WBC NRBC COR # BLD: 12.44 10*3/MM3 (ref 3.4–10.8)

## 2019-08-15 PROCEDURE — 99214 OFFICE O/P EST MOD 30 MIN: CPT | Performed by: INTERNAL MEDICINE

## 2019-08-15 PROCEDURE — 80053 COMPREHEN METABOLIC PANEL: CPT

## 2019-08-15 PROCEDURE — 96413 CHEMO IV INFUSION 1 HR: CPT

## 2019-08-15 PROCEDURE — 36415 COLL VENOUS BLD VENIPUNCTURE: CPT

## 2019-08-15 PROCEDURE — 25010000002 DEXAMETHASONE SODIUM PHOSPHATE 100 MG/10ML SOLUTION: Performed by: INTERNAL MEDICINE

## 2019-08-15 PROCEDURE — 25010000002 DOCETAXEL 20 MG/ML SOLUTION 4 ML VIAL: Performed by: INTERNAL MEDICINE

## 2019-08-15 PROCEDURE — 86300 IMMUNOASSAY TUMOR CA 15-3: CPT

## 2019-08-15 PROCEDURE — 85025 COMPLETE CBC W/AUTO DIFF WBC: CPT

## 2019-08-15 PROCEDURE — 96367 TX/PROPH/DG ADDL SEQ IV INF: CPT

## 2019-08-15 PROCEDURE — 25010000002 ONDANSETRON PER 1 MG: Performed by: INTERNAL MEDICINE

## 2019-08-15 RX ORDER — FAMOTIDINE 10 MG/ML
20 INJECTION, SOLUTION INTRAVENOUS AS NEEDED
Status: CANCELLED | OUTPATIENT
Start: 2019-08-22

## 2019-08-15 RX ORDER — MEPERIDINE HYDROCHLORIDE 50 MG/ML
25 INJECTION INTRAMUSCULAR; INTRAVENOUS; SUBCUTANEOUS
Status: DISCONTINUED | OUTPATIENT
Start: 2019-08-15 | End: 2019-08-15 | Stop reason: HOSPADM

## 2019-08-15 RX ORDER — MEPERIDINE HYDROCHLORIDE 50 MG/ML
25 INJECTION INTRAMUSCULAR; INTRAVENOUS; SUBCUTANEOUS
Status: CANCELLED | OUTPATIENT
Start: 2019-08-15 | End: 2019-08-16

## 2019-08-15 RX ORDER — MEPERIDINE HYDROCHLORIDE 50 MG/ML
25 INJECTION INTRAMUSCULAR; INTRAVENOUS; SUBCUTANEOUS
Status: CANCELLED | OUTPATIENT
Start: 2019-08-22 | End: 2019-08-23

## 2019-08-15 RX ORDER — LAPATINIB 250 MG/1
250 TABLET ORAL
COMMUNITY
Start: 2019-08-08 | End: 2019-10-10

## 2019-08-15 RX ORDER — SODIUM CHLORIDE 9 MG/ML
250 INJECTION, SOLUTION INTRAVENOUS ONCE
Status: COMPLETED | OUTPATIENT
Start: 2019-08-15 | End: 2019-08-15

## 2019-08-15 RX ORDER — SODIUM CHLORIDE 9 MG/ML
250 INJECTION, SOLUTION INTRAVENOUS ONCE
Status: CANCELLED | OUTPATIENT
Start: 2019-08-15

## 2019-08-15 RX ORDER — SODIUM CHLORIDE 9 MG/ML
250 INJECTION, SOLUTION INTRAVENOUS ONCE
Status: CANCELLED | OUTPATIENT
Start: 2019-08-22

## 2019-08-15 RX ORDER — SODIUM CHLORIDE 0.9 % (FLUSH) 0.9 %
10 SYRINGE (ML) INJECTION AS NEEDED
Status: CANCELLED | OUTPATIENT
Start: 2019-08-15

## 2019-08-15 RX ORDER — FAMOTIDINE 10 MG/ML
20 INJECTION, SOLUTION INTRAVENOUS AS NEEDED
Status: CANCELLED | OUTPATIENT
Start: 2019-08-15

## 2019-08-15 RX ORDER — SODIUM CHLORIDE 0.9 % (FLUSH) 0.9 %
10 SYRINGE (ML) INJECTION AS NEEDED
Status: DISCONTINUED | OUTPATIENT
Start: 2019-08-15 | End: 2019-08-15 | Stop reason: HOSPADM

## 2019-08-15 RX ORDER — FAMOTIDINE 10 MG/ML
20 INJECTION, SOLUTION INTRAVENOUS AS NEEDED
Status: DISCONTINUED | OUTPATIENT
Start: 2019-08-15 | End: 2019-08-15 | Stop reason: HOSPADM

## 2019-08-15 RX ADMIN — DEXAMETHASONE SODIUM PHOSPHATE 50 ML: 10 INJECTION, SOLUTION INTRAMUSCULAR; INTRAVENOUS at 11:37

## 2019-08-15 RX ADMIN — DOCETAXEL 60 MG: 20 INJECTION, SOLUTION, CONCENTRATE INTRAVENOUS at 11:54

## 2019-08-15 RX ADMIN — Medication 500 UNITS: at 13:12

## 2019-08-15 RX ADMIN — SODIUM CHLORIDE 250 ML: 9 INJECTION, SOLUTION INTRAVENOUS at 11:37

## 2019-08-15 NOTE — PROGRESS NOTES
[facility]  HEMATOLOGY & ONCOLOGY    Cancer Staging Information:  Cancer Staging  Malignant neoplasm of upper-outer quadrant of left female breast (CMS/HCC)  Staging form: Breast, AJCC V7  - Clinical stage from 10/11/2016: Stage IV (T2, N1, M1) - Signed by Calli Monsalve APRN on 10/11/2016        Subjective     VISIT DIAGNOSIS:   No diagnosis found.    REASON FOR VISIT:     Chief Complaint   Patient presents with   • Breast Cancer     Treatment        HEMATOLOGY / ONCOLOGY HISTORY:   Oncology/Hematology History    Patient is a 67-year-old postmenopausal female who had onset of her menses at age 9 and menopause at age 48. She received oral contraceptives for approximately 14 years and did not receive hormonal manipulation. Patient has a maternal cousin had breast cancer. Patient has had no prior breast biopsies. Patient found a palpable left breast mass as well as a left axillary mass. Patient had a bloody nipple discharge which been present for approximately 6 months. On 6/20/2012, a mammogram was performed showing a subareolar mass consistent with malignancy. Bilateral breast ultrasound revealed an ill-defined subareolar mass measuring 2.8-2.5 cm. There is a left axillary mass measuring 1.7 cm with suspicion of extracapsular extension. There is no evidence of disease in the right breast. On 7/9/2012 patient underwent a left breast biopsy and placement of marker clip. Left breast biopsy revealed an infiltrating lobular carcinoma grade 2 with focal ductal carcinoma in situ, solid pattern. A fine-needle biopsy of the left lymph node was consistent with metastatic carcinoma. Breast tumor profile revealed ER: 100%; WY: 98%; HER-2/maynor 2+; FISH: Unamplified; Ki-67: 71%; P 53 1%; DNA aneuploidy. A MRI of the breast were performed body multiple abnormal enhancing masses within the left breast. There appears to be anterior retraction of the pectoralis muscle with no direct extension. There is a  moderate to large, layering left pleural effusion appreciated. The right breast reveals 3 moderately enlarged lymph nodes in the posterior lateral aspect the right breast. These have fatty hilum but followup is recommended. Patient is undergone systemic studies including, on 8/2/2012, a CT scan of the brain showing atrophy. a CT scan that shows showing a small to moderate left pleural effusion with 3 subcentimeter nodule densities in the left lung.   She completed 4 cycles of neoadjuvant treatment with dose dense Adriamycin and Cytoxan. She had a mammogram which showed a significant reduction in the size of her left breast lesion. There is no axillary adenopathy noted. She has had an ultrasound revealing the  lesion reducing from 2.4 cm to 1 cm. Patient underwent a left mastectomy on 03/19/2013 finding negative invasive cancer, negative nodes. A 5% DCIS was noted. The patient has declined hormonal therapy. She did not need radiation therapy.  November 2014, she began to have evidence of lytic bone lesions at T5T6, frontal disease, an 8 mm lucency in the central frontal area of  the skull but nothing intracranial. Bone scan revealed only vertex tracer activity but bilateral ribs and thoracic spine, and other lesions in  her pelvis. At that time, she was started on letrozole since November 2014. She is taking Xgeva. She had progression found in bone in Feb 2015 on scan. She was started on Faslodex, Ibrance and continued xgeva.         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 B) Foci of ductal carcinoma in situ, solid pattern. C) Greatest dimension of the invasive carcinoma is 15.8mm.         3/19/2013 Surgery     Left Mastectomy w/ Axillary Tail: Focal residual ducatal  carcinoma in situ (DCIS) 12 lymph nodes negative for metastatic carcinoma.         10/20/2014 Biopsy     Peritoneum Biopsy: Final Diagnosis: Malignant Cell Neoplasm.           Breast cancer metastasized to bone, unspecified laterality (CMS/HCC)    10/15/2012 -  Other Event     Left mammogram:  Impression:  1. Decreasing spiculation and density in the retroareolar left breast, near a previous biopsy.   2. Definite left axillary lesion is not appreciated.          2/5/2013 -  Other Event     Diagnostic Mammo:  Comparison is made to 10/15/2012 and 6/20/2012  The spiculated mass at 12:00 behind the nipple is nearly resolved.   Impression:  1. The mass on the left side is less apparent after receiving chemotherapy. There has been a good response to chmotherapy on the left side.   2. No suspicious abnormality is seen in the right breast to account for the new palable abnormality at the 4-5:00 position.          3/19/2013 Surgery     Breast Biopsy:  Final Diagnosis:  A. Breast, left mastectomy with axillary tail  1. Focal residual ductal carcinoma in situ (DICS) (less than 5% of tumor bed).  2. Negative for residual invasive carcinoma.   3. 12 Lymph Nodes, negative for metastatic carcinoma (0/12) focally, some lymph nodes demonstrate fibrosis/treatment effect.  B. Skin and soft tissue, left advancement flap:  1. Benign skin and subcutis.   2. Negative for carcinoma.          6/20/2013 -  Other Event     Diagnostic Mammo [facility]:  1. A subareolar mass is consistent with malignancy. Additional involement extends inferiorly sonographically and superolaterally mammograhically as demonstrated by calcifications. The mass measures approx 2.8 cm sonographically, but the involved region is likely much larger including an intraductal componet. The left axillary lymph nodes are also involved.   2. Recommened ultrasound guided biopsy of the left subareolar mass and left axillary lymph node.   3. Breast MRI could also further  define multicentric disease on the left.   4. Clear suspicious finding on the right.  Recommend ongoing clinical follow up of palpable foci.          6/13/2014 -  Other Event     Diagnostic Mammo:  IMPRESSION:  1. History of left breast cancer and mastectomy. Stable benign right breast mammograms.          10/20/2014 Surgery     Final Diagnosis:  Biopsies, pertoneum:  Metastatic carcinoma, morphologically compatible with metastatic mammary carcinoma.          6/15/2015 -  Other Event     Diagnostic Mammo:  IMPRESSION:   1. Negative x-ray report, should not delay biopsy if a dominat or clinically suspicious mass is present.          7/5/2016 -  Other Event     Diagnositic mammogram:  Impression:   Stable right mamogram with no radiographic findings suspicious for malignancy. Recommend continued screening mammography per screening guidelines unless otherwise earlier clinically indicated.          9/18/2016 Initial Diagnosis     Secondary malignant neoplasm of bone and bone marrow                INTERVAL HISTORY  Patient ID: Heavenly Yanes is a 74 y.o. year old female Cancer Staging  Stage IV (T2, N1, M1)  --complaining of dyspnea on exersion since last Tuesday. She feels like she ran a mile  2/14/19: cxr showed pneumonia. Already on levofloxacin. Coughing not worse, she did not run temp.  Overall feels weak.  -restarted the appetite suppressant and is starting to eat more but not a whole lot  --2/28/19: CT c/ap with some response to therapy. New left chest inflammatory lesion to be followed ion next imaging. Bone scan unremarkable for progression.  4/25/19: she is much better since being off therapy for 2 weeks. COugh is improved.   6/20/19: has left ear drainage which is her usual ear infection. Leg edema improved with lasix. She was ordered a mammo by the surgeon which I told her is not necessary, she has metastatic dz.    7/25/19: complaining of increased dyspnea on exertion. She takes her laxis daily. Also not  much appetite even though on megace 40bid. YAIR not worse from prior.  -Denies cp/n/v/, neuropathy/ no focal weakness.   Rest of ros unremarkable. 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) 9/18/2016     Past Surgical History:   Past Surgical History:   Procedure Laterality Date   • BREAST SURGERY      left breast biopsy and axillary node   • CHOLECYSTECTOMY     • EXTERNAL EAR SURGERY     • MASTECTOMY Left    • PORTACATH PLACEMENT       Social History:   Social History     Socioeconomic History   • Marital status:      Spouse name: Not on file   • Number of children: Not on file   • Years of education: Not on file   • Highest education level: Not on file   Tobacco Use   • Smoking status: Never Smoker   • Smokeless tobacco: Never Used   Substance and Sexual Activity   • Alcohol use: No   • Drug use: No     Family History:   Family History   Problem Relation Age of Onset   • No Known Problems Daughter    • No Known Problems Son    • Other Mother         complications from a concussion from a fall   • Other Father         old age   • No Known Problems Brother    • No Known Problems Daughter        Review of Systems   Constitutional: Positive for activity change, appetite change and fatigue. Negative for unexpected weight change.   HENT: Negative.    Eyes: Negative.    Respiratory: Positive for shortness of breath.    Cardiovascular: Positive for leg swelling.   Gastrointestinal: Negative.    Endocrine: Negative.    Genitourinary: Negative.    Musculoskeletal: Negative.    Skin: Negative.    Neurological: Negative.    Hematological: Negative.    Psychiatric/Behavioral: Negative.      "    Performance Status:  Asymptomatic    Medications:    Current Outpatient Medications   Medication Sig Dispense Refill   • Calcium-Magnesium-Vitamin D (CALCIUM 1200+D3 PO) Take 1,200 mg by mouth Daily.     • capecitabine (XELODA) 500 MG chemo tablet Take 1 tablet by mouth 2 (Two) Times a Day. For 2 weeks on and 1 week off. 42 tablet 11   • ciprofloxacin (CIPRO) 500 MG tablet Take 1 tablet by mouth 2 (Two) Times a Day. 28 tablet 0   • furosemide (LASIX) 20 MG tablet One tab po qd as needed for swelling take with potassium 30 tablet 3   • megestrol (MEGACE) 40 MG tablet Take 1 tablet by mouth 2 (Two) Times a Day. 60 tablet 5   • metoprolol tartrate (LOPRESSOR) 25 MG tablet Take 25 mg by mouth 2 (two) times a day.     • OLANZapine (ZYPREXA) 2.5 MG tablet Take 2.5 mg by mouth every night.     • ondansetron (ZOFRAN) 8 MG tablet Take 1 tablet by mouth Every 8 (Eight) Hours As Needed for Nausea or Vomiting. 30 tablet 5   • potassium chloride (K-DUR,KLOR-CON) 20 MEQ CR tablet Take one tab po bid on days taking Lasix 60 tablet 1   • raNITIdine (ZANTAC) 150 MG tablet Take 1 tablet by mouth 2 (Two) Times a Day. 60 tablet 5   • rivaroxaban (XARELTO) 20 MG tablet Take 1 tablet by mouth Daily. 30 tablet 5   • thyroid 60 MG PO tablet Take 60 mg by mouth daily.     • TYKERB 250 MG chemo tablet 250 mg.       No current facility-administered medications for this visit.      Facility-Administered Medications Ordered in Other Visits   Medication Dose Route Frequency Provider Last Rate Last Dose   • heparin flush (porcine) 100 UNIT/ML injection 500 Units  500 Units Intravenous PRN Joseph Nunez MD   500 Units at 10/26/17 1155   • sodium chloride 0.9 % flush 10 mL  10 mL Intravenous PRN Joseph Nunez MD   10 mL at 10/26/17 1155       ALLERGIES:    Allergies   Allergen Reactions   • Benadryl  [Diphenhydramine Hcl (Sleep)] Other (See Comments)   • Codeine    • Diphenhydramine Other (See Comments)     \"Shaky leg syndrome\"  \"Shaky leg " "syndrome\"  \"Shaky leg syndrome\"   • Heparin Other (See Comments)     Patient states tolerates Heparin flush without problems: Years ago had heparin IV and had a rash   • Other      POPPY SEED   • Penicillins Hives   • Petroleum Jelly [Skin Protectants, Misc.]    • Sulfa Antibiotics    • Sulfur    • Valium [Diazepam]        Objective      Vitals:    08/15/19 1037   BP: 110/66   Pulse: 87   Resp: 16   Temp: 98.6 °F (37 °C)   SpO2: 97%   Weight: 57.6 kg (127 lb)   Height: 165.1 cm (65\")   PainSc: 0-No pain         Current Status 8/15/2019   ECOG score 2         Physical Examunchanged  General Appearance: Patient is awake, alert, oriented and in no acute distress. Patient is welldeveloped, wellnourished, and appears stated age.  HEENT: Normocephalic. Sclerae clear, conjunctiva pink, extraocular movements intact, pupils, round, reactive to light and  accommodation. Mouth and throat are clear with moist oral mucosa.  NECK: Supple, no jugular venous distention, thyroid not enlarged.  LYMPH: No cervical, supraclavicular, axillary, or inguinal lymphadenopathy.  CHEST: Equal bilateral expansion, AP  diameter normal, resonant percussion note  LUNGS: Good air movement, no rales, rhonchi, rubs or wheezes with auscultation on the right. Diminished on the left.  CARDIO: Regular sinus rhythm, no murmurs, gallops or rubs.  ABDOMEN: Nondistended, soft, No tenderness, no guarding, no rebound, No hepatosplenomegaly. No abdominal masses. Bowel sounds positive. No hernia  GENITALIA: Not examined.  BREASTS: Not examined.  MUSKEL: No joint swelling, decreased motion, or inflammation  EXTREMS: patrick le  Edema L>R,No clubbing, cyanosis, No varicose veins.  NEURO: Grossly nonfocal, Gait is coordinated and smooth, Cognition is preserved.  SKIN: No rashes, no ecchymoses, no petechia.  PSYCH: Oriented to time, place and person. Memory is preserved. Mood and affect appear normal  RECENT LABS:  Lab on 08/15/2019   Component Date Value Ref Range " Status   • WBC 08/15/2019 12.44* 3.40 - 10.80 10*3/mm3 Final   • RBC 08/15/2019 3.52* 3.77 - 5.28 10*6/mm3 Final   • Hemoglobin 08/15/2019 12.4  12.0 - 15.9 g/dL Final   • Hematocrit 08/15/2019 36.5  34.0 - 46.6 % Final   • MCV 08/15/2019 103.7* 79.0 - 97.0 fL Final   • MCH 08/15/2019 35.2* 26.6 - 33.0 pg Final   • MCHC 08/15/2019 34.0  31.5 - 35.7 g/dL Final   • RDW 08/15/2019 17.1* 12.3 - 15.4 % Final   • RDW-SD 08/15/2019 64.2* 37.0 - 54.0 fl Final   • MPV 08/15/2019 9.9  6.0 - 12.0 fL Final   • Platelets 08/15/2019 414  140 - 450 10*3/mm3 Final   • Neutrophil % 08/15/2019 75.5  42.7 - 76.0 % Final   • Lymphocyte % 08/15/2019 16.6* 19.6 - 45.3 % Final   • Monocyte % 08/15/2019 6.5  5.0 - 12.0 % Final   • Eosinophil % 08/15/2019 0.2* 0.3 - 6.2 % Final   • Basophil % 08/15/2019 0.6  0.0 - 1.5 % Final   • Immature Grans % 08/15/2019 0.6* 0.0 - 0.5 % Final   • Neutrophils, Absolute 08/15/2019 9.39* 1.70 - 7.00 10*3/mm3 Final   • Lymphocytes, Absolute 08/15/2019 2.07  0.70 - 3.10 10*3/mm3 Final   • Monocytes, Absolute 08/15/2019 0.81  0.10 - 0.90 10*3/mm3 Final   • Eosinophils, Absolute 08/15/2019 0.03  0.00 - 0.40 10*3/mm3 Final   • Basophils, Absolute 08/15/2019 0.07  0.00 - 0.20 10*3/mm3 Final   • Immature Grans, Absolute 08/15/2019 0.07* 0.00 - 0.05 10*3/mm3 Final   • nRBC 08/15/2019 0.0  0.0 - 0.2 /100 WBC Final       RADIOLOGY:  Xr Chest Pa & Lateral    Result Date: 7/25/2019  Narrative: EXAM: XR CHEST PA AND LATERAL- - 7/25/2019 12:50 PM CDT  HISTORY: Dyspnea on exertion, h/o metastatic breast ca; C50.912-Malignant neoplasm of unspecified site of left female breast; C79.51-Secondary malignant neoplasm of bone; R06.09-Other forms of dyspnea   COMPARISON: 02/07/2019.  TECHNIQUE:  2 images.  Frontal and lateral views of the chest.  FINDINGS:  Port at the right chest with grossly intact catheter, tip projects at the upper right atrium. No pneumothorax. Small bilateral pleural effusions with diffuse interstitial  opacities. Cardiac mediastinal silhouette within normal limits. Calcified aortic atherosclerosis. Surgical clips at the left chest wall. Diffusely heterogeneous bone, better demonstrated on prior CT 05/31/2019, but consistent with diffuse metastatic disease       Impression: 1. Diffuse interstitial opacities and small pleural effusions. Differential diagnosis includes edema or atypical infection. 2. Diffusely abnormal bone mineralization, consistent with diffuse metastatic disease. This report was finalized on 07/25/2019 13:00 by Dr Kendra Arce MD.           Assessment/Plan  Heavenly Yanes is a 74 y.o. year old female with met breast cancer s/p multiple lines of CMT currently on taxotere weekly and xeloda with good tolerance.    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  --also on xeloda [id number], then repeat.  --CT c/a/p 2/19 showed stable dz. Some new Inflammatory opacities in the LEFT lower lobe and bilateral subpleural interstitial thickening.   -bone scan without evidence of dz progression  --primo taxotere weekly. Pt also on xeloda [id number], then repeat.  4/25/19: therapy postponed 2 weeks ago due to neutropenia. He got Neupogen x1. Labs reviewed with pt wbc 10.68, hg 11.1, [id number]. ANC 7630.  -ANC low 880. Postpone tx. Give neupogen x1. Pt advised to hold xeloda until 2 weeks    -imaging CT c/a/p 5/31/19 showed stable dz.  Bone scan 5/31/19 showed multiple metastatic dz, nothing new. Possible pathologic fracture in the area of the coracoid process. Pt does not endorse pain there.  -labs 8/15/19 reviewed with pt wbc 12.44-on steroid, Hg 12.4, plt 414. Ok for treatment with taxotere  -neulasta with day 8 treatment.    2. Psych: on olanzapine    3. HTN: on metoprolol    4. Gerd: on ranitidine  5. Dyspnea: get a cxr to evaluate  6. Anemia: will check iron profile, and act accordingly . Her creatinine is normal at 0.94.   7. DVT on xarelto indefinite.  --Doppler left LE  1/30/19 There is evidence of deep venous thrombosis of the left lower extremity. The clot burden is improved from last study. There is also evidence of superficial clot in the greater saphenous vein. There is a Baker's cyst in the popliteal fossa.  8. CHF: LVEF 65% with diastolic dysfunction. referral to cardiology to maximized her heart function.  9. Thrombocytosis: suspect reactive from anemia. Will check iron profile.  10.left lung pneumonia: resolved  --cxr showed pneumonia. Pt already on levofloxacin. Will complete 14 days.   11: LE edema: improved on lasix.  12. FEN:  Low potassium on  kdur. Also will check magnesium. Advised 20meq kdur TID for 7  days then 20meq once a day thereafter.  13. otits media of the right ear with effusion. This happens off and on for her. She usually gets some medication from pcp. Given her multiple allergies will have her f/u with pcp with what has worked for her in the past.  -she took ciprofloxacin left over from prior rx that I gave her for something else and is asking for refill. I will go ahead and refill it. If it does not get better will refer to ent.  14. Dyspnea with exertion. Worse from prior. Will get CXR to cailin Sotelo MD    8/15/2019    10:53 AM

## 2019-08-16 LAB
CANCER AG15-3 SERPL-ACNC: 48.2 U/ML (ref 0–25)
CANCER AG27-29 SERPL-ACNC: 58.4 U/ML (ref 0–38.6)

## 2019-08-19 DIAGNOSIS — C50.912 CARCINOMA OF LEFT BREAST METASTATIC TO BONE (HCC): Primary | ICD-10-CM

## 2019-08-19 DIAGNOSIS — C79.51 CARCINOMA OF LEFT BREAST METASTATIC TO BONE (HCC): Primary | ICD-10-CM

## 2019-08-22 ENCOUNTER — LAB (OUTPATIENT)
Dept: LAB | Facility: HOSPITAL | Age: 74
End: 2019-08-22

## 2019-08-22 ENCOUNTER — INFUSION (OUTPATIENT)
Dept: ONCOLOGY | Facility: HOSPITAL | Age: 74
End: 2019-08-22

## 2019-08-22 ENCOUNTER — OFFICE VISIT (OUTPATIENT)
Dept: ONCOLOGY | Facility: CLINIC | Age: 74
End: 2019-08-22

## 2019-08-22 VITALS
RESPIRATION RATE: 18 BRPM | DIASTOLIC BLOOD PRESSURE: 62 MMHG | TEMPERATURE: 97.8 F | HEIGHT: 65 IN | BODY MASS INDEX: 21.16 KG/M2 | WEIGHT: 127 LBS | OXYGEN SATURATION: 93 % | SYSTOLIC BLOOD PRESSURE: 118 MMHG | HEART RATE: 112 BPM

## 2019-08-22 VITALS
RESPIRATION RATE: 18 BRPM | OXYGEN SATURATION: 97 % | DIASTOLIC BLOOD PRESSURE: 58 MMHG | SYSTOLIC BLOOD PRESSURE: 133 MMHG | WEIGHT: 127 LBS | TEMPERATURE: 97.9 F | HEART RATE: 93 BPM | HEIGHT: 65 IN | BODY MASS INDEX: 21.16 KG/M2

## 2019-08-22 DIAGNOSIS — C79.51 CARCINOMA OF LEFT BREAST METASTATIC TO BONE (HCC): ICD-10-CM

## 2019-08-22 DIAGNOSIS — C50.912 CARCINOMA OF LEFT BREAST METASTATIC TO BONE (HCC): ICD-10-CM

## 2019-08-22 DIAGNOSIS — C50.912 CARCINOMA OF LEFT BREAST METASTATIC TO BONE (HCC): Primary | ICD-10-CM

## 2019-08-22 DIAGNOSIS — C79.51 BREAST CANCER METASTASIZED TO BONE, UNSPECIFIED LATERALITY (HCC): ICD-10-CM

## 2019-08-22 DIAGNOSIS — C79.51 CARCINOMA OF LEFT BREAST METASTATIC TO BONE (HCC): Primary | ICD-10-CM

## 2019-08-22 DIAGNOSIS — Z95.828 PORT-A-CATH IN PLACE: ICD-10-CM

## 2019-08-22 DIAGNOSIS — C50.919 BREAST CANCER METASTASIZED TO BONE, UNSPECIFIED LATERALITY (HCC): ICD-10-CM

## 2019-08-22 DIAGNOSIS — C50.412 MALIGNANT NEOPLASM OF UPPER-OUTER QUADRANT OF LEFT FEMALE BREAST, UNSPECIFIED ESTROGEN RECEPTOR STATUS (HCC): ICD-10-CM

## 2019-08-22 LAB
ALBUMIN SERPL-MCNC: 3.7 G/DL (ref 3.5–5)
ALBUMIN/GLOB SERPL: 1.2 G/DL (ref 1.1–2.5)
ALP SERPL-CCNC: 59 U/L (ref 24–120)
ALT SERPL W P-5'-P-CCNC: 16 U/L (ref 0–54)
ANION GAP SERPL CALCULATED.3IONS-SCNC: 7 MMOL/L (ref 4–13)
AST SERPL-CCNC: 22 U/L (ref 7–45)
BASOPHILS # BLD AUTO: 0.04 10*3/MM3 (ref 0–0.2)
BASOPHILS NFR BLD AUTO: 0.8 % (ref 0–1.5)
BILIRUB SERPL-MCNC: 0.5 MG/DL (ref 0.1–1)
BUN BLD-MCNC: 10 MG/DL (ref 5–21)
BUN/CREAT SERPL: 12.5 (ref 7–25)
CALCIUM SPEC-SCNC: 9.7 MG/DL (ref 8.4–10.4)
CHLORIDE SERPL-SCNC: 100 MMOL/L (ref 98–110)
CO2 SERPL-SCNC: 29 MMOL/L (ref 24–31)
CREAT BLD-MCNC: 0.8 MG/DL (ref 0.5–1.4)
DEPRECATED RDW RBC AUTO: 59.7 FL (ref 37–54)
EOSINOPHIL # BLD AUTO: 0.08 10*3/MM3 (ref 0–0.4)
EOSINOPHIL NFR BLD AUTO: 1.6 % (ref 0.3–6.2)
ERYTHROCYTE [DISTWIDTH] IN BLOOD BY AUTOMATED COUNT: 15.9 % (ref 12.3–15.4)
GFR SERPL CREATININE-BSD FRML MDRD: 70 ML/MIN/1.73
GLOBULIN UR ELPH-MCNC: 3.1 GM/DL
GLUCOSE BLD-MCNC: 101 MG/DL (ref 70–100)
HCT VFR BLD AUTO: 34.8 % (ref 34–46.6)
HGB BLD-MCNC: 11.7 G/DL (ref 12–15.9)
HOLD SPECIMEN: NORMAL
HOLD SPECIMEN: NORMAL
IMM GRANULOCYTES # BLD AUTO: 0.05 10*3/MM3 (ref 0–0.05)
IMM GRANULOCYTES NFR BLD AUTO: 1 % (ref 0–0.5)
LYMPHOCYTES # BLD AUTO: 2.21 10*3/MM3 (ref 0.7–3.1)
LYMPHOCYTES NFR BLD AUTO: 44.3 % (ref 19.6–45.3)
MCH RBC QN AUTO: 35 PG (ref 26.6–33)
MCHC RBC AUTO-ENTMCNC: 33.6 G/DL (ref 31.5–35.7)
MCV RBC AUTO: 104.2 FL (ref 79–97)
MONOCYTES # BLD AUTO: 0.17 10*3/MM3 (ref 0.1–0.9)
MONOCYTES NFR BLD AUTO: 3.4 % (ref 5–12)
NEUTROPHILS # BLD AUTO: 2.44 10*3/MM3 (ref 1.7–7)
NEUTROPHILS NFR BLD AUTO: 48.9 % (ref 42.7–76)
NRBC BLD AUTO-RTO: 0 /100 WBC (ref 0–0.2)
PLATELET # BLD AUTO: 400 10*3/MM3 (ref 140–450)
PMV BLD AUTO: 10.4 FL (ref 6–12)
POTASSIUM BLD-SCNC: 3.6 MMOL/L (ref 3.5–5.3)
PROT SERPL-MCNC: 6.8 G/DL (ref 6.3–8.7)
RBC # BLD AUTO: 3.34 10*6/MM3 (ref 3.77–5.28)
SODIUM BLD-SCNC: 136 MMOL/L (ref 135–145)
WBC NRBC COR # BLD: 4.99 10*3/MM3 (ref 3.4–10.8)

## 2019-08-22 PROCEDURE — 36415 COLL VENOUS BLD VENIPUNCTURE: CPT | Performed by: INTERNAL MEDICINE

## 2019-08-22 PROCEDURE — 96413 CHEMO IV INFUSION 1 HR: CPT

## 2019-08-22 PROCEDURE — 99214 OFFICE O/P EST MOD 30 MIN: CPT | Performed by: INTERNAL MEDICINE

## 2019-08-22 PROCEDURE — 80053 COMPREHEN METABOLIC PANEL: CPT | Performed by: INTERNAL MEDICINE

## 2019-08-22 PROCEDURE — 85025 COMPLETE CBC W/AUTO DIFF WBC: CPT | Performed by: INTERNAL MEDICINE

## 2019-08-22 PROCEDURE — 96367 TX/PROPH/DG ADDL SEQ IV INF: CPT

## 2019-08-22 PROCEDURE — 25010000002 ONDANSETRON PER 1 MG: Performed by: INTERNAL MEDICINE

## 2019-08-22 PROCEDURE — 96377 APPLICATON ON-BODY INJECTOR: CPT

## 2019-08-22 PROCEDURE — 25010000002 DEXAMETHASONE SODIUM PHOSPHATE 100 MG/10ML SOLUTION: Performed by: INTERNAL MEDICINE

## 2019-08-22 PROCEDURE — 25010000002 DOCETAXEL 20 MG/ML SOLUTION 4 ML VIAL: Performed by: INTERNAL MEDICINE

## 2019-08-22 PROCEDURE — 25010000002 PEGFILGRASTIM 6 MG/0.6ML PREFILLED SYRINGE KIT: Performed by: INTERNAL MEDICINE

## 2019-08-22 RX ORDER — FAMOTIDINE 10 MG/ML
20 INJECTION, SOLUTION INTRAVENOUS AS NEEDED
Status: DISCONTINUED | OUTPATIENT
Start: 2019-08-22 | End: 2019-08-22 | Stop reason: HOSPADM

## 2019-08-22 RX ORDER — SODIUM CHLORIDE 0.9 % (FLUSH) 0.9 %
10 SYRINGE (ML) INJECTION AS NEEDED
Status: DISCONTINUED | OUTPATIENT
Start: 2019-08-22 | End: 2019-08-22 | Stop reason: HOSPADM

## 2019-08-22 RX ORDER — MEPERIDINE HYDROCHLORIDE 50 MG/ML
25 INJECTION INTRAMUSCULAR; INTRAVENOUS; SUBCUTANEOUS
Status: DISCONTINUED | OUTPATIENT
Start: 2019-08-22 | End: 2019-08-22 | Stop reason: HOSPADM

## 2019-08-22 RX ORDER — SODIUM CHLORIDE 0.9 % (FLUSH) 0.9 %
10 SYRINGE (ML) INJECTION AS NEEDED
Status: CANCELLED | OUTPATIENT
Start: 2019-08-22

## 2019-08-22 RX ORDER — OFLOXACIN 3 MG/ML
SOLUTION AURICULAR (OTIC)
COMMUNITY
Start: 2019-08-06 | End: 2019-12-03

## 2019-08-22 RX ORDER — SODIUM CHLORIDE 9 MG/ML
250 INJECTION, SOLUTION INTRAVENOUS ONCE
Status: COMPLETED | OUTPATIENT
Start: 2019-08-22 | End: 2019-08-22

## 2019-08-22 RX ADMIN — Medication 500 UNITS: at 12:39

## 2019-08-22 RX ADMIN — SODIUM CHLORIDE 250 ML: 9 INJECTION, SOLUTION INTRAVENOUS at 11:05

## 2019-08-22 RX ADMIN — DOCETAXEL 60 MG: 20 INJECTION, SOLUTION, CONCENTRATE INTRAVENOUS at 11:24

## 2019-08-22 RX ADMIN — SODIUM CHLORIDE, PRESERVATIVE FREE 10 ML: 5 INJECTION INTRAVENOUS at 12:39

## 2019-08-22 RX ADMIN — PEGFILGRASTIM 6 MG: KIT SUBCUTANEOUS at 12:32

## 2019-08-22 NOTE — PROGRESS NOTES
[facility]  HEMATOLOGY & ONCOLOGY    Cancer Staging Information:  Cancer Staging  Malignant neoplasm of upper-outer quadrant of left female breast (CMS/HCC)  Staging form: Breast, AJCC V7  - Clinical stage from 10/11/2016: Stage IV (T2, N1, M1) - Signed by Calli Monsalve APRN on 10/11/2016        Subjective     VISIT DIAGNOSIS:   Encounter Diagnosis   Name Primary?   • Carcinoma of left breast metastatic to bone (CMS/HCC)        REASON FOR VISIT:     Chief Complaint   Patient presents with   • Breast Cancer     She is here for consideration of her chemo txt today   • Shortness of Breath     c/o increased sob with any activity   • Fatigue     She has concern of increased weakness and fatigue        HEMATOLOGY / ONCOLOGY HISTORY:   Oncology/Hematology History    Patient is a 67-year-old postmenopausal female who had onset of her menses at age 9 and menopause at age 48. She received oral contraceptives for approximately 14 years and did not receive hormonal manipulation. Patient has a maternal cousin had breast cancer. Patient has had no prior breast biopsies. Patient found a palpable left breast mass as well as a left axillary mass. Patient had a bloody nipple discharge which been present for approximately 6 months. On 6/20/2012, a mammogram was performed showing a subareolar mass consistent with malignancy. Bilateral breast ultrasound revealed an ill-defined subareolar mass measuring 2.8-2.5 cm. There is a left axillary mass measuring 1.7 cm with suspicion of extracapsular extension. There is no evidence of disease in the right breast. On 7/9/2012 patient underwent a left breast biopsy and placement of marker clip. Left breast biopsy revealed an infiltrating lobular carcinoma grade 2 with focal ductal carcinoma in situ, solid pattern. A fine-needle biopsy of the left lymph node was consistent with metastatic carcinoma. Breast tumor profile revealed ER: 100%; NH: 98%; HER-2/maynor 2+; FISH:  Unamplified; Ki-67: 71%; P 53 1%; DNA aneuploidy. A MRI of the breast were performed body multiple abnormal enhancing masses within the left breast. There appears to be anterior retraction of the pectoralis muscle with no direct extension. There is a moderate to large, layering left pleural effusion appreciated. The right breast reveals 3 moderately enlarged lymph nodes in the posterior lateral aspect the right breast. These have fatty hilum but followup is recommended. Patient is undergone systemic studies including, on 8/2/2012, a CT scan of the brain showing atrophy. a CT scan that shows showing a small to moderate left pleural effusion with 3 subcentimeter nodule densities in the left lung.   She completed 4 cycles of neoadjuvant treatment with dose dense Adriamycin and Cytoxan. She had a mammogram which showed a significant reduction in the size of her left breast lesion. There is no axillary adenopathy noted. She has had an ultrasound revealing the  lesion reducing from 2.4 cm to 1 cm. Patient underwent a left mastectomy on 03/19/2013 finding negative invasive cancer, negative nodes. A 5% DCIS was noted. The patient has declined hormonal therapy. She did not need radiation therapy.  November 2014, she began to have evidence of lytic bone lesions at T5T6, frontal disease, an 8 mm lucency in the central frontal area of  the skull but nothing intracranial. Bone scan revealed only vertex tracer activity but bilateral ribs and thoracic spine, and other lesions in  her pelvis. At that time, she was started on letrozole since November 2014. She is taking Xgeva. She had progression found in bone in Feb 2015 on scan. She was started on Faslodex, Ibrance and continued xgeva.         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 B) Foci of ductal carcinoma in situ, solid pattern. C) Greatest dimension of the invasive carcinoma is 15.8mm.         3/19/2013 Surgery     Left Mastectomy w/ Axillary Tail: Focal residual ducatal carcinoma in situ (DCIS) 12 lymph nodes negative for metastatic carcinoma.         10/20/2014 Biopsy     Peritoneum Biopsy: Final Diagnosis: Malignant Cell Neoplasm.           Breast cancer metastasized to bone, unspecified laterality (CMS/HCC)    10/15/2012 -  Other Event     Left mammogram:  Impression:  1. Decreasing spiculation and density in the retroareolar left breast, near a previous biopsy.   2. Definite left axillary lesion is not appreciated.          2/5/2013 -  Other Event     Diagnostic Mammo:  Comparison is made to 10/15/2012 and 6/20/2012  The spiculated mass at 12:00 behind the nipple is nearly resolved.   Impression:  1. The mass on the left side is less apparent after receiving chemotherapy. There has been a good response to chmotherapy on the left side.   2. No suspicious abnormality is seen in the right breast to account for the new palable abnormality at the 4-5:00 position.          3/19/2013 Surgery     Breast Biopsy:  Final Diagnosis:  A. Breast, left mastectomy with axillary tail  1. Focal residual ductal carcinoma in situ (DICS) (less than 5% of tumor bed).  2. Negative for residual invasive carcinoma.   3. 12 Lymph Nodes, negative for metastatic carcinoma (0/12) focally, some lymph nodes demonstrate fibrosis/treatment effect.  B. Skin and soft tissue, left advancement flap:  1. Benign skin and subcutis.   2. Negative for carcinoma.          6/20/2013 -  Other Event     Diagnostic Mammo [facility]:  1. A subareolar mass is consistent with malignancy. Additional involement extends inferiorly sonographically and superolaterally mammograhically as demonstrated by calcifications. The mass measures approx 2.8 cm sonographically, but the involved  region is likely much larger including an intraductal componet. The left axillary lymph nodes are also involved.   2. Recommened ultrasound guided biopsy of the left subareolar mass and left axillary lymph node.   3. Breast MRI could also further define multicentric disease on the left.   4. Clear suspicious finding on the right.  Recommend ongoing clinical follow up of palpable foci.          6/13/2014 -  Other Event     Diagnostic Mammo:  IMPRESSION:  1. History of left breast cancer and mastectomy. Stable benign right breast mammograms.          10/20/2014 Surgery     Final Diagnosis:  Biopsies, pertoneum:  Metastatic carcinoma, morphologically compatible with metastatic mammary carcinoma.          6/15/2015 -  Other Event     Diagnostic Mammo:  IMPRESSION:   1. Negative x-ray report, should not delay biopsy if a dominat or clinically suspicious mass is present.          7/5/2016 -  Other Event     Diagnositic mammogram:  Impression:   Stable right mamogram with no radiographic findings suspicious for malignancy. Recommend continued screening mammography per screening guidelines unless otherwise earlier clinically indicated.          9/18/2016 Initial Diagnosis     Secondary malignant neoplasm of bone and bone marrow                INTERVAL HISTORY  Patient ID: Heavenly Yanes is a 74 y.o. year old female Cancer Staging  Stage IV (T2, N1, M1)  --complaining of dyspnea on exersion since last Tuesday. She feels like she ran a mile  2/14/19: cxr showed pneumonia. Already on levofloxacin. Coughing not worse, she did not run temp.  Overall feels weak.  -restarted the appetite suppressant and is starting to eat more but not a whole lot  --2/28/19: CT c/ap with some response to therapy. New left chest inflammatory lesion to be followed ion next imaging. Bone scan unremarkable for progression.  4/25/19: she is much better since being off therapy for 2 weeks. COugh is improved.   6/20/19: has left ear drainage which is her  usual ear infection. Leg edema improved with lasix. She was ordered a mammo by the surgeon which I told her is not necessary, she has metastatic dz.    7/25/19: complaining of increased dyspnea on exertion. She takes her laxis daily.    8/22/19:  complaining of increased dyspnea on exertion. She takes her laxis daily. Also not much appetite even though on megace 40bid. YAIR not worse from prior. Weight the same  -Denies cp/n/v/, neuropathy/ no focal weakness.   Rest of ros unremarkable. 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) 9/18/2016     Past Surgical History:   Past Surgical History:   Procedure Laterality Date   • BREAST SURGERY      left breast biopsy and axillary node   • CHOLECYSTECTOMY     • EXTERNAL EAR SURGERY     • MASTECTOMY Left    • PORTACATH PLACEMENT       Social History:   Social History     Socioeconomic History   • Marital status:      Spouse name: Not on file   • Number of children: Not on file   • Years of education: Not on file   • Highest education level: Not on file   Tobacco Use   • Smoking status: Never Smoker   • Smokeless tobacco: Never Used   Substance and Sexual Activity   • Alcohol use: No   • Drug use: No     Family History:   Family History   Problem Relation Age of Onset   • No Known Problems Daughter    • No Known Problems Son    • Other Mother         complications from a concussion from a fall   • Other Father         old age   • No Known Problems Brother    • No Known Problems Daughter        Review of Systems   Constitutional: Positive for activity change, appetite change and fatigue. Negative for unexpected weight change.   HENT: Negative.     Eyes: Negative.    Respiratory: Positive for shortness of breath.    Cardiovascular: Positive for leg swelling.   Gastrointestinal: Negative.    Endocrine: Negative.    Genitourinary: Negative.    Musculoskeletal: Negative.    Skin: Negative.    Neurological: Negative.    Hematological: Negative.    Psychiatric/Behavioral: Negative.         Performance Status:  Asymptomatic    Medications:    Current Outpatient Medications   Medication Sig Dispense Refill   • Calcium-Magnesium-Vitamin D (CALCIUM 1200+D3 PO) Take 1,200 mg by mouth Daily.     • capecitabine (XELODA) 500 MG chemo tablet Take 1 tablet by mouth 2 (Two) Times a Day. For 2 weeks on and 1 week off. 42 tablet 11   • furosemide (LASIX) 20 MG tablet One tab po qd as needed for swelling take with potassium 30 tablet 3   • megestrol (MEGACE) 40 MG tablet Take 1 tablet by mouth 2 (Two) Times a Day. 60 tablet 5   • metoprolol tartrate (LOPRESSOR) 25 MG tablet Take 25 mg by mouth 2 (two) times a day.     • ofloxacin (FLOXIN) 0.3 % otic solution      • OLANZapine (ZYPREXA) 2.5 MG tablet Take 2.5 mg by mouth every night.     • ondansetron (ZOFRAN) 8 MG tablet Take 1 tablet by mouth Every 8 (Eight) Hours As Needed for Nausea or Vomiting. 30 tablet 5   • potassium chloride (K-DUR,KLOR-CON) 20 MEQ CR tablet Take one tab po bid on days taking Lasix 60 tablet 1   • raNITIdine (ZANTAC) 150 MG tablet Take 1 tablet by mouth 2 (Two) Times a Day. 60 tablet 5   • rivaroxaban (XARELTO) 20 MG tablet Take 1 tablet by mouth Daily. 30 tablet 5   • thyroid 60 MG PO tablet Take 60 mg by mouth daily.     • TYKERB 250 MG chemo tablet 250 mg.     • ciprofloxacin (CIPRO) 500 MG tablet Take 1 tablet by mouth 2 (Two) Times a Day. 28 tablet 0   • metoprolol tartrate (LOPRESSOR) 25 MG tablet Every 12 (Twelve) Hours.       No current facility-administered medications for this visit.      Facility-Administered Medications Ordered in Other Visits   Medication Dose Route Frequency Provider Last  "Rate Last Dose   • heparin flush (porcine) 100 UNIT/ML injection 500 Units  500 Units Intravenous PRN Joseph Nunez MD   500 Units at 10/26/17 1155   • sodium chloride 0.9 % flush 10 mL  10 mL Intravenous PRN Joseph Nunez MD   10 mL at 10/26/17 1155       ALLERGIES:    Allergies   Allergen Reactions   • Benadryl  [Diphenhydramine Hcl (Sleep)] Other (See Comments)   • Codeine    • Diphenhydramine Other (See Comments)     \"Shaky leg syndrome\"  \"Shaky leg syndrome\"  \"Shaky leg syndrome\"   • Heparin Other (See Comments)     Patient states tolerates Heparin flush without problems: Years ago had heparin IV and had a rash   • Other      POPPY SEED   • Penicillins Hives   • Petroleum Jelly [Skin Protectants, Misc.]    • Sulfa Antibiotics    • Sulfur    • Valium [Diazepam]        Objective      Vitals:    08/22/19 0939   BP: 118/62   Pulse: 112   Resp: 18   Temp: 97.8 °F (36.6 °C)   TempSrc: Tympanic   SpO2: 93%   Weight: 57.6 kg (127 lb)   Height: 165.1 cm (65\")   PainSc: 0-No pain         Current Status 8/22/2019   ECOG score 3         Physical Examunchanged  General Appearance: Patient is awake, alert, oriented and in no acute distress. Patient is welldeveloped, wellnourished, and appears stated age.  HEENT: Normocephalic. Sclerae clear, conjunctiva pink, extraocular movements intact, pupils, round, reactive to light and  accommodation. Mouth and throat are clear with moist oral mucosa.  NECK: Supple, no jugular venous distention, thyroid not enlarged.  LYMPH: No cervical, supraclavicular, axillary, or inguinal lymphadenopathy.  CHEST: Equal bilateral expansion, AP  diameter normal, resonant percussion note  LUNGS: Good air movement, no rales, rhonchi, rubs or wheezes with auscultation on the right. Diminished on the left.  CARDIO: Regular sinus rhythm, no murmurs, gallops or rubs.  ABDOMEN: Nondistended, soft, No tenderness, no guarding, no rebound, No hepatosplenomegaly. No abdominal masses. Bowel sounds positive. No " hernia  GENITALIA: Not examined.  BREASTS: Not examined.  MUSKEL: No joint swelling, decreased motion, or inflammation  EXTREMS: patrick le  Edema L>R,No clubbing, cyanosis, No varicose veins.  NEURO: Grossly nonfocal, Gait is coordinated and smooth, Cognition is preserved.  SKIN: No rashes, no ecchymoses, no petechia.  PSYCH: Oriented to time, place and person. Memory is preserved. Mood and affect appear normal  RECENT LABS:  Orders Only on 08/19/2019   Component Date Value Ref Range Status   • Glucose 08/22/2019 101* 70 - 100 mg/dL Final   • BUN 08/22/2019 10  5 - 21 mg/dL Final   • Creatinine 08/22/2019 0.80  0.50 - 1.40 mg/dL Final   • Sodium 08/22/2019 136  135 - 145 mmol/L Final   • Potassium 08/22/2019 3.6  3.5 - 5.3 mmol/L Final   • Chloride 08/22/2019 100  98 - 110 mmol/L Final   • CO2 08/22/2019 29.0  24.0 - 31.0 mmol/L Final   • Calcium 08/22/2019 9.7  8.4 - 10.4 mg/dL Final   • Total Protein 08/22/2019 6.8  6.3 - 8.7 g/dL Final   • Albumin 08/22/2019 3.70  3.50 - 5.00 g/dL Final   • ALT (SGPT) 08/22/2019 16  0 - 54 U/L Final   • AST (SGOT) 08/22/2019 22  7 - 45 U/L Final   • Alkaline Phosphatase 08/22/2019 59  24 - 120 U/L Final   • Total Bilirubin 08/22/2019 0.5  0.1 - 1.0 mg/dL Final   • eGFR Non African Amer 08/22/2019 70  >60 mL/min/1.73 Final   • Globulin 08/22/2019 3.1  gm/dL Final   • A/G Ratio 08/22/2019 1.2  1.1 - 2.5 g/dL Final   • BUN/Creatinine Ratio 08/22/2019 12.5  7.0 - 25.0 Final   • Anion Gap 08/22/2019 7.0  4.0 - 13.0 mmol/L Final   • WBC 08/22/2019 4.99  3.40 - 10.80 10*3/mm3 Final   • RBC 08/22/2019 3.34* 3.77 - 5.28 10*6/mm3 Final   • Hemoglobin 08/22/2019 11.7* 12.0 - 15.9 g/dL Final   • Hematocrit 08/22/2019 34.8  34.0 - 46.6 % Final   • MCV 08/22/2019 104.2* 79.0 - 97.0 fL Final   • MCH 08/22/2019 35.0* 26.6 - 33.0 pg Final   • MCHC 08/22/2019 33.6  31.5 - 35.7 g/dL Final   • RDW 08/22/2019 15.9* 12.3 - 15.4 % Final   • RDW-SD 08/22/2019 59.7* 37.0 - 54.0 fl Final   • MPV 08/22/2019  10.4  6.0 - 12.0 fL Final   • Platelets 08/22/2019 400  140 - 450 10*3/mm3 Final   • Neutrophil % 08/22/2019 48.9  42.7 - 76.0 % Final   • Lymphocyte % 08/22/2019 44.3  19.6 - 45.3 % Final   • Monocyte % 08/22/2019 3.4* 5.0 - 12.0 % Final   • Eosinophil % 08/22/2019 1.6  0.3 - 6.2 % Final   • Basophil % 08/22/2019 0.8  0.0 - 1.5 % Final   • Immature Grans % 08/22/2019 1.0* 0.0 - 0.5 % Final   • Neutrophils, Absolute 08/22/2019 2.44  1.70 - 7.00 10*3/mm3 Final   • Lymphocytes, Absolute 08/22/2019 2.21  0.70 - 3.10 10*3/mm3 Final   • Monocytes, Absolute 08/22/2019 0.17  0.10 - 0.90 10*3/mm3 Final   • Eosinophils, Absolute 08/22/2019 0.08  0.00 - 0.40 10*3/mm3 Final   • Basophils, Absolute 08/22/2019 0.04  0.00 - 0.20 10*3/mm3 Final   • Immature Grans, Absolute 08/22/2019 0.05  0.00 - 0.05 10*3/mm3 Final   • nRBC 08/22/2019 0.0  0.0 - 0.2 /100 WBC Final       RADIOLOGY:  Xr Chest Pa & Lateral    Result Date: 7/25/2019  Narrative: EXAM: XR CHEST PA AND LATERAL- - 7/25/2019 12:50 PM CDT  HISTORY: Dyspnea on exertion, h/o metastatic breast ca; C50.912-Malignant neoplasm of unspecified site of left female breast; C79.51-Secondary malignant neoplasm of bone; R06.09-Other forms of dyspnea   COMPARISON: 02/07/2019.  TECHNIQUE:  2 images.  Frontal and lateral views of the chest.  FINDINGS:  Port at the right chest with grossly intact catheter, tip projects at the upper right atrium. No pneumothorax. Small bilateral pleural effusions with diffuse interstitial opacities. Cardiac mediastinal silhouette within normal limits. Calcified aortic atherosclerosis. Surgical clips at the left chest wall. Diffusely heterogeneous bone, better demonstrated on prior CT 05/31/2019, but consistent with diffuse metastatic disease       Impression: 1. Diffuse interstitial opacities and small pleural effusions. Differential diagnosis includes edema or atypical infection. 2. Diffusely abnormal bone mineralization, consistent with diffuse metastatic  disease. This report was finalized on 07/25/2019 13:00 by Dr Kendra Arce MD.           Assessment/Plan  Heavenly Yanes is a 74 y.o. year old female with met breast cancer s/p multiple lines of CMT currently on taxotere weekly and xeloda with good tolerance.    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  --also on xeloda [id number], then repeat.  --CT c/a/p 2/19 showed stable dz. Some new Inflammatory opacities in the LEFT lower lobe and bilateral subpleural interstitial thickening.   -bone scan without evidence of dz progression  --primo taxotere weekly. Pt also on xeloda [id number], then repeat.  4/25/19: therapy postponed 2 weeks ago due to neutropenia. He got Neupogen x1. Labs reviewed with pt wbc 10.68, hg 11.1, [id number]. ANC 7630.  -ANC low 880. Postpone tx. Give neupogen x1. Pt advised to hold xeloda until 2 weeks    -imaging CT c/a/p 5/31/19 showed stable dz. Bone scan 5/31/19 showed multiple metastatic dz, nothing new. Possible pathologic fracture in the area of the coracoid process. Pt does not endorse pain there.  -labs 8/22/19 reviewed with pt wbc 4.99 Hg 11.7, plt 400. Ok for treatment with taxotere and neulasta.  -exam and review of labs did not reveal reason for the increased sob on exertion  -will obtain CT c/a/p to assess response    2. Psych: on olanzapine    3. HTN: on metoprolol    4. Gerd: on ranitidine  5. Dyspnea:  get a cxr to evaluate  6. Anemia: will check iron profile, and act accordingly . Her creatinine is normal at 0.94.   7. DVT on xarelto indefinite.  --Doppler left LE  1/30/19 There is evidence of deep venous thrombosis of the left lower extremity. The clot burden is improved from last study. There is also evidence of superficial clot in the greater saphenous vein. There is a Baker's cyst in the popliteal fossa.  8. CHF: LVEF 65% with diastolic dysfunction. referral to cardiology to maximized her heart function.  9. Thrombocytosis: suspect reactive from anemia. Will check iron profile.  10.left lung pneumonia: resolved  --cxr showed pneumonia. Pt already on levofloxacin. Will complete 14 days.   11: LE edema: improved on lasix.  12. FEN:  Low potassium on  kdur. Also will check magnesium. Advised 20meq kdur TID for 7  days then 20meq once a day thereafter.  13. otits media of the right ear with effusion. This happens off and on for her. She usually gets some medication from pcp. Given her multiple allergies will have her f/u with pcp with what has worked for her in the past.  -she took ciprofloxacin left over from prior rx that I gave her for something else and is asking for refill. I will go ahead and refill it. If it does not get better will refer to ent.  14. Dyspnea with exertion. Worse from prior. Will get CXR to cailin Sotelo MD    8/22/2019    10:10 AM

## 2019-08-29 ENCOUNTER — HOSPITAL ENCOUNTER (OUTPATIENT)
Dept: NUCLEAR MEDICINE | Facility: HOSPITAL | Age: 74
Discharge: HOME | End: 2019-08-29

## 2019-08-29 ENCOUNTER — TELEPHONE (OUTPATIENT)
Dept: ONCOLOGY | Facility: CLINIC | Age: 74
End: 2019-08-29

## 2019-08-29 ENCOUNTER — HOSPITAL ENCOUNTER (OUTPATIENT)
Dept: CT IMAGING | Facility: HOSPITAL | Age: 74
Discharge: HOME | End: 2019-08-29
Admitting: INTERNAL MEDICINE

## 2019-08-29 DIAGNOSIS — C50.912 CARCINOMA OF LEFT BREAST METASTATIC TO BONE (HCC): ICD-10-CM

## 2019-08-29 DIAGNOSIS — C79.51 CARCINOMA OF LEFT BREAST METASTATIC TO BONE (HCC): ICD-10-CM

## 2019-08-29 PROCEDURE — 78306 BONE IMAGING WHOLE BODY: CPT

## 2019-08-29 PROCEDURE — 0 TECHNETIUM OXIDRONATE KIT: Performed by: INTERNAL MEDICINE

## 2019-08-29 PROCEDURE — 71260 CT THORAX DX C+: CPT

## 2019-08-29 PROCEDURE — A9561 TC99M OXIDRONATE: HCPCS | Performed by: INTERNAL MEDICINE

## 2019-08-29 PROCEDURE — 25010000002 IOPAMIDOL 61 % SOLUTION: Performed by: INTERNAL MEDICINE

## 2019-08-29 PROCEDURE — 74177 CT ABD & PELVIS W/CONTRAST: CPT

## 2019-08-29 RX ADMIN — IOPAMIDOL 100 ML: 612 INJECTION, SOLUTION INTRAVENOUS at 09:25

## 2019-08-29 RX ADMIN — TECHNETIUM TC 99M OXIDRONATE 1 DOSE: 3.15 INJECTION, POWDER, LYOPHILIZED, FOR SOLUTION INTRAVENOUS at 09:10

## 2019-08-29 NOTE — TELEPHONE ENCOUNTER
Called Heavenly concerning her CT scan showing possible bowel perforation.  Had to leave message for patient to go to the ER if she is having any abdominal pain.

## 2019-08-30 ENCOUNTER — INFUSION (OUTPATIENT)
Dept: ONCOLOGY | Facility: HOSPITAL | Age: 74
End: 2019-08-30

## 2019-08-30 ENCOUNTER — OFFICE VISIT (OUTPATIENT)
Dept: ONCOLOGY | Facility: CLINIC | Age: 74
End: 2019-08-30

## 2019-08-30 ENCOUNTER — LAB (OUTPATIENT)
Dept: LAB | Facility: HOSPITAL | Age: 74
End: 2019-08-30

## 2019-08-30 VITALS
DIASTOLIC BLOOD PRESSURE: 63 MMHG | HEIGHT: 65 IN | SYSTOLIC BLOOD PRESSURE: 113 MMHG | HEART RATE: 89 BPM | TEMPERATURE: 98.5 F | BODY MASS INDEX: 20.33 KG/M2 | RESPIRATION RATE: 16 BRPM | WEIGHT: 122 LBS | OXYGEN SATURATION: 95 %

## 2019-08-30 VITALS
HEART RATE: 109 BPM | RESPIRATION RATE: 16 BRPM | SYSTOLIC BLOOD PRESSURE: 112 MMHG | BODY MASS INDEX: 20.41 KG/M2 | WEIGHT: 122.5 LBS | OXYGEN SATURATION: 100 % | HEIGHT: 65 IN | DIASTOLIC BLOOD PRESSURE: 60 MMHG | TEMPERATURE: 97.6 F

## 2019-08-30 DIAGNOSIS — C50.912 CARCINOMA OF LEFT BREAST METASTATIC TO BONE (HCC): Primary | ICD-10-CM

## 2019-08-30 DIAGNOSIS — C50.912 CARCINOMA OF LEFT BREAST METASTATIC TO BONE (HCC): ICD-10-CM

## 2019-08-30 DIAGNOSIS — C79.51 BREAST CANCER METASTASIZED TO BONE, UNSPECIFIED LATERALITY (HCC): ICD-10-CM

## 2019-08-30 DIAGNOSIS — K63.1 PERFORATED SIGMOID COLON (HCC): ICD-10-CM

## 2019-08-30 DIAGNOSIS — C79.51 CARCINOMA OF LEFT BREAST METASTATIC TO BONE (HCC): Primary | ICD-10-CM

## 2019-08-30 DIAGNOSIS — C50.919 BREAST CANCER METASTASIZED TO BONE, UNSPECIFIED LATERALITY (HCC): ICD-10-CM

## 2019-08-30 DIAGNOSIS — C79.51 BREAST CANCER METASTASIZED TO BONE, UNSPECIFIED LATERALITY (HCC): Primary | ICD-10-CM

## 2019-08-30 DIAGNOSIS — C50.412 MALIGNANT NEOPLASM OF UPPER-OUTER QUADRANT OF LEFT FEMALE BREAST, UNSPECIFIED ESTROGEN RECEPTOR STATUS (HCC): ICD-10-CM

## 2019-08-30 DIAGNOSIS — Z95.828 PORT-A-CATH IN PLACE: ICD-10-CM

## 2019-08-30 DIAGNOSIS — C79.51 CARCINOMA OF LEFT BREAST METASTATIC TO BONE (HCC): ICD-10-CM

## 2019-08-30 DIAGNOSIS — C50.919 BREAST CANCER METASTASIZED TO BONE, UNSPECIFIED LATERALITY (HCC): Primary | ICD-10-CM

## 2019-08-30 DIAGNOSIS — E87.6 HYPOKALEMIA: ICD-10-CM

## 2019-08-30 LAB
ALBUMIN SERPL-MCNC: 3.7 G/DL (ref 3.5–5)
ALBUMIN/GLOB SERPL: 1.2 G/DL (ref 1.1–2.5)
ALP SERPL-CCNC: 58 U/L (ref 24–120)
ALT SERPL W P-5'-P-CCNC: 15 U/L (ref 0–54)
ANION GAP SERPL CALCULATED.3IONS-SCNC: 8 MMOL/L (ref 4–13)
AST SERPL-CCNC: 24 U/L (ref 7–45)
BASOPHILS # BLD AUTO: 0.03 10*3/MM3 (ref 0–0.2)
BASOPHILS NFR BLD AUTO: 0.6 % (ref 0–1.5)
BILIRUB SERPL-MCNC: 0.5 MG/DL (ref 0.1–1)
BUN BLD-MCNC: 11 MG/DL (ref 5–21)
BUN/CREAT SERPL: 12.2 (ref 7–25)
CALCIUM SPEC-SCNC: 9.6 MG/DL (ref 8.4–10.4)
CHLORIDE SERPL-SCNC: 96 MMOL/L (ref 98–110)
CO2 SERPL-SCNC: 31 MMOL/L (ref 24–31)
CREAT BLD-MCNC: 0.9 MG/DL (ref 0.5–1.4)
DEPRECATED RDW RBC AUTO: 57 FL (ref 37–54)
EOSINOPHIL # BLD AUTO: 0 10*3/MM3 (ref 0–0.4)
EOSINOPHIL NFR BLD AUTO: 0 % (ref 0.3–6.2)
ERYTHROCYTE [DISTWIDTH] IN BLOOD BY AUTOMATED COUNT: 15.7 % (ref 12.3–15.4)
GFR SERPL CREATININE-BSD FRML MDRD: 61 ML/MIN/1.73
GLOBULIN UR ELPH-MCNC: 3 GM/DL
GLUCOSE BLD-MCNC: 136 MG/DL (ref 70–100)
HCT VFR BLD AUTO: 32.6 % (ref 34–46.6)
HGB BLD-MCNC: 11.4 G/DL (ref 12–15.9)
HOLD SPECIMEN: NORMAL
HOLD SPECIMEN: NORMAL
IMM GRANULOCYTES # BLD AUTO: 0.05 10*3/MM3 (ref 0–0.05)
IMM GRANULOCYTES NFR BLD AUTO: 1.1 % (ref 0–0.5)
LYMPHOCYTES # BLD AUTO: 1.62 10*3/MM3 (ref 0.7–3.1)
LYMPHOCYTES NFR BLD AUTO: 34.5 % (ref 19.6–45.3)
MCH RBC QN AUTO: 35.2 PG (ref 26.6–33)
MCHC RBC AUTO-ENTMCNC: 35 G/DL (ref 31.5–35.7)
MCV RBC AUTO: 100.6 FL (ref 79–97)
MONOCYTES # BLD AUTO: 0.32 10*3/MM3 (ref 0.1–0.9)
MONOCYTES NFR BLD AUTO: 6.8 % (ref 5–12)
NEUTROPHILS # BLD AUTO: 2.68 10*3/MM3 (ref 1.7–7)
NEUTROPHILS NFR BLD AUTO: 57 % (ref 42.7–76)
NRBC BLD AUTO-RTO: 0 /100 WBC (ref 0–0.2)
PLATELET # BLD AUTO: 488 10*3/MM3 (ref 140–450)
PMV BLD AUTO: 9.9 FL (ref 6–12)
POTASSIUM BLD-SCNC: 2.7 MMOL/L (ref 3.5–5.3)
PROT SERPL-MCNC: 6.7 G/DL (ref 6.3–8.7)
RBC # BLD AUTO: 3.24 10*6/MM3 (ref 3.77–5.28)
SODIUM BLD-SCNC: 135 MMOL/L (ref 135–145)
WBC NRBC COR # BLD: 4.7 10*3/MM3 (ref 3.4–10.8)

## 2019-08-30 PROCEDURE — 96360 HYDRATION IV INFUSION INIT: CPT

## 2019-08-30 PROCEDURE — 36415 COLL VENOUS BLD VENIPUNCTURE: CPT

## 2019-08-30 PROCEDURE — 96361 HYDRATE IV INFUSION ADD-ON: CPT

## 2019-08-30 PROCEDURE — 96366 THER/PROPH/DIAG IV INF ADDON: CPT

## 2019-08-30 PROCEDURE — 99214 OFFICE O/P EST MOD 30 MIN: CPT | Performed by: INTERNAL MEDICINE

## 2019-08-30 PROCEDURE — 80053 COMPREHEN METABOLIC PANEL: CPT

## 2019-08-30 PROCEDURE — 85025 COMPLETE CBC W/AUTO DIFF WBC: CPT

## 2019-08-30 PROCEDURE — 96365 THER/PROPH/DIAG IV INF INIT: CPT

## 2019-08-30 PROCEDURE — 25010000002 MAGNESIUM SULFATE 2 GM/50ML SOLUTION: Performed by: INTERNAL MEDICINE

## 2019-08-30 PROCEDURE — 25010000002 POTASSIUM CHLORIDE PER 2 MEQ: Performed by: INTERNAL MEDICINE

## 2019-08-30 PROCEDURE — 96368 THER/DIAG CONCURRENT INF: CPT

## 2019-08-30 RX ORDER — SODIUM CHLORIDE 0.9 % (FLUSH) 0.9 %
10 SYRINGE (ML) INJECTION AS NEEDED
Status: DISCONTINUED | OUTPATIENT
Start: 2019-08-30 | End: 2019-08-30 | Stop reason: HOSPADM

## 2019-08-30 RX ORDER — SODIUM CHLORIDE 9 MG/ML
250 INJECTION, SOLUTION INTRAVENOUS ONCE
Status: CANCELLED | OUTPATIENT
Start: 2019-08-30

## 2019-08-30 RX ORDER — SODIUM CHLORIDE 9 MG/ML
250 INJECTION, SOLUTION INTRAVENOUS ONCE
Status: COMPLETED | OUTPATIENT
Start: 2019-08-30 | End: 2019-08-30

## 2019-08-30 RX ORDER — POTASSIUM CHLORIDE 14.9 MG/ML
20 INJECTION INTRAVENOUS ONCE
Status: CANCELLED | OUTPATIENT
Start: 2019-08-30 | End: 2019-08-30

## 2019-08-30 RX ORDER — MAGNESIUM SULFATE HEPTAHYDRATE 40 MG/ML
2 INJECTION, SOLUTION INTRAVENOUS ONCE
Status: COMPLETED | OUTPATIENT
Start: 2019-08-30 | End: 2019-08-30

## 2019-08-30 RX ORDER — POTASSIUM CHLORIDE 14.9 MG/ML
20 INJECTION INTRAVENOUS ONCE
Status: COMPLETED | OUTPATIENT
Start: 2019-08-30 | End: 2019-08-30

## 2019-08-30 RX ORDER — SODIUM CHLORIDE 0.9 % (FLUSH) 0.9 %
10 SYRINGE (ML) INJECTION AS NEEDED
Status: CANCELLED | OUTPATIENT
Start: 2019-08-30

## 2019-08-30 RX ADMIN — SODIUM CHLORIDE 250 ML: 9 INJECTION, SOLUTION INTRAVENOUS at 10:44

## 2019-08-30 RX ADMIN — SODIUM CHLORIDE, PRESERVATIVE FREE 10 ML: 5 INJECTION INTRAVENOUS at 12:48

## 2019-08-30 RX ADMIN — MAGNESIUM SULFATE HEPTAHYDRATE 2 G: 40 INJECTION, SOLUTION INTRAVENOUS at 10:53

## 2019-08-30 RX ADMIN — POTASSIUM CHLORIDE 20 MEQ: 200 INJECTION, SOLUTION INTRAVENOUS at 10:49

## 2019-08-30 RX ADMIN — Medication 500 UNITS: at 12:49

## 2019-08-30 NOTE — PROGRESS NOTES
[facility]  HEMATOLOGY & ONCOLOGY    Cancer Staging Information:  Cancer Staging  Malignant neoplasm of upper-outer quadrant of left female breast (CMS/HCC)  Staging form: Breast, AJCC V7  - Clinical stage from 10/11/2016: Stage IV (T2, N1, M1) - Signed by Calli Monsalve APRN on 10/11/2016        Subjective     VISIT DIAGNOSIS:   No diagnosis found.    REASON FOR VISIT:     Chief Complaint   Patient presents with   • Breast Cancer        HEMATOLOGY / ONCOLOGY HISTORY:   Oncology/Hematology History    Patient is a 67-year-old postmenopausal female who had onset of her menses at age 9 and menopause at age 48. She received oral contraceptives for approximately 14 years and did not receive hormonal manipulation. Patient has a maternal cousin had breast cancer. Patient has had no prior breast biopsies. Patient found a palpable left breast mass as well as a left axillary mass. Patient had a bloody nipple discharge which been present for approximately 6 months. On 6/20/2012, a mammogram was performed showing a subareolar mass consistent with malignancy. Bilateral breast ultrasound revealed an ill-defined subareolar mass measuring 2.8-2.5 cm. There is a left axillary mass measuring 1.7 cm with suspicion of extracapsular extension. There is no evidence of disease in the right breast. On 7/9/2012 patient underwent a left breast biopsy and placement of marker clip. Left breast biopsy revealed an infiltrating lobular carcinoma grade 2 with focal ductal carcinoma in situ, solid pattern. A fine-needle biopsy of the left lymph node was consistent with metastatic carcinoma. Breast tumor profile revealed ER: 100%; ME: 98%; HER-2/maynor 2+; FISH: Unamplified; Ki-67: 71%; P 53 1%; DNA aneuploidy. A MRI of the breast were performed body multiple abnormal enhancing masses within the left breast. There appears to be anterior retraction of the pectoralis muscle with no direct extension. There is a moderate to  large, layering left pleural effusion appreciated. The right breast reveals 3 moderately enlarged lymph nodes in the posterior lateral aspect the right breast. These have fatty hilum but followup is recommended. Patient is undergone systemic studies including, on 8/2/2012, a CT scan of the brain showing atrophy. a CT scan that shows showing a small to moderate left pleural effusion with 3 subcentimeter nodule densities in the left lung.   She completed 4 cycles of neoadjuvant treatment with dose dense Adriamycin and Cytoxan. She had a mammogram which showed a significant reduction in the size of her left breast lesion. There is no axillary adenopathy noted. She has had an ultrasound revealing the  lesion reducing from 2.4 cm to 1 cm. Patient underwent a left mastectomy on 03/19/2013 finding negative invasive cancer, negative nodes. A 5% DCIS was noted. The patient has declined hormonal therapy. She did not need radiation therapy.  November 2014, she began to have evidence of lytic bone lesions at T5T6, frontal disease, an 8 mm lucency in the central frontal area of  the skull but nothing intracranial. Bone scan revealed only vertex tracer activity but bilateral ribs and thoracic spine, and other lesions in  her pelvis. At that time, she was started on letrozole since November 2014. She is taking Xgeva. She had progression found in bone in Feb 2015 on scan. She was started on Faslodex, Ibrance and continued xgeva.         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 B) Foci of ductal carcinoma in situ, solid pattern. C) Greatest dimension of the invasive carcinoma is 15.8mm.         3/19/2013 Surgery     Left Mastectomy w/ Axillary Tail: Focal residual ducatal carcinoma in situ  (DCIS) 12 lymph nodes negative for metastatic carcinoma.         10/20/2014 Biopsy     Peritoneum Biopsy: Final Diagnosis: Malignant Cell Neoplasm.           Breast cancer metastasized to bone, unspecified laterality (CMS/HCC)    10/15/2012 -  Other Event     Left mammogram:  Impression:  1. Decreasing spiculation and density in the retroareolar left breast, near a previous biopsy.   2. Definite left axillary lesion is not appreciated.          2/5/2013 -  Other Event     Diagnostic Mammo:  Comparison is made to 10/15/2012 and 6/20/2012  The spiculated mass at 12:00 behind the nipple is nearly resolved.   Impression:  1. The mass on the left side is less apparent after receiving chemotherapy. There has been a good response to chmotherapy on the left side.   2. No suspicious abnormality is seen in the right breast to account for the new palable abnormality at the 4-5:00 position.          3/19/2013 Surgery     Breast Biopsy:  Final Diagnosis:  A. Breast, left mastectomy with axillary tail  1. Focal residual ductal carcinoma in situ (DICS) (less than 5% of tumor bed).  2. Negative for residual invasive carcinoma.   3. 12 Lymph Nodes, negative for metastatic carcinoma (0/12) focally, some lymph nodes demonstrate fibrosis/treatment effect.  B. Skin and soft tissue, left advancement flap:  1. Benign skin and subcutis.   2. Negative for carcinoma.          6/20/2013 -  Other Event     Diagnostic Mammo [facility]:  1. A subareolar mass is consistent with malignancy. Additional involement extends inferiorly sonographically and superolaterally mammograhically as demonstrated by calcifications. The mass measures approx 2.8 cm sonographically, but the involved region is likely much larger including an intraductal componet. The left axillary lymph nodes are also involved.   2. Recommened ultrasound guided biopsy of the left subareolar mass and left axillary lymph node.   3. Breast MRI could also further define multicentric  disease on the left.   4. Clear suspicious finding on the right.  Recommend ongoing clinical follow up of palpable foci.          6/13/2014 -  Other Event     Diagnostic Mammo:  IMPRESSION:  1. History of left breast cancer and mastectomy. Stable benign right breast mammograms.          10/20/2014 Surgery     Final Diagnosis:  Biopsies, pertoneum:  Metastatic carcinoma, morphologically compatible with metastatic mammary carcinoma.          6/15/2015 -  Other Event     Diagnostic Mammo:  IMPRESSION:   1. Negative x-ray report, should not delay biopsy if a dominat or clinically suspicious mass is present.          7/5/2016 -  Other Event     Diagnositic mammogram:  Impression:   Stable right mamogram with no radiographic findings suspicious for malignancy. Recommend continued screening mammography per screening guidelines unless otherwise earlier clinically indicated.          9/18/2016 Initial Diagnosis     Secondary malignant neoplasm of bone and bone marrow                INTERVAL HISTORY  Patient ID: Heavenly Yanes is a 74 y.o. year old female Cancer Staging  Stage IV (T2, N1, M1)  --complaining of dyspnea on exersion since last Tuesday. She feels like she ran a mile  2/14/19: cxr showed pneumonia. Already on levofloxacin. Coughing not worse, she did not run temp.  Overall feels weak.  -restarted the appetite suppressant and is starting to eat more but not a whole lot  --2/28/19: CT c/ap with some response to therapy. New left chest inflammatory lesion to be followed ion next imaging. Bone scan unremarkable for progression.  4/25/19: she is much better since being off therapy for 2 weeks. COugh is improved.   6/20/19: has left ear drainage which is her usual ear infection. Leg edema improved with lasix. She was ordered a mammo by the surgeon which I told her is not necessary, she has metastatic dz.    7/25/19: complaining of increased dyspnea on exertion. She takes her laxis daily.    8/22/19:  complaining of  increased dyspnea on exertion. She takes her laxis daily. Also not much appetite even though on megace 40bid. YAIR not worse from prior. Weight the same  8/30/19: she presents today to review CT scan. Overall stable in terms of her cancer but there is a concerning are of extraluminal collection of air in the deep pelvis with a thickened wall measuring about 2.8 x 3.2 cm that is situated between the lower sigmoid colon and small bowel and just above the uterus. This likely represents a contained sigmoid colon perforation related to prior diverticulitis. Perforated neoplasm cannot be ruled out. GI consultation is recommended for this finding. The sigmoid colon and adjacent small bowel loops appear to be matted around this collection.  -currently not having any abdominal symptoms.  -Denies cp/n/v/, neuropathy/ no focal weakness.   Rest of ros unremarkable. 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) 9/18/2016     Past Surgical History:   Past Surgical History:   Procedure Laterality Date   • BREAST SURGERY      left breast biopsy and axillary node   • CHOLECYSTECTOMY     • EXTERNAL EAR SURGERY     • MASTECTOMY Left    • PORTACATH PLACEMENT       Social History:   Social History     Socioeconomic History   • Marital status:      Spouse name: Not on file   • Number of children: Not on file   • Years of education: Not on file   • Highest education level: Not on file   Tobacco Use   • Smoking status: Never Smoker   • Smokeless tobacco: Never Used   Substance and Sexual Activity   • Alcohol use: No   • Drug use: No     Family History:   Family History   Problem Relation Age of Onset   • No  Known Problems Daughter    • No Known Problems Son    • Other Mother         complications from a concussion from a fall   • Other Father         old age   • No Known Problems Brother    • No Known Problems Daughter        Review of Systems   Constitutional: Positive for activity change, appetite change and fatigue. Negative for unexpected weight change.   HENT: Negative.    Eyes: Negative.    Respiratory: Positive for shortness of breath.    Cardiovascular: Positive for leg swelling.   Gastrointestinal: Negative.    Endocrine: Negative.    Genitourinary: Negative.    Musculoskeletal: Negative.    Skin: Negative.    Neurological: Negative.    Hematological: Negative.    Psychiatric/Behavioral: Negative.         Performance Status:  Asymptomatic    Medications:    Current Outpatient Medications   Medication Sig Dispense Refill   • Calcium-Magnesium-Vitamin D (CALCIUM 1200+D3 PO) Take 1,200 mg by mouth Daily.     • capecitabine (XELODA) 500 MG chemo tablet Take 1 tablet by mouth 2 (Two) Times a Day. For 2 weeks on and 1 week off. 42 tablet 11   • ciprofloxacin (CIPRO) 500 MG tablet Take 1 tablet by mouth 2 (Two) Times a Day. 28 tablet 0   • furosemide (LASIX) 20 MG tablet One tab po qd as needed for swelling take with potassium 30 tablet 3   • megestrol (MEGACE) 40 MG tablet Take 1 tablet by mouth 2 (Two) Times a Day. 60 tablet 5   • metoprolol tartrate (LOPRESSOR) 25 MG tablet Take 25 mg by mouth 2 (two) times a day.     • metoprolol tartrate (LOPRESSOR) 25 MG tablet Every 12 (Twelve) Hours.     • ofloxacin (FLOXIN) 0.3 % otic solution      • OLANZapine (ZYPREXA) 2.5 MG tablet Take 2.5 mg by mouth every night.     • ondansetron (ZOFRAN) 8 MG tablet Take 1 tablet by mouth Every 8 (Eight) Hours As Needed for Nausea or Vomiting. 30 tablet 5   • potassium chloride (K-DUR,KLOR-CON) 20 MEQ CR tablet Take one tab po bid on days taking Lasix 60 tablet 1   • raNITIdine (ZANTAC) 150 MG tablet Take 1 tablet by mouth 2 (Two)  "Times a Day. 60 tablet 5   • rivaroxaban (XARELTO) 20 MG tablet Take 1 tablet by mouth Daily. 30 tablet 5   • thyroid 60 MG PO tablet Take 60 mg by mouth daily.     • TYKERB 250 MG chemo tablet 250 mg.       No current facility-administered medications for this visit.      Facility-Administered Medications Ordered in Other Visits   Medication Dose Route Frequency Provider Last Rate Last Dose   • heparin flush (porcine) 100 UNIT/ML injection 500 Units  500 Units Intravenous PRN Joseph Nunez MD   500 Units at 10/26/17 1155   • sodium chloride 0.9 % flush 10 mL  10 mL Intravenous PRN Joseph Nunez MD   10 mL at 10/26/17 1155       ALLERGIES:    Allergies   Allergen Reactions   • Benadryl  [Diphenhydramine Hcl (Sleep)] Other (See Comments)   • Codeine    • Diphenhydramine Other (See Comments)     \"Shaky leg syndrome\"  \"Shaky leg syndrome\"  \"Shaky leg syndrome\"   • Heparin Other (See Comments)     Patient states tolerates Heparin flush without problems: Years ago had heparin IV and had a rash   • Other      POPPY SEED   • Penicillins Hives   • Petroleum Jelly [Skin Protectants, Misc.]    • Sulfa Antibiotics    • Sulfur    • Valium [Diazepam]        Objective      Vitals:    08/30/19 0916   BP: 112/60   Pulse: 109   Resp: 16   Temp: 97.6 °F (36.4 °C)   TempSrc: Tympanic   SpO2: 100%   Weight: 55.6 kg (122 lb 8 oz)   Height: 165.1 cm (65\")   PainSc: 0-No pain         Current Status 8/30/2019   ECOG score 3         Physical Examunchanged  General Appearance: Patient is awake, alert, oriented and in no acute distress. Patient is welldeveloped, wellnourished, and appears stated age.  HEENT: Normocephalic. Sclerae clear, conjunctiva pink, extraocular movements intact, pupils, round, reactive to light and  accommodation. Mouth and throat are clear with moist oral mucosa.  NECK: Supple, no jugular venous distention, thyroid not enlarged.  LYMPH: No cervical, supraclavicular, axillary, or inguinal lymphadenopathy.  CHEST: " Equal bilateral expansion, AP  diameter normal, resonant percussion note  LUNGS: Good air movement, no rales, rhonchi, rubs or wheezes with auscultation on the right. Diminished on the left.  CARDIO: Regular sinus rhythm, no murmurs, gallops or rubs.  ABDOMEN: Nondistended, soft, No tenderness, no guarding, no rebound, No hepatosplenomegaly. No abdominal masses. Bowel sounds positive. No hernia  GENITALIA: Not examined.  BREASTS: Not examined.  MUSKEL: No joint swelling, decreased motion, or inflammation  EXTREMS: patrick le  Edema L>R,No clubbing, cyanosis, No varicose veins.  NEURO: Grossly nonfocal, Gait is coordinated and smooth, Cognition is preserved.  SKIN: No rashes, no ecchymoses, no petechia.  PSYCH: Oriented to time, place and person. Memory is preserved. Mood and affect appear normal  RECENT LABS:  Lab on 08/30/2019   Component Date Value Ref Range Status   • WBC 08/30/2019 4.70  3.40 - 10.80 10*3/mm3 Final   • RBC 08/30/2019 3.24* 3.77 - 5.28 10*6/mm3 Final   • Hemoglobin 08/30/2019 11.4* 12.0 - 15.9 g/dL Final   • Hematocrit 08/30/2019 32.6* 34.0 - 46.6 % Final   • MCV 08/30/2019 100.6* 79.0 - 97.0 fL Final   • MCH 08/30/2019 35.2* 26.6 - 33.0 pg Final   • MCHC 08/30/2019 35.0  31.5 - 35.7 g/dL Final   • RDW 08/30/2019 15.7* 12.3 - 15.4 % Final   • RDW-SD 08/30/2019 57.0* 37.0 - 54.0 fl Final   • MPV 08/30/2019 9.9  6.0 - 12.0 fL Final   • Platelets 08/30/2019 488* 140 - 450 10*3/mm3 Final   • Neutrophil % 08/30/2019 57.0  42.7 - 76.0 % Final   • Lymphocyte % 08/30/2019 34.5  19.6 - 45.3 % Final   • Monocyte % 08/30/2019 6.8  5.0 - 12.0 % Final   • Eosinophil % 08/30/2019 0.0* 0.3 - 6.2 % Final   • Basophil % 08/30/2019 0.6  0.0 - 1.5 % Final   • Immature Grans % 08/30/2019 1.1* 0.0 - 0.5 % Final   • Neutrophils, Absolute 08/30/2019 2.68  1.70 - 7.00 10*3/mm3 Final   • Lymphocytes, Absolute 08/30/2019 1.62  0.70 - 3.10 10*3/mm3 Final   • Monocytes, Absolute 08/30/2019 0.32  0.10 - 0.90 10*3/mm3 Final   •  Eosinophils, Absolute 08/30/2019 0.00  0.00 - 0.40 10*3/mm3 Final   • Basophils, Absolute 08/30/2019 0.03  0.00 - 0.20 10*3/mm3 Final   • Immature Grans, Absolute 08/30/2019 0.05  0.00 - 0.05 10*3/mm3 Final   • nRBC 08/30/2019 0.0  0.0 - 0.2 /100 WBC Final       RADIOLOGY:  Ct Chest With Contrast    Result Date: 8/29/2019  Narrative: EXAMINATION:  CT CHEST W CONTRAST-  8/29/2019 9:22 AM CDT  HISTORY: Metastatic breast cancer. Assess therapy response. C50.912-Malignant neoplasm of unspecified site of left female breast; C79.51-Secondary malignant neoplasm of bone.  COMPARISON : 05/31/2019.  DLP: 116 mGy-cm. Automated dosage control was utilized.  TECHNIQUE: CT was performed of the chest with contrast. Sagittal and coronal images were reconstructed.  MEDIASTINUM, HEART AND VASCULAR STRUCTURES: There is atheromatous disease of the thoracic aorta. Heart size is upper limits of normal. Pulmonary arteries appear to be normal in caliber. There is diffuse mediastinal edema and body wall edema. There are no pathologically enlarged mediastinal lymph nodes. There is a small pericardial effusion.  LUNGS: There is a moderate to large right pleural effusion that is increased compared to the previous study. There are patchy peripheral infiltrates in the aerated portions of both lungs. This is slightly greater on the left. This may be infectious or inflammatory. There is stable pleural thickening on the left.  UPPER ABDOMEN: Please see the abdomen and pelvis CT report separately.  BONES AND SOFT TISSUES: There is diffuse metastatic bone disease there is stable mild compression deformity of T11. There has been prior left mastectomy.      Impression: 1. Diffuse mediastinal and body wall edema is a new finding compared to the previous study. 2. No enlarged mediastinal lymph nodes. Prior left mastectomy. 3. Diffuse metastatic bone disease. Stable mild compression deformity of T11. 4. Stable pleural thickening on the left. 5. New patchy  peripheral infiltrates in both lungs, greater on the left side. This may be infectious or inflammatory. Patchy pulmonary edema is possible as well. 6. Moderate to large right pleural effusion, increasing compared to the prior study. Small pericardial effusion.    This report was finalized on 08/29/2019 10:30 by Dr. Marques Denson MD.    Ct Abdomen Pelvis With Contrast    Result Date: 8/29/2019  Narrative: EXAMINATION:  CT ABDOMEN PELVIS W CONTRAST-  8/29/2019 9:22 AM CDT  HISTORY: Metastatic breast cancer. Assess therapy response. C50.912-Malignant neoplasm of unspecified site of left female breast; C79.51-Secondary malignant neoplasm of bone.  TECHNIQUE: Spiral CT was performed of the abdomen and pelvis with contrast. Multiplanar images were reconstructed.  DLP: 159 mGy-cm. Automated dosage control was utilized.  COMPARISON: 05/31/2019.  LUNG BASES: Please see the chest CT report separately.  LIVER AND SPLEEN: The liver and spleen demonstrate no focal abnormality. There has been prior cholecystectomy.  PANCREAS: No pancreatic mass or inflammatory change.  KIDNEYS AND ADRENALS: Mild adrenal thickening on the left is stable. The right adrenal gland is normal. The kidneys demonstrate no acute appearing abnormality. There is wall thickening of the bladder. The bladder is decompressed.  BOWEL: There is an extraluminal collection of air in the deep pelvis measuring about 2.8 x 3.2 cm that is just superior to the uterus and between the lower sigmoid colon and adjacent small bowel. This may be a contained perforation related to prior diverticulitis. There are diverticula in this area. Sigmoid colon and small bowel loops appear to be adhesed in this region.  OTHER: There is a calcified leiomyoma in the uterus. There is diffuse metastatic bone disease with multiple tiny sclerotic lesions throughout the visualized spine and pelvis. There is mild body wall edema. There is a stable T11 compression deformity. There are no new  fractures.      Impression: 1. Extraluminal collection of air in the deep pelvis with a thickened wall measuring about 2.8 x 3.2 cm that is situated between the lower sigmoid colon and small bowel and just above the uterus. This likely represents a contained sigmoid colon perforation related to prior diverticulitis. Perforated neoplasm cannot be ruled out. GI consultation is recommended for this finding. The sigmoid colon and adjacent small bowel loops appear to be matted around this collection. 2. Extensive metastatic bone disease appears relatively stable. There are so many lesions that it is difficult to determine if there is any subtle change. 3. Calcified uterine leiomyoma. 4. Mild body wall edema. 5. Stable mild thickening of the left adrenal gland. This report was finalized on 08/29/2019 10:52 by Dr. Marques Denson MD.           Assessment/Plan  Heavenly Yanes is a 74 y.o. year old female with met breast cancer s/p multiple lines of CMT currently on taxotere weekly and xeloda with good tolerance.    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  --also on xeloda [id number], then repeat.  --CT c/a/p 2/19 showed stable dz. Some new Inflammatory opacities in the LEFT lower lobe and bilateral subpleural interstitial thickening.   -bone scan without evidence of dz progression  --primo taxotere weekly. Pt also on xeloda [id number], then  repeat.  4/25/19: therapy postponed 2 weeks ago due to neutropenia. He got Neupogen x1. Labs reviewed with pt wbc 10.68, hg 11.1, [id number]. ANC 7630.  -ANC low 880. Postpone tx. Give neupogen x1. Pt advised to hold xeloda until 2 weeks    -imaging CT c/a/p 5/31/19 showed stable dz. Bone scan 5/31/19 showed multiple metastatic dz, nothing new. Possible pathologic fracture in the area of the coracoid process. Pt does not endorse pain there.  -labs 8/22/19 reviewed with pt wbc 4.99 Hg 11.7, plt 400. Ok for treatment with taxotere and neulasta.  -exam and review of labs did not reveal reason for the increased sob on exertion  -8/30/19: she presents today to review CT scan. Overall stable in terms of her cancer but there is a concerning are of extraluminal collection of air in the deep pelvis with a thickened wall measuring about 2.8 x 3.2 cm that is situated between the lower sigmoid colon and small bowel and just above the uterus. This likely represents a contained sigmoid colon perforation related to prior diverticulitis. Perforated neoplasm cannot be ruled out. GI consultation is recommended for this finding. The sigmoid colon and adjacent small bowel loops appear to be matted around this collection.  -currently not having any abdominal symptoms.  -will refer to GI to get their opinion. Heavenly is okay with this.  -obviously no progression will primo therapy as usual.    2. Psych: on olanzapine    3. HTN: on metoprolol    4. Gerd: on ranitidine  5. Dyspnea: get a cxr to evaluate  6. Anemia: will check iron profile, and act accordingly . Her creatinine is normal at 0.94.   7. DVT on xarelto indefinite.  --Doppler left LE  1/30/19 There is evidence of deep venous thrombosis of the left lower extremity. The clot burden is improved from last study. There is also evidence of superficial clot in the greater saphenous vein. There is a Baker's cyst in the popliteal fossa.  8. CHF: LVEF 65% with diastolic dysfunction. referral to  cardiology to maximized her heart function.  9. Thrombocytosis: suspect reactive from anemia. Will check iron profile.  10.left lung pneumonia: resolved  --cxr showed pneumonia. Pt already on levofloxacin. Will complete 14 days.   11: LE edema: improved on lasix.  12. FEN:  Low potassium 2.7 on  kdur. Also will check magnesium.   -give 2g magsul, 20meq kcl.  --Advised 20meq kdur TID for 7  days then 20meq once a day thereafter.      Moise Sotelo MD    8/30/2019    9:45 AM

## 2019-09-12 ENCOUNTER — LAB (OUTPATIENT)
Dept: LAB | Facility: HOSPITAL | Age: 74
End: 2019-09-12

## 2019-09-12 ENCOUNTER — OFFICE VISIT (OUTPATIENT)
Dept: ONCOLOGY | Facility: CLINIC | Age: 74
End: 2019-09-12

## 2019-09-12 ENCOUNTER — INFUSION (OUTPATIENT)
Dept: ONCOLOGY | Facility: HOSPITAL | Age: 74
End: 2019-09-12

## 2019-09-12 VITALS
BODY MASS INDEX: 20.16 KG/M2 | TEMPERATURE: 97.9 F | HEIGHT: 65 IN | SYSTOLIC BLOOD PRESSURE: 130 MMHG | OXYGEN SATURATION: 97 % | RESPIRATION RATE: 17 BRPM | WEIGHT: 121 LBS | HEART RATE: 82 BPM | DIASTOLIC BLOOD PRESSURE: 63 MMHG

## 2019-09-12 VITALS
SYSTOLIC BLOOD PRESSURE: 102 MMHG | OXYGEN SATURATION: 96 % | RESPIRATION RATE: 17 BRPM | HEART RATE: 106 BPM | TEMPERATURE: 97.6 F | BODY MASS INDEX: 20.29 KG/M2 | HEIGHT: 65 IN | DIASTOLIC BLOOD PRESSURE: 76 MMHG | WEIGHT: 121.8 LBS

## 2019-09-12 DIAGNOSIS — C79.51 CARCINOMA OF LEFT BREAST METASTATIC TO BONE (HCC): ICD-10-CM

## 2019-09-12 DIAGNOSIS — C79.51 BREAST CANCER METASTASIZED TO BONE, UNSPECIFIED LATERALITY (HCC): ICD-10-CM

## 2019-09-12 DIAGNOSIS — C50.412 MALIGNANT NEOPLASM OF UPPER-OUTER QUADRANT OF LEFT FEMALE BREAST, UNSPECIFIED ESTROGEN RECEPTOR STATUS (HCC): ICD-10-CM

## 2019-09-12 DIAGNOSIS — Z95.828 PORT-A-CATH IN PLACE: ICD-10-CM

## 2019-09-12 DIAGNOSIS — C50.912 CARCINOMA OF LEFT BREAST METASTATIC TO BONE (HCC): ICD-10-CM

## 2019-09-12 DIAGNOSIS — C79.51 CARCINOMA OF LEFT BREAST METASTATIC TO BONE (HCC): Primary | ICD-10-CM

## 2019-09-12 DIAGNOSIS — C50.919 BREAST CANCER METASTASIZED TO BONE, UNSPECIFIED LATERALITY (HCC): ICD-10-CM

## 2019-09-12 DIAGNOSIS — C50.912 CARCINOMA OF LEFT BREAST METASTATIC TO BONE (HCC): Primary | ICD-10-CM

## 2019-09-12 LAB
ALBUMIN SERPL-MCNC: 3.7 G/DL (ref 3.5–5.2)
ALBUMIN/GLOB SERPL: 1.2 G/DL
ALP SERPL-CCNC: 64 U/L (ref 39–117)
ALT SERPL W P-5'-P-CCNC: 6 U/L (ref 1–33)
ANION GAP SERPL CALCULATED.3IONS-SCNC: 10 MMOL/L (ref 5–15)
AST SERPL-CCNC: 17 U/L (ref 1–32)
BASOPHILS # BLD AUTO: 0.07 10*3/MM3 (ref 0–0.2)
BASOPHILS NFR BLD AUTO: 0.7 % (ref 0–1.5)
BILIRUB SERPL-MCNC: 0.4 MG/DL (ref 0.2–1.2)
BUN BLD-MCNC: 10 MG/DL (ref 8–23)
BUN/CREAT SERPL: 11.5 (ref 7–25)
CALCIUM SPEC-SCNC: 9.4 MG/DL (ref 8.6–10.5)
CANCER AG15-3 SERPL-ACNC: 45.3 U/ML
CHLORIDE SERPL-SCNC: 96 MMOL/L (ref 98–107)
CO2 SERPL-SCNC: 32 MMOL/L (ref 22–29)
CREAT BLD-MCNC: 0.87 MG/DL (ref 0.57–1)
DEPRECATED RDW RBC AUTO: 57.6 FL (ref 37–54)
EOSINOPHIL # BLD AUTO: 0.01 10*3/MM3 (ref 0–0.4)
EOSINOPHIL NFR BLD AUTO: 0.1 % (ref 0.3–6.2)
ERYTHROCYTE [DISTWIDTH] IN BLOOD BY AUTOMATED COUNT: 15.8 % (ref 12.3–15.4)
GFR SERPL CREATININE-BSD FRML MDRD: 63 ML/MIN/1.73
GLOBULIN UR ELPH-MCNC: 3.2 GM/DL
GLUCOSE BLD-MCNC: 109 MG/DL (ref 65–99)
HCT VFR BLD AUTO: 39.2 % (ref 34–46.6)
HGB BLD-MCNC: 13.2 G/DL (ref 12–15.9)
IMM GRANULOCYTES # BLD AUTO: 0.04 10*3/MM3 (ref 0–0.05)
IMM GRANULOCYTES NFR BLD AUTO: 0.4 % (ref 0–0.5)
LYMPHOCYTES # BLD AUTO: 2.45 10*3/MM3 (ref 0.7–3.1)
LYMPHOCYTES NFR BLD AUTO: 24.2 % (ref 19.6–45.3)
MCH RBC QN AUTO: 34.6 PG (ref 26.6–33)
MCHC RBC AUTO-ENTMCNC: 33.7 G/DL (ref 31.5–35.7)
MCV RBC AUTO: 102.6 FL (ref 79–97)
MONOCYTES # BLD AUTO: 0.79 10*3/MM3 (ref 0.1–0.9)
MONOCYTES NFR BLD AUTO: 7.8 % (ref 5–12)
NEUTROPHILS # BLD AUTO: 6.75 10*3/MM3 (ref 1.7–7)
NEUTROPHILS NFR BLD AUTO: 66.8 % (ref 42.7–76)
NRBC BLD AUTO-RTO: 0 /100 WBC (ref 0–0.2)
PLATELET # BLD AUTO: 480 10*3/MM3 (ref 140–450)
PMV BLD AUTO: 9 FL (ref 6–12)
POTASSIUM BLD-SCNC: 3.5 MMOL/L (ref 3.5–5.2)
PROT SERPL-MCNC: 6.9 G/DL (ref 6–8.5)
RBC # BLD AUTO: 3.82 10*6/MM3 (ref 3.77–5.28)
SODIUM BLD-SCNC: 138 MMOL/L (ref 136–145)
WBC NRBC COR # BLD: 10.11 10*3/MM3 (ref 3.4–10.8)

## 2019-09-12 PROCEDURE — 85025 COMPLETE CBC W/AUTO DIFF WBC: CPT

## 2019-09-12 PROCEDURE — 99214 OFFICE O/P EST MOD 30 MIN: CPT | Performed by: INTERNAL MEDICINE

## 2019-09-12 PROCEDURE — 25010000002 DOCETAXEL 20 MG/ML SOLUTION 4 ML VIAL: Performed by: INTERNAL MEDICINE

## 2019-09-12 PROCEDURE — 25010000002 ONDANSETRON PER 1 MG

## 2019-09-12 PROCEDURE — 86300 IMMUNOASSAY TUMOR CA 15-3: CPT

## 2019-09-12 PROCEDURE — 96367 TX/PROPH/DG ADDL SEQ IV INF: CPT

## 2019-09-12 PROCEDURE — 80053 COMPREHEN METABOLIC PANEL: CPT

## 2019-09-12 PROCEDURE — 96413 CHEMO IV INFUSION 1 HR: CPT

## 2019-09-12 PROCEDURE — 25010000002 DEXAMETHASONE SODIUM PHOSPHATE 100 MG/10ML SOLUTION

## 2019-09-12 PROCEDURE — 36415 COLL VENOUS BLD VENIPUNCTURE: CPT

## 2019-09-12 RX ORDER — MEPERIDINE HYDROCHLORIDE 50 MG/ML
25 INJECTION INTRAMUSCULAR; INTRAVENOUS; SUBCUTANEOUS
Status: DISCONTINUED | OUTPATIENT
Start: 2019-09-12 | End: 2019-09-12 | Stop reason: HOSPADM

## 2019-09-12 RX ORDER — FAMOTIDINE 10 MG/ML
20 INJECTION, SOLUTION INTRAVENOUS AS NEEDED
Status: DISCONTINUED | OUTPATIENT
Start: 2019-09-12 | End: 2019-09-12 | Stop reason: HOSPADM

## 2019-09-12 RX ORDER — SODIUM CHLORIDE 9 MG/ML
250 INJECTION, SOLUTION INTRAVENOUS ONCE
Status: COMPLETED | OUTPATIENT
Start: 2019-09-12 | End: 2019-09-12

## 2019-09-12 RX ORDER — FAMOTIDINE 10 MG/ML
20 INJECTION, SOLUTION INTRAVENOUS AS NEEDED
Status: CANCELLED | OUTPATIENT
Start: 2019-09-19

## 2019-09-12 RX ORDER — FAMOTIDINE 10 MG/ML
20 INJECTION, SOLUTION INTRAVENOUS AS NEEDED
Status: CANCELLED | OUTPATIENT
Start: 2019-09-12

## 2019-09-12 RX ORDER — SODIUM CHLORIDE 0.9 % (FLUSH) 0.9 %
10 SYRINGE (ML) INJECTION AS NEEDED
Status: DISCONTINUED | OUTPATIENT
Start: 2019-09-12 | End: 2019-09-12 | Stop reason: HOSPADM

## 2019-09-12 RX ORDER — MEPERIDINE HYDROCHLORIDE 50 MG/ML
25 INJECTION INTRAMUSCULAR; INTRAVENOUS; SUBCUTANEOUS
Status: CANCELLED | OUTPATIENT
Start: 2019-09-12 | End: 2019-09-13

## 2019-09-12 RX ORDER — SODIUM CHLORIDE 9 MG/ML
250 INJECTION, SOLUTION INTRAVENOUS ONCE
Status: CANCELLED | OUTPATIENT
Start: 2019-09-19

## 2019-09-12 RX ORDER — SODIUM CHLORIDE 0.9 % (FLUSH) 0.9 %
10 SYRINGE (ML) INJECTION AS NEEDED
Status: CANCELLED | OUTPATIENT
Start: 2019-09-12

## 2019-09-12 RX ORDER — SODIUM CHLORIDE 9 MG/ML
250 INJECTION, SOLUTION INTRAVENOUS ONCE
Status: CANCELLED | OUTPATIENT
Start: 2019-09-12

## 2019-09-12 RX ORDER — MEPERIDINE HYDROCHLORIDE 50 MG/ML
25 INJECTION INTRAMUSCULAR; INTRAVENOUS; SUBCUTANEOUS
Status: CANCELLED | OUTPATIENT
Start: 2019-09-19 | End: 2019-09-20

## 2019-09-12 RX ADMIN — Medication 500 UNITS: at 13:03

## 2019-09-12 RX ADMIN — SODIUM CHLORIDE, PRESERVATIVE FREE 10 ML: 5 INJECTION INTRAVENOUS at 13:03

## 2019-09-12 RX ADMIN — DOCETAXEL 60 MG: 20 INJECTION, SOLUTION, CONCENTRATE INTRAVENOUS at 11:34

## 2019-09-12 RX ADMIN — SODIUM CHLORIDE 250 ML: 9 INJECTION, SOLUTION INTRAVENOUS at 11:10

## 2019-09-12 NOTE — PROGRESS NOTES
[facility]  HEMATOLOGY & ONCOLOGY    Cancer Staging Information:  Cancer Staging  Malignant neoplasm of upper-outer quadrant of left female breast (CMS/HCC)  Staging form: Breast, AJCC V7  - Clinical stage from 10/11/2016: Stage IV (T2, N1, M1) - Signed by Clali Monsalve APRN on 10/11/2016        Subjective     VISIT DIAGNOSIS:   Encounter Diagnosis   Name Primary?   • Carcinoma of left breast metastatic to bone (CMS/HCC)        REASON FOR VISIT:     Chief Complaint   Patient presents with   • Follow-up     Left breast cancer metastasized to bone.        HEMATOLOGY / ONCOLOGY HISTORY:   Oncology/Hematology History    Patient is a 67-year-old postmenopausal female who had onset of her menses at age 9 and menopause at age 48. She received oral contraceptives for approximately 14 years and did not receive hormonal manipulation. Patient has a maternal cousin had breast cancer. Patient has had no prior breast biopsies. Patient found a palpable left breast mass as well as a left axillary mass. Patient had a bloody nipple discharge which been present for approximately 6 months. On 6/20/2012, a mammogram was performed showing a subareolar mass consistent with malignancy. Bilateral breast ultrasound revealed an ill-defined subareolar mass measuring 2.8-2.5 cm. There is a left axillary mass measuring 1.7 cm with suspicion of extracapsular extension. There is no evidence of disease in the right breast. On 7/9/2012 patient underwent a left breast biopsy and placement of marker clip. Left breast biopsy revealed an infiltrating lobular carcinoma grade 2 with focal ductal carcinoma in situ, solid pattern. A fine-needle biopsy of the left lymph node was consistent with metastatic carcinoma. Breast tumor profile revealed ER: 100%; VT: 98%; HER-2/maynor 2+; FISH: Unamplified; Ki-67: 71%; P 53 1%; DNA aneuploidy. A MRI of the breast were performed body multiple abnormal enhancing masses within the left breast.  There appears to be anterior retraction of the pectoralis muscle with no direct extension. There is a moderate to large, layering left pleural effusion appreciated. The right breast reveals 3 moderately enlarged lymph nodes in the posterior lateral aspect the right breast. These have fatty hilum but followup is recommended. Patient is undergone systemic studies including, on 8/2/2012, a CT scan of the brain showing atrophy. a CT scan that shows showing a small to moderate left pleural effusion with 3 subcentimeter nodule densities in the left lung.   She completed 4 cycles of neoadjuvant treatment with dose dense Adriamycin and Cytoxan. She had a mammogram which showed a significant reduction in the size of her left breast lesion. There is no axillary adenopathy noted. She has had an ultrasound revealing the  lesion reducing from 2.4 cm to 1 cm. Patient underwent a left mastectomy on 03/19/2013 finding negative invasive cancer, negative nodes. A 5% DCIS was noted. The patient has declined hormonal therapy. She did not need radiation therapy.  November 2014, she began to have evidence of lytic bone lesions at T5T6, frontal disease, an 8 mm lucency in the central frontal area of  the skull but nothing intracranial. Bone scan revealed only vertex tracer activity but bilateral ribs and thoracic spine, and other lesions in  her pelvis. At that time, she was started on letrozole since November 2014. She is taking Xgeva. She had progression found in bone in Feb 2015 on scan. She was started on Faslodex, Ibrance and continued xgeva.         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 B) Foci of ductal carcinoma in situ, solid pattern. C) Greatest dimension of the invasive carcinoma is  15.8mm.         3/19/2013 Surgery     Left Mastectomy w/ Axillary Tail: Focal residual ducatal carcinoma in situ (DCIS) 12 lymph nodes negative for metastatic carcinoma.         10/20/2014 Biopsy     Peritoneum Biopsy: Final Diagnosis: Malignant Cell Neoplasm.           Breast cancer metastasized to bone, unspecified laterality (CMS/HCC)    10/15/2012 -  Other Event     Left mammogram:  Impression:  1. Decreasing spiculation and density in the retroareolar left breast, near a previous biopsy.   2. Definite left axillary lesion is not appreciated.          2/5/2013 -  Other Event     Diagnostic Mammo:  Comparison is made to 10/15/2012 and 6/20/2012  The spiculated mass at 12:00 behind the nipple is nearly resolved.   Impression:  1. The mass on the left side is less apparent after receiving chemotherapy. There has been a good response to chmotherapy on the left side.   2. No suspicious abnormality is seen in the right breast to account for the new palable abnormality at the 4-5:00 position.          3/19/2013 Surgery     Breast Biopsy:  Final Diagnosis:  A. Breast, left mastectomy with axillary tail  1. Focal residual ductal carcinoma in situ (DICS) (less than 5% of tumor bed).  2. Negative for residual invasive carcinoma.   3. 12 Lymph Nodes, negative for metastatic carcinoma (0/12) focally, some lymph nodes demonstrate fibrosis/treatment effect.  B. Skin and soft tissue, left advancement flap:  1. Benign skin and subcutis.   2. Negative for carcinoma.          6/20/2013 -  Other Event     Diagnostic Mammo [facility]:  1. A subareolar mass is consistent with malignancy. Additional involement extends inferiorly sonographically and superolaterally mammograhically as demonstrated by calcifications. The mass measures approx 2.8 cm sonographically, but the involved region is likely much larger including an intraductal componet. The left axillary lymph nodes are also involved.   2. Recommened ultrasound guided biopsy of  the left subareolar mass and left axillary lymph node.   3. Breast MRI could also further define multicentric disease on the left.   4. Clear suspicious finding on the right.  Recommend ongoing clinical follow up of palpable foci.          6/13/2014 -  Other Event     Diagnostic Mammo:  IMPRESSION:  1. History of left breast cancer and mastectomy. Stable benign right breast mammograms.          10/20/2014 Surgery     Final Diagnosis:  Biopsies, pertoneum:  Metastatic carcinoma, morphologically compatible with metastatic mammary carcinoma.          6/15/2015 -  Other Event     Diagnostic Mammo:  IMPRESSION:   1. Negative x-ray report, should not delay biopsy if a dominat or clinically suspicious mass is present.          7/5/2016 -  Other Event     Diagnositic mammogram:  Impression:   Stable right mamogram with no radiographic findings suspicious for malignancy. Recommend continued screening mammography per screening guidelines unless otherwise earlier clinically indicated.          9/18/2016 Initial Diagnosis     Secondary malignant neoplasm of bone and bone marrow                INTERVAL HISTORY  Patient ID: Heavenly Yanes is a 74 y.o. year old female Cancer Staging  Stage IV (T2, N1, M1)  --complaining of dyspnea on exersion since last Tuesday. She feels like she ran a mile  2/14/19: cxr showed pneumonia. Already on levofloxacin. Coughing not worse, she did not run temp.  Overall feels weak.  -restarted the appetite suppressant and is starting to eat more but not a whole lot  --2/28/19: CT c/ap with some response to therapy. New left chest inflammatory lesion to be followed ion next imaging. Bone scan unremarkable for progression.  4/25/19: she is much better since being off therapy for 2 weeks. COugh is improved.   6/20/19: has left ear drainage which is her usual ear infection. Leg edema improved with lasix. She was ordered a mammo by the surgeon which I told her is not necessary, she has metastatic  dz.    7/25/19: complaining of increased dyspnea on exertion. She takes her laxis daily.    8/22/19:  complaining of increased dyspnea on exertion. She takes her laxis daily. Also not much appetite even though on megace 40bid. YAIR not worse from prior. Weight the same  8/30/19: she presents today to review CT scan. Overall stable in terms of her cancer but there is a concerning are of extraluminal collection of air in the deep pelvis with a thickened wall measuring about 2.8 x 3.2 cm that is situated between the lower sigmoid colon and small bowel and just above the uterus. This likely represents a contained sigmoid colon perforation related to prior diverticulitis. Perforated neoplasm cannot be ruled out. GI consultation is recommended for this finding. The sigmoid colon and adjacent small bowel loops appear to be matted around this collection.  -9/12/19: no issues or concerns.  -currently not having any abdominal symptoms.  -Denies cp/n/v/, neuropathy/ no focal weakness.   Rest of ros unremarkable. 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) 9/18/2016     Past Surgical History:   Past Surgical History:   Procedure Laterality Date   • BREAST SURGERY      left breast biopsy and axillary node   • CHOLECYSTECTOMY     • EXTERNAL EAR SURGERY     • MASTECTOMY Left    • PORTACATH PLACEMENT       Social History:   Social History     Socioeconomic History   • Marital status:      Spouse name: Not on file   • Number of children: Not on file   • Years of education: Not on file   • Highest education level: Not on file   Tobacco Use   • Smoking status: Never Smoker   • Smokeless tobacco: Never  Used   Substance and Sexual Activity   • Alcohol use: No   • Drug use: No     Family History:   Family History   Problem Relation Age of Onset   • No Known Problems Daughter    • No Known Problems Son    • Other Mother         complications from a concussion from a fall   • Other Father         old age   • No Known Problems Brother    • No Known Problems Daughter        Review of Systems   Constitutional: Positive for activity change, appetite change and fatigue. Negative for unexpected weight change.   HENT: Negative.    Eyes: Negative.    Respiratory: Positive for shortness of breath.    Cardiovascular: Positive for leg swelling.   Gastrointestinal: Negative.    Endocrine: Negative.    Genitourinary: Negative.    Musculoskeletal: Negative.    Skin: Negative.    Neurological: Negative.    Hematological: Negative.    Psychiatric/Behavioral: Negative.         Performance Status:  Asymptomatic    Medications:    Current Outpatient Medications   Medication Sig Dispense Refill   • Calcium-Magnesium-Vitamin D (CALCIUM 1200+D3 PO) Take 1,200 mg by mouth Daily.     • capecitabine (XELODA) 500 MG chemo tablet Take 1 tablet by mouth 2 (Two) Times a Day. For 2 weeks on and 1 week off. 42 tablet 11   • furosemide (LASIX) 20 MG tablet One tab po qd as needed for swelling take with potassium 30 tablet 3   • megestrol (MEGACE) 40 MG tablet Take 1 tablet by mouth 2 (Two) Times a Day. 60 tablet 5   • metoprolol tartrate (LOPRESSOR) 25 MG tablet Take 25 mg by mouth 2 (two) times a day.     • metoprolol tartrate (LOPRESSOR) 25 MG tablet Every 12 (Twelve) Hours.     • ofloxacin (FLOXIN) 0.3 % otic solution      • OLANZapine (ZYPREXA) 2.5 MG tablet Take 2.5 mg by mouth every night.     • ondansetron (ZOFRAN) 8 MG tablet Take 1 tablet by mouth Every 8 (Eight) Hours As Needed for Nausea or Vomiting. 30 tablet 5   • potassium chloride (K-DUR,KLOR-CON) 20 MEQ CR tablet Take one tab po bid on days taking Lasix 60 tablet 1   • raNITIdine  "(ZANTAC) 150 MG tablet Take 1 tablet by mouth 2 (Two) Times a Day. 60 tablet 5   • rivaroxaban (XARELTO) 20 MG tablet Take 1 tablet by mouth Daily. 30 tablet 5   • thyroid 60 MG PO tablet Take 60 mg by mouth daily.     • TYKERB 250 MG chemo tablet 250 mg.     • ciprofloxacin (CIPRO) 500 MG tablet Take 1 tablet by mouth 2 (Two) Times a Day. 28 tablet 0     No current facility-administered medications for this visit.      Facility-Administered Medications Ordered in Other Visits   Medication Dose Route Frequency Provider Last Rate Last Dose   • heparin flush (porcine) 100 UNIT/ML injection 500 Units  500 Units Intravenous PRN Joseph Nunez MD   500 Units at 10/26/17 1155   • sodium chloride 0.9 % flush 10 mL  10 mL Intravenous PRN Joseph Nunez MD   10 mL at 10/26/17 1155       ALLERGIES:    Allergies   Allergen Reactions   • Benadryl  [Diphenhydramine Hcl (Sleep)] Other (See Comments)   • Codeine    • Diphenhydramine Other (See Comments)     \"Shaky leg syndrome\"  \"Shaky leg syndrome\"  \"Shaky leg syndrome\"   • Heparin Other (See Comments)     Patient states tolerates Heparin flush without problems: Years ago had heparin IV and had a rash   • Other      POPPY SEED   • Penicillins Hives   • Petroleum Jelly [Skin Protectants, Misc.]    • Sulfa Antibiotics    • Sulfur    • Valium [Diazepam]        Objective      Vitals:    09/12/19 1000   BP: 102/76   Pulse: 106   Resp: 17   Temp: 97.6 °F (36.4 °C)   TempSrc: Oral   SpO2: 96%   Weight: 55.2 kg (121 lb 12.8 oz)   Height: 165.1 cm (65\")   PainSc: 0-No pain         Current Status 8/30/2019   ECOG score 3         Physical Examunchanged  General Appearance: Patient is awake, alert, oriented and in no acute distress. Patient is welldeveloped, wellnourished, and appears stated age.  HEENT: Normocephalic. Sclerae clear, conjunctiva pink, extraocular movements intact, pupils, round, reactive to light and  accommodation. Mouth and throat are clear with moist oral mucosa.  NECK: " Supple, no jugular venous distention, thyroid not enlarged.  LYMPH: No cervical, supraclavicular, axillary, or inguinal lymphadenopathy.  CHEST: Equal bilateral expansion, AP  diameter normal, resonant percussion note  LUNGS: Good air movement, no rales, rhonchi, rubs or wheezes with auscultation on the right. Diminished on the left.  CARDIO: Regular sinus rhythm, no murmurs, gallops or rubs.  ABDOMEN: Nondistended, soft, No tenderness, no guarding, no rebound, No hepatosplenomegaly. No abdominal masses. Bowel sounds positive. No hernia  GENITALIA: Not examined.  BREASTS: Not examined.  MUSKEL: No joint swelling, decreased motion, or inflammation  EXTREMS: patrick le  Edema L>R,No clubbing, cyanosis, No varicose veins.  NEURO: Grossly nonfocal, Gait is coordinated and smooth, Cognition is preserved.  SKIN: No rashes, no ecchymoses, no petechia.  PSYCH: Oriented to time, place and person. Memory is preserved. Mood and affect appear normal  RECENT LABS:  Lab on 09/12/2019   Component Date Value Ref Range Status   • Glucose 09/12/2019 109* 65 - 99 mg/dL Final   • BUN 09/12/2019 10  8 - 23 mg/dL Final   • Creatinine 09/12/2019 0.87  0.57 - 1.00 mg/dL Final   • Sodium 09/12/2019 138  136 - 145 mmol/L Final   • Potassium 09/12/2019 3.5  3.5 - 5.2 mmol/L Final   • Chloride 09/12/2019 96* 98 - 107 mmol/L Final   • CO2 09/12/2019 32.0* 22.0 - 29.0 mmol/L Final   • Calcium 09/12/2019 9.4  8.6 - 10.5 mg/dL Final   • Total Protein 09/12/2019 6.9  6.0 - 8.5 g/dL Final   • Albumin 09/12/2019 3.70  3.50 - 5.20 g/dL Final   • ALT (SGPT) 09/12/2019 6  1 - 33 U/L Final   • AST (SGOT) 09/12/2019 17  1 - 32 U/L Final    Specimen hemolyzed.  Results may be affected.   • Alkaline Phosphatase 09/12/2019 64  39 - 117 U/L Final   • Total Bilirubin 09/12/2019 0.4  0.2 - 1.2 mg/dL Final   • eGFR Non African Amer 09/12/2019 63  >60 mL/min/1.73 Final   • Globulin 09/12/2019 3.2  gm/dL Final   • A/G Ratio 09/12/2019 1.2  g/dL Final   •  BUN/Creatinine Ratio 09/12/2019 11.5  7.0 - 25.0 Final   • Anion Gap 09/12/2019 10.0  5.0 - 15.0 mmol/L Final   • WBC 09/12/2019 10.11  3.40 - 10.80 10*3/mm3 Final   • RBC 09/12/2019 3.82  3.77 - 5.28 10*6/mm3 Final   • Hemoglobin 09/12/2019 13.2  12.0 - 15.9 g/dL Final   • Hematocrit 09/12/2019 39.2  34.0 - 46.6 % Final   • MCV 09/12/2019 102.6* 79.0 - 97.0 fL Final   • MCH 09/12/2019 34.6* 26.6 - 33.0 pg Final   • MCHC 09/12/2019 33.7  31.5 - 35.7 g/dL Final   • RDW 09/12/2019 15.8* 12.3 - 15.4 % Final   • RDW-SD 09/12/2019 57.6* 37.0 - 54.0 fl Final   • MPV 09/12/2019 9.0  6.0 - 12.0 fL Final   • Platelets 09/12/2019 480* 140 - 450 10*3/mm3 Final   • Neutrophil % 09/12/2019 66.8  42.7 - 76.0 % Final   • Lymphocyte % 09/12/2019 24.2  19.6 - 45.3 % Final   • Monocyte % 09/12/2019 7.8  5.0 - 12.0 % Final   • Eosinophil % 09/12/2019 0.1* 0.3 - 6.2 % Final   • Basophil % 09/12/2019 0.7  0.0 - 1.5 % Final   • Immature Grans % 09/12/2019 0.4  0.0 - 0.5 % Final   • Neutrophils, Absolute 09/12/2019 6.75  1.70 - 7.00 10*3/mm3 Final   • Lymphocytes, Absolute 09/12/2019 2.45  0.70 - 3.10 10*3/mm3 Final   • Monocytes, Absolute 09/12/2019 0.79  0.10 - 0.90 10*3/mm3 Final   • Eosinophils, Absolute 09/12/2019 0.01  0.00 - 0.40 10*3/mm3 Final   • Basophils, Absolute 09/12/2019 0.07  0.00 - 0.20 10*3/mm3 Final   • Immature Grans, Absolute 09/12/2019 0.04  0.00 - 0.05 10*3/mm3 Final   • nRBC 09/12/2019 0.0  0.0 - 0.2 /100 WBC Final       RADIOLOGY:  Ct Chest With Contrast    Result Date: 8/29/2019  Narrative: EXAMINATION:  CT CHEST W CONTRAST-  8/29/2019 9:22 AM CDT  HISTORY: Metastatic breast cancer. Assess therapy response. C50.912-Malignant neoplasm of unspecified site of left female breast; C79.51-Secondary malignant neoplasm of bone.  COMPARISON : 05/31/2019.  DLP: 116 mGy-cm. Automated dosage control was utilized.  TECHNIQUE: CT was performed of the chest with contrast. Sagittal and coronal images were reconstructed.   MEDIASTINUM, HEART AND VASCULAR STRUCTURES: There is atheromatous disease of the thoracic aorta. Heart size is upper limits of normal. Pulmonary arteries appear to be normal in caliber. There is diffuse mediastinal edema and body wall edema. There are no pathologically enlarged mediastinal lymph nodes. There is a small pericardial effusion.  LUNGS: There is a moderate to large right pleural effusion that is increased compared to the previous study. There are patchy peripheral infiltrates in the aerated portions of both lungs. This is slightly greater on the left. This may be infectious or inflammatory. There is stable pleural thickening on the left.  UPPER ABDOMEN: Please see the abdomen and pelvis CT report separately.  BONES AND SOFT TISSUES: There is diffuse metastatic bone disease there is stable mild compression deformity of T11. There has been prior left mastectomy.      Impression: 1. Diffuse mediastinal and body wall edema is a new finding compared to the previous study. 2. No enlarged mediastinal lymph nodes. Prior left mastectomy. 3. Diffuse metastatic bone disease. Stable mild compression deformity of T11. 4. Stable pleural thickening on the left. 5. New patchy peripheral infiltrates in both lungs, greater on the left side. This may be infectious or inflammatory. Patchy pulmonary edema is possible as well. 6. Moderate to large right pleural effusion, increasing compared to the prior study. Small pericardial effusion.    This report was finalized on 08/29/2019 10:30 by Dr. Marques Denson MD.    Nm Bone Scan Whole Body    Result Date: 8/30/2019  Narrative: NM BONE SCAN WHOLE BODY- 8/29/2019 9:05 AM CDT  HISTORY: metastatic breast ca, assess therapy response; C50.912-Malignant neoplasm of unspecified site of left female breast; C79.51-Secondary malignant neoplasm of bone  COMPARISON: Bone scan dated 05/31/2019  RADIOPHARMACEUTICAL: 20.6 mCi Tc99m HDP  TECHNIQUE: Anterior and posterior whole-body images are  acquired approximately two hours postinjection.  FINDINGS:  Relatively little soft tissue radiotracer uptake, with essentially nonvisualization of the distal extremities and kidneys. The appearance is that of a metastatic super scan, suggesting extensive osseous metastatic disease. There is a more focal area of metastatic disease in the region of the left proximal scapula, perhaps glenoid or coracoid. Additional more focal uptake identified in the manubrium of the sternum. The appearance is stable.      Impression: 1. Stable bone scan, demonstrating widespread osteoblastic disease (metastatic SuperScan) with areas of more localized uptake in the region of the left glenoid/coracoid as well as the sternal manubrium.  This report was finalized on 08/30/2019 10:56 by Dr Noah Alfred, .    Ct Abdomen Pelvis With Contrast    Result Date: 8/29/2019  Narrative: EXAMINATION:  CT ABDOMEN PELVIS W CONTRAST-  8/29/2019 9:22 AM CDT  HISTORY: Metastatic breast cancer. Assess therapy response. C50.912-Malignant neoplasm of unspecified site of left female breast; C79.51-Secondary malignant neoplasm of bone.  TECHNIQUE: Spiral CT was performed of the abdomen and pelvis with contrast. Multiplanar images were reconstructed.  DLP: 159 mGy-cm. Automated dosage control was utilized.  COMPARISON: 05/31/2019.  LUNG BASES: Please see the chest CT report separately.  LIVER AND SPLEEN: The liver and spleen demonstrate no focal abnormality. There has been prior cholecystectomy.  PANCREAS: No pancreatic mass or inflammatory change.  KIDNEYS AND ADRENALS: Mild adrenal thickening on the left is stable. The right adrenal gland is normal. The kidneys demonstrate no acute appearing abnormality. There is wall thickening of the bladder. The bladder is decompressed.  BOWEL: There is an extraluminal collection of air in the deep pelvis measuring about 2.8 x 3.2 cm that is just superior to the uterus and between the lower sigmoid colon and adjacent  small bowel. This may be a contained perforation related to prior diverticulitis. There are diverticula in this area. Sigmoid colon and small bowel loops appear to be adhesed in this region.  OTHER: There is a calcified leiomyoma in the uterus. There is diffuse metastatic bone disease with multiple tiny sclerotic lesions throughout the visualized spine and pelvis. There is mild body wall edema. There is a stable T11 compression deformity. There are no new fractures.      Impression: 1. Extraluminal collection of air in the deep pelvis with a thickened wall measuring about 2.8 x 3.2 cm that is situated between the lower sigmoid colon and small bowel and just above the uterus. This likely represents a contained sigmoid colon perforation related to prior diverticulitis. Perforated neoplasm cannot be ruled out. GI consultation is recommended for this finding. The sigmoid colon and adjacent small bowel loops appear to be matted around this collection. 2. Extensive metastatic bone disease appears relatively stable. There are so many lesions that it is difficult to determine if there is any subtle change. 3. Calcified uterine leiomyoma. 4. Mild body wall edema. 5. Stable mild thickening of the left adrenal gland. This report was finalized on 08/29/2019 10:52 by Dr. Marques Denson MD.           Assessment/Plan  Heavenly Yanes is a 74 y.o. year old female with met breast cancer s/p multiple lines of CMT currently on taxotere weekly and xeloda with good tolerance.    Patient Active Problem List   Diagnosis   • Malignant neoplasm of upper-outer quadrant of left female breast (CMS/HCC)   • Breast cancer metastasized to bone, unspecified laterality (CMS/HCC)   • Essential hypertension   • Acquired hypothyroidism   • Bipolar 1 disorder (CMS/HCC)   • Carcinoma of left breast metastatic to bone (CMS/HCC)   • Antineoplastic chemotherapy induced anemia   • Dehydration   • Encounter for administration of vaccine   • Chemotherapy  induced neutropenia (CMS/HCC)   • Cough   • Port-A-Cath in place   • Edema   • Hypokalemia          1.Metastatic breast ca: currently on weekly taxotere 3weeks on 1 week off.  --also on xeloda [id number], then repeat.  --CT c/a/p 2/19 showed stable dz. Some new Inflammatory opacities in the LEFT lower lobe and bilateral subpleural interstitial thickening.   -bone scan without evidence of dz progression  --primo taxotere weekly. Pt also on xeloda [id number], then repeat.  4/25/19: therapy postponed 2 weeks ago due to neutropenia. He got Neupogen x1. Labs reviewed with pt wbc 10.68, hg 11.1, [id number]. ANC 7630.  -ANC low 880. Postpone tx. Give neupogen x1. Pt advised to hold xeloda until 2 weeks    -imaging CT c/a/p 5/31/19 showed stable dz. Bone scan 5/31/19 showed multiple metastatic dz, nothing new. Possible pathologic fracture in the area of the coracoid process. Pt does not endorse pain there.  -labs 8/22/19 reviewed with pt wbc 4.99 Hg 11.7, plt 400. Ok for treatment with taxotere and neulasta.  -exam and review of labs did not reveal reason for the increased sob on exertion  -8/30/19: she presents today to review CT scan. Overall stable in terms of her cancer but there is a concerning are of extraluminal collection of air in the deep pelvis with a thickened wall measuring about 2.8 x 3.2 cm that is situated between the lower sigmoid colon and small bowel and just above the uterus. This likely represents a contained sigmoid colon perforation related to prior diverticulitis. Perforated neoplasm cannot be ruled out. GI consultation is recommended for this finding. The sigmoid colon and adjacent small bowel loops appear to be matted around this collection.  -currently not having any abdominal symptoms.  -will refer to GI to get their opinion. Heavenly is okay with this.  -obviously no progression will primo therapy as usual.  -has appointment with GI in the near future.  -labs reviewed with pt and  cr  0.87, lFT all normal, wbc 10.11, Hg 13.2, plt 480. Ok for docetaxel    2. Psych: on olanzapine    3. HTN: on metoprolol    4. Gerd: on ranitidine  5. Dyspnea: get a cxr to evaluate  6. Anemia: will check iron profile, and act accordingly . Her creatinine is normal at 0.94.   7. DVT on xarelto indefinite.  --Doppler left LE  1/30/19 There is evidence of deep venous thrombosis of the left lower extremity. The clot burden is improved from last study. There is also evidence of superficial clot in the greater saphenous vein. There is a Baker's cyst in the popliteal fossa.  8. CHF: LVEF 65% with diastolic dysfunction. referral to cardiology to maximized her heart function.  9. Thrombocytosis: suspect reactive from anemia. Will check iron profile.  10.left lung pneumonia: resolved  --cxr showed pneumonia. Pt already on levofloxacin. Will complete 14 days.   11: LE edema: improved on lasix.  12. FEN:  Low potassium 2.7 on  kdur. Also will check magnesium.   -give 2g magsul, 20meq kcl.  --Advised 20meq kdur TID for 7  days then 20meq once a day thereafter.      Moise Sotelo MD    9/12/2019    10:26 AM

## 2019-09-13 LAB — CANCER AG27-29 SERPL-ACNC: 59 U/ML (ref 0–38.6)

## 2019-09-19 ENCOUNTER — INFUSION (OUTPATIENT)
Dept: ONCOLOGY | Facility: HOSPITAL | Age: 74
End: 2019-09-19

## 2019-09-19 ENCOUNTER — LAB (OUTPATIENT)
Dept: LAB | Facility: HOSPITAL | Age: 74
End: 2019-09-19

## 2019-09-19 ENCOUNTER — OFFICE VISIT (OUTPATIENT)
Dept: ONCOLOGY | Facility: CLINIC | Age: 74
End: 2019-09-19

## 2019-09-19 VITALS
TEMPERATURE: 98 F | DIASTOLIC BLOOD PRESSURE: 72 MMHG | RESPIRATION RATE: 19 BRPM | WEIGHT: 122.4 LBS | HEIGHT: 65 IN | SYSTOLIC BLOOD PRESSURE: 110 MMHG | BODY MASS INDEX: 20.39 KG/M2 | OXYGEN SATURATION: 95 % | HEART RATE: 106 BPM

## 2019-09-19 VITALS
SYSTOLIC BLOOD PRESSURE: 127 MMHG | TEMPERATURE: 96.9 F | HEIGHT: 65 IN | HEART RATE: 85 BPM | DIASTOLIC BLOOD PRESSURE: 70 MMHG | BODY MASS INDEX: 20.33 KG/M2 | RESPIRATION RATE: 18 BRPM | WEIGHT: 122 LBS | OXYGEN SATURATION: 97 %

## 2019-09-19 DIAGNOSIS — C79.51 BREAST CANCER METASTASIZED TO BONE, UNSPECIFIED LATERALITY (HCC): ICD-10-CM

## 2019-09-19 DIAGNOSIS — C50.919 BREAST CANCER METASTASIZED TO BONE, UNSPECIFIED LATERALITY (HCC): ICD-10-CM

## 2019-09-19 DIAGNOSIS — Z95.828 PORT-A-CATH IN PLACE: ICD-10-CM

## 2019-09-19 DIAGNOSIS — C50.912 CARCINOMA OF LEFT BREAST METASTATIC TO BONE (HCC): Primary | ICD-10-CM

## 2019-09-19 DIAGNOSIS — C50.912 CARCINOMA OF LEFT BREAST METASTATIC TO BONE (HCC): ICD-10-CM

## 2019-09-19 DIAGNOSIS — C79.51 CARCINOMA OF LEFT BREAST METASTATIC TO BONE (HCC): ICD-10-CM

## 2019-09-19 DIAGNOSIS — C79.51 CARCINOMA OF LEFT BREAST METASTATIC TO BONE (HCC): Primary | ICD-10-CM

## 2019-09-19 DIAGNOSIS — C50.412 MALIGNANT NEOPLASM OF UPPER-OUTER QUADRANT OF LEFT FEMALE BREAST, UNSPECIFIED ESTROGEN RECEPTOR STATUS (HCC): ICD-10-CM

## 2019-09-19 LAB
ALBUMIN SERPL-MCNC: 3.6 G/DL (ref 3.5–5.2)
ALBUMIN/GLOB SERPL: 1.1 G/DL
ALP SERPL-CCNC: 60 U/L (ref 39–117)
ALT SERPL W P-5'-P-CCNC: 5 U/L (ref 1–33)
ANION GAP SERPL CALCULATED.3IONS-SCNC: 9 MMOL/L (ref 5–15)
AST SERPL-CCNC: 18 U/L (ref 1–32)
BASOPHILS # BLD AUTO: 0.04 10*3/MM3 (ref 0–0.2)
BASOPHILS NFR BLD AUTO: 1.1 % (ref 0–1.5)
BILIRUB SERPL-MCNC: 0.5 MG/DL (ref 0.2–1.2)
BUN BLD-MCNC: 10 MG/DL (ref 8–23)
BUN/CREAT SERPL: 14.3 (ref 7–25)
CALCIUM SPEC-SCNC: 9.7 MG/DL (ref 8.6–10.5)
CHLORIDE SERPL-SCNC: 95 MMOL/L (ref 98–107)
CO2 SERPL-SCNC: 33 MMOL/L (ref 22–29)
CREAT BLD-MCNC: 0.7 MG/DL (ref 0.57–1)
DEPRECATED RDW RBC AUTO: 56.4 FL (ref 37–54)
EOSINOPHIL # BLD AUTO: 0.03 10*3/MM3 (ref 0–0.4)
EOSINOPHIL NFR BLD AUTO: 0.8 % (ref 0.3–6.2)
ERYTHROCYTE [DISTWIDTH] IN BLOOD BY AUTOMATED COUNT: 15.4 % (ref 12.3–15.4)
GFR SERPL CREATININE-BSD FRML MDRD: 81 ML/MIN/1.73
GLOBULIN UR ELPH-MCNC: 3.3 GM/DL
GLUCOSE BLD-MCNC: 117 MG/DL (ref 65–99)
HCT VFR BLD AUTO: 34.8 % (ref 34–46.6)
HGB BLD-MCNC: 11.8 G/DL (ref 12–15.9)
HOLD SPECIMEN: NORMAL
IMM GRANULOCYTES # BLD AUTO: 0.04 10*3/MM3 (ref 0–0.05)
IMM GRANULOCYTES NFR BLD AUTO: 1.1 % (ref 0–0.5)
LYMPHOCYTES # BLD AUTO: 1.66 10*3/MM3 (ref 0.7–3.1)
LYMPHOCYTES NFR BLD AUTO: 44.4 % (ref 19.6–45.3)
MCH RBC QN AUTO: 34.5 PG (ref 26.6–33)
MCHC RBC AUTO-ENTMCNC: 33.9 G/DL (ref 31.5–35.7)
MCV RBC AUTO: 101.8 FL (ref 79–97)
MONOCYTES # BLD AUTO: 0.13 10*3/MM3 (ref 0.1–0.9)
MONOCYTES NFR BLD AUTO: 3.5 % (ref 5–12)
NEUTROPHILS # BLD AUTO: 1.84 10*3/MM3 (ref 1.7–7)
NEUTROPHILS NFR BLD AUTO: 49.1 % (ref 42.7–76)
NRBC BLD AUTO-RTO: 0 /100 WBC (ref 0–0.2)
PLATELET # BLD AUTO: 377 10*3/MM3 (ref 140–450)
PMV BLD AUTO: 9.8 FL (ref 6–12)
POTASSIUM BLD-SCNC: 3.5 MMOL/L (ref 3.5–5.2)
PROT SERPL-MCNC: 6.9 G/DL (ref 6–8.5)
RBC # BLD AUTO: 3.42 10*6/MM3 (ref 3.77–5.28)
SODIUM BLD-SCNC: 137 MMOL/L (ref 136–145)
WBC NRBC COR # BLD: 3.74 10*3/MM3 (ref 3.4–10.8)

## 2019-09-19 PROCEDURE — 25010000002 PEGFILGRASTIM 6 MG/0.6ML PREFILLED SYRINGE KIT: Performed by: INTERNAL MEDICINE

## 2019-09-19 PROCEDURE — 25010000002 DEXAMETHASONE SODIUM PHOSPHATE 100 MG/10ML SOLUTION 10 ML VIAL: Performed by: INTERNAL MEDICINE

## 2019-09-19 PROCEDURE — 25010000002 DOCETAXEL 20 MG/ML SOLUTION 4 ML VIAL: Performed by: INTERNAL MEDICINE

## 2019-09-19 PROCEDURE — 99214 OFFICE O/P EST MOD 30 MIN: CPT | Performed by: INTERNAL MEDICINE

## 2019-09-19 PROCEDURE — 85025 COMPLETE CBC W/AUTO DIFF WBC: CPT

## 2019-09-19 PROCEDURE — 96377 APPLICATON ON-BODY INJECTOR: CPT

## 2019-09-19 PROCEDURE — 25010000002 ONDANSETRON PER 1 MG: Performed by: INTERNAL MEDICINE

## 2019-09-19 PROCEDURE — 80053 COMPREHEN METABOLIC PANEL: CPT

## 2019-09-19 PROCEDURE — 96367 TX/PROPH/DG ADDL SEQ IV INF: CPT

## 2019-09-19 PROCEDURE — 36415 COLL VENOUS BLD VENIPUNCTURE: CPT

## 2019-09-19 PROCEDURE — 96413 CHEMO IV INFUSION 1 HR: CPT

## 2019-09-19 RX ORDER — SODIUM CHLORIDE 9 MG/ML
250 INJECTION, SOLUTION INTRAVENOUS ONCE
Status: COMPLETED | OUTPATIENT
Start: 2019-09-19 | End: 2019-09-19

## 2019-09-19 RX ORDER — SODIUM CHLORIDE 0.9 % (FLUSH) 0.9 %
10 SYRINGE (ML) INJECTION AS NEEDED
Status: CANCELLED | OUTPATIENT
Start: 2019-09-19

## 2019-09-19 RX ORDER — SODIUM CHLORIDE 0.9 % (FLUSH) 0.9 %
10 SYRINGE (ML) INJECTION AS NEEDED
Status: DISCONTINUED | OUTPATIENT
Start: 2019-09-19 | End: 2019-09-19 | Stop reason: HOSPADM

## 2019-09-19 RX ORDER — FAMOTIDINE 10 MG/ML
20 INJECTION, SOLUTION INTRAVENOUS AS NEEDED
Status: DISCONTINUED | OUTPATIENT
Start: 2019-09-19 | End: 2019-09-19 | Stop reason: HOSPADM

## 2019-09-19 RX ORDER — MEPERIDINE HYDROCHLORIDE 50 MG/ML
25 INJECTION INTRAMUSCULAR; INTRAVENOUS; SUBCUTANEOUS
Status: DISCONTINUED | OUTPATIENT
Start: 2019-09-19 | End: 2019-09-19 | Stop reason: HOSPADM

## 2019-09-19 RX ADMIN — Medication 500 UNITS: at 12:40

## 2019-09-19 RX ADMIN — SODIUM CHLORIDE 250 ML: 9 INJECTION, SOLUTION INTRAVENOUS at 10:39

## 2019-09-19 RX ADMIN — PEGFILGRASTIM 6 MG: KIT SUBCUTANEOUS at 12:38

## 2019-09-19 RX ADMIN — DOCETAXEL ANHYDROUS 60 MG: 20 INJECTION, SOLUTION INTRAVENOUS at 11:07

## 2019-09-19 RX ADMIN — SODIUM CHLORIDE, PRESERVATIVE FREE 10 ML: 5 INJECTION INTRAVENOUS at 12:40

## 2019-09-19 NOTE — PROGRESS NOTES
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   • Carcinoma of left breast metastatic to bone (CMS/HCC)        REASON FOR VISIT:     Chief Complaint   Patient presents with   • Breast Cancer     She is here for consideration of her txt today, continues to be conerned of loss of appetite and not eating well        HEMATOLOGY / ONCOLOGY HISTORY:   Oncology/Hematology History    Patient is a 67-year-old postmenopausal female who had onset of her menses at age 9 and menopause at age 48. She received oral contraceptives for approximately 14 years and did not receive hormonal manipulation. Patient has a maternal cousin had breast cancer. Patient has had no prior breast biopsies. Patient found a palpable left breast mass as well as a left axillary mass. Patient had a bloody nipple discharge which been present for approximately 6 months. On 6/20/2012, a mammogram was performed showing a subareolar mass consistent with malignancy. Bilateral breast ultrasound revealed an ill-defined subareolar mass measuring 2.8-2.5 cm. There is a left axillary mass measuring 1.7 cm with suspicion of extracapsular extension. There is no evidence of disease in the right breast. On 7/9/2012 patient underwent a left breast biopsy and placement of marker clip. Left breast biopsy revealed an infiltrating lobular carcinoma grade 2 with focal ductal carcinoma in situ, solid pattern. A fine-needle biopsy of the left lymph node was consistent with metastatic carcinoma. Breast tumor profile revealed ER: 100%; [place]: 98%; HER-2/maynor 2+; FISH: Unamplified; Ki-67: 71%; P 53 1%; DNA aneuploidy. A MRI of the breast  were performed body multiple abnormal enhancing masses within the left breast. There appears to be anterior retraction of the pectoralis muscle with no direct extension. There is a moderate to large, layering left pleural effusion appreciated. The right breast reveals 3 moderately enlarged lymph nodes in the posterior lateral aspect the right breast. These have fatty hilum but followup is recommended. Patient is undergone systemic studies including, on 8/2/2012, a CT scan of the brain showing atrophy. a CT scan that shows showing a small to moderate left pleural effusion with 3 subcentimeter nodule densities in the left lung.   She completed 4 cycles of neoadjuvant treatment with dose dense Adriamycin and Cytoxan. She had a mammogram which showed a significant reduction in the size of her left breast lesion. There is no axillary adenopathy noted. She has had an ultrasound revealing the  lesion reducing from 2.4 cm to 1 cm. Patient underwent a left mastectomy on 03/19/2013 finding negative invasive cancer, negative nodes. A 5% DCIS was noted. The patient has declined hormonal therapy. She did not need radiation therapy.  November 2014, she began to have evidence of lytic bone lesions at T5T6, frontal disease, an 8 mm lucency in the central frontal area of  the skull but nothing intracranial. Bone scan revealed only vertex tracer activity but bilateral ribs and thoracic spine, and other lesions in  her pelvis. At that time, she was started on letrozole since November 2014. She is taking Xgeva. She had progression found in bone in Feb 2015 on scan. She was started on Faslodex, Ibrance and continued xgeva.         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 B) Foci of ductal  carcinoma in situ, solid pattern. C) Greatest dimension of the invasive carcinoma is 15.8mm.         3/19/2013 Surgery     Left Mastectomy w/ Axillary Tail: Focal residual ducatal carcinoma in situ (DCIS) 12 lymph nodes negative for metastatic carcinoma.         10/20/2014 Biopsy     Peritoneum Biopsy: Final Diagnosis: Malignant Cell Neoplasm.           Breast cancer metastasized to bone, unspecified laterality (CMS/HCC)    10/15/2012 -  Other Event     Left mammogram:  Impression:  1. Decreasing spiculation and density in the retroareolar left breast, near a previous biopsy.   2. Definite left axillary lesion is not appreciated.          2/5/2013 -  Other Event     Diagnostic Mammo:  Comparison is made to 10/15/2012 and 6/20/2012  The spiculated mass at 12:00 behind the nipple is nearly resolved.   Impression:  1. The mass on the left side is less apparent after receiving chemotherapy. There has been a good response to chmotherapy on the left side.   2. No suspicious abnormality is seen in the right breast to account for the new palable abnormality at the 4-5:00 position.          3/19/2013 Surgery     Breast Biopsy:  Final Diagnosis:  A. Breast, left mastectomy with axillary tail  1. Focal residual ductal carcinoma in situ (DICS) (less than 5% of tumor bed).  2. Negative for residual invasive carcinoma.   3. 12 Lymph Nodes, negative for metastatic carcinoma (0/12) focally, some lymph nodes demonstrate fibrosis/treatment effect.  B. Skin and soft tissue, left advancement flap:  1. Benign skin and subcutis.   2. Negative for carcinoma.          6/20/2013 -  Other Event     Diagnostic Mammo [facility]:  1. A subareolar mass is consistent with malignancy. Additional involement extends inferiorly sonographically and superolaterally mammograhically as demonstrated by calcifications. The mass measures approx 2.8 cm sonographically, but the involved region is likely much larger including an intraductal componet. The left  axillary lymph nodes are also involved.   2. Recommened ultrasound guided biopsy of the left subareolar mass and left axillary lymph node.   3. Breast MRI could also further define multicentric disease on the left.   4. Clear suspicious finding on the right.  Recommend ongoing clinical follow up of palpable foci.          6/13/2014 -  Other Event     Diagnostic Mammo:  IMPRESSION:  1. History of left breast cancer and mastectomy. Stable benign right breast mammograms.          10/20/2014 Surgery     Final Diagnosis:  Biopsies, pertoneum:  Metastatic carcinoma, morphologically compatible with metastatic mammary carcinoma.          6/15/2015 -  Other Event     Diagnostic Mammo:  IMPRESSION:   1. Negative x-ray report, should not delay biopsy if a dominat or clinically suspicious mass is present.          7/5/2016 -  Other Event     Diagnositic mammogram:  Impression:   Stable right mamogram with no radiographic findings suspicious for malignancy. Recommend continued screening mammography per screening guidelines unless otherwise earlier clinically indicated.          9/18/2016 Initial Diagnosis     Secondary malignant neoplasm of bone and bone marrow                INTERVAL HISTORY  Patient ID: Heavenly Yanes is a 74 y.o. year old female Cancer Staging  Stage IV (T2, N1, M1)  --complaining of dyspnea on exersion since last Tuesday. She feels like she ran a mile  2/14/19: cxr showed pneumonia. Already on levofloxacin. Coughing not worse, she did not run temp.  Overall feels weak.  -restarted the appetite suppressant and is starting to eat more but not a whole lot  --2/28/19: CT c/ap with some response to therapy. New left chest inflammatory lesion to be followed ion next imaging. Bone scan unremarkable for progression.  4/25/19: she is much better since being off therapy for 2 weeks. COugh is improved.   6/20/19: has left ear drainage which is her usual ear infection. Leg edema improved with lasix. She was ordered a  mammo by the surgeon which I told her is not necessary, she has metastatic dz.    7/25/19: complaining of increased dyspnea on exertion. She takes her laxis daily.    8/22/19:  complaining of increased dyspnea on exertion. She takes her laxis daily. Also not much appetite even though on megace 40bid. YAIR not worse from prior. Weight the same  8/30/19: she presents today to review CT scan. Overall stable in terms of her cancer but there is a concerning are of extraluminal collection of air in the deep pelvis with a thickened wall measuring about 2.8 x 3.2 cm that is situated between the lower sigmoid colon and small bowel and just above the uterus. This likely represents a contained sigmoid colon perforation related to prior diverticulitis. Perforated neoplasm cannot be ruled out. GI consultation is recommended for this finding. The sigmoid colon and adjacent small bowel loops appear to be matted around this collection.  -9/19/19: no issues or concerns.  -currently not having any abdominal symptoms.  -Denies cp/n/v/, neuropathy/ no focal weakness.   Rest of ros unremarkable. 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) 9/18/2016     Past Surgical History:   Past Surgical History:   Procedure Laterality Date   • BREAST SURGERY      left breast biopsy and axillary node   • CHOLECYSTECTOMY     • COLONOSCOPY  08/15/2014    diverticulosis  otherwise normal colonoscopy   • EXTERNAL EAR SURGERY     • MASTECTOMY Left    • PORTACATH PLACEMENT       Social History:   Social History     Socioeconomic History   • Marital status:      Spouse name: Not on file   • Number of children: Not on file    • Years of education: Not on file   • Highest education level: Not on file   Tobacco Use   • Smoking status: Never Smoker   • Smokeless tobacco: Never Used   Substance and Sexual Activity   • Alcohol use: No   • Drug use: No     Family History:   Family History   Problem Relation Age of Onset   • No Known Problems Daughter    • No Known Problems Son    • Other Mother         complications from a concussion from a fall   • Other Father         old age   • No Known Problems Brother    • No Known Problems Daughter        Review of Systems   Constitutional: Positive for activity change, appetite change and fatigue. Negative for unexpected weight change.   HENT: Negative.    Eyes: Negative.    Respiratory: Positive for shortness of breath.    Cardiovascular: Positive for leg swelling.   Gastrointestinal: Negative.    Endocrine: Negative.    Genitourinary: Negative.    Musculoskeletal: Negative.    Skin: Negative.    Neurological: Negative.    Hematological: Negative.    Psychiatric/Behavioral: Negative.         Performance Status:  Asymptomatic    Medications:    Current Outpatient Medications   Medication Sig Dispense Refill   • Calcium-Magnesium-Vitamin D (CALCIUM 1200+D3 PO) Take 1,200 mg by mouth Daily.     • capecitabine (XELODA) 500 MG chemo tablet Take 1 tablet by mouth 2 (Two) Times a Day. For 2 weeks on and 1 week off. 42 tablet 11   • furosemide (LASIX) 20 MG tablet One tab po qd as needed for swelling take with potassium 30 tablet 3   • megestrol (MEGACE) 40 MG tablet Take 1 tablet by mouth 2 (Two) Times a Day. 60 tablet 5   • metoprolol tartrate (LOPRESSOR) 25 MG tablet Take 25 mg by mouth 2 (two) times a day.     • OLANZapine (ZYPREXA) 2.5 MG tablet Take 2.5 mg by mouth every night.     • ondansetron (ZOFRAN) 8 MG tablet Take 1 tablet by mouth Every 8 (Eight) Hours As Needed for Nausea or Vomiting. 30 tablet 5   • potassium chloride (K-DUR,KLOR-CON) 20 MEQ CR tablet Take one tab po bid on days taking Lasix  "60 tablet 1   • raNITIdine (ZANTAC) 150 MG tablet Take 1 tablet by mouth 2 (Two) Times a Day. 60 tablet 5   • rivaroxaban (XARELTO) 20 MG tablet Take 1 tablet by mouth Daily. 30 tablet 5   • thyroid 60 MG PO tablet Take 60 mg by mouth daily.     • TYKERB 250 MG chemo tablet 250 mg.     • ciprofloxacin (CIPRO) 500 MG tablet Take 1 tablet by mouth 2 (Two) Times a Day. 28 tablet 0   • metoprolol tartrate (LOPRESSOR) 25 MG tablet Every 12 (Twelve) Hours.     • ofloxacin (FLOXIN) 0.3 % otic solution        No current facility-administered medications for this visit.      Facility-Administered Medications Ordered in Other Visits   Medication Dose Route Frequency Provider Last Rate Last Dose   • heparin flush (porcine) 100 UNIT/ML injection 500 Units  500 Units Intravenous PRN Joseph Nunez MD   500 Units at 10/26/17 1155   • sodium chloride 0.9 % flush 10 mL  10 mL Intravenous PRN Joseph Nunez MD   10 mL at 10/26/17 1155       ALLERGIES:    Allergies   Allergen Reactions   • Benadryl  [Diphenhydramine Hcl (Sleep)] Other (See Comments)   • Codeine    • Diphenhydramine Other (See Comments)     \"Shaky leg syndrome\"  \"Shaky leg syndrome\"  \"Shaky leg syndrome\"   • Heparin Other (See Comments)     Patient states tolerates Heparin flush without problems: Years ago had heparin IV and had a rash   • Other      POPPY SEED   • Penicillins Hives   • Petroleum Jelly [Skin Protectants, Misc.]    • Sulfa Antibiotics    • Sulfur    • Valium [Diazepam]        Objective      Vitals:    09/19/19 0930   BP: 110/72   Pulse: 106   Resp: 19   Temp: 98 °F (36.7 °C)   TempSrc: Tympanic   SpO2: 95%   Weight: 55.5 kg (122 lb 6.4 oz)   Height: 165.1 cm (65\")   PainSc: 0-No pain         Current Status 8/30/2019   ECOG score 3         Physical Examunchanged  General Appearance: Patient is awake, alert, oriented and in no acute distress. Patient is welldeveloped, wellnourished, and appears stated age.  HEENT: Normocephalic. Sclerae clear, " conjunctiva pink, extraocular movements intact, pupils, round, reactive to light and  accommodation. Mouth and throat are clear with moist oral mucosa.  NECK: Supple, no jugular venous distention, thyroid not enlarged.  LYMPH: No cervical, supraclavicular, axillary, or inguinal lymphadenopathy.  CHEST: Equal bilateral expansion, AP  diameter normal, resonant percussion note  LUNGS: Good air movement, no rales, rhonchi, rubs or wheezes with auscultation on the right. Diminished on the left.  CARDIO: Regular sinus rhythm, no murmurs, gallops or rubs.  ABDOMEN: Nondistended, soft, No tenderness, no guarding, no rebound, No hepatosplenomegaly. No abdominal masses. Bowel sounds positive. No hernia  GENITALIA: Not examined.  BREASTS: Not examined.  MUSKEL: No joint swelling, decreased motion, or inflammation  EXTREMS: patrick le  Edema L>R,No clubbing, cyanosis, No varicose veins.  NEURO: Grossly nonfocal, Gait is coordinated and smooth, Cognition is preserved.  SKIN: No rashes, no ecchymoses, no petechia.  PSYCH: Oriented to time, place and person. Memory is preserved. Mood and affect appear normal  RECENT LABS:  Lab on 09/19/2019   Component Date Value Ref Range Status   • Glucose 09/19/2019 117* 65 - 99 mg/dL Final   • BUN 09/19/2019 10  8 - 23 mg/dL Final   • Creatinine 09/19/2019 0.70  0.57 - 1.00 mg/dL Final   • Sodium 09/19/2019 137  136 - 145 mmol/L Final   • Potassium 09/19/2019 3.5  3.5 - 5.2 mmol/L Final   • Chloride 09/19/2019 95* 98 - 107 mmol/L Final   • CO2 09/19/2019 33.0* 22.0 - 29.0 mmol/L Final   • Calcium 09/19/2019 9.7  8.6 - 10.5 mg/dL Final   • Total Protein 09/19/2019 6.9  6.0 - 8.5 g/dL Final   • Albumin 09/19/2019 3.60  3.50 - 5.20 g/dL Final   • ALT (SGPT) 09/19/2019 5  1 - 33 U/L Final   • AST (SGOT) 09/19/2019 18  1 - 32 U/L Final   • Alkaline Phosphatase 09/19/2019 60  39 - 117 U/L Final   • Total Bilirubin 09/19/2019 0.5  0.2 - 1.2 mg/dL Final   • eGFR Non African Amer 09/19/2019 81  >60  mL/min/1.73 Final   • Globulin 09/19/2019 3.3  gm/dL Final   • A/G Ratio 09/19/2019 1.1  g/dL Final   • BUN/Creatinine Ratio 09/19/2019 14.3  7.0 - 25.0 Final   • Anion Gap 09/19/2019 9.0  5.0 - 15.0 mmol/L Final   • WBC 09/19/2019 3.74  3.40 - 10.80 10*3/mm3 Final   • RBC 09/19/2019 3.42* 3.77 - 5.28 10*6/mm3 Final   • Hemoglobin 09/19/2019 11.8* 12.0 - 15.9 g/dL Final   • Hematocrit 09/19/2019 34.8  34.0 - 46.6 % Final   • MCV 09/19/2019 101.8* 79.0 - 97.0 fL Final   • MCH 09/19/2019 34.5* 26.6 - 33.0 pg Final   • MCHC 09/19/2019 33.9  31.5 - 35.7 g/dL Final   • RDW 09/19/2019 15.4  12.3 - 15.4 % Final   • RDW-SD 09/19/2019 56.4* 37.0 - 54.0 fl Final   • MPV 09/19/2019 9.8  6.0 - 12.0 fL Final   • Platelets 09/19/2019 377  140 - 450 10*3/mm3 Final   • Neutrophil % 09/19/2019 49.1  42.7 - 76.0 % Final   • Lymphocyte % 09/19/2019 44.4  19.6 - 45.3 % Final   • Monocyte % 09/19/2019 3.5* 5.0 - 12.0 % Final   • Eosinophil % 09/19/2019 0.8  0.3 - 6.2 % Final   • Basophil % 09/19/2019 1.1  0.0 - 1.5 % Final   • Immature Grans % 09/19/2019 1.1* 0.0 - 0.5 % Final   • Neutrophils, Absolute 09/19/2019 1.84  1.70 - 7.00 10*3/mm3 Final   • Lymphocytes, Absolute 09/19/2019 1.66  0.70 - 3.10 10*3/mm3 Final   • Monocytes, Absolute 09/19/2019 0.13  0.10 - 0.90 10*3/mm3 Final   • Eosinophils, Absolute 09/19/2019 0.03  0.00 - 0.40 10*3/mm3 Final   • Basophils, Absolute 09/19/2019 0.04  0.00 - 0.20 10*3/mm3 Final   • Immature Grans, Absolute 09/19/2019 0.04  0.00 - 0.05 10*3/mm3 Final   • nRBC 09/19/2019 0.0  0.0 - 0.2 /100 WBC Final       RADIOLOGY:  Ct Chest With Contrast    Result Date: 8/29/2019  Narrative: EXAMINATION:  CT CHEST W CONTRAST-  8/29/2019 9:22 AM CDT  HISTORY: Metastatic breast cancer. Assess therapy response. C50.912-Malignant neoplasm of unspecified site of left female breast; C79.51-Secondary malignant neoplasm of bone.  COMPARISON : 05/31/2019.  DLP: 116 mGy-cm. Automated dosage control was utilized.  TECHNIQUE:  CT was performed of the chest with contrast. Sagittal and coronal images were reconstructed.  MEDIASTINUM, HEART AND VASCULAR STRUCTURES: There is atheromatous disease of the thoracic aorta. Heart size is upper limits of normal. Pulmonary arteries appear to be normal in caliber. There is diffuse mediastinal edema and body wall edema. There are no pathologically enlarged mediastinal lymph nodes. There is a small pericardial effusion.  LUNGS: There is a moderate to large right pleural effusion that is increased compared to the previous study. There are patchy peripheral infiltrates in the aerated portions of both lungs. This is slightly greater on the left. This may be infectious or inflammatory. There is stable pleural thickening on the left.  UPPER ABDOMEN: Please see the abdomen and pelvis CT report separately.  BONES AND SOFT TISSUES: There is diffuse metastatic bone disease there is stable mild compression deformity of T11. There has been prior left mastectomy.      Impression: 1. Diffuse mediastinal and body wall edema is a new finding compared to the previous study. 2. No enlarged mediastinal lymph nodes. Prior left mastectomy. 3. Diffuse metastatic bone disease. Stable mild compression deformity of T11. 4. Stable pleural thickening on the left. 5. New patchy peripheral infiltrates in both lungs, greater on the left side. This may be infectious or inflammatory. Patchy pulmonary edema is possible as well. 6. Moderate to large right pleural effusion, increasing compared to the prior study. Small pericardial effusion.    This report was finalized on 08/29/2019 10:30 by Dr. Marques Denson MD.    Nm Bone Scan Whole Body    Result Date: 8/30/2019  Narrative: NM BONE SCAN WHOLE BODY- 8/29/2019 9:05 AM CDT  HISTORY: metastatic breast ca, assess therapy response; C50.912-Malignant neoplasm of unspecified site of left female breast; C79.51-Secondary malignant neoplasm of bone  COMPARISON: Bone scan dated 05/31/2019   RADIOPHARMACEUTICAL: 20.6 mCi Tc99m HDP  TECHNIQUE: Anterior and posterior whole-body images are acquired approximately two hours postinjection.  FINDINGS:  Relatively little soft tissue radiotracer uptake, with essentially nonvisualization of the distal extremities and kidneys. The appearance is that of a metastatic super scan, suggesting extensive osseous metastatic disease. There is a more focal area of metastatic disease in the region of the left proximal scapula, perhaps glenoid or coracoid. Additional more focal uptake identified in the manubrium of the sternum. The appearance is stable.      Impression: 1. Stable bone scan, demonstrating widespread osteoblastic disease (metastatic SuperScan) with areas of more localized uptake in the region of the left glenoid/coracoid as well as the sternal manubrium.  This report was finalized on 08/30/2019 10:56 by Dr Noah Alfred, .    Ct Abdomen Pelvis With Contrast    Result Date: 8/29/2019  Narrative: EXAMINATION:  CT ABDOMEN PELVIS W CONTRAST-  8/29/2019 9:22 AM CDT  HISTORY: Metastatic breast cancer. Assess therapy response. C50.912-Malignant neoplasm of unspecified site of left female breast; C79.51-Secondary malignant neoplasm of bone.  TECHNIQUE: Spiral CT was performed of the abdomen and pelvis with contrast. Multiplanar images were reconstructed.  DLP: 159 mGy-cm. Automated dosage control was utilized.  COMPARISON: 05/31/2019.  LUNG BASES: Please see the chest CT report separately.  LIVER AND SPLEEN: The liver and spleen demonstrate no focal abnormality. There has been prior cholecystectomy.  PANCREAS: No pancreatic mass or inflammatory change.  KIDNEYS AND ADRENALS: Mild adrenal thickening on the left is stable. The right adrenal gland is normal. The kidneys demonstrate no acute appearing abnormality. There is wall thickening of the bladder. The bladder is decompressed.  BOWEL: There is an extraluminal collection of air in the deep pelvis measuring about 2.8 x  3.2 cm that is just superior to the uterus and between the lower sigmoid colon and adjacent small bowel. This may be a contained perforation related to prior diverticulitis. There are diverticula in this area. Sigmoid colon and small bowel loops appear to be adhesed in this region.  OTHER: There is a calcified leiomyoma in the uterus. There is diffuse metastatic bone disease with multiple tiny sclerotic lesions throughout the visualized spine and pelvis. There is mild body wall edema. There is a stable T11 compression deformity. There are no new fractures.      Impression: 1. Extraluminal collection of air in the deep pelvis with a thickened wall measuring about 2.8 x 3.2 cm that is situated between the lower sigmoid colon and small bowel and just above the uterus. This likely represents a contained sigmoid colon perforation related to prior diverticulitis. Perforated neoplasm cannot be ruled out. GI consultation is recommended for this finding. The sigmoid colon and adjacent small bowel loops appear to be matted around this collection. 2. Extensive metastatic bone disease appears relatively stable. There are so many lesions that it is difficult to determine if there is any subtle change. 3. Calcified uterine leiomyoma. 4. Mild body wall edema. 5. Stable mild thickening of the left adrenal gland. This report was finalized on 08/29/2019 10:52 by Dr. Marques Denson MD.           Assessment/Plan  Heavenly Yanes is a 74 y.o. year old female with met breast cancer s/p multiple lines of CMT currently on taxotere weekly and xeloda with good tolerance.    Patient Active Problem List   Diagnosis   • Malignant neoplasm of upper-outer quadrant of left female breast (CMS/HCC)   • Breast cancer metastasized to bone, unspecified laterality (CMS/HCC)   • Essential hypertension   • Acquired hypothyroidism   • Bipolar 1 disorder (CMS/HCC)   • Carcinoma of left breast metastatic to bone (CMS/HCC)   • Antineoplastic chemotherapy  induced anemia   • Dehydration   • Encounter for administration of vaccine   • Chemotherapy induced neutropenia (CMS/HCC)   • Cough   • Port-A-Cath in place   • Edema   • Hypokalemia          1.Metastatic breast ca: currently on weekly taxotere 3weeks on 1 week off.  --also on xeloda [id number], then repeat.  --CT c/a/p 2/19 showed stable dz. Some new Inflammatory opacities in the LEFT lower lobe and bilateral subpleural interstitial thickening.   -bone scan without evidence of dz progression  --primo taxotere weekly. Pt also on xeloda [id number], then repeat.  4/25/19: therapy postponed 2 weeks ago due to neutropenia. He got Neupogen x1. Labs reviewed with pt wbc 10.68, hg 11.1, [id number]. ANC 7630.  -ANC low 880. Postpone tx. Give neupogen x1. Pt advised to hold xeloda until 2 weeks    -imaging CT c/a/p 5/31/19 showed stable dz. Bone scan 5/31/19 showed multiple metastatic dz, nothing new. Possible pathologic fracture in the area of the coracoid process. Pt does not endorse pain there.  -labs 8/22/19 reviewed with pt wbc 4.99 Hg 11.7, plt 400. Ok for treatment with taxotere and neulasta.  -exam and review of labs did not reveal reason for the increased sob on exertion  -8/30/19: she presents today to review CT scan done 8:29/19. Overall stable in terms of her cancer but there is a concerning are of extraluminal collection of air in the deep pelvis with a thickened wall measuring about 2.8 x 3.2 cm that is situated between the lower sigmoid colon and small bowel and just above the uterus. This likely represents a contained sigmoid colon perforation related to prior diverticulitis. Perforated neoplasm cannot be ruled out. GI consultation is recommended for this finding. The sigmoid colon and adjacent small bowel loops appear to be matted around this collection.  -currently not having any abdominal symptoms.  -will refer to GI to get their opinion. Heavenly is okay with this.  -obviously no progression will primo  therapy as usual.  -has appointment with GI in the near future.  -labs reviewed with pt and  cr 0.7, lFT all normal, wbc 3.74, Hg 12.8, plt 377. Ok for docetaxel.    2. Psych: on olanzapine    3. HTN: on metoprolol    4. Gerd: on ranitidine  5. Dyspnea: get a cxr to evaluate  6. Anemia: will check iron profile, and act accordingly . Her creatinine is normal at 0.94.   7. DVT on xarelto indefinite.  --Doppler left LE  1/30/19 There is evidence of deep venous thrombosis of the left lower extremity. The clot burden is improved from last study. There is also evidence of superficial clot in the greater saphenous vein. There is a Baker's cyst in the popliteal fossa.  8. CHF: LVEF 65% with diastolic dysfunction. referral to cardiology to maximized her heart function.  9. Thrombocytosis: suspect reactive from anemia. Will check iron profile.  10.left lung pneumonia: resolved  --cxr showed pneumonia. Pt already on levofloxacin. Will complete 14 days.   11: LE edema: improved on lasix.  12. FEN:  Low potassium 2.7 on  kdur. Also will check magnesium.   -give 2g magsul, 20meq kcl.  --Advised 20meq kdur TID for 7  days then 20meq once a day thereafter.      Moise oStelo MD    9/19/2019    9:57 AM

## 2019-09-23 ENCOUNTER — OFFICE VISIT (OUTPATIENT)
Dept: GASTROENTEROLOGY | Facility: CLINIC | Age: 74
End: 2019-09-23

## 2019-09-23 VITALS
TEMPERATURE: 98.6 F | SYSTOLIC BLOOD PRESSURE: 125 MMHG | BODY MASS INDEX: 20.33 KG/M2 | HEIGHT: 65 IN | WEIGHT: 122 LBS | OXYGEN SATURATION: 94 % | HEART RATE: 119 BPM | DIASTOLIC BLOOD PRESSURE: 73 MMHG

## 2019-09-23 DIAGNOSIS — R93.5 ABNORMAL CT OF THE ABDOMEN: Primary | ICD-10-CM

## 2019-09-23 DIAGNOSIS — C79.51 BREAST CANCER METASTASIZED TO BONE, UNSPECIFIED LATERALITY (HCC): ICD-10-CM

## 2019-09-23 DIAGNOSIS — C50.919 BREAST CANCER METASTASIZED TO BONE, UNSPECIFIED LATERALITY (HCC): ICD-10-CM

## 2019-09-23 PROCEDURE — 99214 OFFICE O/P EST MOD 30 MIN: CPT | Performed by: NURSE PRACTITIONER

## 2019-09-23 RX ORDER — FUROSEMIDE 20 MG/1
TABLET ORAL
Qty: 30 TABLET | Refills: 3 | Status: SHIPPED | OUTPATIENT
Start: 2019-09-23

## 2019-09-23 NOTE — PROGRESS NOTES
Chief Complaint   Patient presents with   • GI Problem     perforated sigmoid colon       PCP: Moise Sotelo MD  REFER: No ref. provider found    Subjective     HPI    Followed by Dr Sotelo for metastatic breast cancer diagnosis.  Patient referred to office for perforation of sigmoid colon.  Perforation identified on routine CT scan 8/22/19.  CT also showed extensive metastaic bone disease (described as appearing stable).   Patient denies experiencing abdominal pain, she continues to deny abdominal pain.  No change in bowels.  Bowels described as formed, soft, move daily without difficulty.  Denies constipation.  No rectal bleeding.  She eats very little.     CT scan 5/31/19 showed segmental wall thickening  CT scan 2/19/19 showed mild mucosal thickening involving segments of colon including sigmoid, ascending, and hepatic flexure.    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     • capecitabine (XELODA) 500 MG chemo tablet Take 1 tablet by mouth 2 (Two) Times a Day. For 2 weeks on and 1 week off. 42 tablet 11   • ciprofloxacin (CIPRO) 500 MG tablet Take 1  "tablet by mouth 2 (Two) Times a Day. 28 tablet 0   • furosemide (LASIX) 20 MG tablet One tab po qd as needed for swelling take with potassium 30 tablet 3   • megestrol (MEGACE) 40 MG tablet Take 1 tablet by mouth 2 (Two) Times a Day. 60 tablet 5   • metoprolol tartrate (LOPRESSOR) 25 MG tablet Take 25 mg by mouth 2 (two) times a day.     • metoprolol tartrate (LOPRESSOR) 25 MG tablet Every 12 (Twelve) Hours.     • ofloxacin (FLOXIN) 0.3 % otic solution      • OLANZapine (ZYPREXA) 2.5 MG tablet Take 2.5 mg by mouth every night.     • ondansetron (ZOFRAN) 8 MG tablet Take 1 tablet by mouth Every 8 (Eight) Hours As Needed for Nausea or Vomiting. 30 tablet 5   • potassium chloride (K-DUR,KLOR-CON) 20 MEQ CR tablet Take one tab po bid on days taking Lasix 60 tablet 1   • raNITIdine (ZANTAC) 150 MG tablet Take 1 tablet by mouth 2 (Two) Times a Day. 60 tablet 5   • rivaroxaban (XARELTO) 20 MG tablet Take 1 tablet by mouth Daily. 30 tablet 5   • thyroid 60 MG PO tablet Take 60 mg by mouth daily.     • TYKERB 250 MG chemo tablet 250 mg.         Allergies   Allergen Reactions   • Benadryl  [Diphenhydramine Hcl (Sleep)] Other (See Comments)   • Codeine    • Diphenhydramine Other (See Comments)     \"Shaky leg syndrome\"  \"Shaky leg syndrome\"  \"Shaky leg syndrome\"   • Heparin Other (See Comments)     Patient states tolerates Heparin flush without problems: Years ago had heparin IV and had a rash   • Other      POPPY SEED   • Penicillins Hives   • Petroleum Jelly [Skin Protectants, Misc.]    • Sulfa Antibiotics    • Sulfur    • Valium [Diazepam]        Social History     Socioeconomic History   • Marital status:      Spouse name: Not on file   • Number of children: Not on file   • Years of education: Not on file   • Highest education level: Not on file   Tobacco Use   • Smoking status: Never Smoker   • Smokeless tobacco: Never Used   Substance and Sexual Activity   • Alcohol use: No   • Drug use: No   • Sexual activity: " "Defer       Family History   Problem Relation Age of Onset   • No Known Problems Daughter    • No Known Problems Son    • Other Mother         complications from a concussion from a fall   • Other Father         old age   • No Known Problems Brother    • No Known Problems Daughter        Review of Systems   Constitutional: Negative for fatigue, fever and unexpected weight change.   HENT: Negative for hearing loss, sore throat and voice change.    Eyes: Negative for visual disturbance.   Respiratory: Negative for cough, shortness of breath and wheezing.    Cardiovascular: Negative for chest pain and palpitations.   Gastrointestinal: Negative for abdominal pain, blood in stool and vomiting.   Endocrine: Negative for polydipsia and polyuria.   Genitourinary: Negative for difficulty urinating, dysuria, hematuria and urgency.   Musculoskeletal: Negative for joint swelling and myalgias.   Skin: Negative for color change, rash and wound.   Neurological: Negative for dizziness, tremors, seizures and syncope.   Hematological: Does not bruise/bleed easily.   Psychiatric/Behavioral: Negative for agitation and confusion. The patient is not nervous/anxious.        Objective     Vitals:    09/23/19 0801   BP: 125/73   Pulse: 119   Temp: 98.6 °F (37 °C)   SpO2: 94%   Weight: 55.3 kg (122 lb)   Height: 165.1 cm (65\")     Body mass index is 20.3 kg/m².    Physical Exam   Constitutional: She is oriented to person, place, and time. She appears well-developed and well-nourished. She is cooperative.   HENT:   Head: Normocephalic and atraumatic.   Eyes: Conjunctivae are normal. Pupils are equal, round, and reactive to light. No scleral icterus.   Neck: Normal range of motion. Neck supple. No JVD present. No thyroid mass and no thyromegaly present.   Cardiovascular: Normal rate, regular rhythm and normal heart sounds. Exam reveals no gallop and no friction rub.   No murmur heard.  Pulmonary/Chest: Effort normal and breath sounds normal. " "No accessory muscle usage. No respiratory distress. She has no wheezes. She has no rales.   Abdominal: Soft. Normal appearance and bowel sounds are normal. She exhibits no distension, no ascites and no mass. There is no hepatosplenomegaly. There is no tenderness. There is no rebound and no guarding.   Musculoskeletal: Normal range of motion. She exhibits no edema or tenderness.     Vascular Status -  Her right foot exhibits normal foot vasculature  and no edema. Her left foot exhibits normal foot vasculature  and no edema.  Lymphadenopathy:     She has no cervical adenopathy.   Neurological: She is alert and oriented to person, place, and time. She has normal strength. Gait normal.   Skin: Skin is warm, dry and intact. No rash noted.       Imaging Results (most recent)     None          Body mass index is 20.3 kg/m².    Assessment/Plan     Heavenly was seen today for gi problem.    Diagnoses and all orders for this visit:    Abnormal CT of the abdomen    Breast cancer metastasized to bone, unspecified laterality (CMS/HCC)        * Surgery not found *    Colonoscopy unable to be performed for at least 3 months, patient stated she did not wish to pursue colonoscopy evaluation at this time due to cancer treatment and \"so much other testing.\"  Patient stated she may consider evaluation in 6 months.      Patient advised to discuss this further with Dr Sotelo at visit 10/10/19.  If she wishes to proceed with colonoscopy, procedure could be performed in Dec 2019, patient can call office to schedule.  If further clarification is needed prior to Dec 2019 Gastrografin enema could be performed.      There are no Patient Instructions on file for this visit.    "

## 2019-10-10 ENCOUNTER — OFFICE VISIT (OUTPATIENT)
Dept: ONCOLOGY | Facility: CLINIC | Age: 74
End: 2019-10-10

## 2019-10-10 ENCOUNTER — TELEPHONE (OUTPATIENT)
Dept: ONCOLOGY | Facility: CLINIC | Age: 74
End: 2019-10-10

## 2019-10-10 ENCOUNTER — INFUSION (OUTPATIENT)
Dept: ONCOLOGY | Facility: HOSPITAL | Age: 74
End: 2019-10-10

## 2019-10-10 ENCOUNTER — LAB (OUTPATIENT)
Dept: LAB | Facility: HOSPITAL | Age: 74
End: 2019-10-10

## 2019-10-10 VITALS
WEIGHT: 125.7 LBS | BODY MASS INDEX: 20.94 KG/M2 | HEIGHT: 65 IN | OXYGEN SATURATION: 99 % | HEART RATE: 64 BPM | RESPIRATION RATE: 16 BRPM | TEMPERATURE: 97.4 F | SYSTOLIC BLOOD PRESSURE: 106 MMHG | DIASTOLIC BLOOD PRESSURE: 68 MMHG

## 2019-10-10 VITALS
WEIGHT: 125 LBS | HEART RATE: 84 BPM | HEIGHT: 65 IN | TEMPERATURE: 98.2 F | DIASTOLIC BLOOD PRESSURE: 72 MMHG | OXYGEN SATURATION: 100 % | RESPIRATION RATE: 16 BRPM | SYSTOLIC BLOOD PRESSURE: 125 MMHG | BODY MASS INDEX: 20.83 KG/M2

## 2019-10-10 DIAGNOSIS — C50.412 MALIGNANT NEOPLASM OF UPPER-OUTER QUADRANT OF LEFT FEMALE BREAST, UNSPECIFIED ESTROGEN RECEPTOR STATUS (HCC): ICD-10-CM

## 2019-10-10 DIAGNOSIS — Z95.828 PORT-A-CATH IN PLACE: ICD-10-CM

## 2019-10-10 DIAGNOSIS — C79.51 CARCINOMA OF LEFT BREAST METASTATIC TO BONE (HCC): ICD-10-CM

## 2019-10-10 DIAGNOSIS — C79.51 CARCINOMA OF LEFT BREAST METASTATIC TO BONE (HCC): Primary | ICD-10-CM

## 2019-10-10 DIAGNOSIS — C50.919 BREAST CANCER METASTASIZED TO BONE, UNSPECIFIED LATERALITY (HCC): ICD-10-CM

## 2019-10-10 DIAGNOSIS — C50.912 CARCINOMA OF LEFT BREAST METASTATIC TO BONE (HCC): ICD-10-CM

## 2019-10-10 DIAGNOSIS — C50.912 CARCINOMA OF LEFT BREAST METASTATIC TO BONE (HCC): Primary | ICD-10-CM

## 2019-10-10 DIAGNOSIS — C79.51 BREAST CANCER METASTASIZED TO BONE, UNSPECIFIED LATERALITY (HCC): ICD-10-CM

## 2019-10-10 LAB
ALBUMIN SERPL-MCNC: 3.2 G/DL (ref 3.5–5.2)
ALBUMIN/GLOB SERPL: 1 G/DL
ALP SERPL-CCNC: 60 U/L (ref 39–117)
ALT SERPL W P-5'-P-CCNC: 6 U/L (ref 1–33)
ANION GAP SERPL CALCULATED.3IONS-SCNC: 9 MMOL/L (ref 5–15)
AST SERPL-CCNC: 16 U/L (ref 1–32)
BASOPHILS # BLD AUTO: 0.07 10*3/MM3 (ref 0–0.2)
BASOPHILS NFR BLD AUTO: 0.9 % (ref 0–1.5)
BILIRUB SERPL-MCNC: 0.2 MG/DL (ref 0.2–1.2)
BUN BLD-MCNC: 12 MG/DL (ref 8–23)
BUN/CREAT SERPL: 14.5 (ref 7–25)
CALCIUM SPEC-SCNC: 9.5 MG/DL (ref 8.6–10.5)
CANCER AG15-3 SERPL-ACNC: 47.8 U/ML
CHLORIDE SERPL-SCNC: 97 MMOL/L (ref 98–107)
CO2 SERPL-SCNC: 32 MMOL/L (ref 22–29)
CREAT BLD-MCNC: 0.83 MG/DL (ref 0.57–1)
DEPRECATED RDW RBC AUTO: 60.4 FL (ref 37–54)
EOSINOPHIL # BLD AUTO: 0.04 10*3/MM3 (ref 0–0.4)
EOSINOPHIL NFR BLD AUTO: 0.5 % (ref 0.3–6.2)
ERYTHROCYTE [DISTWIDTH] IN BLOOD BY AUTOMATED COUNT: 16.8 % (ref 12.3–15.4)
GFR SERPL CREATININE-BSD FRML MDRD: 67 ML/MIN/1.73
GLOBULIN UR ELPH-MCNC: 3.3 GM/DL
GLUCOSE BLD-MCNC: 108 MG/DL (ref 65–99)
HCT VFR BLD AUTO: 37.5 % (ref 34–46.6)
HGB BLD-MCNC: 12.6 G/DL (ref 12–15.9)
IMM GRANULOCYTES # BLD AUTO: 0.03 10*3/MM3 (ref 0–0.05)
IMM GRANULOCYTES NFR BLD AUTO: 0.4 % (ref 0–0.5)
LYMPHOCYTES # BLD AUTO: 2.7 10*3/MM3 (ref 0.7–3.1)
LYMPHOCYTES NFR BLD AUTO: 36.3 % (ref 19.6–45.3)
MCH RBC QN AUTO: 34.3 PG (ref 26.6–33)
MCHC RBC AUTO-ENTMCNC: 33.6 G/DL (ref 31.5–35.7)
MCV RBC AUTO: 102.2 FL (ref 79–97)
MONOCYTES # BLD AUTO: 0.57 10*3/MM3 (ref 0.1–0.9)
MONOCYTES NFR BLD AUTO: 7.7 % (ref 5–12)
NEUTROPHILS # BLD AUTO: 4.02 10*3/MM3 (ref 1.7–7)
NEUTROPHILS NFR BLD AUTO: 54.2 % (ref 42.7–76)
NRBC BLD AUTO-RTO: 0 /100 WBC (ref 0–0.2)
PLATELET # BLD AUTO: 537 10*3/MM3 (ref 140–450)
PMV BLD AUTO: 9 FL (ref 6–12)
POTASSIUM BLD-SCNC: 3.3 MMOL/L (ref 3.5–5.2)
PROT SERPL-MCNC: 6.5 G/DL (ref 6–8.5)
RBC # BLD AUTO: 3.67 10*6/MM3 (ref 3.77–5.28)
SODIUM BLD-SCNC: 138 MMOL/L (ref 136–145)
WBC NRBC COR # BLD: 7.43 10*3/MM3 (ref 3.4–10.8)

## 2019-10-10 PROCEDURE — 96367 TX/PROPH/DG ADDL SEQ IV INF: CPT

## 2019-10-10 PROCEDURE — 86300 IMMUNOASSAY TUMOR CA 15-3: CPT

## 2019-10-10 PROCEDURE — 25010000002 DOCETAXEL 20 MG/ML SOLUTION 4 ML VIAL: Performed by: INTERNAL MEDICINE

## 2019-10-10 PROCEDURE — 80053 COMPREHEN METABOLIC PANEL: CPT

## 2019-10-10 PROCEDURE — 96413 CHEMO IV INFUSION 1 HR: CPT

## 2019-10-10 PROCEDURE — 25010000002 DEXAMETHASONE SODIUM PHOSPHATE 100 MG/10ML SOLUTION: Performed by: INTERNAL MEDICINE

## 2019-10-10 PROCEDURE — 25010000002 ONDANSETRON PER 1 MG: Performed by: INTERNAL MEDICINE

## 2019-10-10 PROCEDURE — 85025 COMPLETE CBC W/AUTO DIFF WBC: CPT

## 2019-10-10 PROCEDURE — 99214 OFFICE O/P EST MOD 30 MIN: CPT | Performed by: INTERNAL MEDICINE

## 2019-10-10 PROCEDURE — 36415 COLL VENOUS BLD VENIPUNCTURE: CPT

## 2019-10-10 RX ORDER — MEPERIDINE HYDROCHLORIDE 50 MG/ML
25 INJECTION INTRAMUSCULAR; INTRAVENOUS; SUBCUTANEOUS
Status: DISCONTINUED | OUTPATIENT
Start: 2019-10-10 | End: 2019-10-10 | Stop reason: HOSPADM

## 2019-10-10 RX ORDER — SODIUM CHLORIDE 9 MG/ML
250 INJECTION, SOLUTION INTRAVENOUS ONCE
Status: CANCELLED | OUTPATIENT
Start: 2019-10-17

## 2019-10-10 RX ORDER — FAMOTIDINE 10 MG/ML
20 INJECTION, SOLUTION INTRAVENOUS AS NEEDED
Status: CANCELLED | OUTPATIENT
Start: 2019-10-17

## 2019-10-10 RX ORDER — SODIUM CHLORIDE 0.9 % (FLUSH) 0.9 %
10 SYRINGE (ML) INJECTION AS NEEDED
Status: CANCELLED | OUTPATIENT
Start: 2019-10-10

## 2019-10-10 RX ORDER — SODIUM CHLORIDE 9 MG/ML
250 INJECTION, SOLUTION INTRAVENOUS ONCE
Status: COMPLETED | OUTPATIENT
Start: 2019-10-10 | End: 2019-10-10

## 2019-10-10 RX ORDER — MEPERIDINE HYDROCHLORIDE 50 MG/ML
25 INJECTION INTRAMUSCULAR; INTRAVENOUS; SUBCUTANEOUS
Status: CANCELLED | OUTPATIENT
Start: 2019-10-17 | End: 2019-10-18

## 2019-10-10 RX ORDER — MEPERIDINE HYDROCHLORIDE 50 MG/ML
25 INJECTION INTRAMUSCULAR; INTRAVENOUS; SUBCUTANEOUS
Status: CANCELLED | OUTPATIENT
Start: 2019-10-10 | End: 2019-10-11

## 2019-10-10 RX ORDER — SODIUM CHLORIDE 0.9 % (FLUSH) 0.9 %
10 SYRINGE (ML) INJECTION AS NEEDED
Status: DISCONTINUED | OUTPATIENT
Start: 2019-10-10 | End: 2019-10-10 | Stop reason: HOSPADM

## 2019-10-10 RX ORDER — CAPECITABINE 500 MG/1
TABLET, FILM COATED ORAL
Qty: 21 TABLET | Refills: 5 | Status: SHIPPED | OUTPATIENT
Start: 2019-10-10 | End: 2019-12-03

## 2019-10-10 RX ORDER — SODIUM CHLORIDE 9 MG/ML
250 INJECTION, SOLUTION INTRAVENOUS ONCE
Status: CANCELLED | OUTPATIENT
Start: 2019-10-10

## 2019-10-10 RX ORDER — FAMOTIDINE 10 MG/ML
20 INJECTION, SOLUTION INTRAVENOUS AS NEEDED
Status: CANCELLED | OUTPATIENT
Start: 2019-10-10

## 2019-10-10 RX ORDER — FAMOTIDINE 10 MG/ML
20 INJECTION, SOLUTION INTRAVENOUS AS NEEDED
Status: DISCONTINUED | OUTPATIENT
Start: 2019-10-10 | End: 2019-10-10 | Stop reason: HOSPADM

## 2019-10-10 RX ADMIN — Medication 500 UNITS: at 10:32

## 2019-10-10 RX ADMIN — SODIUM CHLORIDE, PRESERVATIVE FREE 10 ML: 5 INJECTION INTRAVENOUS at 10:32

## 2019-10-10 RX ADMIN — SODIUM CHLORIDE 250 ML: 9 INJECTION, SOLUTION INTRAVENOUS at 08:50

## 2019-10-10 RX ADMIN — DOCETAXEL 60 MG: 20 INJECTION, SOLUTION, CONCENTRATE INTRAVENOUS at 09:19

## 2019-10-10 NOTE — PROGRESS NOTES
Called Dr Sotelo's office spoke with Sandra RN regarding patient's potassium 3.3. New orders from Dr HAMEED to increase potassium pill. Sandra spoke with patient directly on phone regarding new instructions.

## 2019-10-10 NOTE — PROGRESS NOTES
[facility]  HEMATOLOGY & ONCOLOGY    Cancer Staging Information:  Cancer Staging  Malignant neoplasm of upper-outer quadrant of left female breast (CMS/HCC)  Staging form: Breast, AJCC V7  - Clinical stage from 10/11/2016: Stage IV (T2, N1, M1) - Signed by Calli Monsalve APRN on 10/11/2016        Subjective     VISIT DIAGNOSIS:   Encounter Diagnosis   Name Primary?   • Carcinoma of left breast metastatic to bone (CMS/HCC)        REASON FOR VISIT:     Chief Complaint   Patient presents with   • Breast Cancer with Mets     Here for chemo        HEMATOLOGY / ONCOLOGY HISTORY:   Oncology/Hematology History    Patient is a 67-year-old postmenopausal female who had onset of her menses at age 9 and menopause at age 48. She received oral contraceptives for approximately 14 years and did not receive hormonal manipulation. Patient has a maternal cousin had breast cancer. Patient has had no prior breast biopsies. Patient found a palpable left breast mass as well as a left axillary mass. Patient had a bloody nipple discharge which been present for approximately 6 months. On 6/20/2012, a mammogram was performed showing a subareolar mass consistent with malignancy. Bilateral breast ultrasound revealed an ill-defined subareolar mass measuring 2.8-2.5 cm. There is a left axillary mass measuring 1.7 cm with suspicion of extracapsular extension. There is no evidence of disease in the right breast. On 7/9/2012 patient underwent a left breast biopsy and placement of marker clip. Left breast biopsy revealed an infiltrating lobular carcinoma grade 2 with focal ductal carcinoma in situ, solid pattern. A fine-needle biopsy of the left lymph node was consistent with metastatic carcinoma. Breast tumor profile revealed ER: 100%; SD: 98%; HER-2/maynor 2+; FISH: Unamplified; Ki-67: 71%; P 53 1%; DNA aneuploidy. A MRI of the breast were performed body multiple abnormal enhancing masses within the left breast. There  appears to be anterior retraction of the pectoralis muscle with no direct extension. There is a moderate to large, layering left pleural effusion appreciated. The right breast reveals 3 moderately enlarged lymph nodes in the posterior lateral aspect the right breast. These have fatty hilum but followup is recommended. Patient is undergone systemic studies including, on 8/2/2012, a CT scan of the brain showing atrophy. a CT scan that shows showing a small to moderate left pleural effusion with 3 subcentimeter nodule densities in the left lung.   She completed 4 cycles of neoadjuvant treatment with dose dense Adriamycin and Cytoxan. She had a mammogram which showed a significant reduction in the size of her left breast lesion. There is no axillary adenopathy noted. She has had an ultrasound revealing the  lesion reducing from 2.4 cm to 1 cm. Patient underwent a left mastectomy on 03/19/2013 finding negative invasive cancer, negative nodes. A 5% DCIS was noted. The patient has declined hormonal therapy. She did not need radiation therapy.  November 2014, she began to have evidence of lytic bone lesions at T5T6, frontal disease, an 8 mm lucency in the central frontal area of  the skull but nothing intracranial. Bone scan revealed only vertex tracer activity but bilateral ribs and thoracic spine, and other lesions in  her pelvis. At that time, she was started on letrozole since November 2014. She is taking Xgeva. She had progression found in bone in Feb 2015 on scan. She was started on Faslodex, Ibrance and continued xgeva.         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 B) Foci of ductal carcinoma in situ, solid pattern. C) Greatest dimension of the invasive carcinoma is  15.8mm.         3/19/2013 Surgery     Left Mastectomy w/ Axillary Tail: Focal residual ducatal carcinoma in situ (DCIS) 12 lymph nodes negative for metastatic carcinoma.         10/20/2014 Biopsy     Peritoneum Biopsy: Final Diagnosis: Malignant Cell Neoplasm.           Breast cancer metastasized to bone, unspecified laterality (CMS/HCC)    10/15/2012 -  Other Event     Left mammogram:  Impression:  1. Decreasing spiculation and density in the retroareolar left breast, near a previous biopsy.   2. Definite left axillary lesion is not appreciated.          2/5/2013 -  Other Event     Diagnostic Mammo:  Comparison is made to 10/15/2012 and 6/20/2012  The spiculated mass at 12:00 behind the nipple is nearly resolved.   Impression:  1. The mass on the left side is less apparent after receiving chemotherapy. There has been a good response to chmotherapy on the left side.   2. No suspicious abnormality is seen in the right breast to account for the new palable abnormality at the 4-5:00 position.          3/19/2013 Surgery     Breast Biopsy:  Final Diagnosis:  A. Breast, left mastectomy with axillary tail  1. Focal residual ductal carcinoma in situ (DICS) (less than 5% of tumor bed).  2. Negative for residual invasive carcinoma.   3. 12 Lymph Nodes, negative for metastatic carcinoma (0/12) focally, some lymph nodes demonstrate fibrosis/treatment effect.  B. Skin and soft tissue, left advancement flap:  1. Benign skin and subcutis.   2. Negative for carcinoma.          6/20/2013 -  Other Event     Diagnostic Mammo [facility]:  1. A subareolar mass is consistent with malignancy. Additional involement extends inferiorly sonographically and superolaterally mammograhically as demonstrated by calcifications. The mass measures approx 2.8 cm sonographically, but the involved region is likely much larger including an intraductal componet. The left axillary lymph nodes are also involved.   2. Recommened ultrasound guided biopsy of  the left subareolar mass and left axillary lymph node.   3. Breast MRI could also further define multicentric disease on the left.   4. Clear suspicious finding on the right.  Recommend ongoing clinical follow up of palpable foci.          6/13/2014 -  Other Event     Diagnostic Mammo:  IMPRESSION:  1. History of left breast cancer and mastectomy. Stable benign right breast mammograms.          10/20/2014 Surgery     Final Diagnosis:  Biopsies, pertoneum:  Metastatic carcinoma, morphologically compatible with metastatic mammary carcinoma.          6/15/2015 -  Other Event     Diagnostic Mammo:  IMPRESSION:   1. Negative x-ray report, should not delay biopsy if a dominat or clinically suspicious mass is present.          7/5/2016 -  Other Event     Diagnositic mammogram:  Impression:   Stable right mamogram with no radiographic findings suspicious for malignancy. Recommend continued screening mammography per screening guidelines unless otherwise earlier clinically indicated.          9/18/2016 Initial Diagnosis     Secondary malignant neoplasm of bone and bone marrow                INTERVAL HISTORY  Patient ID: Heavenly Yanes is a 74 y.o. year old female Cancer Staging  Stage IV (T2, N1, M1)  --complaining of dyspnea on exersion since last Tuesday. She feels like she ran a mile  2/14/19: cxr showed pneumonia. Already on levofloxacin. Coughing not worse, she did not run temp.  Overall feels weak.  -restarted the appetite suppressant and is starting to eat more but not a whole lot  --2/28/19: CT c/ap with some response to therapy. New left chest inflammatory lesion to be followed ion next imaging. Bone scan unremarkable for progression.  4/25/19: she is much better since being off therapy for 2 weeks. COugh is improved.   6/20/19: has left ear drainage which is her usual ear infection. Leg edema improved with lasix. She was ordered a mammo by the surgeon which I told her is not necessary, she has metastatic  dz.    7/25/19: complaining of increased dyspnea on exertion. She takes her laxis daily.    8/22/19:  complaining of increased dyspnea on exertion. She takes her laxis daily. Also not much appetite even though on megace 40bid. YAIR not worse from prior. Weight the same  8/30/19: she presents today to review CT scan. Overall stable in terms of her cancer but there is a concerning are of extraluminal collection of air in the deep pelvis with a thickened wall measuring about 2.8 x 3.2 cm that is situated between the lower sigmoid colon and small bowel and just above the uterus. This likely represents a contained sigmoid colon perforation related to prior diverticulitis. Perforated neoplasm cannot be ruled out. GI consultation is recommended for this finding. The sigmoid colon and adjacent small bowel loops appear to be matted around this collection.  -10/10/19: no issues or concerns.  -currently not having any abdominal symptoms.  -Denies cp/n/v/, neuropathy/ no focal weakness.   Rest of ros unremarkable. 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) 9/18/2016     Past Surgical History:   Past Surgical History:   Procedure Laterality Date   • BREAST SURGERY      left breast biopsy and axillary node   • CHOLECYSTECTOMY     • COLONOSCOPY  08/15/2014    diverticulosis  otherwise normal colonoscopy   • EXTERNAL EAR SURGERY     • MASTECTOMY Left    • PORTACATH PLACEMENT       Social History:   Social History     Socioeconomic History   • Marital status:      Spouse name: Not on file   • Number of children: Not on file   • Years of education: Not on file   • Highest education level: Not on file    Tobacco Use   • Smoking status: Never Smoker   • Smokeless tobacco: Never Used   Substance and Sexual Activity   • Alcohol use: No   • Drug use: No   • Sexual activity: Defer     Family History:   Family History   Problem Relation Age of Onset   • No Known Problems Daughter    • No Known Problems Son    • Other Mother         complications from a concussion from a fall   • Other Father         old age   • No Known Problems Brother    • No Known Problems Daughter        Review of Systems   Constitutional: Positive for activity change, appetite change and fatigue. Negative for unexpected weight change.   HENT: Negative.    Eyes: Negative.    Respiratory: Positive for shortness of breath.    Cardiovascular: Positive for leg swelling.   Gastrointestinal: Negative.    Endocrine: Negative.    Genitourinary: Negative.    Musculoskeletal: Negative.    Skin: Negative.    Neurological: Negative.    Hematological: Negative.    Psychiatric/Behavioral: Negative.         Performance Status:  Asymptomatic    Medications:    Current Outpatient Medications   Medication Sig Dispense Refill   • Calcium-Magnesium-Vitamin D (CALCIUM 1200+D3 PO) Take 1,200 mg by mouth Daily.     • capecitabine (XELODA) 500 MG chemo tablet Take 1 tablet by mouth daily for 2 weeks on and 1 week off. 21 tablet 5   • ciprofloxacin (CIPRO) 500 MG tablet Take 1 tablet by mouth 2 (Two) Times a Day. 28 tablet 0   • furosemide (LASIX) 20 MG tablet One tab po qd as needed for swelling take with potassium 30 tablet 3   • megestrol (MEGACE) 40 MG tablet Take 1 tablet by mouth 2 (Two) Times a Day. 60 tablet 5   • metoprolol tartrate (LOPRESSOR) 25 MG tablet Take 25 mg by mouth 2 (two) times a day.     • ofloxacin (FLOXIN) 0.3 % otic solution      • OLANZapine (ZYPREXA) 2.5 MG tablet Take 2.5 mg by mouth every night.     • ondansetron (ZOFRAN) 8 MG tablet Take 1 tablet by mouth Every 8 (Eight) Hours As Needed for Nausea or Vomiting. 30 tablet 5   • potassium  "chloride (K-DUR,KLOR-CON) 20 MEQ CR tablet Take one tab po bid on days taking Lasix 60 tablet 1   • raNITIdine (ZANTAC) 150 MG tablet Take 1 tablet by mouth 2 (Two) Times a Day. 60 tablet 5   • rivaroxaban (XARELTO) 20 MG tablet Take 1 tablet by mouth Daily. 30 tablet 5   • thyroid 60 MG PO tablet Take 60 mg by mouth daily.       No current facility-administered medications for this visit.      Facility-Administered Medications Ordered in Other Visits   Medication Dose Route Frequency Provider Last Rate Last Dose   • heparin flush (porcine) 100 UNIT/ML injection 500 Units  500 Units Intravenous PRN Joseph Nunez MD   500 Units at 10/26/17 1155   • sodium chloride 0.9 % flush 10 mL  10 mL Intravenous PRN Joseph Nunez MD   10 mL at 10/26/17 1155       ALLERGIES:    Allergies   Allergen Reactions   • Benadryl  [Diphenhydramine Hcl (Sleep)] Other (See Comments)   • Codeine    • Diphenhydramine Other (See Comments)     \"Shaky leg syndrome\"  \"Shaky leg syndrome\"  \"Shaky leg syndrome\"   • Heparin Other (See Comments)     Patient states tolerates Heparin flush without problems: Years ago had heparin IV and had a rash   • Other      POPPY SEED   • Penicillins Hives   • Petroleum Jelly [Skin Protectants, Misc.]    • Sulfa Antibiotics    • Sulfur    • Valium [Diazepam]        Objective      Vitals:    10/10/19 0753   BP: 106/68   Pulse: 64   Resp: 16   Temp: 97.4 °F (36.3 °C)   TempSrc: Temporal   SpO2: 99%   Weight: 57 kg (125 lb 11.2 oz)   Height: 165.1 cm (65\")   PainSc: 0-No pain         Current Status 10/10/2019   ECOG score 2         Physical Examunchanged  General Appearance: Patient is awake, alert, oriented and in no acute distress. Patient is welldeveloped, wellnourished, and appears stated age.  HEENT: Normocephalic. Sclerae clear, conjunctiva pink, extraocular movements intact, pupils, round, reactive to light and  accommodation. Mouth and throat are clear with moist oral mucosa.  NECK: Supple, no jugular venous " distention, thyroid not enlarged.  LYMPH: No cervical, supraclavicular, axillary, or inguinal lymphadenopathy.  CHEST: Equal bilateral expansion, AP  diameter normal, resonant percussion note  LUNGS: Good air movement, no rales, rhonchi, rubs or wheezes with auscultation on the right. Diminished on the left.  CARDIO: Regular sinus rhythm, no murmurs, gallops or rubs.  ABDOMEN: Nondistended, soft, No tenderness, no guarding, no rebound, No hepatosplenomegaly. No abdominal masses. Bowel sounds positive. No hernia  GENITALIA: Not examined.  BREASTS: Not examined.  MUSKEL: No joint swelling, decreased motion, or inflammation  EXTREMS: patrick le  Edema R>L,No clubbing, cyanosis, No varicose veins.  NEURO: Grossly nonfocal, Gait is coordinated and smooth, Cognition is preserved.  SKIN: No rashes, no ecchymoses, no petechia.  PSYCH: Oriented to time, place and person. Memory is preserved. Mood and affect appear normal  RECENT LABS:  Lab on 10/10/2019   Component Date Value Ref Range Status   • WBC 10/10/2019 7.43  3.40 - 10.80 10*3/mm3 Final   • RBC 10/10/2019 3.67* 3.77 - 5.28 10*6/mm3 Final   • Hemoglobin 10/10/2019 12.6  12.0 - 15.9 g/dL Final   • Hematocrit 10/10/2019 37.5  34.0 - 46.6 % Final   • MCV 10/10/2019 102.2* 79.0 - 97.0 fL Final   • MCH 10/10/2019 34.3* 26.6 - 33.0 pg Final   • MCHC 10/10/2019 33.6  31.5 - 35.7 g/dL Final   • RDW 10/10/2019 16.8* 12.3 - 15.4 % Final   • RDW-SD 10/10/2019 60.4* 37.0 - 54.0 fl Final   • MPV 10/10/2019 9.0  6.0 - 12.0 fL Final   • Platelets 10/10/2019 537* 140 - 450 10*3/mm3 Final   • Neutrophil % 10/10/2019 54.2  42.7 - 76.0 % Final   • Lymphocyte % 10/10/2019 36.3  19.6 - 45.3 % Final   • Monocyte % 10/10/2019 7.7  5.0 - 12.0 % Final   • Eosinophil % 10/10/2019 0.5  0.3 - 6.2 % Final   • Basophil % 10/10/2019 0.9  0.0 - 1.5 % Final   • Immature Grans % 10/10/2019 0.4  0.0 - 0.5 % Final   • Neutrophils, Absolute 10/10/2019 4.02  1.70 - 7.00 10*3/mm3 Final   • Lymphocytes,  Absolute 10/10/2019 2.70  0.70 - 3.10 10*3/mm3 Final   • Monocytes, Absolute 10/10/2019 0.57  0.10 - 0.90 10*3/mm3 Final   • Eosinophils, Absolute 10/10/2019 0.04  0.00 - 0.40 10*3/mm3 Final   • Basophils, Absolute 10/10/2019 0.07  0.00 - 0.20 10*3/mm3 Final   • Immature Grans, Absolute 10/10/2019 0.03  0.00 - 0.05 10*3/mm3 Final   • nRBC 10/10/2019 0.0  0.0 - 0.2 /100 WBC Final       RADIOLOGY:  No results found.         Assessment/Plan  Heavenly Yanes is a 74 y.o. year old female with met breast cancer s/p multiple lines of CMT currently on taxotere weekly and xeloda with good tolerance.    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  --also on xeloda [id number], then repeat.  --CT c/a/p 2/19 showed stable dz. Some new Inflammatory opacities in the LEFT lower lobe and bilateral subpleural interstitial thickening.   -bone scan without evidence of dz progression  --primo taxotere weekly. Pt also on xeloda [id number]-off, then repeat.  4/25/19: therapy postponed 2 weeks ago due to neutropenia. He got Neupogen x1. Labs reviewed with pt wbc 10.68, hg 11.1, [id number]. ANC 7630.  -ANC low 880. Postpone tx. Give neupogen x1. Pt advised to hold xeloda until 2 weeks    -imaging CT c/a/p 5/31/19 showed stable dz. Bone scan 5/31/19 showed multiple metastatic dz, nothing new. Possible pathologic fracture in the area of the coracoid process. Pt  does not endorse pain there.  -labs 8/22/19 reviewed with pt wbc 4.99 Hg 11.7, plt 400. Ok for treatment with taxotere and neulasta.  -exam and review of labs did not reveal reason for the increased sob on exertion  -8/30/19: she presents today to review CT scan done 8:29/19. Overall stable in terms of her cancer but there is a concerning are of extraluminal collection of air in the deep pelvis with a thickened wall measuring about 2.8 x 3.2 cm that is situated between the lower sigmoid colon and small bowel and just above the uterus. This likely represents a contained sigmoid colon perforation related to prior diverticulitis. Perforated neoplasm cannot be ruled out. GI consultation is recommended for this finding. The sigmoid colon and adjacent small bowel loops appear to be matted around this collection.  -currently not having any abdominal symptoms.  -will refer to GI to get their opinion. Heavenly is okay with this.  -obviously no progression will primo therapy as usual.  -has appointment with GI in the near future.  -labs reviewed with pt and   wbc 7.43, Hg 12.6, plt 537.  Cr 0.83. Ok for docetaxel.    2. Psych: on olanzapine    3. HTN: on metoprolol    4. Gerd: on ranitidine  5. Dyspnea: get a cxr to evaluate  6. Anemia: will check iron profile, and act accordingly . Her creatinine is normal at 0.94.   7. DVT on xarelto indefinite.  --Doppler left LE  1/30/19 There is evidence of deep venous thrombosis of the left lower extremity. The clot burden is improved from last study. There is also evidence of superficial clot in the greater saphenous vein. There is a Baker's cyst in the popliteal fossa.  8. CHF: LVEF 65% with diastolic dysfunction. referral to cardiology to maximized her heart function.  9. Thrombocytosis: suspect reactive from anemia. Will check iron profile.  10.left lung pneumonia: resolved  --cxr showed pneumonia. Pt already on levofloxacin. Will complete 14 days.   11: LE edema: improved on  lasix.  12. FEN:  Low potassium 3.3  --Advised 20meq kdur bid for 3 days  then 20meq once a day thereafter.      Moise Sotelo MD    10/10/2019    8:20 AM

## 2019-10-10 NOTE — TELEPHONE ENCOUNTER
Called Heavenly to let her know that her potassium level is low at 3.3.  Heavenly stated that she is taking 1 potassium pill a day.  Verbal order from Dr. Sotelo for her to take 1 tab twice a day for 3 days.  Heavenly v/u.

## 2019-10-11 LAB — CANCER AG27-29 SERPL-ACNC: 49.9 U/ML (ref 0–38.6)

## 2019-10-17 ENCOUNTER — OFFICE VISIT (OUTPATIENT)
Dept: ONCOLOGY | Facility: CLINIC | Age: 74
End: 2019-10-17

## 2019-10-17 ENCOUNTER — LAB (OUTPATIENT)
Dept: LAB | Facility: HOSPITAL | Age: 74
End: 2019-10-17

## 2019-10-17 ENCOUNTER — INFUSION (OUTPATIENT)
Dept: ONCOLOGY | Facility: HOSPITAL | Age: 74
End: 2019-10-17

## 2019-10-17 VITALS
SYSTOLIC BLOOD PRESSURE: 126 MMHG | WEIGHT: 124 LBS | HEIGHT: 65 IN | BODY MASS INDEX: 20.66 KG/M2 | TEMPERATURE: 97.9 F | RESPIRATION RATE: 17 BRPM | DIASTOLIC BLOOD PRESSURE: 76 MMHG | OXYGEN SATURATION: 96 % | HEART RATE: 92 BPM

## 2019-10-17 VITALS
WEIGHT: 123.4 LBS | HEIGHT: 65 IN | OXYGEN SATURATION: 84 % | RESPIRATION RATE: 18 BRPM | HEART RATE: 68 BPM | BODY MASS INDEX: 20.56 KG/M2 | TEMPERATURE: 98.3 F

## 2019-10-17 DIAGNOSIS — Z95.828 PORT-A-CATH IN PLACE: ICD-10-CM

## 2019-10-17 DIAGNOSIS — C50.912 CARCINOMA OF LEFT BREAST METASTATIC TO BONE (HCC): ICD-10-CM

## 2019-10-17 DIAGNOSIS — C50.919 BREAST CANCER METASTASIZED TO BONE, UNSPECIFIED LATERALITY (HCC): ICD-10-CM

## 2019-10-17 DIAGNOSIS — C79.51 CARCINOMA OF LEFT BREAST METASTATIC TO BONE (HCC): Primary | ICD-10-CM

## 2019-10-17 DIAGNOSIS — C50.412 MALIGNANT NEOPLASM OF UPPER-OUTER QUADRANT OF LEFT FEMALE BREAST, UNSPECIFIED ESTROGEN RECEPTOR STATUS (HCC): ICD-10-CM

## 2019-10-17 DIAGNOSIS — C79.51 BREAST CANCER METASTASIZED TO BONE, UNSPECIFIED LATERALITY (HCC): ICD-10-CM

## 2019-10-17 DIAGNOSIS — C79.51 CARCINOMA OF LEFT BREAST METASTATIC TO BONE (HCC): ICD-10-CM

## 2019-10-17 DIAGNOSIS — C50.912 CARCINOMA OF LEFT BREAST METASTATIC TO BONE (HCC): Primary | ICD-10-CM

## 2019-10-17 LAB
ALBUMIN SERPL-MCNC: 3.3 G/DL (ref 3.5–5.2)
ALBUMIN/GLOB SERPL: 1 G/DL
ALP SERPL-CCNC: 63 U/L (ref 39–117)
ALT SERPL W P-5'-P-CCNC: 8 U/L (ref 1–33)
ANION GAP SERPL CALCULATED.3IONS-SCNC: 11 MMOL/L (ref 5–15)
AST SERPL-CCNC: 19 U/L (ref 1–32)
BASOPHILS # BLD AUTO: 0.05 10*3/MM3 (ref 0–0.2)
BASOPHILS NFR BLD AUTO: 1.4 % (ref 0–1.5)
BILIRUB SERPL-MCNC: 0.4 MG/DL (ref 0.2–1.2)
BUN BLD-MCNC: 9 MG/DL (ref 8–23)
BUN/CREAT SERPL: 14.3 (ref 7–25)
CALCIUM SPEC-SCNC: 9.3 MG/DL (ref 8.6–10.5)
CHLORIDE SERPL-SCNC: 95 MMOL/L (ref 98–107)
CO2 SERPL-SCNC: 31 MMOL/L (ref 22–29)
CREAT BLD-MCNC: 0.63 MG/DL (ref 0.57–1)
DEPRECATED RDW RBC AUTO: 58 FL (ref 37–54)
EOSINOPHIL # BLD AUTO: 0.04 10*3/MM3 (ref 0–0.4)
EOSINOPHIL NFR BLD AUTO: 1.1 % (ref 0.3–6.2)
ERYTHROCYTE [DISTWIDTH] IN BLOOD BY AUTOMATED COUNT: 16.8 % (ref 12.3–15.4)
GFR SERPL CREATININE-BSD FRML MDRD: 92 ML/MIN/1.73
GLOBULIN UR ELPH-MCNC: 3.3 GM/DL
GLUCOSE BLD-MCNC: 122 MG/DL (ref 65–99)
HCT VFR BLD AUTO: 34.8 % (ref 34–46.6)
HGB BLD-MCNC: 12 G/DL (ref 12–15.9)
HOLD SPECIMEN: NORMAL
LYMPHOCYTES # BLD AUTO: 1.77 10*3/MM3 (ref 0.7–3.1)
LYMPHOCYTES NFR BLD AUTO: 48.4 % (ref 19.6–45.3)
MCH RBC QN AUTO: 34 PG (ref 26.6–33)
MCHC RBC AUTO-ENTMCNC: 34.5 G/DL (ref 31.5–35.7)
MCV RBC AUTO: 98.6 FL (ref 79–97)
MONOCYTES # BLD AUTO: 0.12 10*3/MM3 (ref 0.1–0.9)
MONOCYTES NFR BLD AUTO: 3.3 % (ref 5–12)
NEUTROPHILS # BLD AUTO: 1.62 10*3/MM3 (ref 1.7–7)
NEUTROPHILS NFR BLD AUTO: 44.2 % (ref 42.7–76)
PLATELET # BLD AUTO: 388 10*3/MM3 (ref 140–450)
PMV BLD AUTO: 10.6 FL (ref 6–12)
POTASSIUM BLD-SCNC: 3.3 MMOL/L (ref 3.5–5.2)
PROT SERPL-MCNC: 6.6 G/DL (ref 6–8.5)
RBC # BLD AUTO: 3.53 10*6/MM3 (ref 3.77–5.28)
SODIUM BLD-SCNC: 137 MMOL/L (ref 136–145)
WBC NRBC COR # BLD: 3.66 10*3/MM3 (ref 3.4–10.8)

## 2019-10-17 PROCEDURE — 96377 APPLICATON ON-BODY INJECTOR: CPT

## 2019-10-17 PROCEDURE — 25010000002 PEGFILGRASTIM 6 MG/0.6ML PREFILLED SYRINGE KIT: Performed by: INTERNAL MEDICINE

## 2019-10-17 PROCEDURE — 96413 CHEMO IV INFUSION 1 HR: CPT

## 2019-10-17 PROCEDURE — 25010000002 ONDANSETRON PER 1 MG: Performed by: INTERNAL MEDICINE

## 2019-10-17 PROCEDURE — 36415 COLL VENOUS BLD VENIPUNCTURE: CPT

## 2019-10-17 PROCEDURE — 25010000002 DOCETAXEL 20 MG/ML SOLUTION 8 ML VIAL: Performed by: INTERNAL MEDICINE

## 2019-10-17 PROCEDURE — 99214 OFFICE O/P EST MOD 30 MIN: CPT | Performed by: INTERNAL MEDICINE

## 2019-10-17 PROCEDURE — 25010000002 DEXAMETHASONE SODIUM PHOSPHATE 100 MG/10ML SOLUTION: Performed by: INTERNAL MEDICINE

## 2019-10-17 PROCEDURE — 80053 COMPREHEN METABOLIC PANEL: CPT

## 2019-10-17 PROCEDURE — 85025 COMPLETE CBC W/AUTO DIFF WBC: CPT

## 2019-10-17 PROCEDURE — 96367 TX/PROPH/DG ADDL SEQ IV INF: CPT

## 2019-10-17 RX ORDER — FAMOTIDINE 10 MG/ML
20 INJECTION, SOLUTION INTRAVENOUS AS NEEDED
Status: DISCONTINUED | OUTPATIENT
Start: 2019-10-17 | End: 2019-10-17 | Stop reason: HOSPADM

## 2019-10-17 RX ORDER — SODIUM CHLORIDE 0.9 % (FLUSH) 0.9 %
10 SYRINGE (ML) INJECTION AS NEEDED
Status: DISCONTINUED | OUTPATIENT
Start: 2019-10-17 | End: 2019-10-17 | Stop reason: HOSPADM

## 2019-10-17 RX ORDER — SODIUM CHLORIDE 9 MG/ML
250 INJECTION, SOLUTION INTRAVENOUS ONCE
Status: COMPLETED | OUTPATIENT
Start: 2019-10-17 | End: 2019-10-17

## 2019-10-17 RX ORDER — SODIUM CHLORIDE 0.9 % (FLUSH) 0.9 %
10 SYRINGE (ML) INJECTION AS NEEDED
Status: CANCELLED | OUTPATIENT
Start: 2019-10-17

## 2019-10-17 RX ORDER — MEPERIDINE HYDROCHLORIDE 50 MG/ML
25 INJECTION INTRAMUSCULAR; INTRAVENOUS; SUBCUTANEOUS
Status: DISCONTINUED | OUTPATIENT
Start: 2019-10-17 | End: 2019-10-17 | Stop reason: HOSPADM

## 2019-10-17 RX ADMIN — PEGFILGRASTIM 6 MG: KIT SUBCUTANEOUS at 12:53

## 2019-10-17 RX ADMIN — HEPARIN SODIUM (PORCINE) LOCK FLUSH IV SOLN 100 UNIT/ML 500 UNITS: 100 SOLUTION at 12:56

## 2019-10-17 RX ADMIN — DEXAMETHASONE SODIUM PHOSPHATE 50 ML: 10 INJECTION, SOLUTION INTRAMUSCULAR; INTRAVENOUS at 11:02

## 2019-10-17 RX ADMIN — SODIUM CHLORIDE, PRESERVATIVE FREE 10 ML: 5 INJECTION INTRAVENOUS at 12:56

## 2019-10-17 RX ADMIN — DOCETAXEL 60 MG: 20 INJECTION, SOLUTION, CONCENTRATE INTRAVENOUS at 11:23

## 2019-10-17 RX ADMIN — SODIUM CHLORIDE 250 ML: 9 INJECTION, SOLUTION INTRAVENOUS at 10:55

## 2019-10-17 NOTE — PROGRESS NOTES
[facility]  HEMATOLOGY & ONCOLOGY    Cancer Staging Information:  Cancer Staging  Malignant neoplasm of upper-outer quadrant of left female breast (CMS/HCC)  Staging form: Breast, AJCC V7  - Clinical stage from 10/11/2016: Stage IV (T2, N1, M1) - Signed by Clali Monsalve APRN on 10/11/2016        Subjective     VISIT DIAGNOSIS:   Encounter Diagnosis   Name Primary?   • Carcinoma of left breast metastatic to bone (CMS/HCC)        REASON FOR VISIT:     Chief Complaint   Patient presents with   • Breast Cancer     Follow up for Carcinoma of lt breast metastatic to bone        HEMATOLOGY / ONCOLOGY HISTORY:   Oncology/Hematology History    Patient is a 67-year-old postmenopausal female who had onset of her menses at age 9 and menopause at age 48. She received oral contraceptives for approximately 14 years and did not receive hormonal manipulation. Patient has a maternal cousin had breast cancer. Patient has had no prior breast biopsies. Patient found a palpable left breast mass as well as a left axillary mass. Patient had a bloody nipple discharge which been present for approximately 6 months. On 6/20/2012, a mammogram was performed showing a subareolar mass consistent with malignancy. Bilateral breast ultrasound revealed an ill-defined subareolar mass measuring 2.8-2.5 cm. There is a left axillary mass measuring 1.7 cm with suspicion of extracapsular extension. There is no evidence of disease in the right breast. On 7/9/2012 patient underwent a left breast biopsy and placement of marker clip. Left breast biopsy revealed an infiltrating lobular carcinoma grade 2 with focal ductal carcinoma in situ, solid pattern. A fine-needle biopsy of the left lymph node was consistent with metastatic carcinoma. Breast tumor profile revealed ER: 100%; OR: 98%; HER-2/maynor 2+; FISH: Unamplified; Ki-67: 71%; P 53 1%; DNA aneuploidy. A MRI of the breast were performed body multiple abnormal enhancing masses  within the left breast. There appears to be anterior retraction of the pectoralis muscle with no direct extension. There is a moderate to large, layering left pleural effusion appreciated. The right breast reveals 3 moderately enlarged lymph nodes in the posterior lateral aspect the right breast. These have fatty hilum but followup is recommended. Patient is undergone systemic studies including, on 8/2/2012, a CT scan of the brain showing atrophy. a CT scan that shows showing a small to moderate left pleural effusion with 3 subcentimeter nodule densities in the left lung.   She completed 4 cycles of neoadjuvant treatment with dose dense Adriamycin and Cytoxan. She had a mammogram which showed a significant reduction in the size of her left breast lesion. There is no axillary adenopathy noted. She has had an ultrasound revealing the  lesion reducing from 2.4 cm to 1 cm. Patient underwent a left mastectomy on 03/19/2013 finding negative invasive cancer, negative nodes. A 5% DCIS was noted. The patient has declined hormonal therapy. She did not need radiation therapy.  November 2014, she began to have evidence of lytic bone lesions at T5T6, frontal disease, an 8 mm lucency in the central frontal area of  the skull but nothing intracranial. Bone scan revealed only vertex tracer activity but bilateral ribs and thoracic spine, and other lesions in  her pelvis. At that time, she was started on letrozole since November 2014. She is taking Xgeva. She had progression found in bone in Feb 2015 on scan. She was started on Faslodex, Ibrance and continued xgeva.         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 B) Foci of ductal carcinoma in situ, solid pattern. C) Greatest dimension of  the invasive carcinoma is 15.8mm.         3/19/2013 Surgery     Left Mastectomy w/ Axillary Tail: Focal residual ducatal carcinoma in situ (DCIS) 12 lymph nodes negative for metastatic carcinoma.         10/20/2014 Biopsy     Peritoneum Biopsy: Final Diagnosis: Malignant Cell Neoplasm.           Breast cancer metastasized to bone, unspecified laterality (CMS/HCC)    10/15/2012 -  Other Event     Left mammogram:  Impression:  1. Decreasing spiculation and density in the retroareolar left breast, near a previous biopsy.   2. Definite left axillary lesion is not appreciated.          2/5/2013 -  Other Event     Diagnostic Mammo:  Comparison is made to 10/15/2012 and 6/20/2012  The spiculated mass at 12:00 behind the nipple is nearly resolved.   Impression:  1. The mass on the left side is less apparent after receiving chemotherapy. There has been a good response to chmotherapy on the left side.   2. No suspicious abnormality is seen in the right breast to account for the new palable abnormality at the 4-5:00 position.          3/19/2013 Surgery     Breast Biopsy:  Final Diagnosis:  A. Breast, left mastectomy with axillary tail  1. Focal residual ductal carcinoma in situ (DICS) (less than 5% of tumor bed).  2. Negative for residual invasive carcinoma.   3. 12 Lymph Nodes, negative for metastatic carcinoma (0/12) focally, some lymph nodes demonstrate fibrosis/treatment effect.  B. Skin and soft tissue, left advancement flap:  1. Benign skin and subcutis.   2. Negative for carcinoma.          6/20/2013 -  Other Event     Diagnostic Mammo [facility]:  1. A subareolar mass is consistent with malignancy. Additional involement extends inferiorly sonographically and superolaterally mammograhically as demonstrated by calcifications. The mass measures approx 2.8 cm sonographically, but the involved region is likely much larger including an intraductal componet. The left axillary lymph nodes are also involved.   2. Recommened  ultrasound guided biopsy of the left subareolar mass and left axillary lymph node.   3. Breast MRI could also further define multicentric disease on the left.   4. Clear suspicious finding on the right.  Recommend ongoing clinical follow up of palpable foci.          6/13/2014 -  Other Event     Diagnostic Mammo:  IMPRESSION:  1. History of left breast cancer and mastectomy. Stable benign right breast mammograms.          10/20/2014 Surgery     Final Diagnosis:  Biopsies, pertoneum:  Metastatic carcinoma, morphologically compatible with metastatic mammary carcinoma.          6/15/2015 -  Other Event     Diagnostic Mammo:  IMPRESSION:   1. Negative x-ray report, should not delay biopsy if a dominat or clinically suspicious mass is present.          7/5/2016 -  Other Event     Diagnositic mammogram:  Impression:   Stable right mamogram with no radiographic findings suspicious for malignancy. Recommend continued screening mammography per screening guidelines unless otherwise earlier clinically indicated.          9/18/2016 Initial Diagnosis     Secondary malignant neoplasm of bone and bone marrow                INTERVAL HISTORY  Patient ID: Heavenly Yanes is a 74 y.o. year old female Cancer Staging  Stage IV (T2, N1, M1)  --complaining of dyspnea on exersion since last Tuesday. She feels like she ran a mile  2/14/19: cxr showed pneumonia. Already on levofloxacin. Coughing not worse, she did not run temp.  Overall feels weak.  -restarted the appetite suppressant and is starting to eat more but not a whole lot  --2/28/19: CT c/ap with some response to therapy. New left chest inflammatory lesion to be followed ion next imaging. Bone scan unremarkable for progression.  4/25/19: she is much better since being off therapy for 2 weeks. COugh is improved.   6/20/19: has left ear drainage which is her usual ear infection. Leg edema improved with lasix. She was ordered a mammo by the surgeon which I told her is not necessary,  she has metastatic dz.    7/25/19: complaining of increased dyspnea on exertion. She takes her laxis daily.    8/22/19:  complaining of increased dyspnea on exertion. She takes her laxis daily. Also not much appetite even though on megace 40bid. YAIR not worse from prior. Weight the same  8/30/19: she presents today to review CT scan. Overall stable in terms of her cancer but there is a concerning are of extraluminal collection of air in the deep pelvis with a thickened wall measuring about 2.8 x 3.2 cm that is situated between the lower sigmoid colon and small bowel and just above the uterus. This likely represents a contained sigmoid colon perforation related to prior diverticulitis. Perforated neoplasm cannot be ruled out. GI consultation is recommended for this finding. The sigmoid colon and adjacent small bowel loops appear to be matted around this collection.  -10/17/19: no issues or concerns. Appetite a little better.  -currently not having any abdominal symptoms.  -Denies cp/n/v/, neuropathy/ no focal weakness.   Rest of ros unremarkable. 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) 9/18/2016     Past Surgical History:   Past Surgical History:   Procedure Laterality Date   • BREAST SURGERY      left breast biopsy and axillary node   • CHOLECYSTECTOMY     • COLONOSCOPY  08/15/2014    diverticulosis  otherwise normal colonoscopy   • EXTERNAL EAR SURGERY     • MASTECTOMY Left    • PORTACATH PLACEMENT       Social History:   Social History     Socioeconomic History   • Marital status:      Spouse name: Not on file   • Number of children: Not on file   • Years of education: Not on  file   • Highest education level: Not on file   Tobacco Use   • Smoking status: Never Smoker   • Smokeless tobacco: Never Used   Substance and Sexual Activity   • Alcohol use: No   • Drug use: No   • Sexual activity: Defer     Family History:   Family History   Problem Relation Age of Onset   • No Known Problems Daughter    • No Known Problems Son    • Other Mother         complications from a concussion from a fall   • Other Father         old age   • No Known Problems Brother    • No Known Problems Daughter        Review of Systems   Constitutional: Positive for activity change, appetite change and fatigue. Negative for unexpected weight change.   HENT: Negative.    Eyes: Negative.    Respiratory: Positive for shortness of breath.    Cardiovascular: Positive for leg swelling.   Gastrointestinal: Negative.    Endocrine: Negative.    Genitourinary: Negative.    Musculoskeletal: Negative.    Skin: Negative.    Neurological: Negative.    Hematological: Negative.    Psychiatric/Behavioral: Negative.         Performance Status:  Asymptomatic    Medications:    Current Outpatient Medications   Medication Sig Dispense Refill   • Calcium-Magnesium-Vitamin D (CALCIUM 1200+D3 PO) Take 1,200 mg by mouth Daily.     • capecitabine (XELODA) 500 MG chemo tablet Take 1 tablet by mouth daily for 2 weeks on and 1 week off. 21 tablet 5   • ciprofloxacin (CIPRO) 500 MG tablet Take 1 tablet by mouth 2 (Two) Times a Day. 28 tablet 0   • furosemide (LASIX) 20 MG tablet One tab po qd as needed for swelling take with potassium 30 tablet 3   • megestrol (MEGACE) 40 MG tablet Take 1 tablet by mouth 2 (Two) Times a Day. 60 tablet 5   • metoprolol tartrate (LOPRESSOR) 25 MG tablet Take 25 mg by mouth 2 (two) times a day.     • ofloxacin (FLOXIN) 0.3 % otic solution      • OLANZapine (ZYPREXA) 2.5 MG tablet Take 2.5 mg by mouth every night.     • ondansetron (ZOFRAN) 8 MG tablet Take 1 tablet by mouth Every 8 (Eight) Hours As Needed for Nausea  "or Vomiting. 30 tablet 5   • potassium chloride (K-DUR,KLOR-CON) 20 MEQ CR tablet Take one tab po bid on days taking Lasix 60 tablet 1   • raNITIdine (ZANTAC) 150 MG tablet Take 1 tablet by mouth 2 (Two) Times a Day. 60 tablet 5   • rivaroxaban (XARELTO) 20 MG tablet Take 1 tablet by mouth Daily. 30 tablet 5   • thyroid 60 MG PO tablet Take 60 mg by mouth daily.       No current facility-administered medications for this visit.      Facility-Administered Medications Ordered in Other Visits   Medication Dose Route Frequency Provider Last Rate Last Dose   • heparin flush (porcine) 100 UNIT/ML injection 500 Units  500 Units Intravenous PRN Joseph Nunez MD   500 Units at 10/26/17 1155   • sodium chloride 0.9 % flush 10 mL  10 mL Intravenous PRN Joseph Nunez MD   10 mL at 10/26/17 1155       ALLERGIES:    Allergies   Allergen Reactions   • Other Angioedema     POPPY SEED   • Penicillins Hives   • Sulfa Antibiotics Mental Status Change   • Sulfur Mental Status Change   • Valium [Diazepam] Unknown (See Comments)     Unable to remember reaction   • Benadryl [Diphenhydramine Hcl (Sleep)] Other (See Comments)     Shaky legs   • Codeine Rash   • Diphenhydramine Other (See Comments)     \"Shaky leg syndrome\"  \"Shaky leg syndrome\"  \"Shaky leg syndrome\"   • Heparin Other (See Comments)     Patient states tolerates Heparin flush without problems: Years ago had heparin IV and had a rash   • Petroleum Jelly [Skin Protectants, Misc.] Rash       Objective      Vitals:    10/17/19 0907   Pulse: 68   Resp: 18   Temp: 98.3 °F (36.8 °C)   SpO2: (!) 84%   Weight: 56 kg (123 lb 6.4 oz)   Height: 165.1 cm (65\")   PainSc: 0-No pain         Current Status 10/17/2019   ECOG score 2         Physical Examunchanged  General Appearance: Patient is awake, alert, oriented and in no acute distress. Patient is welldeveloped, wellnourished, and appears stated age.  HEENT: Normocephalic. Sclerae clear, conjunctiva pink, extraocular movements intact, " pupils, round, reactive to light and  accommodation. Mouth and throat are clear with moist oral mucosa.  NECK: Supple, no jugular venous distention, thyroid not enlarged.  LYMPH: No cervical, supraclavicular, axillary, or inguinal lymphadenopathy.  CHEST: Equal bilateral expansion, AP  diameter normal, resonant percussion note  LUNGS: Good air movement, no rales, rhonchi, rubs or wheezes with auscultation on the right. Diminished on the left.  CARDIO: Regular sinus rhythm, no murmurs, gallops or rubs.  ABDOMEN: Nondistended, soft, No tenderness, no guarding, no rebound, No hepatosplenomegaly. No abdominal masses. Bowel sounds positive. No hernia  GENITALIA: Not examined.  BREASTS: Not examined.  MUSKEL: No joint swelling, decreased motion, or inflammation  EXTREMS: patrick le  Edema R>L stable,No clubbing, cyanosis, No varicose veins.  NEURO: Grossly nonfocal, Gait is coordinated and smooth, Cognition is preserved.  SKIN: No rashes, no ecchymoses, no petechia.  PSYCH: Oriented to time, place and person. Memory is preserved. Mood and affect appear normal  RECENT LABS:  Lab on 10/17/2019   Component Date Value Ref Range Status   • Glucose 10/17/2019 122* 65 - 99 mg/dL Final   • BUN 10/17/2019 9  8 - 23 mg/dL Final   • Creatinine 10/17/2019 0.63  0.57 - 1.00 mg/dL Final   • Sodium 10/17/2019 137  136 - 145 mmol/L Final   • Potassium 10/17/2019 3.3* 3.5 - 5.2 mmol/L Final   • Chloride 10/17/2019 95* 98 - 107 mmol/L Final   • CO2 10/17/2019 31.0* 22.0 - 29.0 mmol/L Final   • Calcium 10/17/2019 9.3  8.6 - 10.5 mg/dL Final   • Total Protein 10/17/2019 6.6  6.0 - 8.5 g/dL Final   • Albumin 10/17/2019 3.30* 3.50 - 5.20 g/dL Final   • ALT (SGPT) 10/17/2019 8  1 - 33 U/L Final   • AST (SGOT) 10/17/2019 19  1 - 32 U/L Final   • Alkaline Phosphatase 10/17/2019 63  39 - 117 U/L Final   • Total Bilirubin 10/17/2019 0.4  0.2 - 1.2 mg/dL Final   • eGFR Non African Amer 10/17/2019 92  >60 mL/min/1.73 Final   • Globulin 10/17/2019 3.3   gm/dL Final   • A/G Ratio 10/17/2019 1.0  g/dL Final   • BUN/Creatinine Ratio 10/17/2019 14.3  7.0 - 25.0 Final   • Anion Gap 10/17/2019 11.0  5.0 - 15.0 mmol/L Final   • WBC 10/17/2019 3.66  3.40 - 10.80 10*3/mm3 Final   • RBC 10/17/2019 3.53* 3.77 - 5.28 10*6/mm3 Final   • Hemoglobin 10/17/2019 12.0  12.0 - 15.9 g/dL Final   • Hematocrit 10/17/2019 34.8  34.0 - 46.6 % Final   • MCV 10/17/2019 98.6* 79.0 - 97.0 fL Final   • MCH 10/17/2019 34.0* 26.6 - 33.0 pg Final   • MCHC 10/17/2019 34.5  31.5 - 35.7 g/dL Final   • RDW 10/17/2019 16.8* 12.3 - 15.4 % Final   • RDW-SD 10/17/2019 58.0* 37.0 - 54.0 fl Final   • MPV 10/17/2019 10.6  6.0 - 12.0 fL Final   • Platelets 10/17/2019 388  140 - 450 10*3/mm3 Final   • Neutrophil % 10/17/2019 44.2  42.7 - 76.0 % Final   • Lymphocyte % 10/17/2019 48.4* 19.6 - 45.3 % Final   • Monocyte % 10/17/2019 3.3* 5.0 - 12.0 % Final   • Eosinophil % 10/17/2019 1.1  0.3 - 6.2 % Final   • Basophil % 10/17/2019 1.4  0.0 - 1.5 % Final   • Neutrophils, Absolute 10/17/2019 1.62* 1.70 - 7.00 10*3/mm3 Final   • Lymphocytes, Absolute 10/17/2019 1.77  0.70 - 3.10 10*3/mm3 Final   • Monocytes, Absolute 10/17/2019 0.12  0.10 - 0.90 10*3/mm3 Final   • Eosinophils, Absolute 10/17/2019 0.04  0.00 - 0.40 10*3/mm3 Final   • Basophils, Absolute 10/17/2019 0.05  0.00 - 0.20 10*3/mm3 Final   • Extra Tube 10/17/2019 Hold for add-ons.   Final    Auto resulted.       RADIOLOGY:  No results found.         Assessment/Plan  Heavenly Yanes is a 74 y.o. year old female with met breast cancer s/p multiple lines of CMT currently on taxotere weekly and xeloda with good tolerance.    Patient Active Problem List   Diagnosis   • Malignant neoplasm of upper-outer quadrant of left female breast (CMS/HCC)   • Breast cancer metastasized to bone, unspecified laterality (CMS/HCC)   • Essential hypertension   • Acquired hypothyroidism   • Bipolar 1 disorder (CMS/HCC)   • Carcinoma of left breast metastatic to bone (CMS/HCC)   •  Antineoplastic chemotherapy induced anemia   • Dehydration   • Encounter for administration of vaccine   • Chemotherapy induced neutropenia (CMS/HCC)   • Cough   • Port-A-Cath in place   • Edema   • Hypokalemia          1.Metastatic breast ca: currently on weekly taxotere 3weeks on 1 week off.  --also on xeloda [id number], then repeat.  --CT c/a/p 2/19 showed stable dz. Some new Inflammatory opacities in the LEFT lower lobe and bilateral subpleural interstitial thickening.   -bone scan without evidence of dz progression  --primo taxotere weekly. Pt also on xeloda [id number], then repeat.  4/25/19: therapy postponed 2 weeks ago due to neutropenia. He got Neupogen x1. Labs reviewed with pt wbc 10.68, hg 11.1, [id number]. ANC 7630.  -ANC low 880. Postpone tx. Give neupogen x1. Pt advised to hold xeloda until 2 weeks    -imaging CT c/a/p 5/31/19 showed stable dz. Bone scan 5/31/19 showed multiple metastatic dz, nothing new. Possible pathologic fracture in the area of the coracoid process. Pt does not endorse pain there.  -labs 8/22/19 reviewed with pt wbc 4.99 Hg 11.7, plt 400. Ok for treatment with taxotere and neulasta.  -exam and review of labs did not reveal reason for the increased sob on exertion  -8/30/19: she presents today to review CT scan done 8:29/19. Overall stable in terms of her cancer but there is a concerning are of extraluminal collection of air in the deep pelvis with a thickened wall measuring about 2.8 x 3.2 cm that is situated between the lower sigmoid colon and small bowel and just above the uterus. This likely represents a contained sigmoid colon perforation related to prior diverticulitis. Perforated neoplasm cannot be ruled out. GI consultation is recommended for this finding. The sigmoid colon and adjacent small bowel loops appear to be matted around this collection.  -currently not having any abdominal symptoms.  -will refer to GI to get their opinion. Heavenly is okay with  this.  -obviously no progression will primo therapy as usual.  -has appointment with GI in the near future.  -labs reviewed with pt and   wbc 3.66, Hg 12.0, plt 388.  Cr 0.63. Ok for docetaxel.    2. Psych: on olanzapine    3. HTN: on metoprolol    4. Gerd: on ranitidine  5. Dyspnea: get a cxr to evaluate  6. Anemia: will check iron profile, and act accordingly . Her creatinine is normal at 0.94.   7. DVT on xarelto indefinite.  --Doppler left LE  1/30/19 There is evidence of deep venous thrombosis of the left lower extremity. The clot burden is improved from last study. There is also evidence of superficial clot in the greater saphenous vein. There is a Baker's cyst in the popliteal fossa.  8. CHF: LVEF 65% with diastolic dysfunction. referral to cardiology to maximized her heart function.  9. Thrombocytosis: suspect reactive from anemia. Will check iron profile.  10.left lung pneumonia: resolved  --cxr showed pneumonia. Pt already on levofloxacin. Will complete 14 days.   11: LE edema: improved on lasix.  12. FEN:  Low potassium 3.3  --Advised 20meq kdur bid for 3 days  then 20meq once a day thereafter.      Moise Sotelo MD    10/17/2019    10:08 AM

## 2019-11-07 ENCOUNTER — INFUSION (OUTPATIENT)
Dept: ONCOLOGY | Facility: HOSPITAL | Age: 74
End: 2019-11-07

## 2019-11-07 ENCOUNTER — LAB (OUTPATIENT)
Dept: LAB | Facility: HOSPITAL | Age: 74
End: 2019-11-07

## 2019-11-07 ENCOUNTER — OFFICE VISIT (OUTPATIENT)
Dept: ONCOLOGY | Facility: CLINIC | Age: 74
End: 2019-11-07

## 2019-11-07 VITALS
BODY MASS INDEX: 19.49 KG/M2 | HEART RATE: 96 BPM | RESPIRATION RATE: 14 BRPM | OXYGEN SATURATION: 98 % | TEMPERATURE: 97.8 F | DIASTOLIC BLOOD PRESSURE: 65 MMHG | SYSTOLIC BLOOD PRESSURE: 124 MMHG | WEIGHT: 117 LBS | HEIGHT: 65 IN

## 2019-11-07 VITALS
DIASTOLIC BLOOD PRESSURE: 68 MMHG | HEIGHT: 65 IN | OXYGEN SATURATION: 98 % | HEART RATE: 111 BPM | SYSTOLIC BLOOD PRESSURE: 122 MMHG | RESPIRATION RATE: 16 BRPM | BODY MASS INDEX: 19.64 KG/M2 | TEMPERATURE: 99.7 F | WEIGHT: 117.9 LBS

## 2019-11-07 DIAGNOSIS — C79.51 CARCINOMA OF LEFT BREAST METASTATIC TO BONE (HCC): ICD-10-CM

## 2019-11-07 DIAGNOSIS — C79.51 CARCINOMA OF LEFT BREAST METASTATIC TO BONE (HCC): Primary | ICD-10-CM

## 2019-11-07 DIAGNOSIS — C50.412 MALIGNANT NEOPLASM OF UPPER-OUTER QUADRANT OF LEFT FEMALE BREAST, UNSPECIFIED ESTROGEN RECEPTOR STATUS (HCC): ICD-10-CM

## 2019-11-07 DIAGNOSIS — Z95.828 PORT-A-CATH IN PLACE: ICD-10-CM

## 2019-11-07 DIAGNOSIS — C79.51 BREAST CANCER METASTASIZED TO BONE, UNSPECIFIED LATERALITY (HCC): ICD-10-CM

## 2019-11-07 DIAGNOSIS — C50.912 CARCINOMA OF LEFT BREAST METASTATIC TO BONE (HCC): ICD-10-CM

## 2019-11-07 DIAGNOSIS — C50.919 BREAST CANCER METASTASIZED TO BONE, UNSPECIFIED LATERALITY (HCC): ICD-10-CM

## 2019-11-07 DIAGNOSIS — C50.912 CARCINOMA OF LEFT BREAST METASTATIC TO BONE (HCC): Primary | ICD-10-CM

## 2019-11-07 LAB
ALBUMIN SERPL-MCNC: 3.4 G/DL (ref 3.5–5.2)
ALBUMIN/GLOB SERPL: 1.1 G/DL
ALP SERPL-CCNC: 103 U/L (ref 39–117)
ALT SERPL W P-5'-P-CCNC: 16 U/L (ref 1–33)
ANION GAP SERPL CALCULATED.3IONS-SCNC: 13 MMOL/L (ref 5–15)
AST SERPL-CCNC: 20 U/L (ref 1–32)
BASOPHILS # BLD AUTO: 0.08 10*3/MM3 (ref 0–0.2)
BASOPHILS NFR BLD AUTO: 0.7 % (ref 0–1.5)
BILIRUB SERPL-MCNC: 0.3 MG/DL (ref 0.2–1.2)
BUN BLD-MCNC: 9 MG/DL (ref 8–23)
BUN/CREAT SERPL: 11 (ref 7–25)
CALCIUM SPEC-SCNC: 9.7 MG/DL (ref 8.6–10.5)
CANCER AG15-3 SERPL-ACNC: 60 U/ML
CHLORIDE SERPL-SCNC: 95 MMOL/L (ref 98–107)
CO2 SERPL-SCNC: 32 MMOL/L (ref 22–29)
CREAT BLD-MCNC: 0.82 MG/DL (ref 0.57–1)
DEPRECATED RDW RBC AUTO: 69.2 FL (ref 37–54)
EOSINOPHIL # BLD AUTO: 0.01 10*3/MM3 (ref 0–0.4)
EOSINOPHIL NFR BLD AUTO: 0.1 % (ref 0.3–6.2)
ERYTHROCYTE [DISTWIDTH] IN BLOOD BY AUTOMATED COUNT: 19.6 % (ref 12.3–15.4)
GFR SERPL CREATININE-BSD FRML MDRD: 68 ML/MIN/1.73
GLOBULIN UR ELPH-MCNC: 3.2 GM/DL
GLUCOSE BLD-MCNC: 126 MG/DL (ref 65–99)
HCT VFR BLD AUTO: 37.7 % (ref 34–46.6)
HGB BLD-MCNC: 12.9 G/DL (ref 12–15.9)
IMM GRANULOCYTES # BLD AUTO: 0.1 10*3/MM3 (ref 0–0.05)
IMM GRANULOCYTES NFR BLD AUTO: 0.9 % (ref 0–0.5)
LYMPHOCYTES # BLD AUTO: 2.45 10*3/MM3 (ref 0.7–3.1)
LYMPHOCYTES NFR BLD AUTO: 21.1 % (ref 19.6–45.3)
MCH RBC QN AUTO: 33.9 PG (ref 26.6–33)
MCHC RBC AUTO-ENTMCNC: 34.2 G/DL (ref 31.5–35.7)
MCV RBC AUTO: 99 FL (ref 79–97)
MONOCYTES # BLD AUTO: 0.89 10*3/MM3 (ref 0.1–0.9)
MONOCYTES NFR BLD AUTO: 7.7 % (ref 5–12)
NEUTROPHILS # BLD AUTO: 8.07 10*3/MM3 (ref 1.7–7)
NEUTROPHILS NFR BLD AUTO: 69.5 % (ref 42.7–76)
NRBC BLD AUTO-RTO: 0 /100 WBC (ref 0–0.2)
PLATELET # BLD AUTO: 436 10*3/MM3 (ref 140–450)
PMV BLD AUTO: 10.2 FL (ref 6–12)
POTASSIUM BLD-SCNC: 3.4 MMOL/L (ref 3.5–5.2)
PROT SERPL-MCNC: 6.6 G/DL (ref 6–8.5)
RBC # BLD AUTO: 3.81 10*6/MM3 (ref 3.77–5.28)
SODIUM BLD-SCNC: 140 MMOL/L (ref 136–145)
WBC NRBC COR # BLD: 11.6 10*3/MM3 (ref 3.4–10.8)

## 2019-11-07 PROCEDURE — 86300 IMMUNOASSAY TUMOR CA 15-3: CPT

## 2019-11-07 PROCEDURE — 80053 COMPREHEN METABOLIC PANEL: CPT

## 2019-11-07 PROCEDURE — G0008 ADMIN INFLUENZA VIRUS VAC: HCPCS | Performed by: INTERNAL MEDICINE

## 2019-11-07 PROCEDURE — 99215 OFFICE O/P EST HI 40 MIN: CPT | Performed by: INTERNAL MEDICINE

## 2019-11-07 PROCEDURE — 85025 COMPLETE CBC W/AUTO DIFF WBC: CPT

## 2019-11-07 PROCEDURE — 96413 CHEMO IV INFUSION 1 HR: CPT

## 2019-11-07 PROCEDURE — 90653 IIV ADJUVANT VACCINE IM: CPT | Performed by: INTERNAL MEDICINE

## 2019-11-07 PROCEDURE — 25010000002 DEXAMETHASONE SODIUM PHOSPHATE 100 MG/10ML SOLUTION: Performed by: INTERNAL MEDICINE

## 2019-11-07 PROCEDURE — 25010000002 ONDANSETRON PER 1 MG: Performed by: INTERNAL MEDICINE

## 2019-11-07 PROCEDURE — 36415 COLL VENOUS BLD VENIPUNCTURE: CPT

## 2019-11-07 PROCEDURE — 25010000002 DOCETAXEL 20 MG/ML SOLUTION 4 ML VIAL: Performed by: INTERNAL MEDICINE

## 2019-11-07 PROCEDURE — 96367 TX/PROPH/DG ADDL SEQ IV INF: CPT

## 2019-11-07 RX ORDER — FAMOTIDINE 10 MG/ML
20 INJECTION, SOLUTION INTRAVENOUS AS NEEDED
Status: CANCELLED | OUTPATIENT
Start: 2019-11-07

## 2019-11-07 RX ORDER — SODIUM CHLORIDE 0.9 % (FLUSH) 0.9 %
10 SYRINGE (ML) INJECTION AS NEEDED
Status: CANCELLED | OUTPATIENT
Start: 2019-11-07

## 2019-11-07 RX ORDER — FAMOTIDINE 10 MG/ML
20 INJECTION, SOLUTION INTRAVENOUS AS NEEDED
Status: CANCELLED | OUTPATIENT
Start: 2019-11-15

## 2019-11-07 RX ORDER — MEPERIDINE HYDROCHLORIDE 50 MG/ML
25 INJECTION INTRAMUSCULAR; INTRAVENOUS; SUBCUTANEOUS
Status: CANCELLED | OUTPATIENT
Start: 2019-11-15 | End: 2019-11-15

## 2019-11-07 RX ORDER — SODIUM CHLORIDE 9 MG/ML
250 INJECTION, SOLUTION INTRAVENOUS ONCE
Status: CANCELLED | OUTPATIENT
Start: 2019-11-15

## 2019-11-07 RX ORDER — MEPERIDINE HYDROCHLORIDE 50 MG/ML
25 INJECTION INTRAMUSCULAR; INTRAVENOUS; SUBCUTANEOUS
Status: DISCONTINUED | OUTPATIENT
Start: 2019-11-07 | End: 2019-11-07 | Stop reason: HOSPADM

## 2019-11-07 RX ORDER — SODIUM CHLORIDE 0.9 % (FLUSH) 0.9 %
10 SYRINGE (ML) INJECTION AS NEEDED
Status: DISCONTINUED | OUTPATIENT
Start: 2019-11-07 | End: 2019-11-07 | Stop reason: HOSPADM

## 2019-11-07 RX ORDER — FAMOTIDINE 10 MG/ML
20 INJECTION, SOLUTION INTRAVENOUS AS NEEDED
Status: DISCONTINUED | OUTPATIENT
Start: 2019-11-07 | End: 2019-11-07 | Stop reason: HOSPADM

## 2019-11-07 RX ORDER — SODIUM CHLORIDE 9 MG/ML
250 INJECTION, SOLUTION INTRAVENOUS ONCE
Status: CANCELLED | OUTPATIENT
Start: 2019-11-07

## 2019-11-07 RX ORDER — MEPERIDINE HYDROCHLORIDE 50 MG/ML
25 INJECTION INTRAMUSCULAR; INTRAVENOUS; SUBCUTANEOUS
Status: CANCELLED | OUTPATIENT
Start: 2019-11-07 | End: 2019-11-08

## 2019-11-07 RX ORDER — SODIUM CHLORIDE 9 MG/ML
250 INJECTION, SOLUTION INTRAVENOUS ONCE
Status: COMPLETED | OUTPATIENT
Start: 2019-11-07 | End: 2019-11-07

## 2019-11-07 RX ADMIN — DEXAMETHASONE SODIUM PHOSPHATE 50 ML: 10 INJECTION, SOLUTION INTRAMUSCULAR; INTRAVENOUS at 11:12

## 2019-11-07 RX ADMIN — Medication 500 UNITS: at 12:53

## 2019-11-07 RX ADMIN — SODIUM CHLORIDE, PRESERVATIVE FREE 10 ML: 5 INJECTION INTRAVENOUS at 12:53

## 2019-11-07 RX ADMIN — DOCETAXEL 60 MG: 20 INJECTION, SOLUTION, CONCENTRATE INTRAVENOUS at 11:35

## 2019-11-07 RX ADMIN — SODIUM CHLORIDE 250 ML: 9 INJECTION, SOLUTION INTRAVENOUS at 11:06

## 2019-11-07 NOTE — PROGRESS NOTES
[facility]  HEMATOLOGY & ONCOLOGY    Cancer Staging Information:  Cancer Staging  Malignant neoplasm of upper-outer quadrant of left female breast (CMS/HCC)  Staging form: Breast, AJCC V7  - Clinical stage from 10/11/2016: Stage IV (T2, N1, M1) - Signed by Calli Monsalve APRN on 10/11/2016        Subjective     VISIT DIAGNOSIS:   Encounter Diagnosis   Name Primary?   • Carcinoma of left breast metastatic to bone (CMS/HCC)        REASON FOR VISIT:     Chief Complaint   Patient presents with   • Breast Cancer     here for chemo        HEMATOLOGY / ONCOLOGY HISTORY:   Oncology/Hematology History    Patient is a 67-year-old postmenopausal female who had onset of her menses at age 9 and menopause at age 48. She received oral contraceptives for approximately 14 years and did not receive hormonal manipulation. Patient has a maternal cousin had breast cancer. Patient has had no prior breast biopsies. Patient found a palpable left breast mass as well as a left axillary mass. Patient had a bloody nipple discharge which been present for approximately 6 months. On 6/20/2012, a mammogram was performed showing a subareolar mass consistent with malignancy. Bilateral breast ultrasound revealed an ill-defined subareolar mass measuring 2.8-2.5 cm. There is a left axillary mass measuring 1.7 cm with suspicion of extracapsular extension. There is no evidence of disease in the right breast. On 7/9/2012 patient underwent a left breast biopsy and placement of marker clip. Left breast biopsy revealed an infiltrating lobular carcinoma grade 2 with focal ductal carcinoma in situ, solid pattern. A fine-needle biopsy of the left lymph node was consistent with metastatic carcinoma. Breast tumor profile revealed ER: 100%; ID: 98%; HER-2/maynor 2+; FISH: Unamplified; Ki-67: 71%; P 53 1%; DNA aneuploidy. A MRI of the breast were performed body multiple abnormal enhancing masses within the left breast. There appears to be  anterior retraction of the pectoralis muscle with no direct extension. There is a moderate to large, layering left pleural effusion appreciated. The right breast reveals 3 moderately enlarged lymph nodes in the posterior lateral aspect the right breast. These have fatty hilum but followup is recommended. Patient is undergone systemic studies including, on 8/2/2012, a CT scan of the brain showing atrophy. a CT scan that shows showing a small to moderate left pleural effusion with 3 subcentimeter nodule densities in the left lung.   She completed 4 cycles of neoadjuvant treatment with dose dense Adriamycin and Cytoxan. She had a mammogram which showed a significant reduction in the size of her left breast lesion. There is no axillary adenopathy noted. She has had an ultrasound revealing the  lesion reducing from 2.4 cm to 1 cm. Patient underwent a left mastectomy on 03/19/2013 finding negative invasive cancer, negative nodes. A 5% DCIS was noted. The patient has declined hormonal therapy. She did not need radiation therapy.  November 2014, she began to have evidence of lytic bone lesions at T5T6, frontal disease, an 8 mm lucency in the central frontal area of  the skull but nothing intracranial. Bone scan revealed only vertex tracer activity but bilateral ribs and thoracic spine, and other lesions in  her pelvis. At that time, she was started on letrozole since November 2014. She is taking Xgeva. She had progression found in bone in Feb 2015 on scan. She was started on Faslodex, Ibrance and continued xgeva.         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 B) Foci of ductal carcinoma in situ, solid pattern. C) Greatest dimension of the invasive carcinoma is 15.8mm.          3/19/2013 Surgery     Left Mastectomy w/ Axillary Tail: Focal residual ducatal carcinoma in situ (DCIS) 12 lymph nodes negative for metastatic carcinoma.         10/20/2014 Biopsy     Peritoneum Biopsy: Final Diagnosis: Malignant Cell Neoplasm.           Breast cancer metastasized to bone, unspecified laterality (CMS/HCC)    10/15/2012 -  Other Event     Left mammogram:  Impression:  1. Decreasing spiculation and density in the retroareolar left breast, near a previous biopsy.   2. Definite left axillary lesion is not appreciated.          2/5/2013 -  Other Event     Diagnostic Mammo:  Comparison is made to 10/15/2012 and 6/20/2012  The spiculated mass at 12:00 behind the nipple is nearly resolved.   Impression:  1. The mass on the left side is less apparent after receiving chemotherapy. There has been a good response to chmotherapy on the left side.   2. No suspicious abnormality is seen in the right breast to account for the new palable abnormality at the 4-5:00 position.          3/19/2013 Surgery     Breast Biopsy:  Final Diagnosis:  A. Breast, left mastectomy with axillary tail  1. Focal residual ductal carcinoma in situ (DICS) (less than 5% of tumor bed).  2. Negative for residual invasive carcinoma.   3. 12 Lymph Nodes, negative for metastatic carcinoma (0/12) focally, some lymph nodes demonstrate fibrosis/treatment effect.  B. Skin and soft tissue, left advancement flap:  1. Benign skin and subcutis.   2. Negative for carcinoma.          6/20/2013 -  Other Event     Diagnostic Mammo [facility]:  1. A subareolar mass is consistent with malignancy. Additional involement extends inferiorly sonographically and superolaterally mammograhically as demonstrated by calcifications. The mass measures approx 2.8 cm sonographically, but the involved region is likely much larger including an intraductal componet. The left axillary lymph nodes are also involved.   2. Recommened ultrasound guided biopsy of the left  subareolar mass and left axillary lymph node.   3. Breast MRI could also further define multicentric disease on the left.   4. Clear suspicious finding on the right.  Recommend ongoing clinical follow up of palpable foci.          6/13/2014 -  Other Event     Diagnostic Mammo:  IMPRESSION:  1. History of left breast cancer and mastectomy. Stable benign right breast mammograms.          10/20/2014 Surgery     Final Diagnosis:  Biopsies, pertoneum:  Metastatic carcinoma, morphologically compatible with metastatic mammary carcinoma.          6/15/2015 -  Other Event     Diagnostic Mammo:  IMPRESSION:   1. Negative x-ray report, should not delay biopsy if a dominat or clinically suspicious mass is present.          7/5/2016 -  Other Event     Diagnositic mammogram:  Impression:   Stable right mamogram with no radiographic findings suspicious for malignancy. Recommend continued screening mammography per screening guidelines unless otherwise earlier clinically indicated.          9/18/2016 Initial Diagnosis     Secondary malignant neoplasm of bone and bone marrow                INTERVAL HISTORY  Patient ID: Heavenly Yanes is a 74 y.o. year old female Cancer Staging  Stage IV (T2, N1, M1)  --complaining of dyspnea on exersion since last Tuesday. She feels like she ran a mile  2/14/19: cxr showed pneumonia. Already on levofloxacin. Coughing not worse, she did not run temp.  Overall feels weak.  -restarted the appetite suppressant and is starting to eat more but not a whole lot  --2/28/19: CT c/ap with some response to therapy. New left chest inflammatory lesion to be followed ion next imaging. Bone scan unremarkable for progression.  4/25/19: she is much better since being off therapy for 2 weeks. COugh is improved.   6/20/19: has left ear drainage which is her usual ear infection. Leg edema improved with lasix. She was ordered a mammo by the surgeon which I told her is not necessary, she has metastatic dz.    7/25/19:  complaining of increased dyspnea on exertion. She takes her laxis daily.    8/22/19:  complaining of increased dyspnea on exertion. She takes her laxis daily. Also not much appetite even though on megace 40bid. YAIR not worse from prior. Weight the same  8/30/19: she presents today to review CT scan. Overall stable in terms of her cancer but there is a concerning are of extraluminal collection of air in the deep pelvis with a thickened wall measuring about 2.8 x 3.2 cm that is situated between the lower sigmoid colon and small bowel and just above the uterus. This likely represents a contained sigmoid colon perforation related to prior diverticulitis. Perforated neoplasm cannot be ruled out. GI consultation is recommended for this finding. The sigmoid colon and adjacent small bowel loops appear to be matted around this collection.  -11/7/19: main concern from  is lack of appetite. contnues to be fatigued.  -currently not having any abdominal symptoms.  -Denies cp/n/v/, diarrhea/fever/chills/neuropathy/ no focal weakness.   Rest of ros unremarkable. 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) 9/18/2016     Past Surgical History:   Past Surgical History:   Procedure Laterality Date   • BREAST SURGERY      left breast biopsy and axillary node   • CHOLECYSTECTOMY     • COLONOSCOPY  08/15/2014    diverticulosis  otherwise normal colonoscopy   • EXTERNAL EAR SURGERY     • MASTECTOMY Left    • PORTACATH PLACEMENT       Social History:   Social History     Socioeconomic History   • Marital status:      Spouse name: Not on file   • Number of children: Not on file   • Years of  education: Not on file   • Highest education level: Not on file   Tobacco Use   • Smoking status: Never Smoker   • Smokeless tobacco: Never Used   Substance and Sexual Activity   • Alcohol use: No   • Drug use: No   • Sexual activity: Defer     Family History:   Family History   Problem Relation Age of Onset   • No Known Problems Daughter    • No Known Problems Son    • Other Mother         complications from a concussion from a fall   • Other Father         old age   • No Known Problems Brother    • No Known Problems Daughter        Review of Systems   Constitutional: Positive for activity change, appetite change and fatigue. Negative for unexpected weight change.   HENT: Negative.    Eyes: Negative.    Respiratory: Positive for shortness of breath.    Cardiovascular: Positive for leg swelling.   Gastrointestinal: Negative.    Endocrine: Negative.    Genitourinary: Negative.    Musculoskeletal: Negative.    Skin: Negative.    Neurological: Negative.    Hematological: Negative.    Psychiatric/Behavioral: Negative.         Performance Status:  Asymptomatic    Medications:    Current Outpatient Medications   Medication Sig Dispense Refill   • Calcium-Magnesium-Vitamin D (CALCIUM 1200+D3 PO) Take 1,200 mg by mouth Daily.     • capecitabine (XELODA) 500 MG chemo tablet Take 1 tablet by mouth daily for 2 weeks on and 1 week off. 21 tablet 5   • ciprofloxacin (CIPRO) 500 MG tablet Take 1 tablet by mouth 2 (Two) Times a Day. 28 tablet 0   • furosemide (LASIX) 20 MG tablet One tab po qd as needed for swelling take with potassium 30 tablet 3   • megestrol (MEGACE) 40 MG tablet Take 1 tablet by mouth 2 (Two) Times a Day. 60 tablet 5   • metoprolol tartrate (LOPRESSOR) 25 MG tablet Take 25 mg by mouth 2 (two) times a day.     • ofloxacin (FLOXIN) 0.3 % otic solution      • OLANZapine (ZYPREXA) 2.5 MG tablet Take 2.5 mg by mouth every night.     • ondansetron (ZOFRAN) 8 MG tablet Take 1 tablet by mouth Every 8 (Eight) Hours As  "Needed for Nausea or Vomiting. 30 tablet 5   • potassium chloride (K-DUR,KLOR-CON) 20 MEQ CR tablet Take one tab po bid on days taking Lasix 60 tablet 1   • raNITIdine (ZANTAC) 150 MG tablet Take 1 tablet by mouth 2 (Two) Times a Day. 60 tablet 5   • rivaroxaban (XARELTO) 20 MG tablet Take 1 tablet by mouth Daily. 30 tablet 5   • thyroid 60 MG PO tablet Take 60 mg by mouth daily.       No current facility-administered medications for this visit.      Facility-Administered Medications Ordered in Other Visits   Medication Dose Route Frequency Provider Last Rate Last Dose   • heparin flush (porcine) 100 UNIT/ML injection 500 Units  500 Units Intravenous PRN Joseph Nunez MD   500 Units at 10/26/17 1155   • sodium chloride 0.9 % flush 10 mL  10 mL Intravenous PRN Joseph Nunez MD   10 mL at 10/26/17 1155       ALLERGIES:    Allergies   Allergen Reactions   • Other Angioedema     POPPY SEED   • Penicillins Hives   • Sulfa Antibiotics Mental Status Change   • Sulfur Mental Status Change   • Valium [Diazepam] Unknown (See Comments)     Unable to remember reaction   • Benadryl [Diphenhydramine Hcl (Sleep)] Other (See Comments)     Shaky legs   • Codeine Rash   • Diphenhydramine Other (See Comments)     \"Shaky leg syndrome\"  \"Shaky leg syndrome\"  \"Shaky leg syndrome\"   • Heparin Other (See Comments)     Patient states tolerates Heparin flush without problems: Years ago had heparin IV and had a rash   • Petroleum Jelly [Skin Protectants, Misc.] Rash       Objective      Vitals:    11/07/19 0925   BP: 122/68   Pulse: 111   Resp: 16   Temp: 99.7 °F (37.6 °C)   SpO2: 98%   Weight: 53.5 kg (117 lb 14.4 oz)   Height: 165.1 cm (65\")   PainSc: 0-No pain         Current Status 11/7/2019   ECOG score 0         Physical Examunchanged  General Appearance: Patient is awake, alert, oriented and in no acute distress. Patient is welldeveloped, wellnourished, and appears stated age.  HEENT: Normocephalic. Sclerae clear, conjunctiva pink, " extraocular movements intact, pupils, round, reactive to light and  accommodation. Mouth and throat are clear with moist oral mucosa.  NECK: Supple, no jugular venous distention, thyroid not enlarged.  LYMPH: No cervical, supraclavicular, axillary, or inguinal lymphadenopathy.  CHEST: Equal bilateral expansion, AP  diameter normal, resonant percussion note  LUNGS: Good air movement, no rales, rhonchi, rubs or wheezes with auscultation on the right. Diminished on the left.  CARDIO: Regular sinus rhythm, no murmurs, gallops or rubs.  ABDOMEN: Nondistended, soft, No tenderness, no guarding, no rebound, No hepatosplenomegaly. No abdominal masses. Bowel sounds positive. No hernia  GENITALIA: Not examined.  BREASTS: Not examined.  MUSKEL: No joint swelling, decreased motion, or inflammation  EXTREMS: patrick le  Edema L>R stable,No clubbing, cyanosis, No varicose veins.  NEURO: Grossly nonfocal, Gait is coordinated and smooth, Cognition is preserved.  SKIN: No rashes, no ecchymoses, no petechia.  PSYCH: Oriented to time, place and person. Memory is preserved. Mood and affect appear normal  RECENT LABS:  Lab on 11/07/2019   Component Date Value Ref Range Status   • Glucose 11/07/2019 126* 65 - 99 mg/dL Final   • BUN 11/07/2019 9  8 - 23 mg/dL Final   • Creatinine 11/07/2019 0.82  0.57 - 1.00 mg/dL Final   • Sodium 11/07/2019 140  136 - 145 mmol/L Final   • Potassium 11/07/2019 3.4* 3.5 - 5.2 mmol/L Final   • Chloride 11/07/2019 95* 98 - 107 mmol/L Final   • CO2 11/07/2019 32.0* 22.0 - 29.0 mmol/L Final   • Calcium 11/07/2019 9.7  8.6 - 10.5 mg/dL Final   • Total Protein 11/07/2019 6.6  6.0 - 8.5 g/dL Final   • Albumin 11/07/2019 3.40* 3.50 - 5.20 g/dL Final   • ALT (SGPT) 11/07/2019 16  1 - 33 U/L Final   • AST (SGOT) 11/07/2019 20  1 - 32 U/L Final   • Alkaline Phosphatase 11/07/2019 103  39 - 117 U/L Final   • Total Bilirubin 11/07/2019 0.3  0.2 - 1.2 mg/dL Final   • eGFR Non  Amer 11/07/2019 68  >60 mL/min/1.73 Final    • Globulin 11/07/2019 3.2  gm/dL Final   • A/G Ratio 11/07/2019 1.1  g/dL Final   • BUN/Creatinine Ratio 11/07/2019 11.0  7.0 - 25.0 Final   • Anion Gap 11/07/2019 13.0  5.0 - 15.0 mmol/L Final   • WBC 11/07/2019 11.60* 3.40 - 10.80 10*3/mm3 Final   • RBC 11/07/2019 3.81  3.77 - 5.28 10*6/mm3 Final   • Hemoglobin 11/07/2019 12.9  12.0 - 15.9 g/dL Final   • Hematocrit 11/07/2019 37.7  34.0 - 46.6 % Final   • MCV 11/07/2019 99.0* 79.0 - 97.0 fL Final   • MCH 11/07/2019 33.9* 26.6 - 33.0 pg Final   • MCHC 11/07/2019 34.2  31.5 - 35.7 g/dL Final   • RDW 11/07/2019 19.6* 12.3 - 15.4 % Final   • RDW-SD 11/07/2019 69.2* 37.0 - 54.0 fl Final   • MPV 11/07/2019 10.2  6.0 - 12.0 fL Final   • Platelets 11/07/2019 436  140 - 450 10*3/mm3 Final   • Neutrophil % 11/07/2019 69.5  42.7 - 76.0 % Final   • Lymphocyte % 11/07/2019 21.1  19.6 - 45.3 % Final   • Monocyte % 11/07/2019 7.7  5.0 - 12.0 % Final   • Eosinophil % 11/07/2019 0.1* 0.3 - 6.2 % Final   • Basophil % 11/07/2019 0.7  0.0 - 1.5 % Final   • Immature Grans % 11/07/2019 0.9* 0.0 - 0.5 % Final   • Neutrophils, Absolute 11/07/2019 8.07* 1.70 - 7.00 10*3/mm3 Final   • Lymphocytes, Absolute 11/07/2019 2.45  0.70 - 3.10 10*3/mm3 Final   • Monocytes, Absolute 11/07/2019 0.89  0.10 - 0.90 10*3/mm3 Final   • Eosinophils, Absolute 11/07/2019 0.01  0.00 - 0.40 10*3/mm3 Final   • Basophils, Absolute 11/07/2019 0.08  0.00 - 0.20 10*3/mm3 Final   • Immature Grans, Absolute 11/07/2019 0.10* 0.00 - 0.05 10*3/mm3 Final   • nRBC 11/07/2019 0.0  0.0 - 0.2 /100 WBC Final       RADIOLOGY:  No results found.         Assessment/Plan  Heavenly Yanes is a 74 y.o. year old female with met breast cancer s/p multiple lines of CMT currently on taxotere weekly and xeloda with good tolerance.    Patient Active Problem List   Diagnosis   • Malignant neoplasm of upper-outer quadrant of left female breast (CMS/HCC)   • Breast cancer metastasized to bone, unspecified laterality (CMS/HCC)   • Essential  hypertension   • Acquired hypothyroidism   • Bipolar 1 disorder (CMS/HCC)   • Carcinoma of left breast metastatic to bone (CMS/HCC)   • Antineoplastic chemotherapy induced anemia   • Dehydration   • Encounter for administration of vaccine   • Chemotherapy induced neutropenia (CMS/HCC)   • Cough   • Port-A-Cath in place   • Edema   • Hypokalemia          1.Metastatic breast ca: currently on weekly taxotere 3weeks on 1 week off.  --also on xeloda [id number], then repeat.  --CT c/a/p 2/19 showed stable dz. Some new Inflammatory opacities in the LEFT lower lobe and bilateral subpleural interstitial thickening.   -bone scan without evidence of dz progression  --primo taxotere weekly. Pt also on xeloda [id number], then repeat.  4/25/19: therapy postponed 2 weeks ago due to neutropenia. He got Neupogen x1. Labs reviewed with pt wbc 10.68, hg 11.1, [id number]. ANC 7630.  -ANC low 880. Postpone tx. Give neupogen x1. Pt advised to hold xeloda until 2 weeks    -imaging CT c/a/p 5/31/19 showed stable dz. Bone scan 5/31/19 showed multiple metastatic dz, nothing new. Possible pathologic fracture in the area of the coracoid process. Pt does not endorse pain there.  -labs 8/22/19 reviewed with pt wbc 4.99 Hg 11.7, plt 400. Ok for treatment with taxotere and neulasta.  -exam and review of labs did not reveal reason for the increased sob on exertion  -8/30/19: she presents today to review CT scan done 8:29/19. Overall stable in terms of her cancer but there is a concerning are of extraluminal collection of air in the deep pelvis with a thickened wall measuring about 2.8 x 3.2 cm that is situated between the lower sigmoid colon and small bowel and just above the uterus. This likely represents a contained sigmoid colon perforation related to prior diverticulitis. Perforated neoplasm cannot be ruled out. GI consultation is recommended for this finding. The sigmoid colon and adjacent small bowel loops appear to be matted around this  collection.  -currently not having any abdominal symptoms.  -will refer to GI to get their opinion. Heavenly is okay with this.  -obviously no progression will primo therapy as usual.  -has appointment with GI in the near future.  -labs reviewed with pt and   Cr 0.82, potassium 3.4, LFT nl, wbc 11.60, hg 12.9, plt 436. Denies steroid use, diarrhea, fever or chill. Will monitor wbc.  Ok for docetaxel.  -- Imaging in 2 weeks.    2. Psych: on olanzapine    3. HTN: on metoprolol    4. Gerd: on ranitidine  5. Dyspnea: get a cxr to evaluate  6. Anemia: will check iron profile, and act accordingly . Her creatinine is normal at 0.94.   7. DVT on xarelto indefinite.  --Doppler left LE  1/30/19 There is evidence of deep venous thrombosis of the left lower extremity. The clot burden is improved from last study. There is also evidence of superficial clot in the greater saphenous vein. There is a Baker's cyst in the popliteal fossa.  8. CHF: LVEF 65% with diastolic dysfunction. referral to cardiology to maximized her heart function.  9. Thrombocytosis: suspect reactive from anemia. Will check iron profile.  10.left lung pneumonia: resolved  --cxr showed pneumonia. Pt already on levofloxacin. Will complete 14 days.   11: LE edema: improved on lasix.  12. FEN:  Low potassium 3.4. primo kdur daily  -reinforced small frequent meals and high calorie food such as peanut butter, ice cream etc.      Moise Sotelo MD    11/7/2019    10:15 AM

## 2019-11-08 ENCOUNTER — TELEPHONE (OUTPATIENT)
Dept: ONCOLOGY | Facility: CLINIC | Age: 74
End: 2019-11-08

## 2019-11-08 LAB — CANCER AG27-29 SERPL-ACNC: 79.8 U/ML (ref 0–38.6)

## 2019-11-08 NOTE — TELEPHONE ENCOUNTER
Pt. voiced understanding    ----- Message from Moise Sotelo MD sent at 11/8/2019 10:25 AM CST -----  Please call the patient regarding her abnormal result. Markers going up. Will monitor in amonth and decide if we need to change therapy

## 2019-11-14 ENCOUNTER — APPOINTMENT (OUTPATIENT)
Dept: ONCOLOGY | Facility: HOSPITAL | Age: 74
End: 2019-11-14

## 2019-11-15 ENCOUNTER — INFUSION (OUTPATIENT)
Dept: ONCOLOGY | Facility: HOSPITAL | Age: 74
End: 2019-11-15

## 2019-11-15 ENCOUNTER — OFFICE VISIT (OUTPATIENT)
Dept: ONCOLOGY | Facility: CLINIC | Age: 74
End: 2019-11-15

## 2019-11-15 ENCOUNTER — LAB (OUTPATIENT)
Dept: LAB | Facility: HOSPITAL | Age: 74
End: 2019-11-15

## 2019-11-15 VITALS
RESPIRATION RATE: 16 BRPM | OXYGEN SATURATION: 90 % | WEIGHT: 119.7 LBS | TEMPERATURE: 98.5 F | BODY MASS INDEX: 19.94 KG/M2 | DIASTOLIC BLOOD PRESSURE: 68 MMHG | HEART RATE: 87 BPM | HEIGHT: 65 IN | SYSTOLIC BLOOD PRESSURE: 111 MMHG

## 2019-11-15 VITALS
SYSTOLIC BLOOD PRESSURE: 123 MMHG | DIASTOLIC BLOOD PRESSURE: 60 MMHG | RESPIRATION RATE: 18 BRPM | WEIGHT: 119 LBS | BODY MASS INDEX: 19.83 KG/M2 | OXYGEN SATURATION: 98 % | HEART RATE: 82 BPM | HEIGHT: 65 IN | TEMPERATURE: 97.8 F

## 2019-11-15 DIAGNOSIS — C50.912 CARCINOMA OF LEFT BREAST METASTATIC TO BONE (HCC): Primary | ICD-10-CM

## 2019-11-15 DIAGNOSIS — C50.912 CARCINOMA OF LEFT BREAST METASTATIC TO BONE (HCC): ICD-10-CM

## 2019-11-15 DIAGNOSIS — C79.51 BREAST CANCER METASTASIZED TO BONE, UNSPECIFIED LATERALITY (HCC): ICD-10-CM

## 2019-11-15 DIAGNOSIS — C50.919 BREAST CANCER METASTASIZED TO BONE, UNSPECIFIED LATERALITY (HCC): ICD-10-CM

## 2019-11-15 DIAGNOSIS — C50.412 MALIGNANT NEOPLASM OF UPPER-OUTER QUADRANT OF LEFT FEMALE BREAST, UNSPECIFIED ESTROGEN RECEPTOR STATUS (HCC): ICD-10-CM

## 2019-11-15 DIAGNOSIS — C79.51 CARCINOMA OF LEFT BREAST METASTATIC TO BONE (HCC): ICD-10-CM

## 2019-11-15 DIAGNOSIS — C79.51 CARCINOMA OF LEFT BREAST METASTATIC TO BONE (HCC): Primary | ICD-10-CM

## 2019-11-15 DIAGNOSIS — Z95.828 PORT-A-CATH IN PLACE: ICD-10-CM

## 2019-11-15 DIAGNOSIS — R13.19 OTHER DYSPHAGIA: Primary | ICD-10-CM

## 2019-11-15 LAB
ALBUMIN SERPL-MCNC: 3.4 G/DL (ref 3.5–5.2)
ALBUMIN/GLOB SERPL: 1.1 G/DL
ALP SERPL-CCNC: 81 U/L (ref 39–117)
ALT SERPL W P-5'-P-CCNC: 10 U/L (ref 1–33)
ANION GAP SERPL CALCULATED.3IONS-SCNC: 7 MMOL/L (ref 5–15)
AST SERPL-CCNC: 18 U/L (ref 1–32)
BASOPHILS # BLD AUTO: 0.06 10*3/MM3 (ref 0–0.2)
BASOPHILS NFR BLD AUTO: 1.2 % (ref 0–1.5)
BILIRUB SERPL-MCNC: 0.6 MG/DL (ref 0.2–1.2)
BUN BLD-MCNC: 10 MG/DL (ref 8–23)
BUN/CREAT SERPL: 15.4 (ref 7–25)
CALCIUM SPEC-SCNC: 9.4 MG/DL (ref 8.6–10.5)
CHLORIDE SERPL-SCNC: 100 MMOL/L (ref 98–107)
CO2 SERPL-SCNC: 31 MMOL/L (ref 22–29)
CREAT BLD-MCNC: 0.65 MG/DL (ref 0.57–1)
DEPRECATED RDW RBC AUTO: 66.7 FL (ref 37–54)
EOSINOPHIL # BLD AUTO: 0.04 10*3/MM3 (ref 0–0.4)
EOSINOPHIL NFR BLD AUTO: 0.8 % (ref 0.3–6.2)
ERYTHROCYTE [DISTWIDTH] IN BLOOD BY AUTOMATED COUNT: 19 % (ref 12.3–15.4)
GFR SERPL CREATININE-BSD FRML MDRD: 89 ML/MIN/1.73
GLOBULIN UR ELPH-MCNC: 3.1 GM/DL
GLUCOSE BLD-MCNC: 106 MG/DL (ref 65–99)
HCT VFR BLD AUTO: 32.8 % (ref 34–46.6)
HGB BLD-MCNC: 11.2 G/DL (ref 12–15.9)
HOLD SPECIMEN: NORMAL
HOLD SPECIMEN: NORMAL
IMM GRANULOCYTES # BLD AUTO: 0.06 10*3/MM3 (ref 0–0.05)
IMM GRANULOCYTES NFR BLD AUTO: 1.2 % (ref 0–0.5)
LYMPHOCYTES # BLD AUTO: 1.48 10*3/MM3 (ref 0.7–3.1)
LYMPHOCYTES NFR BLD AUTO: 30.1 % (ref 19.6–45.3)
MCH RBC QN AUTO: 33.7 PG (ref 26.6–33)
MCHC RBC AUTO-ENTMCNC: 34.1 G/DL (ref 31.5–35.7)
MCV RBC AUTO: 98.8 FL (ref 79–97)
MONOCYTES # BLD AUTO: 0.15 10*3/MM3 (ref 0.1–0.9)
MONOCYTES NFR BLD AUTO: 3 % (ref 5–12)
NEUTROPHILS # BLD AUTO: 3.13 10*3/MM3 (ref 1.7–7)
NEUTROPHILS NFR BLD AUTO: 63.7 % (ref 42.7–76)
NRBC BLD AUTO-RTO: 0 /100 WBC (ref 0–0.2)
PLATELET # BLD AUTO: 417 10*3/MM3 (ref 140–450)
PMV BLD AUTO: 10.1 FL (ref 6–12)
POTASSIUM BLD-SCNC: 3.5 MMOL/L (ref 3.5–5.2)
PROT SERPL-MCNC: 6.5 G/DL (ref 6–8.5)
RBC # BLD AUTO: 3.32 10*6/MM3 (ref 3.77–5.28)
SODIUM BLD-SCNC: 138 MMOL/L (ref 136–145)
WBC NRBC COR # BLD: 4.92 10*3/MM3 (ref 3.4–10.8)

## 2019-11-15 PROCEDURE — 25010000002 ONDANSETRON PER 1 MG: Performed by: INTERNAL MEDICINE

## 2019-11-15 PROCEDURE — 25010000002 PEGFILGRASTIM 6 MG/0.6ML PREFILLED SYRINGE KIT: Performed by: INTERNAL MEDICINE

## 2019-11-15 PROCEDURE — 96413 CHEMO IV INFUSION 1 HR: CPT

## 2019-11-15 PROCEDURE — 80053 COMPREHEN METABOLIC PANEL: CPT

## 2019-11-15 PROCEDURE — 25010000002 DEXAMETHASONE SODIUM PHOSPHATE 100 MG/10ML SOLUTION: Performed by: INTERNAL MEDICINE

## 2019-11-15 PROCEDURE — 96377 APPLICATON ON-BODY INJECTOR: CPT

## 2019-11-15 PROCEDURE — 36415 COLL VENOUS BLD VENIPUNCTURE: CPT

## 2019-11-15 PROCEDURE — 96367 TX/PROPH/DG ADDL SEQ IV INF: CPT

## 2019-11-15 PROCEDURE — 25010000002 DOCETAXEL 20 MG/ML SOLUTION 4 ML VIAL: Performed by: INTERNAL MEDICINE

## 2019-11-15 PROCEDURE — 85025 COMPLETE CBC W/AUTO DIFF WBC: CPT

## 2019-11-15 PROCEDURE — 99215 OFFICE O/P EST HI 40 MIN: CPT | Performed by: INTERNAL MEDICINE

## 2019-11-15 RX ORDER — SODIUM CHLORIDE 0.9 % (FLUSH) 0.9 %
10 SYRINGE (ML) INJECTION AS NEEDED
Status: DISCONTINUED | OUTPATIENT
Start: 2019-11-15 | End: 2019-11-15 | Stop reason: HOSPADM

## 2019-11-15 RX ORDER — SODIUM CHLORIDE 0.9 % (FLUSH) 0.9 %
10 SYRINGE (ML) INJECTION AS NEEDED
Status: CANCELLED | OUTPATIENT
Start: 2019-11-15

## 2019-11-15 RX ORDER — SODIUM CHLORIDE 9 MG/ML
250 INJECTION, SOLUTION INTRAVENOUS ONCE
Status: COMPLETED | OUTPATIENT
Start: 2019-11-15 | End: 2019-11-15

## 2019-11-15 RX ORDER — MEPERIDINE HYDROCHLORIDE 50 MG/ML
25 INJECTION INTRAMUSCULAR; INTRAVENOUS; SUBCUTANEOUS
Status: DISCONTINUED | OUTPATIENT
Start: 2019-11-15 | End: 2019-11-15 | Stop reason: HOSPADM

## 2019-11-15 RX ORDER — FAMOTIDINE 10 MG/ML
20 INJECTION, SOLUTION INTRAVENOUS AS NEEDED
Status: DISCONTINUED | OUTPATIENT
Start: 2019-11-15 | End: 2019-11-15 | Stop reason: HOSPADM

## 2019-11-15 RX ADMIN — DOCETAXEL 55 MG: 20 INJECTION, SOLUTION, CONCENTRATE INTRAVENOUS at 11:20

## 2019-11-15 RX ADMIN — PEGFILGRASTIM 6 MG: KIT SUBCUTANEOUS at 12:31

## 2019-11-15 RX ADMIN — SODIUM CHLORIDE, PRESERVATIVE FREE 10 ML: 5 INJECTION INTRAVENOUS at 12:34

## 2019-11-15 RX ADMIN — Medication 500 UNITS: at 12:34

## 2019-11-15 RX ADMIN — DEXAMETHASONE SODIUM PHOSPHATE 50 ML: 10 INJECTION, SOLUTION INTRAMUSCULAR; INTRAVENOUS at 11:00

## 2019-11-15 RX ADMIN — SODIUM CHLORIDE 250 ML: 9 INJECTION, SOLUTION INTRAVENOUS at 11:00

## 2019-11-17 NOTE — PROGRESS NOTES
Chief Complaint   Patient presents with   • Difficulty Swallowing     dysphagia/food and drink get stuck       PCP: Moise Sotelo MD  REFER: Moise Sotelo*    Subjective     HPI    Patient seen in our office in 9/2019 due to perforation of sigmoid colon identified on CT scan 8/22/19.  CT at that time also showed extensive metastatic bone disease (described as appearing stable).  Colonoscopy evaluation declined.  Today patient presents with complain of dysphagia.  She is having difficulty swallowing liquids.  No trouble swallowing solid food.  No heartburn or indigestion.  No nausea/vomitting.  No previous EGD.  Appetite has not changed. No abdominal pain.  No bright red blood per rectum, no melena.     She is followed by Dr Sotelo for breast cancer.      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 21 tablet 5   • ciprofloxacin (CIPRO) 500 MG tablet Take 1 tablet by mouth 2 (Two) Times a Day. 28 tablet 0   • furosemide (LASIX) 20 MG tablet One tab po qd as needed  "for swelling take with potassium 30 tablet 3   • megestrol (MEGACE) 40 MG tablet Take 1 tablet by mouth 2 (Two) Times a Day. 60 tablet 5   • metoprolol tartrate (LOPRESSOR) 25 MG tablet Take 25 mg by mouth 2 (two) times a day.     • OLANZapine (ZYPREXA) 2.5 MG tablet Take 2.5 mg by mouth every night.     • potassium chloride (K-DUR,KLOR-CON) 20 MEQ CR tablet Take one tab po bid on days taking Lasix 60 tablet 1   • rivaroxaban (XARELTO) 20 MG tablet Take 1 tablet by mouth Daily. 30 tablet 5   • thyroid 60 MG PO tablet Take 60 mg by mouth daily.         Allergies   Allergen Reactions   • Other Angioedema     POPPY SEED   • Penicillins Hives   • Sulfa Antibiotics Mental Status Change   • Sulfur Mental Status Change   • Valium [Diazepam] Unknown (See Comments)     Unable to remember reaction   • Benadryl [Diphenhydramine Hcl (Sleep)] Other (See Comments)     Shaky legs   • Codeine Rash   • Diphenhydramine Other (See Comments)     \"Shaky leg syndrome\"  \"Shaky leg syndrome\"  \"Shaky leg syndrome\"   • Heparin Other (See Comments)     Patient states tolerates Heparin flush without problems: Years ago had heparin IV and had a rash   • Petroleum Jelly [Skin Protectants, Misc.] Rash       Social History     Socioeconomic History   • Marital status:      Spouse name: Not on file   • Number of children: Not on file   • Years of education: Not on file   • Highest education level: Not on file   Tobacco Use   • Smoking status: Never Smoker   • Smokeless tobacco: Never Used   Substance and Sexual Activity   • Alcohol use: No   • Drug use: No   • Sexual activity: Defer       Family History   Problem Relation Age of Onset   • No Known Problems Daughter    • No Known Problems Son    • Other Mother         complications from a concussion from a fall   • Other Father         old age   • Colon polyps Father    • No Known Problems Brother    • No Known Problems Daughter    • Colon cancer Neg Hx    • Esophageal cancer Neg Hx  " "      Review of Systems   Constitutional: Negative for fatigue, fever and unexpected weight change.   HENT: Positive for trouble swallowing. Negative for hearing loss, sore throat and voice change.    Eyes: Negative for visual disturbance.   Respiratory: Negative for cough, shortness of breath and wheezing.    Cardiovascular: Negative for chest pain and palpitations.   Gastrointestinal: Negative for abdominal pain, blood in stool and vomiting.   Endocrine: Negative for polydipsia and polyuria.   Genitourinary: Negative for difficulty urinating, dysuria, hematuria and urgency.   Musculoskeletal: Negative for joint swelling and myalgias.   Skin: Negative for color change, rash and wound.   Neurological: Negative for dizziness, tremors, seizures and syncope.   Hematological: Does not bruise/bleed easily.   Psychiatric/Behavioral: Negative for agitation and confusion. The patient is not nervous/anxious.        Objective     Vitals:    11/18/19 1355   BP: 121/70   Pulse: 108   Temp: 99.4 °F (37.4 °C)   SpO2: 96%   Weight: 55.3 kg (122 lb)   Height: 165.1 cm (65\")     Body mass index is 20.3 kg/m².    Physical Exam   Constitutional: She is oriented to person, place, and time. She appears well-developed and well-nourished. She is cooperative.   HENT:   Head: Normocephalic and atraumatic.   Eyes: Conjunctivae are normal. Pupils are equal, round, and reactive to light. No scleral icterus.   Neck: Normal range of motion. Neck supple. No JVD present. No thyroid mass and no thyromegaly present.   Cardiovascular: Normal rate, regular rhythm and normal heart sounds. Exam reveals no gallop and no friction rub.   No murmur heard.  Pulmonary/Chest: Effort normal and breath sounds normal. No accessory muscle usage. No respiratory distress. She has no wheezes. She has no rales.   Abdominal: Soft. Normal appearance and bowel sounds are normal. She exhibits no distension, no ascites and no mass. There is no hepatosplenomegaly. There is " no tenderness. There is no rebound and no guarding.   Musculoskeletal: Normal range of motion. She exhibits no edema or tenderness.     Vascular Status -  Her right foot exhibits normal foot vasculature  and no edema. Her left foot exhibits normal foot vasculature  and no edema.  Lymphadenopathy:     She has no cervical adenopathy.   Neurological: She is alert and oriented to person, place, and time. She has normal strength. Gait normal.   Skin: Skin is warm, dry and intact. No rash noted.       Imaging Results (Most Recent)     None          Body mass index is 20.3 kg/m².    Assessment/Plan     Heavenly was seen today for difficulty swallowing.    Diagnoses and all orders for this visit:    Dysphagia, unspecified type  -     FL Esophagram Complete; Future        * Surgery not found *    Further order pending result of esophagram  Patient did not wish to pursue colonoscopy at this time      There are no Patient Instructions on file for this visit.

## 2019-11-18 ENCOUNTER — TELEPHONE (OUTPATIENT)
Dept: GASTROENTEROLOGY | Facility: CLINIC | Age: 74
End: 2019-11-18

## 2019-11-18 ENCOUNTER — OFFICE VISIT (OUTPATIENT)
Dept: GASTROENTEROLOGY | Facility: CLINIC | Age: 74
End: 2019-11-18

## 2019-11-18 VITALS
HEIGHT: 65 IN | SYSTOLIC BLOOD PRESSURE: 121 MMHG | WEIGHT: 122 LBS | DIASTOLIC BLOOD PRESSURE: 70 MMHG | OXYGEN SATURATION: 96 % | BODY MASS INDEX: 20.33 KG/M2 | HEART RATE: 108 BPM | TEMPERATURE: 99.4 F

## 2019-11-18 DIAGNOSIS — R13.10 DYSPHAGIA, UNSPECIFIED TYPE: Primary | ICD-10-CM

## 2019-11-18 PROCEDURE — 99214 OFFICE O/P EST MOD 30 MIN: CPT | Performed by: NURSE PRACTITIONER

## 2019-11-21 ENCOUNTER — HOSPITAL ENCOUNTER (OUTPATIENT)
Dept: NUCLEAR MEDICINE | Facility: HOSPITAL | Age: 74
Discharge: HOME | End: 2019-11-21

## 2019-11-21 ENCOUNTER — HOSPITAL ENCOUNTER (OUTPATIENT)
Dept: CT IMAGING | Facility: HOSPITAL | Age: 74
Discharge: HOME | End: 2019-11-21
Admitting: INTERNAL MEDICINE

## 2019-11-21 DIAGNOSIS — C79.51 CARCINOMA OF LEFT BREAST METASTATIC TO BONE (HCC): ICD-10-CM

## 2019-11-21 DIAGNOSIS — C50.912 CARCINOMA OF LEFT BREAST METASTATIC TO BONE (HCC): ICD-10-CM

## 2019-11-21 LAB — CREAT BLDA-MCNC: 0.9 MG/DL (ref 0.6–1.3)

## 2019-11-21 PROCEDURE — 78306 BONE IMAGING WHOLE BODY: CPT

## 2019-11-21 PROCEDURE — A9561 TC99M OXIDRONATE: HCPCS | Performed by: INTERNAL MEDICINE

## 2019-11-21 PROCEDURE — 25010000002 IOPAMIDOL 61 % SOLUTION: Performed by: INTERNAL MEDICINE

## 2019-11-21 PROCEDURE — 0 TECHNETIUM OXIDRONATE KIT: Performed by: INTERNAL MEDICINE

## 2019-11-21 PROCEDURE — 82565 ASSAY OF CREATININE: CPT

## 2019-11-21 PROCEDURE — 74177 CT ABD & PELVIS W/CONTRAST: CPT

## 2019-11-21 PROCEDURE — 71260 CT THORAX DX C+: CPT

## 2019-11-21 RX ADMIN — TECHNETIUM TC 99M OXIDRONATE 1 DOSE: 3.15 INJECTION, POWDER, LYOPHILIZED, FOR SOLUTION INTRAVENOUS at 10:15

## 2019-11-21 RX ADMIN — IOPAMIDOL 100 ML: 612 INJECTION, SOLUTION INTRAVENOUS at 10:26

## 2019-11-21 RX ADMIN — IOPAMIDOL 50 ML: 612 INJECTION, SOLUTION INTRAVENOUS at 10:26

## 2019-11-25 ENCOUNTER — TELEPHONE (OUTPATIENT)
Dept: ONCOLOGY | Facility: CLINIC | Age: 74
End: 2019-11-25

## 2019-11-25 NOTE — TELEPHONE ENCOUNTER
Contacted patient regarding her recent scans per Dr Sotelo instruction and need to jenni a thoracentesis, she v/u and will wait for Noemi to call back with time and date.

## 2019-11-25 NOTE — TELEPHONE ENCOUNTER
----- Message from Moise Sotelo MD sent at 11/22/2019  2:10 PM CST -----  Please call the patient regarding her abnormal result pleural effusion. Arrange for thoracentesis. tnx

## 2019-12-03 ENCOUNTER — HOSPITAL ENCOUNTER (OUTPATIENT)
Dept: GENERAL RADIOLOGY | Facility: HOSPITAL | Age: 74
Discharge: HOME | End: 2019-12-03

## 2019-12-03 ENCOUNTER — LAB (OUTPATIENT)
Dept: LAB | Facility: HOSPITAL | Age: 74
End: 2019-12-03

## 2019-12-03 ENCOUNTER — OFFICE VISIT (OUTPATIENT)
Dept: ONCOLOGY | Facility: CLINIC | Age: 74
End: 2019-12-03

## 2019-12-03 ENCOUNTER — HOSPITAL ENCOUNTER (OUTPATIENT)
Dept: ULTRASOUND IMAGING | Facility: HOSPITAL | Age: 74
Discharge: HOME | End: 2019-12-03
Admitting: INTERNAL MEDICINE

## 2019-12-03 ENCOUNTER — TELEPHONE (OUTPATIENT)
Dept: ONCOLOGY | Facility: CLINIC | Age: 74
End: 2019-12-03

## 2019-12-03 VITALS
OXYGEN SATURATION: 93 % | RESPIRATION RATE: 16 BRPM | SYSTOLIC BLOOD PRESSURE: 110 MMHG | HEIGHT: 65 IN | WEIGHT: 113.5 LBS | HEART RATE: 104 BPM | DIASTOLIC BLOOD PRESSURE: 77 MMHG | TEMPERATURE: 99.2 F | BODY MASS INDEX: 18.91 KG/M2

## 2019-12-03 VITALS
RESPIRATION RATE: 18 BRPM | OXYGEN SATURATION: 99 % | WEIGHT: 113 LBS | SYSTOLIC BLOOD PRESSURE: 117 MMHG | HEART RATE: 88 BPM | HEIGHT: 65 IN | BODY MASS INDEX: 18.83 KG/M2 | DIASTOLIC BLOOD PRESSURE: 72 MMHG

## 2019-12-03 DIAGNOSIS — L89.121: Primary | ICD-10-CM

## 2019-12-03 DIAGNOSIS — C50.912 CARCINOMA OF LEFT BREAST METASTATIC TO BONE (HCC): Primary | ICD-10-CM

## 2019-12-03 DIAGNOSIS — C50.912 CARCINOMA OF LEFT BREAST METASTATIC TO BONE (HCC): ICD-10-CM

## 2019-12-03 DIAGNOSIS — R13.19 OTHER DYSPHAGIA: ICD-10-CM

## 2019-12-03 DIAGNOSIS — Z98.890 STATUS POST THORACENTESIS: ICD-10-CM

## 2019-12-03 DIAGNOSIS — C79.51 CARCINOMA OF LEFT BREAST METASTATIC TO BONE (HCC): ICD-10-CM

## 2019-12-03 DIAGNOSIS — C79.51 CARCINOMA OF LEFT BREAST METASTATIC TO BONE (HCC): Primary | ICD-10-CM

## 2019-12-03 LAB
ALBUMIN SERPL-MCNC: 3.1 G/DL (ref 3.5–5.2)
ALBUMIN/GLOB SERPL: 0.9 G/DL
ALP SERPL-CCNC: 177 U/L (ref 39–117)
ALT SERPL W P-5'-P-CCNC: 16 U/L (ref 1–33)
ANION GAP SERPL CALCULATED.3IONS-SCNC: 9 MMOL/L (ref 5–15)
APPEARANCE FLD: ABNORMAL
APTT PPP: 32 SECONDS (ref 24.1–35)
AST SERPL-CCNC: 27 U/L (ref 1–32)
BASOPHILS # BLD AUTO: 0.17 10*3/MM3 (ref 0–0.2)
BASOPHILS NFR BLD AUTO: 0.6 % (ref 0–1.5)
BILIRUB SERPL-MCNC: 0.4 MG/DL (ref 0.2–1.2)
BUN BLD-MCNC: 8 MG/DL (ref 8–23)
BUN/CREAT SERPL: 9.3 (ref 7–25)
CALCIUM SPEC-SCNC: 9.2 MG/DL (ref 8.6–10.5)
CANCER AG15-3 SERPL-ACNC: 74.2 U/ML
CHLORIDE SERPL-SCNC: 96 MMOL/L (ref 98–107)
CO2 SERPL-SCNC: 34 MMOL/L (ref 22–29)
COLOR FLD: YELLOW
CREAT BLD-MCNC: 0.86 MG/DL (ref 0.57–1)
DEPRECATED RDW RBC AUTO: 72.5 FL (ref 37–54)
EOSINOPHIL # BLD AUTO: 0.01 10*3/MM3 (ref 0–0.4)
EOSINOPHIL NFR BLD AUTO: 0 % (ref 0.3–6.2)
ERYTHROCYTE [DISTWIDTH] IN BLOOD BY AUTOMATED COUNT: 20.2 % (ref 12.3–15.4)
GFR SERPL CREATININE-BSD FRML MDRD: 64 ML/MIN/1.73
GLOBULIN UR ELPH-MCNC: 3.5 GM/DL
GLUCOSE BLD-MCNC: 126 MG/DL (ref 65–99)
HCT VFR BLD AUTO: 36.9 % (ref 34–46.6)
HGB BLD-MCNC: 12.5 G/DL (ref 12–15.9)
HOLD SPECIMEN: NORMAL
HOLD SPECIMEN: NORMAL
INR PPP: 0.97 (ref 0.91–1.09)
LYMPHOCYTES # BLD AUTO: 2.81 10*3/MM3 (ref 0.7–3.1)
LYMPHOCYTES NFR BLD AUTO: 9.2 % (ref 19.6–45.3)
LYMPHOCYTES NFR FLD MANUAL: 10 %
MCH RBC QN AUTO: 33.8 PG (ref 26.6–33)
MCHC RBC AUTO-ENTMCNC: 33.9 G/DL (ref 31.5–35.7)
MCV RBC AUTO: 99.7 FL (ref 79–97)
MESOTHL CELL NFR FLD MANUAL: 2 %
MONOCYTES # BLD AUTO: 1.12 10*3/MM3 (ref 0.1–0.9)
MONOCYTES NFR BLD AUTO: 3.7 % (ref 5–12)
MONOCYTES NFR FLD: 38 %
NEUTROPHILS # BLD AUTO: 25.61 10*3/MM3 (ref 1.7–7)
NEUTROPHILS NFR BLD AUTO: 83.7 % (ref 42.7–76)
NEUTROPHILS NFR FLD MANUAL: 50 %
PLATELET # BLD AUTO: 357 10*3/MM3 (ref 140–450)
PLATELET # BLD AUTO: 368 10*3/MM3 (ref 140–450)
PMV BLD AUTO: 10.2 FL (ref 6–12)
POTASSIUM BLD-SCNC: 3.2 MMOL/L (ref 3.5–5.2)
PROT SERPL-MCNC: 6.6 G/DL (ref 6–8.5)
PROTHROMBIN TIME: 13.2 SECONDS (ref 11.9–14.6)
RBC # BLD AUTO: 3.7 10*6/MM3 (ref 3.77–5.28)
RBC # FLD AUTO: 2000 /MM3
SODIUM BLD-SCNC: 139 MMOL/L (ref 136–145)
WBC # FLD AUTO: 399 /MM3
WBC NRBC COR # BLD: 30.57 10*3/MM3 (ref 3.4–10.8)

## 2019-12-03 PROCEDURE — 88305 TISSUE EXAM BY PATHOLOGIST: CPT | Performed by: INTERNAL MEDICINE

## 2019-12-03 PROCEDURE — 71045 X-RAY EXAM CHEST 1 VIEW: CPT

## 2019-12-03 PROCEDURE — 86300 IMMUNOASSAY TUMOR CA 15-3: CPT

## 2019-12-03 PROCEDURE — 89051 BODY FLUID CELL COUNT: CPT | Performed by: INTERNAL MEDICINE

## 2019-12-03 PROCEDURE — 76942 ECHO GUIDE FOR BIOPSY: CPT

## 2019-12-03 PROCEDURE — 85610 PROTHROMBIN TIME: CPT | Performed by: INTERNAL MEDICINE

## 2019-12-03 PROCEDURE — 80053 COMPREHEN METABOLIC PANEL: CPT

## 2019-12-03 PROCEDURE — 85730 THROMBOPLASTIN TIME PARTIAL: CPT | Performed by: INTERNAL MEDICINE

## 2019-12-03 PROCEDURE — 85025 COMPLETE CBC W/AUTO DIFF WBC: CPT

## 2019-12-03 PROCEDURE — 36415 COLL VENOUS BLD VENIPUNCTURE: CPT

## 2019-12-03 PROCEDURE — 76604 US EXAM CHEST: CPT

## 2019-12-03 PROCEDURE — 85049 AUTOMATED PLATELET COUNT: CPT | Performed by: INTERNAL MEDICINE

## 2019-12-03 PROCEDURE — 88312 SPECIAL STAINS GROUP 1: CPT | Performed by: INTERNAL MEDICINE

## 2019-12-03 PROCEDURE — 88112 CYTOPATH CELL ENHANCE TECH: CPT | Performed by: INTERNAL MEDICINE

## 2019-12-03 PROCEDURE — 99215 OFFICE O/P EST HI 40 MIN: CPT | Performed by: INTERNAL MEDICINE

## 2019-12-03 RX ORDER — LEVOFLOXACIN 750 MG/1
750 TABLET ORAL DAILY
Qty: 14 TABLET | Refills: 0 | Status: SHIPPED | OUTPATIENT
Start: 2019-12-03 | End: 2019-12-03

## 2019-12-03 RX ORDER — CETIRIZINE HYDROCHLORIDE 5 MG/1
5 TABLET ORAL DAILY
COMMUNITY

## 2019-12-03 NOTE — PROGRESS NOTES
[facility]  HEMATOLOGY & ONCOLOGY    Cancer Staging Information:  Cancer Staging  Malignant neoplasm of upper-outer quadrant of left female breast (CMS/HCC)  Staging form: Breast, AJCC V7  - Clinical stage from 10/11/2016: Stage IV (T2, N1, M1) - Signed by Calli Monsalve APRN on 10/11/2016        Subjective     VISIT DIAGNOSIS:   Encounter Diagnosis   Name Primary?   • Carcinoma of left breast metastatic to bone (CMS/HCC)        REASON FOR VISIT:     Chief Complaint   Patient presents with   • Breast Cancer     sore on shoulder that they want dr to look at. left leg is swollen         HEMATOLOGY / ONCOLOGY HISTORY:   Oncology/Hematology History    Patient is a 67-year-old postmenopausal female who had onset of her menses at age 9 and menopause at age 48. She received oral contraceptives for approximately 14 years and did not receive hormonal manipulation. Patient has a maternal cousin had breast cancer. Patient has had no prior breast biopsies. Patient found a palpable left breast mass as well as a left axillary mass. Patient had a bloody nipple discharge which been present for approximately 6 months. On 6/20/2012, a mammogram was performed showing a subareolar mass consistent with malignancy. Bilateral breast ultrasound revealed an ill-defined subareolar mass measuring 2.8-2.5 cm. There is a left axillary mass measuring 1.7 cm with suspicion of extracapsular extension. There is no evidence of disease in the right breast. On 7/9/2012 patient underwent a left breast biopsy and placement of marker clip. Left breast biopsy revealed an infiltrating lobular carcinoma grade 2 with focal ductal carcinoma in situ, solid pattern. A fine-needle biopsy of the left lymph node was consistent with metastatic carcinoma. Breast tumor profile revealed ER: 100%; GA: 98%; HER-2/maynor 2+; FISH: Unamplified; Ki-67: 71%; P 53 1%; DNA aneuploidy. A MRI of the breast were performed body multiple abnormal enhancing  masses within the left breast. There appears to be anterior retraction of the pectoralis muscle with no direct extension. There is a moderate to large, layering left pleural effusion appreciated. The right breast reveals 3 moderately enlarged lymph nodes in the posterior lateral aspect the right breast. These have fatty hilum but followup is recommended. Patient is undergone systemic studies including, on 8/2/2012, a CT scan of the brain showing atrophy. a CT scan that shows showing a small to moderate left pleural effusion with 3 subcentimeter nodule densities in the left lung.   She completed 4 cycles of neoadjuvant treatment with dose dense Adriamycin and Cytoxan. She had a mammogram which showed a significant reduction in the size of her left breast lesion. There is no axillary adenopathy noted. She has had an ultrasound revealing the  lesion reducing from 2.4 cm to 1 cm. Patient underwent a left mastectomy on 03/19/2013 finding negative invasive cancer, negative nodes. A 5% DCIS was noted. The patient has declined hormonal therapy. She did not need radiation therapy.  November 2014, she began to have evidence of lytic bone lesions at T5T6, frontal disease, an 8 mm lucency in the central frontal area of  the skull but nothing intracranial. Bone scan revealed only vertex tracer activity but bilateral ribs and thoracic spine, and other lesions in  her pelvis. At that time, she was started on letrozole since November 2014. She is taking Xgeva. She had progression found in bone in Feb 2015 on scan. She was started on Faslodex, Ibrance and continued xgeva.         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 B) Foci of ductal carcinoma in situ, solid pattern. C) Greatest  dimension of the invasive carcinoma is 15.8mm.         3/19/2013 Surgery     Left Mastectomy w/ Axillary Tail: Focal residual ducatal carcinoma in situ (DCIS) 12 lymph nodes negative for metastatic carcinoma.         10/20/2014 Biopsy     Peritoneum Biopsy: Final Diagnosis: Malignant Cell Neoplasm.           Breast cancer metastasized to bone, unspecified laterality (CMS/HCC)    10/15/2012 -  Other Event     Left mammogram:  Impression:  1. Decreasing spiculation and density in the retroareolar left breast, near a previous biopsy.   2. Definite left axillary lesion is not appreciated.          2/5/2013 -  Other Event     Diagnostic Mammo:  Comparison is made to 10/15/2012 and 6/20/2012  The spiculated mass at 12:00 behind the nipple is nearly resolved.   Impression:  1. The mass on the left side is less apparent after receiving chemotherapy. There has been a good response to chmotherapy on the left side.   2. No suspicious abnormality is seen in the right breast to account for the new palable abnormality at the 4-5:00 position.          3/19/2013 Surgery     Breast Biopsy:  Final Diagnosis:  A. Breast, left mastectomy with axillary tail  1. Focal residual ductal carcinoma in situ (DICS) (less than 5% of tumor bed).  2. Negative for residual invasive carcinoma.   3. 12 Lymph Nodes, negative for metastatic carcinoma (0/12) focally, some lymph nodes demonstrate fibrosis/treatment effect.  B. Skin and soft tissue, left advancement flap:  1. Benign skin and subcutis.   2. Negative for carcinoma.          6/20/2013 -  Other Event     Diagnostic Mammo [facility]:  1. A subareolar mass is consistent with malignancy. Additional involement extends inferiorly sonographically and superolaterally mammograhically as demonstrated by calcifications. The mass measures approx 2.8 cm sonographically, but the involved region is likely much larger including an intraductal componet. The left axillary lymph nodes are also involved.   2.  Recommened ultrasound guided biopsy of the left subareolar mass and left axillary lymph node.   3. Breast MRI could also further define multicentric disease on the left.   4. Clear suspicious finding on the right.  Recommend ongoing clinical follow up of palpable foci.          6/13/2014 -  Other Event     Diagnostic Mammo:  IMPRESSION:  1. History of left breast cancer and mastectomy. Stable benign right breast mammograms.          10/20/2014 Surgery     Final Diagnosis:  Biopsies, pertoneum:  Metastatic carcinoma, morphologically compatible with metastatic mammary carcinoma.          6/15/2015 -  Other Event     Diagnostic Mammo:  IMPRESSION:   1. Negative x-ray report, should not delay biopsy if a dominat or clinically suspicious mass is present.          7/5/2016 -  Other Event     Diagnositic mammogram:  Impression:   Stable right mamogram with no radiographic findings suspicious for malignancy. Recommend continued screening mammography per screening guidelines unless otherwise earlier clinically indicated.          9/18/2016 Initial Diagnosis     Secondary malignant neoplasm of bone and bone marrow                INTERVAL HISTORY  Patient ID: Heavenly Yanes is a 74 y.o. year old female Cancer Staging  Stage IV (T2, N1, M1)  --complaining of dyspnea on exersion since last Tuesday. She feels like she ran a mile  2/14/19: cxr showed pneumonia. Already on levofloxacin. Coughing not worse, she did not run temp.  Overall feels weak.  -restarted the appetite suppressant and is starting to eat more but not a whole lot  --2/28/19: CT c/ap with some response to therapy. New left chest inflammatory lesion to be followed ion next imaging. Bone scan unremarkable for progression.  4/25/19: she is much better since being off therapy for 2 weeks. COugh is improved.   6/20/19: has left ear drainage which is her usual ear infection. Leg edema improved with lasix. She was ordered a mammo by the surgeon which I told her is not  necessary, she has metastatic dz.    7/25/19: complaining of increased dyspnea on exertion. She takes her laxis daily.    8/22/19:  complaining of increased dyspnea on exertion. She takes her laxis daily. Also not much appetite even though on megace 40bid. YAIR not worse from prior. Weight the same  8/30/19: she presents today to review CT scan. Overall stable in terms of her cancer but there is a concerning are of extraluminal collection of air in the deep pelvis with a thickened wall measuring about 2.8 x 3.2 cm that is situated between the lower sigmoid colon and small bowel and just above the uterus. This likely represents a contained sigmoid colon perforation related to prior diverticulitis. Perforated neoplasm cannot be ruled out. GI consultation is recommended for this finding. The sigmoid colon and adjacent small bowel loops appear to be matted around this collection.  -11/7/19: main concern from  is lack of appetite. contnues to be fatigued.  11/15/19: appetite still same per  but he nags at her to eat more. Pt complaining of choking today and not able to swallow. Per , it has been happening for a while. Will refer to GI    12/3/19: here to review scan, toxicity assessment. CT showd stable bone mets, chest imaging showed possible infection.neoplatic process and moderate to large right pleural effusion that may be slightly larger  on the current study. Abdominal CT showed Diffuse wall thickening of the sigmoid colon extending to the rectum. An extraluminal air collection adjacent to the sigmoid colon and just above the uterus is slightly larger. The differential for this includes diverticulitis with perforation or colon cancer with perforation  -currently not having any abdominal symptoms  -she saw GI and deferred c-scope. Esophagram done for dysphagia.  -she developed left shoulder superficial ulcer from pressure  -Denies cp/n/v/, diarrhea/fever/chills/neuropathy/ no focal weakness.    Rest of ros unremarkable. PE positive for left shoulder superficial pressure ulcer. 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) 9/18/2016     Past Surgical History:   Past Surgical History:   Procedure Laterality Date   • BREAST SURGERY      left breast biopsy and axillary node   • CHOLECYSTECTOMY     • COLONOSCOPY  08/15/2014    diverticulosis  otherwise normal colonoscopy   • EXTERNAL EAR SURGERY     • MASTECTOMY Left    • PORTACATH PLACEMENT       Social History:   Social History     Socioeconomic History   • Marital status:      Spouse name: Not on file   • Number of children: Not on file   • Years of education: Not on file   • Highest education level: Not on file   Tobacco Use   • Smoking status: Never Smoker   • Smokeless tobacco: Never Used   Substance and Sexual Activity   • Alcohol use: No   • Drug use: No   • Sexual activity: Defer     Family History:   Family History   Problem Relation Age of Onset   • No Known Problems Daughter    • No Known Problems Son    • Other Mother         complications from a concussion from a fall   • Other Father         old age   • Colon polyps Father    • No Known Problems Brother    • No Known Problems Daughter    • Colon cancer Neg Hx    • Esophageal cancer Neg Hx        Review of Systems   Constitutional: Positive for activity change, appetite change and fatigue. Negative for unexpected weight change.   HENT: Negative.    Eyes: Negative.    Respiratory: Positive for shortness of breath.    Cardiovascular: Positive for leg swelling.   Gastrointestinal: Negative.    Endocrine: Negative.     Genitourinary: Negative.    Musculoskeletal: Negative.    Skin: Negative.    Neurological: Negative.    Hematological: Negative.    Psychiatric/Behavioral: Negative.         Performance Status:  Asymptomatic    Medications:    Current Outpatient Medications   Medication Sig Dispense Refill   • Calcium-Magnesium-Vitamin D (CALCIUM 1200+D3 PO) Take 1,200 mg by mouth Daily.     • capecitabine (XELODA) 500 MG chemo tablet Take 1 tablet by mouth daily for 2 weeks on and 1 week off. 21 tablet 5   • ciprofloxacin (CIPRO) 500 MG tablet Take 1 tablet by mouth 2 (Two) Times a Day. 28 tablet 0   • furosemide (LASIX) 20 MG tablet One tab po qd as needed for swelling take with potassium 30 tablet 3   • megestrol (MEGACE) 40 MG tablet Take 1 tablet by mouth 2 (Two) Times a Day. 60 tablet 5   • metoprolol tartrate (LOPRESSOR) 25 MG tablet Take 25 mg by mouth 2 (two) times a day.     • ofloxacin (FLOXIN) 0.3 % otic solution      • OLANZapine (ZYPREXA) 2.5 MG tablet Take 2.5 mg by mouth every night.     • ondansetron (ZOFRAN) 8 MG tablet Take 1 tablet by mouth Every 8 (Eight) Hours As Needed for Nausea or Vomiting. 30 tablet 5   • potassium chloride (K-DUR,KLOR-CON) 20 MEQ CR tablet Take one tab po bid on days taking Lasix 60 tablet 1   • raNITIdine (ZANTAC) 150 MG tablet Take 1 tablet by mouth 2 (Two) Times a Day. 60 tablet 5   • rivaroxaban (XARELTO) 20 MG tablet Take 1 tablet by mouth Daily. 30 tablet 5   • thyroid 60 MG PO tablet Take 60 mg by mouth daily.       No current facility-administered medications for this visit.      Facility-Administered Medications Ordered in Other Visits   Medication Dose Route Frequency Provider Last Rate Last Dose   • heparin flush (porcine) 100 UNIT/ML injection 500 Units  500 Units Intravenous PRN Joseph Nunez MD   500 Units at 10/26/17 1155   • sodium chloride 0.9 % flush 10 mL  10 mL Intravenous PRN Joseph Nunez MD   10 mL at 10/26/17 1155       ALLERGIES:    Allergies   Allergen  "Reactions   • Other Angioedema     POPPY SEED   • Penicillins Hives   • Sulfa Antibiotics Mental Status Change   • Sulfur Mental Status Change   • Valium [Diazepam] Provider Review Needed     Unable to remember reaction   • Benadryl [Diphenhydramine Hcl (Sleep)] Provider Review Needed     Shaky legs   • Codeine Rash   • Diphenhydramine Provider Review Needed     \"Shaky leg syndrome\"  \"Shaky leg syndrome\"  \"Shaky leg syndrome\"   • Heparin Provider Review Needed     Patient states tolerates Heparin flush without problems: Years ago had heparin IV and had a rash   • Petroleum Jelly [Skin Protectants, Misc.] Rash       Objective      Vitals:    12/03/19 0949   BP: 110/77   Pulse: 104   Resp: 16   Temp: 99.2 °F (37.3 °C)   SpO2: 93%   Weight: 51.5 kg (113 lb 8 oz)   Height: 165.1 cm (65\")   PainSc: 0-No pain         Current Status 12/3/2019   ECOG score 0         Physical Examunchanged  General Appearance: Patient is awake, alert, oriented and in no acute distress. Patient is welldeveloped, wellnourished, and appears stated age.  HEENT: Normocephalic. Sclerae clear, conjunctiva pink, extraocular movements intact, pupils, round, reactive to light and  accommodation. Mouth and throat are clear with moist oral mucosa.  NECK: Supple, no jugular venous distention, thyroid not enlarged.  LYMPH: No cervical, supraclavicular, axillary, or inguinal lymphadenopathy.  CHEST: Equal bilateral expansion, AP  diameter normal, resonant percussion note  LUNGS: Good air movement, no rales, rhonchi, rubs or wheezes with auscultation on the right. Diminished on the left.  CARDIO: Regular sinus rhythm, no murmurs, gallops or rubs.  ABDOMEN: Nondistended, soft, No tenderness, no guarding, no rebound, No hepatosplenomegaly. No abdominal masses. Bowel sounds positive. No hernia  GENITALIA: Not examined.  BREASTS: Not examined.  MUSKEL: No joint swelling, decreased motion, or inflammation  EXTREMS: patrick le  Edema L>R stable,No clubbing, cyanosis, " No varicose veins. positive for left shoulder superficial pressure ulcer. No redness, warmth or swelling  NEURO: Grossly nonfocal, Gait is coordinated and smooth, Cognition is preserved.  SKIN: No rashes, no ecchymoses, no petechia.  PSYCH: Oriented to time, place and person. Memory is preserved. Mood and affect appear normal  RECENT LABS:  Lab on 12/03/2019   Component Date Value Ref Range Status   • Glucose 12/03/2019 126* 65 - 99 mg/dL Final   • BUN 12/03/2019 8  8 - 23 mg/dL Final   • Creatinine 12/03/2019 0.86  0.57 - 1.00 mg/dL Final   • Sodium 12/03/2019 139  136 - 145 mmol/L Final   • Potassium 12/03/2019 3.2* 3.5 - 5.2 mmol/L Final   • Chloride 12/03/2019 96* 98 - 107 mmol/L Final   • CO2 12/03/2019 34.0* 22.0 - 29.0 mmol/L Final   • Calcium 12/03/2019 9.2  8.6 - 10.5 mg/dL Final   • Total Protein 12/03/2019 6.6  6.0 - 8.5 g/dL Final   • Albumin 12/03/2019 3.10* 3.50 - 5.20 g/dL Final   • ALT (SGPT) 12/03/2019 16  1 - 33 U/L Final   • AST (SGOT) 12/03/2019 27  1 - 32 U/L Final   • Alkaline Phosphatase 12/03/2019 177* 39 - 117 U/L Final   • Total Bilirubin 12/03/2019 0.4  0.2 - 1.2 mg/dL Final   • eGFR Non  Amer 12/03/2019 64  >60 mL/min/1.73 Final   • Globulin 12/03/2019 3.5  gm/dL Final   • A/G Ratio 12/03/2019 0.9  g/dL Final   • BUN/Creatinine Ratio 12/03/2019 9.3  7.0 - 25.0 Final   • Anion Gap 12/03/2019 9.0  5.0 - 15.0 mmol/L Final   • WBC 12/03/2019 30.57* 3.40 - 10.80 10*3/mm3 Final   • RBC 12/03/2019 3.70* 3.77 - 5.28 10*6/mm3 Final   • Hemoglobin 12/03/2019 12.5  12.0 - 15.9 g/dL Final   • Hematocrit 12/03/2019 36.9  34.0 - 46.6 % Final   • MCV 12/03/2019 99.7* 79.0 - 97.0 fL Final   • MCH 12/03/2019 33.8* 26.6 - 33.0 pg Final   • MCHC 12/03/2019 33.9  31.5 - 35.7 g/dL Final   • RDW 12/03/2019 20.2* 12.3 - 15.4 % Final   • RDW-SD 12/03/2019 72.5* 37.0 - 54.0 fl Final   • MPV 12/03/2019 10.2  6.0 - 12.0 fL Final   • Platelets 12/03/2019 357  140 - 450 10*3/mm3 Final   • Neutrophil %  12/03/2019 83.7* 42.7 - 76.0 % Final   • Lymphocyte % 12/03/2019 9.2* 19.6 - 45.3 % Final   • Monocyte % 12/03/2019 3.7* 5.0 - 12.0 % Final   • Eosinophil % 12/03/2019 0.0* 0.3 - 6.2 % Final   • Basophil % 12/03/2019 0.6  0.0 - 1.5 % Final   • Neutrophils, Absolute 12/03/2019 25.61* 1.70 - 7.00 10*3/mm3 Final   • Lymphocytes, Absolute 12/03/2019 2.81  0.70 - 3.10 10*3/mm3 Final   • Monocytes, Absolute 12/03/2019 1.12* 0.10 - 0.90 10*3/mm3 Final   • Eosinophils, Absolute 12/03/2019 0.01  0.00 - 0.40 10*3/mm3 Final   • Basophils, Absolute 12/03/2019 0.17  0.00 - 0.20 10*3/mm3 Final       RADIOLOGY:  Ct Chest With Contrast    Result Date: 11/21/2019  Narrative: EXAMINATION:  CT CHEST W CONTRAST-  11/21/2019 10:23 AM CST  HISTORY: Metastatic breast cancer. Evaluate response to therapy. C50.912-Malignant neoplasm of unspecified site of left female breast; C79.51-Secondary malignant neoplasm of bone.  COMPARISON : 08/29/2019.  DLP: 159 mGy-cm. Automated dosage control was utilized.  TECHNIQUE: CT was performed of the chest with contrast. Sagittal and coronal images were reconstructed.  MEDIASTINUM, HEART AND VASCULAR STRUCTURES: There is atheromatous disease of the thoracic aorta heart size is upper limits of normal. There is diffuse mediastinal edema as seen previously. There are no definite enlarged lymph nodes in the mediastinum. The mediastinum is somewhat difficult to evaluate due to the edema. A small pericardial effusion is stable.  LUNGS: There is a moderate to large right pleural effusion. This appears to be slightly larger on the current study. There is passive atelectasis, right due to the size of the pleural effusion. There are patchy right upper lobe infiltrates are slightly worse on the current study compared to the prior study. There are worsening infiltrates in the left upper lobe and left lower lobe compared to the prior study. Some of this infiltrate is interstitial and some is alveolar.  UPPER ABDOMEN:  Please see the abdomen and pelvis CT report separately.  BONES: There is diffuse metastatic involvement of the visualized bones. Compression deformity of T11 appears stable.      Impression: 1. Moderate to large right pleural effusion that may be slightly larger on the current study. There is passive right lung atelectasis. 2. Patchy infiltrates in the right upper lobe, left upper lobe and left lower lobe are increasing compared to the previous study. This is likely infectious or inflammatory. 3. Diffuse metastatic bone disease. Stable compression deformity of T11.    This report was finalized on 11/21/2019 11:45 by Dr. Marques Denson MD.    Nm Bone Scan Whole Body    Result Date: 11/21/2019  Narrative: NM BONE SCAN WHOLE BODY- 11/21/2019 10:12 AM CST  HISTORY: met breast ca assess response to treatment; C50.912-Malignant neoplasm of unspecified site of left female breast; C79.51-Secondary malignant neoplasm of bone  COMPARISON: 08/29/2019 bone scan and CT of the chest, abdomen, and pelvis 11/21/2019.  RADIOPHARMACEUTICAL: 21.9 mCi Tc99m HDP  TECHNIQUE: Anterior and posterior whole-body images are acquired approximately two hours postinjection.  FINDINGS: There is relatively decreased uptake of radiotracer in the kidneys, likely due to diffuse osteoblastic disease that is better demonstrated by CT. There has been no significant interval change since the previous study. Focal uptake is present in the LEFT coracoid region and in the sternum. There is also a focal area of increased activity in the L5 vertebral body.      Impression: 1. Stable bone scan with signs of diffuse osteoblastic disease. Metastatic bone disease on this patient is much better visualized by CT. This report was finalized on 11/21/2019 13:57 by Dr. Osmani Gillette MD.    Ct Abdomen Pelvis With Contrast    Result Date: 11/21/2019  Narrative: EXAMINATION:  CT ABDOMEN PELVIS W CONTRAST-  11/21/2019 10:23 AM CST  HISTORY: Metastatic breast cancer.  Assess response to therapy. C50.912-Malignant neoplasm of unspecified site of left female breast; C79.51-Secondary malignant neoplasm of bone.  TECHNIQUE: Spiral CT was performed of the abdomen and pelvis with contrast. Multiplanar images were reconstructed.  DLP: 189 mGy-cm. Automated dosage control was utilized.  COMPARISON: 08/29/2019.  LUNG BASES: Please see the chest CT report separately.  LIVER AND SPLEEN: Prior cholecystectomy. No focal liver lesion is seen. The spleen is unremarkable.  PANCREAS: No pancreatic mass or inflammatory change.  KIDNEYS AND ADRENALS: The kidneys and adrenal glands are unremarkable. The bladder is not very well distended.  BOWEL: There is mild thickening of the gastric antrum versus an artifact of underdistention. Small bowel loops are nondilated. There is abnormal wall thickening of the sigmoid colon and rectum. An extraluminal collection of air in the deep pelvis just above the uterus and adjacent to the sigmoid colon measures 3.4 x 2.9 cm and previously measured 3.2 x 2.8 cm. There is severe thickening of the adjacent colon wall.  OTHER: There is presacral edema and body wall edema. All of the visualized bones are involved with multiple sclerotic metastases. A sclerotic lesion at L2 now measures 2 cm in AP dimension and previously measured 1.9 cm.      Impression: 1. Diffuse wall thickening of the sigmoid colon extending to the rectum. An extraluminal air collection adjacent to the sigmoid colon and just above the uterus is slightly larger. The differential for this includes diverticulitis with perforation or colon cancer with perforation. GI consultation was recommended on the previous CT and is also recommended today. 2. Diffuse metastatic bone disease. The largest sclerotic lesion at L2 is increased slightly in size. There are literally hundreds of small sclerotic lesions throughout the visualized bones. 3. Diffuse body wall edema and presacral edema. This report was finalized  on 11/21/2019 11:54 by Dr. Marques Denson MD.           Assessment/Plan  Heavenly Yanes is a 74 y.o. year old female with met breast cancer s/p multiple lines of CMT currently on taxotere weekly and xeloda with good tolerance.    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  --also on xeloda [id number], then repeat.  --CT c/a/p 2/19 showed stable dz. Some new Inflammatory opacities in the LEFT lower lobe and bilateral subpleural interstitial thickening.   -bone scan without evidence of dz progression  --primo taxotere weekly. Pt also on xeloda [id number], then repeat.  4/25/19: therapy postponed 2 weeks ago due to neutropenia. He got Neupogen x1. Labs reviewed with pt wbc 10.68, hg 11.1, [id number]. ANC 7630.  -ANC low 880. Postpone tx. Give neupogen x1. Pt advised to hold xeloda until 2 weeks    -imaging CT c/a/p 5/31/19 showed stable dz. Bone scan 5/31/19 showed multiple metastatic dz, nothing new. Possible pathologic fracture in the area of the coracoid process. Pt does not endorse pain there.  -labs 8/22/19 reviewed with pt wbc 4.99 Hg 11.7, plt 400. Ok for treatment with taxotere and neulasta.  -exam and review of labs did not reveal reason for the increased sob on exertion  -8/30/19: she presents today to review CT scan done 8:29/19. Overall stable in terms of her cancer but there is a concerning are of extraluminal  collection of air in the deep pelvis with a thickened wall measuring about 2.8 x 3.2 cm that is situated between the lower sigmoid colon and small bowel and just above the uterus. This likely represents a contained sigmoid colon perforation related to prior diverticulitis. Perforated neoplasm cannot be ruled out. GI consultation is recommended for this finding. The sigmoid colon and adjacent small bowel loops appear to be matted around this collection.  -currently not having any abdominal symptoms.  -will refer to GI  For dysphagia  -I reviewed CT finding with pt and  which showed pneumonia, and pleural effusion with stable bone disease  -rx levofloxacin for 14days  -arrange for thoracentesis  -hold off on treatment today  -labs reviewed with them wbc 30.57, Hg 12.5, plt 357.    2. Psych: on olanzapine    3. HTN: on metoprolol    4. Gerd: on ranitidine  5. Dyspnea: get a cxr to evaluate  6. Anemia: will check iron profile, and act accordingly . Her creatinine is normal at 0.94.   7. DVT on xarelto indefinite.  --Doppler left LE  1/30/19 There is evidence of deep venous thrombosis of the left lower extremity. The clot burden is improved from last study. There is also evidence of superficial clot in the greater saphenous vein. There is a Baker's cyst in the popliteal fossa.  8. CHF: LVEF 65% with diastolic dysfunction. referral to cardiology to maximized her heart function.  9. Thrombocytosis: suspect reactive from anemia. Will check iron profile.  10.left lung pneumonia: resolved  --cxr showed pneumonia. Pt already on levofloxacin. Will complete 14 days.   11: LE edema: improved on lasix.  12. FEN:  Low potassium 3.4. primo kdur daily  -reinforced small frequent meals and high calorie food such as peanut butter, ice cream etc.  13. Left shoulder pr ulcer: referral to wound care made      Moise Sotelo MD    12/3/2019    10:18 AM

## 2019-12-04 ENCOUNTER — TELEPHONE (OUTPATIENT)
Dept: ONCOLOGY | Facility: CLINIC | Age: 74
End: 2019-12-04

## 2019-12-04 LAB — CANCER AG27-29 SERPL-ACNC: 93.6 U/ML (ref 0–38.6)

## 2019-12-04 NOTE — TELEPHONE ENCOUNTER
----- Message from Moise Sotelo MD sent at 12/4/2019 12:57 PM CST -----  Please call the patient regarding her abnormal tumor marker result. We will have to discuss going forward next visit.tnx    4:19 pm notified [facility] of tumor marker results results and Dr. Sotelo will discuss this with her at the next visit.  Verbalized understanding.

## 2019-12-05 ENCOUNTER — OFFICE VISIT (OUTPATIENT)
Dept: WOUND CARE | Facility: HOSPITAL | Age: 74
End: 2019-12-05

## 2019-12-05 ENCOUNTER — INFUSION (OUTPATIENT)
Dept: ONCOLOGY | Facility: HOSPITAL | Age: 74
End: 2019-12-05

## 2019-12-05 VITALS
OXYGEN SATURATION: 94 % | BODY MASS INDEX: 18.33 KG/M2 | WEIGHT: 110 LBS | TEMPERATURE: 97.9 F | HEART RATE: 96 BPM | RESPIRATION RATE: 16 BRPM | HEIGHT: 65 IN | DIASTOLIC BLOOD PRESSURE: 58 MMHG | SYSTOLIC BLOOD PRESSURE: 105 MMHG

## 2019-12-05 DIAGNOSIS — C79.51 CARCINOMA OF LEFT BREAST METASTATIC TO BONE (HCC): Primary | ICD-10-CM

## 2019-12-05 DIAGNOSIS — C50.912 CARCINOMA OF LEFT BREAST METASTATIC TO BONE (HCC): Primary | ICD-10-CM

## 2019-12-05 DIAGNOSIS — C79.51 BREAST CANCER METASTASIZED TO BONE, UNSPECIFIED LATERALITY (HCC): ICD-10-CM

## 2019-12-05 DIAGNOSIS — C50.412 MALIGNANT NEOPLASM OF UPPER-OUTER QUADRANT OF LEFT FEMALE BREAST, UNSPECIFIED ESTROGEN RECEPTOR STATUS (HCC): ICD-10-CM

## 2019-12-05 DIAGNOSIS — Z95.828 PORT-A-CATH IN PLACE: ICD-10-CM

## 2019-12-05 DIAGNOSIS — C50.919 BREAST CANCER METASTASIZED TO BONE, UNSPECIFIED LATERALITY (HCC): ICD-10-CM

## 2019-12-05 LAB
CYTO UR: NORMAL
LAB AP CASE REPORT: NORMAL
PATH REPORT.FINAL DX SPEC: NORMAL
PATH REPORT.GROSS SPEC: NORMAL

## 2019-12-05 PROCEDURE — 96375 TX/PRO/DX INJ NEW DRUG ADDON: CPT

## 2019-12-05 PROCEDURE — 96413 CHEMO IV INFUSION 1 HR: CPT

## 2019-12-05 PROCEDURE — 25010000002 ONDANSETRON PER 1 MG: Performed by: INTERNAL MEDICINE

## 2019-12-05 PROCEDURE — 96367 TX/PROPH/DG ADDL SEQ IV INF: CPT

## 2019-12-05 PROCEDURE — G0463 HOSPITAL OUTPT CLINIC VISIT: HCPCS

## 2019-12-05 PROCEDURE — 25010000002 DEXAMETHASONE PER 1 MG: Performed by: INTERNAL MEDICINE

## 2019-12-05 PROCEDURE — 25010000002 DOCETAXEL 20 MG/ML SOLUTION 8 ML VIAL: Performed by: INTERNAL MEDICINE

## 2019-12-05 PROCEDURE — 97602 WOUND(S) CARE NON-SELECTIVE: CPT

## 2019-12-05 RX ORDER — SODIUM CHLORIDE 9 MG/ML
250 INJECTION, SOLUTION INTRAVENOUS ONCE
Status: COMPLETED | OUTPATIENT
Start: 2019-12-05 | End: 2019-12-05

## 2019-12-05 RX ORDER — SODIUM CHLORIDE 0.9 % (FLUSH) 0.9 %
10 SYRINGE (ML) INJECTION AS NEEDED
Status: CANCELLED | OUTPATIENT
Start: 2019-12-05

## 2019-12-05 RX ORDER — MEPERIDINE HYDROCHLORIDE 50 MG/ML
25 INJECTION INTRAMUSCULAR; INTRAVENOUS; SUBCUTANEOUS
Status: DISCONTINUED | OUTPATIENT
Start: 2019-12-05 | End: 2019-12-05 | Stop reason: HOSPADM

## 2019-12-05 RX ORDER — SODIUM CHLORIDE 0.9 % (FLUSH) 0.9 %
10 SYRINGE (ML) INJECTION AS NEEDED
Status: DISCONTINUED | OUTPATIENT
Start: 2019-12-05 | End: 2019-12-05 | Stop reason: HOSPADM

## 2019-12-05 RX ORDER — FAMOTIDINE 10 MG/ML
20 INJECTION, SOLUTION INTRAVENOUS AS NEEDED
Status: DISCONTINUED | OUTPATIENT
Start: 2019-12-05 | End: 2019-12-05 | Stop reason: HOSPADM

## 2019-12-05 RX ADMIN — DOCETAXEL 55 MG: 20 INJECTION, SOLUTION, CONCENTRATE INTRAVENOUS at 09:49

## 2019-12-05 RX ADMIN — SODIUM CHLORIDE, PRESERVATIVE FREE 10 ML: 5 INJECTION INTRAVENOUS at 11:12

## 2019-12-05 RX ADMIN — Medication 500 UNITS: at 11:12

## 2019-12-05 RX ADMIN — SODIUM CHLORIDE 250 ML: 9 INJECTION, SOLUTION INTRAVENOUS at 09:23

## 2019-12-12 ENCOUNTER — LAB (OUTPATIENT)
Dept: LAB | Facility: HOSPITAL | Age: 74
End: 2019-12-12

## 2019-12-12 ENCOUNTER — OFFICE VISIT (OUTPATIENT)
Dept: ONCOLOGY | Facility: CLINIC | Age: 74
End: 2019-12-12

## 2019-12-12 ENCOUNTER — INFUSION (OUTPATIENT)
Dept: ONCOLOGY | Facility: HOSPITAL | Age: 74
End: 2019-12-12

## 2019-12-12 VITALS
SYSTOLIC BLOOD PRESSURE: 100 MMHG | HEIGHT: 65 IN | DIASTOLIC BLOOD PRESSURE: 62 MMHG | HEART RATE: 100 BPM | RESPIRATION RATE: 16 BRPM | TEMPERATURE: 97.7 F | WEIGHT: 114.7 LBS | BODY MASS INDEX: 19.11 KG/M2 | OXYGEN SATURATION: 93 %

## 2019-12-12 DIAGNOSIS — C50.912 CARCINOMA OF LEFT BREAST METASTATIC TO BONE (HCC): ICD-10-CM

## 2019-12-12 DIAGNOSIS — C79.51 CARCINOMA OF LEFT BREAST METASTATIC TO BONE (HCC): ICD-10-CM

## 2019-12-12 LAB
ALBUMIN SERPL-MCNC: 3.1 G/DL (ref 3.5–5.2)
ALBUMIN/GLOB SERPL: 1 G/DL
ALP SERPL-CCNC: 93 U/L (ref 39–117)
ALT SERPL W P-5'-P-CCNC: 9 U/L (ref 1–33)
ANION GAP SERPL CALCULATED.3IONS-SCNC: 11 MMOL/L (ref 5–15)
AST SERPL-CCNC: 17 U/L (ref 1–32)
BASOPHILS # BLD AUTO: 0.06 10*3/MM3 (ref 0–0.2)
BASOPHILS NFR BLD AUTO: 1.1 % (ref 0–1.5)
BILIRUB SERPL-MCNC: 0.5 MG/DL (ref 0.2–1.2)
BUN BLD-MCNC: 9 MG/DL (ref 8–23)
BUN/CREAT SERPL: 15 (ref 7–25)
CALCIUM SPEC-SCNC: 9.7 MG/DL (ref 8.6–10.5)
CHLORIDE SERPL-SCNC: 99 MMOL/L (ref 98–107)
CO2 SERPL-SCNC: 29 MMOL/L (ref 22–29)
CREAT BLD-MCNC: 0.6 MG/DL (ref 0.57–1)
DEPRECATED RDW RBC AUTO: 69.9 FL (ref 37–54)
EOSINOPHIL # BLD AUTO: 0.02 10*3/MM3 (ref 0–0.4)
EOSINOPHIL NFR BLD AUTO: 0.4 % (ref 0.3–6.2)
ERYTHROCYTE [DISTWIDTH] IN BLOOD BY AUTOMATED COUNT: 19.4 % (ref 12.3–15.4)
GFR SERPL CREATININE-BSD FRML MDRD: 97 ML/MIN/1.73
GLOBULIN UR ELPH-MCNC: 3.2 GM/DL
GLUCOSE BLD-MCNC: 116 MG/DL (ref 65–99)
HCT VFR BLD AUTO: 35.7 % (ref 34–46.6)
HGB BLD-MCNC: 12 G/DL (ref 12–15.9)
HOLD SPECIMEN: NORMAL
HOLD SPECIMEN: NORMAL
IMM GRANULOCYTES # BLD AUTO: 0.06 10*3/MM3 (ref 0–0.05)
IMM GRANULOCYTES NFR BLD AUTO: 1.1 % (ref 0–0.5)
LYMPHOCYTES # BLD AUTO: 1.55 10*3/MM3 (ref 0.7–3.1)
LYMPHOCYTES NFR BLD AUTO: 28.5 % (ref 19.6–45.3)
MCH RBC QN AUTO: 33.4 PG (ref 26.6–33)
MCHC RBC AUTO-ENTMCNC: 33.6 G/DL (ref 31.5–35.7)
MCV RBC AUTO: 99.4 FL (ref 79–97)
MONOCYTES # BLD AUTO: 0.12 10*3/MM3 (ref 0.1–0.9)
MONOCYTES NFR BLD AUTO: 2.2 % (ref 5–12)
NEUTROPHILS # BLD AUTO: 3.63 10*3/MM3 (ref 1.7–7)
NEUTROPHILS NFR BLD AUTO: 66.7 % (ref 42.7–76)
NRBC BLD AUTO-RTO: 0 /100 WBC (ref 0–0.2)
PLATELET # BLD AUTO: 425 10*3/MM3 (ref 140–450)
PMV BLD AUTO: 10.2 FL (ref 6–12)
POTASSIUM BLD-SCNC: 3.7 MMOL/L (ref 3.5–5.2)
PROT SERPL-MCNC: 6.3 G/DL (ref 6–8.5)
RBC # BLD AUTO: 3.59 10*6/MM3 (ref 3.77–5.28)
SODIUM BLD-SCNC: 139 MMOL/L (ref 136–145)
WBC NRBC COR # BLD: 5.44 10*3/MM3 (ref 3.4–10.8)

## 2019-12-12 PROCEDURE — 99215 OFFICE O/P EST HI 40 MIN: CPT | Performed by: INTERNAL MEDICINE

## 2019-12-12 PROCEDURE — 36415 COLL VENOUS BLD VENIPUNCTURE: CPT

## 2019-12-12 PROCEDURE — 85025 COMPLETE CBC W/AUTO DIFF WBC: CPT

## 2019-12-12 PROCEDURE — 80053 COMPREHEN METABOLIC PANEL: CPT

## 2019-12-12 NOTE — PROGRESS NOTES
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   • Carcinoma of left breast metastatic to bone (CMS/HCC)        REASON FOR VISIT:     No chief complaint on file.       HEMATOLOGY / ONCOLOGY HISTORY:   Oncology/Hematology History    Patient is a 67-year-old postmenopausal female who had onset of her menses at age 9 and menopause at age 48. She received oral contraceptives for approximately 14 years and did not receive hormonal manipulation. Patient has a maternal cousin had breast cancer. Patient has had no prior breast biopsies. Patient found a palpable left breast mass as well as a left axillary mass. Patient had a bloody nipple discharge which been present for approximately 6 months. On 6/20/2012, a mammogram was performed showing a subareolar mass consistent with malignancy. Bilateral breast ultrasound revealed an ill-defined subareolar mass measuring 2.8-2.5 cm. There is a left axillary mass measuring 1.7 cm with suspicion of extracapsular extension. There is no evidence of disease in the right breast. On 7/9/2012 patient underwent a left breast biopsy and placement of marker clip. Left breast biopsy revealed an infiltrating lobular carcinoma grade 2 with focal ductal carcinoma in situ, solid pattern. A fine-needle biopsy of the left lymph node was consistent with metastatic carcinoma. Breast tumor profile revealed ER: 100%; MA: 98%; HER-2/maynor 2+; FISH: Unamplified; Ki-67: 71%; P 53 1%; DNA aneuploidy. A MRI of the breast were performed body multiple abnormal enhancing masses within the left breast. There appears to be anterior retraction of the pectoralis muscle with no  direct extension. There is a moderate to large, layering left pleural effusion appreciated. The right breast reveals 3 moderately enlarged lymph nodes in the posterior lateral aspect the right breast. These have fatty hilum but followup is recommended. Patient is undergone systemic studies including, on 8/2/2012, a CT scan of the brain showing atrophy. a CT scan that shows showing a small to moderate left pleural effusion with 3 subcentimeter nodule densities in the left lung.   She completed 4 cycles of neoadjuvant treatment with dose dense Adriamycin and Cytoxan. She had a mammogram which showed a significant reduction in the size of her left breast lesion. There is no axillary adenopathy noted. She has had an ultrasound revealing the  lesion reducing from 2.4 cm to 1 cm. Patient underwent a left mastectomy on 03/19/2013 finding negative invasive cancer, negative nodes. A 5% DCIS was noted. The patient has declined hormonal therapy. She did not need radiation therapy.  November 2014, she began to have evidence of lytic bone lesions at T5T6, frontal disease, an 8 mm lucency in the central frontal area of  the skull but nothing intracranial. Bone scan revealed only vertex tracer activity but bilateral ribs and thoracic spine, and other lesions in  her pelvis. At that time, she was started on letrozole since November 2014. She is taking Xgeva. She had progression found in bone in Feb 2015 on scan. She was started on Faslodex, Ibrance and continued xgeva.         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 B) Foci of ductal carcinoma in situ, solid pattern. C) Greatest dimension of the invasive carcinoma is 15.8mm.      3/19/2013 Surgery     Left Mastectomy w/ Axillary Tail: Focal  residual ducatal carcinoma in situ (DCIS) 12 lymph nodes negative for metastatic carcinoma.      10/20/2014 Biopsy     Peritoneum Biopsy: Final Diagnosis: Malignant Cell Neoplasm.        Breast cancer metastasized to bone, unspecified laterality (CMS/HCC)    10/15/2012 -  Other Event     Left mammogram:  Impression:  1. Decreasing spiculation and density in the retroareolar left breast, near a previous biopsy.   2. Definite left axillary lesion is not appreciated.       2/5/2013 -  Other Event     Diagnostic Mammo:  Comparison is made to 10/15/2012 and 6/20/2012  The spiculated mass at 12:00 behind the nipple is nearly resolved.   Impression:  1. The mass on the left side is less apparent after receiving chemotherapy. There has been a good response to chmotherapy on the left side.   2. No suspicious abnormality is seen in the right breast to account for the new palable abnormality at the 4-5:00 position.       3/19/2013 Surgery     Breast Biopsy:  Final Diagnosis:  A. Breast, left mastectomy with axillary tail  1. Focal residual ductal carcinoma in situ (DICS) (less than 5% of tumor bed).  2. Negative for residual invasive carcinoma.   3. 12 Lymph Nodes, negative for metastatic carcinoma (0/12) focally, some lymph nodes demonstrate fibrosis/treatment effect.  B. Skin and soft tissue, left advancement flap:  1. Benign skin and subcutis.   2. Negative for carcinoma.       6/20/2013 -  Other Event     Diagnostic Mammo [facility]:  1. A subareolar mass is consistent with malignancy. Additional involement extends inferiorly sonographically and superolaterally mammograhically as demonstrated by calcifications. The mass measures approx 2.8 cm sonographically, but the involved region is likely much larger including an intraductal componet. The left axillary lymph nodes are also involved.   2. Recommened ultrasound guided biopsy of the left subareolar mass and left axillary lymph node.   3. Breast MRI could also further  define multicentric disease on the left.   4. Clear suspicious finding on the right.  Recommend ongoing clinical follow up of palpable foci.       6/13/2014 -  Other Event     Diagnostic Mammo:  IMPRESSION:  1. History of left breast cancer and mastectomy. Stable benign right breast mammograms.       10/20/2014 Surgery     Final Diagnosis:  Biopsies, pertoneum:  Metastatic carcinoma, morphologically compatible with metastatic mammary carcinoma.       6/15/2015 -  Other Event     Diagnostic Mammo:  IMPRESSION:   1. Negative x-ray report, should not delay biopsy if a dominat or clinically suspicious mass is present.       7/5/2016 -  Other Event     Diagnositic mammogram:  Impression:   Stable right mamogram with no radiographic findings suspicious for malignancy. Recommend continued screening mammography per screening guidelines unless otherwise earlier clinically indicated.       9/18/2016 Initial Diagnosis     Secondary malignant neoplasm of bone and bone marrow        Carcinoma of left breast metastatic to bone (CMS/HCC)    3/1/2017 Initial Diagnosis     Carcinoma of left breast metastatic to bone (CMS/HCC)      12/18/2019 -  Chemotherapy     OP BREAST Eribulin             INTERVAL HISTORY  Patient ID: Heavenly Yanes is a 74 y.o. year old female Cancer Staging  Stage IV (T2, N1, M1)  --complaining of dyspnea on exersion since last Tuesday. She feels like she ran a mile  2/14/19: cxr showed pneumonia. Already on levofloxacin. Coughing not worse, she did not run temp.  Overall feels weak.  -restarted the appetite suppressant and is starting to eat more but not a whole lot  --2/28/19: CT c/ap with some response to therapy. New left chest inflammatory lesion to be followed ion next imaging. Bone scan unremarkable for progression.  4/25/19: she is much better since being off therapy for 2 weeks. COugh is improved.   6/20/19: has left ear drainage which is her usual ear infection. Leg edema improved with lasix. She was  ordered a mammo by the surgeon which I told her is not necessary, she has metastatic dz.    7/25/19: complaining of increased dyspnea on exertion. She takes her laxis daily.    8/22/19:  complaining of increased dyspnea on exertion. She takes her laxis daily. Also not much appetite even though on megace 40bid. YAIR not worse from prior. Weight the same  8/30/19: she presents today to review CT scan. Overall stable in terms of her cancer but there is a concerning are of extraluminal collection of air in the deep pelvis with a thickened wall measuring about 2.8 x 3.2 cm that is situated between the lower sigmoid colon and small bowel and just above the uterus. This likely represents a contained sigmoid colon perforation related to prior diverticulitis. Perforated neoplasm cannot be ruled out. GI consultation is recommended for this finding. The sigmoid colon and adjacent small bowel loops appear to be matted around this collection.  -11/7/19: main concern from  is lack of appetite. contnues to be fatigued.  11/15/19: appetite still same per  but he nags at her to eat more. Pt complaining of choking today and not able to swallow. Per , it has been happening for a while. Will refer to GI    12/3/19: here to review scan, toxicity assessment. CT showd stable bone mets, chest imaging showed possible infection.neoplatic process and moderate to large right pleural effusion that may be slightly larger  on the current study. Abdominal CT showed Diffuse wall thickening of the sigmoid colon extending to the rectum. An extraluminal air collection adjacent to the sigmoid colon and just above the uterus is slightly larger. The differential for this includes diverticulitis with perforation or colon cancer with perforation  -currently not having any abdominal symptoms  -she saw GI and deferred c-scope. Esophagram done for dysphagia.  -she developed left shoulder superficial ulcer from pressure    12/12/19: here to  review scan. Recent scan showed progression in the L2 sclerotic lesion. Thoracentesis positive for breast cancer. I reviewed tumor markers with pt and they have progressively increased. ; 74.2/60/47.8. Today is pending  -CA 27.29 93.6/79.8/49.9. Today is pending  -Denies cp/n/v/, diarrhea/fever/chills/neuropathy/ no focal weakness.   Rest of ros unremarkable. PE positive for left shoulder superficial pressure ulcer. 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) 9/18/2016     Past Surgical History:   Past Surgical History:   Procedure Laterality Date   • BREAST SURGERY      left breast biopsy and axillary node   • CHOLECYSTECTOMY     • COLONOSCOPY  08/15/2014    diverticulosis  otherwise normal colonoscopy   • EXTERNAL EAR SURGERY     • MASTECTOMY Left    • PORTACATH PLACEMENT       Social History:   Social History     Socioeconomic History   • Marital status:      Spouse name: Not on file   • Number of children: Not on file   • Years of education: Not on file   • Highest education level: Not on file   Tobacco Use   • Smoking status: Never Smoker   • Smokeless tobacco: Never Used   Substance and Sexual Activity   • Alcohol use: No   • Drug use: No   • Sexual activity: Defer     Family History:   Family History   Problem Relation Age of Onset   • No Known Problems Daughter    • No Known Problems Son    • Other Mother         complications from a concussion from a fall   • Other Father         old age   • Colon polyps Father    • No Known Problems Brother    • No Known Problems Daughter    • Colon cancer Neg Hx    • Esophageal  cancer Neg Hx        Review of Systems   Constitutional: Positive for activity change, appetite change and fatigue. Negative for unexpected weight change.   HENT: Negative.    Eyes: Negative.    Respiratory: Positive for shortness of breath.    Cardiovascular: Positive for leg swelling.   Gastrointestinal: Negative.    Endocrine: Negative.    Genitourinary: Negative.    Musculoskeletal: Negative.    Skin: Negative.    Neurological: Negative.    Hematological: Negative.    Psychiatric/Behavioral: Negative.         Performance Status:  Asymptomatic    Medications:    Current Outpatient Medications   Medication Sig Dispense Refill   • Calcium-Magnesium-Vitamin D (CALCIUM 1200+D3 PO) Take 1,200 mg by mouth Daily.     • cetirizine (zyrTEC) 5 MG tablet Take 5 mg by mouth Daily.     • furosemide (LASIX) 20 MG tablet One tab po qd as needed for swelling take with potassium 30 tablet 3   • megestrol (MEGACE) 40 MG tablet Take 1 tablet by mouth 2 (Two) Times a Day. 60 tablet 5   • OLANZapine (ZYPREXA) 2.5 MG tablet Take 2.5 mg by mouth every night.     • ondansetron (ZOFRAN) 8 MG tablet Take 1 tablet by mouth Every 8 (Eight) Hours As Needed for Nausea or Vomiting. 30 tablet 5   • potassium chloride (K-DUR,KLOR-CON) 20 MEQ CR tablet Take one tab po bid on days taking Lasix 60 tablet 1   • raNITIdine (ZANTAC) 150 MG tablet Take 1 tablet by mouth 2 (Two) Times a Day. 60 tablet 5   • rivaroxaban (XARELTO) 20 MG tablet Take 1 tablet by mouth Daily. 30 tablet 5   • thyroid 60 MG PO tablet Take 60 mg by mouth daily.       No current facility-administered medications for this visit.      Facility-Administered Medications Ordered in Other Visits   Medication Dose Route Frequency Provider Last Rate Last Dose   • heparin flush (porcine) 100 UNIT/ML injection 500 Units  500 Units Intravenous PRN Joseph Nunez MD   500 Units at 10/26/17 1155   • sodium chloride 0.9 % flush 10 mL  10 mL Intravenous PRN Joseph Nunez MD   10 mL at  "10/26/17 1155       ALLERGIES:    Allergies   Allergen Reactions   • Other Angioedema     POPPY SEED   • Penicillins Hives   • Sulfa Antibiotics Mental Status Change   • Sulfur Mental Status Change   • Valium [Diazepam] Provider Review Needed     Unable to remember reaction   • Benadryl [Diphenhydramine Hcl (Sleep)] Provider Review Needed     Shaky legs   • Codeine Rash   • Diphenhydramine Provider Review Needed     \"Shaky leg syndrome\"  \"Shaky leg syndrome\"  \"Shaky leg syndrome\"   • Heparin Provider Review Needed     Patient states tolerates Heparin flush without problems: Years ago had heparin IV and had a rash   • Petroleum Jelly [Skin Protectants, Misc.] Rash       Objective      Vitals:    12/12/19 0812   BP: 100/62   Pulse: 100   Resp: 16   Temp: 97.7 °F (36.5 °C)   TempSrc: Temporal   SpO2: 93%   Weight: 52 kg (114 lb 11.2 oz)   Height: 165.1 cm (65\")   PainSc: 0-No pain         Current Status 12/12/2019   ECOG score 2         Physical Examunchanged  General Appearance: Patient is awake, alert, oriented and in no acute distress. Patient is welldeveloped, wellnourished, and appears stated age.  HEENT: Normocephalic. Sclerae clear, conjunctiva pink, extraocular movements intact, pupils, round, reactive to light and  accommodation. Mouth and throat are clear with moist oral mucosa.  NECK: Supple, no jugular venous distention, thyroid not enlarged.  LYMPH: No cervical, supraclavicular, axillary, or inguinal lymphadenopathy.  CHEST: Equal bilateral expansion, AP  diameter normal, resonant percussion note  LUNGS: Good air movement, no rales, rhonchi, rubs or wheezes with auscultation on the right. Diminished on the left.  CARDIO: Regular sinus rhythm, no murmurs, gallops or rubs.  ABDOMEN: Nondistended, soft, No tenderness, no guarding, no rebound, No hepatosplenomegaly. No abdominal masses. Bowel sounds positive. No hernia  GENITALIA: Not examined.  BREASTS: Not examined.  MUSKEL: No joint swelling, decreased " motion, or inflammation  EXTREMS: patrick le  Edema L>R stable,No clubbing, cyanosis, No varicose veins. positive for left shoulder superficial pressure ulcer. No redness, warmth or swelling  NEURO: Grossly nonfocal, Gait is coordinated and smooth, Cognition is preserved.  SKIN: No rashes, no ecchymoses, no petechia.  PSYCH: Oriented to time, place and person. Memory is preserved. Mood and affect appear normal  RECENT LABS:  Lab on 12/12/2019   Component Date Value Ref Range Status   • Glucose 12/12/2019 116* 65 - 99 mg/dL Final   • BUN 12/12/2019 9  8 - 23 mg/dL Final   • Creatinine 12/12/2019 0.60  0.57 - 1.00 mg/dL Final   • Sodium 12/12/2019 139  136 - 145 mmol/L Final   • Potassium 12/12/2019 3.7  3.5 - 5.2 mmol/L Final   • Chloride 12/12/2019 99  98 - 107 mmol/L Final   • CO2 12/12/2019 29.0  22.0 - 29.0 mmol/L Final   • Calcium 12/12/2019 9.7  8.6 - 10.5 mg/dL Final   • Total Protein 12/12/2019 6.3  6.0 - 8.5 g/dL Final   • Albumin 12/12/2019 3.10* 3.50 - 5.20 g/dL Final   • ALT (SGPT) 12/12/2019 9  1 - 33 U/L Final   • AST (SGOT) 12/12/2019 17  1 - 32 U/L Final   • Alkaline Phosphatase 12/12/2019 93  39 - 117 U/L Final   • Total Bilirubin 12/12/2019 0.5  0.2 - 1.2 mg/dL Final   • eGFR Non African Amer 12/12/2019 97  >60 mL/min/1.73 Final   • Globulin 12/12/2019 3.2  gm/dL Final   • A/G Ratio 12/12/2019 1.0  g/dL Final   • BUN/Creatinine Ratio 12/12/2019 15.0  7.0 - 25.0 Final   • Anion Gap 12/12/2019 11.0  5.0 - 15.0 mmol/L Final   • WBC 12/12/2019 5.44  3.40 - 10.80 10*3/mm3 Final   • RBC 12/12/2019 3.59* 3.77 - 5.28 10*6/mm3 Final   • Hemoglobin 12/12/2019 12.0  12.0 - 15.9 g/dL Final   • Hematocrit 12/12/2019 35.7  34.0 - 46.6 % Final   • MCV 12/12/2019 99.4* 79.0 - 97.0 fL Final   • MCH 12/12/2019 33.4* 26.6 - 33.0 pg Final   • MCHC 12/12/2019 33.6  31.5 - 35.7 g/dL Final   • RDW 12/12/2019 19.4* 12.3 - 15.4 % Final   • RDW-SD 12/12/2019 69.9* 37.0 - 54.0 fl Final   • MPV 12/12/2019 10.2  6.0 - 12.0 fL Final    • Platelets 12/12/2019 425  140 - 450 10*3/mm3 Final   • Neutrophil % 12/12/2019 66.7  42.7 - 76.0 % Final    Appended report. These results have been appended to a previously final verified report.   • Lymphocyte % 12/12/2019 28.5  19.6 - 45.3 % Final    Appended report. These results have been appended to a previously final verified report.   • Monocyte % 12/12/2019 2.2* 5.0 - 12.0 % Final    Appended report. These results have been appended to a previously final verified report.   • Eosinophil % 12/12/2019 0.4  0.3 - 6.2 % Final    Appended report. These results have been appended to a previously final verified report.   • Basophil % 12/12/2019 1.1  0.0 - 1.5 % Final    Appended report. These results have been appended to a previously final verified report.   • Immature Grans % 12/12/2019 1.1* 0.0 - 0.5 % Final    Appended report. These results have been appended to a previously final verified report.   • Neutrophils, Absolute 12/12/2019 3.63  1.70 - 7.00 10*3/mm3 Final    Appended report. These results have been appended to a previously final verified report.   • Lymphocytes, Absolute 12/12/2019 1.55  0.70 - 3.10 10*3/mm3 Final    Appended report. These results have been appended to a previously final verified report.   • Monocytes, Absolute 12/12/2019 0.12  0.10 - 0.90 10*3/mm3 Final    Appended report. These results have been appended to a previously final verified report.   • Eosinophils, Absolute 12/12/2019 0.02  0.00 - 0.40 10*3/mm3 Final    Appended report. These results have been appended to a previously final verified report.   • Basophils, Absolute 12/12/2019 0.06  0.00 - 0.20 10*3/mm3 Final    Appended report. These results have been appended to a previously final verified report.   • Immature Grans, Absolute 12/12/2019 0.06* 0.00 - 0.05 10*3/mm3 Final    Appended report. These results have been appended to a previously final verified report.   • nRBC 12/12/2019 0.0  0.0 - 0.2 /100 WBC Final     Appended report. These results have been appended to a previously final verified report.   • Extra Tube 12/12/2019 Hold for add-ons.   Final    Auto resulted.   • Extra Tube 12/12/2019 Hold for add-ons.   Final    Auto resulted.       RADIOLOGY:  Ct Chest With Contrast    Result Date: 11/21/2019  Narrative: EXAMINATION:  CT CHEST W CONTRAST-  11/21/2019 10:23 AM CST  HISTORY: Metastatic breast cancer. Evaluate response to therapy. C50.912-Malignant neoplasm of unspecified site of left female breast; C79.51-Secondary malignant neoplasm of bone.  COMPARISON : 08/29/2019.  DLP: 159 mGy-cm. Automated dosage control was utilized.  TECHNIQUE: CT was performed of the chest with contrast. Sagittal and coronal images were reconstructed.  MEDIASTINUM, HEART AND VASCULAR STRUCTURES: There is atheromatous disease of the thoracic aorta heart size is upper limits of normal. There is diffuse mediastinal edema as seen previously. There are no definite enlarged lymph nodes in the mediastinum. The mediastinum is somewhat difficult to evaluate due to the edema. A small pericardial effusion is stable.  LUNGS: There is a moderate to large right pleural effusion. This appears to be slightly larger on the current study. There is passive atelectasis, right due to the size of the pleural effusion. There are patchy right upper lobe infiltrates are slightly worse on the current study compared to the prior study. There are worsening infiltrates in the left upper lobe and left lower lobe compared to the prior study. Some of this infiltrate is interstitial and some is alveolar.  UPPER ABDOMEN: Please see the abdomen and pelvis CT report separately.  BONES: There is diffuse metastatic involvement of the visualized bones. Compression deformity of T11 appears stable.      Impression: 1. Moderate to large right pleural effusion that may be slightly larger on the current study. There is passive right lung atelectasis. 2. Patchy infiltrates in the  right upper lobe, left upper lobe and left lower lobe are increasing compared to the previous study. This is likely infectious or inflammatory. 3. Diffuse metastatic bone disease. Stable compression deformity of T11.    This report was finalized on 11/21/2019 11:45 by Dr. Marques Denson MD.    Nm Bone Scan Whole Body    Result Date: 11/21/2019  Narrative: NM BONE SCAN WHOLE BODY- 11/21/2019 10:12 AM CST  HISTORY: met breast ca assess response to treatment; C50.912-Malignant neoplasm of unspecified site of left female breast; C79.51-Secondary malignant neoplasm of bone  COMPARISON: 08/29/2019 bone scan and CT of the chest, abdomen, and pelvis 11/21/2019.  RADIOPHARMACEUTICAL: 21.9 mCi Tc99m HDP  TECHNIQUE: Anterior and posterior whole-body images are acquired approximately two hours postinjection.  FINDINGS: There is relatively decreased uptake of radiotracer in the kidneys, likely due to diffuse osteoblastic disease that is better demonstrated by CT. There has been no significant interval change since the previous study. Focal uptake is present in the LEFT coracoid region and in the sternum. There is also a focal area of increased activity in the L5 vertebral body.      Impression: 1. Stable bone scan with signs of diffuse osteoblastic disease. Metastatic bone disease on this patient is much better visualized by CT. This report was finalized on 11/21/2019 13:57 by Dr. Osmani Gillette MD.    Us Chest    Result Date: 12/3/2019  Narrative: EXAM: US THORACENTESIS- - 12/3/2019 1:52 PM CST  HISTORY: h/o metastatic breast ca with pleural effusion on CT; C50.912-Malignant neoplasm of unspecified site of left female breast; C79.51-Secondary malignant neoplasm of bone; R13.19-Other dysphagia. Right pleural effusion.  COMPARISON: 11/21/2019  PROCEDURE: Risks, benefits and alternatives to the procedure were discussed with the patient. Written informed consent was obtained.  Time Out: Immediately prior to the procedure, a time  out was performed. The patient's name, date of birth, and procedure to be performed were verified.  Using ultrasound guidance, a site in the right lower posterior chest was selected and prepped in a sterile fashion. Lidocaine was used for local anesthesia. A 5 [place]erratian Yueh catheter was inserted into the pleural space. 900 mL of clear yellow fluid was withdrawn. The catheter was then removed. The fluid was sent to the laboratory for analysis.    The patient experienced a coughing episode at the end of the procedure. Oxygen saturation was 94 percent, pulse 100. These are similar compared to her preprocedural vitals. Coughing episode subsided. No evidence of complication. The patient was discharged home in stable condition.       Impression: Successful ultrasound-guided diagnostic and therapeutic right thoracentesis with drainage of 900 mL of clear yellow pleural fluid. This report was finalized on 12/03/2019 16:08 by Dr Kendra Arce MD.    Ct Abdomen Pelvis With Contrast    Result Date: 11/21/2019  Narrative: EXAMINATION:  CT ABDOMEN PELVIS W CONTRAST-  11/21/2019 10:23 AM CST  HISTORY: Metastatic breast cancer. Assess response to therapy. C50.912-Malignant neoplasm of unspecified site of left female breast; C79.51-Secondary malignant neoplasm of bone.  TECHNIQUE: Spiral CT was performed of the abdomen and pelvis with contrast. Multiplanar images were reconstructed.  DLP: 189 mGy-cm. Automated dosage control was utilized.  COMPARISON: 08/29/2019.  LUNG BASES: Please see the chest CT report separately.  LIVER AND SPLEEN: Prior cholecystectomy. No focal liver lesion is seen. The spleen is unremarkable.  PANCREAS: No pancreatic mass or inflammatory change.  KIDNEYS AND ADRENALS: The kidneys and adrenal glands are unremarkable. The bladder is not very well distended.  BOWEL: There is mild thickening of the gastric antrum versus an artifact of underdistention. Small bowel loops are nondilated. There is abnormal wall  thickening of the sigmoid colon and rectum. An extraluminal collection of air in the deep pelvis just above the uterus and adjacent to the sigmoid colon measures 3.4 x 2.9 cm and previously measured 3.2 x 2.8 cm. There is severe thickening of the adjacent colon wall.  OTHER: There is presacral edema and body wall edema. All of the visualized bones are involved with multiple sclerotic metastases. A sclerotic lesion at L2 now measures 2 cm in AP dimension and previously measured 1.9 cm.      Impression: 1. Diffuse wall thickening of the sigmoid colon extending to the rectum. An extraluminal air collection adjacent to the sigmoid colon and just above the uterus is slightly larger. The differential for this includes diverticulitis with perforation or colon cancer with perforation. GI consultation was recommended on the previous CT and is also recommended today. 2. Diffuse metastatic bone disease. The largest sclerotic lesion at L2 is increased slightly in size. There are literally hundreds of small sclerotic lesions throughout the visualized bones. 3. Diffuse body wall edema and presacral edema. This report was finalized on 11/21/2019 11:54 by Dr. Marques Denson MD.    Xr Chest 1 View    Result Date: 12/3/2019  Narrative: Exam:   XR CHEST 1 VW-   Date:  12/03/2019  History:  Female, age  74 years;s/p thoracentesis; Z98.890-Other specified postprocedural states  COMPARISON:  Chest x-ray dated 07/25/2019.  Findings :  The heart and mediastinum are normal in size. Interval right thoracentesis, without measurable pneumothorax. Trace left pleural effusion. Right-sided chest port is unchanged in position. Bilateral ill-defined lung parenchymal opacities redemonstrated. The heart is within normal limits in size.  The bones show no acute pathology.       Impression: Impression:  Status post right thoracentesis, without measurable pneumothorax.  This report was finalized on 12/03/2019 15:19 by Dr. Bekah Enriquez MD.      Thoracentesis    Result Date: 12/3/2019  Narrative: EXAM: US THORACENTESIS- - 12/3/2019 1:52 PM CST  HISTORY: h/o metastatic breast ca with pleural effusion on CT; C50.912-Malignant neoplasm of unspecified site of left female breast; C79.51-Secondary malignant neoplasm of bone; R13.19-Other dysphagia. Right pleural effusion.  COMPARISON: 11/21/2019  PROCEDURE: Risks, benefits and alternatives to the procedure were discussed with the patient. Written informed consent was obtained.  Time Out: Immediately prior to the procedure, a time out was performed. The patient's name, date of birth, and procedure to be performed were verified.  Using ultrasound guidance, a site in the right lower posterior chest was selected and prepped in a sterile fashion. Lidocaine was used for local anesthesia. A 5 [employer] catheter was inserted into the pleural space. 900 mL of clear yellow fluid was withdrawn. The catheter was then removed. The fluid was sent to the laboratory for analysis.    The patient experienced a coughing episode at the end of the procedure. Oxygen saturation was 94 percent, pulse 100. These are similar compared to her preprocedural vitals. Coughing episode subsided. No evidence of complication. The patient was discharged home in stable condition.       Impression: Successful ultrasound-guided diagnostic and therapeutic right thoracentesis with drainage of 900 mL of clear yellow pleural fluid. This report was finalized on 12/03/2019 16:08 by Dr Kendra Arce MD.           Assessment/Plan  Heavenly Yanes is a 74 y.o. year old female with met breast cancer s/p multiple lines of CMT currently on taxotere weekly and xeloda with good tolerance.    Patient Active Problem List   Diagnosis   • Malignant neoplasm of upper-outer quadrant of left female breast (CMS/HCC)   • Breast cancer metastasized to bone, unspecified laterality (CMS/HCC)   • Essential hypertension   • Acquired hypothyroidism   • Bipolar 1 disorder  (CMS/HCC)   • Carcinoma of left breast metastatic to bone (CMS/HCC)   • Antineoplastic chemotherapy induced anemia   • Dehydration   • Encounter for administration of vaccine   • Chemotherapy induced neutropenia (CMS/HCC)   • Cough   • Port-A-Cath in place   • Edema   • Hypokalemia          1.Metastatic breast ca: currently on weekly taxotere 3weeks on 1 week off.  --also on xeloda [id number]-off, then repeat.  --CT c/a/p 2/19 showed stable dz. Some new Inflammatory opacities in the LEFT lower lobe and bilateral subpleural interstitial thickening.   -bone scan without evidence of dz progression  --primo taxotere weekly. Pt also on xeloda [id number]-off, then repeat.  4/25/19: therapy postponed 2 weeks ago due to neutropenia. He got Neupogen x1. Labs reviewed with pt wbc 10.68, hg 11.1, [id number]. ANC 7630.  -ANC low 880. Postpone tx. Give neupogen x1. Pt advised to hold xeloda until 2 weeks    -imaging CT c/a/p 5/31/19 showed stable dz. Bone scan 5/31/19 showed multiple metastatic dz, nothing new. Possible pathologic fracture in the area of the coracoid process. Pt does not endorse pain there.  -labs 8/22/19 reviewed with pt wbc 4.99 Hg 11.7, plt 400. Ok for treatment with taxotere and neulasta.  -exam and review of labs did not reveal reason for the increased sob on exertion  -8/30/19: she presents today to review CT scan done 8:29/19. Overall stable in terms of her cancer but there is a concerning are of extraluminal collection of air in the deep pelvis with a thickened wall measuring about 2.8 x 3.2 cm that is situated between the lower sigmoid colon and small bowel and just above the uterus. This likely represents a contained sigmoid colon perforation related to prior diverticulitis. Perforated neoplasm cannot be ruled out. GI consultation is recommended for this finding. The sigmoid colon and adjacent small bowel loops appear to be matted around this collection.  -currently not having any abdominal  symptoms.  -will refer to GI  For dysphagia  -I reviewed CT finding with pt and  which showed pneumonia, and pleural effusion with stable bone disease  -rx levofloxacin for 14days  -arrange for thoracentesis  -hold off on treatment today  -labs reviewed with them wbc 30.57, Hg 12.5, plt 357.  -12/12/19: Recent scan showed progression in the L2 sclerotic lesion. Thoracentesis positive for breast cancer. I reviewed tumor markers with pt and they have progressively increased. ; 74.2/60/47.8. Today is pending  -CA 27.29 93.6/79.8/49.9. Today is pending  - discussed with Heavenly and her  that this is true progression  - I reviewed her therapy so far. She has progressed on almost all standard lines of therapy with the exception of the microtubule inhibitors. I offered best supportive care versus CMT. She chose to get treatment  -plan for eribulin with 10% dose reduction  -pros and cons of treatment discussed with pt and she expresses understand and would like to proceed.    2. Psych: on olanzapine    3. HTN: on metoprolol    4. Gerd: on ranitidine  5. Dyspnea: get a cxr to evaluate  6. Anemia: will check iron profile, and act accordingly . Her creatinine is normal at 0.94.   7. DVT on xarelto indefinite.  --Doppler left LE  1/30/19 There is evidence of deep venous thrombosis of the left lower extremity. The clot burden is improved from last study. There is also evidence of superficial clot in the greater saphenous vein. There is a Baker's cyst in the popliteal fossa.  8. CHF: LVEF 65% with diastolic dysfunction. referral to cardiology to maximized her heart function.  9. Thrombocytosis: suspect reactive from anemia. Will check iron profile.  10.left lung pneumonia: resolved  --cxr showed pneumonia. Pt already on levofloxacin. Will complete 14 days.   11: LE edema: improved on lasix.  12. FEN:  Low potassium 3.4. primo kdur daily  -reinforced small frequent meals and high calorie food such as peanut  butter, ice cream etc.  13. Left shoulder pr ulcer: referral to wound care made      Moise Sotelo MD    12/12/2019    10:54 AM

## 2019-12-18 ENCOUNTER — APPOINTMENT (OUTPATIENT)
Dept: WOUND CARE | Facility: HOSPITAL | Age: 74
End: 2019-12-18

## 2019-12-18 ENCOUNTER — INFUSION (OUTPATIENT)
Dept: ONCOLOGY | Facility: HOSPITAL | Age: 74
End: 2019-12-18

## 2019-12-18 ENCOUNTER — OFFICE VISIT (OUTPATIENT)
Dept: ONCOLOGY | Facility: CLINIC | Age: 74
End: 2019-12-18

## 2019-12-18 ENCOUNTER — LAB (OUTPATIENT)
Dept: LAB | Facility: HOSPITAL | Age: 74
End: 2019-12-18

## 2019-12-18 VITALS
TEMPERATURE: 97.7 F | HEIGHT: 65 IN | WEIGHT: 115 LBS | RESPIRATION RATE: 16 BRPM | BODY MASS INDEX: 19.16 KG/M2 | OXYGEN SATURATION: 92 % | HEART RATE: 84 BPM | DIASTOLIC BLOOD PRESSURE: 61 MMHG | SYSTOLIC BLOOD PRESSURE: 117 MMHG

## 2019-12-18 VITALS
OXYGEN SATURATION: 98 % | SYSTOLIC BLOOD PRESSURE: 99 MMHG | BODY MASS INDEX: 19.16 KG/M2 | TEMPERATURE: 97.5 F | HEART RATE: 74 BPM | HEIGHT: 65 IN | RESPIRATION RATE: 16 BRPM | WEIGHT: 115 LBS | DIASTOLIC BLOOD PRESSURE: 52 MMHG

## 2019-12-18 DIAGNOSIS — C79.51 CARCINOMA OF LEFT BREAST METASTATIC TO BONE (HCC): ICD-10-CM

## 2019-12-18 DIAGNOSIS — C50.919 BREAST CANCER METASTASIZED TO BONE, UNSPECIFIED LATERALITY (HCC): ICD-10-CM

## 2019-12-18 DIAGNOSIS — C50.412 MALIGNANT NEOPLASM OF UPPER-OUTER QUADRANT OF LEFT FEMALE BREAST, UNSPECIFIED ESTROGEN RECEPTOR STATUS (HCC): ICD-10-CM

## 2019-12-18 DIAGNOSIS — C79.51 CARCINOMA OF LEFT BREAST METASTATIC TO BONE (HCC): Primary | ICD-10-CM

## 2019-12-18 DIAGNOSIS — C50.912 CARCINOMA OF LEFT BREAST METASTATIC TO BONE (HCC): Primary | ICD-10-CM

## 2019-12-18 DIAGNOSIS — Z95.828 PORT-A-CATH IN PLACE: ICD-10-CM

## 2019-12-18 DIAGNOSIS — C79.51 BREAST CANCER METASTASIZED TO BONE, UNSPECIFIED LATERALITY (HCC): ICD-10-CM

## 2019-12-18 DIAGNOSIS — C50.912 CARCINOMA OF LEFT BREAST METASTATIC TO BONE (HCC): ICD-10-CM

## 2019-12-18 LAB
ALBUMIN SERPL-MCNC: 2.9 G/DL (ref 3.5–5.2)
ALBUMIN/GLOB SERPL: 0.9 G/DL
ALP SERPL-CCNC: 86 U/L (ref 39–117)
ALT SERPL W P-5'-P-CCNC: 5 U/L (ref 1–33)
ANION GAP SERPL CALCULATED.3IONS-SCNC: 11 MMOL/L (ref 5–15)
AST SERPL-CCNC: 14 U/L (ref 1–32)
BASOPHILS # BLD AUTO: 0.03 10*3/MM3 (ref 0–0.2)
BASOPHILS NFR BLD AUTO: 0.6 % (ref 0–1.5)
BILIRUB SERPL-MCNC: 0.3 MG/DL (ref 0.2–1.2)
BUN BLD-MCNC: 9 MG/DL (ref 8–23)
BUN/CREAT SERPL: 12.2 (ref 7–25)
CALCIUM SPEC-SCNC: 9.3 MG/DL (ref 8.6–10.5)
CHLORIDE SERPL-SCNC: 96 MMOL/L (ref 98–107)
CO2 SERPL-SCNC: 27 MMOL/L (ref 22–29)
CREAT BLD-MCNC: 0.74 MG/DL (ref 0.57–1)
DEPRECATED RDW RBC AUTO: 65.1 FL (ref 37–54)
EOSINOPHIL # BLD AUTO: 0.09 10*3/MM3 (ref 0–0.4)
EOSINOPHIL NFR BLD AUTO: 1.9 % (ref 0.3–6.2)
ERYTHROCYTE [DISTWIDTH] IN BLOOD BY AUTOMATED COUNT: 18.6 % (ref 12.3–15.4)
GFR SERPL CREATININE-BSD FRML MDRD: 76 ML/MIN/1.73
GLOBULIN UR ELPH-MCNC: 3.4 GM/DL
GLUCOSE BLD-MCNC: 109 MG/DL (ref 65–99)
HCT VFR BLD AUTO: 38 % (ref 34–46.6)
HGB BLD-MCNC: 13 G/DL (ref 12–15.9)
HOLD SPECIMEN: NORMAL
LYMPHOCYTES # BLD AUTO: 1.63 10*3/MM3 (ref 0.7–3.1)
LYMPHOCYTES NFR BLD AUTO: 35.2 % (ref 19.6–45.3)
MCH RBC QN AUTO: 33.4 PG (ref 26.6–33)
MCHC RBC AUTO-ENTMCNC: 34.2 G/DL (ref 31.5–35.7)
MCV RBC AUTO: 97.7 FL (ref 79–97)
MONOCYTES # BLD AUTO: 0.53 10*3/MM3 (ref 0.1–0.9)
MONOCYTES NFR BLD AUTO: 11.4 % (ref 5–12)
NEUTROPHILS # BLD AUTO: 2.32 10*3/MM3 (ref 1.7–7)
NEUTROPHILS NFR BLD AUTO: 50.3 % (ref 42.7–76)
PLATELET # BLD AUTO: 518 10*3/MM3 (ref 140–450)
PMV BLD AUTO: 9.5 FL (ref 6–12)
POTASSIUM BLD-SCNC: 3.7 MMOL/L (ref 3.5–5.2)
PROT SERPL-MCNC: 6.3 G/DL (ref 6–8.5)
RBC # BLD AUTO: 3.89 10*6/MM3 (ref 3.77–5.28)
SODIUM BLD-SCNC: 134 MMOL/L (ref 136–145)
WBC NRBC COR # BLD: 4.63 10*3/MM3 (ref 3.4–10.8)

## 2019-12-18 PROCEDURE — 36415 COLL VENOUS BLD VENIPUNCTURE: CPT

## 2019-12-18 PROCEDURE — 25010000002 ERIBULIN 1 MG/2ML SOLUTION: Performed by: INTERNAL MEDICINE

## 2019-12-18 PROCEDURE — 25010000002 DEXAMETHASONE SODIUM PHOSPHATE 100 MG/10ML SOLUTION: Performed by: INTERNAL MEDICINE

## 2019-12-18 PROCEDURE — 99215 OFFICE O/P EST HI 40 MIN: CPT | Performed by: INTERNAL MEDICINE

## 2019-12-18 PROCEDURE — 96409 CHEMO IV PUSH SNGL DRUG: CPT

## 2019-12-18 PROCEDURE — 96367 TX/PROPH/DG ADDL SEQ IV INF: CPT

## 2019-12-18 PROCEDURE — 96375 TX/PRO/DX INJ NEW DRUG ADDON: CPT

## 2019-12-18 PROCEDURE — 25010000002 PALONOSETRON PER 25 MCG: Performed by: INTERNAL MEDICINE

## 2019-12-18 PROCEDURE — 80053 COMPREHEN METABOLIC PANEL: CPT

## 2019-12-18 PROCEDURE — 85025 COMPLETE CBC W/AUTO DIFF WBC: CPT

## 2019-12-18 RX ORDER — SODIUM CHLORIDE 0.9 % (FLUSH) 0.9 %
10 SYRINGE (ML) INJECTION AS NEEDED
Status: CANCELLED | OUTPATIENT
Start: 2019-12-18

## 2019-12-18 RX ORDER — PALONOSETRON 0.05 MG/ML
0.25 INJECTION, SOLUTION INTRAVENOUS ONCE
Status: CANCELLED | OUTPATIENT
Start: 2020-01-02

## 2019-12-18 RX ORDER — SODIUM CHLORIDE 9 MG/ML
250 INJECTION, SOLUTION INTRAVENOUS ONCE
Status: CANCELLED | OUTPATIENT
Start: 2020-01-02

## 2019-12-18 RX ORDER — SODIUM CHLORIDE 0.9 % (FLUSH) 0.9 %
10 SYRINGE (ML) INJECTION AS NEEDED
Status: DISCONTINUED | OUTPATIENT
Start: 2019-12-18 | End: 2019-12-18 | Stop reason: HOSPADM

## 2019-12-18 RX ORDER — PALONOSETRON 0.05 MG/ML
0.25 INJECTION, SOLUTION INTRAVENOUS ONCE
Status: CANCELLED | OUTPATIENT
Start: 2019-12-18

## 2019-12-18 RX ORDER — SODIUM CHLORIDE 9 MG/ML
250 INJECTION, SOLUTION INTRAVENOUS ONCE
Status: COMPLETED | OUTPATIENT
Start: 2019-12-18 | End: 2019-12-18

## 2019-12-18 RX ORDER — SODIUM CHLORIDE 9 MG/ML
250 INJECTION, SOLUTION INTRAVENOUS ONCE
Status: CANCELLED | OUTPATIENT
Start: 2019-12-18

## 2019-12-18 RX ORDER — PALONOSETRON 0.05 MG/ML
0.25 INJECTION, SOLUTION INTRAVENOUS ONCE
Status: COMPLETED | OUTPATIENT
Start: 2019-12-18 | End: 2019-12-18

## 2019-12-18 RX ADMIN — SODIUM CHLORIDE 250 ML: 9 INJECTION, SOLUTION INTRAVENOUS at 11:06

## 2019-12-18 RX ADMIN — Medication 500 UNITS: at 12:05

## 2019-12-18 RX ADMIN — ERIBULIN MESYLATE 1.95 MG: 0.5 INJECTION INTRAVENOUS at 11:38

## 2019-12-18 RX ADMIN — SODIUM CHLORIDE, PRESERVATIVE FREE 10 ML: 5 INJECTION INTRAVENOUS at 12:04

## 2019-12-18 RX ADMIN — DEXAMETHASONE SODIUM PHOSPHATE 12 MG: 10 INJECTION, SOLUTION INTRAMUSCULAR; INTRAVENOUS at 11:08

## 2019-12-18 RX ADMIN — PALONOSETRON HYDROCHLORIDE 0.25 MG: 0.25 INJECTION, SOLUTION INTRAVENOUS at 11:06

## 2019-12-18 NOTE — PATIENT INSTRUCTIONS
Eribulin solution for injection  What is this medicine?  ERIBULIN (er e bu phong) is a chemotherapy drug. It is used to treat breast cancer and liposarcoma.  This medicine may be used for other purposes; ask your health care provider or pharmacist if you have questions.  COMMON BRAND NAME(S): Modesta  What should I tell my health care provider before I take this medicine?  They need to know if you have any of these conditions:  -heart disease  -history of irregular heartbeat  -kidney disease  -liver disease  -low blood counts, like low white cell, platelet, or red cell counts  -low levels of potassium or magnesium in the blood  -an unusual or allergic reaction to eribulin, other medicines, foods, dyes, or preservatives  -pregnant or trying to get pregnant  -breast-feeding  How should I use this medicine?  This medicine is for infusion into a vein. It is given by a health care professional in a hospital or clinic setting.  Talk to your pediatrician regarding the use of this medicine in children. Special care may be needed.  Overdosage: If you think you have taken too much of this medicine contact a poison control center or emergency room at once.  NOTE: This medicine is only for you. Do not share this medicine with others.  What if I miss a dose?  It is important not to miss your dose. Call your doctor or health care professional if you are unable to keep an appointment.  What may interact with this medicine?  Do not take this medicine with any of the following medications:  -amiodarone  -astemizole  -arsenic trioxide  -bepridil  -bretylium  -chloroquine  -chlorpromazine  -cisapride  -clarithromycin  -dextromethorphan,  quinidine  -disopyramide  -dofetilide  -droperidol  -dronedarone  -erythromycin  -grepafloxacin  -halofantrine  -haloperidol  -ibutilide  -levomethadyl  -mesoridazine  -methadone  -pentamidine  -procainamide  -quinidine  -pimozide  -posaconazole  -probucol  -propafenone  -saquinavir  -sotalol  -sparfloxacin  -terfenadine  -thioridazine  -troleandomycin  -ziprasidone  This list may not describe all possible interactions. Give your health care provider a list of all the medicines, herbs, non-prescription drugs, or dietary supplements you use. Also tell them if you smoke, drink alcohol, or use illegal drugs. Some items may interact with your medicine.  What should I watch for while using this medicine?  This drug may make you feel generally unwell. This is not uncommon, as chemotherapy can affect healthy cells as well as cancer cells. Report any side effects. Continue your course of treatment even though you feel ill unless your doctor tells you to stop.  Call your doctor or health care professional for advice if you get a fever, chills or sore throat, or other symptoms of a cold or flu. Do not treat yourself. This drug decreases your body's ability to fight infections. Try to avoid being around people who are sick.  This medicine may increase your risk to bruise or bleed. Call your doctor or health care professional if you notice any unusual bleeding.  You may need blood work done while you are taking this medicine.  Do not become pregnant while taking this medicine or for 2 weeks after stopping it. Women should inform their doctor if they wish to become pregnant or think they might be pregnant. Men should not father a child while taking this medicine and for 3.5 months after stopping it. There is a potential for serious side effects to an unborn child. Talk to your health care professional or pharmacist for more information. Do not breast-feed an infant while taking this medicine or for 2 weeks after stopping  it.  What side effects may I notice from receiving this medicine?  Side effects that you should report to your doctor or health care professional as soon as possible:  -allergic reactions like skin rash, itching or hives, swelling of the face, lips, or tongue  -low blood counts - this medicine may decrease the number of white blood cells, red blood cells and platelets. You may be at increased risk for infections and bleeding.  -signs of infection - fever or chills, cough, sore throat, pain or difficulty passing urine  -signs of decreased platelets or bleeding - bruising, pinpoint red spots on the skin, black, tarry stools, blood in the urine  -signs of decreased red blood cells - unusually weak or tired, fainting spells, lightheadedness  -pain, tingling, numbness in the hands or feet  Side effects that usually do not require medical attention (report to your doctor or health care professional if they continue or are bothersome):  -constipation  -hair loss  -headache  -loss of appetite  -muscle or joint pain  -nausea, vomiting  -stomach pain  This list may not describe all possible side effects. Call your doctor for medical advice about side effects. You may report side effects to FDA at 3-530-FDA-9806.  Where should I keep my medicine?  This drug is given in a hospital or clinic and will not be stored at home.  NOTE: This sheet is a summary. It may not cover all possible information. If you have questions about this medicine, talk to your doctor, pharmacist, or health care provider.  © 2019 [employer] (2017-01-19 10:11:26)

## 2019-12-18 NOTE — PROGRESS NOTES
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    REASON FOR VISIT:     Chief Complaint   Patient presents with   • Breast Cancer     here for follow up , no complaints         HEMATOLOGY / ONCOLOGY HISTORY:   Oncology/Hematology History    Patient is a 67-year-old postmenopausal female who had onset of her menses at age 9 and menopause at age 48. She received oral contraceptives for approximately 14 years and did not receive hormonal manipulation. Patient has a maternal cousin had breast cancer. Patient has had no prior breast biopsies. Patient found a palpable left breast mass as well as a left axillary mass. Patient had a bloody nipple discharge which been present for approximately 6 months. On 6/20/2012, a mammogram was performed showing a subareolar mass consistent with malignancy. Bilateral breast ultrasound revealed an ill-defined subareolar mass measuring 2.8-2.5 cm. There is a left axillary mass measuring 1.7 cm with suspicion of extracapsular extension. There is no evidence of disease in the right breast. On 7/9/2012 patient underwent a left breast biopsy and placement of marker clip. Left breast biopsy revealed an infiltrating lobular carcinoma grade 2 with focal ductal carcinoma in situ, solid pattern. A fine-needle biopsy of the left lymph node was consistent with metastatic carcinoma. Breast tumor profile revealed ER: 100%; PA: 98%; HER-2/maynor 2+; FISH: Unamplified; Ki-67: 71%; P 53 1%; DNA aneuploidy. A MRI of the breast were performed body multiple abnormal enhancing masses within the left breast. There appears to be anterior retraction of the pectoralis muscle with no direct  extension. There is a moderate to large, layering left pleural effusion appreciated. The right breast reveals 3 moderately enlarged lymph nodes in the posterior lateral aspect the right breast. These have fatty hilum but followup is recommended. Patient is undergone systemic studies including, on 8/2/2012, a CT scan of the brain showing atrophy. a CT scan that shows showing a small to moderate left pleural effusion with 3 subcentimeter nodule densities in the left lung.   She completed 4 cycles of neoadjuvant treatment with dose dense Adriamycin and Cytoxan. She had a mammogram which showed a significant reduction in the size of her left breast lesion. There is no axillary adenopathy noted. She has had an ultrasound revealing the  lesion reducing from 2.4 cm to 1 cm. Patient underwent a left mastectomy on 03/19/2013 finding negative invasive cancer, negative nodes. A 5% DCIS was noted. The patient has declined hormonal therapy. She did not need radiation therapy.  November 2014, she began to have evidence of lytic bone lesions at T5T6, frontal disease, an 8 mm lucency in the central frontal area of  the skull but nothing intracranial. Bone scan revealed only vertex tracer activity but bilateral ribs and thoracic spine, and other lesions in  her pelvis. At that time, she was started on letrozole since November 2014. She is taking Xgeva. She had progression found in bone in Feb 2015 on scan. She was started on Faslodex, Ibrance and continued xgeva.         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 B) Foci of ductal carcinoma in situ, solid pattern. C) Greatest dimension of the invasive carcinoma is 15.8mm.      3/19/2013 Surgery     Left Mastectomy w/ Axillary Tail: Focal residual  ducatal carcinoma in situ (DCIS) 12 lymph nodes negative for metastatic carcinoma.      10/20/2014 Biopsy     Peritoneum Biopsy: Final Diagnosis: Malignant Cell Neoplasm.        Breast cancer metastasized to bone, unspecified laterality (CMS/HCC)    10/15/2012 -  Other Event     Left mammogram:  Impression:  1. Decreasing spiculation and density in the retroareolar left breast, near a previous biopsy.   2. Definite left axillary lesion is not appreciated.       2/5/2013 -  Other Event     Diagnostic Mammo:  Comparison is made to 10/15/2012 and 6/20/2012  The spiculated mass at 12:00 behind the nipple is nearly resolved.   Impression:  1. The mass on the left side is less apparent after receiving chemotherapy. There has been a good response to chmotherapy on the left side.   2. No suspicious abnormality is seen in the right breast to account for the new palable abnormality at the 4-5:00 position.       3/19/2013 Surgery     Breast Biopsy:  Final Diagnosis:  A. Breast, left mastectomy with axillary tail  1. Focal residual ductal carcinoma in situ (DICS) (less than 5% of tumor bed).  2. Negative for residual invasive carcinoma.   3. 12 Lymph Nodes, negative for metastatic carcinoma (0/12) focally, some lymph nodes demonstrate fibrosis/treatment effect.  B. Skin and soft tissue, left advancement flap:  1. Benign skin and subcutis.   2. Negative for carcinoma.       6/20/2013 -  Other Event     Diagnostic Mammo [facility]:  1. A subareolar mass is consistent with malignancy. Additional involement extends inferiorly sonographically and superolaterally mammograhically as demonstrated by calcifications. The mass measures approx 2.8 cm sonographically, but the involved region is likely much larger including an intraductal componet. The left axillary lymph nodes are also involved.   2. Recommened ultrasound guided biopsy of the left subareolar mass and left axillary lymph node.   3. Breast MRI could also further define  multicentric disease on the left.   4. Clear suspicious finding on the right.  Recommend ongoing clinical follow up of palpable foci.       6/13/2014 -  Other Event     Diagnostic Mammo:  IMPRESSION:  1. History of left breast cancer and mastectomy. Stable benign right breast mammograms.       10/20/2014 Surgery     Final Diagnosis:  Biopsies, pertoneum:  Metastatic carcinoma, morphologically compatible with metastatic mammary carcinoma.       6/15/2015 -  Other Event     Diagnostic Mammo:  IMPRESSION:   1. Negative x-ray report, should not delay biopsy if a dominat or clinically suspicious mass is present.       7/5/2016 -  Other Event     Diagnositic mammogram:  Impression:   Stable right mamogram with no radiographic findings suspicious for malignancy. Recommend continued screening mammography per screening guidelines unless otherwise earlier clinically indicated.       9/18/2016 Initial Diagnosis     Secondary malignant neoplasm of bone and bone marrow        Carcinoma of left breast metastatic to bone (CMS/HCC)    3/1/2017 Initial Diagnosis     Carcinoma of left breast metastatic to bone (CMS/HCC)      12/18/2019 -  Chemotherapy     OP BREAST Eribulin             INTERVAL HISTORY  Patient ID: Heavenly Yanes is a 74 y.o. year old female Cancer Staging  Stage IV (T2, N1, M1)  --complaining of dyspnea on exersion since last Tuesday. She feels like she ran a mile  2/14/19: cxr showed pneumonia. Already on levofloxacin. Coughing not worse, she did not run temp.  Overall feels weak.  -restarted the appetite suppressant and is starting to eat more but not a whole lot  --2/28/19: CT c/ap with some response to therapy. New left chest inflammatory lesion to be followed ion next imaging. Bone scan unremarkable for progression.  4/25/19: she is much better since being off therapy for 2 weeks. COugh is improved.   6/20/19: has left ear drainage which is her usual ear infection. Leg edema improved with lasix. She was ordered  a mammo by the surgeon which I told her is not necessary, she has metastatic dz.    7/25/19: complaining of increased dyspnea on exertion. She takes her laxis daily.    8/22/19:  complaining of increased dyspnea on exertion. She takes her laxis daily. Also not much appetite even though on megace 40bid. YAIR not worse from prior. Weight the same  8/30/19: she presents today to review CT scan. Overall stable in terms of her cancer but there is a concerning are of extraluminal collection of air in the deep pelvis with a thickened wall measuring about 2.8 x 3.2 cm that is situated between the lower sigmoid colon and small bowel and just above the uterus. This likely represents a contained sigmoid colon perforation related to prior diverticulitis. Perforated neoplasm cannot be ruled out. GI consultation is recommended for this finding. The sigmoid colon and adjacent small bowel loops appear to be matted around this collection.  -11/7/19: main concern from  is lack of appetite. contnues to be fatigued.  11/15/19: appetite still same per  but he nags at her to eat more. Pt complaining of choking today and not able to swallow. Per , it has been happening for a while. Will refer to GI    12/3/19: here to review scan, toxicity assessment. CT showd stable bone mets, chest imaging showed possible infection.neoplatic process and moderate to large right pleural effusion that may be slightly larger  on the current study. Abdominal CT showed Diffuse wall thickening of the sigmoid colon extending to the rectum. An extraluminal air collection adjacent to the sigmoid colon and just above the uterus is slightly larger. The differential for this includes diverticulitis with perforation or colon cancer with perforation  -currently not having any abdominal symptoms  -she saw GI and deferred c-scope. Esophagram done for dysphagia.  -she developed left shoulder superficial ulcer from pressure    12/12/19: here to review  scan. Recent scan showed progression in the L2 sclerotic lesion. Thoracentesis positive for breast cancer.    12/18/19: Heavenly presents to initiate eribulin.  I reviewed tumor markers with pt and they have progressively increased. ; 74.2/60/47.8. Today is pending  -CA 27.29 93.6/79.8/49.9. Today is pending  Ros + for Weakness, lack of appetite, sob  -Denies cp/n/v/, diarrhea/fever/chills/neuropathy/ no focal weakness.   Rest of ros unremarkable. PE positive for left shoulder superficial pressure ulcer. 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) 9/18/2016     Past Surgical History:   Past Surgical History:   Procedure Laterality Date   • BREAST SURGERY      left breast biopsy and axillary node   • CHOLECYSTECTOMY     • COLONOSCOPY  08/15/2014    diverticulosis  otherwise normal colonoscopy   • EXTERNAL EAR SURGERY     • MASTECTOMY Left    • PORTACATH PLACEMENT       Social History:   Social History     Socioeconomic History   • Marital status:      Spouse name: Not on file   • Number of children: Not on file   • Years of education: Not on file   • Highest education level: Not on file   Tobacco Use   • Smoking status: Never Smoker   • Smokeless tobacco: Never Used   Substance and Sexual Activity   • Alcohol use: No   • Drug use: No   • Sexual activity: Defer     Family History:   Family History   Problem Relation Age of Onset   • No Known Problems Daughter    • No Known Problems Son    • Other Mother         complications from a concussion from a fall   • Other Father         old age   • Colon polyps Father    • No Known  Problems Brother    • No Known Problems Daughter    • Colon cancer Neg Hx    • Esophageal cancer Neg Hx        Review of Systems   Constitutional: Positive for activity change, appetite change and fatigue. Negative for unexpected weight change.   HENT: Negative.    Eyes: Negative.    Respiratory: Positive for shortness of breath.    Cardiovascular: Positive for leg swelling.   Gastrointestinal: Negative.    Endocrine: Negative.    Genitourinary: Negative.    Musculoskeletal: Negative.    Skin: Negative.    Neurological: Negative.    Hematological: Negative.    Psychiatric/Behavioral: Negative.         Performance Status:  Asymptomatic    Medications:    Current Outpatient Medications   Medication Sig Dispense Refill   • furosemide (LASIX) 20 MG tablet One tab po qd as needed for swelling take with potassium 30 tablet 3   • megestrol (MEGACE) 40 MG tablet Take 1 tablet by mouth 2 (Two) Times a Day. 60 tablet 5   • OLANZapine (ZYPREXA) 2.5 MG tablet Take 2.5 mg by mouth every night.     • ondansetron (ZOFRAN) 8 MG tablet Take 1 tablet by mouth Every 8 (Eight) Hours As Needed for Nausea or Vomiting. 30 tablet 5   • potassium chloride (K-DUR,KLOR-CON) 20 MEQ CR tablet Take one tab po bid on days taking Lasix 60 tablet 1   • raNITIdine (ZANTAC) 150 MG tablet Take 1 tablet by mouth 2 (Two) Times a Day. 60 tablet 5   • rivaroxaban (XARELTO) 20 MG tablet Take 1 tablet by mouth Daily. 30 tablet 5   • thyroid 60 MG PO tablet Take 60 mg by mouth daily.     • Calcium-Magnesium-Vitamin D (CALCIUM 1200+D3 PO) Take 1,200 mg by mouth Daily.     • cetirizine (zyrTEC) 5 MG tablet Take 5 mg by mouth Daily.       No current facility-administered medications for this visit.      Facility-Administered Medications Ordered in Other Visits   Medication Dose Route Frequency Provider Last Rate Last Dose   • heparin flush (porcine) 100 UNIT/ML injection 500 Units  500 Units Intravenous PRN Joseph Nunez MD   500 Units at 10/26/17 1155   •  "heparin flush (porcine) 100 UNIT/ML injection 500 Units  500 Units Intravenous PRN Moise Sotelo MD   500 Units at 12/18/19 1205   • sodium chloride 0.9 % flush 10 mL  10 mL Intravenous PRN Joseph Nunez MD   10 mL at 10/26/17 1155   • sodium chloride 0.9 % flush 10 mL  10 mL Intravenous PRN Moise Sotelo MD   10 mL at 12/18/19 1204       ALLERGIES:    Allergies   Allergen Reactions   • Other Angioedema     POPPY SEED   • Penicillins Hives   • Sulfa Antibiotics Mental Status Change   • Sulfur Mental Status Change   • Valium [Diazepam] Provider Review Needed     Unable to remember reaction   • Benadryl [Diphenhydramine Hcl (Sleep)] Provider Review Needed     Shaky legs   • Codeine Rash   • Diphenhydramine Provider Review Needed     \"Shaky leg syndrome\"  \"Shaky leg syndrome\"  \"Shaky leg syndrome\"   • Heparin Provider Review Needed     Patient states tolerates Heparin flush without problems: Years ago had heparin IV and had a rash   • Petroleum Jelly [Skin Protectants, Misc.] Rash       Objective      Vitals:    12/18/19 0945   BP: 117/61   Pulse: 84   Resp: 16   Temp: 97.7 °F (36.5 °C)   SpO2: 92%   Weight: 52.2 kg (115 lb)   Height: 165.1 cm (65\")   PainSc: 0-No pain         Current Status 12/18/2019   ECOG score 2         Physical Examunchanged  General Appearance: Patient is awake, alert, oriented and in no acute distress. Patient is welldeveloped, wellnourished, and appears stated age.  HEENT: Normocephalic. Sclerae clear, conjunctiva pink, extraocular movements intact, pupils, round, reactive to light and  accommodation. Mouth and throat are clear with moist oral mucosa.  NECK: Supple, no jugular venous distention, thyroid not enlarged.  LYMPH: No cervical, supraclavicular, axillary, or inguinal lymphadenopathy.  CHEST: Equal bilateral expansion, AP  diameter normal, resonant percussion note  LUNGS: Good air movement, no rales, rhonchi, rubs or wheezes with auscultation on the right. " Diminished on the left.  CARDIO: Regular sinus rhythm, no murmurs, gallops or rubs.  ABDOMEN: Nondistended, soft, No tenderness, no guarding, no rebound, No hepatosplenomegaly. No abdominal masses. Bowel sounds positive. No hernia  GENITALIA: Not examined.  BREASTS: Not examined.  MUSKEL: No joint swelling, decreased motion, or inflammation  EXTREMS: patrick le  Edema L>R stable,No clubbing, cyanosis, No varicose veins. positive for left shoulder superficial pressure ulcer. No redness, warmth or swelling  NEURO: Grossly nonfocal, Gait is coordinated and smooth, Cognition is preserved.  SKIN: No rashes, no ecchymoses, no petechia.  PSYCH: Oriented to time, place and person. Memory is preserved. Mood and affect appear normal  RECENT LABS:  Lab on 12/18/2019   Component Date Value Ref Range Status   • WBC 12/18/2019 4.63  3.40 - 10.80 10*3/mm3 Final   • RBC 12/18/2019 3.89  3.77 - 5.28 10*6/mm3 Final   • Hemoglobin 12/18/2019 13.0  12.0 - 15.9 g/dL Final   • Hematocrit 12/18/2019 38.0  34.0 - 46.6 % Final   • MCV 12/18/2019 97.7* 79.0 - 97.0 fL Final   • MCH 12/18/2019 33.4* 26.6 - 33.0 pg Final   • MCHC 12/18/2019 34.2  31.5 - 35.7 g/dL Final   • RDW 12/18/2019 18.6* 12.3 - 15.4 % Final   • RDW-SD 12/18/2019 65.1* 37.0 - 54.0 fl Final   • MPV 12/18/2019 9.5  6.0 - 12.0 fL Final   • Platelets 12/18/2019 518* 140 - 450 10*3/mm3 Final   • Neutrophil % 12/18/2019 50.3  42.7 - 76.0 % Final   • Lymphocyte % 12/18/2019 35.2  19.6 - 45.3 % Final   • Monocyte % 12/18/2019 11.4  5.0 - 12.0 % Final   • Eosinophil % 12/18/2019 1.9  0.3 - 6.2 % Final   • Basophil % 12/18/2019 0.6  0.0 - 1.5 % Final   • Neutrophils, Absolute 12/18/2019 2.32  1.70 - 7.00 10*3/mm3 Final   • Lymphocytes, Absolute 12/18/2019 1.63  0.70 - 3.10 10*3/mm3 Final   • Monocytes, Absolute 12/18/2019 0.53  0.10 - 0.90 10*3/mm3 Final   • Eosinophils, Absolute 12/18/2019 0.09  0.00 - 0.40 10*3/mm3 Final   • Basophils, Absolute 12/18/2019 0.03  0.00 - 0.20 10*3/mm3  Final   • Extra Tube 12/18/2019 Hold for add-ons.   Final    Auto resulted.   • Glucose 12/18/2019 109* 65 - 99 mg/dL Final   • BUN 12/18/2019 9  8 - 23 mg/dL Final   • Creatinine 12/18/2019 0.74  0.57 - 1.00 mg/dL Final   • Sodium 12/18/2019 134* 136 - 145 mmol/L Final   • Potassium 12/18/2019 3.7  3.5 - 5.2 mmol/L Final   • Chloride 12/18/2019 96* 98 - 107 mmol/L Final   • CO2 12/18/2019 27.0  22.0 - 29.0 mmol/L Final   • Calcium 12/18/2019 9.3  8.6 - 10.5 mg/dL Final   • Total Protein 12/18/2019 6.3  6.0 - 8.5 g/dL Final   • Albumin 12/18/2019 2.90* 3.50 - 5.20 g/dL Final   • ALT (SGPT) 12/18/2019 5  1 - 33 U/L Final   • AST (SGOT) 12/18/2019 14  1 - 32 U/L Final   • Alkaline Phosphatase 12/18/2019 86  39 - 117 U/L Final   • Total Bilirubin 12/18/2019 0.3  0.2 - 1.2 mg/dL Final   • eGFR Non African Amer 12/18/2019 76  >60 mL/min/1.73 Final   • Globulin 12/18/2019 3.4  gm/dL Final   • A/G Ratio 12/18/2019 0.9  g/dL Final   • BUN/Creatinine Ratio 12/18/2019 12.2  7.0 - 25.0 Final   • Anion Gap 12/18/2019 11.0  5.0 - 15.0 mmol/L Final   Lab on 12/12/2019   Component Date Value Ref Range Status   • Glucose 12/12/2019 116* 65 - 99 mg/dL Final   • BUN 12/12/2019 9  8 - 23 mg/dL Final   • Creatinine 12/12/2019 0.60  0.57 - 1.00 mg/dL Final   • Sodium 12/12/2019 139  136 - 145 mmol/L Final   • Potassium 12/12/2019 3.7  3.5 - 5.2 mmol/L Final   • Chloride 12/12/2019 99  98 - 107 mmol/L Final   • CO2 12/12/2019 29.0  22.0 - 29.0 mmol/L Final   • Calcium 12/12/2019 9.7  8.6 - 10.5 mg/dL Final   • Total Protein 12/12/2019 6.3  6.0 - 8.5 g/dL Final   • Albumin 12/12/2019 3.10* 3.50 - 5.20 g/dL Final   • ALT (SGPT) 12/12/2019 9  1 - 33 U/L Final   • AST (SGOT) 12/12/2019 17  1 - 32 U/L Final   • Alkaline Phosphatase 12/12/2019 93  39 - 117 U/L Final   • Total Bilirubin 12/12/2019 0.5  0.2 - 1.2 mg/dL Final   • eGFR Non African Amer 12/12/2019 97  >60 mL/min/1.73 Final   • Globulin 12/12/2019 3.2  gm/dL Final   • A/G Ratio  12/12/2019 1.0  g/dL Final   • BUN/Creatinine Ratio 12/12/2019 15.0  7.0 - 25.0 Final   • Anion Gap 12/12/2019 11.0  5.0 - 15.0 mmol/L Final   • WBC 12/12/2019 5.44  3.40 - 10.80 10*3/mm3 Final   • RBC 12/12/2019 3.59* 3.77 - 5.28 10*6/mm3 Final   • Hemoglobin 12/12/2019 12.0  12.0 - 15.9 g/dL Final   • Hematocrit 12/12/2019 35.7  34.0 - 46.6 % Final   • MCV 12/12/2019 99.4* 79.0 - 97.0 fL Final   • MCH 12/12/2019 33.4* 26.6 - 33.0 pg Final   • MCHC 12/12/2019 33.6  31.5 - 35.7 g/dL Final   • RDW 12/12/2019 19.4* 12.3 - 15.4 % Final   • RDW-SD 12/12/2019 69.9* 37.0 - 54.0 fl Final   • MPV 12/12/2019 10.2  6.0 - 12.0 fL Final   • Platelets 12/12/2019 425  140 - 450 10*3/mm3 Final   • Neutrophil % 12/12/2019 66.7  42.7 - 76.0 % Final    Appended report. These results have been appended to a previously final verified report.   • Lymphocyte % 12/12/2019 28.5  19.6 - 45.3 % Final    Appended report. These results have been appended to a previously final verified report.   • Monocyte % 12/12/2019 2.2* 5.0 - 12.0 % Final    Appended report. These results have been appended to a previously final verified report.   • Eosinophil % 12/12/2019 0.4  0.3 - 6.2 % Final    Appended report. These results have been appended to a previously final verified report.   • Basophil % 12/12/2019 1.1  0.0 - 1.5 % Final    Appended report. These results have been appended to a previously final verified report.   • Immature Grans % 12/12/2019 1.1* 0.0 - 0.5 % Final    Appended report. These results have been appended to a previously final verified report.   • Neutrophils, Absolute 12/12/2019 3.63  1.70 - 7.00 10*3/mm3 Final    Appended report. These results have been appended to a previously final verified report.   • Lymphocytes, Absolute 12/12/2019 1.55  0.70 - 3.10 10*3/mm3 Final    Appended report. These results have been appended to a previously final verified report.   • Monocytes, Absolute 12/12/2019 0.12  0.10 - 0.90 10*3/mm3 Final     Appended report. These results have been appended to a previously final verified report.   • Eosinophils, Absolute 12/12/2019 0.02  0.00 - 0.40 10*3/mm3 Final    Appended report. These results have been appended to a previously final verified report.   • Basophils, Absolute 12/12/2019 0.06  0.00 - 0.20 10*3/mm3 Final    Appended report. These results have been appended to a previously final verified report.   • Immature Grans, Absolute 12/12/2019 0.06* 0.00 - 0.05 10*3/mm3 Final    Appended report. These results have been appended to a previously final verified report.   • nRBC 12/12/2019 0.0  0.0 - 0.2 /100 WBC Final    Appended report. These results have been appended to a previously final verified report.   • Extra Tube 12/12/2019 Hold for add-ons.   Final    Auto resulted.   • Extra Tube 12/12/2019 Hold for add-ons.   Final    Auto resulted.       RADIOLOGY:  Ct Chest With Contrast    Result Date: 11/21/2019  Narrative: EXAMINATION:  CT CHEST W CONTRAST-  11/21/2019 10:23 AM CST  HISTORY: Metastatic breast cancer. Evaluate response to therapy. C50.912-Malignant neoplasm of unspecified site of left female breast; C79.51-Secondary malignant neoplasm of bone.  COMPARISON : 08/29/2019.  DLP: 159 mGy-cm. Automated dosage control was utilized.  TECHNIQUE: CT was performed of the chest with contrast. Sagittal and coronal images were reconstructed.  MEDIASTINUM, HEART AND VASCULAR STRUCTURES: There is atheromatous disease of the thoracic aorta heart size is upper limits of normal. There is diffuse mediastinal edema as seen previously. There are no definite enlarged lymph nodes in the mediastinum. The mediastinum is somewhat difficult to evaluate due to the edema. A small pericardial effusion is stable.  LUNGS: There is a moderate to large right pleural effusion. This appears to be slightly larger on the current study. There is passive atelectasis, right due to the size of the pleural effusion. There are patchy right  upper lobe infiltrates are slightly worse on the current study compared to the prior study. There are worsening infiltrates in the left upper lobe and left lower lobe compared to the prior study. Some of this infiltrate is interstitial and some is alveolar.  UPPER ABDOMEN: Please see the abdomen and pelvis CT report separately.  BONES: There is diffuse metastatic involvement of the visualized bones. Compression deformity of T11 appears stable.      Impression: 1. Moderate to large right pleural effusion that may be slightly larger on the current study. There is passive right lung atelectasis. 2. Patchy infiltrates in the right upper lobe, left upper lobe and left lower lobe are increasing compared to the previous study. This is likely infectious or inflammatory. 3. Diffuse metastatic bone disease. Stable compression deformity of T11.    This report was finalized on 11/21/2019 11:45 by Dr. Marques Denson MD.    Nm Bone Scan Whole Body    Result Date: 11/21/2019  Narrative: NM BONE SCAN WHOLE BODY- 11/21/2019 10:12 AM CST  HISTORY: met breast ca assess response to treatment; C50.912-Malignant neoplasm of unspecified site of left female breast; C79.51-Secondary malignant neoplasm of bone  COMPARISON: 08/29/2019 bone scan and CT of the chest, abdomen, and pelvis 11/21/2019.  RADIOPHARMACEUTICAL: 21.9 mCi Tc99m HDP  TECHNIQUE: Anterior and posterior whole-body images are acquired approximately two hours postinjection.  FINDINGS: There is relatively decreased uptake of radiotracer in the kidneys, likely due to diffuse osteoblastic disease that is better demonstrated by CT. There has been no significant interval change since the previous study. Focal uptake is present in the LEFT coracoid region and in the sternum. There is also a focal area of increased activity in the L5 vertebral body.      Impression: 1. Stable bone scan with signs of diffuse osteoblastic disease. Metastatic bone disease on this patient is much better  visualized by CT. This report was finalized on 11/21/2019 13:57 by Dr. Osmani Gillette MD.    Us Chest    Result Date: 12/3/2019  Narrative: EXAM: US THORACENTESIS- - 12/3/2019 1:52 PM CST  HISTORY: h/o metastatic breast ca with pleural effusion on CT; C50.912-Malignant neoplasm of unspecified site of left female breast; C79.51-Secondary malignant neoplasm of bone; R13.19-Other dysphagia. Right pleural effusion.  COMPARISON: 11/21/2019  PROCEDURE: Risks, benefits and alternatives to the procedure were discussed with the patient. Written informed consent was obtained.  Time Out: Immediately prior to the procedure, a time out was performed. The patient's name, date of birth, and procedure to be performed were verified.  Using ultrasound guidance, a site in the right lower posterior chest was selected and prepped in a sterile fashion. Lidocaine was used for local anesthesia. A 5 [place] Yueh catheter was inserted into the pleural space. 900 mL of clear yellow fluid was withdrawn. The catheter was then removed. The fluid was sent to the laboratory for analysis.    The patient experienced a coughing episode at the end of the procedure. Oxygen saturation was 94 percent, pulse 100. These are similar compared to her preprocedural vitals. Coughing episode subsided. No evidence of complication. The patient was discharged home in stable condition.       Impression: Successful ultrasound-guided diagnostic and therapeutic right thoracentesis with drainage of 900 mL of clear yellow pleural fluid. This report was finalized on 12/03/2019 16:08 by Dr Kendra Arce MD.    Ct Abdomen Pelvis With Contrast    Result Date: 11/21/2019  Narrative: EXAMINATION:  CT ABDOMEN PELVIS W CONTRAST-  11/21/2019 10:23 AM CST  HISTORY: Metastatic breast cancer. Assess response to therapy. C50.912-Malignant neoplasm of unspecified site of left female breast; C79.51-Secondary malignant neoplasm of bone.  TECHNIQUE: Spiral CT was performed of the  abdomen and pelvis with contrast. Multiplanar images were reconstructed.  DLP: 189 mGy-cm. Automated dosage control was utilized.  COMPARISON: 08/29/2019.  LUNG BASES: Please see the chest CT report separately.  LIVER AND SPLEEN: Prior cholecystectomy. No focal liver lesion is seen. The spleen is unremarkable.  PANCREAS: No pancreatic mass or inflammatory change.  KIDNEYS AND ADRENALS: The kidneys and adrenal glands are unremarkable. The bladder is not very well distended.  BOWEL: There is mild thickening of the gastric antrum versus an artifact of underdistention. Small bowel loops are nondilated. There is abnormal wall thickening of the sigmoid colon and rectum. An extraluminal collection of air in the deep pelvis just above the uterus and adjacent to the sigmoid colon measures 3.4 x 2.9 cm and previously measured 3.2 x 2.8 cm. There is severe thickening of the adjacent colon wall.  OTHER: There is presacral edema and body wall edema. All of the visualized bones are involved with multiple sclerotic metastases. A sclerotic lesion at L2 now measures 2 cm in AP dimension and previously measured 1.9 cm.      Impression: 1. Diffuse wall thickening of the sigmoid colon extending to the rectum. An extraluminal air collection adjacent to the sigmoid colon and just above the uterus is slightly larger. The differential for this includes diverticulitis with perforation or colon cancer with perforation. GI consultation was recommended on the previous CT and is also recommended today. 2. Diffuse metastatic bone disease. The largest sclerotic lesion at L2 is increased slightly in size. There are literally hundreds of small sclerotic lesions throughout the visualized bones. 3. Diffuse body wall edema and presacral edema. This report was finalized on 11/21/2019 11:54 by Dr. Marques Denson MD.    Xr Chest 1 View    Result Date: 12/3/2019  Narrative: Exam:   XR CHEST 1 VW-   Date:  12/03/2019  History:  Female, age  74 years;s/p  thoracentesis; Z98.890-Other specified postprocedural states  COMPARISON:  Chest x-ray dated 07/25/2019.  Findings :  The heart and mediastinum are normal in size. Interval right thoracentesis, without measurable pneumothorax. Trace left pleural effusion. Right-sided chest port is unchanged in position. Bilateral ill-defined lung parenchymal opacities redemonstrated. The heart is within normal limits in size.  The bones show no acute pathology.       Impression: Impression:  Status post right thoracentesis, without measurable pneumothorax.  This report was finalized on 12/03/2019 15:19 by Dr. Bekah Enriquez MD.    Us Thoracentesis    Result Date: 12/3/2019  Narrative: EXAM: US THORACENTESIS- - 12/3/2019 1:52 PM CST  HISTORY: h/o metastatic breast ca with pleural effusion on CT; C50.912-Malignant neoplasm of unspecified site of left female breast; C79.51-Secondary malignant neoplasm of bone; R13.19-Other dysphagia. Right pleural effusion.  COMPARISON: 11/21/2019  PROCEDURE: Risks, benefits and alternatives to the procedure were discussed with the patient. Written informed consent was obtained.  Time Out: Immediately prior to the procedure, a time out was performed. The patient's name, date of birth, and procedure to be performed were verified.  Using ultrasound guidance, a site in the right lower posterior chest was selected and prepped in a sterile fashion. Lidocaine was used for local anesthesia. A 5 [place] Yueh catheter was inserted into the pleural space. 900 mL of clear yellow fluid was withdrawn. The catheter was then removed. The fluid was sent to the laboratory for analysis.    The patient experienced a coughing episode at the end of the procedure. Oxygen saturation was 94 percent, pulse 100. These are similar compared to her preprocedural vitals. Coughing episode subsided. No evidence of complication. The patient was discharged home in stable condition.       Impression: Successful ultrasound-guided  diagnostic and therapeutic right thoracentesis with drainage of 900 mL of clear yellow pleural fluid. This report was finalized on 12/03/2019 16:08 by Dr Kendra Arce MD.           Assessment/Plan  Heavenly Yanes is a 74 y.o. year old female with met breast cancer s/p multiple lines of CMT currently on taxotere weekly and xeloda with good tolerance.    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  --also on xeloda [id number], then repeat.  --CT c/a/p 2/19 showed stable dz. Some new Inflammatory opacities in the LEFT lower lobe and bilateral subpleural interstitial thickening.   -bone scan without evidence of dz progression  --primo taxotere weekly. Pt also on xeloda [id number], then repeat.  4/25/19: therapy postponed 2 weeks ago due to neutropenia. He got Neupogen x1. Labs reviewed with pt wbc 10.68, hg 11.1, [id number]. ANC 7630.  -ANC low 880. Postpone tx. Give neupogen x1. Pt advised to hold xeloda until 2 weeks    -imaging CT c/a/p 5/31/19 showed stable dz. Bone scan 5/31/19 showed multiple metastatic dz, nothing new. Possible pathologic fracture in the area of the coracoid process. Pt does not endorse pain there.  -labs 8/22/19 reviewed with pt wbc 4.99 Hg 11.7, plt 400. Ok for treatment with taxotere and neulasta.  -exam and review of labs did not reveal reason for the increased sob on exertion  -8/30/19: she presents  "today to review CT scan done 8:29/19. Overall stable in terms of her cancer but there is a concerning are of extraluminal collection of air in the deep pelvis with a thickened wall measuring about 2.8 x 3.2 cm that is situated between the lower sigmoid colon and small bowel and just above the uterus. This likely represents a contained sigmoid colon perforation related to prior diverticulitis. Perforated neoplasm cannot be ruled out. GI consultation is recommended for this finding. The sigmoid colon and adjacent small bowel loops appear to be matted around this collection.  -currently not having any abdominal symptoms.  -will refer to GI  For dysphagia  -I reviewed CT finding with pt and  which showed pneumonia, and pleural effusion with stable bone disease  -rx levofloxacin for 14days  -arrange for thoracentesis  -hold off on treatment today  -labs reviewed with them wbc 30.57, Hg 12.5, plt 357.  -12/12/19: Recent scan showed progression in the L2 sclerotic lesion. Thoracentesis positive for breast cancer. I reviewed tumor markers with pt and they have progressively increased. ; 74.2/60/47.8. Today is pending  -CA 27.29 93.6/79.8/49.9. Today is pending  - discussed with Heavenly and her  that this is true progression  - I reviewed her therapy so far. She has progressed on almost all standard lines of therapy with the exception of the microtubule inhibitors. I offered best supportive care versus CMT. She chose to get treatment  -plan for eribulin with 10% dose reduction  -pros and cons of treatment discussed with pt and she expresses understand and would like to proceed.  12/18/19\" presents for initiation of tx. Pros and cons of eribulin discussed, printout of chemo care given to pt. She signed consent  -labs reviewed wbc 4.63, Hg 13.0, plt 518 Ok for eribulin    2. Psych: on olanzapine    3. HTN: on metoprolol    4. Gerd: on ranitidine  5. Dyspnea: get a cxr to evaluate  6. Anemia: will check iron " profile, and act accordingly . Her creatinine is normal at 0.94.   7. DVT on xarelto indefinite.  --Doppler left LE  1/30/19 There is evidence of deep venous thrombosis of the left lower extremity. The clot burden is improved from last study. There is also evidence of superficial clot in the greater saphenous vein. There is a Baker's cyst in the popliteal fossa.  8. CHF: LVEF 65% with diastolic dysfunction. referral to cardiology to maximized her heart function.  9. Thrombocytosis: suspect reactive from anemia. Will check iron profile.  10.left lung pneumonia: resolved  --cxr showed pneumonia. Pt already on levofloxacin. Will complete 14 days.   11: LE edema: improved on lasix.  12. FEN:  Low potassium 3.4. primo kdur daily  -reinforced small frequent meals and high calorie food such as peanut butter, ice cream etc.  13. Left shoulder pr ulcer: referral to wound care made      Moise Sotelo MD    12/18/2019    3:29 PM

## 2019-12-26 ENCOUNTER — APPOINTMENT (OUTPATIENT)
Dept: ONCOLOGY | Facility: HOSPITAL | Age: 74
End: 2019-12-26

## 2019-12-30 ENCOUNTER — OFFICE VISIT (OUTPATIENT)
Dept: WOUND CARE | Facility: HOSPITAL | Age: 74
End: 2019-12-30

## 2019-12-30 PROCEDURE — G0463 HOSPITAL OUTPT CLINIC VISIT: HCPCS

## 2020-01-02 ENCOUNTER — INFUSION (OUTPATIENT)
Dept: ONCOLOGY | Facility: HOSPITAL | Age: 75
End: 2020-01-02

## 2020-01-02 ENCOUNTER — LAB (OUTPATIENT)
Dept: LAB | Facility: HOSPITAL | Age: 75
End: 2020-01-02

## 2020-01-02 ENCOUNTER — OFFICE VISIT (OUTPATIENT)
Dept: ONCOLOGY | Facility: CLINIC | Age: 75
End: 2020-01-02

## 2020-01-02 VITALS
SYSTOLIC BLOOD PRESSURE: 94 MMHG | WEIGHT: 116 LBS | DIASTOLIC BLOOD PRESSURE: 68 MMHG | TEMPERATURE: 99.7 F | OXYGEN SATURATION: 91 % | HEIGHT: 65 IN | HEART RATE: 100 BPM | BODY MASS INDEX: 19.33 KG/M2 | RESPIRATION RATE: 16 BRPM

## 2020-01-02 VITALS
DIASTOLIC BLOOD PRESSURE: 54 MMHG | OXYGEN SATURATION: 90 % | TEMPERATURE: 98.1 F | RESPIRATION RATE: 16 BRPM | HEART RATE: 85 BPM | SYSTOLIC BLOOD PRESSURE: 102 MMHG

## 2020-01-02 DIAGNOSIS — C50.919 BREAST CANCER METASTASIZED TO BONE, UNSPECIFIED LATERALITY (HCC): ICD-10-CM

## 2020-01-02 DIAGNOSIS — C50.912 CARCINOMA OF LEFT BREAST METASTATIC TO BONE (HCC): ICD-10-CM

## 2020-01-02 DIAGNOSIS — Z95.828 PORT-A-CATH IN PLACE: ICD-10-CM

## 2020-01-02 DIAGNOSIS — C50.412 MALIGNANT NEOPLASM OF UPPER-OUTER QUADRANT OF LEFT FEMALE BREAST, UNSPECIFIED ESTROGEN RECEPTOR STATUS (HCC): ICD-10-CM

## 2020-01-02 DIAGNOSIS — C79.51 CARCINOMA OF LEFT BREAST METASTATIC TO BONE (HCC): ICD-10-CM

## 2020-01-02 DIAGNOSIS — C79.51 BREAST CANCER METASTASIZED TO BONE, UNSPECIFIED LATERALITY (HCC): ICD-10-CM

## 2020-01-02 DIAGNOSIS — C79.51 CARCINOMA OF LEFT BREAST METASTATIC TO BONE (HCC): Primary | ICD-10-CM

## 2020-01-02 DIAGNOSIS — C50.912 CARCINOMA OF LEFT BREAST METASTATIC TO BONE (HCC): Primary | ICD-10-CM

## 2020-01-02 LAB
BASOPHILS # BLD AUTO: 0.05 10*3/MM3 (ref 0–0.2)
BASOPHILS NFR BLD AUTO: 0.7 % (ref 0–1.5)
DEPRECATED RDW RBC AUTO: 59.7 FL (ref 37–54)
EOSINOPHIL # BLD AUTO: 0.01 10*3/MM3 (ref 0–0.4)
EOSINOPHIL NFR BLD AUTO: 0.1 % (ref 0.3–6.2)
ERYTHROCYTE [DISTWIDTH] IN BLOOD BY AUTOMATED COUNT: 17 % (ref 12.3–15.4)
HCT VFR BLD AUTO: 38.6 % (ref 34–46.6)
HGB BLD-MCNC: 12.7 G/DL (ref 12–15.9)
HOLD SPECIMEN: NORMAL
HOLD SPECIMEN: NORMAL
IMM GRANULOCYTES # BLD AUTO: 0.06 10*3/MM3 (ref 0–0.05)
IMM GRANULOCYTES NFR BLD AUTO: 0.9 % (ref 0–0.5)
LYMPHOCYTES # BLD AUTO: 1.91 10*3/MM3 (ref 0.7–3.1)
LYMPHOCYTES NFR BLD AUTO: 28.4 % (ref 19.6–45.3)
MAGNESIUM SERPL-MCNC: 1.5 MG/DL (ref 1.6–2.4)
MCH RBC QN AUTO: 31.9 PG (ref 26.6–33)
MCHC RBC AUTO-ENTMCNC: 32.9 G/DL (ref 31.5–35.7)
MCV RBC AUTO: 97 FL (ref 79–97)
MONOCYTES # BLD AUTO: 0.7 10*3/MM3 (ref 0.1–0.9)
MONOCYTES NFR BLD AUTO: 10.4 % (ref 5–12)
NEUTROPHILS # BLD AUTO: 3.99 10*3/MM3 (ref 1.7–7)
NEUTROPHILS NFR BLD AUTO: 59.5 % (ref 42.7–76)
NRBC BLD AUTO-RTO: 0 /100 WBC (ref 0–0.2)
PLATELET # BLD AUTO: 377 10*3/MM3 (ref 140–450)
PMV BLD AUTO: 8.8 FL (ref 6–12)
RBC # BLD AUTO: 3.98 10*6/MM3 (ref 3.77–5.28)
WBC NRBC COR # BLD: 6.72 10*3/MM3 (ref 3.4–10.8)

## 2020-01-02 PROCEDURE — 99214 OFFICE O/P EST MOD 30 MIN: CPT | Performed by: INTERNAL MEDICINE

## 2020-01-02 PROCEDURE — 96375 TX/PRO/DX INJ NEW DRUG ADDON: CPT

## 2020-01-02 PROCEDURE — 25010000002 PALONOSETRON PER 25 MCG: Performed by: INTERNAL MEDICINE

## 2020-01-02 PROCEDURE — 83735 ASSAY OF MAGNESIUM: CPT

## 2020-01-02 PROCEDURE — 36415 COLL VENOUS BLD VENIPUNCTURE: CPT

## 2020-01-02 PROCEDURE — 25010000002 DEXAMETHASONE SODIUM PHOSPHATE 100 MG/10ML SOLUTION: Performed by: INTERNAL MEDICINE

## 2020-01-02 PROCEDURE — 25010000002 MAGNESIUM SULFATE 2 GM/50ML SOLUTION: Performed by: INTERNAL MEDICINE

## 2020-01-02 PROCEDURE — 25010000002 ERIBULIN 1 MG/2ML SOLUTION: Performed by: INTERNAL MEDICINE

## 2020-01-02 PROCEDURE — 85025 COMPLETE CBC W/AUTO DIFF WBC: CPT

## 2020-01-02 PROCEDURE — 96409 CHEMO IV PUSH SNGL DRUG: CPT

## 2020-01-02 PROCEDURE — 96367 TX/PROPH/DG ADDL SEQ IV INF: CPT

## 2020-01-02 RX ORDER — MAGNESIUM SULFATE HEPTAHYDRATE 40 MG/ML
2 INJECTION, SOLUTION INTRAVENOUS ONCE
Status: COMPLETED | OUTPATIENT
Start: 2020-01-02 | End: 2020-01-02

## 2020-01-02 RX ORDER — SODIUM CHLORIDE 0.9 % (FLUSH) 0.9 %
10 SYRINGE (ML) INJECTION AS NEEDED
OUTPATIENT
Start: 2020-01-02

## 2020-01-02 RX ORDER — SODIUM CHLORIDE 9 MG/ML
250 INJECTION, SOLUTION INTRAVENOUS ONCE
Status: COMPLETED | OUTPATIENT
Start: 2020-01-02 | End: 2020-01-02

## 2020-01-02 RX ORDER — PALONOSETRON 0.05 MG/ML
0.25 INJECTION, SOLUTION INTRAVENOUS ONCE
Status: COMPLETED | OUTPATIENT
Start: 2020-01-02 | End: 2020-01-02

## 2020-01-02 RX ORDER — SODIUM CHLORIDE 0.9 % (FLUSH) 0.9 %
10 SYRINGE (ML) INJECTION AS NEEDED
Status: DISCONTINUED | OUTPATIENT
Start: 2020-01-02 | End: 2020-01-02 | Stop reason: HOSPADM

## 2020-01-02 RX ORDER — MAGNESIUM SULFATE IN 0.9% NACL 2 G/50 ML
2 INTRAVENOUS SOLUTION, PIGGYBACK (ML) INTRAVENOUS ONCE
Status: CANCELLED
Start: 2020-01-02

## 2020-01-02 RX ORDER — HEPARIN SODIUM (PORCINE) LOCK FLUSH IV SOLN 100 UNIT/ML 100 UNIT/ML
500 SOLUTION INTRAVENOUS AS NEEDED
OUTPATIENT
Start: 2020-01-02

## 2020-01-02 RX ADMIN — DEXAMETHASONE SODIUM PHOSPHATE 12 MG: 10 INJECTION, SOLUTION INTRAMUSCULAR; INTRAVENOUS at 11:32

## 2020-01-02 RX ADMIN — ERIBULIN MESYLATE 1.95 MG: 0.5 INJECTION INTRAVENOUS at 12:02

## 2020-01-02 RX ADMIN — Medication 500 UNITS: at 13:35

## 2020-01-02 RX ADMIN — PALONOSETRON HYDROCHLORIDE 0.25 MG: 0.25 INJECTION, SOLUTION INTRAVENOUS at 11:30

## 2020-01-02 RX ADMIN — SODIUM CHLORIDE 250 ML: 9 INJECTION, SOLUTION INTRAVENOUS at 11:26

## 2020-01-02 RX ADMIN — SODIUM CHLORIDE, PRESERVATIVE FREE 10 ML: 5 INJECTION INTRAVENOUS at 13:35

## 2020-01-02 RX ADMIN — MAGNESIUM SULFATE HEPTAHYDRATE 2 G: 40 INJECTION, SOLUTION INTRAVENOUS at 12:07

## 2020-01-02 NOTE — PROGRESS NOTES
[facility]  HEMATOLOGY & ONCOLOGY    Cancer Staging Information:  Cancer Staging  Malignant neoplasm of upper-outer quadrant of left female breast (CMS/HCC)  Staging form: Breast, AJCC V7  - Clinical stage from 10/11/2016: Stage IV (T2, N1, M1) - Signed by Calli Monsalve APRN on 10/11/2016        Subjective     VISIT DIAGNOSIS:   Encounter Diagnosis   Name Primary?   • Carcinoma of left breast metastatic to bone (CMS/HCC)        REASON FOR VISIT:     Chief Complaint   Patient presents with   • Breast Cancer     here for chemo, complaints of no appetite and getting choked easily         HEMATOLOGY / ONCOLOGY HISTORY:   Oncology/Hematology History    Patient is a 67-year-old postmenopausal female who had onset of her menses at age 9 and menopause at age 48. She received oral contraceptives for approximately 14 years and did not receive hormonal manipulation. Patient has a maternal cousin had breast cancer. Patient has had no prior breast biopsies. Patient found a palpable left breast mass as well as a left axillary mass. Patient had a bloody nipple discharge which been present for approximately 6 months. On 6/20/2012, a mammogram was performed showing a subareolar mass consistent with malignancy. Bilateral breast ultrasound revealed an ill-defined subareolar mass measuring 2.8-2.5 cm. There is a left axillary mass measuring 1.7 cm with suspicion of extracapsular extension. There is no evidence of disease in the right breast. On 7/9/2012 patient underwent a left breast biopsy and placement of marker clip. Left breast biopsy revealed an infiltrating lobular carcinoma grade 2 with focal ductal carcinoma in situ, solid pattern. A fine-needle biopsy of the left lymph node was consistent with metastatic carcinoma. Breast tumor profile revealed ER: 100%; NJ: 98%; HER-2/maynor 2+; FISH: Unamplified; Ki-67: 71%; P 53 1%; DNA aneuploidy. A MRI of the breast were performed body multiple abnormal enhancing  masses within the left breast. There appears to be anterior retraction of the pectoralis muscle with no direct extension. There is a moderate to large, layering left pleural effusion appreciated. The right breast reveals 3 moderately enlarged lymph nodes in the posterior lateral aspect the right breast. These have fatty hilum but followup is recommended. Patient is undergone systemic studies including, on 8/2/2012, a CT scan of the brain showing atrophy. a CT scan that shows showing a small to moderate left pleural effusion with 3 subcentimeter nodule densities in the left lung.   She completed 4 cycles of neoadjuvant treatment with dose dense Adriamycin and Cytoxan. She had a mammogram which showed a significant reduction in the size of her left breast lesion. There is no axillary adenopathy noted. She has had an ultrasound revealing the  lesion reducing from 2.4 cm to 1 cm. Patient underwent a left mastectomy on 03/19/2013 finding negative invasive cancer, negative nodes. A 5% DCIS was noted. The patient has declined hormonal therapy. She did not need radiation therapy.  November 2014, she began to have evidence of lytic bone lesions at T5T6, frontal disease, an 8 mm lucency in the central frontal area of  the skull but nothing intracranial. Bone scan revealed only vertex tracer activity but bilateral ribs and thoracic spine, and other lesions in  her pelvis. At that time, she was started on letrozole since November 2014. She is taking Xgeva. She had progression found in bone in Feb 2015 on scan. She was started on Faslodex, Ibrance and continued xgeva.         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 B) Foci of ductal carcinoma in situ, solid pattern. C) Greatest dimension  of the invasive carcinoma is 15.8mm.      3/19/2013 Surgery     Left Mastectomy w/ Axillary Tail: Focal residual ducatal carcinoma in situ (DCIS) 12 lymph nodes negative for metastatic carcinoma.      10/20/2014 Biopsy     Peritoneum Biopsy: Final Diagnosis: Malignant Cell Neoplasm.        Breast cancer metastasized to bone, unspecified laterality (CMS/HCC)    10/15/2012 -  Other Event     Left mammogram:  Impression:  1. Decreasing spiculation and density in the retroareolar left breast, near a previous biopsy.   2. Definite left axillary lesion is not appreciated.       2/5/2013 -  Other Event     Diagnostic Mammo:  Comparison is made to 10/15/2012 and 6/20/2012  The spiculated mass at 12:00 behind the nipple is nearly resolved.   Impression:  1. The mass on the left side is less apparent after receiving chemotherapy. There has been a good response to chmotherapy on the left side.   2. No suspicious abnormality is seen in the right breast to account for the new palable abnormality at the 4-5:00 position.       3/19/2013 Surgery     Breast Biopsy:  Final Diagnosis:  A. Breast, left mastectomy with axillary tail  1. Focal residual ductal carcinoma in situ (DICS) (less than 5% of tumor bed).  2. Negative for residual invasive carcinoma.   3. 12 Lymph Nodes, negative for metastatic carcinoma (0/12) focally, some lymph nodes demonstrate fibrosis/treatment effect.  B. Skin and soft tissue, left advancement flap:  1. Benign skin and subcutis.   2. Negative for carcinoma.       6/20/2013 -  Other Event     Diagnostic Mammo [facility]:  1. A subareolar mass is consistent with malignancy. Additional involement extends inferiorly sonographically and superolaterally mammograhically as demonstrated by calcifications. The mass measures approx 2.8 cm sonographically, but the involved region is likely much larger including an intraductal componet. The left axillary lymph nodes are also involved.   2. Recommened ultrasound guided  biopsy of the left subareolar mass and left axillary lymph node.   3. Breast MRI could also further define multicentric disease on the left.   4. Clear suspicious finding on the right.  Recommend ongoing clinical follow up of palpable foci.       6/13/2014 -  Other Event     Diagnostic Mammo:  IMPRESSION:  1. History of left breast cancer and mastectomy. Stable benign right breast mammograms.       10/20/2014 Surgery     Final Diagnosis:  Biopsies, pertoneum:  Metastatic carcinoma, morphologically compatible with metastatic mammary carcinoma.       6/15/2015 -  Other Event     Diagnostic Mammo:  IMPRESSION:   1. Negative x-ray report, should not delay biopsy if a dominat or clinically suspicious mass is present.       7/5/2016 -  Other Event     Diagnositic mammogram:  Impression:   Stable right mamogram with no radiographic findings suspicious for malignancy. Recommend continued screening mammography per screening guidelines unless otherwise earlier clinically indicated.       9/18/2016 Initial Diagnosis     Secondary malignant neoplasm of bone and bone marrow        Carcinoma of left breast metastatic to bone (CMS/HCC)    3/1/2017 Initial Diagnosis     Carcinoma of left breast metastatic to bone (CMS/HCC)      12/18/2019 -  Chemotherapy     OP BREAST Eribulin             INTERVAL HISTORY  Patient ID: Heavenly Yanes is a 74 y.o. year old female Cancer Staging  Stage IV (T2, N1, M1)  --complaining of dyspnea on exersion since last Tuesday. She feels like she ran a mile  2/14/19: cxr showed pneumonia. Already on levofloxacin. Coughing not worse, she did not run temp.  Overall feels weak.  -restarted the appetite suppressant and is starting to eat more but not a whole lot  --2/28/19: CT c/ap with some response to therapy. New left chest inflammatory lesion to be followed ion next imaging. Bone scan unremarkable for progression.  4/25/19: she is much better since being off therapy for 2 weeks. COugh is improved.    6/20/19: has left ear drainage which is her usual ear infection. Leg edema improved with lasix. She was ordered a mammo by the surgeon which I told her is not necessary, she has metastatic dz.    7/25/19: complaining of increased dyspnea on exertion. She takes her laxis daily.    8/22/19:  complaining of increased dyspnea on exertion. She takes her laxis daily. Also not much appetite even though on megace 40bid. YAIR not worse from prior. Weight the same  8/30/19: she presents today to review CT scan. Overall stable in terms of her cancer but there is a concerning are of extraluminal collection of air in the deep pelvis with a thickened wall measuring about 2.8 x 3.2 cm that is situated between the lower sigmoid colon and small bowel and just above the uterus. This likely represents a contained sigmoid colon perforation related to prior diverticulitis. Perforated neoplasm cannot be ruled out. GI consultation is recommended for this finding. The sigmoid colon and adjacent small bowel loops appear to be matted around this collection.  -11/7/19: main concern from  is lack of appetite. contnues to be fatigued.  11/15/19: appetite still same per  but he nags at her to eat more. Pt complaining of choking today and not able to swallow. Per , it has been happening for a while. Will refer to GI    12/3/19: here to review scan, toxicity assessment. CT showd stable bone mets, chest imaging showed possible infection.neoplatic process and moderate to large right pleural effusion that may be slightly larger  on the current study. Abdominal CT showed Diffuse wall thickening of the sigmoid colon extending to the rectum. An extraluminal air collection adjacent to the sigmoid colon and just above the uterus is slightly larger. The differential for this includes diverticulitis with perforation or colon cancer with perforation  -currently not having any abdominal symptoms  -she saw GI and deferred c-scope.  Esophagram done for dysphagia.  -she developed left shoulder superficial ulcer from pressure    12/12/19: here to review scan. Recent scan showed progression in the L2 sclerotic lesion. Thoracentesis positive for breast cancer.    12/18/19: Heavenly presents to initiate eribulin.  I reviewed tumor markers with pt and they have progressively increased. ; 74.2/60/47.8. Today is pending  -CA 27.29 93.6/79.8/49.9. Today is pending  Ros + for Weakness, lack of appetite, sob  -Denies cp/n/v/, diarrhea/fever/chills/neuropathy/ no focal weakness.   Rest of ros unremarkable. PE positive for left shoulder superficial pressure ulcer. 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) 9/18/2016     Past Surgical History:   Past Surgical History:   Procedure Laterality Date   • BREAST SURGERY      left breast biopsy and axillary node   • CHOLECYSTECTOMY     • COLONOSCOPY  08/15/2014    diverticulosis  otherwise normal colonoscopy   • EXTERNAL EAR SURGERY     • MASTECTOMY Left    • PORTACATH PLACEMENT       Social History:   Social History     Socioeconomic History   • Marital status:      Spouse name: Not on file   • Number of children: Not on file   • Years of education: Not on file   • Highest education level: Not on file   Tobacco Use   • Smoking status: Never Smoker   • Smokeless tobacco: Never Used   Substance and Sexual Activity   • Alcohol use: No   • Drug use: No   • Sexual activity: Defer     Family History:   Family History   Problem Relation Age of Onset   • No Known Problems Daughter    • No Known Problems Son    • Other Mother          complications from a concussion from a fall   • Other Father         old age   • Colon polyps Father    • No Known Problems Brother    • No Known Problems Daughter    • Colon cancer Neg Hx    • Esophageal cancer Neg Hx        Review of Systems   Constitutional: Positive for activity change, appetite change and fatigue. Negative for unexpected weight change.   HENT: Negative.    Eyes: Negative.    Respiratory: Positive for shortness of breath.    Cardiovascular: Positive for leg swelling.   Gastrointestinal: Negative.    Endocrine: Negative.    Genitourinary: Negative.    Musculoskeletal: Negative.    Skin: Negative.    Neurological: Negative.    Hematological: Negative.    Psychiatric/Behavioral: Negative.         Performance Status:  Asymptomatic    Medications:    Current Outpatient Medications   Medication Sig Dispense Refill   • Calcium-Magnesium-Vitamin D (CALCIUM 1200+D3 PO) Take 1,200 mg by mouth Daily.     • cetirizine (zyrTEC) 5 MG tablet Take 5 mg by mouth Daily.     • furosemide (LASIX) 20 MG tablet One tab po qd as needed for swelling take with potassium 30 tablet 3   • megestrol (MEGACE) 40 MG tablet Take 1 tablet by mouth 2 (Two) Times a Day. 60 tablet 5   • OLANZapine (ZYPREXA) 2.5 MG tablet Take 2.5 mg by mouth every night.     • ondansetron (ZOFRAN) 8 MG tablet Take 1 tablet by mouth Every 8 (Eight) Hours As Needed for Nausea or Vomiting. 30 tablet 5   • potassium chloride (K-DUR,KLOR-CON) 20 MEQ CR tablet Take one tab po bid on days taking Lasix 60 tablet 1   • raNITIdine (ZANTAC) 150 MG tablet Take 1 tablet by mouth 2 (Two) Times a Day. 60 tablet 5   • rivaroxaban (XARELTO) 20 MG tablet Take 1 tablet by mouth Daily. 30 tablet 5   • thyroid 60 MG PO tablet Take 60 mg by mouth daily.       No current facility-administered medications for this visit.      Facility-Administered Medications Ordered in Other Visits   Medication Dose Route Frequency Provider Last Rate Last Dose   • heparin flush  "(porcine) 100 UNIT/ML injection 500 Units  500 Units Intravenous PRN Joseph Nunez MD   500 Units at 10/26/17 1155   • sodium chloride 0.9 % flush 10 mL  10 mL Intravenous PRN Joseph Nunez MD   10 mL at 10/26/17 1155       ALLERGIES:    Allergies   Allergen Reactions   • Other Angioedema     POPPY SEED   • Penicillins Hives   • Sulfa Antibiotics Mental Status Change   • Sulfur Mental Status Change   • Valium [Diazepam] Provider Review Needed     Unable to remember reaction   • Benadryl [Diphenhydramine Hcl (Sleep)] Provider Review Needed     Shaky legs   • Codeine Rash   • Diphenhydramine Provider Review Needed     \"Shaky leg syndrome\"  \"Shaky leg syndrome\"  \"Shaky leg syndrome\"   • Heparin Provider Review Needed     Patient states tolerates Heparin flush without problems: Years ago had heparin IV and had a rash   • Petroleum Jelly [Skin Protectants, Misc.] Rash       Objective      Vitals:    01/02/20 1025   BP: 94/68   Pulse: 100   Resp: 16   Temp: 99.7 °F (37.6 °C)   SpO2: 91%   Weight: 52.6 kg (116 lb)   Height: 165.1 cm (65\")   PainSc: 0-No pain         Current Status 12/18/2019   ECOG score 2         Physical Examunchanged  General Appearance: Patient is awake, alert, oriented and in no acute distress. Patient is welldeveloped, wellnourished, and appears stated age.  HEENT: Normocephalic. Sclerae clear, conjunctiva pink, extraocular movements intact, pupils, round, reactive to light and  accommodation. Mouth and throat are clear with moist oral mucosa.  NECK: Supple, no jugular venous distention, thyroid not enlarged.  LYMPH: No cervical, supraclavicular, axillary, or inguinal lymphadenopathy.  CHEST: Equal bilateral expansion, AP  diameter normal, resonant percussion note  LUNGS: Good air movement, no rales, rhonchi, rubs or wheezes with auscultation on the right. Diminished on the left.  CARDIO: Regular sinus rhythm, no murmurs, gallops or rubs.  ABDOMEN: Nondistended, soft, No tenderness, no guarding, no " rebound, No hepatosplenomegaly. No abdominal masses. Bowel sounds positive. No hernia  GENITALIA: Not examined.  BREASTS: Not examined.  MUSKEL: No joint swelling, decreased motion, or inflammation  EXTREMS: patrick le  Edema L>R stable,No clubbing, cyanosis, No varicose veins. positive for left shoulder superficial pressure ulcer. No redness, warmth or swelling  NEURO: Grossly nonfocal, Gait is coordinated and smooth, Cognition is preserved.  SKIN: No rashes, no ecchymoses, no petechia.  PSYCH: Oriented to time, place and person. Memory is preserved. Mood and affect appear normal  RECENT LABS:  Lab on 01/02/2020   Component Date Value Ref Range Status   • Magnesium 01/02/2020 1.5* 1.6 - 2.4 mg/dL Final   • WBC 01/02/2020 6.72  3.40 - 10.80 10*3/mm3 Final   • RBC 01/02/2020 3.98  3.77 - 5.28 10*6/mm3 Final   • Hemoglobin 01/02/2020 12.7  12.0 - 15.9 g/dL Final   • Hematocrit 01/02/2020 38.6  34.0 - 46.6 % Final   • MCV 01/02/2020 97.0  79.0 - 97.0 fL Final   • MCH 01/02/2020 31.9  26.6 - 33.0 pg Final   • MCHC 01/02/2020 32.9  31.5 - 35.7 g/dL Final   • RDW 01/02/2020 17.0* 12.3 - 15.4 % Final   • RDW-SD 01/02/2020 59.7* 37.0 - 54.0 fl Final   • MPV 01/02/2020 8.8  6.0 - 12.0 fL Final   • Platelets 01/02/2020 377  140 - 450 10*3/mm3 Final   • Neutrophil % 01/02/2020 59.5  42.7 - 76.0 % Final   • Lymphocyte % 01/02/2020 28.4  19.6 - 45.3 % Final   • Monocyte % 01/02/2020 10.4  5.0 - 12.0 % Final   • Eosinophil % 01/02/2020 0.1* 0.3 - 6.2 % Final   • Basophil % 01/02/2020 0.7  0.0 - 1.5 % Final   • Immature Grans % 01/02/2020 0.9* 0.0 - 0.5 % Final   • Neutrophils, Absolute 01/02/2020 3.99  1.70 - 7.00 10*3/mm3 Final   • Lymphocytes, Absolute 01/02/2020 1.91  0.70 - 3.10 10*3/mm3 Final   • Monocytes, Absolute 01/02/2020 0.70  0.10 - 0.90 10*3/mm3 Final   • Eosinophils, Absolute 01/02/2020 0.01  0.00 - 0.40 10*3/mm3 Final   • Basophils, Absolute 01/02/2020 0.05  0.00 - 0.20 10*3/mm3 Final   • Immature Grans, Absolute  01/02/2020 0.06* 0.00 - 0.05 10*3/mm3 Final   • nRBC 01/02/2020 0.0  0.0 - 0.2 /100 WBC Final       RADIOLOGY:  Us Chest    Result Date: 12/3/2019  Narrative: EXAM: US THORACENTESIS- - 12/3/2019 1:52 PM CST  HISTORY: h/o metastatic breast ca with pleural effusion on CT; C50.912-Malignant neoplasm of unspecified site of left female breast; C79.51-Secondary malignant neoplasm of bone; R13.19-Other dysphagia. Right pleural effusion.  COMPARISON: 11/21/2019  PROCEDURE: Risks, benefits and alternatives to the procedure were discussed with the patient. Written informed consent was obtained.  Time Out: Immediately prior to the procedure, a time out was performed. The patient's name, date of birth, and procedure to be performed were verified.  Using ultrasound guidance, a site in the right lower posterior chest was selected and prepped in a sterile fashion. Lidocaine was used for local anesthesia. A 5 [employer] catheter was inserted into the pleural space. 900 mL of clear yellow fluid was withdrawn. The catheter was then removed. The fluid was sent to the laboratory for analysis.    The patient experienced a coughing episode at the end of the procedure. Oxygen saturation was 94 percent, pulse 100. These are similar compared to her preprocedural vitals. Coughing episode subsided. No evidence of complication. The patient was discharged home in stable condition.       Impression: Successful ultrasound-guided diagnostic and therapeutic right thoracentesis with drainage of 900 mL of clear yellow pleural fluid. This report was finalized on 12/03/2019 16:08 by Dr Kendra Arce MD.    Xr Chest 1 View    Result Date: 12/3/2019  Narrative: Exam:   XR CHEST 1 VW-   Date:  12/03/2019  History:  Female, age  74 years;s/p thoracentesis; Z98.890-Other specified postprocedural states  COMPARISON:  Chest x-ray dated 07/25/2019.  Findings :  The heart and mediastinum are normal in size. Interval right thoracentesis, without measurable  pneumothorax. Trace left pleural effusion. Right-sided chest port is unchanged in position. Bilateral ill-defined lung parenchymal opacities redemonstrated. The heart is within normal limits in size.  The bones show no acute pathology.       Impression: Impression:  Status post right thoracentesis, without measurable pneumothorax.  This report was finalized on 12/03/2019 15:19 by Dr. Bekah Enriquez MD.    Us Thoracentesis    Result Date: 12/3/2019  Narrative: EXAM: US THORACENTESIS- - 12/3/2019 1:52 PM CST  HISTORY: h/o metastatic breast ca with pleural effusion on CT; C50.912-Malignant neoplasm of unspecified site of left female breast; C79.51-Secondary malignant neoplasm of bone; R13.19-Other dysphagia. Right pleural effusion.  COMPARISON: 11/21/2019  PROCEDURE: Risks, benefits and alternatives to the procedure were discussed with the patient. Written informed consent was obtained.  Time Out: Immediately prior to the procedure, a time out was performed. The patient's name, date of birth, and procedure to be performed were verified.  Using ultrasound guidance, a site in the right lower posterior chest was selected and prepped in a sterile fashion. Lidocaine was used for local anesthesia. A 5 [employer] catheter was inserted into the pleural space. 900 mL of clear yellow fluid was withdrawn. The catheter was then removed. The fluid was sent to the laboratory for analysis.    The patient experienced a coughing episode at the end of the procedure. Oxygen saturation was 94 percent, pulse 100. These are similar compared to her preprocedural vitals. Coughing episode subsided. No evidence of complication. The patient was discharged home in stable condition.       Impression: Successful ultrasound-guided diagnostic and therapeutic right thoracentesis with drainage of 900 mL of clear yellow pleural fluid. This report was finalized on 12/03/2019 16:08 by Dr Kendra Arce MD.           Assessment/Plan  Heavenly Yanes is a  74 y.o. year old female with met breast cancer s/p multiple lines of CMT currently on eribulin here for toxicity assessment.    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 Some new Inflammatory opacities in the LEFT lower lobe and bilateral subpleural interstitial thickening.   -bone scan without evidence of dz progression  --primo taxotere weekly. Pt also on xeloda [id number], then repeat.  4/25/19: therapy postponed 2 weeks ago due to neutropenia. He got Neupogen x1. Labs reviewed with pt wbc 10.68, hg 11.1, [id number]. ANC 7630.  -ANC low 880. Postpone tx. Give neupogen x1. Pt advised to hold xeloda until 2 weeks    -imaging CT c/a/p 5/31/19 showed stable dz. Bone scan 5/31/19 showed multiple metastatic dz, nothing new. Possible pathologic fracture in the area of the coracoid process. Pt does not endorse pain there.  -labs 8/22/19 reviewed with pt wbc 4.99 Hg 11.7, plt 400. Ok for treatment with taxotere and neulasta.  -exam and review of labs did not reveal reason for the increased sob on exertion  -8/30/19: she presents today to review CT scan done 8:29/19. Overall stable in terms of her cancer but there is a concerning are of extraluminal collection of air in the deep pelvis with a thickened wall measuring about 2.8 x 3.2 cm that is situated between the lower sigmoid colon and small bowel and just  "above the uterus. This likely represents a contained sigmoid colon perforation related to prior diverticulitis. Perforated neoplasm cannot be ruled out. GI consultation is recommended for this finding. The sigmoid colon and adjacent small bowel loops appear to be matted around this collection.  -currently not having any abdominal symptoms.  -will refer to GI  For dysphagia  -I reviewed CT finding with pt and  which showed pneumonia, and pleural effusion with stable bone disease  -rx levofloxacin for 14days  -arrange for thoracentesis  -hold off on treatment today  -labs reviewed with them wbc 30.57, Hg 12.5, plt 357.    -12/12/19: Recent scan showed progression in the L2 sclerotic lesion. Thoracentesis positive for breast cancer. I reviewed tumor markers with pt and they have progressively increased. ; 74.2/60/47.8. Today is pending  -CA 27.29 93.6/79.8/49.9. Today is pending  - discussed with Heavenly and her  that this is true progression  - I reviewed her therapy so far. She has progressed on almost all standard lines of therapy with the exception of the microtubule inhibitors. I offered best supportive care versus CMT. She chose to get treatment  -plan for eribulin with 10% dose reduction  -pros and cons of treatment discussed with pt and she expresses understand and would like to proceed.    12/18/19\" presents for initiation of tx. Pros and cons of eribulin discussed, printout of chemo care given to pt. She signed consent  -labs reviewed wbc 4.63, Hg 13.0, plt 518 Ok for eribulin    1/2/20: no new issues. Tolerating tx. Labs reviewed with pt and  wbc 6.72, Hg 12.7, plt 377. Mag 1.5. Will give 2g mag    2. Psych: on olanzapine    3. HTN: on metoprolol    4. Gerd: on ranitidine  5. Dyspnea: get a cxr to evaluate  6. Anemia: will check iron profile, and act accordingly . Her creatinine is normal at 0.94.   7. DVT on xarelto indefinite.  --Doppler left LE  1/30/19 There is evidence of deep " venous thrombosis of the left lower extremity. The clot burden is improved from last study. There is also evidence of superficial clot in the greater saphenous vein. There is a Baker's cyst in the popliteal fossa.  8. CHF: LVEF 65% with diastolic dysfunction. referral to cardiology to maximized her heart function.  9. Thrombocytosis: suspect reactive from anemia. Will check iron profile.  10.left lung pneumonia: resolved  --cxr showed pneumonia. Pt already on levofloxacin. Will complete 14 days.   11: LE edema: improved on lasix.  12. FEN:  Low potassium 3.4. primo kdur daily  -reinforced small frequent meals and high calorie food such as peanut butter, ice cream etc.  13. Left shoulder pr ulcer: referral to wound care made      Moise Sotelo MD    1/2/2020    10:51 AM

## 2020-01-08 DIAGNOSIS — C50.912 CARCINOMA OF LEFT BREAST METASTATIC TO BONE (HCC): ICD-10-CM

## 2020-01-08 DIAGNOSIS — C79.51 CARCINOMA OF LEFT BREAST METASTATIC TO BONE (HCC): ICD-10-CM

## 2020-01-13 ENCOUNTER — TELEPHONE (OUTPATIENT)
Dept: ONCOLOGY | Facility: CLINIC | Age: 75
End: 2020-01-13

## 2020-01-13 DIAGNOSIS — C50.912 CARCINOMA OF LEFT BREAST METASTATIC TO BONE (HCC): Primary | ICD-10-CM

## 2020-01-13 DIAGNOSIS — C79.51 CARCINOMA OF LEFT BREAST METASTATIC TO BONE (HCC): Primary | ICD-10-CM

## 2020-01-13 NOTE — TELEPHONE ENCOUNTER
Received return call from , Wyatt he says that he has talked with Heavenly and she does not want to make another trip back here and is requesting referral to [facility]. Informed him referral will be made today and to please let Heavenly know we love her and if we can be of any assistance or if they have any concerns or questions to please call our office. He v/u.

## 2020-01-13 NOTE — TELEPHONE ENCOUNTER
Received call from patient , he calls to report that wife, Heavenly has decided that she no longer wants any type of txt. Requesting something to control her pain.  Wants to cancel her jenni apt this Wednesday 1/15/20.   Informed  that Dr Sotelo would not write for pain medication without seeing patient. Informed Mr. Yanes that we could go ahead and make referral to [facility], so they  comfortable and get her pain under control. He v/u but feels this might be to fast, Heavenly is trying to absorb what is actually happening to her and it may be to fast, he says he will try to convenience Heavenly to keep her scheduled apt with Dr Sotelo this Wednesday, that he is sure she had lots of questions, maybe by that time she will be ready to make the decision.

## 2020-01-15 ENCOUNTER — APPOINTMENT (OUTPATIENT)
Dept: LAB | Facility: HOSPITAL | Age: 75
End: 2020-01-15

## 2020-01-15 ENCOUNTER — APPOINTMENT (OUTPATIENT)
Dept: ONCOLOGY | Facility: HOSPITAL | Age: 75
End: 2020-01-15

## 2020-01-22 ENCOUNTER — APPOINTMENT (OUTPATIENT)
Dept: ONCOLOGY | Facility: HOSPITAL | Age: 75
End: 2020-01-22

## 2020-01-24 ENCOUNTER — TELEPHONE (OUTPATIENT)
Dept: ONCOLOGY | Facility: CLINIC | Age: 75
End: 2020-01-24

## 2020-01-24 NOTE — TELEPHONE ENCOUNTER
Follow up call placed and spoke to Louise with [facility] regarding patient Heavenly Yanes. She states that she is holding her own at this time, denies pain, decline in her desire to eat or drink. Nurse is making in home visits twice weekly. She does have apt jenni today Friday 1/24/20. Asked if we could have weekly update on patient.